# Patient Record
Sex: MALE | Race: WHITE | NOT HISPANIC OR LATINO | Employment: UNEMPLOYED | URBAN - METROPOLITAN AREA
[De-identification: names, ages, dates, MRNs, and addresses within clinical notes are randomized per-mention and may not be internally consistent; named-entity substitution may affect disease eponyms.]

---

## 2014-01-14 LAB — HBA1C MFR BLD: 6.7 % (ref 4.3–6)

## 2014-01-29 LAB
CHOLEST SERPL-MCNC: 205 MG/DL (ref 0–200)
HDLC SERPL-MCNC: 27 MG/DL (ref 40–110)
LDLC SERPL CALC-MCNC: 130 MG/DL (ref 0–129)
NONHDLC SERPL-MCNC: ABNORMAL MG/DL
TRIGL SERPL-MCNC: 242 MG/DL (ref 0–150)

## 2014-07-08 LAB
ANION GAP SERPL CALCULATED.3IONS-SCNC: 13 MMOL/L (ref 6–17)
ANION GAP SERPL CALCULATED.3IONS-SCNC: 15 MMOL/L (ref 6–17)
BASOPHILS NFR BLD AUTO: 0.4 %
BUN SERPL-MCNC: 14 MG/DL (ref 5–24)
BUN SERPL-MCNC: 20 MG/DL (ref 5–24)
CALCIUM SERPL-MCNC: 8.8 MG/DL (ref 8.5–10.4)
CALCIUM SERPL-MCNC: 9.7 MG/DL (ref 8.5–10.4)
CHLORIDE SERPLBLD-SCNC: 101 MMOL/L (ref 94–109)
CHLORIDE SERPLBLD-SCNC: 103 MMOL/L (ref 94–109)
CO2 SERPL-SCNC: 28 MMOL/L (ref 20–32)
CO2 SERPL-SCNC: 28 MMOL/L (ref 20–32)
CREAT SERPL-MCNC: 0.93 MG/DL (ref 0.66–1.25)
CREAT SERPL-MCNC: 1 MG/DL (ref 0.66–1.25)
EOSINOPHIL NFR BLD AUTO: 3 %
ERYTHROCYTE [DISTWIDTH] IN BLOOD BY AUTOMATED COUNT: 15.1 % (ref 10–15)
GFR SERPL CREATININE-BSD FRML MDRD: 80 ML/MIN/1.73M2
GFR SERPL CREATININE-BSD FRML MDRD: 87 ML/MIN/1.73M2
GLUCOSE SERPL-MCNC: 147 MG/DL (ref 60–99)
GLUCOSE SERPL-MCNC: 186 MG/DL (ref 60–99)
HCT VFR BLD AUTO: 52.2 % (ref 40–53)
HEMOGLOBIN: 14.8 G/DL (ref 13.3–17.7)
HEMOGLOBIN: 17.6 G/DL (ref 13.3–17.7)
LYMPHOCYTES NFR BLD AUTO: 20.7 %
MCH RBC QN AUTO: 29.8 PG (ref 26.5–33)
MCHC RBC AUTO-ENTMCNC: 33.6 G/DL (ref 31.5–36.5)
MCV RBC AUTO: 88 FL (ref 78–100)
MONOCYTES NFR BLD AUTO: 6.9 %
NEUTROPHILS NFR BLD AUTO: 68.7 %
PLATELET COUNT - QUEST: 312 10^9/L (ref 150–450)
POTASSIUM SERPL-SCNC: 4.1 MMOL/L (ref 3.4–5.3)
POTASSIUM SERPL-SCNC: 4.5 MMOL/L (ref 3.4–5.3)
RBC # BLD AUTO: 5.9 10^12/L (ref 4.4–5.9)
SODIUM SERPL-SCNC: 142 MMOL/L (ref 133–144)
SODIUM SERPL-SCNC: 146 MMOL/L (ref 133–144)
WBC # BLD AUTO: 15.2 10^9/L (ref 4–11)

## 2017-01-05 ENCOUNTER — HOSPITAL ENCOUNTER (OUTPATIENT)
Dept: NEUROLOGY | Facility: CLINIC | Age: 47
Setting detail: THERAPIES SERIES
Discharge: STILL ADMITTED OR ADMINISTRATIVE | End: 2017-01-05
Attending: NURSE PRACTITIONER

## 2017-01-05 DIAGNOSIS — G89.29 CHRONIC PAIN: ICD-10-CM

## 2017-01-05 DIAGNOSIS — G47.00 INSOMNIA, UNSPECIFIED: ICD-10-CM

## 2017-01-05 DIAGNOSIS — F33.1 MDD (MAJOR DEPRESSIVE DISORDER), RECURRENT EPISODE, MODERATE (H): ICD-10-CM

## 2017-01-13 ENCOUNTER — OFFICE VISIT (OUTPATIENT)
Dept: ORTHOPEDICS | Facility: CLINIC | Age: 47
End: 2017-01-13

## 2017-01-13 VITALS — WEIGHT: 260 LBS | BODY MASS INDEX: 35.21 KG/M2 | HEIGHT: 72 IN

## 2017-01-13 DIAGNOSIS — E11.40 TYPE 2 DIABETES MELLITUS WITH DIABETIC NEUROPATHY, WITHOUT LONG-TERM CURRENT USE OF INSULIN (H): ICD-10-CM

## 2017-01-13 DIAGNOSIS — L60.0 ONYCHOCRYPTOSIS: Primary | ICD-10-CM

## 2017-01-13 ASSESSMENT — ENCOUNTER SYMPTOMS
CONSTIPATION: 1
EXERCISE INTOLERANCE: 1
DYSPNEA ON EXERTION: 1
TINGLING: 1
SHORTNESS OF BREATH: 1
HYPERTENSION: 1
MEMORY LOSS: 1
SNORES LOUDLY: 1
NUMBNESS: 1
LEG SWELLING: 1
LEG PAIN: 1
BACK PAIN: 1
ABDOMINAL PAIN: 1
NECK PAIN: 1

## 2017-01-13 NOTE — PROGRESS NOTES
Date of Service: 1/13/2017    Chief Complaint:   Chief Complaint   Patient presents with     Consult     Bilateral toe pain, Ingrown toe pain, Neuropathy        HPI: Konstantin is a 46 year old male who presents today for further evaluation of painful ingrown nails for many weeks. He presents today with his sister-in-law who is also his PCA. They relate to me that Konstantin is a diabetic. He receives his primary care from an outside PCP. He relates that the nails get ingrown in the corners and he tries to dig them out himself. He also relates the sensation of feeling like thread is around the toes, not in a constricting manner, just loosely wrapped around. He doesn't recall his last A1c number exactly, however he thinks it was in the low 7's. His PMD would like this number decreased. He relates that he has neuropathy and has had large doses of gabapentin that were not sufficient to help with the symptoms. His PMD attempted to get Lyrica for him, however it was declined by insurance.     Review of Systems: A 14-point review was obtained and added to the medical record.   Answers for HPI/ROS submitted by the patient on 1/13/2017   General Symptoms: No  Skin Symptoms: No  HENT Symptoms: No  EYE SYMPTOMS: No  HEART SYMPTOMS: Yes  LUNG SYMPTOMS: Yes  INTESTINAL SYMPTOMS: Yes  URINARY SYMPTOMS: No  REPRODUCTIVE SYMPTOMS: No  SKELETAL SYMPTOMS: Yes  BLOOD SYMPTOMS: No  NERVOUS SYSTEM SYMPTOMS: Yes  MENTAL HEALTH SYMPTOMS: No  Difficulty breating or shortness of breath: Yes  Snoring: Yes  Difficulty breathing on exertion: Yes  Chest pain or pressure: Yes  Pain in legs with walking: Yes  Swelling in feet or ankles: Yes  Fingers or Toes appear blue: Yes  High blood pressure: Yes  Chest pain on exertion: Yes  Pacemaker: Yes  Edema or swelling: Yes  Exercise intolerance: Yes  Abdominal pain: Yes  Constipation: Yes  Hemorrhoids: Yes  Back pain: Yes  Neck pain: Yes  Memory loss: Yes  Tingling: Yes  Numbness: Yes        PMH:   Past  Medical History   Diagnosis Date     Cardiomyopathy      Atrial fibrillation (H)      HTN (hypertension)      Unspecified asthma(493.90)      Heart disease      Inflammatory arthritis      Back pains, lower      Cardiomyopathy (H)      Polysubstance abuse      Sleep apnea      doesn't use cpap     Cardiomyopathy      Type 2 diabetes mellitus without complications (H)      Coronary artery disease      Shortness of breath      Muscular dystrophy (H)      Dual ICD (implantable cardiac defibrillator) in place 4/29/2014     Pacemaker      Hypertrophic nonobstructive cardiomyopathy (H) 9/12/2012       PSxH:   Past Surgical History   Procedure Laterality Date     Neck surgery       X 2     Stomach surgery       Wrist surgery       LEFT     Shoulder surgery       RIGHT     Discectomy lumbar posterior microscopic three+ levels  12/16/2011     Procedure:DISCECTOMY LUMBAR POSTERIOR MICROSCOPIC THREE+ LEVELS; Posterior Decompression L2-S1; Surgeon:CAITIE OSMAN; [facility]     Appendectomy       Fusion cervical posterior one level  8/24/2012     Procedure: FUSION CERVICAL POSTERIOR ONE LEVEL;  Posterior Instrumentated Spinal Fusion Cervical 6-7, Right Iliac Crest Bone [place] ;  Surgeon: Caitie Osman MD;  [facility]     Graft bone from iliac crest  8/24/2012     Procedure: GRAFT BONE FROM ILIAC CREST;;  Surgeon: Caitie Osman MD;  [facility]     Abdomen surgery       Insert stimulator and leads internal dorsal column  8/7/2013     Procedure: INSERT STIMULATOR AND LEADS INTERNAL DORSAL COLUMN;  SPINAL CORD STIMULATOR IMPLANT ;  Surgeon: Ricardo Bruno MD;  [facility]     Insert stimulator dorsal column       Myectomy septal  4/14/2014     Procedure: Median Sternotomy, Septal Myectomy, Intraoperative BRENT performed by Dr. Castano, on pump oxygenator.;  Surgeon: Aguila Cannon MD;  [facility]       4/29/2014     Procedure: ANESTHESIA ICD/PACEMAKER CHANGE ADULT;   "Surgeon: Generic Anesthesia Provider;  [facility]     Laser co2 laryngoscopy, complex  7/22/2014     Procedure: LASER CO2 LARYNGOSCOPY, COMPLEX;  Surgeon: Siri Koch MD;  [facility]     Laser co2 laryngoscopy N/A 2/19/2015     Procedure: LASER CO2 LARYNGOSCOPY;  Surgeon: Siri Koch MD;  [facility]     Inject steroid (location) N/A 2/19/2015     Procedure: INJECT STEROID (LOCATION);  Surgeon: Siri Koch MD;  [facility]       Allergies: Bee venom; Lisinopril; and Penicillins    SH:   Social History     Social History     Marital Status: Single     Spouse Name: N/A     Number of Children: N/A     Years of Education: N/A     Occupational History     Not on file.     Social History Main Topics     Smoking status: Current Every Day Smoker -- 0.25 packs/day for 28 years     Types: Cigarettes     Last Attempt to Quit: 04/07/2014     Smokeless tobacco: Never Used     Alcohol Use: 0.0 oz/week     0 Standard drinks or equivalent per week      Comment: 0-1 X Weekly     Drug Use: No     Sexual Activity: Not on file     Other Topics Concern     Parent/Sibling W/ Cabg, Mi Or Angioplasty Before 65f 55m? Yes     Social History Narrative       FH:   Family History   Problem Relation Age of Onset     HEART DISEASE Father 32     MIs x2; s/p CABG     Hypertension Brother      DIABETES Brother      Glaucoma Maternal Grandmother      DIABETES Maternal Grandfather      Glaucoma Maternal Grandfather        Objective:   6' 0\" 260 lbs 0 oz    PT and DP pulses are 1/4 bilaterally. CRT is > 3 seconds. Diminished pedal hair.   Gross sensation is intact bilaterally. Monofilament testing with a 5.07 (10 G) filament revealed decreased protective sensation bilaterally.    Equinus is noted bilaterally. No pain with active or passive ROM of the ankle, MTJ, 1st ray, or halluces bilaterally,.   No open lesions are noted. Nails are short. The hallux nails are mildly incurvated at the medial " borders with no signs of infection noted to the digits today. Some mild pain with palpation of the area.     Assessment: DM2 with neuropathy  Onychocryptosis BL halluces     Plan:  - Pt seen and evaluated  - The medial hallucial nails were debrided using a slantback procedure. The right nail had scant bleeding and was covered with a bandaid.   - I will try to get a topical neuropathic cream. I think that the insurance will deny it though. If they do, he can try Capsaicin OTC for the neuropathy.   - See again in 6 months or sooner if problems arise.

## 2017-01-13 NOTE — NURSING NOTE
Reason For Visit:   Chief Complaint   Patient presents with     Consult     Bilateral toe pain, Ingrown toe pain, Neuropathy       Primary MD: Jesus Boyle      Ht 1.829 m (6')  Wt 117.935 kg (260 lb)  BMI 35.25 kg/m2    Pain Assessment  Patient Currently in Pain: Yes  0-10 Pain Scale: 7  Primary Pain Location: Foot  Pain Orientation: Right, Left  Other Pain Locations: Neck, Back   Pain Descriptors: Aching, Burning, Tingling, Sharp, Shooting         Current Outpatient Prescriptions   Medication     Lubiprostone (AMITIZA PO)     FAMOTIDINE PO     warfarin (COUMADIN) 5 MG tablet     warfarin (COUMADIN) 1 MG tablet     losartan (COZAAR) 25 MG tablet     metFORMIN (GLUCOPHAGE) 500 MG tablet     traZODone (DESYREL) 50 MG tablet     glipiZIDE (GLUCOTROL) 5 MG tablet     mirtazapine (REMERON) 45 MG tablet     venlafaxine (EFFEXOR-XR) 150 MG 24 hr capsule     topiramate (TOPAMAX) 50 MG tablet     ARIPiprazole (ABILIFY) 2 MG tablet     diphenhydrAMINE (BENADRYL) 25 MG tablet     amitriptyline (ELAVIL) 25 MG tablet     magnesium oxide (MAG-OX) 400 (241.3 MG) MG tablet     QUEtiapine (SEROQUEL) 100 MG tablet     senna (SENOKOT) 8.6 MG tablet     metoprolol (LOPRESSOR) 50 MG tablet     aspirin 81 MG tablet     albuterol (PROAIR HFA, PROVENTIL HFA, VENTOLIN HFA) 108 (90 BASE) MCG/ACT inhaler     No current facility-administered medications for this visit.          Allergies   Allergen Reactions     Bee Venom Swelling     Lisinopril      TABS  Severe cough     Penicillins Hives and Difficulty breathing

## 2017-01-13 NOTE — Clinical Note
1/13/2017       RE: Konstantin Sharp  [address]  [facility] [address]     Dear Colleague,    Thank you for referring your patient, Konstantin Sharp, to the [facility] at [facility]. Please see a copy of my visit note below.    Date of Service: 1/13/2017    Chief Complaint:   Chief Complaint   Patient presents with     Consult     Bilateral toe pain, Ingrown toe pain, Neuropathy        HPI: Konstantin is a 46 year old male who presents today for further evaluation of painful ingrown nails for many weeks. He presents today with his sister-in-law who is also his PCA. They relate to me that Konstantin is a diabetic. He receives his primary care from an outside PCP. He relates that the nails get ingrown in the corners and he tries to dig them out himself. He also relates the sensation of feeling like thread is around the toes, not in a constricting manner, just loosely wrapped around. He doesn't recall his last A1c number exactly, however he thinks it was in the low 7's. His PMD would like this number decreased. He relates that he has neuropathy and has had large doses of gabapentin that were not sufficient to help with the symptoms. His PMD attempted to get Lyrica for him, however it was declined by insurance.     Review of Systems: A 14-point review was obtained and added to the medical record.   Answers for HPI/ROS submitted by the patient on 1/13/2017   General Symptoms: No  Skin Symptoms: No  HENT Symptoms: No  EYE SYMPTOMS: No  HEART SYMPTOMS: Yes  LUNG SYMPTOMS: Yes  INTESTINAL SYMPTOMS: Yes  URINARY SYMPTOMS: No  REPRODUCTIVE SYMPTOMS: No  SKELETAL SYMPTOMS: Yes  BLOOD SYMPTOMS: No  NERVOUS SYSTEM SYMPTOMS: Yes  MENTAL HEALTH SYMPTOMS: No  Difficulty breating or shortness of breath: Yes  Snoring: Yes  Difficulty breathing on exertion: Yes  Chest pain or pressure: Yes  Pain in legs with walking: Yes  Swelling in feet or ankles: Yes  Fingers or Toes appear blue:  Yes  High blood pressure: Yes  Chest pain on exertion: Yes  Pacemaker: Yes  Edema or swelling: Yes  Exercise intolerance: Yes  Abdominal pain: Yes  Constipation: Yes  Hemorrhoids: Yes  Back pain: Yes  Neck pain: Yes  Memory loss: Yes  Tingling: Yes  Numbness: Yes        PMH:   Past Medical History   Diagnosis Date     Cardiomyopathy      Atrial fibrillation (H)      HTN (hypertension)      Unspecified asthma(493.90)      Heart disease      Inflammatory arthritis      Back pains, lower      Cardiomyopathy (H)      Polysubstance abuse      Sleep apnea      doesn't use cpap     Cardiomyopathy      Type 2 diabetes mellitus without complications (H)      Coronary artery disease      Shortness of breath      Muscular dystrophy (H)      Dual ICD (implantable cardiac defibrillator) in place 4/29/2014     Pacemaker      Hypertrophic nonobstructive cardiomyopathy (H) 9/12/2012       PSxH:   Past Surgical History   Procedure Laterality Date     Neck surgery       X 2     Stomach surgery       Wrist surgery       LEFT     Shoulder surgery       RIGHT     Discectomy lumbar posterior microscopic three+ levels  12/16/2011     Procedure:DISCECTOMY LUMBAR POSTERIOR MICROSCOPIC THREE+ LEVELS; Posterior Decompression L2-S1; Surgeon:CAITIE FUNEZ; [facility]     Appendectomy       Fusion cervical posterior one level  8/24/2012     Procedure: FUSION CERVICAL POSTERIOR ONE LEVEL;  Posterior Instrumentated Spinal Fusion Cervical 6-7, Right Iliac Crest Bone [place] ;  Surgeon: Caitie Funez MD;  [facility]     Graft bone from iliac crest  8/24/2012     Procedure: GRAFT BONE FROM ILIAC CREST;;  Surgeon: Caitie Funez MD;  [facility]     Abdomen surgery       Insert stimulator and leads internal dorsal column  8/7/2013     Procedure: INSERT STIMULATOR AND LEADS INTERNAL DORSAL COLUMN;  SPINAL CORD STIMULATOR IMPLANT ;  Surgeon: Ricardo Bruno MD;  [facility]     Insert stimulator dorsal  "column       Myectomy septal  4/14/2014     Procedure: Median Sternotomy, Septal Myectomy, Intraoperative BRENT performed by Dr. Castano, on pump oxygenator.;  Surgeon: Aguila Cannon MD;  [facility]       4/29/2014     Procedure: ANESTHESIA ICD/PACEMAKER CHANGE ADULT;  Surgeon: Generic Anesthesia Provider;  [facility]     Laser co2 laryngoscopy, complex  7/22/2014     Procedure: LASER CO2 LARYNGOSCOPY, COMPLEX;  Surgeon: Siri Koch MD;  [facility]     Laser co2 laryngoscopy N/A 2/19/2015     Procedure: LASER CO2 LARYNGOSCOPY;  Surgeon: Siri Koch MD;  [facility]     Inject steroid (location) N/A 2/19/2015     Procedure: INJECT STEROID (LOCATION);  Surgeon: Siri Koch MD;  [facility]       Allergies: Bee venom; Lisinopril; and Penicillins    SH:   Social History     Social History     Marital Status: Single     Spouse Name: N/A     Number of Children: N/A     Years of Education: N/A     Occupational History     Not on file.     Social History Main Topics     Smoking status: Current Every Day Smoker -- 0.25 packs/day for 28 years     Types: Cigarettes     Last Attempt to Quit: 04/07/2014     Smokeless tobacco: Never Used     Alcohol Use: 0.0 oz/week     0 Standard drinks or equivalent per week      Comment: 0-1 X Weekly     Drug Use: No     Sexual Activity: Not on file     Other Topics Concern     Parent/Sibling W/ Cabg, Mi Or Angioplasty Before 65f 55m? Yes     Social History Narrative       FH:   Family History   Problem Relation Age of Onset     HEART DISEASE Father 32     MIs x2; s/p CABG     Hypertension Brother      DIABETES Brother      Glaucoma Maternal Grandmother      DIABETES Maternal Grandfather      Glaucoma Maternal Grandfather        Objective:   6' 0\" 260 lbs 0 oz    PT and DP pulses are 1/4 bilaterally. CRT is > 3 seconds. Diminished pedal hair.   Gross sensation is intact bilaterally. Monofilament testing with a 5.07 (10 G) " filament revealed decreased protective sensation bilaterally.    Equinus is noted bilaterally. No pain with active or passive ROM of the ankle, MTJ, 1st ray, or halluces bilaterally,.   No open lesions are noted. Nails are short. The hallux nails are mildly incurvated at the medial borders with no signs of infection noted to the digits today. Some mild pain with palpation of the area.     Assessment: DM2 with neuropathy  Onychocryptosis BL halluces     Plan:  - Pt seen and evaluated  - The medial hallucial nails were debrided using a slantback procedure. The right nail had scant bleeding and was covered with a bandaid.   - I will try to get a topical neuropathic cream. I think that the insurance will deny it though. If they do, he can try Capsaicin OTC for the neuropathy.   - See again in 6 months or sooner if problems arise.          Again, thank you for allowing me to participate in the care of your patient.      Sincerely,    Casey Bangura DPM

## 2017-01-26 ENCOUNTER — COMMUNICATION - HEALTHEAST (OUTPATIENT)
Dept: NEUROLOGY | Facility: CLINIC | Age: 47
End: 2017-01-26

## 2017-02-03 ENCOUNTER — ALLIED HEALTH/NURSE VISIT (OUTPATIENT)
Dept: CARDIOLOGY | Facility: CLINIC | Age: 47
End: 2017-02-03
Attending: INTERNAL MEDICINE
Payer: MEDICAID

## 2017-02-03 ENCOUNTER — OFFICE VISIT (OUTPATIENT)
Dept: OTOLARYNGOLOGY | Facility: CLINIC | Age: 47
End: 2017-02-03

## 2017-02-03 VITALS — HEIGHT: 71 IN | WEIGHT: 251 LBS | BODY MASS INDEX: 35.14 KG/M2

## 2017-02-03 DIAGNOSIS — I42.2 HYPERTROPHIC NONOBSTRUCTIVE CARDIOMYOPATHY (H): Primary | ICD-10-CM

## 2017-02-03 DIAGNOSIS — J95.5 POSTPROCEDURAL SUBGLOTTIC STENOSIS: ICD-10-CM

## 2017-02-03 DIAGNOSIS — R13.12 OROPHARYNGEAL DYSPHAGIA: ICD-10-CM

## 2017-02-03 DIAGNOSIS — J38.6 SUBGLOTTIC STENOSIS: Primary | ICD-10-CM

## 2017-02-03 DIAGNOSIS — R49.0 DYSPHONIA: ICD-10-CM

## 2017-02-03 DIAGNOSIS — R06.02 SOB (SHORTNESS OF BREATH): ICD-10-CM

## 2017-02-03 PROCEDURE — 93289 INTERROG DEVICE EVAL HEART: CPT | Mod: 26 | Performed by: INTERNAL MEDICINE

## 2017-02-03 PROCEDURE — 93283 PRGRMG EVAL IMPLANTABLE DFB: CPT | Mod: ZF

## 2017-02-03 ASSESSMENT — PAIN SCALES - GENERAL: PAINLEVEL: NO PAIN (0)

## 2017-02-03 NOTE — NURSING NOTE
Procedure: Upper aerodigestive tract endoscopy, Flexible or rigid laryngoscopy, possible stroboscopy, possible flexible endoscopic evaluation of swallowing   Reason: symptoms requiring examination   PREPROCEDURE:   Yes Patient ID verified with 2 identifiers (name and  or MRN)   Yes: Procedure and site verified with patient/designee (when able)   Yes: Accurate consent documentation in medical record   No: Marking not required. Reason: [ Procedure does not require site marking. ][ Provider is in continuous attendance with the patient from consent through completion of procedure. ][ Marking unable or refused by patient (see scanned diagram).   TIME OUT:   Yes: Time-Out performed immediately prior to starting procedure, including verbal and active participation of all team members addressing:   * Correct patient identity   * Confirmed that the correct side and site are marked   * An accurate procedure consent form   * Agreement on the procedure to be done   * Correct patient position   * Relevant images and results are properly labeled and appropriately displayed   * The need to administer antibiotics or fluids for irrigation purposes during the procedure as applicable   * Safety precations based on patient history or medication use   DURING PROCEDURE: Verification of correct person, site, and procedure occurs any time the responsibility for care of the patient is transferred to another member of the care team.   Roseanna Ding RN  P Otolaryngology/Head & Neck Surgery

## 2017-02-03 NOTE — Clinical Note
2/3/2017      RE: Konstantin Sharp  [address]  [facility] [address]       Dear Dr. Cooper:    Konstantin Sharp recently returned for follow-up at the [facility]. My clinic note from our visit is enclosed below.     I appreciate the ongoing opportunity to participate in this patient's care.    Please feel free to contact me with any questions.      Sincerely yours,  Siri Koch M.D., M.P.H.  , Laryngology  Director, [facility]  Otolaryngology- Head & Neck Surgery  [phone]            =====  HISTORY OF PRESENT ILLNESS:  Konstantin Sharp is a pleasant 46-year-old male with intubation-related subglottic stenosis and multiple medical problems, status post microdirect laryngoscopy with kenalog injection and balloon dilation 7/22/2014 and 2/19/2015 who returns in follow up today. He was last seen in 2015, at which time we discussed a referral to Dr. Covington. He underwent an airway resection at [place] with Dr Erickson in early 2015.  He was hospitalized in Fall of 2015 with pneumonia and ended up receiving a tracheostomy at [place]. He was decannulated December of 2015. He reports that since that time, he has had stable limitation in activity.  He is fine at rest, but gets short of breath if he walks more than about 0.25 mile or goes up stairs. He also reports that about a month after decannulation, he began having difficulty with swallowing pills and food although water is okay. Part of the trouble is difficulty with chewing due to dental problems. This problem has been persistent and stable. No voice or cough concerns.      MEDICATIONS:     Current Outpatient Prescriptions   Medication Sig Dispense Refill     Lubiprostone (AMITIZA PO) Take 24 mcg by mouth 2 times daily (with meals)       FAMOTIDINE PO Take 10 mg by mouth daily       warfarin (COUMADIN) 5 MG tablet 8.5 mg daily 30 tablet      warfarin (COUMADIN) 1 MG tablet Total 8.5 mg daily 30 tablet       losartan (COZAAR) 25 MG tablet Take 1 tablet (25 mg) by mouth daily 30 tablet      metFORMIN (GLUCOPHAGE) 500 MG tablet 850 mg BID 60 tablet      traZODone (DESYREL) 50 MG tablet Take 1 tablet (50 mg) by mouth 3 times daily 90 tablet      glipiZIDE (GLUCOTROL) 5 MG tablet Take 1 tablet (5 mg) by mouth 2 times daily (before meals) 30 tablet      mirtazapine (REMERON) 45 MG tablet Take 1 tablet (45 mg) by mouth daily 90 tablet 1     venlafaxine (EFFEXOR-XR) 150 MG 24 hr capsule Take 1 capsule (150 mg) by mouth daily 30 capsule 1     topiramate (TOPAMAX) 50 MG tablet Take 1 tablet (50 mg) by mouth 2 times daily 60 tablet      ARIPiprazole (ABILIFY) 2 MG tablet Take 1 tablet (2 mg) by mouth 2 times daily 30 tablet 0     diphenhydrAMINE (BENADRYL) 25 MG tablet Take 1-2 tablets (25-50 mg) by mouth every 6 hours as needed for itching or allergies 60 tablet 1     amitriptyline (ELAVIL) 25 MG tablet Take 1 tablet (25 mg) by mouth At Bedtime 90 tablet 1     magnesium oxide (MAG-OX) 400 (241.3 MG) MG tablet Take 1 tablet (400 mg) by mouth daily 60 tablet 3     QUEtiapine (SEROQUEL) 100 MG tablet 150 mg every HS 30 tablet 1     senna (SENOKOT) 8.6 MG tablet Take 1 tablet by mouth daily 120 tablet      metoprolol (LOPRESSOR) 50 MG tablet Take 100 mg by mouth 3 times daily  180 tablet 4     aspirin 81 MG tablet Take 1 tablet (81 mg) by mouth daily 90 tablet 4     albuterol (PROAIR HFA, PROVENTIL HFA, VENTOLIN HFA) 108 (90 BASE) MCG/ACT inhaler Inhale 2 puffs into the lungs every 4 hours as needed         ALLERGIES:    Allergies   Allergen Reactions     Bee Venom Swelling     Lisinopril      TABS  Severe cough     Penicillins Hives and Difficulty breathing       NEW PMH/PSH: None    REVIEW OF SYSTEMS:  The patient completed a comprehensive 11 point review of systems (below), which was reviewed. Positives are as noted below.  Patient Supplied Answers to Review of Systems  [facility] ENT ROS 2/3/2017   Constitutional Unexplained  fatigue, Problems with sleep   Neurology Numbness   Psychology Frequently feeling depressed or sad   Eyes Double vision   Ears, Nose, Throat Ringing/noise in ears, Trouble swallowing   Gastrointestinal/Genitourinary Constipation   Musculoskeletal Back pain, Neck pain   Endocrine Heat or cold intolerance, Frequent urination       PHYSICAL EXAM:  General: The patient was alert and conversant, and in no acute distress. Patient accompanied by his significant other.  Oral cavity/oropharynx: No masses or lesions. Dentition unchanged since prior. Tongue mobility and palate elevation intact and symmetric.  Neck: No palpable cervical lymphadenopathy, no significant tenderness to palpation of the thyrohyoid space. No obvious thyroid abnormality. Well healed anterior neck incision/trach scar.  Resp: Breathing comfortably, no stridor or stertor.  Neuro: Symmetric facial function. Other cranial nerve function as documented above.  Psych: Normal affect, pleasant and cooperative.  Voice/speech: No significant dysphonia.      Intake scores  Last 2 Scores for Patient-Answered VHI Questionnaire  VHI Total Score 2/3/2017   VHI Total Score 0      Last 2 Scores for Patient-Answered EAT Questionnaire  EAT Total Score 2/3/2017   EAT Total Score 17      Last 2 Scores for Patient-Answered CSI Questionnaire  CSI Total Score 2/3/2017   CSI Total Score 0       Procedure:   Flexible fiberoptic laryngoscopy  Indications: This procedure was warranted to evaluate the patient's laryngeal function. Risks, benefits, and alternatives were discussed.  Description: After written informed consent was obtained, a time-out was performed to confirm patient identity, procedure, and procedure site. Topical 3% lidocaine with 0.25% phenylephrine was applied to the nasal cavities. I performed the endoscopy and no complications were apparent.  Performed by: Siri Koch MD MPH  SLP: NA  Findings: Normal nasopharynx. Normal base of tongue, valleculae, and  epiglottis. The right true vocal fold demonstrated normal mobility. The left true vocal fold demonstrated normal mobility. The medial edges of the vocal folds appeared smooth and straight. No focal mucosal lesions were observed on the true vocal folds. Moderate supraglottic hyperfunction.  Mucosa of the false vocal folds, aryepiglottic folds, piriform sinuses, and posterior glottis unremarkable. Airway (including subglottis and upper trachea) was mildly narrow and slightly tortuous, but patent.      IMPRESSION AND PLAN:   Konstantin Sharp returns with a patent airway status post airway reconstruction by Dr. Erickson. He is having difficulty with ongoing dyspnea on exertion and dysphagia.    Recommendations:  1) I recommended a video fluoro swallow study for further evaluation of swallowing. I encouraged immediate adoption of any strategies and/or exercises learned at that evaluation.   2) If VFSS normal, plan speech therapy with [facility] SLP for supraglottic hyperfunction.  3) Continue follow up with primary provider and pulmonary/cardiology for other contributors to shortness of breath, as airway is patent and he did not demonstrate paradoxical vocal fold motion.    He will return as needed. I appreciate the opportunity to participate in the care of this pleasant patient.     Siri Koch MD

## 2017-02-03 NOTE — NURSING NOTE
Chief Complaint   Patient presents with     RECHECK     Return Throat - throat check, difficulty breathing     Pt states no pain today, just difficulty swallowing.     RAYNE Bonner LPN

## 2017-02-03 NOTE — PROGRESS NOTES
Dear Dr. Cooper:    Konstantin Sharp recently returned for follow-up at the [facility]. My clinic note from our visit is enclosed below.     I appreciate the ongoing opportunity to participate in this patient's care.    Please feel free to contact me with any questions.      Sincerely yours,  Siri Koch M.D., M.P.H.  , Laryngology  Director, [facility]  Otolaryngology- Head & Neck Surgery  [phone]            =====  HISTORY OF PRESENT ILLNESS:  Konstantin Sharp is a pleasant 46-year-old male with intubation-related subglottic stenosis and multiple medical problems, status post microdirect laryngoscopy with kenalog injection and balloon dilation 7/22/2014 and 2/19/2015 who returns in follow up today. He was last seen in 2015, at which time we discussed a referral to Dr. Covington. He underwent an airway resection at [facility] with Dr Erickson in early 2015.  He was hospitalized in Fall of 2015 with pneumonia and ended up receiving a tracheostomy at [facility]. He was decannulated December of 2015. He reports that since that time, he has had stable limitation in activity.  He is fine at rest, but gets short of breath if he walks more than about 0.25 mile or goes up stairs. He also reports that about a month after decannulation, he began having difficulty with swallowing pills and food although water is okay. Part of the trouble is difficulty with chewing due to dental problems. This problem has been persistent and stable. No voice or cough concerns.      MEDICATIONS:     Current Outpatient Prescriptions   Medication Sig Dispense Refill     Lubiprostone (AMITIZA PO) Take 24 mcg by mouth 2 times daily (with meals)       FAMOTIDINE PO Take 10 mg by mouth daily       warfarin (COUMADIN) 5 MG tablet 8.5 mg daily 30 tablet      warfarin (COUMADIN) 1 MG tablet Total 8.5 mg daily 30 tablet      losartan (COZAAR) 25 MG tablet Take 1 tablet (25 mg) by mouth daily 30  tablet      metFORMIN (GLUCOPHAGE) 500 MG tablet 850 mg BID 60 tablet      traZODone (DESYREL) 50 MG tablet Take 1 tablet (50 mg) by mouth 3 times daily 90 tablet      glipiZIDE (GLUCOTROL) 5 MG tablet Take 1 tablet (5 mg) by mouth 2 times daily (before meals) 30 tablet      mirtazapine (REMERON) 45 MG tablet Take 1 tablet (45 mg) by mouth daily 90 tablet 1     venlafaxine (EFFEXOR-XR) 150 MG 24 hr capsule Take 1 capsule (150 mg) by mouth daily 30 capsule 1     topiramate (TOPAMAX) 50 MG tablet Take 1 tablet (50 mg) by mouth 2 times daily 60 tablet      ARIPiprazole (ABILIFY) 2 MG tablet Take 1 tablet (2 mg) by mouth 2 times daily 30 tablet 0     diphenhydrAMINE (BENADRYL) 25 MG tablet Take 1-2 tablets (25-50 mg) by mouth every 6 hours as needed for itching or allergies 60 tablet 1     amitriptyline (ELAVIL) 25 MG tablet Take 1 tablet (25 mg) by mouth At Bedtime 90 tablet 1     magnesium oxide (MAG-OX) 400 (241.3 MG) MG tablet Take 1 tablet (400 mg) by mouth daily 60 tablet 3     QUEtiapine (SEROQUEL) 100 MG tablet 150 mg every HS 30 tablet 1     senna (SENOKOT) 8.6 MG tablet Take 1 tablet by mouth daily 120 tablet      metoprolol (LOPRESSOR) 50 MG tablet Take 100 mg by mouth 3 times daily  180 tablet 4     aspirin 81 MG tablet Take 1 tablet (81 mg) by mouth daily 90 tablet 4     albuterol (PROAIR HFA, PROVENTIL HFA, VENTOLIN HFA) 108 (90 BASE) MCG/ACT inhaler Inhale 2 puffs into the lungs every 4 hours as needed         ALLERGIES:    Allergies   Allergen Reactions     Bee Venom Swelling     Lisinopril      TABS  Severe cough     Penicillins Hives and Difficulty breathing       NEW PMH/PSH: None    REVIEW OF SYSTEMS:  The patient completed a comprehensive 11 point review of systems (below), which was reviewed. Positives are as noted below.  Patient Supplied Answers to Review of Systems  [facility] ENT ROS 2/3/2017   Constitutional Unexplained fatigue, Problems with sleep   Neurology Numbness   Psychology Frequently feeling  depressed or sad   Eyes Double vision   Ears, Nose, Throat Ringing/noise in ears, Trouble swallowing   Gastrointestinal/Genitourinary Constipation   Musculoskeletal Back pain, Neck pain   Endocrine Heat or cold intolerance, Frequent urination       PHYSICAL EXAM:  General: The patient was alert and conversant, and in no acute distress. Patient accompanied by his significant other.  Oral cavity/oropharynx: No masses or lesions. Dentition unchanged since prior. Tongue mobility and palate elevation intact and symmetric.  Neck: No palpable cervical lymphadenopathy, no significant tenderness to palpation of the thyrohyoid space. No obvious thyroid abnormality. Well healed anterior neck incision/trach scar.  Resp: Breathing comfortably, no stridor or stertor.  Neuro: Symmetric facial function. Other cranial nerve function as documented above.  Psych: Normal affect, pleasant and cooperative.  Voice/speech: No significant dysphonia.      Intake scores  Last 2 Scores for Patient-Answered VHI Questionnaire  VHI Total Score 2/3/2017   VHI Total Score 0      Last 2 Scores for Patient-Answered EAT Questionnaire  EAT Total Score 2/3/2017   EAT Total Score 17      Last 2 Scores for Patient-Answered CSI Questionnaire  CSI Total Score 2/3/2017   CSI Total Score 0       Procedure:   Flexible fiberoptic laryngoscopy  Indications: This procedure was warranted to evaluate the patient's laryngeal function. Risks, benefits, and alternatives were discussed.  Description: After written informed consent was obtained, a time-out was performed to confirm patient identity, procedure, and procedure site. Topical 3% lidocaine with 0.25% phenylephrine was applied to the nasal cavities. I performed the endoscopy and no complications were apparent.  Performed by: Siri Koch MD MPH  SLP: NA  Findings: Normal nasopharynx. Normal base of tongue, valleculae, and epiglottis. The right true vocal fold demonstrated normal mobility. The left true  vocal fold demonstrated normal mobility. The medial edges of the vocal folds appeared smooth and straight. No focal mucosal lesions were observed on the true vocal folds. Moderate supraglottic hyperfunction.  Mucosa of the false vocal folds, aryepiglottic folds, piriform sinuses, and posterior glottis unremarkable. Airway (including subglottis and upper trachea) was mildly narrow and slightly tortuous, but patent.      IMPRESSION AND PLAN:   Konstantin Sharp returns with a patent airway status post airway reconstruction by Dr. Erickson. He is having difficulty with ongoing dyspnea on exertion and dysphagia.    Recommendations:  1) I recommended a video fluoro swallow study for further evaluation of swallowing. I encouraged immediate adoption of any strategies and/or exercises learned at that evaluation.   2) If VFSS normal, plan speech therapy with [facility] SLP for supraglottic hyperfunction.  3) Continue follow up with primary provider and pulmonary/cardiology for other contributors to shortness of breath, as airway is patent and he did not demonstrate paradoxical vocal fold motion.    He will return as needed. I appreciate the opportunity to participate in the care of this pleasant patient.

## 2017-02-03 NOTE — Clinical Note
2/3/2017       RE: Konstantin Sharp  [address]  [facility] [address]     Dear Colleague,    Thank you for referring your patient, Konstantin Sharp, to the [facility] EAR NOSE AND THROAT at [facility]. Please see a copy of my visit note below.    No notes on file    Again, thank you for allowing me to participate in the care of your patient.      Sincerely,    Siri Koch MD

## 2017-02-03 NOTE — PATIENT INSTRUCTIONS
Plan of Care:  1. Swallow evaluation study referral to be placed  2. Depending on the results follow up with speech therapy or swallow therapy  3. Check with PCP regarding possible heart and lung issues leading to exertion    Clinic Information:  1. To schedule an appointment please call [phone], option 1  2. To talk to a Triage RN please call [phone] select option 3  3. To speak to Dr. Koch's nurse, Roseanna, please call [phone]    Roseanna Ding RN  [facility]   Head & Neck Surgery

## 2017-02-03 NOTE — PATIENT INSTRUCTIONS
It was a pleasure to see you in clinic today. Please do not hesitate to call with any questions or concerns. You are scheduled for a remote ICD transmission on 5/3/17. This will happen automatically in the night. We will call in 1-2 business days to discuss the results with you. We look forward to seeing you in clinic at your next device check in 6 months.    Rupal Zamora RN  Electrophysiology Nurse Clinician  [facility]  During business hours call:  [phone]  After business hours please call: [phone]- select option #4 and ask for job code [phone].

## 2017-02-07 ENCOUNTER — THERAPY VISIT (OUTPATIENT)
Dept: SPEECH THERAPY | Facility: CLINIC | Age: 47
End: 2017-02-07
Attending: OTOLARYNGOLOGY

## 2017-02-07 DIAGNOSIS — R13.12 OROPHARYNGEAL DYSPHAGIA: Primary | ICD-10-CM

## 2017-02-07 NOTE — PROGRESS NOTES
02/07/17 0800   General Information   Type Of Visit Initial   Start Of Care Date 02/07/17   Referring Physician Dr Siri Koch   Orders Evaluate And Treat   Orders Comment Video Swallow Study   Medical Diagnosis Oropharyngeal dysphagia   Onset Of Illness/injury Or Date Of Surgery 02/03/17   Hearing WFL   Pertinent History of Current Problem/OT: Additional Occupational Profile Info Mr Sharp is a 46 year old man who present with reported dysphagia to solids and pills. He has limited upper dentition and reports he does not chew well sometimes. He also has a history of multiple intubations and subglottic stenosis which was treated with tracheal reconstruction. After that time he was again hospitalized and required tracheostomy. He recovered from that illness and was decannulated. He followed up with his ENT on 2/3/17 reporting difficulty swallowing. He reports no coughing or choking when eating just a globus sensation in the area of his former tracheostomy on solids and pills. This will eventially resolve with sips of liquid.    Respiratory Status Room air   Prior Level Of Function Swallowing   Prior Level Of Function Comment Regular solids and thin liquids   General Observations Pt pleasant and cooperative. Pt very quiet with limited verbal responses.    Patient/family Goals Pt states desire to eat with greater ease.   FALL RISK SCREEN   Have you fallen 2 or more times in the last year? No   Have you fallen and had an injury in the past year? No   Is the patient a fall risk? No   Clinical Swallow Evaluation   Oral Musculature generally intact   Dentition other (see comments)  (Limited lower dentition, no upper dentition)   Mucosal Quality good   Mandibular Strength and Mobility intact   Oral Labial Strength and Mobility WFL   Lingual Strength and Mobility WFL   Velar Elevation intact   Buccal Strength and Mobility intact   Laryngeal Function Cough;Throat clear;Swallow;Voicing initiated   VFSS Eval: Radiology    Radiologist Resident   Views Taken left lateral;A/P   Physical Location of Procedure [facility]   VFSS Eval: Thin Liquid Texture Trial   Mode of Presentation, Thin Liquid cup;self-fed   Order of Presentation 1,2,7   Preparatory Phase WFL   Oral Phase, Thin Liquid WFL   Pharyngeal Phase, Thin Liquid WFL   Rosenbek's Penetration Aspiration Scale: Thin Liquid Trial Results 1 - no aspiration, contrast does not enter airway   Diagnostic Statement No aspiration demonstrated. No pharyngeal stasis noted. Single episode of flash penetration noted on the initial swallow of thin liquid. No further episodes noted.    VFSS Eval: Nectar Thick Liquid Texture Trial   Mode of Presentation, Nectar cup;self-fed   Order of Presentation 3   Preparatory Phase WFL   Oral Phase, Nectar WFL   Pharyngeal Phase, [place] WFL   Rosenbek's Penetration Aspiration Scale: Nectar-Thick Liquid Trial Results 1 - no aspiration, contrast does not enter airway   Diagnostic Statement Functional swallow demonstrated on nectar thick liquid trial.    VFSS Eval: Puree Solid Texture Trial   Mode of Presentation, Puree spoon;self-fed   Order of Presentation 4   Preparatory Phase WFL   Oral Phase, Puree WFL   Pharyngeal Phase, Puree WFL   Rosenbek's Penetration Aspiration Scale: Puree Food Trial Results 1 - no aspiration, contrast does not enter airway   Diagnostic Statement Functional swallow noted on pureed consistency   VFSS Eval: Solid Food Texture Trial   Mode of Presentation, Solid self-fed   Order of Presentation 5   Preparatory Phase WFL   Oral Phase, Solid WFL   Pharyngeal Phase, Solid WFL   Rosenbek's Penetration Aspiration Scale: Solid Food Trial Results 1 - no aspiration, contrast does not enter airway   Diagnostic Statement No overt clincal s/sx of aspiration/penetration noted. Pt demonstrated stasis in the valleculae after initial swallow however second swallow cleared this without difficulty.    Swallow Compensations    Swallow Compensations No compensations were used   Educational Assessment   Barriers to Learning Emotional;Cognitive   Preferred Learning Style Listening;Reading;Demonstration;Pictures/video   Esophageal Phase of Swallow   Esophageal sweep performed during today s vidofluoroscopic exam  Yes;Please refer to radiologist's report for details   Swallow Eval: Clinical Impressions   Skilled Criteria for Therapy Intervention No problems identified which require skilled intervention   Dysphagia Outcome Severity Scale (SARA) Level 7 - SARA   Treatment Diagnosis Functional oropharyngeal swallow   Diet texture recommendations Regular diet;Thin liquids   Recommended Feeding/Eating Techniques alternate between small bites and sips of food/liquid;maintain upright posture during/after eating for 30 mins;small sips/bites   Predicted Duration of Therapy Intervention (days/wks) Evaluation only   Anticipated Discharge Disposition home   Risks and Benefits of Treatment have been explained. Yes   Patient, family and/or staff in agreement with Plan of Care Yes   Clinical Impression Comments Mr Sharp presents with functional oropharyngeal swallow as characterized by no events of aspiration noted, single flash penetration into laryngeal vestibule on the initial swallow of thin liquid which spontaneously resolved and stasis noted in valleculae on solid consistency which was cleared with second spontaneous swallow. Pt demonstrated no other pharyngeal stasis. Pt demonstrated thurough mastication of cookie consistency. Pt reports he does not usually chew this much while eating at home. Pt has no upper dentition and limited lower dentition. Adequate ROM and strength of oral mechanism demonstrate. Pill presented with water which passed easily through oropharyngeal mechanism and into esophagus easily. Recommend regular consistency diet with thin liquids. Pt to sit upright for po intake. Encouraged small bites and small sips. Encouraged pt to  increase the moisture of his solids and cut solids more before he takes bites. Also encouraged pt to chew better when eating solid consistencies. Encouraged pt to eat more slowly as this will decrease some of his intermittent globus sensation. Mr Sharp and his sister-in-law/PCA were educated on this information. No further SLP services indicated for swallowing at this time. Thank you kindly for this referral.   Total Session Time   Total Session Time 45   Total Evaluation Time 45

## 2017-02-08 ENCOUNTER — TELEPHONE (OUTPATIENT)
Dept: OTOLARYNGOLOGY | Facility: CLINIC | Age: 47
End: 2017-02-08

## 2017-02-08 NOTE — TELEPHONE ENCOUNTER
Phoenix Monzon,    Mr Sharp's swallow study was normal, so I would like him to work with one of the [facility] SLPs to help with throat muscle coordination and reduce tightness. Could you pls place the referral and let him know to expect the call? Also, pls let him know that he needs to call in advance if he can't make it.     Thanks,  Siri     Left detailed msg with above information on patient's cell number. Stated Mr. Juan Pablo Viveros would be calling to get him scheduled with [facility]. Message sent to Juan Pablo.  Na Dillon, RN Care Coordinator  [facility] Otolaryngology/Head & Neck Surgery  Direct contact: [phone]

## 2017-02-13 ENCOUNTER — PRE VISIT (OUTPATIENT)
Dept: CARDIOLOGY | Facility: CLINIC | Age: 47
End: 2017-02-13

## 2017-02-14 ENCOUNTER — RADIANT APPOINTMENT (OUTPATIENT)
Dept: CARDIOLOGY | Facility: CLINIC | Age: 47
End: 2017-02-14

## 2017-02-14 ENCOUNTER — OFFICE VISIT (OUTPATIENT)
Dept: CARDIOLOGY | Facility: CLINIC | Age: 47
End: 2017-02-14
Attending: INTERNAL MEDICINE
Payer: MEDICAID

## 2017-02-14 VITALS
SYSTOLIC BLOOD PRESSURE: 122 MMHG | HEART RATE: 76 BPM | DIASTOLIC BLOOD PRESSURE: 82 MMHG | HEIGHT: 72 IN | BODY MASS INDEX: 33.94 KG/M2 | WEIGHT: 250.6 LBS | OXYGEN SATURATION: 94 %

## 2017-02-14 DIAGNOSIS — I42.2 HYPERTROPHIC CARDIOMYOPATHY (H): ICD-10-CM

## 2017-02-14 DIAGNOSIS — G47.10 EXCESSIVE SLEEPINESS: Primary | ICD-10-CM

## 2017-02-14 PROCEDURE — 99213 OFFICE O/P EST LOW 20 MIN: CPT

## 2017-02-14 PROCEDURE — 99214 OFFICE O/P EST MOD 30 MIN: CPT | Mod: ZP | Performed by: INTERNAL MEDICINE

## 2017-02-14 PROCEDURE — 99212 OFFICE O/P EST SF 10 MIN: CPT | Mod: 25,ZF

## 2017-02-14 ASSESSMENT — PAIN SCALES - GENERAL: PAINLEVEL: NO PAIN (0)

## 2017-02-14 NOTE — NURSING NOTE
Chief Complaint   Patient presents with     Follow Up For     heart problem--46 year old male with history of a fib, hypertrophic cardiomyopathy, s/p ventricular spetal myectomy and ICD placement, Hypertension, hyperlipidemia, and DM presenting for follow up

## 2017-02-14 NOTE — PATIENT INSTRUCTIONS
You were seen today in the Adult Congenital and Cardiovascular Genetics Clinic at the [facility].    Cardiology Providers you saw during your visit:  Dr Lia Castellano    Diagnosis:  Hypertrophic Cardiomyopathy    Results:  Dr Castellano reviewed the results of your echo with you in clinic    Recommendations:    1.  Continue to eat a heart healthy, low salt diet.  2.  Continue to get 20-30 minutes of aerobic activity, 4-5 days per week.  Examples of aerobic activity include walking, running, swimming, cycling, etc.  3.  Continue to observe good oral hygiene, with regular dental visits.  4.  Will place referral for sleep medicine evaluation      Vitals:    02/14/17 0850   BP: 122/82   BP Location: Right arm   Patient Position: Chair   Cuff Size: Adult Large   Pulse: 76   SpO2: 94%   Weight: 113.7 kg (250 lb 9.6 oz)   Height: 1.829 m (6')       SBE prophylaxis:   Yes____  No__x__    Lifelong Bacterial Endocarditis Prophylaxis:  YES____  NO____    If YES is checked, follow the recommendations outlined below:  1. Take antibiotic(s) prior to interventional procedures or surgeries (dental, respiratory, urologic, gastrointestinal, gynecologic), or instrumental examinations.   2. Observe good oral hygiene daily, as advised by your dentist. Get regular professional dental care.  3. Keep cuts clean.  4. Infections should be treated promptly.      Exercise restrictions:   Yes____  No_x___         If yes, list restrictions:  Must be allowed to rest if fatigued or SOB      Work restrictions:  Yes____  No__x__         If yes, list restrictions:    FASTING CHOLESTEROL was checked in the last 5 years YES_x__  NO___   Continue to eat a heart healthy, low salt diet.         ____ Fasting lipid panel order today         ____ No changes in medications          ____ I recommend the following changes in your cholesterol medications.:          ____ Please follow up for cholesterol screening at your primary care  physician      Follow-up:  Follow up with Dr Castellano in one year with an echo cardiogram. Device Clinic appointment in April.    For after hours urgent needs, call [phone] and ask to speak to the Adult Congenital Physician on call.  Mention Job Code [phone].    For emergencies call [phone].    For any scheduling needs, please call [facility] , at [phone]  Thank you for your visit today!  If you have questions or concerns about today's visit, please call me.    Cisco Hercules RN, BSN  Cardiology Care Coordinator  [facility]  [phone]    [address] [facility]  Mail Code [id number]  [place] [address]

## 2017-02-14 NOTE — LETTER
2/14/2017      RE: Konstantin Sharp  [address]  [facility] [address]       Dear Colleague,    Thank you for the opportunity to participate in the care of your patient, Konstantin Sharp, at the [facility] at [facility]. Please see a copy of my visit note below.    HPI:     It was a pleasure to see Konstantin Sharp and his significant other in [facility].  Konstantin is a 46-year-old gentleman with a past medical history significant for hypertrophic cardiomyopathy.  He also has diabetes, has nicotine dependence, hypertension and hyperlipidemia. I met him for the first time in June of 2015.  He has chronic atypical chest pain that he describes happen when he is lying on the couch, that is unchanged.  It can last for hours at a time, better when he walks around.  As part of this evaluation, he did have a nuclear stress test that was nondiagnostic; CTA in 2013 and 2015 were negative with calcium score of 0. He has a 30-pack-year smoking history and continues to smoke a half pack a day.  He also has diabetes type 2 and is morbidly obese.  Notably, he is also on Coumadin with a history of atrial fibrillation and DVT.  His last echo was today.  This showed no gradient across his thickened wall, which is 11 cm apically.  His ejection fraction is 66%.  He does have an ICD that was interrogated.  He has not had any shocks.     Since I last saw him he has been doing well with no change in symptoms. No hospitalizations and he has lost 12 pounds.  He has cut back on smoking to 1/2 PPD -he is now on Chantix.  I don't know why he is not on a statin - we need to look into that. I would like to know where his cholesterol is.  AIC has been around 7, per his report. No shocks from his ICD      Notably, he has 2 daughters and 2 sons. The 2 daughters are from the same mom and the 2 sons each have a different baby mother. One son has been checked and is  negative. Another daughter has been checked and is negative, but 2 of the other children have not been checked this point. He does have a diagnosis of obstructive sleep apnea and reports he does not use the CPAP mask because he cannot tolerate it, despite multiple tries. He is willing to get rechecked for that at this point.  He still uses a neurostimulation device in his back. He has been on disability since 2008.        PAST MEDICAL HISTORY:  Past Medical History   Diagnosis Date     Atrial fibrillation (H)      Back pains, lower      Cardiomyopathy      Cardiomyopathy      Cardiomyopathy (H)      Coronary artery disease      Dual ICD (implantable cardiac defibrillator) in place 4/29/2014     Heart disease      HTN (hypertension)      Hypertrophic nonobstructive cardiomyopathy (H) 9/12/2012     Inflammatory arthritis      Muscular dystrophy (H)      Pacemaker      Polysubstance abuse      Shortness of breath      Sleep apnea      doesn't use cpap     Type 2 diabetes mellitus without complications (H)      Unspecified asthma(493.90)        CURRENT MEDICATIONS:  Current Outpatient Prescriptions   Medication Sig Dispense Refill     Lubiprostone (AMITIZA PO) Take 24 mcg by mouth 2 times daily (with meals)       FAMOTIDINE PO Take 10 mg by mouth daily       warfarin (COUMADIN) 5 MG tablet 8.5 mg daily 30 tablet      warfarin (COUMADIN) 1 MG tablet Total 8.5 mg daily 30 tablet      losartan (COZAAR) 25 MG tablet Take 1 tablet (25 mg) by mouth daily 30 tablet      metFORMIN (GLUCOPHAGE) 500 MG tablet 850 mg BID 60 tablet      traZODone (DESYREL) 50 MG tablet Take 1 tablet (50 mg) by mouth 3 times daily 90 tablet      glipiZIDE (GLUCOTROL) 5 MG tablet Take 1 tablet (5 mg) by mouth 2 times daily (before meals) 30 tablet      mirtazapine (REMERON) 45 MG tablet Take 1 tablet (45 mg) by mouth daily 90 tablet 1     venlafaxine (EFFEXOR-XR) 150 MG 24 hr capsule Take 1 capsule (150 mg) by mouth daily 30 capsule 1     topiramate  (TOPAMAX) 50 MG tablet Take 1 tablet (50 mg) by mouth 2 times daily 60 tablet      ARIPiprazole (ABILIFY) 2 MG tablet Take 1 tablet (2 mg) by mouth 2 times daily 30 tablet 0     diphenhydrAMINE (BENADRYL) 25 MG tablet Take 1-2 tablets (25-50 mg) by mouth every 6 hours as needed for itching or allergies 60 tablet 1     amitriptyline (ELAVIL) 25 MG tablet Take 1 tablet (25 mg) by mouth At Bedtime 90 tablet 1     magnesium oxide (MAG-OX) 400 (241.3 MG) MG tablet Take 1 tablet (400 mg) by mouth daily 60 tablet 3     QUEtiapine (SEROQUEL) 100 MG tablet 150 mg every HS 30 tablet 1     senna (SENOKOT) 8.6 MG tablet Take 1 tablet by mouth daily 120 tablet      metoprolol (LOPRESSOR) 50 MG tablet Take 100 mg by mouth 3 times daily  180 tablet 4     aspirin 81 MG tablet Take 1 tablet (81 mg) by mouth daily 90 tablet 4     albuterol (PROAIR HFA, PROVENTIL HFA, VENTOLIN HFA) 108 (90 BASE) MCG/ACT inhaler Inhale 2 puffs into the lungs every 4 hours as needed         PAST SURGICAL HISTORY:  Past Surgical History   Procedure Laterality Date     Neck surgery       X 2     Stomach surgery       Wrist surgery       LEFT     Shoulder surgery       RIGHT     Discectomy lumbar posterior microscopic three+ levels  12/16/2011     Procedure:DISCECTOMY LUMBAR POSTERIOR MICROSCOPIC THREE+ LEVELS; Posterior Decompression L2-S1; Surgeon:CAITIE FUNEZ; Location:[facility]     Appendectomy       Fusion cervical posterior one level  8/24/2012     Procedure: FUSION CERVICAL POSTERIOR ONE LEVEL;  Posterior Instrumentated Spinal Fusion Cervical 6-7, Right Iliac Crest Bone [place] ;  Surgeon: Caitie Funez MD;  Location: [facility]     Graft bone from iliac crest  8/24/2012     Procedure: GRAFT BONE FROM ILIAC CREST;;  Surgeon: Caitie Funez MD;  Location: [facility]     Abdomen surgery       Insert stimulator and leads internal dorsal column  8/7/2013     Procedure: INSERT STIMULATOR AND LEADS INTERNAL DORSAL COLUMN;  SPINAL CORD  STIMULATOR IMPLANT ;  Surgeon: Ricardo Bruno MD;  Location: [facility] OR     Insert stimulator dorsal column       Myectomy septal  4/14/2014     Procedure: Median Sternotomy, Septal Myectomy, Intraoperative BRENT performed by Dr. Castano, on pump oxygenator.;  Surgeon: Aguila Cannon MD;  Location: [facility]       4/29/2014     Procedure: ANESTHESIA ICD/PACEMAKER CHANGE ADULT;  Surgeon: Generic Anesthesia Provider;  [facility]     Laser co2 laryngoscopy, complex  7/22/2014     Procedure: LASER CO2 LARYNGOSCOPY, COMPLEX;  Surgeon: Siri Koch MD;  [facility]     Laser co2 laryngoscopy N/A 2/19/2015     Procedure: LASER CO2 LARYNGOSCOPY;  Surgeon: Siri Koch MD;  [facility]     Inject steroid (location) N/A 2/19/2015     Procedure: INJECT STEROID (LOCATION);  Surgeon: Siri Koch MD;  [facility]       ALLERGIES     Allergies   Allergen Reactions     Bee Venom Swelling     Lisinopril      TABS  Severe cough     Penicillins Hives and Difficulty breathing       FAMILY HISTORY:  Family History   Problem Relation Age of Onset     HEART DISEASE Father 32     MIs x2; s/p CABG     Hypertension Brother      DIABETES Brother      Glaucoma Maternal Grandmother      DIABETES Maternal Grandfather      Glaucoma Maternal Grandfather        SOCIAL HISTORY:  Social History     Social History     Marital status: Single     Spouse name: N/A     Number of children: N/A     Years of education: N/A     Social History Main Topics     Smoking status: Current Every Day Smoker     Packs/day: 0.25     Years: 28.00     Types: Cigarettes     Last attempt to quit: 4/7/2014     Smokeless tobacco: Never Used     Alcohol use 0.0 oz/week     0 Standard drinks or equivalent per week      Comment: 0-1 X Weekly     Drug use: No     Sexual activity: Not Asked     Other Topics Concern     Parent/Sibling W/ Cabg, Mi Or Angioplasty Before 65f 55m? Yes     Social History Narrative       ROS:    Constitutional: No fever, chills, or sweats. No weight gain/loss   ENT: No visual disturbance, ear ache, epistaxis, sore throat  Allergies/Immunologic: Negative.   Respiratory: No cough, hemoptysia  Cardiovascular: As per HPI  GI: No nausea, vomiting, hematemesis, melena, or hematochezia  : No urinary frequency, dysuria, or hematuria  Integument: Negative  Psychiatric: Negative  Neuro: Negative  Endocrinology: Negative   Musculoskeletal: Negative    EXAM:  /82 (BP Location: Right arm, Patient Position: Chair, Cuff Size: Adult Large)  Pulse 76  Ht 1.829 m (6')  Wt 113.7 kg (250 lb 9.6 oz)  SpO2 94%  BMI 33.99 kg/m2  In general, the patient is a pleasant male in no apparent distress.    HEENT: NC/AT.  PERRLA.  EOMI.  Sclerae white, not injected.  Nares clear.    Neck:. Carotids +4/4 bilaterally without bruits.  No jugular venous distension.   Heart: RRR. Normal S1, S2 splits physiologically. No murmur, rub, click, or gallop. The PMI is in the 5th ICS in the midclavicular line. There is no heave.    Lungs: CTA.  No ronchi, wheezes, rales.    Abdomen: Soft, nontender, nondistended  Extremities: No clubbing, cyanosis, or edema.  The pulses are +4/4 at the radial, DP, and PT sites bilaterally.    Neurologic: Alert and oriented to person/place/time, normal speech, gait and affect  Skin: No petechiae, purpura or rash.    Labs:  LIPID RESULTS:  Lab Results   Component Value Date    CHOL 140 09/30/2013    HDL 35 (L) 09/30/2013    LDL 76 09/30/2013    TRIG 145 09/30/2013    CHOLHDLRATIO 4.0 09/30/2013       LIVER ENZYME RESULTS:  Lab Results   Component Value Date    AST 23 01/17/2015    ALT 30 01/17/2015       CBC RESULTS:  Lab Results   Component Value Date    WBC 13.2 (H) 02/17/2015    RBC 5.26 02/17/2015    HGB 16.6 02/17/2015    HCT 48.1 02/17/2015    MCV 91 02/17/2015    MCH 31.6 02/17/2015    MCHC 34.5 02/17/2015    RDW 13.4 02/17/2015     02/17/2015       BMP RESULTS:  Lab Results   Component Value  Date     03/17/2015    POTASSIUM 4.0 03/17/2015    CHLORIDE 110 (H) 03/17/2015    CO2 25 03/17/2015    ANIONGAP 7 03/17/2015     (H) 03/17/2015    BUN 14 03/17/2015    CR 0.96 03/17/2015    GFRESTIMATED 84 03/17/2015    GFRESTBLACK >90   GFR Calc   03/17/2015    TANIA 8.8 03/17/2015        A1C RESULTS:  Lab Results   Component Value Date    A1C 7.2 (H) 01/18/2015       INR RESULTS:  Lab Results   Component Value Date    INR 0.92 02/19/2015    INR 1.12 01/18/2015    INR 1.7 (H) 01/17/2015       Procedures:      Assessment and Plan:   #1 Hypertrophic cardiomyopathy S/P myectomy  #2 DM II with neuropathy  #3 Heart block following myectomy S/P ICD and permanent pacemaker  #4 Atrial fibrillation with history of DVT on chronic anticoagulation with coumadin  #5 Nicotine dependence, 30 PYSH, currently smokes  #6 Atypical chest pain with non-diagnostic nuclear stress test 2/2015 and negative CTA; unchanged  #7 Glotic stenosis  #8 Pneumonia hospitalized for 4 months requiring tracheostomy, 9/2015-12/2015, [facility] [place]          It was a pleasure to see Mr. Sharp in follow-up.  Clinically he is doing well with no new symptoms. He continues to have neuropathy and he blames this for not being as active.  He has lost 12 pounds in the past year, however. He has no chest pain of concern; just the chronic one that he notices only at rest.   I discussed that his echo looks good. I am again concerned about medication reconciliation as he reports that he thinks he is on Atorvastatin, which he should be, but it is not listed.  I would like to know what his cholesterol is, and we will work on getting his labs from his clinic.  He says his A1C is around 7.  I am glad that he has cut back on the smoking; he says he has started chantix in the past month and is down to 1/2 PPD.    He is followed by EP for his ICD.  He agrees to sleep study today, which we will arrange.    Will plan to see him back in 1 year with an  juna j Castellano MD [facility]  Adult Congenital and Interventional Cardiology   [facility]  [phone]    CC  Patient Care Team:  Jesus Boyle MD as PCP - General (Family Practice)  None  Darcy Rodriguez, RN as Clinic Care Coordinator (ENT-Otolaryngology)  Ana Sanchez (Nurse Practitioner)  Cisco Hercules RN as Nurse Coordinator (Cardiology)  Casey Bangura DPM as MD (Podiatry)  TAHIRA CASTELLANO    Chief Complaint   Patient presents with     Follow Up For     heart problem--46 year old male with history of a fib, hypertrophic cardiomyopathy, s/p ventricular spetal myectomy and ICD placement, Hypertension, hyperlipidemia, and DM presenting for follow up

## 2017-02-14 NOTE — PROGRESS NOTES
HPI:     It was a pleasure to see Konstantin Sharp and his significant other in [facility].  Konstantin is a 46-year-old gentleman with a past medical history significant for hypertrophic cardiomyopathy.  He also has diabetes, has nicotine dependence, hypertension and hyperlipidemia. I met him for the first time in June of 2015.  He has chronic atypical chest pain that he describes happen when he is lying on the couch, that is unchanged.  It can last for hours at a time, better when he walks around.  As part of this evaluation, he did have a nuclear stress test that was nondiagnostic; CTA in 2013 and 2015 were negative with calcium score of 0. He has a 30-pack-year smoking history and continues to smoke a half pack a day.  He also has diabetes type 2 and is morbidly obese.  Notably, he is also on Coumadin with a history of atrial fibrillation and DVT.  His last echo was today.  This showed no gradient across his thickened wall, which is 11 cm apically.  His ejection fraction is 66%.  He does have an ICD that was interrogated.  He has not had any shocks.     Since I last saw him he has been doing well with no change in symptoms. No hospitalizations and he has lost 12 pounds.  He has cut back on smoking to 1/2 PPD -he is now on Chantix.  I don't know why he is not on a statin - we need to look into that. I would like to know where his cholesterol is.  AIC has been around 7, per his report. No shocks from his ICD      Notably, he has 2 daughters and 2 sons. The 2 daughters are from the same mom and the 2 sons each have a different baby mother. One son has been checked and is negative. Another daughter has been checked and is negative, but 2 of the other children have not been checked this point. He does have a diagnosis of obstructive sleep apnea and reports he does not use the CPAP mask because he cannot tolerate it, despite multiple tries. He is willing to get rechecked for that at  this point.  He still uses a neurostimulation device in his back. He has been on disability since 2008.        PAST MEDICAL HISTORY:  Past Medical History   Diagnosis Date     Atrial fibrillation (H)      Back pains, lower      Cardiomyopathy      Cardiomyopathy      Cardiomyopathy (H)      Coronary artery disease      Dual ICD (implantable cardiac defibrillator) in place 4/29/2014     Heart disease      HTN (hypertension)      Hypertrophic nonobstructive cardiomyopathy (H) 9/12/2012     Inflammatory arthritis      Muscular dystrophy (H)      Pacemaker      Polysubstance abuse      Shortness of breath      Sleep apnea      doesn't use cpap     Type 2 diabetes mellitus without complications (H)      Unspecified asthma(493.90)        CURRENT MEDICATIONS:  Current Outpatient Prescriptions   Medication Sig Dispense Refill     Lubiprostone (AMITIZA PO) Take 24 mcg by mouth 2 times daily (with meals)       FAMOTIDINE PO Take 10 mg by mouth daily       warfarin (COUMADIN) 5 MG tablet 8.5 mg daily 30 tablet      warfarin (COUMADIN) 1 MG tablet Total 8.5 mg daily 30 tablet      losartan (COZAAR) 25 MG tablet Take 1 tablet (25 mg) by mouth daily 30 tablet      metFORMIN (GLUCOPHAGE) 500 MG tablet 850 mg BID 60 tablet      traZODone (DESYREL) 50 MG tablet Take 1 tablet (50 mg) by mouth 3 times daily 90 tablet      glipiZIDE (GLUCOTROL) 5 MG tablet Take 1 tablet (5 mg) by mouth 2 times daily (before meals) 30 tablet      mirtazapine (REMERON) 45 MG tablet Take 1 tablet (45 mg) by mouth daily 90 tablet 1     venlafaxine (EFFEXOR-XR) 150 MG 24 hr capsule Take 1 capsule (150 mg) by mouth daily 30 capsule 1     topiramate (TOPAMAX) 50 MG tablet Take 1 tablet (50 mg) by mouth 2 times daily 60 tablet      ARIPiprazole (ABILIFY) 2 MG tablet Take 1 tablet (2 mg) by mouth 2 times daily 30 tablet 0     diphenhydrAMINE (BENADRYL) 25 MG tablet Take 1-2 tablets (25-50 mg) by mouth every 6 hours as needed for itching or allergies 60 tablet 1      amitriptyline (ELAVIL) 25 MG tablet Take 1 tablet (25 mg) by mouth At Bedtime 90 tablet 1     magnesium oxide (MAG-OX) 400 (241.3 MG) MG tablet Take 1 tablet (400 mg) by mouth daily 60 tablet 3     QUEtiapine (SEROQUEL) 100 MG tablet 150 mg every HS 30 tablet 1     senna (SENOKOT) 8.6 MG tablet Take 1 tablet by mouth daily 120 tablet      metoprolol (LOPRESSOR) 50 MG tablet Take 100 mg by mouth 3 times daily  180 tablet 4     aspirin 81 MG tablet Take 1 tablet (81 mg) by mouth daily 90 tablet 4     albuterol (PROAIR HFA, PROVENTIL HFA, VENTOLIN HFA) 108 (90 BASE) MCG/ACT inhaler Inhale 2 puffs into the lungs every 4 hours as needed         PAST SURGICAL HISTORY:  Past Surgical History   Procedure Laterality Date     Neck surgery       X 2     Stomach surgery       Wrist surgery       LEFT     Shoulder surgery       RIGHT     Discectomy lumbar posterior microscopic three+ levels  12/16/2011     Procedure:DISCECTOMY LUMBAR POSTERIOR MICROSCOPIC THREE+ LEVELS; Posterior Decompression L2-S1; Surgeon:CAITIE OSMAN; Location:[facility]     Appendectomy       Fusion cervical posterior one level  8/24/2012     Procedure: FUSION CERVICAL POSTERIOR ONE LEVEL;  Posterior Instrumentated Spinal Fusion Cervical 6-7, Right Iliac Crest Bone [place] ;  Surgeon: Caitie Osman MD;  [facility]     Graft bone from iliac crest  8/24/2012     Procedure: GRAFT BONE FROM ILIAC CREST;;  Surgeon: Caitie Osman MD;  [facility]     Abdomen surgery       Insert stimulator and leads internal dorsal column  8/7/2013     Procedure: INSERT STIMULATOR AND LEADS INTERNAL DORSAL COLUMN;  SPINAL CORD STIMULATOR IMPLANT ;  Surgeon: Ricardo Bruno MD;  Location:  OR     Insert stimulator dorsal column       Myectomy septal  4/14/2014     Procedure: Median Sternotomy, Septal Myectomy, Intraoperative BRENT performed by Dr. Castano, on pump oxygenator.;  Surgeon: Aguila Cannon MD;  [facility]        4/29/2014     Procedure: ANESTHESIA ICD/PACEMAKER CHANGE ADULT;  Surgeon: Generic Anesthesia Provider;  [facility]     Laser co2 laryngoscopy, complex  7/22/2014     Procedure: LASER CO2 LARYNGOSCOPY, COMPLEX;  Surgeon: Siri Koch MD;  [facility]     Laser co2 laryngoscopy N/A 2/19/2015     Procedure: LASER CO2 LARYNGOSCOPY;  Surgeon: Siri Koch MD;  [facility]     Inject steroid (location) N/A 2/19/2015     Procedure: INJECT STEROID (LOCATION);  Surgeon: Siri Koch MD;  [facility]       ALLERGIES     Allergies   Allergen Reactions     Bee Venom Swelling     Lisinopril      TABS  Severe cough     Penicillins Hives and Difficulty breathing       FAMILY HISTORY:  Family History   Problem Relation Age of Onset     HEART DISEASE Father 32     MIs x2; s/p CABG     Hypertension Brother      DIABETES Brother      Glaucoma Maternal Grandmother      DIABETES Maternal Grandfather      Glaucoma Maternal Grandfather        SOCIAL HISTORY:  Social History     Social History     Marital status: Single     Spouse name: N/A     Number of children: N/A     Years of education: N/A     Social History Main Topics     Smoking status: Current Every Day Smoker     Packs/day: 0.25     Years: 28.00     Types: Cigarettes     Last attempt to quit: 4/7/2014     Smokeless tobacco: Never Used     Alcohol use 0.0 oz/week     0 Standard drinks or equivalent per week      Comment: 0-1 X Weekly     Drug use: No     Sexual activity: Not Asked     Other Topics Concern     Parent/Sibling W/ Cabg, Mi Or Angioplasty Before 65f 55m? Yes     Social History Narrative       ROS:   Constitutional: No fever, chills, or sweats. No weight gain/loss   ENT: No visual disturbance, ear ache, epistaxis, sore throat  Allergies/Immunologic: Negative.   Respiratory: No cough, hemoptysia  Cardiovascular: As per HPI  GI: No nausea, vomiting, hematemesis, melena, or hematochezia  : No urinary frequency,  dysuria, or hematuria  Integument: Negative  Psychiatric: Negative  Neuro: Negative  Endocrinology: Negative   Musculoskeletal: Negative    EXAM:  /82 (BP Location: Right arm, Patient Position: Chair, Cuff Size: Adult Large)  Pulse 76  Ht 1.829 m (6')  Wt 113.7 kg (250 lb 9.6 oz)  SpO2 94%  BMI 33.99 kg/m2  In general, the patient is a pleasant male in no apparent distress.    HEENT: NC/AT.  PERRLA.  EOMI.  Sclerae white, not injected.  Nares clear.    Neck:. Carotids +4/4 bilaterally without bruits.  No jugular venous distension.   Heart: RRR. Normal S1, S2 splits physiologically. No murmur, rub, click, or gallop. The PMI is in the 5th ICS in the midclavicular line. There is no heave.    Lungs: CTA.  No ronchi, wheezes, rales.    Abdomen: Soft, nontender, nondistended  Extremities: No clubbing, cyanosis, or edema.  The pulses are +4/4 at the radial, DP, and PT sites bilaterally.    Neurologic: Alert and oriented to person/place/time, normal speech, gait and affect  Skin: No petechiae, purpura or rash.    Labs:  LIPID RESULTS:  Lab Results   Component Value Date    CHOL 140 09/30/2013    HDL 35 (L) 09/30/2013    LDL 76 09/30/2013    TRIG 145 09/30/2013    CHOLHDLRATIO 4.0 09/30/2013       LIVER ENZYME RESULTS:  Lab Results   Component Value Date    AST 23 01/17/2015    ALT 30 01/17/2015       CBC RESULTS:  Lab Results   Component Value Date    WBC 13.2 (H) 02/17/2015    RBC 5.26 02/17/2015    HGB 16.6 02/17/2015    HCT 48.1 02/17/2015    MCV 91 02/17/2015    MCH 31.6 02/17/2015    MCHC 34.5 02/17/2015    RDW 13.4 02/17/2015     02/17/2015       BMP RESULTS:  Lab Results   Component Value Date     03/17/2015    POTASSIUM 4.0 03/17/2015    CHLORIDE 110 (H) 03/17/2015    CO2 25 03/17/2015    ANIONGAP 7 03/17/2015     (H) 03/17/2015    BUN 14 03/17/2015    CR 0.96 03/17/2015    GFRESTIMATED 84 03/17/2015    GFRESTBLACK >90   GFR Calc   03/17/2015    TANIA 8.8 03/17/2015        A1C  RESULTS:  Lab Results   Component Value Date    A1C 7.2 (H) 01/18/2015       INR RESULTS:  Lab Results   Component Value Date    INR 0.92 02/19/2015    INR 1.12 01/18/2015    INR 1.7 (H) 01/17/2015       Procedures:      Assessment and Plan:   #1 Hypertrophic cardiomyopathy S/P myectomy  #2 DM II with neuropathy  #3 Heart block following myectomy S/P ICD and permanent pacemaker  #4 Atrial fibrillation with history of DVT on chronic anticoagulation with coumadin  #5 Nicotine dependence, 30 PYSH, currently smokes  #6 Atypical chest pain with non-diagnostic nuclear stress test 2/2015 and negative CTA; unchanged  #7 Glotic stenosis  #8 Pneumonia hospitalized for 4 months requiring tracheostomy, 9/2015-12/2015, [facility]          It was a pleasure to see Mr. Sharp in follow-up.  Clinically he is doing well with no new symptoms. He continues to have neuropathy and he blames this for not being as active.  He has lost 12 pounds in the past year, however. He has no chest pain of concern; just the chronic one that he notices only at rest.   I discussed that his echo looks good. I am again concerned about medication reconciliation as he reports that he thinks he is on Atorvastatin, which he should be, but it is not listed.  I would like to know what his cholesterol is, and we will work on getting his labs from his clinic.  He says his A1C is around 7.  I am glad that he has cut back on the smoking; he says he has started chantix in the past month and is down to 1/2 PPD.    He is followed by EP for his ICD.  He agrees to sleep study today, which we will arrange.    Will plan to see him back in 1 year with an echo     S. Lia Castellano MD [facility]  Adult Congenital and Interventional Cardiology   [facility]  [phone]    CC  Patient Care Team:  Jesus Boyle MD as PCP - General (Family Practice)  None  Darcy Rodriguez, RN as Clinic Care Coordinator (ENT-Otolaryngology)  Ana Sanchez (Nurse  Practitioner)  Cisco Hercules, RN as Nurse Coordinator (Cardiology)  Casey Bangura, CEDM as MD (Podiatry)  TAHIRA SAHU

## 2017-02-14 NOTE — NURSING NOTE
Cardiac Testing: Patient given instructions regarding  echocardiogram . Discussed purpose, preparation, procedure and when to expect results reported back to the patient. Patient demonstrated understanding of this information and agreed to call with further questions or concerns.    Med Reconcile: Reviewed and verified all current medications with the patient. The updated medication list was printed and given to the patient.    Return Appointment: Follow up with Dr Castellano in 1 year with an echocardiogram.  Patient given instructions regarding scheduling next clinic visit. Patient demonstrated understanding of this information and agreed to call with further questions or concerns.    Patient stated he understood all health information given and agreed to call with further questions or concerns.

## 2017-03-09 ENCOUNTER — TRANSFERRED RECORDS (OUTPATIENT)
Dept: HEALTH INFORMATION MANAGEMENT | Facility: CLINIC | Age: 47
End: 2017-03-09

## 2017-03-09 ENCOUNTER — MEDICAL CORRESPONDENCE (OUTPATIENT)
Dept: HEALTH INFORMATION MANAGEMENT | Facility: CLINIC | Age: 47
End: 2017-03-09

## 2017-03-14 ENCOUNTER — OFFICE VISIT (OUTPATIENT)
Dept: OPHTHALMOLOGY | Facility: CLINIC | Age: 47
End: 2017-03-14

## 2017-03-14 DIAGNOSIS — E11.3292: Primary | ICD-10-CM

## 2017-03-14 ASSESSMENT — EXTERNAL EXAM - LEFT EYE: OS_EXAM: NORMAL

## 2017-03-14 ASSESSMENT — SLIT LAMP EXAM - LIDS
COMMENTS: NORMAL
COMMENTS: NORMAL

## 2017-03-14 ASSESSMENT — TONOMETRY
IOP_METHOD: TONOPEN
OD_IOP_MMHG: 26
IOP_METHOD: TONOPEN
OD_IOP_MMHG: 21
OS_IOP_MMHG: 27
OD_IOP_MMHG: 20
OS_IOP_MMHG: 23
OS_IOP_MMHG: 21

## 2017-03-14 ASSESSMENT — CONF VISUAL FIELD
METHOD: COUNTING FINGERS
OD_NORMAL: 1
OS_NORMAL: 1

## 2017-03-14 ASSESSMENT — VISUAL ACUITY
OD_CC: 20/30
OS_CC: J1
CORRECTION_TYPE: GLASSES
OD_PH_CC: 20/25
OS_PH_CC: 20/25
OD_CC: J1
METHOD: SNELLEN - LINEAR
OS_CC: 20/40
OD_CC+: -1

## 2017-03-14 ASSESSMENT — REFRACTION_WEARINGRX
OS_CYLINDER: +1.00
OS_SPHERE: -1.25
OD_CYLINDER: +0.50
OS_AXIS: 016
OD_ADD: +2.00
OD_AXIS: 018
OD_SPHERE: -1.00
SPECS_TYPE: BIFOCAL
OS_ADD: +2.00

## 2017-03-14 ASSESSMENT — CUP TO DISC RATIO
OD_RATIO: 0.4
OS_RATIO: 0.3

## 2017-03-14 ASSESSMENT — EXTERNAL EXAM - RIGHT EYE: OD_EXAM: NORMAL

## 2017-03-14 NOTE — NURSING NOTE
"Chief Complaints and History of Present Illnesses   Patient presents with     Diabetic Eye Exam     HPI    Affected eye(s):  Both   Symptoms:     Blurred vision (Comment: BE )   Floaters (Comment: BE x \"long times\", translucent threads/spot)   No flashes   Ghost images (Comment: shadow images, diagonally, worse further away)   No tearing   No Dryness         Do you have eye pain now?:  No      Comments:  DMII last BGL: 117 this morning  Last A1C: 6.7 about 1 month  Lab Results       Component                Value               Date                       A1C                      7.2                 01/18/2015                 A1C                      6.5                 07/23/2014                 A1C                      6.5                 04/26/2014                 A1C                      6.5                 04/25/2014                 A1C                      6.4                 03/13/2014              Went to [facility] last Wednesday, told he had a \"hernia\" in the LE, had MRx done at that time, was told he has OHTN    Sara Stake March 14, 2017 11:54 AM               "

## 2017-03-14 NOTE — PROGRESS NOTES
Assessment/Plan  (E11.1696) Non-proliferative diabetic retinopathy, mild, left eye (H) - Left Eye  (primary encounter diagnosis)  Plan: Educated patient on clinical findings and the importance of continued management with primary care physician. Continue management as directed and return to clinic in 6 months for dilated exam, or sooner, as needed.   Provided patient with [employer] for at home monitoring weekly. If changes occur in vision, return to clinic immediately.   Records requested from previous provider, patient wishes to transfer care.      Complete documentation of historical and exam elements from today's encounter can  be found in the full encounter summary report (not reduplicated in this progress  note). I personally obtained the chief complaint(s) and history of present illness. I  confirmed and edited as necessary the review of systems, past medical/surgical  history, family history, social history, and examination findings as documented by  others; and I examined the patient myself. I personally reviewed the relevant tests,  images, and reports as documented above. I formulated and edited as necessary the  assessment and plan and discussed the findings and management plan with the  patient and family.    Ricardo Pickard, SHEREE, FAAO

## 2017-03-14 NOTE — LETTER
March 14, 2017          Your patient, Konstantin Sharp, was examined in the Ophthalmology Clinic today for a diabetic evaluation.     Examination of the right eye today shows: No diabetic retinopathy.    Examination ofthe left eye today shows: Non-proliferative diabetic retinopathy:  mild.    The condition of the eyes today is: stable.    Treatment recommended: Monitor.    I recommend follow-up examination in: 6 months.    I have encouraged the patient to continue working with you to maintain tight control of blood glucose and blood pressure.   Thank you for allowing us to participate in the care of this patient.     Sincerely,      Ricardo Pickard OD

## 2017-03-14 NOTE — MR AVS SNAPSHOT
After Visit Summary   3/14/2017    Konstantin Sharp    MRN: [id number]           Patient Information     Date Of Birth          1970        Visit Information        Provider Department      3/14/2017 12:00 PM Ricardo Pickard OD [facility]        Today's Diagnoses     Non-proliferative diabetic retinopathy, mild, left eye (H) - Left Eye    -  1       Follow-ups after your visit        Follow-up notes from your care team     Return in about 6 months (around 9/14/2017) for Follow Up.      Your next 10 appointments already scheduled     Mar 20, 2017 12:00 PM CDT   New Sleep Patient with Niru Chairez MD   [facility] [place], Sleep Study ([facility])    [address] [place] [id number] [phone]            Sep 12, 2017 11:00 AM CDT   (Arrive by 10:45 AM)   RETURN GENERAL with SHEREE Tejada [facility] Ophthalmology ([facility] [place])    [address] [address] [phone]              Who to contact     Please call your clinic at [phone] to:    Ask questions about your health    Make or cancel appointments    Discuss your medicines    Learn about your test results    Speak to your doctor   If you have compliments or concerns about an experience at your clinic, or if you wish to file a complaint, please contact [facility] Patient Relations at [phone] or email us at [email or web address]         Additional Information About Your Visit        MyChart Information     [facility] gives you secure access to your electronic health record. If you see a primary care provider, you can also send messages to your care team and make appointments. If you have questions, please call your primary care clinic.  If you do not have a primary care provider, please call [phone] and they will assist you.      [employer] is an  electronic gateway that provides easy, online access to your medical records. With [employer], you can request a clinic appointment, read your test results, renew a prescription or communicate with your care team.     To access your existing account, please contact your [facility] or call [phone] for assistance.        Care EveryWhere ID     This is your Care EveryWhere ID. This could be used by other organizations to access your [place] medical records  [id number]         Blood Pressure from Last 3 Encounters:   02/14/17 122/82   06/21/16 132/90   05/02/16 126/70    Weight from Last 3 Encounters:   02/14/17 113.7 kg (250 lb 9.6 oz)   02/03/17 113.9 kg (251 lb)   01/13/17 117.9 kg (260 lb)              Today, you had the following     No orders found for display       Primary Care Provider Office Phone # Fax #    Jesus Boyle [phone]       [facility] [address] [facility] [address]        Thank you!     Thank you for choosing [facility] OPHTHALMOLOGY  for your care. Our goal is always to provide you with excellent care. Hearing back from our patients is one way we can continue to improve our services. Please take a few minutes to complete the written survey that you may receive in the mail after your visit with us. Thank you!             Your Updated Medication List - Protect others around you: Learn how to safely use, store and throw away your medicines at www.disposemymeds.org.          This list is accurate as of: 3/14/17  1:26 PM.  Always use your most recent med list.                   Brand Name Dispense Instructions for use    albuterol 108 (90 BASE) MCG/ACT Inhaler    PROAIR HFA/PROVENTIL HFA/VENTOLIN HFA     Inhale 2 puffs into the lungs every 4 hours as needed       AMITIZA PO      Take 24 mcg by mouth 2 times daily (with meals)       amitriptyline 25 MG tablet    ELAVIL    90 tablet    Take 1 tablet (25 mg) by mouth At Bedtime        ARIPiprazole 2 MG tablet    ABILIFY    30 tablet    Take 1 tablet (2 mg) by mouth 2 times daily       aspirin 81 MG tablet     90 tablet    Take 1 tablet (81 mg) by mouth daily       * COUMADIN 5 MG tablet   Generic drug:  warfarin     30 tablet    8.5 mg daily       * warfarin 1 MG tablet    COUMADIN    30 tablet    Total 8.5 mg daily       diphenhydrAMINE 25 MG tablet    BENADRYL    60 tablet    Take 1-2 tablets (25-50 mg) by mouth every 6 hours as needed for itching or allergies       FAMOTIDINE PO      Take 10 mg by mouth daily       glipiZIDE 5 MG tablet    GLUCOTROL    30 tablet    Take 1 tablet (5 mg) by mouth 2 times daily (before meals)       losartan 25 MG tablet    COZAAR    30 tablet    Take 1 tablet (25 mg) by mouth daily       magnesium oxide 400 (241.3 MG) MG tablet    MAG-OX    60 tablet    Take 1 tablet (400 mg) by mouth daily       metFORMIN 500 MG tablet    GLUCOPHAGE    60 tablet    850 mg BID       metoprolol 50 MG tablet    LOPRESSOR    180 tablet    Take 100 mg by mouth 3 times daily       mirtazapine 45 MG tablet    REMERON    90 tablet    Take 1 tablet (45 mg) by mouth daily       QUEtiapine 100 MG tablet    SEROquel    30 tablet    150 mg every HS       senna 8.6 MG tablet    SENOKOT    120 tablet    Take 1 tablet by mouth daily       topiramate 50 MG tablet    TOPAMAX    60 tablet    Take 1 tablet (50 mg) by mouth 2 times daily       traZODone 50 MG tablet    DESYREL    90 tablet    Take 1 tablet (50 mg) by mouth 3 times daily       venlafaxine 150 MG 24 hr capsule    EFFEXOR-XR    30 capsule    Take 1 capsule (150 mg) by mouth daily       * Notice:  This list has 2 medication(s) that are the same as other medications prescribed for you. Read the directions carefully, and ask your doctor or other care provider to review them with you.

## 2017-03-20 ENCOUNTER — OFFICE VISIT (OUTPATIENT)
Dept: SLEEP MEDICINE | Facility: CLINIC | Age: 47
End: 2017-03-20
Attending: INTERNAL MEDICINE
Payer: MEDICAID

## 2017-03-20 VITALS
HEART RATE: 76 BPM | WEIGHT: 262 LBS | RESPIRATION RATE: 18 BRPM | DIASTOLIC BLOOD PRESSURE: 72 MMHG | SYSTOLIC BLOOD PRESSURE: 125 MMHG | OXYGEN SATURATION: 97 % | HEIGHT: 72 IN | BODY MASS INDEX: 35.49 KG/M2

## 2017-03-20 DIAGNOSIS — G47.10 EXCESSIVE SLEEPINESS: ICD-10-CM

## 2017-03-20 DIAGNOSIS — G25.81 RESTLESS LEGS SYNDROME (RLS): Primary | ICD-10-CM

## 2017-03-20 DIAGNOSIS — G47.33 OSA (OBSTRUCTIVE SLEEP APNEA): ICD-10-CM

## 2017-03-20 LAB
BASE EXCESS BLDV CALC-SCNC: 1.1 MMOL/L
FERRITIN SERPL-MCNC: 65 NG/ML (ref 26–388)
HCO3 BLDV-SCNC: 27 MMOL/L (ref 21–28)
O2/TOTAL GAS SETTING VFR VENT: NORMAL %
PCO2 BLDV: 49 MM HG (ref 40–50)
PH BLDV: 7.35 PH (ref 7.32–7.43)
PO2 BLDV: 39 MM HG (ref 25–47)

## 2017-03-20 PROCEDURE — 99211 OFF/OP EST MAY X REQ PHY/QHP: CPT | Mod: ZF

## 2017-03-20 PROCEDURE — 82803 BLOOD GASES ANY COMBINATION: CPT | Performed by: INTERNAL MEDICINE

## 2017-03-20 PROCEDURE — 36415 COLL VENOUS BLD VENIPUNCTURE: CPT | Performed by: INTERNAL MEDICINE

## 2017-03-20 PROCEDURE — 82728 ASSAY OF FERRITIN: CPT | Performed by: INTERNAL MEDICINE

## 2017-03-20 NOTE — NURSING NOTE
Chief Complaint   Patient presents with     Consult     Discuss poor sleeping.  Only gets 4 hours of sleep at night       Initial Ht 1.829 m (6')  Wt 118.8 kg (262 lb)  BMI 35.53 kg/m2 Estimated body mass index is 35.53 kg/(m^2) as calculated from the following:    Height as of this encounter: 1.829 m (6').    Weight as of this encounter: 118.8 kg (262 lb).  Medication Reconciliation: complete     Neck circumference: 19 inches.  48.5 cm    JOSH Chen

## 2017-03-20 NOTE — MR AVS SNAPSHOT
"              After Visit Summary   3/20/2017    Konstantin Sharp    MRN: [id number]           Patient Information     Date Of Birth          1970        Visit Information        Provider Department      3/20/2017 12:00 PM Niru Chairez MD [facility], [place], Sleep Study        Today's Diagnoses     Restless legs syndrome (RLS)    -  1    Excessive sleepiness        CLARY (obstructive sleep apnea)          Care Instructions    MY TREATMENT INFORMATION FOR SLEEP APNEA-  Konstantin Sharp    DOCTOR : Niru Chairez    If I haven't had a sleep study yet, what can I expect?  A personal story from [employer]  https://www.Slingr.com/watch?v=AxPLmlRpnCs    Am I having a home sleep study?  Here is a video in case you get home and want to make sure you have done it correctly  https://www.Slingr.com/watch?v=RVL2J5fJeu1&feature=youtu.be    Frequently asked questions:  1. What is Obstructive Sleep Apnea (CLARY)? CLARY is the most common type of sleep apnea. Apnea literally means, \"without breath.\" It is characterized by repetitive pauses in breathing, despite continued effort to breathe, and is usually associated with a reduction in blood oxygen saturation. Apneas can last 10 to over 60 seconds. It is caused by narrowing or collapse of the upper airway as muscles relax during sleep. Severity of sleep apnea is determined by frequency of breathing events and their effect on your sleep and oxygen levels determined during sleep testing.   2. What are the consequences of CLARY? Symptoms include: daytime sleepiness- possibly increasing the risk of falling asleep while driving, unrefreshing/restless sleep, snoring, insomnia, waking frequently to urinate, waking with heartburn or reflux, reduced concentration and memory, and morning headaches. Other health consequences may include development of high blood pressure and other cardiovascular disease in persons who are susceptible. Untreated CLARY  can " contribute to heart disease, stroke and diabetes.   3. What are the treatment options? In most situations, sleep apnea is a lifelong disease that must be managed with daily therapy. Medications are not effective for sleep apnea and surgery is generally not performed until other therapies have been tried. Therapy is usually tailored to the individual patient based on many factors including your wishes as well as severity of sleep apnea and severity of obesity. Continuous Positive Airway (CPAP) is the most reliable treatment. An oral device to hold your jaw forward is usually the next most reliable option. Other options include postioning devices (to keep you off your back), weight loss, and surgery including a tongue pacing device. There is more detail about some of these options below.            1. CPAP-  WHAT DOES IT DO AND HOW CAN I LEARN TO WEAR IT?                               BEFORE I START, CAN I WATCH A MOVIE TO GET A PLAN ON HOW TO USE CPAP?  https://www.iFLYER.com/watch?b=w3R10xh577E      Continuous positive airway pressure, or CPAP, is the most effective treatment for obstructive sleep apnea. It works by blowing room air, through a mask, to hold your throat open. A decision to use CPAP is a major step forward in the pursuit of a healthier life. The successful use of CPAP will help you breathe easier, sleep better and live healthier. You can choose CPAP equipment from any durable medical equipment provider that meets your needs.  Using CPAP can be a positive experience if you keep these mullins points in mind:  1. Commitment  CPAP is not a quick fix for your problem. It involves a long-term commitment to improve your sleep and your health.    2. Communication  Stay in close communication with both your sleep doctor and your CPAP supplier. Ask lots of questions and seek help when you need it.    3. Consistency  Use CPAP all night, every night and for every nap. You will receive the maximum health benefits  "from CPAP when you use it every time that you sleep. This will also make it easier for your body to adjust to the treatment.    4. Correction  The first machine and mask that you try may not be the best ones for you. Work with your sleep doctor and your CPAP supplier to make corrections to your equipment selection. Ask about trying a different type of machine or mask if you have ongoing problems. Make sure that your mask is a good fit and learn to use your equipment properly.    5. Challenge  Tell a family member or close friend to ask you each morning if you used your CPAP the previous night. Have someone to challenge you to give it your best effort.    6. Connection   Your adjustment to CPAP will be easier if you are able to connect with others who use the same treatment. Ask your sleep doctor if there is a support group in your area for people who have sleep apnea, or look for one on the Internet.  7. Comfort   Increase your level of comfort by using a saline spray, decongestant or heated humidifier if CPAP irritates your nose, mouth or throat. Use your unit's \"ramp\" setting to slowly get used to the air pressure level. There may be soft pads you can buy that will fit over your mask straps. Look on www.CPAP.com for accessories that can help make CPAP use more comfortable.  8. Cleaning   Clean your mask, tubing and headgear on a regular basis. Put this time in your schedule so that you don't forget to do it. Check and replace the filters for your CPAP unit and humidifier.    9. Completion   Although you are never finished with CPAP therapy, you should reward yourself by celebrating the completion of your first month of treatment. Expect this first month to be your hardest period of adjustment. It will involve some trial and error as you find the machine, mask and pressure settings that are right for you.    10. Continuation  After your first month of treatment, continue to make a daily commitment to use your CPAP " all night, every night and for every nap.    CPAP-Tips to starting with success:  Begin using your CPAP for short periods of time during the day while you watch TV or read.    Use CPAP every night and for every nap. Using it less often reduces the health benefits and makes it harder for your body to get used to it.    Make small adjustments to your mask, tubing, straps and headgear until you get the right fit. Tightening the mask may actually worsen the leak.  If it leaves significant marks on your face or irritates the bridge of your nose, it may not be the best mask for you.  Speak with the person who supplied the mask and consider trying other masks. Insurances will allow you to try different masks during the first month of starting CPAP.  Insurance also covers a new mask, hose and filter about every 6 months.    Use a saline nasal spray to ease mild nasal congestion. Neti-Pot or saline nasal rinses may also help. Nasal gel sprays can help reduce nasal dryness.  Biotene mouthwash can be helpful to protect your teeth if you experience frequent dry mouth.  Dry mouth may be a sign of air escaping out of your mouth or out of the mask in the case of a full face mask.  Speak with your provider if you expect that is the case.     Take a nasal decongestant to relieve more severe nasal or sinus congestion.  Do not use Afrin (oxymetazoline) nasal spray more than 3 days in a row.  Speak with your sleep doctor if your nasal congestion is chronic.    Use a heated humidifier that fits your CPAP model to enhance your breathing comfort. Adjust the heat setting up if you get a dry nose or throat, down if you get condensation in the hose or mask.  Position the CPAP lower than you so that any condensation in the hose drains back into the machine rather than towards the mask.    Try a system that uses nasal pillows if traditional masks give you problems.    Clean your mask, tubing and headgear once a week. Make sure the equipment  debora luis.    Regularly check and replace the filters for your CPAP unit and humidifier.    Work closely with your sleep provider and your CPAP supplier to make sure that you have the machine, mask and air pressure setting that works best for you. It is better to stop using it and call your provider to solve problems than to lay awake all night frustrated with the device.    BESIDES CPAP, WHAT OTHER THERAPIES ARE THERE?      Positioning Device  Positioning devices are generally used when sleep apnea is mild and only occurs on your back.This example shows a pillow that straps around the waist. It may be appropriate for those whose sleep study shows milder sleep apnea that occurs primarily when lying flat on one's back. Preliminary studies have shown benefit but effectiveness at home may need to be verified by a home sleep test. These devices are generally not covered by medical insurance.                      Oral Appliance  What is oral appliance therapy?  An oral appliance is a small acrylic device that fits over the upper and lower teeth or tongue (similar to an orthodontic retainer or a mouth guard). This device slightly advances the lower jaw or tongue, which moves the base of the tongue forward, opens the airway, improves breathing and can effectively treat snoring and obstructive sleep apnea sleep apnea. The appliance is fabricated and customized by a qualified dentist with experience in treating snoring and sleep apnea. Oral appliances are usually well tolerated and have relatively high compliance by patients1, 2, 3.  When is an oral appliance indicated?  Oral appliance therapy is recommended as a first-line treatment for patients with primary snoring, mild sleep apnea, and for patients with moderate sleep apnea who prefer appliance therapy to use of CPAP4, 5. Severity of sleep apnea is determined by sleep testing and is based on the number of respiratory events per hour of sleep.   How successful is oral  appliance therapy?  The success rate of oral appliance therapy in patients with mild sleep apnea is 75-80% while in patients with moderate sleep apnea it is 50-70%. The chance of success in patients with severe sleep apnea is 40-50%. The research also shows that oral appliances have a beneficial effect on the cardiovascular health of CLARY patients at the same magnitude as CPAP therapy7.  Oral appliances should be a second-line treatment in cases of severe sleep apnea, but if not completely successful then a combination therapy utilizing CPAP plus oral appliance therapy may be effective. Oral appliances tend to be effective in a broad range of patients although studies show that the patients who have the highest success are females, younger patients, those with milder disease, and less severe obesity. 3, 6.   The chances of success are lower in patients who have more severe CLRAY, are older, and those who are morbidly obese.     Example of an oral appliance   Finding a dentist that practices dental sleep medicine  Specific training is available through the American Academy of Dental Sleep Medicine for dentists interested in working in the field of sleep. To find a dentist who is educated in the field of sleep and the use of oral appliances, near you, visit the Web site of the American Academy of Dental Sleep Medicine; also see   http://www.accpstorage.org/newOrganization/patients/oralAppliances.pdf  To search for a dentist certified in these practices:  Http://aadsm.org/FindADentist.aspx?1  1. Zia, et al. Objectively measured vs self-reported compliance during oral appliance therapy for sleep-disordered breathing. Chest 2013; 144(5): 8520-7812.  2. Sherly et al. Objective measurement of compliance during oral appliance therapy for sleep-disordered breathing. Thorax 2013; 68(1): 91-96.  3. Keny et al. Mandibular advancement devices in 620 men and women with CLARY and snoring: tolerability and predictors  of treatment success. Chest 2004; 125: 7854-2735.  4. Shawna et al. Oral appliances for snoring and CLARY: a review. Sleep 2006; 29: 244-262.  5. Adalberto et al. Oral appliance treatment for CLARY: an update. J Clin Sleep Med 2014; 10(2): 215-227.  6. Yisel et al. Predictors of OSAH treatment outcome. J Dent Res 2007; 86: 6970-1406.      Weight Loss:    Weight management is a personal decision.  If you are interested in exploring weight loss strategies, the following discussion covers the impact on weight loss on sleep apnea and the approaches that may be successful.    Weight loss decreases severity of sleep apnea in most people with obesity. For those with mild obesity who have developed snoring with weight gain, even 15-30 pound weight loss can improve and occasionally eliminate sleep apnea.  Structured and life-long dietary and health habits are necessary to lose weight and keep healthier weight levels.     Though there may be significant health benefits from weight loss, long-term weight loss is very difficult to achieve- studies show success with dietary management in less than 10% of people. In addition, substantial weight loss may require years of dietary control and may be difficult if patients have severe obesity. In these cases, surgical management may be considered.  Finally, older individuals who have tolerated obesity without health complications may be less likely to benefit from weight loss strategies.    Your BMI is Body mass index is 35.53 kg/(m^2).  Body mass index (BMI) is one way to tell whether you are at a healthy weight, overweight, or obese. It measures your weight in relation to your height.  A BMI of 18.5 to 24.9 is in the healthy range. A person with a BMI of 25 to 29.9 is considered overweight, and someone with a BMI of 30 or greater is considered obese. More than two-thirds of American adults are considered overweight or obese.  Being overweight or obese increases the risk for  further weight gain. Excess weight may lead to heart disease and diabetes.  Creating and following plans for healthy eating and physical activity may help you improve your health.  Weight control is part of healthy lifestyle and includes exercise, emotional health, and healthy eating habits. Careful eating habits lifelong are the mainstay of weight control. Though there are significant health benefits from weight loss, long-term weight loss with diet alone may be very difficult to achieve- studies show long-term success with dietary management in less than 10% of people. Attaining a healthy weight may be especially difficult to achieve in those with severe obesity. In some cases, medications, devices and surgical management might be considered.  What can you do?  If you are overweight or obese and are interested in methods for weight loss, you should discuss this with your provider.     Consider reducing daily calorie intake by 500 calories.     Keep a food journal.     Avoiding skipping meals, consider cutting portions instead.    Diet combined with exercise helps maintain muscle while optimizing fat loss. Strength training is particularly important for building and maintaining muscle mass. Exercise helps reduce stress, increase energy, and improves fitness. Increasing exercise without diet control, however, may not burn enough calories to loose weight.       Start walking three days a week 10-20 minutes at a time    Work towards walking thirty minutes five days a week     Eventually, increase the speed of your walking for 1-2 minutes at time    In addition, we recommend that you review healthy lifestyles and methods for weight loss available through the National Institutes of Health patient information sites:  http://win.niddk.nih.gov/publications/index.htm    And look into health and wellness programs that may be available through your health insurance provider, employer, local community center, or [place]  club.    Weight management plan: Patient was referred to their PCP to discuss a diet and exercise plan.    Surgery:    Upper Airway Surgery for CLARY  Surgery for CLARY is a second-line treatment option in the management of sleep apnea.  Surgery should be considered for patients who are having a difficult time tolerating CPAP.    Surgery for CLARY is directed at areas that are responsible for narrowing or complete obstruction of the airway during sleep.  There are a wide range of procedures available to enlarge and/or stabilize the airway to prevent blockage of breathing in the three major areas where it can occur: the palate, tongue, and nasal regions.  Successful surgical treatment depends on the accurate identification of the factors responsible for obstructive sleep apnea in each person.  A personalized approach is required because there is no single treatment that works well for everyone.  Because of anatomic variation, consultation with an examination by a sleep surgeon is a critical first step in determining what surgical options are best for each patient.  In some cases, examination during sedation may be recommended in order to guide the selection of procedures.  Patients will be counseled about risks and benefits as well as the typical recovery course after surgery. Surgery is typically not a cure for a person s CLARY.  However, surgery will often significantly improve one s CLARY severity (termed  success rate ).  Even in the absence of a cure, surgery will decrease the cardiovascular risk associated with OSA7; improve overall quality of life8 (sleepiness, functionality, sleep quality, etc).          Palate Procedures:  Patients with CLARY often have narrowing of their airway in the region of their tonsils and uvula.  The goals of palate procedures are to widen the airway in this region as well as to help the tissues resist collapse.  Modern palate procedure techniques focus on tissue conservation and soft tissue  rearrangement, rather than tissue removal.  Often the uvula is preserved in this procedure. Residual sleep apnea is common in patient after pharyngoplasty with an average reduction in sleep apnea events of 33%2.      Tongue Procedures:  While patients are awake, the muscles that surround the throat are active and keep this region open for breathing. These muscles relax during sleep, allowing the tongue and other structures to collapse and block breathing.  There are several different tongue procedures available.  Selection of a tongue base procedure depends on characteristics seen on physical exam.  Generally, procedures are aimed at removing bulky tissues in this area or preventing the back of the tongue from falling back during sleep.  Success rates for tongue surgery range from 50-62%3.    Hypoglossal Nerve Stimulation:  Hypoglossal nerve stimulation has recently received approval from the [place] Food and Drug Administration for the treatment of obstructive sleep apnea.  This is based on research showing that the system was safe and effective in treating sleep apnea6.  Results showed that the median AHI score decreased 68%, from 29.3 to 9.0. This therapy uses an implant system that senses breathing patterns and delivers mild stimulation to airway muscles, which keeps the airway open during sleep.  The system consists of three fully implanted components: a small generator (similar in size to a pacemaker), a breathing sensor, and a stimulation lead.  Using a small handheld remote, a patient turns the therapy on before bed and off upon awakening.    Candidates for this device must be greater than 22 years of age, have moderate to severe CLARY (AHI between 20-65), BMI less than 32, have tried CPAP/oral appliance without success, and have appropriate upper airway anatomy (determined by a sleep endoscopy performed by Dr. Ackerman).    Hypoglossal Nerve Stimulation Pathway:    The sleep surgeon s office will work with  the patient through the insurance prior-authorization process (including communications and appeals).    Nasal Procedures:  Nasal obstruction can interfere with nasal breathing during the day and night.  Studies have shown that relief of nasal obstruction can improve the ability of some patients to tolerate positive airway pressure therapy for obstructive sleep apnea1.  Treatment options include medications such as nasal saline, topical corticosteroid and antihistamine sprays, and oral medications such as antihistamines or decongestants. Non-surgical treatments can include external nasal dilators for selected patients. If these are not successful by themselves, surgery can improve the nasal airway either alone or in combination with these other options.      Combination Procedures:  Combination of surgical procedures and other treatments may be recommended, particularly if patients have more than one area of narrowing or persistent positional disease.  The success rate of combination surgery ranges from 66-80%2,3.      1. Tyrell LE. The Role of the Nose in Snoring and Obstructive Sleep Apnoea: An Update.  Eur Arch Otorhinolaryngol. 2011; 268: 1365-73.  2.  Cecilia SM; Toney JA; Sim JR; Pallanch JF; Qamar MB; Dinesh SG; Del PLEITEZ. Surgical modifications of the upper airway for obstructive sleep apnea in adults: a systematic review and meta-analysis. SLEEP 2010;33(10):8480-7772. Marie CHERY. Hypopharyngeal surgery in obstructive sleep apnea: an evidence-based medicine review.  Arch Otolaryngol Head Neck Surg. 2006 Feb;132(2):206-13.  3. Robert YH1, Maria Esther Y, Alberto RODOLFO. The efficacy of anatomically based multilevel surgery for obstructive sleep apnea. Otolaryngol Head Neck Surg. 2003 Oct;129(4):327-35.  4. Marie CHERY, Goldberg A. Hypopharyngeal Surgery in Obstructive Sleep Apnea: An Evidence-Based Medicine Review. Arch Otolaryngol Head Neck Surg. 2006 Feb;132(2):206-13.  5. Bryon RUIZ et al. Upper-Airway Stimulation  for Obstructive Sleep Apnea.  N Engl J Med. 2014 Jan 9;370(2):139-49.  6. John Y et al. Increased Incidence of Cardiovascular Disease in Middle-aged Men with Obstructive Sleep Apnea. Am J Respir Crit Care Med; 2002 166: 159-165  7. Muna SIGALA et al. Studying Life Effects and Effectiveness of Palatopharyngoplasty (SLEEP) study: Subjective Outcomes of Isolated Uvulopalatopharyngoplasty. Otolaryngol Head Neck Surg. 2011; 144: 623-631.  Please do not drive if drowsy or sleepy;  pull over if drowsy.  Simple lifestyle changes can play an important role in alleviating symptoms of Restless legs syndrome (RLS) . These steps may help reduce the extra activity in your legs:   Try baths and massages. Soaking in a warm bath and massaging your legs can relax your muscles.   Apply warm or cool packs. You may find that the use of heat or cold, or alternating use of the two, lessens the sensations in your limbs.   Try relaxation techniques, such as meditation or yoga. Stress can aggravate RLS. Learn to relax, especially before going to bed at night.   Establish good sleep hygiene. Fatigue tends to worsen symptoms of RLS, so it's important that you practice good sleep hygiene. Ideally, sleep hygiene involves having a cool, quiet and comfortable sleeping environment, going to bed at the same time, rising at the same time, and getting enough sleep to feel well rested. Some people with RLS find that going to bed later and rising later in the day helps in getting enough sleep.   Exercise. Getting moderate, regular exercise may relieve symptoms of RLS, but overdoing it at the gym or working out too late in the day may intensify symptoms.   Avoid caffeine. Sometimes cutting back on caffeine may help restless legs. It's worth trying to avoid caffeine-containing products, including chocolate and caffeinated beverages, such as coffee, tea and soft drinks, for a few weeks to see if this helps.   Cut back on alcohol and tobacco. These  substances also may aggravate or trigger symptoms of RLS. Test to see whether avoiding them helps.     Please follow up with your primary care provider and psychiatrist for optimizing the management of the depression.      [place] [facility]      Treating Insomnia  Good sleeping habits are a key part of treatment. If needed, some medications may help you sleep better at first. Making healthy lifestyle changes and learning to relax can improve your sleep. Treating insomnia takes commitment, but trust that your efforts will pay off. Talk to your doctor before taking any medication.    Healthy Lifestyle  Your lifestyle affects your health and your sleep. Here are some healthy habits:    Keep a regular sleep schedule. Go to bed and get up at the same time each day.    Exercise regularly. It may help you reduce stress. Avoid strenuous exercise for two to four hours before bedtime.    Avoid or limit naps.    Use your bed only for sleep and sex.    Don t spend too much time in bed trying to fall asleep. If you can t fall asleep, get up and do something until you become tired and drowsy.    Avoid or limit caffeine and nicotine. They can keep you awake at night. Also avoid alcohol. It may help you fall asleep at first, but your sleep will not be restful.    Before Bedtime  To sleep better every night, try these tips:    Have a bedtime routine to let your body and mind know when it s time to sleep.    Going to bed should be relaxing so try to do only relaxing things around bedtime. Sleep will come sooner.    If your worries don t let you sleep, write them down in a diary. Then close it, and go to bed.    Make sure the room is not too hot or too cold. If it s not dark enough, an eye mask can help. If it s noisy, try using earplugs.    Learn to Relax  Stress, anxiety, and body tension may keep you awake at night. To unwind before bedtime, try reading a book, meditation, or yoga. Also, try the following:    Deep  breathing. Sit or lie back in a chair. Take a slow, deep breath. Hold it for 5 counts. Then breathe out slowly through your mouth. Keep doing this until you feel relaxed.    Imagery. Think of the last fun trip you took. In your mind, walk through the trip from start to finish. Put as much detail into the memory as you can remember. It will help you relax.    Cognitive Behavioral Treatment (CBT)  CBT is the most effective treatment for long-term insomnia. It tries to address the underlying causes of your sleep problems, including your habits and how you think about sleep.      Individual Therapy   Aden Chandra    Sleep Psychologist, [facility] in [place] and [place], [place]     Online Programs     www.[employer] (pronounced shut eye). There is a fee for this program. Enter the code  [facility]  if you decide to enroll in this program.      www.sleepIO.com (pronounced sleep ee oh). There is a fee for this program. Enter the code  [employer]  if you decide to enroll in this program.     Suggested Resources  Insomnia Treatment Books     Overcoming Insomnia by Damien Mckenna and Jamaica Stovall (2008)    No More Sleepless Nights by Ruddy Bailey and Chinyere Olson (1996)    Say Akbar to Insomnia by Zay Marcial (2009)    The Insomnia Workbook by Siri Houston and Duke Mejias (2009)    The Insomnia Answer by Jackson Masters and Michael Garay (2006)      Stress Management and Relaxation Books    The Relaxation and Stress Reduction Workbook by Helen Mayes, Concepción Shah and Shorty Merritt (2008)    Stress Management Workbook: Techniques and Self-Assessment Procedures by Nena Rothman and Mu Concepcion (1997)    A Mindfulness-Based Stress Reduction Workbook by Remi Portillo and Sachi Vega (2010)    The Complete Stress Management Workbook by Josep Malik, Brody Pizarro and Garret Mclaughlin (1996)    Assert Yourself by Serena Sepulveda and William Sepulveda (1977)    Relaxation  Resources for Computer Download   These websites offer resources to help you relax. This list is for information only. [facility] is not responsible for the quality of services or the actions of any person or organization.  Progressive Muscle Relaxation (PMR):     http://www.AdhereTx/progressive-muscle-relaxation-exercise.html     http://studentsupport.[facility]/counseling/resources/self-help/relaxation-and-stress-management/   Deep Breathing Exercises:    http://www.AdhereTx/breathing-awareness.html     Meditation:     wwwUmBio    www.[employer]guidedMediaCoremeditation-site.com You may have to pay for some of these resources.    Guided Imagery:    http://www.[employer]hereTx/guided-imagery-scripts.html     http://[employer]library/bnphvpijvl-xzjyop-tshdoww/     Counseling / Behavioral Health  [facility]  Visit [email or web address] or call [phone] to find a clinic close to you.      This is not a prescription and these resources are optional. You must pay for any costs when using these resources. Please ask your insurance carrier if you can be reimbursed for these resources. If so, you are responsible for sending the needed details to your insurance carrier. These resources may also be tax deductible as medical expenses. Check with your .     These programs and publications are not affiliated in any way with [facility].                  Follow-ups after your visit        Follow-up notes from your care team     Return in about 1 week (around 3/27/2017).      Your next 10 appointments already scheduled     Mar 23, 2017  8:00 PM CDT   Psg Split W/Tcm with SLEEP STUDY  5   [facility], Sleep Study ([facility])    [address] [place] [id number] [phone]            Apr 03, 2017 11:00 AM CDT   Return Sleep Patient with Niru Chairez MD   [facility],  [place], Sleep Study ([facility])    [address] [place] [id number]   [phone]            Sep 12, 2017 11:00 AM CDT   (Arrive by 10:45 AM)   RETURN GENERAL with Ricardo Pickard OD   [facility] ([facility] [place])    [address] [facility]  [address] [facility] [id number] [phone]              Future tests that were ordered for you today     Open Future Orders        Priority Expected Expires Ordered    Ferritin Routine  5/19/2017 3/20/2017    Comprehensive Sleep Study Routine  9/16/2017 3/20/2017            Who to contact     If you have questions or need follow up information about today's clinic visit or your schedule please contact [facility] directly at [phone].  Normal or non-critical lab and imaging results will be communicated to you by MyChart, letter or phone within 4 business days after the clinic has received the results. If you do not hear from us within 7 days, please contact the clinic through [employer] or phone. If you have a critical or abnormal lab result, we will notify you by phone as soon as possible.  Submit refill requests through [employer] or call your pharmacy and they will forward the refill request to us. Please allow 3 business days for your refill to be completed.          Additional Information About Your Visit        [employer] Information     [employer] gives you secure access to your electronic health record. If you see a primary care provider, you can also send messages to your care team and make appointments. If you have questions, please call your primary care clinic.  If you do not have a primary care provider, please call [phone] and they will assist you.        Care EveryWhere ID     This is your Care EveryWhere ID. This could be used by other organizations to access your [facility] medical records  [id number]        Your Vitals Were     Pulse Respirations  Height Pulse Oximetry BMI (Body Mass Index)       76 18 1.829 m (6') 97% 35.53 kg/m2        Blood Pressure from Last 3 Encounters:   03/20/17 125/72   02/14/17 122/82   06/21/16 132/90    Weight from Last 3 Encounters:   03/20/17 118.8 kg (262 lb)   02/14/17 113.7 kg (250 lb 9.6 oz)   02/03/17 113.9 kg (251 lb)              We Performed the Following     Blood gas venous     SLEEP EVALUATION & MANAGEMENT REFERRAL - ADULT        Primary Care Provider Office Phone # Fax #    Jesus Boyle [phone]       [facility] [address] [facility] [address]        Thank you!     Thank you for choosing [facility], SLEEP STUDY  for your care. Our goal is always to provide you with excellent care. Hearing back from our patients is one way we can continue to improve our services. Please take a few minutes to complete the written survey that you may receive in the mail after your visit with us. Thank you!             Your Updated Medication List - Protect others around you: Learn how to safely use, store and throw away your medicines at www.disposemymeds.org.          This list is accurate as of: 3/20/17  1:01 PM.  Always use your most recent med list.                   Brand Name Dispense Instructions for use    albuterol 108 (90 BASE) MCG/ACT Inhaler    PROAIR HFA/PROVENTIL HFA/VENTOLIN HFA     Inhale 2 puffs into the lungs every 4 hours as needed       AMITIZA PO      Take 24 mcg by mouth 2 times daily (with meals)       amitriptyline 25 MG tablet    ELAVIL    90 tablet    Take 1 tablet (25 mg) by mouth At Bedtime       ARIPiprazole 2 MG tablet    ABILIFY    30 tablet    Take 1 tablet (2 mg) by mouth 2 times daily       * COUMADIN 5 MG tablet   Generic drug:  warfarin     30 tablet    8.5 mg daily       * warfarin 1 MG tablet    COUMADIN    30 tablet    Total 8.5 mg daily       FAMOTIDINE PO      Take 10 mg by mouth daily       glipiZIDE 5 MG tablet    GLUCOTROL    30 tablet    Take 1  tablet (5 mg) by mouth 2 times daily (before meals)       losartan 25 MG tablet    COZAAR    30 tablet    Take 1 tablet (25 mg) by mouth daily       magnesium oxide 400 (241.3 MG) MG tablet    MAG-OX    60 tablet    Take 1 tablet (400 mg) by mouth daily       metFORMIN 500 MG tablet    GLUCOPHAGE    60 tablet    850 mg BID       metoprolol 50 MG tablet    LOPRESSOR    180 tablet    Take 100 mg by mouth 3 times daily       mirtazapine 45 MG tablet    REMERON    90 tablet    Take 1 tablet (45 mg) by mouth daily       QUEtiapine 100 MG tablet    SEROquel    30 tablet    150 mg every HS       senna 8.6 MG tablet    SENOKOT    120 tablet    Take 1 tablet by mouth daily       topiramate 50 MG tablet    TOPAMAX    60 tablet    Take 1 tablet (50 mg) by mouth 2 times daily       traZODone 50 MG tablet    DESYREL    90 tablet    Take 1 tablet (50 mg) by mouth 3 times daily       venlafaxine 150 MG 24 hr capsule    EFFEXOR-XR    30 capsule    Take 1 capsule (150 mg) by mouth daily       * Notice:  This list has 2 medication(s) that are the same as other medications prescribed for you. Read the directions carefully, and ask your doctor or other care provider to review them with you.

## 2017-03-20 NOTE — PATIENT INSTRUCTIONS
"MY TREATMENT INFORMATION FOR SLEEP APNEA-  Konstantin Sharp    DOCTOR : Niru Chairez    If I haven't had a sleep study yet, what can I expect?  A personal story from Mike  [email or web address]=AxPLmlRpnCs    Am I having a home sleep study?  Here is a video in case you get home and want to make sure you have done it correctly  [email or web address]?v=[email or web address]=youtu.be    Frequently asked questions:  1. What is Obstructive Sleep Apnea (CLARY)? CLARY is the most common type of sleep apnea. Apnea literally means, \"without breath.\" It is characterized by repetitive pauses in breathing, despite continued effort to breathe, and is usually associated with a reduction in blood oxygen saturation. Apneas can last 10 to over 60 seconds. It is caused by narrowing or collapse of the upper airway as muscles relax during sleep. Severity of sleep apnea is determined by frequency of breathing events and their effect on your sleep and oxygen levels determined during sleep testing.   2. What are the consequences of CLARY? Symptoms include: daytime sleepiness- possibly increasing the risk of falling asleep while driving, unrefreshing/restless sleep, snoring, insomnia, waking frequently to urinate, waking with heartburn or reflux, reduced concentration and memory, and morning headaches. Other health consequences may include development of high blood pressure and other cardiovascular disease in persons who are susceptible. Untreated CLARY  can contribute to heart disease, stroke and diabetes.   3. What are the treatment options? In most situations, sleep apnea is a lifelong disease that must be managed with daily therapy. Medications are not effective for sleep apnea and surgery is generally not performed until other therapies have been tried. Therapy is usually tailored to the individual patient based on many factors including your wishes as well as severity of sleep apnea and severity of " obesity. Continuous Positive Airway (CPAP) is the most reliable treatment. An oral device to hold your jaw forward is usually the next most reliable option. Other options include postioning devices (to keep you off your back), weight loss, and surgery including a tongue pacing device. There is more detail about some of these options below.            1. CPAP-  WHAT DOES IT DO AND HOW CAN I LEARN TO WEAR IT?                               BEFORE I START, CAN I WATCH A MOVIE TO GET A PLAN ON HOW TO USE CPAP?  https://www.[employer].com/watch?o=f5O72bo110Z      Continuous positive airway pressure, or CPAP, is the most effective treatment for obstructive sleep apnea. It works by blowing room air, through a mask, to hold your throat open. A decision to use CPAP is a major step forward in the pursuit of a healthier life. The successful use of CPAP will help you breathe easier, sleep better and live healthier. You can choose CPAP equipment from any durable medical equipment provider that meets your needs.  Using CPAP can be a positive experience if you keep these mullins points in mind:  1. Commitment  CPAP is not a quick fix for your problem. It involves a long-term commitment to improve your sleep and your health.    2. Communication  Stay in close communication with both your sleep doctor and your CPAP supplier. Ask lots of questions and seek help when you need it.    3. Consistency  Use CPAP all night, every night and for every nap. You will receive the maximum health benefits from CPAP when you use it every time that you sleep. This will also make it easier for your body to adjust to the treatment.    4. Correction  The first machine and mask that you try may not be the best ones for you. Work with your sleep doctor and your CPAP supplier to make corrections to your equipment selection. Ask about trying a different type of machine or mask if you have ongoing problems. Make sure that your mask is a good fit and learn to use  "your equipment properly.    5. Challenge  Tell a family member or close friend to ask you each morning if you used your CPAP the previous night. Have someone to challenge you to give it your best effort.    6. Connection   Your adjustment to CPAP will be easier if you are able to connect with others who use the same treatment. Ask your sleep doctor if there is a support group in your area for people who have sleep apnea, or look for one on the Internet.  7. Comfort   Increase your level of comfort by using a saline spray, decongestant or heated humidifier if CPAP irritates your nose, mouth or throat. Use your unit's \"ramp\" setting to slowly get used to the air pressure level. There may be soft pads you can buy that will fit over your mask straps. Look on www.CPAP.com for accessories that can help make CPAP use more comfortable.  8. Cleaning   Clean your mask, tubing and headgear on a regular basis. Put this time in your schedule so that you don't forget to do it. Check and replace the filters for your CPAP unit and humidifier.    9. Completion   Although you are never finished with CPAP therapy, you should reward yourself by celebrating the completion of your first month of treatment. Expect this first month to be your hardest period of adjustment. It will involve some trial and error as you find the machine, mask and pressure settings that are right for you.    10. Continuation  After your first month of treatment, continue to make a daily commitment to use your CPAP all night, every night and for every nap.    CPAP-Tips to starting with success:  Begin using your CPAP for short periods of time during the day while you watch TV or read.    Use CPAP every night and for every nap. Using it less often reduces the health benefits and makes it harder for your body to get used to it.    Make small adjustments to your mask, tubing, straps and headgear until you get the right fit. Tightening the mask may actually worsen " the leak.  If it leaves significant marks on your face or irritates the bridge of your nose, it may not be the best mask for you.  Speak with the person who supplied the mask and consider trying other masks. Insurances will allow you to try different masks during the first month of starting CPAP.  Insurance also covers a new mask, hose and filter about every 6 months.    Use a saline nasal spray to ease mild nasal congestion. Neti-Pot or saline nasal rinses may also help. Nasal gel sprays can help reduce nasal dryness.  Biotene mouthwash can be helpful to protect your teeth if you experience frequent dry mouth.  Dry mouth may be a sign of air escaping out of your mouth or out of the mask in the case of a full face mask.  Speak with your provider if you expect that is the case.     Take a nasal decongestant to relieve more severe nasal or sinus congestion.  Do not use Afrin (oxymetazoline) nasal spray more than 3 days in a row.  Speak with your sleep doctor if your nasal congestion is chronic.    Use a heated humidifier that fits your CPAP model to enhance your breathing comfort. Adjust the heat setting up if you get a dry nose or throat, down if you get condensation in the hose or mask.  Position the CPAP lower than you so that any condensation in the hose drains back into the machine rather than towards the mask.    Try a system that uses nasal pillows if traditional masks give you problems.    Clean your mask, tubing and headgear once a week. Make sure the equipment dries fully.    Regularly check and replace the filters for your CPAP unit and humidifier.    Work closely with your sleep provider and your CPAP supplier to make sure that you have the machine, mask and air pressure setting that works best for you. It is better to stop using it and call your provider to solve problems than to lay awake all night frustrated with the device.    BESIDES CPAP, WHAT OTHER THERAPIES ARE THERE?      Positioning Device   Positioning devices are generally used when sleep apnea is mild and only occurs on your back.This example shows a pillow that straps around the waist. It may be appropriate for those whose sleep study shows milder sleep apnea that occurs primarily when lying flat on one's back. Preliminary studies have shown benefit but effectiveness at home may need to be verified by a home sleep test. These devices are generally not covered by medical insurance.                      Oral Appliance  What is oral appliance therapy?  An oral appliance is a small acrylic device that fits over the upper and lower teeth or tongue (similar to an orthodontic retainer or a mouth guard). This device slightly advances the lower jaw or tongue, which moves the base of the tongue forward, opens the airway, improves breathing and can effectively treat snoring and obstructive sleep apnea sleep apnea. The appliance is fabricated and customized by a qualified dentist with experience in treating snoring and sleep apnea. Oral appliances are usually well tolerated and have relatively high compliance by patients1, 2, 3.  When is an oral appliance indicated?  Oral appliance therapy is recommended as a first-line treatment for patients with primary snoring, mild sleep apnea, and for patients with moderate sleep apnea who prefer appliance therapy to use of CPAP4, 5. Severity of sleep apnea is determined by sleep testing and is based on the number of respiratory events per hour of sleep.   How successful is oral appliance therapy?  The success rate of oral appliance therapy in patients with mild sleep apnea is 75-80% while in patients with moderate sleep apnea it is 50-70%. The chance of success in patients with severe sleep apnea is 40-50%. The research also shows that oral appliances have a beneficial effect on the cardiovascular health of CLARY patients at the same magnitude as CPAP therapy7.  Oral appliances should be a second-line treatment in cases of  severe sleep apnea, but if not completely successful then a combination therapy utilizing CPAP plus oral appliance therapy may be effective. Oral appliances tend to be effective in a broad range of patients although studies show that the patients who have the highest success are females, younger patients, those with milder disease, and less severe obesity. 3, 6.   The chances of success are lower in patients who have more severe CLARY, are older, and those who are morbidly obese.     Example of an oral appliance   Finding a dentist that practices dental sleep medicine  Specific training is available through the American Academy of Dental Sleep Medicine for dentists interested in working in the field of sleep. To find a dentist who is educated in the field of sleep and the use of oral appliances, near you, visit the Web site of the American Academy of Dental Sleep Medicine; also see   http://www.accpstorage.org/newOrganization/patients/oralAppliances.pdf  To search for a dentist certified in these practices:  Http://aadsm.org/FindADentist.aspx?1  1. Zia et al. Objectively measured vs self-reported compliance during oral appliance therapy for sleep-disordered breathing. Chest 2013; 144(5): 9875-1793.  2. Sherly, et al. Objective measurement of compliance during oral appliance therapy for sleep-disordered breathing. Thorax 2013; 68(1): 91-96.  3. Keny et al. Mandibular advancement devices in 620 men and women with CLARY and snoring: tolerability and predictors of treatment success. Chest 2004; 125: 2435-6604.  4. Matos, et al. Oral appliances for snoring and CLARY: a review. Sleep 2006; 29: 244-262.  5. Adalberto et al. Oral appliance treatment for CLARY: an update. J Clin Sleep Med 2014; 10(2): 215-227.  6. Yisel, et al. Predictors of OSAH treatment outcome. J Dent Res 2007; 86: 6267-7648.      Weight Loss:    Weight management is a personal decision.  If you are interested in exploring weight loss  strategies, the following discussion covers the impact on weight loss on sleep apnea and the approaches that may be successful.    Weight loss decreases severity of sleep apnea in most people with obesity. For those with mild obesity who have developed snoring with weight gain, even 15-30 pound weight loss can improve and occasionally eliminate sleep apnea.  Structured and life-long dietary and health habits are necessary to lose weight and keep healthier weight levels.     Though there may be significant health benefits from weight loss, long-term weight loss is very difficult to achieve- studies show success with dietary management in less than 10% of people. In addition, substantial weight loss may require years of dietary control and may be difficult if patients have severe obesity. In these cases, surgical management may be considered.  Finally, older individuals who have tolerated obesity without health complications may be less likely to benefit from weight loss strategies.    Your BMI is Body mass index is 35.53 kg/(m^2).  Body mass index (BMI) is one way to tell whether you are at a healthy weight, overweight, or obese. It measures your weight in relation to your height.  A BMI of 18.5 to 24.9 is in the healthy range. A person with a BMI of 25 to 29.9 is considered overweight, and someone with a BMI of 30 or greater is considered obese. More than two-thirds of American adults are considered overweight or obese.  Being overweight or obese increases the risk for further weight gain. Excess weight may lead to heart disease and diabetes.  Creating and following plans for healthy eating and physical activity may help you improve your health.  Weight control is part of healthy lifestyle and includes exercise, emotional health, and healthy eating habits. Careful eating habits lifelong are the mainstay of weight control. Though there are significant health benefits from weight loss, long-term weight loss with diet  alone may be very difficult to achieve- studies show long-term success with dietary management in less than 10% of people. Attaining a healthy weight may be especially difficult to achieve in those with severe obesity. In some cases, medications, devices and surgical management might be considered.  What can you do?  If you are overweight or obese and are interested in methods for weight loss, you should discuss this with your provider.     Consider reducing daily calorie intake by 500 calories.     Keep a food journal.     Avoiding skipping meals, consider cutting portions instead.    Diet combined with exercise helps maintain muscle while optimizing fat loss. Strength training is particularly important for building and maintaining muscle mass. Exercise helps reduce stress, increase energy, and improves fitness. Increasing exercise without diet control, however, may not burn enough calories to loose weight.       Start walking three days a week 10-20 minutes at a time    Work towards walking thirty minutes five days a week     Eventually, increase the speed of your walking for 1-2 minutes at time    In addition, we recommend that you review healthy lifestyles and methods for weight loss available through the National Institutes of Health patient information sites:  http://win.niddk.nih.gov/publications/index.htm    And look into health and wellness programs that may be available through your health insurance provider, employer, local community center, or [employer].    Weight management plan: Patient was referred to their PCP to discuss a diet and exercise plan.    Surgery:    Upper Airway Surgery for CLARY  Surgery for CLARY is a second-line treatment option in the management of sleep apnea.  Surgery should be considered for patients who are having a difficult time tolerating CPAP.    Surgery for CLARY is directed at areas that are responsible for narrowing or complete obstruction of the airway during sleep.  There are  a wide range of procedures available to enlarge and/or stabilize the airway to prevent blockage of breathing in the three major areas where it can occur: the palate, tongue, and nasal regions.  Successful surgical treatment depends on the accurate identification of the factors responsible for obstructive sleep apnea in each person.  A personalized approach is required because there is no single treatment that works well for everyone.  Because of anatomic variation, consultation with an examination by a sleep surgeon is a critical first step in determining what surgical options are best for each patient.  In some cases, examination during sedation may be recommended in order to guide the selection of procedures.  Patients will be counseled about risks and benefits as well as the typical recovery course after surgery. Surgery is typically not a cure for a person s CLARY.  However, surgery will often significantly improve one s CLARY severity (termed  success rate ).  Even in the absence of a cure, surgery will decrease the cardiovascular risk associated with OSA7; improve overall quality of life8 (sleepiness, functionality, sleep quality, etc).          Palate Procedures:  Patients with CLARY often have narrowing of their airway in the region of their tonsils and uvula.  The goals of palate procedures are to widen the airway in this region as well as to help the tissues resist collapse.  Modern palate procedure techniques focus on tissue conservation and soft tissue rearrangement, rather than tissue removal.  Often the uvula is preserved in this procedure. Residual sleep apnea is common in patient after pharyngoplasty with an average reduction in sleep apnea events of 33%2.      Tongue Procedures:  While patients are awake, the muscles that surround the throat are active and keep this region open for breathing. These muscles relax during sleep, allowing the tongue and other structures to collapse and block breathing.  There  are several different tongue procedures available.  Selection of a tongue base procedure depends on characteristics seen on physical exam.  Generally, procedures are aimed at removing bulky tissues in this area or preventing the back of the tongue from falling back during sleep.  Success rates for tongue surgery range from 50-62%3.    Hypoglossal Nerve Stimulation:  Hypoglossal nerve stimulation has recently received approval from the [place] Food and Drug Administration for the treatment of obstructive sleep apnea.  This is based on research showing that the system was safe and effective in treating sleep apnea6.  Results showed that the median AHI score decreased 68%, from 29.3 to 9.0. This therapy uses an implant system that senses breathing patterns and delivers mild stimulation to airway muscles, which keeps the airway open during sleep.  The system consists of three fully implanted components: a small generator (similar in size to a pacemaker), a breathing sensor, and a stimulation lead.  Using a small handheld remote, a patient turns the therapy on before bed and off upon awakening.    Candidates for this device must be greater than 22 years of age, have moderate to severe CLARY (AHI between 20-65), BMI less than 32, have tried CPAP/oral appliance without success, and have appropriate upper airway anatomy (determined by a sleep endoscopy performed by Dr. Ackerman).    Hypoglossal Nerve Stimulation Pathway:    The sleep surgeon s office will work with the patient through the insurance prior-authorization process (including communications and appeals).    Nasal Procedures:  Nasal obstruction can interfere with nasal breathing during the day and night.  Studies have shown that relief of nasal obstruction can improve the ability of some patients to tolerate positive airway pressure therapy for obstructive sleep apnea1.  Treatment options include medications such as nasal saline, topical corticosteroid and  antihistamine sprays, and oral medications such as antihistamines or decongestants. Non-surgical treatments can include external nasal dilators for selected patients. If these are not successful by themselves, surgery can improve the nasal airway either alone or in combination with these other options.      Combination Procedures:  Combination of surgical procedures and other treatments may be recommended, particularly if patients have more than one area of narrowing or persistent positional disease.  The success rate of combination surgery ranges from 66-80%2,3.      1. Tyrell LE. The Role of the Nose in Snoring and Obstructive Sleep Apnoea: An Update.  Eur Arch Otorhinolaryngol. 2011; 268: 1365-73.  2.  Cecilia SM; Toney JA; Sim JR; Pallanch JF; Qamar MB; Dinesh SG; Del PLEITEZ. Surgical modifications of the upper airway for obstructive sleep apnea in adults: a systematic review and meta-analysis. SLEEP 2010;33(10):0686-9827. Marie CHERY. Hypopharyngeal surgery in obstructive sleep apnea: an evidence-based medicine review.  Arch Otolaryngol Head Neck Surg. 2006 Feb;132(2):206-13.  3. Robert YH1, Maria Esther Y, Alberto RODOLFO. The efficacy of anatomically based multilevel surgery for obstructive sleep apnea. Otolaryngol Head Neck Surg. 2003 Oct;129(4):327-35.  4. Marie CHERY, Goldberg A. Hypopharyngeal Surgery in Obstructive Sleep Apnea: An Evidence-Based Medicine Review. Arch Otolaryngol Head Neck Surg. 2006 Feb;132(2):206-13.  5. Bryon RUIZ et al. Upper-Airway Stimulation for Obstructive Sleep Apnea.  N Engl J Med. 2014 Jan 9;370(2):139-49.  6. John Y et al. Increased Incidence of Cardiovascular Disease in Middle-aged Men with Obstructive Sleep Apnea. Am J Respir Crit Care Med; 2002 166: 159-165  7. Muna SIGALA et al. Studying Life Effects and Effectiveness of Palatopharyngoplasty (SLEEP) study: Subjective Outcomes of Isolated Uvulopalatopharyngoplasty. Otolaryngol Head Neck Surg. 2011; 144: 623-631.  Please do not drive if  drowsy or sleepy;  pull over if drowsy.  Simple lifestyle changes can play an important role in alleviating symptoms of Restless legs syndrome (RLS) . These steps may help reduce the extra activity in your legs:   Try baths and massages. Soaking in a warm bath and massaging your legs can relax your muscles.   Apply warm or cool packs. You may find that the use of heat or cold, or alternating use of the two, lessens the sensations in your limbs.   Try relaxation techniques, such as meditation or yoga. Stress can aggravate RLS. Learn to relax, especially before going to bed at night.   Establish good sleep hygiene. Fatigue tends to worsen symptoms of RLS, so it's important that you practice good sleep hygiene. Ideally, sleep hygiene involves having a cool, quiet and comfortable sleeping environment, going to bed at the same time, rising at the same time, and getting enough sleep to feel well rested. Some people with RLS find that going to bed later and rising later in the day helps in getting enough sleep.   Exercise. Getting moderate, regular exercise may relieve symptoms of RLS, but overdoing it at the gym or working out too late in the day may intensify symptoms.   Avoid caffeine. Sometimes cutting back on caffeine may help restless legs. It's worth trying to avoid caffeine-containing products, including chocolate and caffeinated beverages, such as coffee, tea and soft drinks, for a few weeks to see if this helps.   Cut back on alcohol and tobacco. These substances also may aggravate or trigger symptoms of RLS. Test to see whether avoiding them helps.     Please follow up with your primary care provider and psychiatrist for optimizing the management of the depression.      [facility]      Treating Insomnia  Good sleeping habits are a key part of treatment. If needed, some medications may help you sleep better at first. Making healthy lifestyle changes and learning to relax can improve your sleep.  Treating insomnia takes commitment, but trust that your efforts will pay off. Talk to your doctor before taking any medication.    Healthy Lifestyle  Your lifestyle affects your health and your sleep. Here are some healthy habits:    Keep a regular sleep schedule. Go to bed and get up at the same time each day.    Exercise regularly. It may help you reduce stress. Avoid strenuous exercise for two to four hours before bedtime.    Avoid or limit naps.    Use your bed only for sleep and sex.    Don t spend too much time in bed trying to fall asleep. If you can t fall asleep, get up and do something until you become tired and drowsy.    Avoid or limit caffeine and nicotine. They can keep you awake at night. Also avoid alcohol. It may help you fall asleep at first, but your sleep will not be restful.    Before Bedtime  To sleep better every night, try these tips:    Have a bedtime routine to let your body and mind know when it s time to sleep.    Going to bed should be relaxing so try to do only relaxing things around bedtime. Sleep will come sooner.    If your worries don t let you sleep, write them down in a diary. Then close it, and go to bed.    Make sure the room is not too hot or too cold. If it s not dark enough, an eye mask can help. If it s noisy, try using earplugs.    Learn to Relax  Stress, anxiety, and body tension may keep you awake at night. To unwind before bedtime, try reading a book, meditation, or yoga. Also, try the following:    Deep breathing. Sit or lie back in a chair. Take a slow, deep breath. Hold it for 5 counts. Then breathe out slowly through your mouth. Keep doing this until you feel relaxed.    Imagery. Think of the last fun trip you took. In your mind, walk through the trip from start to finish. Put as much detail into the memory as you can remember. It will help you relax.    Cognitive Behavioral Treatment (CBT)  CBT is the most effective treatment for long-term insomnia. It tries to  address the underlying causes of your sleep problems, including your habits and how you think about sleep.      Individual Therapy   Aden Chandra    Sleep Psychologist, [facility] in [place] and [place], [place]     Online Programs     www.SHUTi.me (pronounced shut eye). There is a fee for this program. Enter the code  [facility]  if you decide to enroll in this program.      www.sleepIO.com (pronounced sleep ee oh). There is a fee for this program. Enter the code  [facility]  if you decide to enroll in this program.     Suggested Resources  Insomnia Treatment Books     Overcoming Insomnia by Damien Mckenna and Jamaica Stovall (2008)    No More Sleepless Nights by Ruddy Bailey and Chinyere Olson (1996)    Say Akbar to Insomnia by Zay Marcial (2009)    The Insomnia Workbook by Siri Houston and Duke Mejias (2009)    The Insomnia Answer by Jackson Masters and iMchael Garay (2006)      Stress Management and Relaxation Books    The Relaxation and Stress Reduction Workbook by Helen Mayes, Concepción Shah and Shorty Merritt (2008)    Stress Management Workbook: Techniques and Self-Assessment Procedures by Nena Rothman and Mu Concepcion (1997)    A Mindfulness-Based Stress Reduction Workbook by Remi Portillo and Sachi Vega (2010)    The Complete Stress Management Workbook by Josep Malik, Brody Pizarro and Garret Mclaughlin (1996)    Assert Yourself by Serena Sepulveda and William Sepulveda (1977)    Relaxation Resources for Computer Download   These websites offer resources to help you relax. This list is for information only. [facility] is not responsible for the quality of services or the actions of any person or organization.  Progressive Muscle Relaxation (PMR):     http://www.innerSense Platformo.com/progressive-muscle-relaxation-exercise.html     http://studentsupport.[facility]/counseling/resources/self-help/relaxation-and-stress-management/   Deep Breathing  Exercises:    http://www.[employer] [place]/breathing-awareness.html     Meditation:     www.[employer] Systems[employer]hePit My Pet.[employer]    www.theTiny Postguided-meditation-site.com You may have to pay for some of these resources.    Guided Imagery:    http://www.[employer] [place]/guided-imagery-scripts.html     http://UZwan/library/tcuvoovlzn-nhxycs-gxjzpiz/     Counseling / Behavioral Health  [facility]  Visit www.[facility].org or call [phone] to find a clinic close to you.      This is not a prescription and these resources are optional. You must pay for any costs when using these resources. Please ask your insurance carrier if you can be reimbursed for these resources. If so, you are responsible for sending the needed details to your insurance carrier. These resources may also be tax deductible as medical expenses. Check with your .     These programs and publications are not affiliated in any way with [facility].
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
## 2017-03-21 NOTE — PROGRESS NOTES
DICTATED THE SLEEP CLINIC VISIT NOTE FOR TODAY.  Niru Chairez MD   of Medicine,  Division of Pulmonary/Sleep Medicine  [facility].

## 2017-03-23 ENCOUNTER — MEDICAL CORRESPONDENCE (OUTPATIENT)
Dept: HEALTH INFORMATION MANAGEMENT | Facility: CLINIC | Age: 47
End: 2017-03-23

## 2017-03-27 ENCOUNTER — MEDICAL CORRESPONDENCE (OUTPATIENT)
Dept: HEALTH INFORMATION MANAGEMENT | Facility: CLINIC | Age: 47
End: 2017-03-27

## 2017-03-27 ENCOUNTER — THERAPY VISIT (OUTPATIENT)
Dept: SLEEP MEDICINE | Facility: CLINIC | Age: 47
End: 2017-03-27
Attending: INTERNAL MEDICINE
Payer: MEDICAID

## 2017-03-27 ENCOUNTER — TRANSFERRED RECORDS (OUTPATIENT)
Dept: HEALTH INFORMATION MANAGEMENT | Facility: CLINIC | Age: 47
End: 2017-03-27

## 2017-03-27 DIAGNOSIS — G47.33 OSA (OBSTRUCTIVE SLEEP APNEA): ICD-10-CM

## 2017-03-27 LAB — HBA1C MFR BLD: 6.3 % (ref 4.3–6)

## 2017-03-27 PROCEDURE — 95810 POLYSOM 6/> YRS 4/> PARAM: CPT | Mod: ZF

## 2017-03-27 NOTE — MR AVS SNAPSHOT
After Visit Summary   3/27/2017    Konstantin Sharp    MRN: [id number]           Patient Information     Date Of Birth          1970        Visit Information        Provider Department      3/27/2017 8:00 PM [facility]Y [facility]        Today's Diagnoses     CLARY (obstructive sleep apnea)          Care Instructions    [place] SLEEP [facility]    1. Your sleep study will be reviewed by a sleep physician within the next few days.     2. Please follow up in the sleep clinic as scheduled, or, make an appointment with your sleep provider to be seen within two weeks to discuss the results of the sleep study.    3. If you have any questions or problems with your treatment plan, please contact your sleep clinic provider at [phone] to further manage your condition.    4. Please review your attached medication list, and, at your follow-up appointment advise your sleep clinic provider about any changes.    5. Go to [email or web address] for more information about your sleep problems.    LITA Garcia  March 28, 2017                Follow-ups after your visit        Your next 10 appointments already scheduled     Apr 03, 2017 11:00 AM CDT   Return Sleep Patient with Niru Chairez MD   [facility], Sleep Study ([facility])    [address] [place] [address] [phone]            Sep 12, 2017 11:00 AM CDT   (Arrive by 10:45 AM)   RETURN GENERAL with Ricardo Pickard OD   [facility]hthalmology ([facility])    [address] [address] [phone]              Who to contact     If you have questions or need follow up information about today's clinic visit or your schedule please contact [facility] directly at [phone].  Normal or non-critical lab and imaging results will be communicated to  you by MyChart, letter or phone within 4 business days after the clinic has received the results. If you do not hear from us within 7 days, please contact the clinic through Meiyout or phone. If you have a critical or abnormal lab result, we will notify you by phone as soon as possible.  Submit refill requests through Mirifice or call your pharmacy and they will forward the refill request to us. Please allow 3 business days for your refill to be completed.          Additional Information About Your Visit        [employer] Information     Mirifice gives you secure access to your electronic health record. If you see a primary care provider, you can also send messages to your care team and make appointments. If you have questions, please call your primary care clinic.  If you do not have a primary care provider, please call [phone] and they will assist you.        Care EveryWhere ID     This is your Care EveryWhere ID. This could be used by other organizations to access your [place] medical records  [id number]         Blood Pressure from Last 3 Encounters:   03/20/17 125/72   02/14/17 122/82   06/21/16 132/90    Weight from Last 3 Encounters:   03/20/17 118.8 kg (262 lb)   02/14/17 113.7 kg (250 lb 9.6 oz)   02/03/17 113.9 kg (251 lb)              We Performed the Following     Comprehensive Sleep Study        Primary Care Provider Office Phone # Fax #    Jesus Boyle [phone]       [facility] [address] [address]        Thank you!     Thank you for choosing [facility], SLEEP STUDY  for your care. Our goal is always to provide you with excellent care. Hearing back from our patients is one way we can continue to improve our services. Please take a few minutes to complete the written survey that you may receive in the mail after your visit with us. Thank you!             Your Updated Medication List - Protect others around you: Learn how to safely use, store and  throw away your medicines at www.disposemymeds.org.          This list is accurate as of: 3/27/17 11:59 PM.  Always use your most recent med list.                   Brand Name Dispense Instructions for use    albuterol 108 (90 BASE) MCG/ACT Inhaler    PROAIR HFA/PROVENTIL HFA/VENTOLIN HFA     Inhale 2 puffs into the lungs every 4 hours as needed       AMITIZA PO      Take 24 mcg by mouth 2 times daily (with meals)       amitriptyline 25 MG tablet    ELAVIL    90 tablet    Take 1 tablet (25 mg) by mouth At Bedtime       ARIPiprazole 2 MG tablet    ABILIFY    30 tablet    Take 1 tablet (2 mg) by mouth 2 times daily       * COUMADIN 5 MG tablet   Generic drug:  warfarin     30 tablet    8.5 mg daily       * warfarin 1 MG tablet    COUMADIN    30 tablet    Total 8.5 mg daily       FAMOTIDINE PO      Take 10 mg by mouth daily       glipiZIDE 5 MG tablet    GLUCOTROL    30 tablet    Take 1 tablet (5 mg) by mouth 2 times daily (before meals)       losartan 25 MG tablet    COZAAR    30 tablet    Take 1 tablet (25 mg) by mouth daily       magnesium oxide 400 (241.3 MG) MG tablet    MAG-OX    60 tablet    Take 1 tablet (400 mg) by mouth daily       metFORMIN 500 MG tablet    GLUCOPHAGE    60 tablet    850 mg BID       metoprolol 50 MG tablet    LOPRESSOR    180 tablet    Take 100 mg by mouth 3 times daily       mirtazapine 45 MG tablet    REMERON    90 tablet    Take 1 tablet (45 mg) by mouth daily       QUEtiapine 100 MG tablet    SEROquel    30 tablet    150 mg every HS       senna 8.6 MG tablet    SENOKOT    120 tablet    Take 1 tablet by mouth daily       topiramate 50 MG tablet    TOPAMAX    60 tablet    Take 1 tablet (50 mg) by mouth 2 times daily       traZODone 50 MG tablet    DESYREL    90 tablet    Take 1 tablet (50 mg) by mouth 3 times daily       venlafaxine 150 MG 24 hr capsule    EFFEXOR-XR    30 capsule    Take 1 capsule (150 mg) by mouth daily       * Notice:  This list has 2 medication(s) that are the same as  other medications prescribed for you. Read the directions carefully, and ask your doctor or other care provider to review them with you.

## 2017-03-28 NOTE — PATIENT INSTRUCTIONS
[place] SLEEP [facility]    1. Your sleep study will be reviewed by a sleep physician within the next few days.     2. Please follow up in the sleep clinic as scheduled, or, make an appointment with your sleep provider to be seen within two weeks to discuss the results of the sleep study.    3. If you have any questions or problems with your treatment plan, please contact your sleep clinic provider at [phone] to further manage your condition.    4. Please review your attached medication list, and, at your follow-up appointment advise your sleep clinic provider about any changes.    5. Go to [email or web address] for more information about your sleep problems.    Ml Draper, RPSGT  March 28, 2017

## 2017-03-29 ENCOUNTER — PRE VISIT (OUTPATIENT)
Dept: GASTROENTEROLOGY | Facility: CLINIC | Age: 47
End: 2017-03-29

## 2017-03-29 NOTE — TELEPHONE ENCOUNTER
1.  Date/reason for appt:7/26/17, Chronic constipation   2.  Referring provider:DINORA MORALEZ  3.  Call to patient (Yes / No - short description):No, transferred from  (Anna from refering clinic)  4.  Previous care at / records requested from:   [facility]- faxing records (provided fax #)

## 2017-03-31 NOTE — PROCEDURES
SLEEP STUDY INTERPRETATION  POLYSOMNOGRAPHY REPORT      Patient: Konstantin Sharp  Date of Birth: [birth year]  Study Date: 3/27/17  MRN: [id number]  Referring Provider: Momo Castellano MD  Ordering Provider: Mag Chairez MD    Indications for Polysomnography: The patient is a 47 y old edentulous  male who is 6' and weighs 262.0 lbs.  His BMI is 35.9, [place] sleepiness scale 11.0 and neck size is 48cm.  Relevant medical history includes atrial fibrillation, diabetes, DVT and hypertrophic cardiomyopathy [EF> 60%]. His sleep diagnoses include delayed sleep phase, RLS, and  prior history of severe obstructive sleep apnea in 2010 [AHI 30.5] with some CPAP intolerance.  A diagnostic polysomnogram was performed to evaluate for current severity of sleep apnea.     Polysomnogram Data:  A full night polysomnogram recorded the standard physiologic parameters including EEG, EOG, EMG, ECG, nasal and oral airflow.  Respiratory parameters of chest and abdominal movements were recorded with respiratory inductance plethysmography.  Oxygen saturation was recorded by pulse oximetry.      Sleep Architecture: Prolonged sleep latency and prolonged wakefulness.   The total recording time of the polysomnogram was 435.3 minutes.  The total sleep time was 358.5 minutes.  Sleep latency was increased at 46.7 minutes without the use of a sleep aid.  REM latency was 227.5 minutes.  Arousal index was normal at 24.1 arousals per hour.  Sleep efficiency was decreased at 82.4%.  Wake after sleep onset was 29.5 minutes.  The patient spent 4.6% of total sleep time in Stage N1, 69.2% in Stage N2, 14.2% in Stages N3, and 12.0% in REM.  Time in REM supine was 0.5 minutes.    Respiration: Moderate obstructive sleep apnea with hypoxemia worse in stage R and not clearly positional.     Events - The polysomnogram revealed a presence of 13 obstructive, 8 central, and 2 mixed apneas resulting in an apnea index of 3.8 events per hour.  There were 96  hypopneas resulting in a hypopnea index of 16.1 events per hour.  The combined apnea/hypopnea index was 19.9 events per hour.  The REM AHI was 80.9 events per hour.  The supine AHI was 9.4 events per hour.  The RERA index was 0.2 events per hour.   The RDI was 20.1 events per hour.    Snoring - was reported as continuous.    Respiratory rate and pattern - was notable for normal respiratory rate and pattern.    Sustained Sleep Associated Hypoventilation - Transcutaneous carbon dioxide monitoring was performed without evidence of hypoventilation. Current venous CO2 was marginal at 49 mmHg though arterial PCO2 was elevated at 48 mmHg in 1/17.    Sleep Associated Hypoxemia - (Greater than 5 minutes O2 sat below 89%) was present.  Baseline oxygen saturation was 93.9%. Lowest oxygen saturation was 76.6%.  Time spent less than or equal to 88% was 7.6 minutes.  Time spent less than or equal to 89% was 10.4 minutes.  20.1 0.2 19.9     Movement Activity: Mumbling during wakefulness; transient muscle activity in legs during stage R contaminated by respiratory events and without dream enactment.     Periodic Limb Activity - There were 14 PLMs during the entire study. The PLM index was 2.3 movements per hour.  The PLM Arousal Index was 0.8 per hour.    REM EMG Activity - Excessive transient / sustained muscle activity was not clearly documented with transient muscle activity increases contaminated by respiratory events.     Nocturnal Behavior - Occasional mumbling during transitions between wakefulness and N1.     Bruxism - None apparent.    Cardiac Summary: Normal sinus rhythm with wide complex beats.   The average pulse rate was 77.8 bpm.  The minimum pulse rate was 59.9 bpm while the maximum pulse rate was 95.0 bpm. The rhythm is normal sinus. Arrhythmias were not noted.      Assessment:     Probable mild awake hypoventilation without worsening during sleep.     Moderate non-positional obstructive sleep apnea worse in stage R  and hypoxemia.    Prolonged sleep latency with sleep onset at 11:30 pm and periods of prolonged wakefulness.     Clinical history of RLS-current findings do not exclude that diagnosis. .    Probable confusional arousals during sleep-these may be promoted by insufficient sleep or conditions such as sleep apnea and RLS that cause arousals.  .     Recommendations:    Consider APAP 5-15 and clinical followup with sleep therapy management.     Advise regarding the risks of drowsy driving.    Suggest optimizing sleep schedule and avoiding sleep deprivation.    Weight management (if BMI > 30).    Diagnostic Codes:     Obstructive Sleep Apnea G47.33    Repetitive Intrusions Into Sleep F51.8    Sleep Hypoxemia/Hypoventilation G47.36            _____________________________________   Electronically Signed by Jake Baker MD 14:47 3/31/17

## 2017-04-03 ENCOUNTER — OFFICE VISIT (OUTPATIENT)
Dept: SLEEP MEDICINE | Facility: CLINIC | Age: 47
End: 2017-04-03
Attending: INTERNAL MEDICINE
Payer: MEDICAID

## 2017-04-03 VITALS
RESPIRATION RATE: 20 BRPM | WEIGHT: 261 LBS | HEIGHT: 72 IN | HEART RATE: 91 BPM | BODY MASS INDEX: 35.35 KG/M2 | OXYGEN SATURATION: 96 % | SYSTOLIC BLOOD PRESSURE: 126 MMHG | DIASTOLIC BLOOD PRESSURE: 75 MMHG

## 2017-04-03 DIAGNOSIS — G47.33 OSA (OBSTRUCTIVE SLEEP APNEA): ICD-10-CM

## 2017-04-03 DIAGNOSIS — E66.01 MORBID OBESITY, UNSPECIFIED OBESITY TYPE (H): Primary | ICD-10-CM

## 2017-04-03 PROCEDURE — 99211 OFF/OP EST MAY X REQ PHY/QHP: CPT | Mod: ZF

## 2017-04-03 NOTE — MR AVS SNAPSHOT
After Visit Summary   4/3/2017    Konstantin Sharp    MRN: [id number]           Patient Information     Date Of Birth          1970        Visit Information        Provider Department      4/3/2017 11:20 AM Niru [facility]airez MD [facility]        Today's Diagnoses     Morbid obesity, unspecified obesity type (H)    -  1    CLARY (obstructive sleep apnea)           Follow-ups after your visit        Additional Services     BARIATRIC ADULT REFERRAL       Your provider has referred you to: [facility]: Lifestyle Medicine Program for Weight Management [facility] [phone]   [email or web address]    Please be aware that coverage of these services is subject to the terms and limitations of your health insurance plan.  Call member services at your health plan with any benefit or coverage questions.      Please bring the following with you to your appointment:      (1) List of current medications   (2) This referral request   (3) Any documents/labs given to you for this referral                  Follow-up notes from your care team     Return in about 2 weeks (around 4/17/2017).      Your next 10 appointments already scheduled     Apr 04, 2017  8:00 PM CDT   PSG Titration with SLEEP STUDY  6   [facility], Sleep Study ([facility])    [address] [place] [address]   [phone]            Apr 25, 2017 11:30 AM CDT   Return Sleep Patient with Niru Chairez MD   [facility], Sleep Study ([facility])    [address] [place] [address]   [phone]            Jul 26, 2017  2:00 PM CDT   (Arrive by 1:45 PM)   New Patient Visit with SHAHEED Mccormick [facility] ([facility])    [address] [place] [address]  Floor  [facility] [id number] [phone]            Sep 12, 2017 11:00 AM CDT   (Arrive by 10:45 AM)   RETURN GENERAL with Ricardo Pickard OD   [facility]hthalmology ([facility])    [address] [facility]  4th Floor  [facility] [address] [phone]              Future tests that were ordered for you today     Open Future Orders        Priority Expected Expires Ordered    Comprehensive Sleep Study Routine  9/30/2017 4/3/2017            Who to contact     If you have questions or need follow up information about today's clinic visit or your schedule please contact [facility] directly at [phone].  Normal or non-critical lab and imaging results will be communicated to you by [employer], letter or phone within 4 business days after the clinic has received the results. If you do not hear from us within 7 days, please contact the clinic through Neo Technologyt or phone. If you have a critical or abnormal lab result, we will notify you by phone as soon as possible.  Submit refill requests through Oplerno or call your pharmacy and they will forward the refill request to us. Please allow 3 business days for your refill to be completed.          Additional Information About Your Visit        Oplerno Information     Oplerno gives you secure access to your electronic health record. If you see a primary care provider, you can also send messages to your care team and make appointments. If you have questions, please call your primary care clinic.  If you do not have a primary care provider, please call [phone] and they will assist you.        Care EveryWhere ID     This is your Care EveryWhere ID. This could be used by other organizations to access your [place] medical records  [id number]        Your Vitals Were     Pulse Respirations Height Pulse Oximetry BMI (Body Mass Index)       91 20 1.829 m (6') 96% 35.4 kg/m2        Blood Pressure from Last 3 Encounters:   04/03/17  126/75   03/20/17 125/72   02/14/17 122/82    Weight from Last 3 Encounters:   04/03/17 118.4 kg (261 lb)   03/20/17 118.8 kg (262 lb)   02/14/17 113.7 kg (250 lb 9.6 oz)              We Performed the Following     BARIATRIC ADULT REFERRAL          Today's Medication Changes          These changes are accurate as of: 4/3/17 11:50 AM.  If you have any questions, ask your nurse or doctor.               Start taking these medicines.        Dose/Directions    ferrous fumarate 65 mg ([place]. FE)-Vitamin C 125 mg  MG Tabs tablet   Commonly known as:  VITRON C   Started by:  Niru Chairez MD        One tab by mouth once daily in between meals   Quantity:  30 tablet   Refills:  2            Where to get your medicines      Some of these will need a paper prescription and others can be bought over the counter.  Ask your nurse if you have questions.     Bring a paper prescription for each of these medications     ferrous fumarate 65 mg ([place]. FE)-Vitamin C 125 mg  MG Tabs tablet                Primary Care Provider Office Phone # Fax #    Jesus Boyle [phone]       [facility] [address] [address]        Thank you!     Thank you for choosing [facility], SLEEP STUDY  for your care. Our goal is always to provide you with excellent care. Hearing back from our patients is one way we can continue to improve our services. Please take a few minutes to complete the written survey that you may receive in the mail after your visit with us. Thank you!             Your Updated Medication List - Protect others around you: Learn how to safely use, store and throw away your medicines at www.disposemymeds.org.          This list is accurate as of: 4/3/17 11:50 AM.  Always use your most recent med list.                   Brand Name Dispense Instructions for use    albuterol 108 (90 BASE) MCG/ACT Inhaler    PROAIR HFA/PROVENTIL HFA/VENTOLIN HFA      Inhale 2 puffs into the lungs every 4 hours as needed       AMITIZA PO      Take 24 mcg by mouth 2 times daily (with meals)       amitriptyline 25 MG tablet    ELAVIL    90 tablet    Take 1 tablet (25 mg) by mouth At Bedtime       ARIPiprazole 2 MG tablet    ABILIFY    30 tablet    Take 1 tablet (2 mg) by mouth 2 times daily       * COUMADIN 5 MG tablet   Generic drug:  warfarin     30 tablet    8.5 mg daily       * warfarin 1 MG tablet    COUMADIN    30 tablet    Total 8.5 mg daily       FAMOTIDINE PO      Take 10 mg by mouth daily       ferrous fumarate 65 mg ([place]. FE)-Vitamin C 125 mg  MG Tabs tablet    VITRON C    30 tablet    One tab by mouth once daily in between meals       glipiZIDE 5 MG tablet    GLUCOTROL    30 tablet    Take 1 tablet (5 mg) by mouth 2 times daily (before meals)       losartan 25 MG tablet    COZAAR    30 tablet    Take 1 tablet (25 mg) by mouth daily       magnesium oxide 400 (241.3 MG) MG tablet    MAG-OX    60 tablet    Take 1 tablet (400 mg) by mouth daily       metFORMIN 500 MG tablet    GLUCOPHAGE    60 tablet    850 mg BID       metoprolol 50 MG tablet    LOPRESSOR    180 tablet    Take 100 mg by mouth 3 times daily       mirtazapine 45 MG tablet    REMERON    90 tablet    Take 1 tablet (45 mg) by mouth daily       QUEtiapine 100 MG tablet    SEROquel    30 tablet    150 mg every HS       senna 8.6 MG tablet    SENOKOT    120 tablet    Take 1 tablet by mouth daily       topiramate 50 MG tablet    TOPAMAX    60 tablet    Take 1 tablet (50 mg) by mouth 2 times daily       traZODone 50 MG tablet    DESYREL    90 tablet    Take 1 tablet (50 mg) by mouth 3 times daily       venlafaxine 150 MG 24 hr capsule    EFFEXOR-XR    30 capsule    Take 1 capsule (150 mg) by mouth daily       * Notice:  This list has 2 medication(s) that are the same as other medications prescribed for you. Read the directions carefully, and ask your doctor or other care provider to review them with you.

## 2017-04-03 NOTE — NURSING NOTE
Chief Complaint   Patient presents with     RECHECK     Follow up PSG results       Initial Ht 1.829 m (6')  Wt 118.4 kg (261 lb)  BMI 35.4 kg/m2 Estimated body mass index is 35.4 kg/(m^2) as calculated from the following:    Height as of this encounter: 1.829 m (6').    Weight as of this encounter: 118.4 kg (261 lb).  Medication Reconciliation: complete     JOSH Chen

## 2017-04-03 NOTE — Clinical Note
[place] Tom, the pt is coming tonight for PSG, PAP titration study. He wants to be called at [phone] after the sleep study to review the test results. Could you please write the DME orders for PAP device after the study so that he can start the treatment soon. Thanks, Alex Becker, pt has an appt scheduled to review test results with me on 4/25/17. He will not need that appointment on that date but I  can see his 6 weeks after he starts the PAP treatment. Thanks, Alex

## 2017-04-04 ENCOUNTER — THERAPY VISIT (OUTPATIENT)
Dept: SLEEP MEDICINE | Facility: CLINIC | Age: 47
End: 2017-04-04
Attending: INTERNAL MEDICINE
Payer: MEDICAID

## 2017-04-04 DIAGNOSIS — G47.33 OSA (OBSTRUCTIVE SLEEP APNEA): ICD-10-CM

## 2017-04-04 PROCEDURE — 95811 POLYSOM 6/>YRS CPAP 4/> PARM: CPT | Mod: ZF

## 2017-04-04 NOTE — PROGRESS NOTES
Chief complaint: review results of recently obtained  polysomnography.    HPI: Mr. Konstantin Sharp is a 47 yr old male who presents to Sleep clinic, accompanied by his sister-in-law , to review results of recently obtained polysomnography.     On 3/23/17, he was seen at ER in [facility] for Atrial fibrillation.    PSG(3/27/17)  Total recording time: 435.3 minutes ; Total sleep time: 358.5 minutes  Sleep latency: increased at 46.7 minutes without the use of a sleep aid.    REM latency: prolonged at 227.5 minutes  Sleep efficiency: decreased at 82.4%  Arousal index was increased at 24.1 per hour  All stages of sleep were observed.   REM was reduced at  12.0% of the total sleep time  Total AHI: 19.9 per hour; RDI: 20.1 per hour. The obstructive events were pronounced during REM sleep with REM AHI=80.9 per hour.  Continuous  snoring was reported.  BaselineO2 saturation: 93.9%; Lowest O2 saturation: 76.6% ; Sleep associated hypoxemia (Greater than 5 minutes O2 sat below 89%) was present. Time spent less than or equal to 88% was 7.6 minutes.  Time spent less than or equal to   89% was 10.4 minutes.   Transcutaneous carbon dioxide monitoring was performed without evidence of hypoventilation. Current venous CO2 was marginal at 49 mmHg   though arterial PCO2 was elevated at 48 mmHg in 1/17.  PLM Index: 2.3 per hour  ; PLM Arousal Index: 0.8 per hour  Mumbling during wakefulness; transient muscle activity in legs during stage R contaminated by respiratory events and without dream enactment.  Normal sinus rhythm was noted with wide complex beats.  The results were discussed with pt in detail.    CURRENT MEDS:  Current Outpatient Prescriptions   Medication Sig Dispense Refill     ferrous fumarate 65 mg, [place]. FE,-Vitamin C 125 mg (VITRON C)  MG TABS tablet One tab by mouth once daily in between meals 30 tablet 2     Lubiprostone (AMITIZA PO) Take 24 mcg by mouth 2 times daily (with meals)       FAMOTIDINE PO  Take 10 mg by mouth daily       warfarin (COUMADIN) 5 MG tablet 8.5 mg daily 30 tablet      warfarin (COUMADIN) 1 MG tablet Total 8.5 mg daily 30 tablet      losartan (COZAAR) 25 MG tablet Take 1 tablet (25 mg) by mouth daily 30 tablet      metFORMIN (GLUCOPHAGE) 500 MG tablet 850 mg BID 60 tablet      traZODone (DESYREL) 50 MG tablet Take 1 tablet (50 mg) by mouth 3 times daily 90 tablet      glipiZIDE (GLUCOTROL) 5 MG tablet Take 1 tablet (5 mg) by mouth 2 times daily (before meals) 30 tablet      mirtazapine (REMERON) 45 MG tablet Take 1 tablet (45 mg) by mouth daily 90 tablet 1     venlafaxine (EFFEXOR-XR) 150 MG 24 hr capsule Take 1 capsule (150 mg) by mouth daily 30 capsule 1     topiramate (TOPAMAX) 50 MG tablet Take 1 tablet (50 mg) by mouth 2 times daily 60 tablet      ARIPiprazole (ABILIFY) 2 MG tablet Take 1 tablet (2 mg) by mouth 2 times daily 30 tablet 0     amitriptyline (ELAVIL) 25 MG tablet Take 1 tablet (25 mg) by mouth At Bedtime 90 tablet 1     magnesium oxide (MAG-OX) 400 (241.3 MG) MG tablet Take 1 tablet (400 mg) by mouth daily 60 tablet 3     QUEtiapine (SEROQUEL) 100 MG tablet 150 mg every HS 30 tablet 1     senna (SENOKOT) 8.6 MG tablet Take 1 tablet by mouth daily 120 tablet      metoprolol (LOPRESSOR) 50 MG tablet Take 100 mg by mouth 3 times daily  180 tablet 4     albuterol (PROAIR HFA, PROVENTIL HFA, VENTOLIN HFA) 108 (90 BASE) MCG/ACT inhaler Inhale 2 puffs into the lungs every 4 hours as needed       ALLERGIES:  Allergies   Allergen Reactions     Bee Venom Swelling     Lisinopril      TABS  Severe cough     Penicillins Hives and Difficulty breathing     PAST MEDICAL HISTORY:  Past Medical History:   Diagnosis Date     Atrial fibrillation (H)      Back pains, lower      Cardiomyopathy      Cardiomyopathy      Cardiomyopathy (H)      Coronary artery disease      Dual ICD (implantable cardiac defibrillator) in place 4/29/2014     Heart disease      HTN (hypertension)      Hypertrophic  nonobstructive cardiomyopathy (H) 9/12/2012     Inflammatory arthritis      Muscular dystrophy (H)      Pacemaker      Polysubstance abuse      Shortness of breath      Sleep apnea     doesn't use cpap     Type 2 diabetes mellitus without complications (H)      Unspecified asthma(493.90)        PAST SURGICAL  HISTORY:    Past Surgical History:   Procedure Laterality Date     ABDOMEN SURGERY       APPENDECTOMY       DISCECTOMY LUMBAR POSTERIOR MICROSCOPIC THREE+ LEVELS  12/16/2011    Procedure:DISCECTOMY LUMBAR POSTERIOR MICROSCOPIC THREE+ LEVELS; Posterior Decompression L2-S1; Surgeon:CAITIE OSMAN; Location:[facility] OR     FUSION CERVICAL POSTERIOR ONE LEVEL  8/24/2012    Procedure: FUSION CERVICAL POSTERIOR ONE LEVEL;  Posterior Instrumentated Spinal Fusion Cervical 6-7, Right Iliac Crest Bone [place] ;  Surgeon: Caitie Osman MD;  Location: [facility] OR     GRAFT BONE FROM ILIAC CREST  8/24/2012    Procedure: GRAFT BONE FROM ILIAC CREST;;  Surgeon: Caitie Osman MD;  [facility]     INJECT STEROID (LOCATION) N/A 2/19/2015    Procedure: INJECT STEROID (LOCATION);  Surgeon: Siri Koch MD;  [facility]     INSERT STIMULATOR AND LEADS INTERNAL DORSAL COLUMN  8/7/2013    Procedure: INSERT STIMULATOR AND LEADS INTERNAL DORSAL COLUMN;  SPINAL CORD STIMULATOR IMPLANT ;  Surgeon: Ricardo Bruno MD;  Location: [facility] OR     INSERT STIMULATOR DORSAL COLUMN       LASER CO2 LARYNGOSCOPY N/A 2/19/2015    Procedure: LASER CO2 LARYNGOSCOPY;  Surgeon: Siri Koch MD;  [facility]     LASER CO2 LARYNGOSCOPY, COMPLEX  7/22/2014    Procedure: LASER CO2 LARYNGOSCOPY, COMPLEX;  Surgeon: Siri Koch MD;  [facility]     MYECTOMY SEPTAL  4/14/2014    Procedure: Median Sternotomy, Septal Myectomy, Intraoperative BRENT performed by Dr. Castano, on pump oxygenator.;  Surgeon: Aguila Cannon MD;  [facility]     NECK SURGERY      X 2     SHOULDER  SURGERY      RIGHT     STOMACH SURGERY       WRIST SURGERY      LEFT       Exam:  Vital signs: /75  Pulse 91  Resp 20  Ht 1.829 m (6')  Wt 118.4 kg (261 lb)  SpO2 96%  BMI 35.4 kg/m2   General appearance:  Obese male, in no apparent distress    ASSESSMENT/PLAN:  1. Moderate  obstructive sleep apnea,  worse during stage REM sleep and hypoxemia. Probable mild awake hypoventilation without worsening during sleep. We discussed about treatment options for CLARY. Recommend obtaining a repeat sleep study which will include all night titration using  PAP device to establish the optimum pressure/treatment to control the sleep related breathing disorder. Therapeutic trial with auto CPAP was not considered due to the h/o atrial fibrillation including recent episode. (He is not a candidate for oral appliance based on his dentition and  he is not a candidate for hypoglossal nerve stimulator based on BMI). Pt wants to be called at [phone] after the sleep study to review the test results. Patient was counseled on the importance of driving while alert, avoiding driving if drowsy or sleepy and to pull over if drowsy.    We discussed weight management. [facility]eral to [facility] was provided.     2. Atrial fibrillation: We discussed the  association of CLARY and atrial fibrillation and he was recommended to f/u with cardiology.    Twentyfive minutes spent with patient> 50 % spent counseling and co-ordinating plan of care.    Niru Chairez MD   of Medicine,  Division of Pulmonary/Sleep Medicine  [facility].

## 2017-04-04 NOTE — MR AVS SNAPSHOT
After Visit Summary   4/4/2017    Konstantin Sharp    MRN: [id number]           Patient Information     Date Of Birth          1970        Visit Information        Provider Department      4/4/2017 8:00 PM SLEEP STUDY RM 6 [facility], Sleep Study        Today's Diagnoses     CLARY (obstructive sleep apnea)          Care Instructions    [facility]    1. Your sleep study will be reviewed by a sleep physician within the next few days.     2. Please follow up in the sleep clinic as scheduled, or, make an appointment with your sleep provider to be seen within two weeks to discuss the results of the sleep study.    3. If you have any questions or problems with your treatment plan, please contact your sleep clinic provider at [phone] to further manage your condition.    4. Please review your attached medication list, and, at your follow-up appointment advise your sleep clinic provider about any changes.    5. Go to [email or web address] for more information about your sleep problems.    Ayden Moy, [facility]  April 5, 2017              Follow-ups after your visit        Your next 10 appointments already scheduled     Apr 25, 2017 11:30 AM CDT   Return Sleep Patient with Niru Chairez MD   [facility], Sleep Study ([facility])    [address] [place] [id number] [phone]            Jul 26, 2017  2:00 PM CDT   (Arrive by 1:45 PM)   New Patient Visit with SHAHEED Mccormick CNP [facility] Gastroenterology and IBD ([facility])    [address] [place] [id number] [phone]            Sep 12, 2017 11:00 AM CDT   (Arrive by 10:45 AM)   RETURN GENERAL with SHEREE Tejada [facility] Ophthalmology ([facility])    [address] [place] [id number] [phone]              Who  to contact     If you have questions or need follow up information about today's clinic visit or your schedule please contact [facility], SLEEP STUDY directly at [phone].  Normal or non-critical lab and imaging results will be communicated to you by MyChart, letter or phone within 4 business days after the clinic has received the results. If you do not hear from us within 7 days, please contact the clinic through [employer] or phone. If you have a critical or abnormal lab result, we will notify you by phone as soon as possible.  Submit refill requests through LIA or call your pharmacy and they will forward the refill request to us. Please allow 3 business days for your refill to be completed.          Additional Information About Your Visit        LIA Information     LIA gives you secure access to your electronic health record. If you see a primary care provider, you can also send messages to your care team and make appointments. If you have questions, please call your primary care clinic.  If you do not have a primary care provider, please call [phone] and they will assist you.        Care EveryWhere ID     This is your Care EveryWhere ID. This could be used by other organizations to access your [facility] medical records  [id number]         Blood Pressure from Last 3 Encounters:   04/03/17 126/75   03/20/17 125/72   02/14/17 122/82    Weight from Last 3 Encounters:   04/03/17 118.4 kg (261 lb)   03/20/17 118.8 kg (262 lb)   02/14/17 113.7 kg (250 lb 9.6 oz)              We Performed the Following     Comprehensive Sleep Study        Primary Care Provider Office Phone # Fax #    Jesus Boyle [phone]       [facility] [address] [facility] [address]        Thank you!     Thank you for choosing [facility]EP [facility]  for your care. Our goal is always to provide you with excellent care. Hearing back from our patients is one way we can continue to  improve our services. Please take a few minutes to complete the written survey that you may receive in the mail after your visit with us. Thank you!             Your Updated Medication List - Protect others around you: Learn how to safely use, store and throw away your medicines at www.disposemymeds.org.          This list is accurate as of: 4/4/17 11:59 PM.  Always use your most recent med list.                   Brand Name Dispense Instructions for use    albuterol 108 (90 BASE) MCG/ACT Inhaler    PROAIR HFA/PROVENTIL HFA/VENTOLIN HFA     Inhale 2 puffs into the lungs every 4 hours as needed       AMITIZA PO      Take 24 mcg by mouth 2 times daily (with meals)       amitriptyline 25 MG tablet    ELAVIL    90 tablet    Take 1 tablet (25 mg) by mouth At Bedtime       ARIPiprazole 2 MG tablet    ABILIFY    30 tablet    Take 1 tablet (2 mg) by mouth 2 times daily       * COUMADIN 5 MG tablet   Generic drug:  warfarin     30 tablet    8.5 mg daily       * warfarin 1 MG tablet    COUMADIN    30 tablet    Total 8.5 mg daily       FAMOTIDINE PO      Take 10 mg by mouth daily       ferrous fumarate 65 mg ([place]. FE)-Vitamin C 125 mg  MG Tabs tablet    VITRON C    30 tablet    One tab by mouth once daily in between meals       glipiZIDE 5 MG tablet    GLUCOTROL    30 tablet    Take 1 tablet (5 mg) by mouth 2 times daily (before meals)       losartan 25 MG tablet    COZAAR    30 tablet    Take 1 tablet (25 mg) by mouth daily       magnesium oxide 400 (241.3 MG) MG tablet    MAG-OX    60 tablet    Take 1 tablet (400 mg) by mouth daily       metFORMIN 500 MG tablet    GLUCOPHAGE    60 tablet    850 mg BID       metoprolol 50 MG tablet    LOPRESSOR    180 tablet    Take 100 mg by mouth 3 times daily       mirtazapine 45 MG tablet    REMERON    90 tablet    Take 1 tablet (45 mg) by mouth daily       QUEtiapine 100 MG tablet    SEROquel    30 tablet    150 mg every HS       senna 8.6 MG tablet    SENOKOT    120 tablet     Take 1 tablet by mouth daily       topiramate 50 MG tablet    TOPAMAX    60 tablet    Take 1 tablet (50 mg) by mouth 2 times daily       traZODone 50 MG tablet    DESYREL    90 tablet    Take 1 tablet (50 mg) by mouth 3 times daily       venlafaxine 150 MG 24 hr capsule    EFFEXOR-XR    30 capsule    Take 1 capsule (150 mg) by mouth daily       * Notice:  This list has 2 medication(s) that are the same as other medications prescribed for you. Read the directions carefully, and ask your doctor or other care provider to review them with you.

## 2017-04-05 NOTE — PATIENT INSTRUCTIONS
[facility]    1. Your sleep study will be reviewed by a sleep physician within the next few days.     2. Please follow up in the sleep clinic as scheduled, or, make an appointment with your sleep provider to be seen within two weeks to discuss the results of the sleep study.    3. If you have any questions or problems with your treatment plan, please contact your sleep clinic provider at [phone] to further manage your condition.    4. Please review your attached medication list, and, at your follow-up appointment advise your sleep clinic provider about any changes.    5. Go to [email or web address] for more information about your sleep problems.    Ayden Moy, RPSGT  April 5, 2017

## 2017-04-05 NOTE — PROGRESS NOTES
Completed a all night titration PSG per provider order.    Preliminary AHI 19.  A final therapeutic PAP pressure was achieved.    Supine REM was seen on therapeutic pressure.    Patient reports feeling not refreshed in AM.

## 2017-04-06 ENCOUNTER — HOSPITAL ENCOUNTER (OUTPATIENT)
Dept: NEUROLOGY | Facility: CLINIC | Age: 47
Setting detail: THERAPIES SERIES
Discharge: STILL ADMITTED OR ADMINISTRATIVE | End: 2017-04-06
Attending: NURSE PRACTITIONER

## 2017-04-06 DIAGNOSIS — F09 COGNITIVE DISORDER: ICD-10-CM

## 2017-04-06 DIAGNOSIS — F33.1 MDD (MAJOR DEPRESSIVE DISORDER), RECURRENT EPISODE, MODERATE (H): ICD-10-CM

## 2017-04-06 DIAGNOSIS — G47.00 INSOMNIA, UNSPECIFIED: ICD-10-CM

## 2017-04-06 DIAGNOSIS — G89.29 CHRONIC PAIN: ICD-10-CM

## 2017-04-09 NOTE — PROCEDURES
SLEEP STUDY INTERPRETATION  TITRATION STUDY      Patient: Konstantin Sharp  Date of Birth: [birth year]  Study Date: 4/04/17  MRN: [id number]  Referring Provider: Jesus Boyle MD  Ordering Provider: Mag Chairez MD    Indications for Polysomnography: The patient is a 47 y old Male who is 6' and weighs 261.0 lbs.  His BMI is 35.7, Ninety Six sleepiness scale is 7.0 and neck size is 48cm.  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  A diagnostic polysomnogram PAP titration was performed to find optimal treatment for sleep apnea (PSG 3/27/2017 AHI 19.9, REM AHI 80.9 lowest sat 77%).    Polysomnogram Data:  A full night polysomnogram recorded the standard physiologic parameters including EEG, EOG, EMG, ECG, nasal and oral airflow.  Respiratory parameters of chest and abdominal movements were recorded with respiratory inductance plethysmography.  Oxygen saturation was recorded by pulse oximetry.      Treatment PSG:  Sleep Architecture: Consolidated sleep with delay to REM.  The total recording time of the study was 421.7 minutes.  The total sleep time was 388.5 minutes.  Sleep latency was short at 0.8 minutes without the use of a sleep aid.  REM latency was delayed at 161.0 minutes.  Arousal index was normal at 7.9 arousals per hour.  Sleep efficiency was normal at 92.1%.  Wake after sleep onset was 31.5 minutes.   The patient spent 2.1% of total sleep time in Stage N1, 42.5% in Stage N2, 37.8% in Stage N3 and 17.6% in REM.     Respiration: Obstructive events resolved at final CPAP treatment pressure of 10 including REM lateral.    The patient was titrated at pressures ranging from 5 cmH2O up to 10 cmH2O.  The final pressure achieved was 10 cmH2O with a residual AHI of 6.5 events per hour.  Time in REM supine  on final pressure was 1.5 minutes.     This titration was considered adequate (residual AHI with 75% decrease, or above constraints without REM-supine sleep at final pressure).    Snoring - was reported as mild.    Respiratory rate and pattern - was notable for normal respiratory rate and pattern.    Sustained Sleep Associated Hypoventilation - Transcutaneous carbon dioxide monitoring was not used, however significant hypoventilation was not suggested by oximetry.    Sleep Associated Hypoxemia - (Greater than 5 minutes O2 sat below 89%) was not present.  Baseline oxygen saturation was 93.6%. Lowest oxygen saturation was 82.3%.  Time spent less than or equal to 88% was 2.2 minutes.  Time spent less than or equal to 89% was 3.0 minutes.         Movement Activity: Increased EMG activity noted without dream enactment behavior; increased PLMs most of which were not associated with arousals.    Periodic Limb Activity - There were 83 PLMs during the entire study. The PLM index was 12.8 movements per hour.  The PLM Arousal Index was 0.2 per hour.    REM EMG Activity - Excessive transient / sustained muscle activity was present.    Nocturnal Behavior - Abnormal sleep related behaviors were not noted.    Bruxism - None apparent.    Cardiac Summary: Normal rate, paced rhythm  The average pulse rate was 72.5 bpm.  The minimum pulse rate was 59.9 bpm while the maximum pulse rate was 86.1 bpm. The rhythm is normal sinus. Arrhythmias were not noted.    Assessment:     Recently diagnosed moderately severe REM predominant obstructive sleep apnea.    Obstructive events resolved at final CPAP treatment pressure of 10 including REM lateral.    On PAP therapy sleep was consolidated with delay to REM.    Increased EMG activity noted without dream enactment behavior.    Increased PLMs most of which were not associated with arousals.    Normal heart  rate, paced rhythm.    Recommendations:    Treatment of CLARY with Auto-titrating PAP  therapy with a range of 10-15 cmH2O.  Recommend clinical follow up with sleep management team, including review of compliance measures.    Clinical correlation recommended for increased EMG activity in REM sleep (may be associated with SSRIs.)    Advise regarding the risks of drowsy driving.    Suggest optimizing sleep schedule and avoiding sleep deprivation.    Weight management ( BMI > 30).    Pharmacologic therapy should be used for management of restless legs syndrome only if present and clinically indicated and not based on the presence of periodic limb movements alone.    Diagnostic Codes:    Obstructive Sleep Apnea G47.33          _____________________________________   Noris Fernandes MD 4/9/2017         Table of Oximetry Distribution    Range(%) Time in range (min) Time in range (%) Time in or below range (min) Time in or below range (%)   0.0 - 88.0 2.2 0.5% 2.2 0.5%   0.0 - 89.0 3.0 0.7% 3.0 0.7%

## 2017-04-10 ENCOUNTER — HOSPITAL ENCOUNTER (OUTPATIENT)
Dept: NEUROLOGY | Facility: CLINIC | Age: 47
Setting detail: THERAPIES SERIES
Discharge: STILL ADMITTED OR ADMINISTRATIVE | End: 2017-04-10
Attending: NURSE PRACTITIONER

## 2017-04-10 DIAGNOSIS — F34.1 PERSISTENT DEPRESSIVE DISORDER: ICD-10-CM

## 2017-04-10 DIAGNOSIS — F41.1 GENERALIZED ANXIETY DISORDER: ICD-10-CM

## 2017-04-10 DIAGNOSIS — F17.200 TOBACCO USE DISORDER: ICD-10-CM

## 2017-04-11 DIAGNOSIS — G47.33 OBSTRUCTIVE SLEEP APNEA (ADULT) (PEDIATRIC): Primary | ICD-10-CM

## 2017-04-13 ENCOUNTER — DOCUMENTATION ONLY (OUTPATIENT)
Dept: SLEEP MEDICINE | Facility: CLINIC | Age: 47
End: 2017-04-13
Attending: INTERNAL MEDICINE

## 2017-04-13 NOTE — PROGRESS NOTES
Patient was offered choice of vendor and chose [facility].  Patient Konstantin Sharp was set up at [place] on April 13, 2017. Patient received a Resmed AirSense 10 Auto. Pressures were set at 10-15 cm H2O.   Patient s ramp is 5 cm H2O for Auto and FLEX/EPR is EPR, 2.  Patient received a Resmed Mask name: AIRFIT F20  Full Face mask Size Medium, heated tubing and heated humidifier.  Patient is enrolled in the STM Program and does need to meet compliance. Patient has a follow up on 5/23/17 with Dr. Chairez.    Dorota Hercules

## 2017-04-27 ENCOUNTER — TELEPHONE (OUTPATIENT)
Dept: SLEEP MEDICINE | Facility: CLINIC | Age: 47
End: 2017-04-27

## 2017-05-02 ENCOUNTER — HOSPITAL ENCOUNTER (OUTPATIENT)
Dept: NEUROLOGY | Facility: CLINIC | Age: 47
Setting detail: THERAPIES SERIES
Discharge: STILL ADMITTED OR ADMINISTRATIVE | End: 2017-05-02
Attending: NURSE PRACTITIONER

## 2017-05-02 DIAGNOSIS — F34.1 PERSISTENT DEPRESSIVE DISORDER: ICD-10-CM

## 2017-05-02 DIAGNOSIS — F41.1 GENERALIZED ANXIETY DISORDER: ICD-10-CM

## 2017-05-05 ENCOUNTER — COMMUNICATION - HEALTHEAST (OUTPATIENT)
Dept: NEUROLOGY | Facility: CLINIC | Age: 47
End: 2017-05-05

## 2017-05-05 DIAGNOSIS — F33.1 MDD (MAJOR DEPRESSIVE DISORDER), RECURRENT EPISODE, MODERATE (H): ICD-10-CM

## 2017-05-05 DIAGNOSIS — F32.A DEPRESSION: ICD-10-CM

## 2017-05-11 ENCOUNTER — CARE COORDINATION (OUTPATIENT)
Dept: CARDIOLOGY | Facility: CLINIC | Age: 47
End: 2017-05-11

## 2017-05-22 RX ORDER — ALBUTEROL SULFATE 0.83 MG/ML
1 SOLUTION RESPIRATORY (INHALATION) EVERY 6 HOURS PRN
COMMUNITY
End: 2021-12-28

## 2017-05-22 RX ORDER — ALBUTEROL SULFATE 90 UG/1
2 AEROSOL, METERED RESPIRATORY (INHALATION) EVERY 4 HOURS PRN
COMMUNITY
End: 2020-05-15

## 2017-05-22 RX ORDER — AMOXICILLIN 250 MG
1 CAPSULE ORAL DAILY
COMMUNITY
End: 2020-05-15

## 2017-05-22 RX ORDER — WARFARIN SODIUM 7.5 MG/1
7.5 TABLET ORAL DAILY
COMMUNITY
End: 2018-01-23

## 2017-05-23 ENCOUNTER — OFFICE VISIT (OUTPATIENT)
Dept: SLEEP MEDICINE | Facility: CLINIC | Age: 47
End: 2017-05-23
Attending: INTERNAL MEDICINE
Payer: MEDICAID

## 2017-05-23 VITALS
HEIGHT: 72 IN | SYSTOLIC BLOOD PRESSURE: 129 MMHG | HEART RATE: 83 BPM | RESPIRATION RATE: 16 BRPM | BODY MASS INDEX: 35.68 KG/M2 | WEIGHT: 263.4 LBS | OXYGEN SATURATION: 96 % | DIASTOLIC BLOOD PRESSURE: 77 MMHG

## 2017-05-23 DIAGNOSIS — G47.33 OSA (OBSTRUCTIVE SLEEP APNEA): Primary | ICD-10-CM

## 2017-05-23 PROCEDURE — 99211 OFF/OP EST MAY X REQ PHY/QHP: CPT | Mod: ZF

## 2017-05-23 NOTE — NURSING NOTE
Chief Complaint   Patient presents with     CPAP Follow Up       Initial /77  Pulse 83  Resp 16  Ht 1.829 m (6')  Wt 119.5 kg (263 lb 6.4 oz)  SpO2 96%  BMI 35.72 kg/m2 Estimated body mass index is 35.72 kg/(m^2) as calculated from the following:    Height as of this encounter: 1.829 m (6').    Weight as of this encounter: 119.5 kg (263 lb 6.4 oz).  Medication Reconciliation: complete         [facility]

## 2017-05-23 NOTE — PATIENT INSTRUCTIONS

## 2017-05-23 NOTE — PROGRESS NOTES
SLEEP MEDICINE FOLLOWUP VISIT      DATE OF SLEEP CLINIC VISIT:   05/23/2017.      CHIEF COMPLAINT AND PURPOSE OF VISIT:  Followup of CLARY.  CPAP return visit.      HISTORY OF PRESENT ILLNESS:  Mr. Konstantin Sharp is a 47-year-old male who was previously diagnosed with obstructive sleep apnea(3/27/17; AHI 19.9 per hr, pronounce during REM sleep).  He presents to the Sleep Clinic today for followup accompanied by his sister-in-law, Leilani Sharp.      Following the diagnosis of obstructive sleep apnea, he was prescribed auto titrating CPAP device.  He received auto CPAP device through the [facility] in [place] on 04/13/2017 with pressure setting 10 to 15 cm of water.      He tried using the CPAP device for the first few weeks when he got the device because he had to use it.  However, he reported that he discontinued the CPAP since he could not tolerate the pressure settings at all and according to his sister-in-law, he has been burping since he has been using  the device and patient also reports trouble exhaling against the CPAP pressures.      The compliance measures were reviewed via synopsis and based on the synopsis, he does have poor compliance with the CPAP and has not really used the CPAP device past 04/24/2017.  The residual AHI was 3.2 per hour, 95th percentile of the pressure setting was 13.5 cm of water.     Current meds, Past medical history, Past surgical history, Allergies, Social history, Family history: reviewed, per EMR    Physical exam:  /77  Pulse 83  Resp 16  Ht 1.829 m (6')  Wt 119.5 kg (263 lb 6.4 oz)  SpO2 96%  BMI 35.72 kg/m2]  General appearance:  Obese male, in no apparent distress     IMPRESSION/ REPORT/PLAN:  Recently diagnosed moderate obstructive sleep apnea (predominant during REM sleep).  The patient was prescribed auto titrating CPAP device 10-15 cm of water pressure following the sleep study; however, patient reports poor compliance and intolerance to the CPAP  device and has not been using the device almost since .  He still is interested in pursuing treatment for the sleep apnea.  We discussed about the alternate option of BiPAP device and explained how the BiPAP works and how it is different from the CPAP.  He was willing to consider the BiPAP device.  Prescription was provided for bilevel device with IPAP equal to 15 and EPAP equal to 11 cm of water.  He will be scheduled a DME visit for tomorrow when he will return this previous auto CPAP and obtain the BiPAP in return.  Once he obtains the bilevel device, he was recommended to use the device regularly during sleep, and he was asked to get back to us if he is encountering interface problems.  He will be followed through our STM program.  We discussed about the importance of being compliant with the device and the consequences if he does not meet the compliance requirements. He will be scheduled for a followup visit with me 61-90 days after he starts the bilevel device. Weight management was discussed.  He was instructed not to drive if drowsy or sleepy to prevent accidents.      TOTAL TIME SPENT DURING THIS VISIT:  25 minutes, more than 50% spent in counseling and coordinating plan of care.         JEREMIAH KOHLER MD             D: 2017 16:39   T: 2017 17:59   MT: RITESH      Name:     NERIS JACKSON   MRN:      [id number]        Account:      [id number]   :      1970           Visit Date:   2017      Document: [id number]

## 2017-05-24 ENCOUNTER — DOCUMENTATION ONLY (OUTPATIENT)
Dept: SLEEP MEDICINE | Facility: CLINIC | Age: 47
End: 2017-05-24
Attending: INTERNAL MEDICINE

## 2017-05-24 ENCOUNTER — DOCUMENTATION ONLY (OUTPATIENT)
Dept: SLEEP MEDICINE | Facility: CLINIC | Age: 47
End: 2017-05-24

## 2017-05-24 NOTE — PROGRESS NOTES
DICTATED THE SLEEP CLINIC VISIT NOTE FOR TODAY.  Niru Chairez MD   of Medicine,  Division of Pulmonary/Sleep Medicine  [facility].

## 2017-05-30 ENCOUNTER — DOCUMENTATION ONLY (OUTPATIENT)
Dept: SLEEP MEDICINE | Facility: CLINIC | Age: 47
End: 2017-05-30

## 2017-05-30 ENCOUNTER — HOSPITAL ENCOUNTER (OUTPATIENT)
Dept: NEUROLOGY | Facility: CLINIC | Age: 47
Setting detail: THERAPIES SERIES
Discharge: STILL ADMITTED OR ADMINISTRATIVE | End: 2017-05-30
Attending: NURSE PRACTITIONER

## 2017-05-30 DIAGNOSIS — F34.1 PERSISTENT DEPRESSIVE DISORDER: ICD-10-CM

## 2017-05-30 DIAGNOSIS — F17.200 TOBACCO USE DISORDER: ICD-10-CM

## 2017-05-30 DIAGNOSIS — F41.1 GENERALIZED ANXIETY DISORDER: ICD-10-CM

## 2017-05-30 NOTE — PROGRESS NOTES
3 DAY STM VISIT    Patient contacted for 3 day STM visit  Subjective measures:  Pt states things are going well and has no issues or complaints.  He likes this machine much better.  Pt is benefiting from therapy.    Current settings: 15/34bgQ6Q   Assessment: Nightly usage over four hours.  Action plan: Pt to have f/u 14 day STM visit.

## 2017-05-30 NOTE — PROGRESS NOTES
Konstantin Sharp was set up with PAP equipment by [facility] and is enrolled in [facility].  See previous documentation by [facility] for equipment and supply details.

## 2017-06-02 ENCOUNTER — COMMUNICATION - HEALTHEAST (OUTPATIENT)
Dept: NEUROLOGY | Facility: CLINIC | Age: 47
End: 2017-06-02

## 2017-06-02 DIAGNOSIS — F33.1 MDD (MAJOR DEPRESSIVE DISORDER), RECURRENT EPISODE, MODERATE (H): ICD-10-CM

## 2017-06-08 ENCOUNTER — DOCUMENTATION ONLY (OUTPATIENT)
Dept: SLEEP MEDICINE | Facility: CLINIC | Age: 47
End: 2017-06-08

## 2017-06-08 NOTE — PROGRESS NOTES
14 DAY STM VISIT    Subjective measures:  Pt states that he is doing pretty well with the machine.  He isn't sleeping much so that's why his compliance is down.    Assessment: Pt not meeting objective benchmarks for leak and compliance  Patient meeting subjective benchmarks.   Action plan: Pt to have 30 day STM visit.   Device settings:  15/11cm H20  Objective measures: 14 day rolling measure     % compliance greater than four hours rolling average 14 days: 21.4%     95% OF Leak in litres Rolling Average 14 Days: 60.4 lpm last data upload        AHI Rolling Average 14 Day: 1.58  last data upload        Time mask on face 14 day average: 85 min         Objective measure goal  Compliance   Goal >70%  Leak   Goal < 10%  AHI  Goal < 5  Usage  Goal >240

## 2017-06-18 ENCOUNTER — COMMUNICATION - HEALTHEAST (OUTPATIENT)
Dept: NEUROLOGY | Facility: CLINIC | Age: 47
End: 2017-06-18

## 2017-06-18 DIAGNOSIS — F32.A DEPRESSION: ICD-10-CM

## 2017-06-23 ENCOUNTER — COMMUNICATION - HEALTHEAST (OUTPATIENT)
Dept: NEUROLOGY | Facility: CLINIC | Age: 47
End: 2017-06-23

## 2017-06-23 DIAGNOSIS — F33.1 MDD (MAJOR DEPRESSIVE DISORDER), RECURRENT EPISODE, MODERATE (H): ICD-10-CM

## 2017-06-26 ENCOUNTER — DOCUMENTATION ONLY (OUTPATIENT)
Dept: SLEEP MEDICINE | Facility: CLINIC | Age: 47
End: 2017-06-26

## 2017-06-26 NOTE — PROGRESS NOTES
Patient returned call.     Subjective measures:  Patient meeting subjective benchmarks.     Action plan:Pt to have 6 month STM visit    Data shows high mask leak on just a few days that cause the average to be high.  Most nights leak is meeting benchmarks.

## 2017-06-29 ENCOUNTER — HOSPITAL ENCOUNTER (OUTPATIENT)
Dept: NEUROLOGY | Facility: CLINIC | Age: 47
Setting detail: THERAPIES SERIES
Discharge: STILL ADMITTED OR ADMINISTRATIVE | End: 2017-06-29
Attending: NURSE PRACTITIONER

## 2017-06-29 DIAGNOSIS — F41.1 GENERALIZED ANXIETY DISORDER: ICD-10-CM

## 2017-06-29 DIAGNOSIS — F34.1 PERSISTENT DEPRESSIVE DISORDER: ICD-10-CM

## 2017-06-29 DIAGNOSIS — F17.200 TOBACCO USE DISORDER: ICD-10-CM

## 2017-07-06 ENCOUNTER — HOSPITAL ENCOUNTER (OUTPATIENT)
Dept: NEUROLOGY | Facility: CLINIC | Age: 47
Setting detail: THERAPIES SERIES
Discharge: STILL ADMITTED OR ADMINISTRATIVE | End: 2017-07-06
Attending: NURSE PRACTITIONER

## 2017-07-06 ENCOUNTER — TRANSFERRED RECORDS (OUTPATIENT)
Dept: HEALTH INFORMATION MANAGEMENT | Facility: CLINIC | Age: 47
End: 2017-07-06

## 2017-07-06 DIAGNOSIS — F33.1 MDD (MAJOR DEPRESSIVE DISORDER), RECURRENT EPISODE, MODERATE (H): ICD-10-CM

## 2017-07-06 DIAGNOSIS — F29 PSYCHOSIS (H): ICD-10-CM

## 2017-07-06 DIAGNOSIS — G89.29 CHRONIC PAIN: ICD-10-CM

## 2017-07-06 DIAGNOSIS — F09 COGNITIVE DISORDER: ICD-10-CM

## 2017-07-06 DIAGNOSIS — G47.00 INSOMNIA, UNSPECIFIED: ICD-10-CM

## 2017-07-11 ENCOUNTER — ALLIED HEALTH/NURSE VISIT (OUTPATIENT)
Dept: CARDIOLOGY | Facility: CLINIC | Age: 47
End: 2017-07-11
Attending: INTERNAL MEDICINE
Payer: MEDICAID

## 2017-07-11 DIAGNOSIS — I42.2 HYPERTROPHIC NONOBSTRUCTIVE CARDIOMYOPATHY (H): Primary | ICD-10-CM

## 2017-07-11 PROCEDURE — 93296 REM INTERROG EVL PM/IDS: CPT | Mod: ZF

## 2017-07-11 PROCEDURE — 93295 DEV INTERROG REMOTE 1/2/MLT: CPT | Performed by: INTERNAL MEDICINE

## 2017-07-11 NOTE — PROGRESS NOTES
Pt sent in a routine remote ICD check for evaluation per MD order.  His ICD check does not show any episodes of atrial or ventricular arrhythmias, he is RV pacing 100% of the time, his heart rate histograms show good heart rate variation and his ICD battery estimates 5.3 years left.  Pt was called with the results and he reports feeling well and he does not offer any complaints. We will plan for him to send a remote transmission on 9/27/17.    remote ICD

## 2017-07-11 NOTE — MR AVS SNAPSHOT
After Visit Summary   7/11/2017    Konstantin hSarp    MRN: [id number]           Patient Information     Date Of Birth          1970        Visit Information        Provider Department      7/11/2017 6:00 AM UC ICD REMOTE [facility]        Today's Diagnoses     Hypertrophic nonobstructive cardiomyopathy (H)    -  1       Follow-ups after your visit        Your next 10 appointments already scheduled     Jul 26, 2017  2:00 PM CDT   (Arrive by 1:45 PM)   New Patient Visit with SHAHEED Mccormick CNP   [facility] ([facility])    [address] [place] [id number] [phone]            Sep 12, 2017 11:00 AM CDT   (Arrive by 10:45 AM)   RETURN GENERAL with Ricardo Pickard OD   [facility] ([facility])    [address] [place] [id number] [phone]              Who to contact     If you have questions or need follow up information about today's clinic visit or your schedule please contact [facility] directly at [phone].  Normal or non-critical lab and imaging results will be communicated to you by [employer], letter or phone within 4 business days after the clinic has received the results. If you do not hear from us within 7 days, please contact the clinic through [employer] or phone. If you have a critical or abnormal lab result, we will notify you by phone as soon as possible.  Submit refill requests through [employer] or call your pharmacy and they will forward the refill request to us. Please allow 3 business days for your refill to be completed.          Additional Information About Your Visit        MyChart Information     [employer] gives you secure access to your electronic health record. If you see a primary care provider, you can also send messages to your care team and make appointments. If you have questions, please call your primary care  clinic.  If you do not have a primary care provider, please call [phone] and they will assist you.        Care EveryWhere ID     This is your Care EveryWhere ID. This could be used by other organizations to access your [facility] medical records  [id number]         Blood Pressure from Last 3 Encounters:   05/23/17 129/77   04/03/17 126/75   03/20/17 125/72    Weight from Last 3 Encounters:   05/23/17 119.5 kg (263 lb 6.4 oz)   04/03/17 118.4 kg (261 lb)   03/20/17 118.8 kg (262 lb)              We Performed the Following     INTERROGATION DEVICE EVAL REMOTE, PACER/ICD          Today's Medication Changes          These changes are accurate as of: 7/11/17 12:10 PM.  If you have any questions, ask your nurse or doctor.               These medicines have changed or have updated prescriptions.        Dose/Directions    ferrous fumarate 65 mg ([place]. FE)-Vitamin C 125 mg  MG Tabs tablet   Commonly known as:  VITRON C   This may have changed:    - how much to take  - when to take this  - additional instructions        One tab by mouth once daily in between meals   Quantity:  30 tablet   Refills:  2                Primary Care Provider Office Phone # Fax #    Jesus Boyle [phone]       [facility] [address] [place] [address]        Equal Access to Services     CAROLINE BARBER AH: Hadii migdalia breen hadasho Soomaali, waaxda luqadaha, qaybta kaalmada annabel, gustavo kennedy. So [facility] [phone].    ATENCIÓN: Si habla español, tiene a dover disposición servicios gratuitos de asistencia lingüística. Llame al [phone].    We comply with applicable federal civil rights laws and [place] laws. We do not discriminate on the basis of race, color, national origin, age, disability sex, sexual orientation or gender identity.            Thank you!     Thank you for choosing [facility]  for your care. Our goal is always to provide you with excellent  care. Hearing back from our patients is one way we can continue to improve our services. Please take a few minutes to complete the written survey that you may receive in the mail after your visit with us. Thank you!             Your Updated Medication List - Protect others around you: Learn how to safely use, store and throw away your medicines at www.disposemymeds.org.          This list is accurate as of: 7/11/17 12:10 PM.  Always use your most recent med list.                   Brand Name Dispense Instructions for use Diagnosis    * albuterol (2.5 MG/3ML) 0.083% neb solution      Take 1 vial by nebulization every 6 hours as needed for shortness of breath / dyspnea or wheezing        * VENTOLIN  (90 BASE) MCG/ACT Inhaler   Generic drug:  albuterol      Inhale 2 puffs into the lungs every 4 hours as needed for shortness of breath / dyspnea or wheezing        AMITIZA PO      Take 24 mcg by mouth 2 times daily (with meals)        * ARIPIPRAZOLE PO      Take 10 mg by mouth every evening        * ARIPiprazole 2 MG tablet    ABILIFY    30 tablet    Take 2 mg by mouth 2 times daily    Adjustment disorder with depressed mood       CHANTIX PO      Take 1 mg by mouth 2 times daily        EUCERIN INTENSIVE REPAIR EX      Externally apply topically 2 times daily as needed        FAMOTIDINE PO      Take 20 mg by mouth 2 times daily        ferrous fumarate 65 mg ([place]. FE)-Vitamin C 125 mg  MG Tabs tablet    VITRON C    30 tablet    One tab by mouth once daily in between meals        glipiZIDE 5 MG tablet    GLUCOTROL    30 tablet    Take 1 tablet (5 mg) by mouth 2 times daily (before meals)    Diabetes mellitus (H)       LIPITOR PO      Take 40 mg by mouth every evening        losartan 25 MG tablet    COZAAR    30 tablet    Take 1 tablet (25 mg) by mouth daily    Hypertrophic cardiomyopathy (H)       magnesium oxide 400 (241.3 MG) MG tablet    MAG-OX    60 tablet    Take 1 tablet (400 mg) by mouth daily     Hypertrophic cardiomyopathy (H)       metFORMIN 500 MG tablet    GLUCOPHAGE    60 tablet    Take 850 mg by mouth 2 times daily (with meals) 850 mg BID    Diabetes mellitus (H)       metoprolol 50 MG tablet    LOPRESSOR    180 tablet    Take 100 mg by mouth 3 times daily    S/P ventricular septal myectomy, Congestive heart failure, unspecified, Hypertrophic cardiomyopathy (H)       mirtazapine 45 MG tablet    REMERON    90 tablet    Take 45 mg by mouth At Bedtime    Adjustment disorder with depressed mood       * SEROQUEL PO      Take 25 mg by mouth 2 times daily        * QUEtiapine 100 MG tablet    SEROquel    30 tablet    150 mg At Bedtime 150 mg every HS    Insomnia       senna-docusate 8.6-50 MG per tablet    SENOKOT-S;PERICOLACE     Take 1 tablet by mouth daily        TOPAMAX PO      Take 100 mg by mouth every evening        TRAZODONE HCL PO      Take 100 mg by mouth At Bedtime        venlafaxine 150 MG 24 hr capsule    EFFEXOR-XR    30 capsule    Take 150 mg by mouth 2 times daily    Adjustment disorder with depressed mood       * warfarin 7.5 MG tablet    COUMADIN     Take 7.5 mg by mouth daily        * warfarin 1 MG tablet    COUMADIN    30 tablet    Total 8.5 mg daily    Atrial fibrillation (H)       * Notice:  This list has 8 medication(s) that are the same as other medications prescribed for you. Read the directions carefully, and ask your doctor or other care provider to review them with you.

## 2017-07-13 ENCOUNTER — COMMUNICATION - HEALTHEAST (OUTPATIENT)
Dept: NEUROLOGY | Facility: CLINIC | Age: 47
End: 2017-07-13

## 2017-07-13 DIAGNOSIS — F33.1 MDD (MAJOR DEPRESSIVE DISORDER), RECURRENT EPISODE, MODERATE (H): ICD-10-CM

## 2017-07-19 ENCOUNTER — TELEPHONE (OUTPATIENT)
Dept: GASTROENTEROLOGY | Facility: CLINIC | Age: 47
End: 2017-07-19

## 2017-07-23 ASSESSMENT — ENCOUNTER SYMPTOMS
RECTAL PAIN: 1
BOWEL INCONTINENCE: 0
LOSS OF CONSCIOUSNESS: 0
HYPOTENSION: 0
EXERCISE INTOLERANCE: 1
MEMORY LOSS: 1
SEIZURES: 0
BLOATING: 1
SLEEP DISTURBANCES DUE TO BREATHING: 0
LEG SWELLING: 0
PALPITATIONS: 1
LIGHT-HEADEDNESS: 0
JOINT SWELLING: 0
DISTURBANCES IN COORDINATION: 0
DIARRHEA: 0
RECTAL BLEEDING: 1
MUSCLE WEAKNESS: 0
JAUNDICE: 0
HEARTBURN: 0
BLOOD IN STOOL: 0
HYPERTENSION: 1
TREMORS: 0
SYNCOPE: 0
LEG PAIN: 1
NAUSEA: 0
PARALYSIS: 0
ARTHRALGIAS: 1
BACK PAIN: 1
CLAUDICATION: 0
STIFFNESS: 0
ORTHOPNEA: 0
MYALGIAS: 0
DIZZINESS: 0
TACHYCARDIA: 0
ABDOMINAL PAIN: 1
NUMBNESS: 1
MUSCLE CRAMPS: 0
WEAKNESS: 0
HEADACHES: 0
CONSTIPATION: 1
VOMITING: 0
SPEECH CHANGE: 0
NECK PAIN: 1
TINGLING: 1

## 2017-07-26 ENCOUNTER — OFFICE VISIT (OUTPATIENT)
Dept: GASTROENTEROLOGY | Facility: CLINIC | Age: 47
End: 2017-07-26

## 2017-07-26 VITALS
OXYGEN SATURATION: 96 % | DIASTOLIC BLOOD PRESSURE: 82 MMHG | WEIGHT: 268 LBS | HEART RATE: 85 BPM | BODY MASS INDEX: 37.52 KG/M2 | SYSTOLIC BLOOD PRESSURE: 121 MMHG | HEIGHT: 71 IN

## 2017-07-26 DIAGNOSIS — K59.09 OTHER CONSTIPATION: Primary | ICD-10-CM

## 2017-07-26 DIAGNOSIS — D50.9 IRON DEFICIENCY ANEMIA, UNSPECIFIED IRON DEFICIENCY ANEMIA TYPE: ICD-10-CM

## 2017-07-26 ASSESSMENT — PAIN SCALES - GENERAL: PAINLEVEL: MODERATE PAIN (4)

## 2017-07-26 NOTE — PATIENT INSTRUCTIONS
Summary: details below  A blood test to dot I's and cross T's: check for Celiac Disease (may cause constipation and blood anemia)    1. Start lactulose AND  2. Kegel exercises  3. Increase the senna  4. Report back  5. Likely: Pelvic Floor [facility] testing, possible training.      1. Start lactulose  First 30 ml once daily  After several days  30 ml two times daily  After another week, if no better  45 ml two times daily or three times daily     2. At the same time  Learn and do Kegel exercises:    slowly tighten muscles inside the bottom as if to hold urine or gas,    then relax those muscles.  Do this exercise a few times, a few times each day.  AND when you sit on the toilet,   do a Kegel and then relax those muscles to have a BM.  No pushing more than a grunt or cough or two.    With these actions, if no improvement in stool out  3. Increase senna /docusate to two at a time, once daily    LET US KNOW HOW YOU DO WITH THESE ACTIONS    IF NO BENEFIT AND NOT STOOL for 4 or 5 days and getting miserable, mix up 1/2 of the 4 liter golytely powder in 8 cups liquid.    Drink slowly (not for prep.  1 cup each hour x 2 hours.  Then 1 cup every half hour.  How many do you really need.    LET US KNOW HOW YOU DO WITH THESE ACTIONS    Return to GI Clinic 3 months      [facility]   For appointments call Veronica [phone]  Coordinator  [phone] Isabel or call -  GI Nurse Triage  [phone] for Medical Questions, # 3  Fax results to

## 2017-07-26 NOTE — MR AVS SNAPSHOT
After Visit Summary   7/26/2017    Konstantin Sharp    MRN: [id number]           Patient Information     Date Of Birth          1970        Visit Information        Provider Department      7/26/2017 2:00 PM Britney Mcfadden APRN CNP [facility] Gastroenterology and IBD        Today's Diagnoses     Other constipation    -  1    Iron deficiency anemia, unspecified iron deficiency anemia type          Care Instructions    Summary: details below  A blood test to dot I's and cross T's: check for Celiac Disease (may cause constipation and blood anemia)    1. Start lactulose AND  2. Kegel exercises  3. Increase the senna  4. Report back  5. Likely: Pelvic Floor Center testing, possible training.      1. Start lactulose  First 30 ml once daily  After several days  30 ml two times daily  After another week, if no better  45 ml two times daily or three times daily     2. At the same time  Learn and do Kegel exercises:    slowly tighten muscles inside the bottom as if to hold urine or gas,    then relax those muscles.  Do this exercise a few times, a few times each day.  AND when you sit on the toilet,   do a Kegel and then relax those muscles to have a BM.  No pushing more than a grunt or cough or two.    With these actions, if no improvement in stool out  3. Increase senna /docusate to two at a time, once daily    LET US KNOW HOW YOU DO WITH THESE ACTIONS    IF NO BENEFIT AND NOT STOOL for 4 or 5 days and getting miserable, mix up 1/2 of the 4 liter golytely powder in 8 cups liquid.    Drink slowly (not for prep.  1 cup each hour x 2 hours.  Then 1 cup every half hour.  How many do you really need.    LET US KNOW HOW YOU DO WITH THESE ACTIONS            Follow-ups after your visit        Follow-up notes from your care team     Return in about 3 months (around 10/26/2017).      Your next 10 appointments already scheduled     Sep 12, 2017 11:00 AM CDT   (Arrive by 10:45 AM)   RETURN GENERAL with Ricardo Pickard  "OD   [facility] ([facility])    [address]  4th Floor  [facility] [address] [phone]              Future tests that were ordered for you today     Open Future Orders        Priority Expected Expires Ordered    Tissue transglutaminase antibody IgA Routine  7/26/2018 7/26/2017    Tissue transglutaminase antibody IgA Routine  7/26/2018 7/26/2017            Who to contact     Please call your clinic at [phone] to:    Ask questions about your health    Make or cancel appointments    Discuss your medicines    Learn about your test results    Speak to your doctor   If you have compliments or concerns about an experience at your clinic, or if you wish to file a complaint, please contact [facility] Patient Relations at [phone] or email us at [email or web address]         Additional Information About Your Visit        StyleTechhart Information     [employer] gives you secure access to your electronic health record. If you see a primary care provider, you can also send messages to your care team and make appointments. If you have questions, please call your primary care clinic.  If you do not have a primary care provider, please call [phone] and they will assist you.      Rong360 is an electronic gateway that provides easy, online access to your medical records. With [employer], you can request a clinic appointment, read your test results, renew a prescription or communicate with your care team.     To access your existing account, please contact your [facility] or call [phone] for assistance.        Care EveryWhere ID     This is your Care EveryWhere ID. This could be used by other organizations to access your [place] medical records  [id number]        Your Vitals Were     Pulse Height Pulse Oximetry BMI (Body Mass Index)          85 1.803 m (5' 11\") 96% 37.38 kg/m2         Blood Pressure " "from Last 3 Encounters:   07/26/17 121/82   05/23/17 129/77   04/03/17 126/75    Weight from Last 3 Encounters:   07/26/17 121.6 kg (268 lb)   05/23/17 119.5 kg (263 lb 6.4 oz)   04/03/17 118.4 kg (261 lb)                 Today's Medication Changes          These changes are accurate as of: 7/26/17  3:44 PM.  If you have any questions, ask your nurse or doctor.               Start taking these medicines.        Dose/Directions    polyethylene glycol 236 G suspension   Commonly known as:  GoLYTELY/NuLYTELY   Used for:  Other constipation   Started by:  Britney Mcfadden APRN CNP        Dose:  2 L   Take 2,000 mLs (2 L) by mouth once as needed Refer to \"Getting Ready for a Colonoscopy\" instruction handout   Quantity:  4000 mL   Refills:  0            Where to get your medicines      These medications were sent to [facility]/pharmacy #[id number] - [place], [place] - [address]  [address], [place] [address]    Hours:  24-hours Phone:  [phone]     polyethylene glycol 236 G suspension                Primary Care Provider Office Phone # Fax #    Jesus Boyle [phone]       [facility] [address] [facility] [address]        Equal Access to Services     SOFIE BARBER AH: Hadii migdalia breen hadasho Soomaali, waaxda luqadaha, qaybta kaalmada adeegyada, gustavo sanders haydonna perry . So [facility] [phone].    ATENCIÓN: Si habla español, tiene a dover disposición servicios gratuitos de asistencia lingüística. Kemar al [phone].    We comply with applicable federal civil rights laws and [place] laws. We do not discriminate on the basis of race, color, national origin, age, disability sex, sexual orientation or gender identity.            Thank you!     Thank you for choosing [facility] GASTROENTEROLOGY AND IBD  for your care. Our goal is always to provide you with excellent care. Hearing back from our patients is one way we can continue to improve our services. Please take " a few minutes to complete the written survey that you may receive in the mail after your visit with us. Thank you!             Your Updated Medication List - Protect others around you: Learn how to safely use, store and throw away your medicines at www.disposemymeds.org.          This list is accurate as of: 7/26/17  3:44 PM.  Always use your most recent med list.                   Brand Name Dispense Instructions for use Diagnosis    * albuterol (2.5 MG/3ML) 0.083% neb solution      Take 1 vial by nebulization every 6 hours as needed for shortness of breath / dyspnea or wheezing        * VENTOLIN  (90 BASE) MCG/ACT Inhaler   Generic drug:  albuterol      Inhale 2 puffs into the lungs every 4 hours as needed for shortness of breath / dyspnea or wheezing        AMITIZA PO      Take 24 mcg by mouth 2 times daily (with meals)        amitriptyline 25 MG tablet    ELAVIL     Take 25 mg by mouth        ARIPiprazole 2 MG tablet    ABILIFY    30 tablet    Take 2 mg by mouth 2 times daily    Adjustment disorder with depressed mood       CHANTIX PO      Take 1 mg by mouth 2 times daily        EUCERIN INTENSIVE REPAIR EX      Externally apply topically 2 times daily as needed        ferrous fumarate 65 mg ([place]. FE)-Vitamin C 125 mg  MG Tabs tablet    VITRON C    30 tablet    One tab by mouth once daily in between meals        glipiZIDE 5 MG tablet    GLUCOTROL    30 tablet    Take 1 tablet (5 mg) by mouth 2 times daily (before meals)    Diabetes mellitus (H)       LIPITOR PO      Take 40 mg by mouth every evening        losartan 25 MG tablet    COZAAR    30 tablet    Take 1 tablet (25 mg) by mouth daily    Hypertrophic cardiomyopathy (H)       magnesium oxide 400 (241.3 MG) MG tablet    MAG-OX    60 tablet    Take 1 tablet (400 mg) by mouth daily    Hypertrophic cardiomyopathy (H)       metFORMIN 500 MG tablet    GLUCOPHAGE    60 tablet    Take 850 mg by mouth 2 times daily (with meals) 850 mg BID    Diabetes  "mellitus (H)       metoprolol 50 MG tablet    LOPRESSOR    180 tablet    Take 100 mg by mouth 3 times daily    S/P ventricular septal myectomy, Congestive heart failure, unspecified, Hypertrophic cardiomyopathy (H)       polyethylene glycol 236 G suspension    GoLYTELY/NuLYTELY    4000 mL    Take 2,000 mLs (2 L) by mouth once as needed Refer to \"Getting Ready for a Colonoscopy\" instruction handout    Other constipation       * SEROQUEL PO      Take 25 mg by mouth 2 times daily        * QUEtiapine 100 MG tablet    SEROquel    30 tablet    150 mg At Bedtime 150 mg every HS    Insomnia       senna-docusate 8.6-50 MG per tablet    SENOKOT-S;PERICOLACE     Take 1 tablet by mouth daily        TRAZODONE HCL PO      Take 100 mg by mouth At Bedtime        venlafaxine 150 MG 24 hr capsule    EFFEXOR-XR    30 capsule    Take 150 mg by mouth 2 times daily    Adjustment disorder with depressed mood       * warfarin 7.5 MG tablet    COUMADIN     Take 7.5 mg by mouth daily        * warfarin 1 MG tablet    COUMADIN    30 tablet    Total 8.5 mg daily    Atrial fibrillation (H)       * Notice:  This list has 6 medication(s) that are the same as other medications prescribed for you. Read the directions carefully, and ask your doctor or other care provider to review them with you.      "

## 2017-07-26 NOTE — LETTER
2017       RE: Konstantin Sharp  [address] [place] [address]     Dear Colleague,    Thank you for referring your patient, Konstantin Sharp, to the [facility] GASTROENTEROLOGY AND IBD CLINIC at [facility]. Please see a copy of my visit note below.    CHIEF COMPLAINT:  Chronic constipation.      HISTORY OF PRESENT ILLNESS:  Konstantin is a 47-year-old man with a complex medical history including cardiomyopathy with an implanted defibrillator, muscular dystrophy, status post brain injury, double pneumonia and in coma for 3 months with that.  He has had constipation for a couple years, and it is now worse again and causing pain, gas, bloat.  He will have as many as 2 weeks with no results.  He stopped iron a couple weeks ago and is now only minimally improved.  He had a normal bowel movement today after suppository which only sometimes helps.  He is using prune juice quite regularly, takes MiraLax 1-3 doses daily days on end with no benefit and uses Ex-Lax up to 6 doses per day, 1 week straight and sometimes with no benefit.  At times, he has had some response from Senokot.  He has received lactulose from outside location and has not yet started that.  He has received prescriptions for lubiprostone which is not covered thus far.      Konstantin has no black stool.  He denies significant upper GI symptoms.      Konstantin had tremendous abdominal pain , for which he had CT abdomen and pelvis with IV contrast only in September at [facility].  There were air fluid levels with transition, but no sign of disease or mass.  The following day, he had a water soluble colonic enema imaging which showed a large volume of stool burden and no masses.      MEDICAL HISTORY, MEDICATIONS, ADVERSE DRUG REACTIONS:  Reviewed.      FAMILY HISTORY:  Reviewed.  No history of colon cancer.  No autoimmune disease known.  Father  young of heart disease.      SOCIAL HISTORY:  Single, disabled.       REVIEW OF SYSTEMS:  Denies symptoms of acute illness or localized infection.  Denies dysphagia, odynophagia, heartburn, nausea or vomiting.  He has no black or bloody stool.  Has occasional bleeding from hemorrhoids.      OBJECTIVE:   VITAL SIGNS:  Reviewed.  BMI 37.  Pain in the abdomen 4/10.   GENERAL:  Clean, well-nourished man who does not show his distress.  His breathing is calm and grossly normal.  Eyes are clear.  He expresses himself well.  He is here in the company of T[place].   ABDOMEN:  Currently soft and nontender.  Examined only in the chair.      ASSESSMENT:  A 47-year-old meal with severe constipation over greater than 1 year during which time he has received no consistent, successful plan for his constipation.  Based on air fluid levels, there is question also regarding pelvic floor function or dysfunction.  He is not able to describe movement in the pelvic floor with Kegel exercise yet.      PLAN:   1.  Start the lactulose provided.   2.  Maintain good fluid intake.   3.  Perform Kegel exercises daily and use those to identify muscles to relax for defecation.   4.  Increase his senna dose.   5.  Report back to GI how these things are going.   6.  Simply to be sure there is no associated issue, screening for celiac disease which may cause both constipation and the anemia.   7.  Later consideration for Pelvic Floor Center and possible retraining.   8.  If he has no benefit and has not had stool in 4 or 5 days, mix up 1/2 of his 4 liter GoLYTELY powder and 8 cups liquid and drink it slowly, not rapidly as for a preparation.  Details were provided.   9.  Return to GI clinic in 3 months.      We reviewed his medical history, his medications and his constipation history.  We discussed the essentials of management and possible types of agents and the differences between the agents he has had so far.  We discussed the details of the plan and notes were provided.      Total visit 45 minutes with counseling  time 30 minutes.         SHAHEED SHELL CNP             D: 2017 01:48   T: 2017 07:53   MT: SCOTT      Name:     NERIS JACKSON   MRN:      [id number]        Account:      [id number]   :      1970           Service Date: 2017      Document: [id number]       Again, thank you for allowing me to participate in the care of your patient.      Sincerely,    SHAHEED Mccormick CNP

## 2017-07-26 NOTE — NURSING NOTE
"Chief Complaint   Patient presents with     Consult     chronic constipation       Vitals:    07/26/17 1423   BP: 121/82   Pulse: 85   SpO2: 96%   Weight: 121.6 kg (268 lb)   Height: 1.803 m (5' 11\")       Body mass index is 37.38 kg/(m^2).                         "

## 2017-07-27 ENCOUNTER — MYC REFILL (OUTPATIENT)
Dept: SLEEP MEDICINE | Facility: CLINIC | Age: 47
End: 2017-07-27

## 2017-08-02 NOTE — TELEPHONE ENCOUNTER
Pending Prescriptions:                       Disp   Refills    ferrous fumarate 65 mg, [place]. FE,-Vitamin*30 tab*2            Sig: One tab by mouth once daily in between meals    Last Written Prescription Date:  04/03/2017  Last Fill Quantity: 30,   # refills: 2  Last Office Visit with FMMONTSERRAT, CARLOS or [facility] prescribing provider: 05/23/2017  Future Office visit:   No follow up scheduled at this time.    JOSH Chen

## 2017-08-02 NOTE — TELEPHONE ENCOUNTER
Message from MyChart:  Original authorizing provider: Niru Chairez MD    Konstantin Sharp would like a refill of the following medications:  ferrous fumarate 65 mg, [place]. FE,-Vitamin C 125 mg (VITRON C)  MG TABS tablet [Niru Chairez MD]    Preferred pharmacy: [facility]/PHARMACY #[id number] - [place], [place] - [address]    Comment:

## 2017-08-25 ENCOUNTER — OFFICE VISIT (OUTPATIENT)
Dept: ORTHOPEDICS | Facility: CLINIC | Age: 47
End: 2017-08-25

## 2017-08-25 VITALS — BODY MASS INDEX: 37.39 KG/M2 | WEIGHT: 267.1 LBS | HEIGHT: 71 IN

## 2017-08-25 DIAGNOSIS — E11.40 TYPE 2 DIABETES MELLITUS WITH DIABETIC NEUROPATHY, WITHOUT LONG-TERM CURRENT USE OF INSULIN (H): Primary | ICD-10-CM

## 2017-08-25 DIAGNOSIS — L60.0 ONYCHOCRYPTOSIS: ICD-10-CM

## 2017-08-25 RX ORDER — LACTULOSE 10 G/15ML
30 SOLUTION ORAL 2 TIMES DAILY
COMMUNITY
End: 2018-01-23

## 2017-08-25 RX ORDER — BISACODYL 10 MG
10 SUPPOSITORY, RECTAL RECTAL DAILY PRN
COMMUNITY
End: 2020-05-15

## 2017-08-25 RX ORDER — LIDOCAINE 50 MG/G
OINTMENT TOPICAL 3 TIMES DAILY
COMMUNITY
End: 2020-05-15

## 2017-08-25 NOTE — MR AVS SNAPSHOT
After Visit Summary   8/25/2017    Konstantin Sharp    MRN: [id number]           Patient Information     Date Of Birth          1970        Visit Information        Provider Department      8/25/2017 3:40 PM Casey Bangura DPM [facility]        Today's Diagnoses     Type 2 diabetes mellitus with diabetic neuropathy, without long-term current use of insulin (H)    -  1    Onychocryptosis           Follow-ups after your visit        Additional Services     ORTHOTICS REFERRAL       **This referral order prints off in the [facility]  (Orthotics & Prosthetics) Central Scheduling Office**    The [facility] Central Scheduling Staff will contact the patient to schedule appointments.     Central Scheduling Contact Information: [phone] ([place])    Diabetic shoes and custom inserts x 3    Please be aware that coverage of these services is subject to the terms and limitations of your health insurance plan.  Call member services at your health plan with any benefit or coverage questions.      Please bring the following to your appointment:    >>   Any x-rays, CTs or MRIs which have been performed.  Contact the facility where they were done to arrange for  prior to your scheduled appointment.    >>   List of current medications   >>   This referral request   >>   Any documents/labs given to you for this referral                  Your next 10 appointments already scheduled     Sep 12, 2017 11:00 AM CDT   (Arrive by 10:45 AM)   RETURN GENERAL with Ricardo Pickard OD   [facility] ([facility])    [address] [place] [address] [phone]            Oct 26, 2017 11:30 AM CDT   (Arrive by 11:15 AM)   Return Visit with SHAHEED Mccormick [facility] ([facility])    [address] [place] [id number]  "  [phone]              Who to contact     Please call your clinic at [phone] to:    Ask questions about your health    Make or cancel appointments    Discuss your medicines    Learn about your test results    Speak to your doctor   If you have compliments or concerns about an experience at your clinic, or if you wish to file a complaint, please contact [facility] Patient Relations at [phone] or email us at [email or web address]         Additional Information About Your Visit        Woven Systemshart Information     [employer] gives you secure access to your electronic health record. If you see a primary care provider, you can also send messages to your care team and make appointments. If you have questions, please call your primary care clinic.  If you do not have a primary care provider, please call [phone] and they will assist you.      Quest[employer] is an electronic gateway that provides easy, online access to your medical records. With [employer], you can request a clinic appointment, read your test results, renew a prescription or communicate with your care team.     To access your existing account, please contact your [facility] or call [phone] for assistance.        Care EveryWhere ID     This is your Care EveryWhere ID. This could be used by other organizations to access your [place] medical records  [id number]        Your Vitals Were     Height BMI (Body Mass Index)                1.803 m (5' 11\") 37.25 kg/m2           Blood Pressure from Last 3 Encounters:   07/26/17 121/82   05/23/17 129/77   04/03/17 126/75    Weight from Last 3 Encounters:   08/25/17 121.2 kg (267 lb 1.6 oz)   07/26/17 121.6 kg (268 lb)   05/23/17 119.5 kg (263 lb 6.4 oz)              We Performed the Following     ORTHOTICS REFERRAL     TRIM NONDYSTRPHIC NAIL(S)        Primary Care Provider Office Phone # Fax #    Jesus Boyle [phone] "       [facility] [address] [facility] [address]        Equal Access to Services     WILLSOFIE ELISABETH : Hadii migdalia breen patricia Olsen, waallysonda luoskartonyha, qawoodrowta kasuzi rosiedylan, waxgeovanni tommy redahlia velezflower sureshtamera kennedy. So [facility] [phone].    ATENCIÓN: Si habla español, tiene a dover disposición servicios gratuitos de asistencia lingüística. [facility] [phone].    We comply with applicable federal civil rights laws and [place] laws. We do not discriminate on the basis of race, color, national origin, age, disability sex, sexual orientation or gender identity.            Thank you!     Thank you for choosing [facility] ORTHOPAEDIC CLINIC  for your care. Our goal is always to provide you with excellent care. Hearing back from our patients is one way we can continue to improve our services. Please take a few minutes to complete the written survey that you may receive in the mail after your visit with us. Thank you!             Your Updated Medication List - Protect others around you: Learn how to safely use, store and throw away your medicines at www.disposemymeds.org.          This list is accurate as of: 8/25/17  6:00 PM.  Always use your most recent med list.                   Brand Name Dispense Instructions for use Diagnosis    * albuterol (2.5 MG/3ML) 0.083% neb solution      Take 1 vial by nebulization every 6 hours as needed for shortness of breath / dyspnea or wheezing        * VENTOLIN  (90 BASE) MCG/ACT Inhaler   Generic drug:  albuterol      Inhale 2 puffs into the lungs every 4 hours as needed for shortness of breath / dyspnea or wheezing        AMITIZA PO      Take 24 mcg by mouth 2 times daily (with meals)        amitriptyline 25 MG tablet    ELAVIL     Take 25 mg by mouth        * ARIPIPRAZOLE PO      Take 10 mg by mouth daily (with dinner)        * ARIPiprazole 2 MG tablet    ABILIFY    30 tablet    Take 2 mg by mouth 2 times daily    Adjustment disorder with depressed mood        "bisacodyl 10 MG Suppository    DULCOLAX     Place 10 mg rectally 2 times daily        CHANTIX PO      Take 1 mg by mouth 2 times daily        diclofenac sodium 3 % Gel      Apply topically 2 times daily as needed        EUCERIN INTENSIVE REPAIR EX      Externally apply topically 2 times daily as needed        ferrous fumarate 65 mg ([place]. FE)-Vitamin C 125 mg  MG Tabs tablet    VITRON C    30 tablet    One tab by mouth once daily in between meals        glipiZIDE 5 MG tablet    GLUCOTROL    30 tablet    Take 1 tablet (5 mg) by mouth 2 times daily (before meals)    Diabetes mellitus (H)       lactulose 10 GM/15ML solution    CHRONULAC     Take 30 g by mouth 2 times daily        lidocaine 5 % ointment    XYLOCAINE     Apply topically 3 times daily        LIPITOR PO      Take 40 mg by mouth every evening        losartan 25 MG tablet    COZAAR    30 tablet    Take 1 tablet (25 mg) by mouth daily    Hypertrophic cardiomyopathy (H)       LYRICA PO      Take 75 mg by mouth daily        magnesium oxide 400 (241.3 MG) MG tablet    MAG-OX    60 tablet    Take 1 tablet (400 mg) by mouth daily    Hypertrophic cardiomyopathy (H)       metFORMIN 500 MG tablet    GLUCOPHAGE    60 tablet    Take 1,000 mg by mouth 2 times daily (with meals) 850 mg BID    Diabetes mellitus (H)       metoprolol 50 MG tablet    LOPRESSOR    180 tablet    Take 100 mg by mouth 3 times daily    S/P ventricular septal myectomy, Congestive heart failure, unspecified, Hypertrophic cardiomyopathy (H)       polyethylene glycol 236 G suspension    GoLYTELY/NuLYTELY    4000 mL    Take 2,000 mLs (2 L) by mouth once as needed Refer to \"Getting Ready for a Colonoscopy\" instruction handout    Other constipation       * SEROQUEL PO      Take 25 mg by mouth 2 times daily        * QUEtiapine 100 MG tablet    SEROquel    30 tablet    150 mg At Bedtime 150 mg every HS    Insomnia       REVATIO PO      Take 20 mg by mouth daily as needed        senna-docusate 8.6-50 " MG per tablet    SENOKOT-S;PERICOLACE     Take 1 tablet by mouth daily        TRAZODONE HCL PO      Take 100 mg by mouth At Bedtime        venlafaxine 150 MG 24 hr capsule    EFFEXOR-XR    30 capsule    Take 150 mg by mouth 2 times daily    Adjustment disorder with depressed mood       * warfarin 7.5 MG tablet    COUMADIN     Take 7.5 mg by mouth daily        * warfarin 1 MG tablet    COUMADIN    30 tablet    Total 8.5 mg daily    Atrial fibrillation (H)       * Notice:  This list has 8 medication(s) that are the same as other medications prescribed for you. Read the directions carefully, and ask your doctor or other care provider to review them with you.

## 2017-08-25 NOTE — LETTER
"8/25/2017       RE: Konstantin Sharp  [address] [place] [address]     Dear Colleague,    Thank you for referring your patient, Konstantin Sharp, to the [facility] at [facility]. Please see a copy of my visit note below.    Chief Complaint:   Chief Complaint   Patient presents with     Foot Problems     f/u bilateral toe pain     Allergies   Allergen Reactions     Bee Venom Swelling     Lisinopril      TABS  Severe cough     Penicillins Hives and Difficulty breathing     HPI: Konstantin is a 46 year old male who presents today for further evaluation of elongated toenails and diabetic foot check. He presents today with his sister-in-law who is also his PCA.  He has history of DM type 2 which is well controlled with metformin and glipizide. Started Lyrica about 2 months ago for neuropathy by his PCP. This doesn't seem to have helped much, but he isn't sure. The lidocaine ointment helps the most to control neuropath symptoms. Capsaicin cream did not work. Reports that he quit smoking on 8/14/17 with only a few cigarettes since then. Interested in diabetic shoes. Admits numbness and pain of his toes and forefoot. Denies open sores and skin concerns.     Objective:  Ht 1.803 m (5' 11\")  Wt 121.2 kg (267 lb 1.6 oz)  BMI 37.25 kg/m2    PT and DP pulses are 1/4 bilaterally. CRT is > 3 seconds. Diminished pedal hair. Toes are cold to the touch and have bluish discoloration bilaterally.  Gross sensation is slightly decreased bilaterally.  Equinus is noted bilaterally. No pain with active or passive ROM of the ankle, MTJ, 1st ray, or halluces bilaterally. Pain with light touch starting just proximal to first and fifth met heads bilaterally.  No open lesions are noted. Nails are short. The hallux nails are mildly incurvated at the medial borders with no signs of infection noted to the digits today. Skin is normal in texture and turgor.     A1C    6.3%     " 3/27/17    Assessment: DM2 with neuropathy  Onychocryptosis BL halluces   Likely peripheral microvascular disease     Plan:  - Pt seen and evaluated  - Nails trimmed x 10  - Ordered diabetic shoes and inserts x3. Patient to schedule appointment for fitting  - Continue working with your PCP to control neuropathy symptoms.   - Praise given for patient attempting to quit smoking. Likely contributing to microvascular disease in toes cause them to be cold and painful, in addition to the chronic DM and neuropathy.  - See again in 6 months or sooner if problems arise.     Again, thank you for allowing me to participate in the care of your patient.      Sincerely,    Casey Bangura DPM

## 2017-08-25 NOTE — NURSING NOTE
"Reason For Visit:   Chief Complaint   Patient presents with     Foot Problems     f/u bilateral toe pain       Ht 1.803 m (5' 11\")  Wt 121.2 kg (267 lb 1.6 oz)  BMI 37.25 kg/m2    Pain Assessment  Patient Currently in Pain: Yes  0-10 Pain Scale: 7  Primary Pain Location: Foot  Pain Orientation: Right, Left  Pain Descriptors: Burning, Sharp  Alleviating Factors: Other (comment) (no alleviating factors)  Aggravating Factors: Walking, Standing    Janice Brooks LPN  "

## 2017-08-25 NOTE — PROGRESS NOTES
"Chief Complaint:   Chief Complaint   Patient presents with     Foot Problems     f/u bilateral toe pain     Allergies   Allergen Reactions     Bee Venom Swelling     Lisinopril      TABS  Severe cough     Penicillins Hives and Difficulty breathing     HPI: Konstantin is a 46 year old male who presents today for further evaluation of elongated toenails and diabetic foot check. He presents today with his sister-in-law who is also his PCA.  He has history of DM type 2 which is well controlled with metformin and glipizide. Started Lyrica about 2 months ago for neuropathy by his PCP. This doesn't seem to have helped much, but he isn't sure. The lidocaine ointment helps the most to control neuropath symptoms. Capsaicin cream did not work. Reports that he quit smoking on 8/14/17 with only a few cigarettes since then. Interested in diabetic shoes. Admits numbness and pain of his toes and forefoot. Denies open sores and skin concerns.     Objective:  Ht 1.803 m (5' 11\")  Wt 121.2 kg (267 lb 1.6 oz)  BMI 37.25 kg/m2    PT and DP pulses are 1/4 bilaterally. CRT is > 3 seconds. Diminished pedal hair. Toes are cold to the touch and have bluish discoloration bilaterally.  Gross sensation is slightly decreased bilaterally.  Equinus is noted bilaterally. No pain with active or passive ROM of the ankle, MTJ, 1st ray, or halluces bilaterally. Pain with light touch starting just proximal to first and fifth met heads bilaterally.  No open lesions are noted. Nails are short. The hallux nails are mildly incurvated at the medial borders with no signs of infection noted to the digits today. Skin is normal in texture and turgor.     A1C    6.3%     3/27/17    Assessment: DM2 with neuropathy  Onychocryptosis BL halluces   Likely peripheral microvascular disease     Plan:  - Pt seen and evaluated  - Nails trimmed x 10  - Ordered diabetic shoes and inserts x3. Patient to schedule appointment for fitting  - Continue working with your PCP to " control neuropathy symptoms.   - Praise given for patient attempting to quit smoking. Likely contributing to microvascular disease in toes cause them to be cold and painful, in addition to the chronic DM and neuropathy.  - See again in 6 months or sooner if problems arise.

## 2017-08-31 ENCOUNTER — COMMUNICATION - HEALTHEAST (OUTPATIENT)
Dept: NEUROLOGY | Facility: CLINIC | Age: 47
End: 2017-08-31

## 2017-08-31 DIAGNOSIS — F33.1 MDD (MAJOR DEPRESSIVE DISORDER), RECURRENT EPISODE, MODERATE (H): ICD-10-CM

## 2017-09-04 ENCOUNTER — COMMUNICATION - HEALTHEAST (OUTPATIENT)
Dept: NEUROLOGY | Facility: CLINIC | Age: 47
End: 2017-09-04

## 2017-09-04 DIAGNOSIS — F33.1 MDD (MAJOR DEPRESSIVE DISORDER), RECURRENT EPISODE, MODERATE (H): ICD-10-CM

## 2017-09-05 ENCOUNTER — AMBULATORY - HEALTHEAST (OUTPATIENT)
Dept: NEUROLOGY | Facility: CLINIC | Age: 47
End: 2017-09-05

## 2017-09-07 ENCOUNTER — COMMUNICATION - HEALTHEAST (OUTPATIENT)
Dept: NEUROLOGY | Facility: CLINIC | Age: 47
End: 2017-09-07

## 2017-09-07 DIAGNOSIS — F33.1 MDD (MAJOR DEPRESSIVE DISORDER), RECURRENT EPISODE, MODERATE (H): ICD-10-CM

## 2017-09-20 NOTE — PROGRESS NOTES
CHIEF COMPLAINT:  Chronic constipation.      HISTORY OF PRESENT ILLNESS:  Konstantin is a 47-year-old man with a complex medical history including cardiomyopathy with an implanted defibrillator, muscular dystrophy, status post brain injury, double pneumonia and in coma for 3 months with that.  He has had constipation for a couple years, and it is now worse again and causing pain, gas, bloat.  He will have as many as 2 weeks with no results.  He stopped iron a couple weeks ago and is now only minimally improved.  He had a normal bowel movement today after suppository which only sometimes helps.  He is using prune juice quite regularly, takes MiraLax 1-3 doses daily days on end with no benefit and uses Ex-Lax up to 6 doses per day, 1 week straight and sometimes with no benefit.  At times, he has had some response from Senokot.  He has received lactulose from outside location and has not yet started that.  He has received prescriptions for lubiprostone which is not covered thus far.      Konstantin has no black stool.  He denies significant upper GI symptoms.      Konstantin had tremendous abdominal pain , for which he had CT abdomen and pelvis with IV contrast only in September at [facility].  There were air fluid levels with transition, but no sign of disease or mass.  The following day, he had a water soluble colonic enema imaging which showed a large volume of stool burden and no masses.      MEDICAL HISTORY, MEDICATIONS, ADVERSE DRUG REACTIONS:  Reviewed.      FAMILY HISTORY:  Reviewed.  No history of colon cancer.  No autoimmune disease known.  Father  young of heart disease.      SOCIAL HISTORY:  Single, disabled.      REVIEW OF SYSTEMS:  Denies symptoms of acute illness or localized infection.  Denies dysphagia, odynophagia, heartburn, nausea or vomiting.  He has no black or bloody stool.  Has occasional bleeding from hemorrhoids.      OBJECTIVE:   VITAL SIGNS:  Reviewed.  BMI 37.  Pain in the abdomen 4/10.    GENERAL:  Clean, well-nourished man who does not show his distress.  His breathing is calm and grossly normal.  Eyes are clear.  He expresses himself well.  He is here in the company of Leilani.   ABDOMEN:  Currently soft and nontender.  Examined only in the chair.      ASSESSMENT:  A 47-year-old meal with severe constipation over greater than 1 year during which time he has received no consistent, successful plan for his constipation.  Based on air fluid levels, there is question also regarding pelvic floor function or dysfunction.  He is not able to describe movement in the pelvic floor with Kegel exercise yet.      PLAN:   1.  Start the lactulose provided.   2.  Maintain good fluid intake.   3.  Perform Kegel exercises daily and use those to identify muscles to relax for defecation.   4.  Increase his senna dose.   5.  Report back to GI how these things are going.   6.  Simply to be sure there is no associated issue, screening for celiac disease which may cause both constipation and the anemia.   7.  Later consideration for [facility] and possible retraining.   8.  If he has no benefit and has not had stool in 4 or 5 days, mix up 1/2 of his 4 liter GoLYTELY powder and 8 cups liquid and drink it slowly, not rapidly as for a preparation.  Details were provided.   9.  Return to GI clinic in 3 months.      We reviewed his medical history, his medications and his constipation history.  We discussed the essentials of management and possible types of agents and the differences between the agents he has had so far.  We discussed the details of the plan and notes were provided.      Total visit 45 minutes with counseling time 30 minutes.         SHAHEED SHELL CNP             D: 2017 01:48   T: 2017 07:53   MT: SCOTT      Name:     NERIS JACKSON   MRN:      [id number]        Account:      [id number]   :      1970           Service Date: 2017      Document: [id number]

## 2017-10-12 ENCOUNTER — HOSPITAL ENCOUNTER (OUTPATIENT)
Dept: NEUROLOGY | Facility: CLINIC | Age: 47
Setting detail: THERAPIES SERIES
Discharge: STILL ADMITTED OR ADMINISTRATIVE | End: 2017-10-12
Attending: NURSE PRACTITIONER

## 2017-10-12 DIAGNOSIS — F33.1 MDD (MAJOR DEPRESSIVE DISORDER), RECURRENT EPISODE, MODERATE (H): ICD-10-CM

## 2017-10-12 DIAGNOSIS — G47.00 INSOMNIA: ICD-10-CM

## 2017-10-12 DIAGNOSIS — F09 COGNITIVE DISORDER: ICD-10-CM

## 2017-10-15 ENCOUNTER — COMMUNICATION - HEALTHEAST (OUTPATIENT)
Dept: NEUROLOGY | Facility: CLINIC | Age: 47
End: 2017-10-15

## 2017-10-16 ENCOUNTER — ALLIED HEALTH/NURSE VISIT (OUTPATIENT)
Dept: CARDIOLOGY | Facility: CLINIC | Age: 47
End: 2017-10-16
Payer: MEDICAID

## 2017-10-16 DIAGNOSIS — I42.1 HOCM (HYPERTROPHIC OBSTRUCTIVE CARDIOMYOPATHY) (H): Primary | ICD-10-CM

## 2017-10-16 PROCEDURE — 93283 PRGRMG EVAL IMPLANTABLE DFB: CPT | Mod: 26 | Performed by: INTERNAL MEDICINE

## 2017-10-16 PROCEDURE — 93283 PRGRMG EVAL IMPLANTABLE DFB: CPT | Mod: ZF

## 2017-10-16 NOTE — PATIENT INSTRUCTIONS
It was a pleasure to see you in clinic today.  Please do not hesitate to call us if you have any questions or concerns.  Please return to clinic in 6 mos for a ICD check.    Next remote 1/17/18    Nely Bush R.N.  Electrophysiology nurse specialist  [facility]  [address] [place] [id number]     Laurel Zambrano  [phone] business hours    After business hours please call [phone], option #4, and ask for Job code [id number].

## 2017-10-16 NOTE — PROGRESS NOTES
Patient seen in clinic for evaluation and iterative programming of his Medtronic dual lead ICD per MD orders.  Patient has an appointment to see Dr. Castellano today.  Normal ICD function.  2 AT/AF episodes recorded - 7 seconds.  Intrinsic rhythm = NSR with  @ 30 bpm.  AP = 8.2%.   = 100%.  OptiVol fluid index is near baseline.  Estimated battery longevity to DA = 4.8 years.  Patient reports that he is feeling well and denies complaints.  Plan for patient to send a remote transmission in 3 months and return to clinic in 6 months.    Dual lead ICD

## 2017-10-16 NOTE — MR AVS SNAPSHOT
After Visit Summary   10/16/2017    Konstantin Sharp    MRN: [id number]           Patient Information     Date Of Birth          1970        Visit Information        Provider Department      10/16/2017 3:30 PM 1, Uc Cv Device [facility]        Care Instructions    It was a pleasure to see you in clinic today.  Please do not hesitate to call us if you have any questions or concerns.  Please return to clinic in 6 mos for a ICD check.    Next remote 1/17/18    Nely Bush R.N.  Electrophysiology nurse specialist  [facility]  [address] [place] [id number]     Laurel Zambrano  [phone] business hours    After business hours please call [phone], option #4, and ask for Job code [id number].                  Follow-ups after your visit        Follow-up notes from your care team     Discussed this visit Return in about 6 months (around 4/16/2018) for ICD check.      Your next 10 appointments already scheduled     Nov 09, 2017  2:00 PM CST   (Arrive by 1:45 PM)   New Vascular Visit with Irlanda Calero MD   [facility] ([facility])    [address] [place] [id number]   [phone]            Dec 01, 2017  3:00 PM CST   (Arrive by 2:45 PM)   Return Visit with SHAHEED Mccormick CNP   [facility] ([facility])    [address] [place] [id number] [phone]            Apr 16, 2018  3:00 PM CDT   (Arrive by 2:45 PM)   Implanted Defibulator with Uc Cv Device 1   [facility] ([facility])    [address] [place] [id number] [phone]              Who to contact     If you have questions or need follow up information about today's clinic visit or your schedule please contact [facility] directly at [phone].  Normal or non-critical  lab and imaging results will be communicated to you by MyChart, letter or phone within 4 business days after the clinic has received the results. If you do not hear from us within 7 days, please contact the clinic through ShotClipt or phone. If you have a critical or abnormal lab result, we will notify you by phone as soon as possible.  Submit refill requests through Viki or call your pharmacy and they will forward the refill request to us. Please allow 3 business days for your refill to be completed.          Additional Information About Your Visit        [employer] Information     Viki gives you secure access to your electronic health record. If you see a primary care provider, you can also send messages to your care team and make appointments. If you have questions, please call your primary care clinic.  If you do not have a primary care provider, please call [phone] and they will assist you.        Care EveryWhere ID     This is your Care EveryWhere ID. This could be used by other organizations to access your [facility] medical records  [id number]         Blood Pressure from Last 3 Encounters:   07/26/17 121/82   05/23/17 129/77   04/03/17 126/75    Weight from Last 3 Encounters:   08/25/17 121.2 kg (267 lb 1.6 oz)   07/26/17 121.6 kg (268 lb)   05/23/17 119.5 kg (263 lb 6.4 oz)              Today, you had the following     No orders found for display       Primary Care Provider Office Phone # Fax #    Jesus Boyle [phone]       [facility] [address] [place] [address]        Equal Access to Services     CAROLINE BARBER : Hadii migdalia breen hadibano Sodaniela, waaxda luqadaha, qaybta kaalmada annabel, gustavo kennedy. So [facility] [phone].    ATENCIÓN: Si habla español, tiene a dover disposición servicios gratuitos de asistencia lingüística. Llame al [phone].    We comply with applicable federal civil rights laws and [place] laws. We do not  discriminate on the basis of race, color, national origin, age, disability, sex, sexual orientation, or gender identity.            Thank you!     Thank you for choosing [facility]  for your care. Our goal is always to provide you with excellent care. Hearing back from our patients is one way we can continue to improve our services. Please take a few minutes to complete the written survey that you may receive in the mail after your visit with us. Thank you!             Your Updated Medication List - Protect others around you: Learn how to safely use, store and throw away your medicines at www.disposemymeds.org.          This list is accurate as of: 10/16/17  3:48 PM.  Always use your most recent med list.                   Brand Name Dispense Instructions for use Diagnosis    * albuterol (2.5 MG/3ML) 0.083% neb solution      Take 1 vial by nebulization every 6 hours as needed for shortness of breath / dyspnea or wheezing        * VENTOLIN  (90 BASE) MCG/ACT Inhaler   Generic drug:  albuterol      Inhale 2 puffs into the lungs every 4 hours as needed for shortness of breath / dyspnea or wheezing        AMITIZA PO      Take 24 mcg by mouth 2 times daily (with meals)        amitriptyline 25 MG tablet    ELAVIL     Take 25 mg by mouth        * ARIPIPRAZOLE PO      Take 10 mg by mouth daily (with dinner)        * ARIPiprazole 2 MG tablet    ABILIFY    30 tablet    Take 2 mg by mouth 2 times daily    Adjustment disorder with depressed mood       bisacodyl 10 MG Suppository    DULCOLAX     Place 10 mg rectally 2 times daily        CHANTIX PO      Take 1 mg by mouth 2 times daily        diclofenac sodium 3 % Gel      Apply topically 2 times daily as needed        EUCERIN INTENSIVE REPAIR EX      Externally apply topically 2 times daily as needed        ferrous fumarate 65 mg (Viejas. FE)-Vitamin C 125 mg  MG Tabs tablet    VITRON C    30 tablet    One tab by mouth once daily in between meals         "glipiZIDE 5 MG tablet    GLUCOTROL    30 tablet    Take 1 tablet (5 mg) by mouth 2 times daily (before meals)    Diabetes mellitus (H)       lactulose 10 GM/15ML solution    CHRONULAC     Take 30 g by mouth 2 times daily        lidocaine 5 % ointment    XYLOCAINE     Apply topically 3 times daily        LIPITOR PO      Take 40 mg by mouth every evening        losartan 25 MG tablet    COZAAR    30 tablet    Take 1 tablet (25 mg) by mouth daily    Hypertrophic cardiomyopathy (H)       LYRICA PO      Take 75 mg by mouth daily        magnesium oxide 400 (241.3 MG) MG tablet    MAG-OX    60 tablet    Take 1 tablet (400 mg) by mouth daily    Hypertrophic cardiomyopathy (H)       metFORMIN 500 MG tablet    GLUCOPHAGE    60 tablet    Take 1,000 mg by mouth 2 times daily (with meals) 850 mg BID    Diabetes mellitus (H)       metoprolol 50 MG tablet    LOPRESSOR    180 tablet    Take 100 mg by mouth 3 times daily    S/P ventricular septal myectomy, Congestive heart failure, unspecified, Hypertrophic cardiomyopathy (H)       polyethylene glycol 236 G suspension    GoLYTELY/NuLYTELY    4000 mL    Take 2,000 mLs (2 L) by mouth once as needed Refer to \"Getting Ready for a Colonoscopy\" instruction handout    Other constipation       * SEROQUEL PO      Take 25 mg by mouth 2 times daily        * QUEtiapine 100 MG tablet    SEROquel    30 tablet    150 mg At Bedtime 150 mg every HS    Insomnia       REVATIO PO      Take 20 mg by mouth daily as needed        senna-docusate 8.6-50 MG per tablet    SENOKOT-S;PERICOLACE     Take 1 tablet by mouth daily        TRAZODONE HCL PO      Take 100 mg by mouth At Bedtime        venlafaxine 150 MG 24 hr capsule    EFFEXOR-XR    30 capsule    Take 150 mg by mouth 2 times daily    Adjustment disorder with depressed mood       * warfarin 7.5 MG tablet    COUMADIN     Take 7.5 mg by mouth daily        * warfarin 1 MG tablet    COUMADIN    30 tablet    Total 8.5 mg daily    Atrial fibrillation (H)    "    * Notice:  This list has 8 medication(s) that are the same as other medications prescribed for you. Read the directions carefully, and ask your doctor or other care provider to review them with you.

## 2017-10-23 ENCOUNTER — COMMUNICATION - HEALTHEAST (OUTPATIENT)
Dept: NEUROLOGY | Facility: CLINIC | Age: 47
End: 2017-10-23

## 2017-10-23 DIAGNOSIS — G47.00 INSOMNIA: ICD-10-CM

## 2017-11-04 ENCOUNTER — COMMUNICATION - HEALTHEAST (OUTPATIENT)
Dept: NEUROLOGY | Facility: CLINIC | Age: 47
End: 2017-11-04

## 2017-11-04 DIAGNOSIS — F33.1 MDD (MAJOR DEPRESSIVE DISORDER), RECURRENT EPISODE, MODERATE (H): ICD-10-CM

## 2017-11-06 DIAGNOSIS — I73.9 PAD (PERIPHERAL ARTERY DISEASE) (H): Primary | ICD-10-CM

## 2017-11-09 ENCOUNTER — OFFICE VISIT (OUTPATIENT)
Dept: VASCULAR SURGERY | Facility: CLINIC | Age: 47
End: 2017-11-09

## 2017-11-09 VITALS
HEART RATE: 87 BPM | OXYGEN SATURATION: 94 % | DIASTOLIC BLOOD PRESSURE: 83 MMHG | RESPIRATION RATE: 17 BRPM | SYSTOLIC BLOOD PRESSURE: 128 MMHG

## 2017-11-09 DIAGNOSIS — I77.6 VASCULITIS (H): Primary | ICD-10-CM

## 2017-11-09 ASSESSMENT — PAIN SCALES - GENERAL: PAINLEVEL: SEVERE PAIN (6)

## 2017-11-09 NOTE — MR AVS SNAPSHOT
After Visit Summary   11/9/2017    Konstantin hSarp    MRN: [id number]           Patient Information     Date Of Birth          1970        Visit Information        Provider Department      11/9/2017 2:00 PM Irlanda Calero MD [facility]        Care Instructions    Follow up with vascular medicine              Follow-ups after your visit        Your next 10 appointments already scheduled     Dec 01, 2017  3:00 PM CST   (Arrive by 2:45 PM)   Return Visit with SHAHEED Mccormick [facility] ([facility])    [address]  4th Floor  [facility] [id number] [phone]            Apr 16, 2018  3:00 PM CDT   (Arrive by 2:45 PM)   Implanted Defibulator with Uc Cv Device 1   [facility] ([facility])    [address]  3rd Floor  [facility] [id number] [phone]              Who to contact     Please call your clinic at [phone] to:    Ask questions about your health    Make or cancel appointments    Discuss your medicines    Learn about your test results    Speak to your doctor   If you have compliments or concerns about an experience at your clinic, or if you wish to file a complaint, please contact [facility] Patient Relations at [phone] or email us at [email or web address]         Additional Information About Your Visit        MyChart Information     [employer] gives you secure access to your electronic health record. If you see a primary care provider, you can also send messages to your care team and make appointments. If you have questions, please call your primary care clinic.  If you do not have a primary care provider, please call [phone] and they will assist you.      [employer] is an electronic gateway that provides easy, online access to your medical records. With [employer], you can request a clinic appointment, read your  test results, renew a prescription or communicate with your care team.     To access your existing account, please contact your [facility] or call [phone] for assistance.        Care EveryWhere ID     This is your Care EveryWhere ID. This could be used by other organizations to access your [facility] medical records  [id number]        Your Vitals Were     Pulse Respirations Pulse Oximetry             87 17 94%          Blood Pressure from Last 3 Encounters:   11/09/17 128/83   07/26/17 121/82   05/23/17 129/77    Weight from Last 3 Encounters:   08/25/17 121.2 kg (267 lb 1.6 oz)   07/26/17 121.6 kg (268 lb)   05/23/17 119.5 kg (263 lb 6.4 oz)              Today, you had the following     No orders found for display       Primary Care Provider Office Phone # Fax #    Jesus Boyle [phone]       [facility] [address] [facility] [address]        Equal Access to Services     CAROLINE BARBER : Hadii aad ku hadasho Soomaali, waaxda luqadaha, qaybta kaalmada adeegyada, waxay idiin hayaan adeeg kharatamera perry . So [facility] [place] [phone].    ATENCIÓN: Si habla español, tiene a dover disposición servicios gratuitos de asistencia lingüística. Georgieame al [phone].    We comply with applicable federal civil rights laws and [place] laws. We do not discriminate on the basis of race, color, national origin, age, disability, sex, sexual orientation, or gender identity.            Thank you!     Thank you for choosing [facility]  for your care. Our goal is always to provide you with excellent care. Hearing back from our patients is one way we can continue to improve our services. Please take a few minutes to complete the written survey that you may receive in the mail after your visit with us. Thank you!             Your Updated Medication List - Protect others around you: Learn how to safely use, store and throw away your medicines at  www.disposemymeds.org.          This list is accurate as of: 11/9/17  2:17 PM.  Always use your most recent med list.                   Brand Name Dispense Instructions for use Diagnosis    * albuterol (2.5 MG/3ML) 0.083% neb solution      Take 1 vial by nebulization every 6 hours as needed for shortness of breath / dyspnea or wheezing        * VENTOLIN  (90 BASE) MCG/ACT Inhaler   Generic drug:  albuterol      Inhale 2 puffs into the lungs every 4 hours as needed for shortness of breath / dyspnea or wheezing        AMITIZA PO      Take 24 mcg by mouth 2 times daily (with meals)        amitriptyline 25 MG tablet    ELAVIL     Take 25 mg by mouth        ARIPIPRAZOLE PO      Take 10 mg by mouth daily (with dinner)        bisacodyl 10 MG Suppository    DULCOLAX     Place 10 mg rectally 2 times daily        EUCERIN INTENSIVE REPAIR EX      Externally apply topically 2 times daily as needed        ferrous fumarate 65 mg ([place]. FE)-Vitamin C 125 mg  MG Tabs tablet    VITRON C    30 tablet    One tab by mouth once daily in between meals        glipiZIDE 5 MG tablet    GLUCOTROL    30 tablet    Take 1 tablet (5 mg) by mouth 2 times daily (before meals)    Diabetes mellitus (H)       lactulose 10 GM/15ML solution    CHRONULAC     Take 30 g by mouth 2 times daily        lidocaine 5 % ointment    XYLOCAINE     Apply topically 3 times daily        LIPITOR PO      Take 40 mg by mouth every evening        losartan 25 MG tablet    COZAAR    30 tablet    Take 1 tablet (25 mg) by mouth daily    Hypertrophic cardiomyopathy (H)       LYRICA PO      Take 75 mg by mouth daily        magnesium oxide 400 (241.3 MG) MG tablet    MAG-OX    60 tablet    Take 1 tablet (400 mg) by mouth daily    Hypertrophic cardiomyopathy (H)       metFORMIN 500 MG tablet    GLUCOPHAGE    60 tablet    Take 1,000 mg by mouth 2 times daily (with meals) 850 mg BID    Diabetes mellitus (H)       metoprolol 50 MG tablet    LOPRESSOR    180 tablet     "Take 100 mg by mouth 3 times daily    S/P ventricular septal myectomy, Congestive heart failure, unspecified, Hypertrophic cardiomyopathy (H)       polyethylene glycol 236 G suspension    GoLYTELY/NuLYTELY    4000 mL    Take 2,000 mLs (2 L) by mouth once as needed Refer to \"Getting Ready for a Colonoscopy\" instruction handout    Other constipation       * SEROQUEL PO      Take 25 mg by mouth 2 times daily        * QUEtiapine 100 MG tablet    SEROquel    30 tablet    150 mg At Bedtime 150 mg every HS    Insomnia       REVATIO PO      Take 20 mg by mouth daily as needed        senna-docusate 8.6-50 MG per tablet    SENOKOT-S;PERICOLACE     Take 1 tablet by mouth daily        TRAZODONE HCL PO      Take 100 mg by mouth At Bedtime        venlafaxine 150 MG 24 hr capsule    EFFEXOR-XR    30 capsule    Take 150 mg by mouth 2 times daily    Adjustment disorder with depressed mood       * warfarin 7.5 MG tablet    COUMADIN     Take 7.5 mg by mouth daily        * warfarin 1 MG tablet    COUMADIN    30 tablet    Total 8.5 mg daily    Atrial fibrillation (H)       * Notice:  This list has 6 medication(s) that are the same as other medications prescribed for you. Read the directions carefully, and ask your doctor or other care provider to review them with you.      "

## 2017-11-09 NOTE — PROGRESS NOTES
I have seen and assessed this patient with the above provider and agree with her above documentation, impression and plan.    I spent>50% of this 15 min visit in counseling.  In summary this patient does not have macrovascular occlusive disease. He does not have symptoms consistent with claudication. His most likely source for discomfort is neuropathy given that his symptoms are improved with Lyrica and he has long-standing diabetes. Dad said his absence of toe pressures on the left side are concerning. Given his family history of scleroderma, his personal history of arthritis in his knees and wrists, and his occasional history of intermittent rashes, I think workup for a vasculitis is appropriate. Given that he has a cardiomyopathy history I will refer him to Dr. Mcgovern of vascular medicine and cardiology. Follow up with me only p.r.n.  Answers for HPI/ROS submitted by the patient on 11/7/2017   General Symptoms: No  Skin Symptoms: No  HENT Symptoms: No  EYE SYMPTOMS: No  HEART SYMPTOMS: No  LUNG SYMPTOMS: No  INTESTINAL SYMPTOMS: No  URINARY SYMPTOMS: No  REPRODUCTIVE SYMPTOMS: No  SKELETAL SYMPTOMS: No  BLOOD SYMPTOMS: No  NERVOUS SYSTEM SYMPTOMS: No  MENTAL HEALTH SYMPTOMS: No

## 2017-11-09 NOTE — PROGRESS NOTES
VASCULAR SURGERY CLINIC PATIENT CONSULTATION NOTE    HPI:  Konstantin Sharp is a 47 year old male with past medical history remarkable for a-fib, hypertension, cardiomyopathy, DM II, inflammatory arthritis and tobacco abuse with 6 month history of severe bilateral foot pain and coolness.  He states his bilateral foot pain is constant.  He reports elevation and Lyrica help relieve his feet pains. Konstantin states he had previously been on high doses of Neurontin which did not help his symptoms.  He reports his grandmother has history of scleroderma.  He is being seen in clinic regarding possible PAD.      PAST MEDICAL HISTORY:  Past Medical History:   Diagnosis Date     Atrial fibrillation (H)      Back pains, lower      Cardiomyopathy      Cardiomyopathy      Cardiomyopathy (H)      Chest pain      Coronary artery disease      Depressive disorder      Dual ICD (implantable cardiac defibrillator) in place 4/29/2014     Heart disease      HTN (hypertension)      Hypertrophic nonobstructive cardiomyopathy (H) 9/12/2012     Inflammatory arthritis      Muscular dystrophy (H)      Pacemaker      Polysubstance abuse      Shortness of breath      Sleep apnea     doesn't use cpap     Stomach ulcer      Type 2 diabetes mellitus without complications (H)      Uncomplicated asthma      Unspecified asthma(493.90)        PAST SURGICAL HISTORY:  Past Surgical History:   Procedure Laterality Date     APPENDECTOMY  1980    [facility]? near [facility]     C CARDIAC SURG PROCEDURE UNLIST       C HAND/FINGER SURGERY UNLISTED       C SHOULDER SURG PROC UNLISTED       C STOMACH SURGERY PROCEDURE UNLISTED       DISCECTOMY LUMBAR POSTERIOR MICROSCOPIC THREE+ LEVELS  12/16/2011    Procedure:DISCECTOMY LUMBAR POSTERIOR MICROSCOPIC THREE+ LEVELS; Posterior Decompression L2-S1; Surgeon:CAITIE OSMAN; Location:[facility] OR     FUSION CERVICAL POSTERIOR ONE LEVEL  8/24/2012    Procedure: FUSION CERVICAL POSTERIOR ONE LEVEL;  Posterior  "Instrumentated Spinal Fusion Cervical 6-7, Right Iliac Crest Bone [place] ;  Surgeon: Lopez Funez MD;  Location: [facility] OR     GRAFT BONE FROM ILIAC CREST  8/24/2012    Procedure: GRAFT BONE FROM ILIAC CREST;;  Surgeon: Lopez Funez MD;  Location: [facility] OR     HC SACROPLASTY       IMPLANT PACEMAKER       INJECT STEROID (LOCATION) N/A 2/19/2015    Procedure: INJECT STEROID (LOCATION);  Surgeon: Siri Koch MD;  [facility]     INSERT STIMULATOR AND LEADS INTERNAL DORSAL COLUMN  8/7/2013    Procedure: INSERT STIMULATOR AND LEADS INTERNAL DORSAL COLUMN;  SPINAL CORD STIMULATOR IMPLANT ;  Surgeon: Ricardo Bruno MD;  Location: [facility] OR     INSERT STIMULATOR DORSAL COLUMN  08/13/2013    lead replacemend, 12?2016,  battery replacement 4/4/2016     KNEE SURGERY       LASER CO2 LARYNGOSCOPY N/A 2/19/2015    Procedure: LASER CO2 LARYNGOSCOPY;  Surgeon: Siri Koch MD;  [facility]     LASER CO2 LARYNGOSCOPY, COMPLEX  7/22/2014    Procedure: LASER CO2 LARYNGOSCOPY, COMPLEX;  Surgeon: Siri Koch MD;  [facility]     MYECTOMY SEPTAL  4/14/2014    Procedure: Median Sternotomy, Septal Myectomy, Intraoperative BRENT performed by Dr. Castano, on pump oxygenator.;  Surgeon: Aguila Cannon MD;  [facility]     NECK SURGERY       SHOULDER SURGERY  2006    RIGHT     STOMACH SURGERY  1980    partial gastrectomy for bleeding ulcers, \"stress related\". [facility]     WRIST SURGERY Left 1988    fractured. 1988 or so       FAMILY HISTORY:  Family History   Problem Relation Age of Onset     HEART DISEASE Father 32     MIs x2; s/p CABG     Substance Abuse Father      Hypertension Father      Obesity Father      Hyperlipidemia Father      Hypertension Brother      DIABETES Brother      Glaucoma Maternal Grandmother      Hypertension Maternal Grandmother      DIABETES Maternal Grandfather      Glaucoma Maternal Grandfather      Hypertension Maternal " Grandfather      CEREBROVASCULAR DISEASE Maternal Grandfather      Obesity Maternal Grandfather      Hyperlipidemia Maternal Grandfather      Coronary Artery Disease Maternal Grandfather      Substance Abuse Other      CANCER Other      Hypertension Other      Obesity Other      Other Cancer Other      Hyperlipidemia Other      Coronary Artery Disease Other      Obesity Brother      Hyperlipidemia Brother      Macular Degeneration No family hx of        SOCIAL HISTORY:   Social History   Substance Use Topics     Smoking status: Light Tobacco Smoker     Packs/day: 1.00     Years: 28.00     Types: Cigarettes     Last attempt to quit: 4/7/2014     Smokeless tobacco: Never Used     Alcohol use 0.0 oz/week     0 Standard drinks or equivalent per week      Comment: 0-1 X Weekly       TOBACCO USE: current smoker        HOME MEDICATIONS:  Prior to Admission medications    Medication Sig Start Date End Date Taking? Authorizing Provider   lidocaine (XYLOCAINE) 5 % ointment Apply topically 3 times daily   Yes Reported, Patient   lactulose (CHRONULAC) 10 GM/15ML solution Take 30 g by mouth 2 times daily   Yes Reported, Patient   Sildenafil Citrate (REVATIO PO) Take 20 mg by mouth daily as needed   Yes Reported, Patient   Pregabalin (LYRICA PO) Take 75 mg by mouth daily   Yes Reported, Patient   bisacodyl (DULCOLAX) 10 MG Suppository Place 10 mg rectally 2 times daily   Yes Reported, Patient   ARIPIPRAZOLE PO Take 10 mg by mouth daily (with dinner)   Yes Reported, Patient   amitriptyline (ELAVIL) 25 MG tablet Take 25 mg by mouth   Yes Reported, Patient   senna-docusate (SENOKOT-S;PERICOLACE) 8.6-50 MG per tablet Take 1 tablet by mouth daily   Yes Reported, Patient   TRAZODONE HCL PO Take 100 mg by mouth At Bedtime   Yes Reported, Patient   warfarin (COUMADIN) 7.5 MG tablet Take 7.5 mg by mouth daily   Yes Reported, Patient   Atorvastatin Calcium (LIPITOR PO) Take 40 mg by mouth every evening   Yes Reported, Patient   QUEtiapine  "Fumarate (SEROQUEL PO) Take 25 mg by mouth 2 times daily   Yes Reported, Patient   albuterol (2.5 MG/3ML) 0.083% neb solution Take 1 vial by nebulization every 6 hours as needed for shortness of breath / dyspnea or wheezing   Yes Reported, Patient   albuterol (VENTOLIN HFA) 108 (90 BASE) MCG/ACT Inhaler Inhale 2 puffs into the lungs every 4 hours as needed for shortness of breath / dyspnea or wheezing   Yes Reported, Patient   Lubiprostone (AMITIZA PO) Take 24 mcg by mouth 2 times daily (with meals)   Yes Reported, Patient   warfarin (COUMADIN) 1 MG tablet Total 8.5 mg daily 12/12/16  Yes MarchMomo MD   losartan (COZAAR) 25 MG tablet Take 1 tablet (25 mg) by mouth daily 12/12/16  Yes MarchMomo MD   metFORMIN (GLUCOPHAGE) 500 MG tablet Take 1,000 mg by mouth 2 times daily (with meals) 850 mg BID 12/12/16  Yes MarchMomo MD   glipiZIDE (GLUCOTROL) 5 MG tablet Take 1 tablet (5 mg) by mouth 2 times daily (before meals) 12/12/16  Yes MarchMomo MD   venlafaxine (EFFEXOR-XR) 150 MG 24 hr capsule Take 150 mg by mouth 2 times daily  12/12/16  Yes Momo Castellano MD   QUEtiapine (SEROQUEL) 100 MG tablet 150 mg At Bedtime 150 mg every HS 12/12/16  Yes MarchMomo MD   metoprolol (LOPRESSOR) 50 MG tablet Take 100 mg by mouth 3 times daily  2/17/15  Yes Connie Mijares MD   ferrous fumarate 65 mg, [place]. FE,-Vitamin C 125 mg (VITRON C)  MG TABS tablet One tab by mouth once daily in between meals  Patient not taking: Reported on 8/25/2017 8/8/17   Niru Chairez MD   polyethylene glycol (GOLYTELY/NULYTELY) 236 G suspension Take 2,000 mLs (2 L) by mouth once as needed Refer to \"Getting Ready for a Colonoscopy\" instruction handout  Patient not taking: Reported on 8/25/2017 7/26/17   Britney Mcfadden, SHAHEED CNP   Emollient (EUCERIN INTENSIVE REPAIR EX) Externally apply topically 2 times daily as needed    Reported, Patient   magnesium " oxide (MAG-OX) 400 (241.3 MG) MG tablet Take 1 tablet (400 mg) by mouth daily 12/12/16 March, Momo Fitzgerald MD       VITAL SIGNS:      BP: 128/83 Pulse: 87   Resp: 17 SpO2: 94 %        0 lbs 0 oz    PHYSICAL EXAM:  NEURO/PSYCH:  The patient is alert and oriented.  Appropriate.  Moves all extremities.   SKIN: warm and dry.  PULMONARY:  Non-labored breathing   EXTREMITIES: palpable bilateral PT and DP, pulses weaker on right foot, palpable bilateral femoral and popliteal pulses, feet cold bilaterally, moderate hair growth on bilateral legs    JANIS RESULTS:  Exam done 11/9/2017  Right 1.11, first digit 90  Left 1.15, first digit 0    ASSESSMENT:  Patient Active Problem List   Diagnosis     Herniated cervical intervertebral disc     Chondromalacia of patella     Pseudoarthrosis of cervical spine (H)     Hypertrophic nonobstructive cardiomyopathy (H)     Chest pain     Sinus tachycardia     s/p PSF C6-7 for anterior pseudarthrosis     Spinal stenosis, lumbar region, with neurogenic claudication     Lumbar radicular pain     Atrial fibrillation (H)     HOCM (hypertrophic obstructive cardiomyopathy) (H)     Syncope     S/P ventricular septal myectomy     Automatic implantable cardioverter-defibrillator in situ- Glintstronic, dual chamber- DEPENDENT     Subglottic stenosis     Pre-operative cardiovascular examination     Postprocedural subglottic stenosis     Dysphonia     [facility]     Insomnia     Seizures (H)     Type 2 diabetes mellitus with diabetic neuropathy (H)         PLAN:  #1 Bilateral foot pain  - DM neuropathy verus vasculitis  More than likely DM neuropathy since his symptoms are relieved with taking Lyrica.  Will refer patient to vascular medicine to evaluate patient for vasculitis as he has history of cardiomyopathy     - continue Lipitor and coumadin     - smoking cessation strongly encouraged    - no further vascular surgery follow up  needed     Please do not hesitate to contact Dr. Calero's vascular surgery team with any questions.      Nely HUMMEL, CNS  Division of Vascular Surgery  [facility]  Pager [phone]

## 2017-11-09 NOTE — LETTER
11/9/2017       RE: Konstantin Sharp  [address] [place] [address]     Dear Colleague,    Thank you for referring your patient, Konstantin Sharp, to the [facility] VASCULAR [facility]IC at [facility]. Please see a copy of my visit note below.    VASCULAR SURGERY CLINIC PATIENT CONSULTATION NOTE    HPI:  Konstantin Sharp is a 47 year old male with past medical history remarkable for a-fib, hypertension, cardiomyopathy, DM II, inflammatory arthritis and tobacco abuse with 6 month history of severe bilateral foot pain and coolness.  He states his bilateral foot pain is constant.  He reports elevation and Lyrica help relieve his feet pains. Konstantin states he had previously been on high doses of Neurontin which did not help his symptoms.  He reports his grandmother has history of scleroderma.  He is being seen in clinic regarding possible PAD.      PAST MEDICAL HISTORY:  Past Medical History:   Diagnosis Date     Atrial fibrillation (H)      Back pains, lower      Cardiomyopathy      Cardiomyopathy      Cardiomyopathy (H)      Chest pain      Coronary artery disease      Depressive disorder      Dual ICD (implantable cardiac defibrillator) in place 4/29/2014     Heart disease      HTN (hypertension)      Hypertrophic nonobstructive cardiomyopathy (H) 9/12/2012     Inflammatory arthritis      Muscular dystrophy (H)      Pacemaker      Polysubstance abuse      Shortness of breath      Sleep apnea     doesn't use cpap     Stomach ulcer      Type 2 diabetes mellitus without complications (H)      Uncomplicated asthma      Unspecified asthma(493.90)        PAST SURGICAL HISTORY:  Past Surgical History:   Procedure Laterality Date     APPENDECTOMY  1980    [place] near [facility]     C CARDIAC SURG PROCEDURE UNLIST       C HAND/FINGER SURGERY UNLISTED       C SHOULDER SURG PROC UNLISTED       C STOMACH SURGERY PROCEDURE UNLISTED       DISCECTOMY LUMBAR POSTERIOR MICROSCOPIC THREE+ LEVELS   "12/16/2011    Procedure:DISCECTOMY LUMBAR POSTERIOR MICROSCOPIC THREE+ LEVELS; Posterior Decompression L2-S1; Surgeon:CAITIE OSMAN; Location:[facility] OR     FUSION CERVICAL POSTERIOR ONE LEVEL  8/24/2012    Procedure: FUSION CERVICAL POSTERIOR ONE LEVEL;  Posterior Instrumentated Spinal Fusion Cervical 6-7, Right Iliac Crest Bone Redfox ;  Surgeon: Caitie Osman MD;  Location[facility] OR     GRAFT BONE FROM ILIAC CREST  8/24/2012    Procedure: GRAFT BONE FROM ILIAC CREST;;  Surgeon: Caitie Osman MD;  [facility]     HC SACROPLASTY       IMPLANT PACEMAKER       INJECT STEROID (LOCATION) N/A 2/19/2015    Procedure: INJECT STEROID (LOCATION);  Surgeon: Siri Koch MD;  [facility]     INSERT STIMULATOR AND LEADS INTERNAL DORSAL COLUMN  8/7/2013    Procedure: INSERT STIMULATOR AND LEADS INTERNAL DORSAL COLUMN;  SPINAL CORD STIMULATOR IMPLANT ;  Surgeon: Ricardo Bruno MD;  Location: [facility] OR     INSERT STIMULATOR DORSAL COLUMN  08/13/2013    lead replacemend, 12?2016,  battery replacement 4/4/2016     KNEE SURGERY       LASER CO2 LARYNGOSCOPY N/A 2/19/2015    Procedure: LASER CO2 LARYNGOSCOPY;  Surgeon: Siri Koch MD;  [facility]     LASER CO2 LARYNGOSCOPY, COMPLEX  7/22/2014    Procedure: LASER CO2 LARYNGOSCOPY, COMPLEX;  Surgeon: Siri Koch MD;  [facility]     MYECTOMY SEPTAL  4/14/2014    Procedure: Median Sternotomy, Septal Myectomy, Intraoperative BRENT performed by Dr. Castano, on pump oxygenator.;  Surgeon: Aguila Cannon MD;  [facility]     NECK SURGERY       SHOULDER SURGERY  2006    RIGHT     STOMACH SURGERY  1980    partial gastrectomy for bleeding ulcers, \"stress related\". [facility]     WRIST SURGERY Left 1988    fractured. 1988 or so       FAMILY HISTORY:  Family History   Problem Relation Age of Onset     HEART DISEASE Father 32     MIs x2; s/p CABG     Substance Abuse Father      Hypertension Father  "     Obesity Father      Hyperlipidemia Father      Hypertension Brother      DIABETES Brother      Glaucoma Maternal Grandmother      Hypertension Maternal Grandmother      DIABETES Maternal Grandfather      Glaucoma Maternal Grandfather      Hypertension Maternal Grandfather      CEREBROVASCULAR DISEASE Maternal Grandfather      Obesity Maternal Grandfather      Hyperlipidemia Maternal Grandfather      Coronary Artery Disease Maternal Grandfather      Substance Abuse Other      CANCER Other      Hypertension Other      Obesity Other      Other Cancer Other      Hyperlipidemia Other      Coronary Artery Disease Other      Obesity Brother      Hyperlipidemia Brother      Macular Degeneration No family hx of        SOCIAL HISTORY:   Social History   Substance Use Topics     Smoking status: Light Tobacco Smoker     Packs/day: 1.00     Years: 28.00     Types: Cigarettes     Last attempt to quit: 4/7/2014     Smokeless tobacco: Never Used     Alcohol use 0.0 oz/week     0 Standard drinks or equivalent per week      Comment: 0-1 X Weekly       TOBACCO USE: current smoker        HOME MEDICATIONS:  Prior to Admission medications    Medication Sig Start Date End Date Taking? Authorizing Provider   lidocaine (XYLOCAINE) 5 % ointment Apply topically 3 times daily   Yes Reported, Patient   lactulose (CHRONULAC) 10 GM/15ML solution Take 30 g by mouth 2 times daily   Yes Reported, Patient   Sildenafil Citrate (REVATIO PO) Take 20 mg by mouth daily as needed   Yes Reported, Patient   Pregabalin (LYRICA PO) Take 75 mg by mouth daily   Yes Reported, Patient   bisacodyl (DULCOLAX) 10 MG Suppository Place 10 mg rectally 2 times daily   Yes Reported, Patient   ARIPIPRAZOLE PO Take 10 mg by mouth daily (with dinner)   Yes Reported, Patient   amitriptyline (ELAVIL) 25 MG tablet Take 25 mg by mouth   Yes Reported, Patient   senna-docusate (SENOKOT-S;PERICOLACE) 8.6-50 MG per tablet Take 1 tablet by mouth daily   Yes Reported, Patient    TRAZODONE HCL PO Take 100 mg by mouth At Bedtime   Yes Reported, Patient   warfarin (COUMADIN) 7.5 MG tablet Take 7.5 mg by mouth daily   Yes Reported, Patient   Atorvastatin Calcium (LIPITOR PO) Take 40 mg by mouth every evening   Yes Reported, Patient   QUEtiapine Fumarate (SEROQUEL PO) Take 25 mg by mouth 2 times daily   Yes Reported, Patient   albuterol (2.5 MG/3ML) 0.083% neb solution Take 1 vial by nebulization every 6 hours as needed for shortness of breath / dyspnea or wheezing   Yes Reported, Patient   albuterol (VENTOLIN HFA) 108 (90 BASE) MCG/ACT Inhaler Inhale 2 puffs into the lungs every 4 hours as needed for shortness of breath / dyspnea or wheezing   Yes Reported, Patient   Lubiprostone (AMITIZA PO) Take 24 mcg by mouth 2 times daily (with meals)   Yes Reported, Patient   warfarin (COUMADIN) 1 MG tablet Total 8.5 mg daily 12/12/16  Yes Momo Castellano MD   losartan (COZAAR) 25 MG tablet Take 1 tablet (25 mg) by mouth daily 12/12/16  Yes Momo Castellano MD   metFORMIN (GLUCOPHAGE) 500 MG tablet Take 1,000 mg by mouth 2 times daily (with meals) 850 mg BID 12/12/16  Yes Momo Castellano MD   glipiZIDE (GLUCOTROL) 5 MG tablet Take 1 tablet (5 mg) by mouth 2 times daily (before meals) 12/12/16  Yes Momo Castellano MD   venlafaxine (EFFEXOR-XR) 150 MG 24 hr capsule Take 150 mg by mouth 2 times daily  12/12/16  Yes Momo Castellano MD   QUEtiapine (SEROQUEL) 100 MG tablet 150 mg At Bedtime 150 mg every HS 12/12/16  Yes Momo Castellano MD   metoprolol (LOPRESSOR) 50 MG tablet Take 100 mg by mouth 3 times daily  2/17/15  Yes Connie Mijares MD   ferrous fumarate 65 mg, [place]. FE,-Vitamin C 125 mg (VITRON C)  MG TABS tablet One tab by mouth once daily in between meals  Patient not taking: Reported on 8/25/2017 8/8/17   Niru Chairez MD   polyethylene glycol (GOLYTELY/NULYTELY) 236 G suspension Take 2,000 mLs (2 L) by mouth once as needed  "Refer to \"Getting Ready for a Colonoscopy\" instruction handout  Patient not taking: Reported on 8/25/2017 7/26/17   Britney Mcfadden, APRN CNP   Emollient (EUCERIN INTENSIVE REPAIR EX) Externally apply topically 2 times daily as needed    Reported, Patient   magnesium oxide (MAG-OX) 400 (241.3 MG) MG tablet Take 1 tablet (400 mg) by mouth daily 12/12/16 March, Momo Fitzgerald MD       VITAL SIGNS:      BP: 128/83 Pulse: 87   Resp: 17 SpO2: 94 %        0 lbs 0 oz    PHYSICAL EXAM:  NEURO/PSYCH:  The patient is alert and oriented.  Appropriate.  Moves all extremities.   SKIN: warm and dry.  PULMONARY:  Non-labored breathing   EXTREMITIES: palpable bilateral PT and DP, pulses weaker on right foot, palpable bilateral femoral and popliteal pulses, feet cold bilaterally, moderate hair growth on bilateral legs    JANIS RESULTS:  Exam done 11/9/2017  Right 1.11, first digit 90  Left 1.15, first digit 0    ASSESSMENT:  Patient Active Problem List   Diagnosis     Herniated cervical intervertebral disc     Chondromalacia of patella     Pseudoarthrosis of cervical spine (H)     Hypertrophic nonobstructive cardiomyopathy (H)     Chest pain     Sinus tachycardia     s/p PSF C6-7 for anterior pseudarthrosis     Spinal stenosis, lumbar region, with neurogenic claudication     Lumbar radicular pain     Atrial fibrillation (H)     HOCM (hypertrophic obstructive cardiomyopathy) (H)     Syncope     S/P ventricular septal myectomy     Automatic implantable cardioverter-defibrillator in situ- [employer], dual chamber- DEPENDENT     Subglottic stenosis     Pre-operative cardiovascular examination     Postprocedural subglottic stenosis     Dysphonia     [facility]     Insomnia     Seizures (H)     Type 2 diabetes mellitus with diabetic neuropathy (H)         PLAN:  #1 Bilateral foot pain  - DM neuropathy verus vasculitis  More than likely DM neuropathy since his symptoms " are relieved with taking Lyrica.  Will refer patient to vascular medicine to evaluate patient for vasculitis as he has history of cardiomyopathy     - continue Lipitor and coumadin     - smoking cessation strongly encouraged    - no further vascular surgery follow up needed     Please do not hesitate to contact Dr. Calero's vascular surgery team with any questions.      ALBIN Red  Division of Vascular Surgery  [facility]  Pager [phone]            I have seen and assessed this patient with the above provider and agree with her above documentation, impression and plan.    I spent>50% of this 15 min visit in counseling.  In summary this patient does not have macrovascular occlusive disease. He does not have symptoms consistent with claudication. His most likely source for discomfort is neuropathy given that his symptoms are improved with Lyrica and he has long-standing diabetes. Dad said his absence of toe pressures on the left side are concerning. Given his family history of scleroderma, his personal history of arthritis in his knees and wrists, and his occasional history of intermittent rashes, I think workup for a vasculitis is appropriate. Given that he has a cardiomyopathy history I will refer him to Dr. Mcgovern of vascular medicine and cardiology. Follow up with me only p.r.n.      Again, thank you for allowing me to participate in the care of your patient.      Sincerely,    Irlanda Calero MD

## 2017-11-09 NOTE — NURSING NOTE
Chief Complaint   Patient presents with     Consult     Consult claudication        Vitals:    11/09/17 1321   BP: 128/83   BP Location: Right arm   Pulse: 87   Resp: 17   SpO2: 94%       There is no height or weight on file to calculate BMI.               Radhika Hercules LPN

## 2017-11-15 ASSESSMENT — ENCOUNTER SYMPTOMS
NERVOUS/ANXIOUS: 0
SPEECH CHANGE: 0
SEIZURES: 0
DIFFICULTY URINATING: 0
TINGLING: 1
PANIC: 0
LIGHT-HEADEDNESS: 0
HYPOTENSION: 0
SLEEP DISTURBANCES DUE TO BREATHING: 0
MEMORY LOSS: 1
LOSS OF CONSCIOUSNESS: 0
INSOMNIA: 1
FLANK PAIN: 0
HEADACHES: 0
PARALYSIS: 0
NUMBNESS: 1
HEMATURIA: 0
DEPRESSION: 1
DIZZINESS: 1
PALPITATIONS: 0
DECREASED CONCENTRATION: 0
LEG PAIN: 1
SYNCOPE: 0
WEAKNESS: 0
HYPERTENSION: 1
DISTURBANCES IN COORDINATION: 0
ORTHOPNEA: 1
EXERCISE INTOLERANCE: 1
DYSURIA: 0

## 2017-11-18 ENCOUNTER — COMMUNICATION - HEALTHEAST (OUTPATIENT)
Dept: NEUROLOGY | Facility: CLINIC | Age: 47
End: 2017-11-18

## 2017-11-18 DIAGNOSIS — G47.00 INSOMNIA: ICD-10-CM

## 2017-11-24 ENCOUNTER — PRE VISIT (OUTPATIENT)
Dept: CARDIOLOGY | Facility: CLINIC | Age: 47
End: 2017-11-24

## 2017-11-24 NOTE — TELEPHONE ENCOUNTER
JANIS no exercise : 11/9/17  Right leg: Resting JANIS: Normal (No observed evidence of increased cardiovascular risk or peripheral arterial disease). Diminished waveform in the first digit suggestive of small vessel disease.  Toe pressures of 90 mmHg.     Left leg: Resting JANIS: Normal (No observed evidence of increased cardiovascular risk or peripheral arterial disease). No flow identified in the left first digit compatible with severe small vessel disease in the foot.

## 2017-11-28 ENCOUNTER — OFFICE VISIT (OUTPATIENT)
Dept: CARDIOLOGY | Facility: CLINIC | Age: 47
End: 2017-11-28
Attending: INTERNAL MEDICINE
Payer: MEDICAID

## 2017-11-28 VITALS
BODY MASS INDEX: 37.49 KG/M2 | HEIGHT: 71 IN | DIASTOLIC BLOOD PRESSURE: 83 MMHG | OXYGEN SATURATION: 97 % | WEIGHT: 267.8 LBS | HEART RATE: 74 BPM | SYSTOLIC BLOOD PRESSURE: 126 MMHG

## 2017-11-28 DIAGNOSIS — I77.6 VASCULITIS (H): ICD-10-CM

## 2017-11-28 DIAGNOSIS — I70.229 CRITICAL LOWER LIMB ISCHEMIA (H): Primary | ICD-10-CM

## 2017-11-28 PROCEDURE — 99214 OFFICE O/P EST MOD 30 MIN: CPT | Mod: ZF

## 2017-11-28 PROCEDURE — 99205 OFFICE O/P NEW HI 60 MIN: CPT | Mod: ZP | Performed by: INTERNAL MEDICINE

## 2017-11-28 RX ORDER — SILDENAFIL CITRATE 20 MG/1
10 TABLET ORAL 3 TIMES DAILY
Qty: 90 TABLET | Refills: 3 | OUTPATIENT
Start: 2017-11-28 | End: 2017-11-28

## 2017-11-28 RX ORDER — SILDENAFIL CITRATE 20 MG/1
10 TABLET ORAL 3 TIMES DAILY
Qty: 90 TABLET | Refills: 3 | Status: SHIPPED | OUTPATIENT
Start: 2017-11-28 | End: 2020-05-15

## 2017-11-28 ASSESSMENT — PAIN SCALES - GENERAL: PAINLEVEL: NO PAIN (0)

## 2017-11-28 NOTE — LETTER
11/28/2017      RE: Konstantin Sharp  [address] [place] [address]       Dear Colleague,    Thank you for the opportunity to participate in the care of your patient, Konstantin Sharp, at the [facility] at [facility]. Please see a copy of my visit note below.    HPI:     This is a 47-year-old female with past medical history atrial fibrillation coronary artery disease type 2 diabetes on insulin hypertension hypertrophic cardiomyopathy with ICD implantation who presents to vascular clinic for chronic critical limb ischemia.     Patient has a long-standing smoking history of a half pack per day for 20 years.  He also has long-standing diabetes which is poorly controlled which is complicated by significant neuropathy.  He has had left foot pain and severe toe pain at rest worse with exertion that is extremely life limiting.  He has complete cold intolerance of his toe and significant discoloration.  He has significant skin breakdown of the left foot but no gangrene.  Patient has not been on any aspirin therapy.  He was seen by vascular surgery who was concerned for possible small vessel vasculitis and could not offer any interventional therapy given low suspicion for macrovascular disease.  However he has been on sildenafil with no relief.  He has a family history of scleroderma and some osteoarthritis in his knees and with occasional history of rash.  He has been on chronic warfarin for A. Fib.     Has a family history for early coronary disease.    PAST MEDICAL HISTORY  Past Medical History:   Diagnosis Date     Atrial fibrillation (H)      Back pains, lower      Cardiomyopathy      Cardiomyopathy      Cardiomyopathy (H)      Chest pain      Coronary artery disease      Depressive disorder      Dual ICD (implantable cardiac defibrillator) in place 4/29/2014     Heart disease      HTN (hypertension)      Hypertrophic nonobstructive cardiomyopathy (H) 9/12/2012      Inflammatory arthritis      Muscular dystrophy (H)      Pacemaker      Polysubstance abuse      Shortness of breath      Sleep apnea     doesn't use cpap     Stomach ulcer      Type 2 diabetes mellitus without complications (H)      Uncomplicated asthma      Unspecified asthma(493.90)        CURRENT MEDICATIONS  Current Outpatient Prescriptions   Medication Sig Dispense Refill     lidocaine (XYLOCAINE) 5 % ointment Apply topically 3 times daily       lactulose (CHRONULAC) 10 GM/15ML solution Take 30 g by mouth 2 times daily       Sildenafil Citrate (REVATIO PO) Take 20 mg by mouth daily as needed       Pregabalin (LYRICA PO) Take 75 mg by mouth daily       bisacodyl (DULCOLAX) 10 MG Suppository Place 10 mg rectally 2 times daily       ARIPIPRAZOLE PO Take 10 mg by mouth daily (with dinner)       amitriptyline (ELAVIL) 25 MG tablet Take 25 mg by mouth       senna-docusate (SENOKOT-S;PERICOLACE) 8.6-50 MG per tablet Take 1 tablet by mouth daily       TRAZODONE HCL PO Take 100 mg by mouth At Bedtime       Atorvastatin Calcium (LIPITOR PO) Take 40 mg by mouth every evening       QUEtiapine Fumarate (SEROQUEL PO) Take 25 mg by mouth 2 times daily       albuterol (2.5 MG/3ML) 0.083% neb solution Take 1 vial by nebulization every 6 hours as needed for shortness of breath / dyspnea or wheezing       albuterol (VENTOLIN HFA) 108 (90 BASE) MCG/ACT Inhaler Inhale 2 puffs into the lungs every 4 hours as needed for shortness of breath / dyspnea or wheezing       Emollient (EUCERIN INTENSIVE REPAIR EX) Externally apply topically 2 times daily as needed       Lubiprostone (AMITIZA PO) Take 24 mcg by mouth 2 times daily (with meals)       warfarin (COUMADIN) 1 MG tablet Total 8.5 mg daily 30 tablet      losartan (COZAAR) 25 MG tablet Take 1 tablet (25 mg) by mouth daily 30 tablet      metFORMIN (GLUCOPHAGE) 500 MG tablet Take 1,000 mg by mouth 2 times daily (with meals) 850 mg BID 60 tablet      glipiZIDE (GLUCOTROL) 5 MG tablet  "Take 1 tablet (5 mg) by mouth 2 times daily (before meals) 30 tablet      venlafaxine (EFFEXOR-XR) 150 MG 24 hr capsule Take 150 mg by mouth 2 times daily  30 capsule 1     QUEtiapine (SEROQUEL) 100 MG tablet 150 mg At Bedtime 150 mg every HS 30 tablet 1     metoprolol (LOPRESSOR) 50 MG tablet Take 100 mg by mouth 3 times daily  180 tablet 4     ferrous fumarate 65 mg, [place]. FE,-Vitamin C 125 mg (VITRON C)  MG TABS tablet One tab by mouth once daily in between meals (Patient not taking: Reported on 8/25/2017) 30 tablet 2     polyethylene glycol (GOLYTELY/NULYTELY) 236 G suspension Take 2,000 mLs (2 L) by mouth once as needed Refer to \"Getting Ready for a Colonoscopy\" instruction handout (Patient not taking: Reported on 8/25/2017) 4000 mL 0     warfarin (COUMADIN) 7.5 MG tablet Take 7.5 mg by mouth daily       magnesium oxide (MAG-OX) 400 (241.3 MG) MG tablet Take 1 tablet (400 mg) by mouth daily 60 tablet 3       PAST SURGICAL HISTORY:  Past Surgical History:   Procedure Laterality Date     APPENDECTOMY  1980    [facility]? near [facility]     C CARDIAC SURG PROCEDURE UNLIST       C HAND/FINGER SURGERY UNLISTED       C SHOULDER SURG PROC UNLISTED       C STOMACH SURGERY PROCEDURE UNLISTED       DISCECTOMY LUMBAR POSTERIOR MICROSCOPIC THREE+ LEVELS  12/16/2011    Procedure:DISCECTOMY LUMBAR POSTERIOR MICROSCOPIC THREE+ LEVELS; Posterior Decompression L2-S1; Surgeon:CAITIE FUNEZ; Location:[facility] OR     FUSION CERVICAL POSTERIOR ONE LEVEL  8/24/2012    Procedure: FUSION CERVICAL POSTERIOR ONE LEVEL;  Posterior Instrumentated Spinal Fusion Cervical 6-7, Right Iliac Crest Bone [place] ;  Surgeon: Caitie Funez MD;  [facility] OR     GRAFT BONE FROM ILIAC CREST  8/24/2012    Procedure: GRAFT BONE FROM ILIAC CREST;;  Surgeon: Caitie Funez MD;  Location[facility] OR     HC SACROPLASTY       IMPLANT PACEMAKER       INJECT STEROID (LOCATION) N/A 2/19/2015    Procedure: INJECT STEROID " "(LOCATION);  Surgeon: Siri Koch MD;  [facility]     INSERT STIMULATOR AND LEADS INTERNAL DORSAL COLUMN  8/7/2013    Procedure: INSERT STIMULATOR AND LEADS INTERNAL DORSAL COLUMN;  SPINAL CORD STIMULATOR IMPLANT ;  Surgeon: Ricardo Bruno MD;  Location: [facility] OR     INSERT STIMULATOR DORSAL COLUMN  08/13/2013    lead replacemend, 12?2016,  battery replacement 4/4/2016     KNEE SURGERY       LASER CO2 LARYNGOSCOPY N/A 2/19/2015    Procedure: LASER CO2 LARYNGOSCOPY;  Surgeon: Siri Koch MD;  [facility]     LASER CO2 LARYNGOSCOPY, COMPLEX  7/22/2014    Procedure: LASER CO2 LARYNGOSCOPY, COMPLEX;  Surgeon: Siri Koch MD;  [facility]     MYECTOMY SEPTAL  4/14/2014    Procedure: Median Sternotomy, Septal Myectomy, Intraoperative BRENT performed by Dr. Castano, on pump oxygenator.;  Surgeon: Aguila Cannon MD;  [facility]     NECK SURGERY       SHOULDER SURGERY  2006    RIGHT     STOMACH SURGERY  1980    partial gastrectomy for bleeding ulcers, \"stress related\". [facility]     WRIST SURGERY Left 1988    fractured. 1988 or so       ALLERGIES     Allergies   Allergen Reactions     Bee Venom Swelling     Lisinopril      TABS  Severe cough     Penicillins Hives and Difficulty breathing       FAMILY HISTORY  Family History   Problem Relation Age of Onset     HEART DISEASE Father 32     MIs x2; s/p CABG     Substance Abuse Father      Hypertension Father      Obesity Father      Hyperlipidemia Father      Hypertension Brother      DIABETES Brother      Glaucoma Maternal Grandmother      Hypertension Maternal Grandmother      DIABETES Maternal Grandfather      Glaucoma Maternal Grandfather      Hypertension Maternal Grandfather      CEREBROVASCULAR DISEASE Maternal Grandfather      Obesity Maternal Grandfather      Hyperlipidemia Maternal Grandfather      Coronary Artery Disease Maternal Grandfather      Substance Abuse Other      CANCER Other      " "Hypertension Other      Obesity Other      Other Cancer Other      Hyperlipidemia Other      Coronary Artery Disease Other      Obesity Brother      Hyperlipidemia Brother      Macular Degeneration No family hx of        VASCULAR FAMILY HISTORY  1st order relative with atherosclerotic PAD: no  1st order relative with AAA: no    SOCIAL HISTORY  Social History     Social History     Marital status: Single     Spouse name: N/A     Number of children: N/A     Years of education: N/A     Occupational History     Not on file.     Social History Main Topics     Smoking status: Light Tobacco Smoker     Packs/day: 1.00     Years: 28.00     Types: Cigarettes     Last attempt to quit: 4/7/2014     Smokeless tobacco: Never Used     Alcohol use 0.0 oz/week     0 Standard drinks or equivalent per week      Comment: 0-1 X Weekly     Drug use: No     Sexual activity: Not Currently     Partners: Female     Other Topics Concern     Parent/Sibling W/ Cabg, Mi Or Angioplasty Before 65f 55m? Yes     Social History Narrative       ROS:   Constitutional: No fever, chills, or sweats. No weight gain/loss   ENT: No visual disturbance, ear ache, epistaxis, sore throat  Allergies/Immunologic: Negative  Respiratory: No cough, hemoptysia  Cardiovascular: As per HPI  GI: No nausea, vomiting, hematemesis, melena, or hematochezia  : No urinary frequency, dysuria, or hematuria  Integument: Negative  Psychiatric: Negative  Neuro: Negative  Endocrinology: Negative   Musculoskeletal: Negative  Vascular: No walking impairment, claudication, ischemic rest pain or nonhealing wounds    EXAM:  /83 (BP Location: Left arm, Patient Position: Chair, Cuff Size: Adult Large)  Pulse 74  Ht 1.803 m (5' 11\")  Wt 121.5 kg (267 lb 12.8 oz)  SpO2 97%  BMI 37.35 kg/m2  In general, the patient is a pleasant male in no apparent distress.    HEENT: NC/AT.  PERRLA.  EOMI.  Sclerae white, not injected.  Nares clear.  Pharynx without erythema or exudate.  " Dentition intact.    Neck: No adenopathy.  No thyromegaly. Carotids +2/2 bilaterally without bruits.  No jugular venous distension.   Heart: RRR.  Holosystolic murmur right upper sternal border. the PMI is in the 5th ICS in the midclavicular line. There is no heave.    Lungs: CTA.  No ronchi, wheezes, rales.  No dullness to percussion.   Abdomen: Soft, nontender, nondistended. No organomegaly. No AAA.  No bruits.   Extremities: No clubbing, cyanosis, or edema.  No wounds. No varicose veins signs of chronic venous insufficiency.   Vascular: DP and PT pulses are intact.  Normal popliteal and femoral pulses normal radial pulses.  Skin: No skin tightening present    Labs:  LIPID RESULTS:  Lab Results   Component Value Date    CHOL 205 (A) 01/29/2014    HDL 27 (A) 01/29/2014     (A) 01/29/2014    TRIG 242 (A) 01/29/2014    CHOLHDLRATIO 4.0 09/30/2013       LIVER ENZYME RESULTS:  Lab Results   Component Value Date    AST 23 01/17/2015    ALT 30 01/17/2015       CBC RESULTS:  Lab Results   Component Value Date    WBC 13.2 (H) 02/17/2015    RBC 5.26 02/17/2015    HGB 16.6 02/17/2015    HCT 48.1 02/17/2015    MCV 91 02/17/2015    MCH 31.6 02/17/2015    MCHC 34.5 02/17/2015    RDW 13.4 02/17/2015     02/17/2015       BMP RESULTS:  Lab Results   Component Value Date     03/17/2015    POTASSIUM 4.0 03/17/2015    CHLORIDE 110 (H) 03/17/2015    CO2 25 03/17/2015    ANIONGAP 7 03/17/2015     (H) 03/17/2015    BUN 14 03/17/2015    CR 0.96 03/17/2015    GFRESTIMATED 84 03/17/2015    GFRESTBLACK >90   GFR Calc   03/17/2015    TANIA 8.8 03/17/2015        A1C RESULTS:  Lab Results   Component Value Date    A1C 6.3 (A) 03/27/2017       Procedures:    JANIS 11/9/2017    Impression:      Right leg: Resting JANIS: Normal (No observed evidence of increased  cardiovascular risk or peripheral arterial disease). Diminished  waveform in the first digit suggestive of small vessel disease.  Toe  pressures of  90 mmHg.     Left leg: Resting JANIS: Normal (No observed evidence of increased  cardiovascular risk or peripheral arterial disease). No flow  identified in the left first digit compatible with severe small vessel  disease in the foot.    Assessment and Plan:   This is a 47-year-old male with chronic critical limb ischemia of his left first digit with normal ankle-brachial indices bilaterally.  It is unlikely that this is a diffuse small vessel vasculitis as this is an isolated small vessel disease with no involvement of any other blood vessels in the hands or feet.  I also did not  on any systemic findings to suggest scleroderma in this patient or other severe rheumatologic disease like lupus or rheumatoid arthritis that would cause associated vasculitis.  Another consideration is thromboangiitis obliterans given his very significant smoking history, or embolic event in the past. He is also a long-standing diabetic and could have small vessel disease from that alone.  I would like to do a diagnostic angiogram of the foot which can help evaluate for etiology.  Unfortunately there is little intervention that is likely possible if this is in fact a distal small vessel disease of the first digit.  He has been on sildenafil and also warfarin which has not provided any therapeutic relief however he was underdosed on sildenafil and has some room to go up.  I discussed increasing sildenafil with 1 of my heart failure colleagues and it is possible to go up to 10 mg 3 times daily with future escalations as needed.  I did discuss after diagnostic angiogram that for pain relief we could consider a distal toe amputation if there is no improvement with sildenafil as this is extremely life limiting and excruciating for this patient.     Recommendations:   #1: Smoking cessation discussed in detail with patient especially if this is thromboangiitis.   #2: Continue warfarin.  Will increase sildenafil to 10 mg 3 times daily with  possible up titration in the future to 20 mg.  #3:  If no bleeding risk patient would probably benefit from baby aspirin as well.  #4:  Would like to obtain a selective angiography of the left foot.  This can be done under IR.  If classic corkscrew vessels then we establish a diagnosis.  #5: Continue aggressive blood pressure and diabetic control.  Heart failure management per my cardiology colleague.  #6: Based on diagnostic foot angiogram will then obtain vasculitis workup if needed.  Although my current suspicion is low.    Total time spent 60 minutes, of which >50% was spent in face-to-face patient evaluation, reviewing data with patient, and coordination of care.     Kenia Mcgovern MD MSc  Division of Cardiology and Vascular Medicine    [facility]    -smoking cessation encouraged

## 2017-11-28 NOTE — NURSING NOTE
Chief Complaint   Patient presents with     New Patient     48 y/o male for initial evaluation of vasculitis     Vitals were taken and medications were reconciled.  Rich Stoner, JOSH  5:59 PM

## 2017-11-28 NOTE — MR AVS SNAPSHOT
After Visit Summary   11/28/2017    Konstantin Sharp    MRN: [id number]           Patient Information     Date Of Birth          1970        Visit Information        Provider Department      11/28/2017 6:00 PM Kenia Mcgovern MD [facility]        Today's Diagnoses     Critical lower limb ischemia    -  1    Vasculitis (H)          Care Instructions    You were seen today in the Cardiovascular Clinic at the [facility].      Cardiology Providers you saw during your visit:  Dr. Mcgovern    Diagnosis:  Critical limb ischemia    Results:  None today    Recommendations:   Increase sildenafil to 10 mg three times per day     Use GoodRx.com to get coupon.  Let Joel know where you would like it sent.     Lower extremity angiogram with Dr. Calero.      Follow-up:  6 months with Dr. Mcgovern      For emergencies call 911.    For any scheduling needs, please call [phone]. Option 1 then option 3    Thank you for your visit today!     Please call if you have any questions or concerns.  Joel Saba RN              Follow-ups after your visit        Follow-up notes from your care team     See patient instructions section of the AVS Return in about 6 months (around 5/28/2018) for Dr. Mcgovern, Critical limb ischemia.      Your next 10 appointments already scheduled     Dec 01, 2017  3:00 PM CST   (Arrive by 2:45 PM)   Return Visit with SHAHEED Mccormick [facility] ([facility])    [address] [place] [address]  4th [facility] [id number] [phone]            Apr 16, 2018  3:00 PM CDT   (Arrive by 2:45 PM)   Implanted Defibulator with Uc Cv Device 1   [facility])    [address] [place] [id number] [phone]            May 29, 2018  5:00 PM CDT   (Arrive by 4:45 PM)   Return Vascular Visit with Kenia Mcgovern MD   [facility] ([facility]  "[facility])    [address]  3rd Floor  [facility] [id number] [phone]              Who to contact     If you have questions or need follow up information about today's clinic visit or your schedule please contact [facility] directly at [phone].  Normal or non-critical lab and imaging results will be communicated to you by MyChart, letter or phone within 4 business days after the clinic has received the results. If you do not hear from us within 7 days, please contact the clinic through [facility] or phone. If you have a critical or abnormal lab result, we will notify you by phone as soon as possible.  Submit refill requests through [employer] or call your pharmacy and they will forward the refill request to us. Please allow 3 business days for your refill to be completed.          Additional Information About Your Visit        MyChart Information     [employer] gives you secure access to your electronic health record. If you see a primary care provider, you can also send messages to your care team and make appointments. If you have questions, please call your primary care clinic.  If you do not have a primary care provider, please call [phone] and they will assist you.        Care EveryWhere ID     This is your Care EveryWhere ID. This could be used by other organizations to access your [place] medical records  [id number]        Your Vitals Were     Pulse Height Pulse Oximetry BMI (Body Mass Index)          74 1.803 m (5' 11\") 97% 37.35 kg/m2         Blood Pressure from Last 3 Encounters:   11/28/17 126/83   11/09/17 128/83   07/26/17 121/82    Weight from Last 3 Encounters:   11/28/17 121.5 kg (267 lb 12.8 oz)   08/25/17 121.2 kg (267 lb 1.6 oz)   07/26/17 121.6 kg (268 lb)              Today, you had the following     No orders found for display         Today's Medication Changes          These changes are accurate as of: 11/28/17  6:58 PM.  If you have any " questions, ask your nurse or doctor.               Start taking these medicines.        Dose/Directions    sildenafil 20 MG tablet   Commonly known as:  REVATIO   Used for:  Critical lower limb ischemia        Dose:  10 mg   Take 0.5 tablets (10 mg) by mouth 3 times daily   Quantity:  90 tablet   Refills:  3            Where to get your medicines      Call your pharmacy to confirm that your medication is ready for pickup. It may take up to 24 hours for them to receive the prescription. If the prescription is not ready within 3 business days, please contact your clinic or your provider.     We will let you know when these medications are ready. If you don't hear back within 3 business days, please contact us.     sildenafil 20 MG tablet                Primary Care Provider Office Phone # Fax #    Jesus Boyle [phone]       [facility] [address] [employer] [address]        Equal Access to Services     [facility] Center[facility] : Yayo gomez Sodaniela, waaxda luqadaha, qaybta kaalmada annabel, gustavo perry . So [facility] [phone].    ATENCIÓN: Si habla español, tiene a dover disposición servicios gratuitos de asistencia lingüística. Kemar al [phone].    We comply with applicable federal civil rights laws and [place] laws. We do not discriminate on the basis of race, color, national origin, age, disability, sex, sexual orientation, or gender identity.            Thank you!     Thank you for choosing [facility]  for your care. Our goal is always to provide you with excellent care. Hearing back from our patients is one way we can continue to improve our services. Please take a few minutes to complete the written survey that you may receive in the mail after your visit with us. Thank you!             Your Updated Medication List - Protect others around you: Learn how to safely use, store and throw away your medicines at www.disposemymeds.org.           This list is accurate as of: 11/28/17  6:58 PM.  Always use your most recent med list.                   Brand Name Dispense Instructions for use Diagnosis    * albuterol (2.5 MG/3ML) 0.083% neb solution      Take 1 vial by nebulization every 6 hours as needed for shortness of breath / dyspnea or wheezing        * VENTOLIN  (90 BASE) MCG/ACT Inhaler   Generic drug:  albuterol      Inhale 2 puffs into the lungs every 4 hours as needed for shortness of breath / dyspnea or wheezing        AMITIZA PO      Take 24 mcg by mouth 2 times daily (with meals)        amitriptyline 25 MG tablet    ELAVIL     Take 25 mg by mouth        ARIPIPRAZOLE PO      Take 10 mg by mouth daily (with dinner)        bisacodyl 10 MG Suppository    DULCOLAX     Place 10 mg rectally 2 times daily        EUCERIN INTENSIVE REPAIR EX      Externally apply topically 2 times daily as needed        ferrous fumarate 65 mg ([place]. FE)-Vitamin C 125 mg  MG Tabs tablet    VITRON C    30 tablet    One tab by mouth once daily in between meals        glipiZIDE 5 MG tablet    GLUCOTROL    30 tablet    Take 1 tablet (5 mg) by mouth 2 times daily (before meals)    Diabetes mellitus (H)       lactulose 10 GM/15ML solution    CHRONULAC     Take 30 g by mouth 2 times daily        lidocaine 5 % ointment    XYLOCAINE     Apply topically 3 times daily        LIPITOR PO      Take 40 mg by mouth every evening        losartan 25 MG tablet    COZAAR    30 tablet    Take 1 tablet (25 mg) by mouth daily    Hypertrophic cardiomyopathy (H)       LYRICA PO      Take 75 mg by mouth daily        magnesium oxide 400 (241.3 MG) MG tablet    MAG-OX    60 tablet    Take 1 tablet (400 mg) by mouth daily    Hypertrophic cardiomyopathy (H)       metFORMIN 500 MG tablet    GLUCOPHAGE    60 tablet    Take 1,000 mg by mouth 2 times daily (with meals) 850 mg BID    Diabetes mellitus (H)       metoprolol 50 MG tablet    LOPRESSOR    180 tablet    Take 100 mg by mouth 3  "times daily    S/P ventricular septal myectomy, Congestive heart failure, unspecified, Hypertrophic cardiomyopathy (H)       polyethylene glycol 236 G suspension    GoLYTELY/NuLYTELY    4000 mL    Take 2,000 mLs (2 L) by mouth once as needed Refer to \"Getting Ready for a Colonoscopy\" instruction handout    Other constipation       * SEROQUEL PO      Take 25 mg by mouth 2 times daily        * QUEtiapine 100 MG tablet    SEROquel    30 tablet    150 mg At Bedtime 150 mg every HS    Insomnia       senna-docusate 8.6-50 MG per tablet    SENOKOT-S;PERICOLACE     Take 1 tablet by mouth daily        sildenafil 20 MG tablet    REVATIO    90 tablet    Take 0.5 tablets (10 mg) by mouth 3 times daily    Critical lower limb ischemia       TRAZODONE HCL PO      Take 100 mg by mouth At Bedtime        venlafaxine 150 MG 24 hr capsule    EFFEXOR-XR    30 capsule    Take 150 mg by mouth 2 times daily    Adjustment disorder with depressed mood       * warfarin 7.5 MG tablet    COUMADIN     Take 7.5 mg by mouth daily        * warfarin 1 MG tablet    COUMADIN    30 tablet    Total 8.5 mg daily    Atrial fibrillation (H)       * Notice:  This list has 6 medication(s) that are the same as other medications prescribed for you. Read the directions carefully, and ask your doctor or other care provider to review them with you.      "

## 2017-11-29 ENCOUNTER — TELEPHONE (OUTPATIENT)
Dept: CARDIOLOGY | Facility: CLINIC | Age: 47
End: 2017-11-29

## 2017-11-29 NOTE — PATIENT INSTRUCTIONS
You were seen today in the Cardiovascular Clinic at the [facility].      Cardiology Providers you saw during your visit:  Dr. Mcgovern    Diagnosis:  Critical limb ischemia    Results:  None today    Recommendations:   Increase sildenafil to 10 mg three times per day     Use [email or web address] to get coupon.  Let Joel know where you would like it sent.     Lower extremity angiogram with Dr. Calero.      Follow-up:  6 months with Dr. Mcgovern      For emergencies call [phone].    For any scheduling needs, please call [phone]. Option 1 then option 3    Thank you for your visit today!     Please call if you have any questions or concerns.  Joel Saba RN

## 2017-11-29 NOTE — TELEPHONE ENCOUNTER
PA Initiation    Medication: Sidlenafil 20 MG PA PENDING  Insurance Company: [place] Medicaid ([facility] - Phone [phone] Fax [phone]  Pharmacy Filling the Rx: [id number] - [place], [place] - [address] [place] [address] E  Filling Pharmacy Phone: [phone]  Filling Pharmacy Fax: [phone]  Start Date: 11/29/2017    Manually faxed PA form to [place] Medicaid (fax: [phone]) along with clinical notes from 11/28/2017)

## 2017-11-29 NOTE — NURSING NOTE
Med Reconcile: Reviewed and verified all current medications with the patient. The updated medication list was printed and given to the patient.  Referral to Vascular Surgery referral for single leg angiogram.   Return Appointment: 6 months with Dr. Mcgovern. Patient given instructions regarding scheduling next clinic visit. Patient demonstrated understanding of this information and agreed to call with further questions or concerns.  Medication Change: Revatio 10 mg po tid.  Patient was educated regarding prescribed medication change, including discussion of the indication, administration, side effects, and when to report to MD or RN. Patient demonstrated understanding of this information and agreed to call with further questions or concerns.  Patient stated he understood all health information given and agreed to call with further questions or concerns.

## 2017-11-29 NOTE — PROGRESS NOTES
HPI:     This is a 47-year-old female with past medical history atrial fibrillation coronary artery disease type 2 diabetes on insulin hypertension hypertrophic cardiomyopathy with ICD implantation who presents to vascular clinic for chronic critical limb ischemia.     Patient has a long-standing smoking history of a half pack per day for 20 years.  He also has long-standing diabetes which is poorly controlled which is complicated by significant neuropathy.  He has had left foot pain and severe toe pain at rest worse with exertion that is extremely life limiting.  He has complete cold intolerance of his toe and significant discoloration.  He has significant skin breakdown of the left foot but no gangrene.  Patient has not been on any aspirin therapy.  He was seen by vascular surgery who was concerned for possible small vessel vasculitis and could not offer any interventional therapy given low suspicion for macrovascular disease.  However he has been on sildenafil with no relief.  He has a family history of scleroderma and some osteoarthritis in his knees and with occasional history of rash.  He has been on chronic warfarin for A. Fib.     Has a family history for early coronary disease.    PAST MEDICAL HISTORY  Past Medical History:   Diagnosis Date     Atrial fibrillation (H)      Back pains, lower      Cardiomyopathy      Cardiomyopathy      Cardiomyopathy (H)      Chest pain      Coronary artery disease      Depressive disorder      Dual ICD (implantable cardiac defibrillator) in place 4/29/2014     Heart disease      HTN (hypertension)      Hypertrophic nonobstructive cardiomyopathy (H) 9/12/2012     Inflammatory arthritis      Muscular dystrophy (H)      Pacemaker      Polysubstance abuse      Shortness of breath      Sleep apnea     doesn't use cpap     Stomach ulcer      Type 2 diabetes mellitus without complications (H)      Uncomplicated asthma      Unspecified asthma(493.90)        CURRENT  MEDICATIONS  Current Outpatient Prescriptions   Medication Sig Dispense Refill     lidocaine (XYLOCAINE) 5 % ointment Apply topically 3 times daily       lactulose (CHRONULAC) 10 GM/15ML solution Take 30 g by mouth 2 times daily       Sildenafil Citrate (REVATIO PO) Take 20 mg by mouth daily as needed       Pregabalin (LYRICA PO) Take 75 mg by mouth daily       bisacodyl (DULCOLAX) 10 MG Suppository Place 10 mg rectally 2 times daily       ARIPIPRAZOLE PO Take 10 mg by mouth daily (with dinner)       amitriptyline (ELAVIL) 25 MG tablet Take 25 mg by mouth       senna-docusate (SENOKOT-S;PERICOLACE) 8.6-50 MG per tablet Take 1 tablet by mouth daily       TRAZODONE HCL PO Take 100 mg by mouth At Bedtime       Atorvastatin Calcium (LIPITOR PO) Take 40 mg by mouth every evening       QUEtiapine Fumarate (SEROQUEL PO) Take 25 mg by mouth 2 times daily       albuterol (2.5 MG/3ML) 0.083% neb solution Take 1 vial by nebulization every 6 hours as needed for shortness of breath / dyspnea or wheezing       albuterol (VENTOLIN HFA) 108 (90 BASE) MCG/ACT Inhaler Inhale 2 puffs into the lungs every 4 hours as needed for shortness of breath / dyspnea or wheezing       Emollient (EUCERIN INTENSIVE REPAIR EX) Externally apply topically 2 times daily as needed       Lubiprostone (AMITIZA PO) Take 24 mcg by mouth 2 times daily (with meals)       warfarin (COUMADIN) 1 MG tablet Total 8.5 mg daily 30 tablet      losartan (COZAAR) 25 MG tablet Take 1 tablet (25 mg) by mouth daily 30 tablet      metFORMIN (GLUCOPHAGE) 500 MG tablet Take 1,000 mg by mouth 2 times daily (with meals) 850 mg BID 60 tablet      glipiZIDE (GLUCOTROL) 5 MG tablet Take 1 tablet (5 mg) by mouth 2 times daily (before meals) 30 tablet      venlafaxine (EFFEXOR-XR) 150 MG 24 hr capsule Take 150 mg by mouth 2 times daily  30 capsule 1     QUEtiapine (SEROQUEL) 100 MG tablet 150 mg At Bedtime 150 mg every HS 30 tablet 1     metoprolol (LOPRESSOR) 50 MG tablet Take 100  "mg by mouth 3 times daily  180 tablet 4     ferrous fumarate 65 mg, [place]. FE,-Vitamin C 125 mg (VITRON C)  MG TABS tablet One tab by mouth once daily in between meals (Patient not taking: Reported on 8/25/2017) 30 tablet 2     polyethylene glycol (GOLYTELY/NULYTELY) 236 G suspension Take 2,000 mLs (2 L) by mouth once as needed Refer to \"Getting Ready for a Colonoscopy\" instruction handout (Patient not taking: Reported on 8/25/2017) 4000 mL 0     warfarin (COUMADIN) 7.5 MG tablet Take 7.5 mg by mouth daily       magnesium oxide (MAG-OX) 400 (241.3 MG) MG tablet Take 1 tablet (400 mg) by mouth daily 60 tablet 3       PAST SURGICAL HISTORY:  Past Surgical History:   Procedure Laterality Date     APPENDECTOMY  [facility]    [facility] CARDIAC SURG PROCEDURE UNLIST       C HAND/FINGER SURGERY UNLISTED       C SHOULDER SURG PROC UNLISTED       C STOMACH SURGERY PROCEDURE UNLISTED       DISCECTOMY LUMBAR POSTERIOR MICROSCOPIC THREE+ LEVELS  12/16/2011    Procedure:DISCECTOMY LUMBAR POSTERIOR MICROSCOPIC THREE+ LEVELS; Posterior Decompression L2-S1; Surgeon:CAITIE FUNEZ; Location:[facility] OR     FUSION CERVICAL POSTERIOR ONE LEVEL  8/24/2012    Procedure: FUSION CERVICAL POSTERIOR ONE LEVEL;  Posterior Instrumentated Spinal Fusion Cervical 6-7, Right Iliac Crest Bone [place] ;  Surgeon: Caitie Funez MD;  Location: [facility] OR     GRAFT BONE FROM ILIAC CREST  8/24/2012    Procedure: GRAFT BONE FROM ILIAC CREST;;  Surgeon: Caitie Funez MD;  Location[facility] OR     HC SACROPLASTY       IMPLANT PACEMAKER       INJECT STEROID (LOCATION) N/A 2/19/2015    Procedure: INJECT STEROID (LOCATION);  Surgeon: Siri Koch MD;  [facility]     INSERT STIMULATOR AND LEADS INTERNAL DORSAL COLUMN  8/7/2013    Procedure: INSERT STIMULATOR AND LEADS INTERNAL DORSAL COLUMN;  SPINAL CORD STIMULATOR IMPLANT ;  Surgeon: Ricardo Bruno MD;  Location: [facility] OR     INSERT STIMULATOR DORSAL " "COLUMN  08/13/2013    lead replacemend, 12?2016,  battery replacement 4/4/2016     KNEE SURGERY       LASER CO2 LARYNGOSCOPY N/A 2/19/2015    Procedure: LASER CO2 LARYNGOSCOPY;  Surgeon: Siri Koch MD;  [facility]     LASER CO2 LARYNGOSCOPY, COMPLEX  7/22/2014    Procedure: LASER CO2 LARYNGOSCOPY, COMPLEX;  Surgeon: Siri Koch MD;  [facility]     MYECTOMY SEPTAL  4/14/2014    Procedure: Median Sternotomy, Septal Myectomy, Intraoperative BRENT performed by Dr. Castano, on pump oxygenator.;  Surgeon: Aguila Cannon MD;  [facility]     NECK SURGERY       SHOULDER SURGERY  2006    RIGHT     STOMACH SURGERY  1980    partial gastrectomy for bleeding ulcers, \"stress related\". [facility]     WRIST SURGERY Left 1988    fractured. 1988 or so       ALLERGIES     Allergies   Allergen Reactions     Bee Venom Swelling     Lisinopril      TABS  Severe cough     Penicillins Hives and Difficulty breathing       FAMILY HISTORY  Family History   Problem Relation Age of Onset     HEART DISEASE Father 32     MIs x2; s/p CABG     Substance Abuse Father      Hypertension Father      Obesity Father      Hyperlipidemia Father      Hypertension Brother      DIABETES Brother      Glaucoma Maternal Grandmother      Hypertension Maternal Grandmother      DIABETES Maternal Grandfather      Glaucoma Maternal Grandfather      Hypertension Maternal Grandfather      CEREBROVASCULAR DISEASE Maternal Grandfather      Obesity Maternal Grandfather      Hyperlipidemia Maternal Grandfather      Coronary Artery Disease Maternal Grandfather      Substance Abuse Other      CANCER Other      Hypertension Other      Obesity Other      Other Cancer Other      Hyperlipidemia Other      Coronary Artery Disease Other      Obesity Brother      Hyperlipidemia Brother      Macular Degeneration No family hx of        VASCULAR FAMILY HISTORY  1st order relative with atherosclerotic PAD: no  1st order relative with " "AAA: no    SOCIAL HISTORY  Social History     Social History     Marital status: Single     Spouse name: N/A     Number of children: N/A     Years of education: N/A     Occupational History     Not on file.     Social History Main Topics     Smoking status: Light Tobacco Smoker     Packs/day: 1.00     Years: 28.00     Types: Cigarettes     Last attempt to quit: 4/7/2014     Smokeless tobacco: Never Used     Alcohol use 0.0 oz/week     0 Standard drinks or equivalent per week      Comment: 0-1 X Weekly     Drug use: No     Sexual activity: Not Currently     Partners: Female     Other Topics Concern     Parent/Sibling W/ Cabg, Mi Or Angioplasty Before 65f 55m? Yes     Social History Narrative       ROS:   Constitutional: No fever, chills, or sweats. No weight gain/loss   ENT: No visual disturbance, ear ache, epistaxis, sore throat  Allergies/Immunologic: Negative  Respiratory: No cough, hemoptysia  Cardiovascular: As per HPI  GI: No nausea, vomiting, hematemesis, melena, or hematochezia  : No urinary frequency, dysuria, or hematuria  Integument: Negative  Psychiatric: Negative  Neuro: Negative  Endocrinology: Negative   Musculoskeletal: Negative  Vascular: No walking impairment, claudication, ischemic rest pain or nonhealing wounds    EXAM:  /83 (BP Location: Left arm, Patient Position: Chair, Cuff Size: Adult Large)  Pulse 74  Ht 1.803 m (5' 11\")  Wt 121.5 kg (267 lb 12.8 oz)  SpO2 97%  BMI 37.35 kg/m2  In general, the patient is a pleasant male in no apparent distress.    HEENT: NC/AT.  PERRLA.  EOMI.  Sclerae white, not injected.  Nares clear.  Pharynx without erythema or exudate.  Dentition intact.    Neck: No adenopathy.  No thyromegaly. Carotids +2/2 bilaterally without bruits.  No jugular venous distension.   Heart: RRR.  Holosystolic murmur right upper sternal border. the PMI is in the 5th ICS in the midclavicular line. There is no heave.    Lungs: CTA.  No ronchi, wheezes, rales.  No dullness to " percussion.   Abdomen: Soft, nontender, nondistended. No organomegaly. No AAA.  No bruits.   Extremities: No clubbing, cyanosis, or edema.  No wounds. No varicose veins signs of chronic venous insufficiency.   Vascular: DP and PT pulses are intact.  Normal popliteal and femoral pulses normal radial pulses.  Skin: No skin tightening present    Labs:  LIPID RESULTS:  Lab Results   Component Value Date    CHOL 205 (A) 01/29/2014    HDL 27 (A) 01/29/2014     (A) 01/29/2014    TRIG 242 (A) 01/29/2014    CHOLHDLRATIO 4.0 09/30/2013       LIVER ENZYME RESULTS:  Lab Results   Component Value Date    AST 23 01/17/2015    ALT 30 01/17/2015       CBC RESULTS:  Lab Results   Component Value Date    WBC 13.2 (H) 02/17/2015    RBC 5.26 02/17/2015    HGB 16.6 02/17/2015    HCT 48.1 02/17/2015    MCV 91 02/17/2015    MCH 31.6 02/17/2015    MCHC 34.5 02/17/2015    RDW 13.4 02/17/2015     02/17/2015       BMP RESULTS:  Lab Results   Component Value Date     03/17/2015    POTASSIUM 4.0 03/17/2015    CHLORIDE 110 (H) 03/17/2015    CO2 25 03/17/2015    ANIONGAP 7 03/17/2015     (H) 03/17/2015    BUN 14 03/17/2015    CR 0.96 03/17/2015    GFRESTIMATED 84 03/17/2015    GFRESTBLACK >90   GFR Calc   03/17/2015    TANIA 8.8 03/17/2015        A1C RESULTS:  Lab Results   Component Value Date    A1C 6.3 (A) 03/27/2017       Procedures:    JANIS 11/9/2017    Impression:      Right leg: Resting JANIS: Normal (No observed evidence of increased  cardiovascular risk or peripheral arterial disease). Diminished  waveform in the first digit suggestive of small vessel disease.  Toe  pressures of 90 mmHg.     Left leg: Resting JANIS: Normal (No observed evidence of increased  cardiovascular risk or peripheral arterial disease). No flow  identified in the left first digit compatible with severe small vessel  disease in the foot.      Assessment and Plan:     This is a 47-year-old male with chronic critical limb ischemia  of his left first digit with normal ankle-brachial indices bilaterally.  It is unlikely that this is a diffuse small vessel vasculitis as this is an isolated small vessel disease with no involvement of any other blood vessels in the hands or feet.  I also did not  on any systemic findings to suggest scleroderma in this patient or other severe rheumatologic disease like lupus or rheumatoid arthritis that would cause associated vasculitis.  Another consideration is thromboangiitis obliterans given his very significant smoking history, or embolic event in the past. He is also a long-standing diabetic and could have small vessel disease from that alone.  I would like to do a diagnostic angiogram of the foot which can help evaluate for etiology.  Unfortunately there is little intervention that is likely possible if this is in fact a distal small vessel disease of the first digit.  He has been on sildenafil and also warfarin which has not provided any therapeutic relief however he was underdosed on sildenafil and has some room to go up.  I discussed increasing sildenafil with 1 of my heart failure colleagues and it is possible to go up to 10 mg 3 times daily with future escalations as needed.  I did discuss after diagnostic angiogram that for pain relief we could consider a distal toe amputation if there is no improvement with sildenafil as this is extremely life limiting and excruciating for this patient.     Recommendations:   #1: Smoking cessation discussed in detail with patient especially if this is thromboangiitis.   #2: Continue warfarin.  Will increase sildenafil to 10 mg 3 times daily with possible up titration in the future to 20 mg.  #3:  If no bleeding risk patient would probably benefit from baby aspirin as well.  #4:  Would like to obtain a selective angiography of the left foot.  This can be done under IR.  If classic corkscrew vessels then we establish a diagnosis.  #5: Continue aggressive blood  pressure and diabetic control.  Heart failure management per my cardiology colleague.  #6: Based on diagnostic foot angiogram will then obtain vasculitis workup if needed.  Although my current suspicion is low.    Total time spent 60 minutes, of which >50% was spent in face-to-face patient evaluation, reviewing data with patient, and coordination of care.     Kenia Mcgovern MD MSc  Division of Cardiology and Vascular Medicine    [facility]      -smoking cessation encouraged

## 2017-11-29 NOTE — TELEPHONE ENCOUNTER
PRIOR AUTHORIZATION DENIED    Medication: Sidlenafil 20 MG PA DENIED    Denial Date: 11/29/2017    Denial Rational: Diagnosis (I99.8 Critical lower limb ischemia) does not meet criteria.     Appeal Information:

## 2017-12-01 ENCOUNTER — OFFICE VISIT (OUTPATIENT)
Dept: GASTROENTEROLOGY | Facility: CLINIC | Age: 47
End: 2017-12-01

## 2017-12-01 VITALS
DIASTOLIC BLOOD PRESSURE: 92 MMHG | HEART RATE: 98 BPM | SYSTOLIC BLOOD PRESSURE: 142 MMHG | WEIGHT: 267.7 LBS | TEMPERATURE: 97.8 F | HEIGHT: 71 IN | BODY MASS INDEX: 37.48 KG/M2 | OXYGEN SATURATION: 95 %

## 2017-12-01 DIAGNOSIS — K59.00 CONSTIPATION, UNSPECIFIED CONSTIPATION TYPE: Primary | ICD-10-CM

## 2017-12-01 ASSESSMENT — PAIN SCALES - GENERAL: PAINLEVEL: MODERATE PAIN (4)

## 2017-12-01 NOTE — NURSING NOTE
"Chief Complaint   Patient presents with     RECHECK     F/U       Vitals:    12/01/17 1456   BP: (!) 142/92   BP Location: Left arm   Patient Position: Chair   Cuff Size: Adult Regular   Pulse: 98   Temp: 97.8  F (36.6  C)   SpO2: 95%   Weight: 267 lb 11.2 oz   Height: 5' 11\"       Body mass index is 37.34 kg/(m^2).      Silke Wiley                          "

## 2017-12-01 NOTE — LETTER
12/1/2017       RE: Konstantin Sharp  [address] [place] [address]     Dear Colleague,    Thank you for referring your patient, Konstantin Sharp, to the [facility] at [facility]. Please see a copy of my visit note below.    CHIEF COMPLAINT:  Constipation, followup.      HISTORY OF PRESENT ILLNESS:  Konstantin is a 47-year-old man whom I met 07/26/2017.  He has complex medical condition including cardiac hypertrophy, ICD, TBI, double pneumonia with 3 months in coma.  He had severe constipation for a couple years increased again within the last 6 months when he was seen.  He was using prune juice and MiraLax 3 times daily, Ex-Lax up 6 today.  He had received lactulose but had not started it so was advised to do so, advised to use Kegel exercises and relax muscles for defecation, advised to use senna p.r.n. or 1 or 2 daily and to call with the results.  Pelvic floor studies were under consideration.  If he had no stool for 4-5 days, it was suggested he take some action, either his suppositories and if he had good result, that would be enough, but without good result (not typically expected), he was advised to use 1/2 of a GoLYTELY bowel prep over many hours rather than rapidly.      Konstantin is here with his mother (Zurdo).  He has not called in the meantime.  He describes his stool essentially now as before.  He describes that his MiraLax use did not give him softer stool but still left it hard even at regular twice daily dosing.  He did try the lactulose, but it brought stool from once weekly to twice weekly and he still had very hard stool at onset difficult to pass, leading then to soft stool.  Most of all, it caused him to be too gassy for him to work.  In the past, he had magnesium tablets and stopped them or they were stopped by his primary provider due to pill burden.  We find this only in 12/2016 medical record and otherwise as an inpatient  medication, more often liquid.  He does continue on senna 2 each morning and with this has a bowel movement once weekly.  He continues to have pain in the lower abdomen  -- under the belly -- and it is decreased but not ever resolved after a bowel movement.  He is unable to associate any specific increase in pain with the senna nor any improved bowel movement with the Senna.  Finally, he has attempted to note the Kegel exercises and can feel that activity.  He is aware of relaxing his bottom to pass gas and will attempt to use that regularly when passing stool.   He senses when stool was coming down nowadays prior to defecation, which is an improvement.        MEDICAL HISTORY:  Reviewed.  In addition to above, recall that he also has DM type 2, seizure history and a stated spinal stenosis who did have neurogenic claudication in the lumbar region as of 11/2012.  He also has some urinary symptoms, not further discussed.      REVIEW OF SYSTEMS:  Konstantin acknowledges poor memory.  He denies dysphagia, odynophagia, nausea or vomiting.  He denies hematochezia or black stool.  He has difficulty passing firm stool but not the softer stool.  He remains dependent on his mother for some overall management.  She maintains his health file, for instance, and awakens him.      Konstantin denies symptoms of acute illness or localized infection at this time.  He has some pain deep inside at the beltline but does not think it is specifically related to his tight belt but that his pants would fall down if he wears it any looser.  Protuberant abdomen above the beltline is not the location or cause of his pain.      OBJECTIVE:   VITAL SIGNS:  Reviewed.  Note, weight 267 pounds/121 kg for BMI 37.3.  Hypertensive at 142/92.  Pain 4/10.   GENERAL:  Large, somewhat tall man who speaks easily and acknowledges poor memory.   EYES:  Clear.   NEUROLOGIC:  His speech is fluent and logical.  His mood is stable and affect appropriate.   RESPIRATIONS:   Breathing is calm and grossly normal.   ABDOMEN:  Has no noticeable tympany or mass, but the exam is exceedingly compromised.  It is soft without rigidity, guarding or rebound.      RESULTS:  No new laboratory results since the last visit.      Colonoscopy 05/24/2016 at [facility] with no polyps, no specimens found, no complications.  He reports that the prep was good.  We did not find the original direct report but the postoperative conclusion of no specimens and no complications.      ASSESSMENT:  A 47-year-old man with severe constipation not yet adequately managed who has failed management with current Senokot, 2 a day, with Bisacodyl, MiraLax 2 and 3 doses daily and with lactulose.  He does not clearly recollect whether he had good benefit from milk of magnesia.  He had stool onset with use suppository but does not tolerate 2 which bring him better results.  These also exacerbate his hemorrhoids.      Konstantin also can no longer stand to take MiraLax, even in a flavored liquid of some kind or another.  He prefers to have no more powder treatments.  He prefers, of course, also to have no more suppository.  He does sense the Kegel exercise movement and relaxation of the pelvic floor, so pelvic floor studies or training are now deferred.      PLAN:   1.  Trial of linaclotide 290 mcg once daily, 30-60 minutes prior to his first meal.  He is to stop using Senna and any other preparation for the first 3 days of this medication trial and then restart only as needed.   2.  Continue his efforts to drink adequate fluid.   3.  Continue to practice Kegel exercises when not needed and use them when needed to relax the bottom for defecation or passing gas.   4.  Use suppositories to get moving if needed.  Please do not wait 5 days or a week to do so but rather only 3 days at most.   5.  Call or message Gastroenterology within a month as to whether the linaclotide is a good medication for him.   6.  Consider making an appointment  with a Colon and Rectal Surgery surgeon regarding his hemorrhoids.  The in-office nurse practitioner banding cannot be done due to his chronic warfarin use.  Their number was provided.      We discussed the assessment and plan, the very specific action of linaclotide and its lack of medication interactions.  We discussed the possible side effects which may improve within the first week to 10 days and allow continued use.  We discussed the need for him to decide whether any potential diarrhea would be an improvement over constipation, and he says that as long as he has continence, that would be an improvement.     We discussed the assessment and plan, provided written notes and answered questions.  These seem to be to their satisfaction.      Total visit 35 minutes with counseling time 25 minutes.         SHAHEED SHELL CNP             D: 2017 22:50   T: 2017 08:15   MT: SCOTT      Name:     NERIS JACKSON   MRN:      [id number]        Account:      [id number]   :      1970           Service Date: 2017      Document: [id number]        Again, thank you for allowing me to participate in the care of your patient.      Sincerely,    SHAHEED Mccormick CNP

## 2017-12-01 NOTE — PATIENT INSTRUCTIONS
Now, do something to get moving  You say a suppository    And then start linaclotide (Linzess)     Linaclotide (Linzess) against constipation  Take one pill daily, in AM 1/2 hour or one hour before you eat.   Taking this closer to your meal increases risk of diarrhea reaction.    Stop whatever else you are using. If you need to, restart something else.    About half of scb patients use more than the linaclotide (Linzess) alone.    It takes 1 to 2 weeks to figure out if this is good for you.  Good for you may include BM twice a week or more, possibly too soft.  Pain relief comes over a month or longer.      Consider making an appointment with Colon and Rectal Surgery regarding hemorrhoids.  An option to consider is banding of hemorrhoids. This is a procedure done in clinic.   There is no colon preparation for this treatment and no need to take additional time off work. It usually requires two or three brief visits.    To schedule with Colon and Rectal Surgery, call [phone].  To schedule regarding hemorrhoids, ask for a surgeon , because you are on warfarin.    Learn and do Kegel exercises:    slowly tighten muscles inside the bottom as if to hold urine or gas,    then relax those muscles.  Do this exercise a few times, a few times each day.  AND when you sit on the toilet,   do a Kegel and then relax those muscles to have a BM.    Return to GI Clinic Feb 21 or earlier.    [facility]  GI Nurse Triage  [phone] for Medical Questions, # 3

## 2017-12-01 NOTE — MR AVS SNAPSHOT
After Visit Summary   12/1/2017    Konstantin Sharp    MRN: [id number]           Patient Information     Date Of Birth          1970        Visit Information        Provider Department      12/1/2017 3:00 PM Britney Mcfadden APRN [facility]        Today's Diagnoses     Constipation, unspecified constipation type    -  1      Care Instructions    Now, do something to get moving  You say a suppository    And then start linaclotide (Linzess)     Linaclotide (Linzess) against constipation  Take one pill daily, in AM 1/2 hour or one hour before you eat.   Taking this closer to your meal increases risk of diarrhea reaction.    Stop whatever else you are using. If you need to, restart something else.    About half of scb patients use more than the linaclotide (Linzess) alone.    It takes 1 to 2 weeks to figure out if this is good for you.  Good for you may include BM twice a week or more, possibly too soft.  Pain relief comes over a month or longer.      Consider making an appointment with Colon and Rectal Surgery regarding hemorrhoids.  An option to consider is banding of hemorrhoids. This is a procedure done in clinic.   There is no colon preparation for this treatment and no need to take additional time off work. It usually requires two or three brief visits.    To schedule with Colon and Rectal Surgery, call [phone].  To schedule regarding hemorrhoids, ask for a surgeon , because you are on warfarin.    Learn and do Kegel exercises:    slowly tighten muscles inside the bottom as if to hold urine or gas,    then relax those muscles.  Do this exercise a few times, a few times each day.  AND when you sit on the toilet,   do a Kegel and then relax those muscles to have a BM.    Return to GI Clinic Feb 21 or earlier.    [facility]  GI Nurse Triage  [phone] for Medical Questions, # 3              Follow-ups after your  visit        Follow-up notes from your care team     Return in about 3 weeks (around 12/21/2017).      Your next 10 appointments already scheduled     Apr 16, 2018  3:00 PM CDT   (Arrive by 2:45 PM)   Implanted Defibulator with Uc Cv Device 1   [facility] ([facility])    [address]  [address] [facility] [id number] [phone]            May 29, 2018  5:00 PM CDT   (Arrive by 4:45 PM)   Return Vascular Visit with Kenia Mcgovern MD   [facility] ([facility])    [address] [id number] [phone]              Who to contact     Please call your clinic at [phone] to:    Ask questions about your health    Make or cancel appointments    Discuss your medicines    Learn about your test results    Speak to your doctor   If you have compliments or concerns about an experience at your clinic, or if you wish to file a complaint, please contact [facility] Patient Relations at [phone] or email us at [email or web address]         Additional Information About Your Visit        MyChart Information     [employer] gives you secure access to your electronic health record. If you see a primary care provider, you can also send messages to your care team and make appointments. If you have questions, please call your primary care clinic.  If you do not have a primary care provider, please call [phone] and they will assist you.      [employer] is an electronic gateway that provides easy, online access to your medical records. With [employer], you can request a clinic appointment, read your test results, renew a prescription or communicate with your care team.     To access your existing account, please contact your [facility] or call [phone] for assistance.        Care EveryWhere ID     This is your Care EveryWhere ID. This could be used  "by other organizations to access your [place] medical records  [id number]        Your Vitals Were     Pulse Temperature Height Pulse Oximetry BMI (Body Mass Index)       98 97.8  F (36.6  C) 1.803 m (5' 11\") 95% 37.34 kg/m2        Blood Pressure from Last 3 Encounters:   12/01/17 (!) 142/92   11/28/17 126/83   11/09/17 128/83    Weight from Last 3 Encounters:   12/01/17 121.4 kg (267 lb 11.2 oz)   11/28/17 121.5 kg (267 lb 12.8 oz)   08/25/17 121.2 kg (267 lb 1.6 oz)              Today, you had the following     No orders found for display         Today's Medication Changes          These changes are accurate as of: 12/1/17  4:04 PM.  If you have any questions, ask your nurse or doctor.               Start taking these medicines.        Dose/Directions    linaclotide 290 MCG capsule   Commonly known as:  LINZESS   Used for:  Constipation, unspecified constipation type   Started by:  Britney Mcfadden APRN CNP        Dose:  290 mcg   Take 1 capsule (290 mcg) by mouth every morning (before breakfast)   Quantity:  30 capsule   Refills:  1            Where to get your medicines      These medications were sent to [facility]/pharmacy #[id number] - [place], [place] - [address], [place] [address]     Phone:  [phone]     linaclotide 290 MCG capsule                Primary Care Provider Office Phone # Fax #    Damon Cooper [phone]       [facility] [address] [facility] [address]        Equal Access to Services     [facility] Center[facility]ENRI : Hadii migdalia gomez Sodaniela, waaxda luqadaha, qaybta kaalmada gustavo jin . [facility] [phone].    ATENCIÓN: Si habla español, tiene a dover disposición servicios gratuitos de asistencia lingüística. Llame al [phone].    We comply with applicable federal civil rights laws and [place] laws. We do not discriminate on the basis of race, color, national origin, age, disability, sex, sexual " orientation, or gender identity.            Thank you!     Thank you for choosing [facility]  for your care. Our goal is always to provide you with excellent care. Hearing back from our patients is one way we can continue to improve our services. Please take a few minutes to complete the written survey that you may receive in the mail after your visit with us. Thank you!             Your Updated Medication List - Protect others around you: Learn how to safely use, store and throw away your medicines at www.disposemymeds.org.          This list is accurate as of: 12/1/17  4:04 PM.  Always use your most recent med list.                   Brand Name Dispense Instructions for use Diagnosis    * albuterol (2.5 MG/3ML) 0.083% neb solution      Take 1 vial by nebulization every 6 hours as needed for shortness of breath / dyspnea or wheezing        * VENTOLIN  (90 BASE) MCG/ACT Inhaler   Generic drug:  albuterol      Inhale 2 puffs into the lungs every 4 hours as needed for shortness of breath / dyspnea or wheezing        AMITIZA PO      Take 24 mcg by mouth 2 times daily (with meals)        amitriptyline 25 MG tablet    ELAVIL     Take 25 mg by mouth        ARIPIPRAZOLE PO      Take 10 mg by mouth daily (with dinner)        bisacodyl 10 MG Suppository    DULCOLAX     Place 10 mg rectally 2 times daily        EUCERIN INTENSIVE REPAIR EX      Externally apply topically 2 times daily as needed        ferrous fumarate 65 mg ([place]. FE)-Vitamin C 125 mg  MG Tabs tablet    VITRON C    30 tablet    One tab by mouth once daily in between meals        glipiZIDE 5 MG tablet    GLUCOTROL    30 tablet    Take 1 tablet (5 mg) by mouth 2 times daily (before meals)    Diabetes mellitus (H)       lactulose 10 GM/15ML solution    CHRONULAC     Take 30 g by mouth 2 times daily        lidocaine 5 % ointment    XYLOCAINE     Apply topically 3 times daily        linaclotide 290 MCG capsule    LINZESS  "   30 capsule    Take 1 capsule (290 mcg) by mouth every morning (before breakfast)    Constipation, unspecified constipation type       LIPITOR PO      Take 40 mg by mouth every evening        losartan 25 MG tablet    COZAAR    30 tablet    Take 1 tablet (25 mg) by mouth daily    Hypertrophic cardiomyopathy (H)       LYRICA PO      Take 75 mg by mouth daily        magnesium oxide 400 (241.3 MG) MG tablet    MAG-OX    60 tablet    Take 1 tablet (400 mg) by mouth daily    Hypertrophic cardiomyopathy (H)       metFORMIN 500 MG tablet    GLUCOPHAGE    60 tablet    Take 1,000 mg by mouth 2 times daily (with meals) 850 mg BID    Diabetes mellitus (H)       metoprolol 50 MG tablet    LOPRESSOR    180 tablet    Take 100 mg by mouth 3 times daily    S/P ventricular septal myectomy, Congestive heart failure, unspecified, Hypertrophic cardiomyopathy (H)       polyethylene glycol 236 G suspension    GoLYTELY/NuLYTELY    4000 mL    Take 2,000 mLs (2 L) by mouth once as needed Refer to \"Getting Ready for a Colonoscopy\" instruction handout    Other constipation       * SEROQUEL PO      Take 25 mg by mouth 2 times daily        * QUEtiapine 100 MG tablet    SEROquel    30 tablet    150 mg At Bedtime 150 mg every HS    Insomnia       senna-docusate 8.6-50 MG per tablet    SENOKOT-S;PERICOLACE     Take 1 tablet by mouth daily        sildenafil 20 MG tablet    REVATIO    90 tablet    Take 0.5 tablets (10 mg) by mouth 3 times daily    Critical lower limb ischemia       TRAZODONE HCL PO      Take 100 mg by mouth At Bedtime        venlafaxine 150 MG 24 hr capsule    EFFEXOR-XR    30 capsule    Take 150 mg by mouth 2 times daily    Adjustment disorder with depressed mood       * warfarin 7.5 MG tablet    COUMADIN     Take 7.5 mg by mouth daily        * warfarin 1 MG tablet    COUMADIN    30 tablet    Total 8.5 mg daily    Atrial fibrillation (H)       * Notice:  This list has 6 medication(s) that are the same as other medications " prescribed for you. Read the directions carefully, and ask your doctor or other care provider to review them with you.

## 2017-12-04 NOTE — PROGRESS NOTES
CHIEF COMPLAINT:  Constipation, followup.      HISTORY OF PRESENT ILLNESS:  Konstantin is a 47-year-old man whom I met 07/26/2017.  He has complex medical condition including cardiac hypertrophy, ICD, TBI, double pneumonia with 3 months in coma.  He had severe constipation for a couple years increased again within the last 6 months when he was seen.  He was using prune juice and MiraLax 3 times daily, Ex-Lax up 6 today.  He had received lactulose but had not started it so was advised to do so, advised to use Kegel exercises and relax muscles for defecation, advised to use senna p.r.n. or 1 or 2 daily and to call with the results.  Pelvic floor studies were under consideration.  If he had no stool for 4-5 days, it was suggested he take some action, either his suppositories and if he had good result, that would be enough, but without good result (not typically expected), he was advised to use 1/2 of a GoLYTELY bowel prep over many hours rather than rapidly.      Konstantin is here with his mother (Zurdo).  He has not called in the meantime.  He describes his stool essentially now as before.  He describes that his MiraLax use did not give him softer stool but still left it hard even at regular twice daily dosing.  He did try the lactulose, but it brought stool from once weekly to twice weekly and he still had very hard stool at onset difficult to pass, leading then to soft stool.  Most of all, it caused him to be too gassy for him to work.  In the past, he had magnesium tablets and stopped them or they were stopped by his primary provider due to pill burden.  We find this only in 12/2016 medical record and otherwise as an inpatient medication, more often liquid.  He does continue on senna 2 each morning and with this has a bowel movement once weekly.  He continues to have pain in the lower abdomen  -- under the belly -- and it is decreased but not ever resolved after a bowel movement.  He is unable to associate any specific  increase in pain with the senna nor any improved bowel movement with the Senna.  Finally, he has attempted to note the Kegel exercises and can feel that activity.  He is aware of relaxing his bottom to pass gas and will attempt to use that regularly when passing stool.   He senses when stool was coming down nowadays prior to defecation, which is an improvement.        MEDICAL HISTORY:  Reviewed.  In addition to above, recall that he also has DM type 2, seizure history and a stated spinal stenosis who did have neurogenic claudication in the lumbar region as of 11/2012.  He also has some urinary symptoms, not further discussed.      REVIEW OF SYSTEMS:  Konstantin acknowledges poor memory.  He denies dysphagia, odynophagia, nausea or vomiting.  He denies hematochezia or black stool.  He has difficulty passing firm stool but not the softer stool.  He remains dependent on his mother for some overall management.  She maintains his health file, for instance, and awakens him.      Konstantin denies symptoms of acute illness or localized infection at this time.  He has some pain deep inside at the beltline but does not think it is specifically related to his tight belt but that his pants would fall down if he wears it any looser.  Protuberant abdomen above the beltline is not the location or cause of his pain.      OBJECTIVE:   VITAL SIGNS:  Reviewed.  Note, weight 267 pounds/121 kg for BMI 37.3.  Hypertensive at 142/92.  Pain 4/10.   GENERAL:  Large, somewhat tall man who speaks easily and acknowledges poor memory.   EYES:  Clear.   NEUROLOGIC:  His speech is fluent and logical.  His mood is stable and affect appropriate.   RESPIRATIONS:  Breathing is calm and grossly normal.   ABDOMEN:  Has no noticeable tympany or mass, but the exam is exceedingly compromised.  It is soft without rigidity, guarding or rebound.      RESULTS:  No new laboratory results since the last visit.      Colonoscopy 05/24/2016 at [facility] with no polyps, no  specimens found, no complications.  He reports that the prep was good.  We did not find the original direct report but the postoperative conclusion of no specimens and no complications.      ASSESSMENT:  A 47-year-old man with severe constipation not yet adequately managed who has failed management with current Senokot, 2 a day, with Bisacodyl, MiraLax 2 and 3 doses daily and with lactulose.  He does not clearly recollect whether he had good benefit from milk of magnesia.  He had stool onset with use suppository but does not tolerate 2 which bring him better results.  These also exacerbate his hemorrhoids.      Konstantin also can no longer stand to take MiraLax, even in a flavored liquid of some kind or another.  He prefers to have no more powder treatments.  He prefers, of course, also to have no more suppository.  He does sense the Kegel exercise movement and relaxation of the pelvic floor, so pelvic floor studies or training are now deferred.      PLAN:   1.  Trial of linaclotide 290 mcg once daily, 30-60 minutes prior to his first meal.  He is to stop using Senna and any other preparation for the first 3 days of this medication trial and then restart only as needed.   2.  Continue his efforts to drink adequate fluid.   3.  Continue to practice Kegel exercises when not needed and use them when needed to relax the bottom for defecation or passing gas.   4.  Use suppositories to get moving if needed.  Please do not wait 5 days or a week to do so but rather only 3 days at most.   5.  Call or message Gastroenterology within a month as to whether the linaclotide is a good medication for him.   6.  Consider making an appointment with a Colon and Rectal Surgery surgeon regarding his hemorrhoids.  The in-office nurse practitioner banding cannot be done due to his chronic warfarin use.  Their number was provided.      We discussed the assessment and plan, the very specific action of linaclotide and its lack of medication  interactions.  We discussed the possible side effects which may improve within the first week to 10 days and allow continued use.  We discussed the need for him to decide whether any potential diarrhea would be an improvement over constipation, and he says that as long as he has continence, that would be an improvement.     We discussed the assessment and plan, provided written notes and answered questions.  These seem to be to their satisfaction.      Total visit 35 minutes with counseling time 25 minutes.         SHAHEED SHELL CNP             D: 2017 22:50   T: 2017 08:15   MT: SCOTT      Name:     NERIS JACKSON   MRN:      [id number]        Account:      [id number]   :      1970           Service Date: 2017      Document: [id number]

## 2017-12-11 DIAGNOSIS — I70.229 CRITICAL LOWER LIMB ISCHEMIA (H): Primary | ICD-10-CM

## 2017-12-14 ENCOUNTER — CARE COORDINATION (OUTPATIENT)
Dept: GASTROENTEROLOGY | Facility: CLINIC | Age: 47
End: 2017-12-14

## 2017-12-14 NOTE — PROGRESS NOTES
Tejal from [facility] clinic called regarding recent clinic visit with Britney Mcfadden NP.    Per [facility] clinic records patient was taking Amitiza 24mcg BID. Tejal wanted to confirm patient was to discontinue that medication and start Linzess as recommended.     Writer confirmed, patient is to discontinue all other constipation medications ( Amitiza, senna, docusate) and start Linzess.     Tejal verbalized understanding of plan and will update patient care team at [facility]    Message routed to Britney Mcfadden NP.

## 2017-12-20 ENCOUNTER — COMMUNICATION - HEALTHEAST (OUTPATIENT)
Dept: NEUROLOGY | Facility: CLINIC | Age: 47
End: 2017-12-20

## 2017-12-20 DIAGNOSIS — G47.00 INSOMNIA: ICD-10-CM

## 2017-12-27 ENCOUNTER — COMMUNICATION - HEALTHEAST (OUTPATIENT)
Dept: NEUROLOGY | Facility: CLINIC | Age: 47
End: 2017-12-27

## 2017-12-27 DIAGNOSIS — F33.1 MDD (MAJOR DEPRESSIVE DISORDER), RECURRENT EPISODE, MODERATE (H): ICD-10-CM

## 2017-12-28 ENCOUNTER — COMMUNICATION - HEALTHEAST (OUTPATIENT)
Dept: NEUROLOGY | Facility: CLINIC | Age: 47
End: 2017-12-28

## 2017-12-28 DIAGNOSIS — F33.1 MDD (MAJOR DEPRESSIVE DISORDER), RECURRENT EPISODE, MODERATE (H): ICD-10-CM

## 2018-01-02 DIAGNOSIS — I73.1 THROMBOANGIITIS OBLITERANS (BUERGER'S DISEASE) (H): Primary | ICD-10-CM

## 2018-01-11 ENCOUNTER — HOSPITAL ENCOUNTER (OUTPATIENT)
Dept: NEUROLOGY | Facility: CLINIC | Age: 48
Setting detail: THERAPIES SERIES
Discharge: STILL ADMITTED OR ADMINISTRATIVE | End: 2018-01-11
Attending: NURSE PRACTITIONER

## 2018-01-11 DIAGNOSIS — F33.1 MDD (MAJOR DEPRESSIVE DISORDER), RECURRENT EPISODE, MODERATE (H): ICD-10-CM

## 2018-01-11 DIAGNOSIS — F29 PSYCHOSIS (H): ICD-10-CM

## 2018-01-11 DIAGNOSIS — G89.29 CHRONIC PAIN: ICD-10-CM

## 2018-01-11 DIAGNOSIS — G47.00 INSOMNIA: ICD-10-CM

## 2018-01-11 DIAGNOSIS — F09 COGNITIVE DISORDER: ICD-10-CM

## 2018-01-15 ENCOUNTER — OFFICE VISIT (OUTPATIENT)
Dept: RADIOLOGY | Facility: CLINIC | Age: 48
End: 2018-01-15
Payer: MEDICAID

## 2018-01-15 VITALS — HEART RATE: 95 BPM | RESPIRATION RATE: 18 BRPM | SYSTOLIC BLOOD PRESSURE: 105 MMHG | DIASTOLIC BLOOD PRESSURE: 77 MMHG

## 2018-01-15 DIAGNOSIS — I73.9 PAD (PERIPHERAL ARTERY DISEASE) (H): Primary | ICD-10-CM

## 2018-01-15 ASSESSMENT — PAIN SCALES - GENERAL: PAINLEVEL: MODERATE PAIN (5)

## 2018-01-15 NOTE — NURSING NOTE
Chief Complaint   Patient presents with     Consult     Konstantin is here today for a consult of his left foot and toes.        Vitals:    01/15/18 0855   BP: 105/77   Cuff Size: Adult Large   Pulse: 95   Resp: 18       There is no height or weight on file to calculate BMI.

## 2018-01-15 NOTE — LETTER
1/15/2018       RE: Konstantin Sharp  [address] [place] [address]     Dear Colleague,    Thank you for referring your patient, Konstantin Sharp, to the [facility] at [facility]. Please see a copy of my visit note below.        INTERVENTIONAL RADIOLOGY CONSULTATION    Name: Konstantin Sharp  Age: 47 year old   Referring Physician: Dr. Mcgovern   REASON FOR REFERRAL: left great toe pain, critical limb ischemia.      HPI:     48y/o Patient with history of asthma, CLARY, T2DM, HTN, CAD, Cardiomyopathy, and Afib is referred to E angio to evaluate for thromboangiitis obliterans.  Patient has a significant smoking history: 1.5-2.0 ppd since the age of 11, recently reduced to 0.5-0.75 ppd.  He finds complete cessation very difficult due to depression and anxiety related to chronic health issues.      He experiences constant bilateral great toe and lateral foot pain, worst at the left great toe.  This pain never goes away and is at its worst when ambulating. He denies calf tightness/cramping/pain when walking, although he cannot accomplish great distances due to bilateral foot pain.  He has been on Sildenafil for presumed small vessel disease which was recently increased, resulting in no change in symptoms.  No change in pain when legs are elevated, or when lying in bed at night.  No current wounds.     He has a history of lumbar spinal stenosis s/p decompression in 2012. No spinal imaging since then.     PAST MEDICAL HISTORY:   Past Medical History:   Diagnosis Date     Atrial fibrillation (H)      Back pains, lower      Cardiomyopathy      Cardiomyopathy      Cardiomyopathy (H)      Chest pain      Coronary artery disease      Depressive disorder      Dual ICD (implantable cardiac defibrillator) in place 4/29/2014     Heart disease      HTN (hypertension)      Hypertrophic nonobstructive cardiomyopathy (H) 9/12/2012     Inflammatory arthritis      Muscular dystrophy (H)       Pacemaker      Polysubstance abuse      Shortness of breath      Sleep apnea     doesn't use cpap     Stomach ulcer      Type 2 diabetes mellitus without complications (H)      Uncomplicated asthma      Unspecified asthma(493.90)        PAST SURGICAL HISTORY:   Past Surgical History:   Procedure Laterality Date     APPENDECTOMY  1980    [facility]     C CARDIAC SURG PROCEDURE UNLIST       C HAND/FINGER SURGERY UNLISTED       C SHOULDER SURG PROC UNLISTED       C STOMACH SURGERY PROCEDURE UNLISTED       DISCECTOMY LUMBAR POSTERIOR MICROSCOPIC THREE+ LEVELS  12/16/2011    Procedure:DISCECTOMY LUMBAR POSTERIOR MICROSCOPIC THREE+ LEVELS; Posterior Decompression L2-S1; Surgeon:CAITIE FUNEZ; Location:[facility]     FUSION CERVICAL POSTERIOR ONE LEVEL  8/24/2012    Procedure: FUSION CERVICAL POSTERIOR ONE LEVEL;  Posterior Instrumentated Spinal Fusion Cervical 6-7, Right Iliac Crest Bone [place] ;  Surgeon: Caitie Funez MD;  [facility]     GRAFT BONE FROM ILIAC CREST  8/24/2012    Procedure: GRAFT BONE FROM ILIAC CREST;;  Surgeon: Caitie Funez MD;  Location[facility]     HC SACROPLASTY       IMPLANT PACEMAKER       INJECT STEROID (LOCATION) N/A 2/19/2015    Procedure: INJECT STEROID (LOCATION);  Surgeon: Siri Koch MD;  [facility]     INSERT STIMULATOR AND LEADS INTERNAL DORSAL COLUMN  8/7/2013    Procedure: INSERT STIMULATOR AND LEADS INTERNAL DORSAL COLUMN;  SPINAL CORD STIMULATOR IMPLANT ;  Surgeon: Ricardo Bruno MD;  Location: [facility] OR     INSERT STIMULATOR DORSAL COLUMN  08/13/2013    lead replacemend, 12?2016,  battery replacement 4/4/2016     KNEE SURGERY       LASER CO2 LARYNGOSCOPY N/A 2/19/2015    Procedure: LASER CO2 LARYNGOSCOPY;  Surgeon: Siri Koch MD;  [facility]     LASER CO2 LARYNGOSCOPY, COMPLEX  7/22/2014    Procedure: LASER CO2 LARYNGOSCOPY, COMPLEX;  Surgeon: Siri Koch MD;  [facility]      "MYECTOMY SEPTAL  4/14/2014    Procedure: Median Sternotomy, Septal Myectomy, Intraoperative BRENT performed by Dr. Castano, on pump oxygenator.;  Surgeon: Aguila Cannon MD;  [facility]     NECK SURGERY       SHOULDER SURGERY  2006    RIGHT     STOMACH SURGERY  1980    partial gastrectomy for bleeding ulcers, \"stress related\". [facility]     WRIST SURGERY Left 1988    fractured. 1988 or so       FAMILY HISTORY:   Family History   Problem Relation Age of Onset     HEART DISEASE Father 32     MIs x2; s/p CABG     Substance Abuse Father      Hypertension Father      Obesity Father      Hyperlipidemia Father      Hypertension Brother      DIABETES Brother      Glaucoma Maternal Grandmother      Hypertension Maternal Grandmother      DIABETES Maternal Grandfather      Glaucoma Maternal Grandfather      Hypertension Maternal Grandfather      CEREBROVASCULAR DISEASE Maternal Grandfather      Obesity Maternal Grandfather      Hyperlipidemia Maternal Grandfather      Coronary Artery Disease Maternal Grandfather      Substance Abuse Other      CANCER Other      Hypertension Other      Obesity Other      Other Cancer Other      all over     Hyperlipidemia Other      Coronary Artery Disease Other      Obesity Brother      Hyperlipidemia Brother      Lymphoma Maternal Uncle      Macular Degeneration No family hx of        SOCIAL HISTORY:   Social History   Substance Use Topics     Smoking status: Light Tobacco Smoker     Packs/day: 1.00     Years: 28.00     Types: Cigarettes     Last attempt to quit: 4/7/2014     Smokeless tobacco: Never Used     Alcohol use 0.0 oz/week     0 Standard drinks or equivalent per week      Comment: 0-1 X Weekly       PROBLEM LIST:   Patient Active Problem List    Diagnosis Date Noted     Chronic pain due to trauma 09/03/2016     Priority: Medium     Overview:   Broken back/neck 1996/2007 respectively. Did have procedure for spine stimulator       Esophageal reflux 09/03/2016     " Priority: Medium     Essential hypertension 09/03/2016     Priority: Medium     History of traumatic brain injury 09/03/2016     Priority: Medium     Large bowel obstruction 09/03/2016     Priority: Medium     S/P laminectomy 09/03/2016     Priority: Medium     Overview:   replacement of failed spinal cord stimulator & placement of epidural paddle electrode - 9/2/2016       Tobacco dependence 09/03/2016     Priority: Medium     Cognitive disorder 06/16/2016     Priority: Medium     Type 2 diabetes mellitus with diabetic neuropathy (H) 05/02/2016     Priority: Medium     Seizures (H) 02/01/2016     Priority: Medium     Respiratory failure (H) 10/19/2015     Priority: Medium     Insomnia 03/13/2015     Priority: Medium     [facility] 03/11/2015     Priority: Medium     For urgent after hours needs, please call [phone] and ask to speak with the Adult [facility] Physician on call - mention job code [phone].           Dysphonia 01/05/2015     Priority: Medium     Postprocedural subglottic stenosis 07/22/2014     Priority: Medium     Pre-operative cardiovascular examination 07/16/2014     Priority: Medium     Subglottic stenosis 07/07/2014     Priority: Medium     Automatic implantable cardioverter-defibrillator in situ- [employer], dual chamber- DEPENDENT 04/30/2014     Priority: Medium     Implanted 4/29/14 by Dr. Lacy  Problem list name updated by automated process. Provider to review       S/P ventricular septal myectomy 04/14/2014     Priority: Medium     Syncope 12/28/2013     Priority: Medium     HOCM (hypertrophic obstructive cardiomyopathy) (H) 09/30/2013     Priority: Medium     Atrial fibrillation (H) 05/15/2013     Priority: Medium     Asthma 01/24/2013     Priority: Medium     Depressive disorder 01/24/2013     Priority: Medium     Old myocardial infarction 01/24/2013     Priority: Medium     Osteoarthrosis 01/24/2013      "Priority: Medium     Spinal stenosis, lumbar region, with neurogenic claudication 11/15/2012     Priority: Medium     Lumbar radicular pain 11/15/2012     Priority: Medium     s/p PSF C6-7 for anterior pseudarthrosis 10/11/2012     Priority: Medium     Hypertrophic nonobstructive cardiomyopathy (H) 09/12/2012     Priority: Medium     Chest pain 09/12/2012     Priority: Medium     Sinus tachycardia 09/12/2012     Priority: Medium     Pseudoarthrosis of cervical spine (H) 08/24/2012     Priority: Medium     Chondromalacia of patella 10/25/2011     Priority: Medium     Anticoagulant long-term use 02/18/2011     Priority: Medium     Apnea, sleep 02/18/2011     Priority: Medium     Cervical vertebral fusion 12/08/2010     Priority: Medium     Hypertensive disorder 06/29/2010     Priority: Medium     Herniated cervical intervertebral disc 06/26/2008     Priority: Medium       MEDICATIONS:   Prescription Medications as of 1/17/2018             linaclotide (LINZESS) 290 MCG capsule Take 1 capsule (290 mcg) by mouth every morning (before breakfast)    sildenafil (REVATIO) 20 MG tablet Take 0.5 tablets (10 mg) by mouth 3 times daily    lidocaine (XYLOCAINE) 5 % ointment Apply topically 3 times daily    lactulose (CHRONULAC) 10 GM/15ML solution Take 30 g by mouth 2 times daily    Pregabalin (LYRICA PO) Take 75 mg by mouth daily    bisacodyl (DULCOLAX) 10 MG Suppository Place 10 mg rectally 2 times daily    ARIPIPRAZOLE PO Take 10 mg by mouth daily (with dinner)    ferrous fumarate 65 mg, [place]. FE,-Vitamin C 125 mg (VITRON C)  MG TABS tablet One tab by mouth once daily in between meals    amitriptyline (ELAVIL) 25 MG tablet Take 25 mg by mouth    polyethylene glycol (GOLYTELY/NULYTELY) 236 G suspension Take 2,000 mLs (2 L) by mouth once as needed Refer to \"Getting Ready for a Colonoscopy\" instruction handout    senna-docusate (SENOKOT-S;PERICOLACE) 8.6-50 MG per tablet Take 1 tablet by mouth daily    TRAZODONE HCL PO Take " 100 mg by mouth At Bedtime    warfarin (COUMADIN) 7.5 MG tablet Take 7.5 mg by mouth daily    Atorvastatin Calcium (LIPITOR PO) Take 40 mg by mouth every evening    QUEtiapine Fumarate (SEROQUEL PO) Take 25 mg by mouth 2 times daily    albuterol (2.5 MG/3ML) 0.083% neb solution Take 1 vial by nebulization every 6 hours as needed for shortness of breath / dyspnea or wheezing    albuterol (VENTOLIN HFA) 108 (90 BASE) MCG/ACT Inhaler Inhale 2 puffs into the lungs every 4 hours as needed for shortness of breath / dyspnea or wheezing    Emollient (EUCERIN INTENSIVE REPAIR EX) Externally apply topically 2 times daily as needed    Lubiprostone (AMITIZA PO) Take 24 mcg by mouth 2 times daily (with meals)    warfarin (COUMADIN) 1 MG tablet Total 8.5 mg daily    losartan (COZAAR) 25 MG tablet Take 1 tablet (25 mg) by mouth daily    metFORMIN (GLUCOPHAGE) 500 MG tablet Take 1,000 mg by mouth 2 times daily (with meals) 850 mg BID    glipiZIDE (GLUCOTROL) 5 MG tablet Take 1 tablet (5 mg) by mouth 2 times daily (before meals)    venlafaxine (EFFEXOR-XR) 150 MG 24 hr capsule Take 150 mg by mouth 2 times daily     magnesium oxide (MAG-OX) 400 (241.3 MG) MG tablet Take 1 tablet (400 mg) by mouth daily    QUEtiapine (SEROQUEL) 100 MG tablet 150 mg At Bedtime 150 mg every HS    metoprolol (LOPRESSOR) 50 MG tablet Take 100 mg by mouth 3 times daily           ALLERGIES:   Bee venom; Lisinopril; and Penicillins    ROS:  See HPI for relevant positives/negatives.       Physical Examination:   VITALS:   /77 (Cuff Size: Adult Large)  Pulse 95  Resp 18  Constitutional: alert and no acute distress  Gastrointestinal: Obese abdomen.  Soft and non-tender.   : Deferred  Musculoskeletal: extremities normal- no gross deformities noted and normal muscle tone  Skin: no rashes, lesions, wounds.   Neurologic: Proprioception intact. Normal symmetric strength.   Psychiatric: affect flat and slightly limited  insight.  Hematologic/Lymphatic/Immunologic: No LE edema.   Vascular: No bruits are noted. LE's warm, pink and with digital cap refill wnl.  Left digit refill is slightly delayed with respect to the right. Femoral and pedal pulses 2/2.     Labs:    BMP RESULTS:  Lab Results   Component Value Date     03/17/2015    POTASSIUM 4.0 03/17/2015    CHLORIDE 110 (H) 03/17/2015    CO2 25 03/17/2015    ANIONGAP 7 03/17/2015     (H) 03/17/2015    BUN 14 03/17/2015    CR 0.96 03/17/2015    GFRESTIMATED 84 03/17/2015    GFRESTBLACK >90   GFR Calc   03/17/2015    TANIA 8.8 03/17/2015        CBC RESULTS:  Lab Results   Component Value Date    WBC 13.2 (H) 02/17/2015    RBC 5.26 02/17/2015    HGB 16.6 02/17/2015    HCT 48.1 02/17/2015    MCV 91 02/17/2015    MCH 31.6 02/17/2015    MCHC 34.5 02/17/2015    RDW 13.4 02/17/2015     02/17/2015       INR/PTT:  Lab Results   Component Value Date    INR 0.92 02/19/2015    PTT 26 02/19/2015       Diagnostic studies:   Lumbar spine MR 5/17/2012:   Postoperative changes with improvement in the spinal canal  narrowing, most evident at L4-L5.    Cardiac Echo 2/14/2017:   Left ventricular function is normal.The EF is 60-65%.  S/p myomectomy. There is no LVOT gradient or FADIA. The mid anteroseptal  thickness is 11 mm  Global right ventricular function is normal.  No significant valvular abnormalities were noted.  Pulmonary artery systolic pressure is normal.  The inferior vena cava was normal in size with preserved respiratory  variability.  No pericardial effusion is present.    JANIS 11/9/2017:   Right leg: Resting JANIS: Normal (No observed evidence of increased  cardiovascular risk or peripheral arterial disease). Diminished  waveform in the first digit suggestive of small vessel disease.  Toe  pressures of 90 mmHg.     Left leg: Resting JANIS: Normal (No observed evidence of increased  cardiovascular risk or peripheral arterial disease). Flat PPG waveform in the  left first digit compatible with severe small vessel  disease in the foot.      Assessment 48y/o M with complicated medical history with bilateral toe foot pain which is most severe at the left first toe.  By non-invasive imaging and physical exam, he has no macrovascular occlusive disease but clear microvascular disease.  Even so, I think the pain he presents with is more likely related to chronic neuropathy. This could be related to diabetes and or more central disease as he does have a history of prior lumbar decompression for spinal stenosis.  Differential for his microvascular disease includes more likely phenomenon such as Thromboangiitis obliterans and/or DM related damage as well as less likely small vessel vasculitis.  No clear systemic signs/symptoms of rheumatic conditions.     Plan:   -Diagnostic LLE angio to evaluate for evidence of Thromboangiitis obliterans.  He will need to hold Coumadin, switch to Lovenox.   -This will be proceed by PACs clinic for pre-sedation evaluation given the patients significant cardiac history, as well as CLARY and obstructive airway disease. I did discuss with the patient and Dr. Mcgovern (referring provider) that even if Buergers disease is discovered and treated with smoking cessation, it is likely that this does not affect the patients foot pain which is more likely neuropathic.   -Encouraged continued progression toward complete abstinence from cigarettes. He declines formal program/assistance.  -Consider L-spine MR in this patient with history of lumbar decompression. He has not had lumbar imaging since 2012.     I spent a total of 45 minutes with this patient, > 50% of which was spent counseling.     CC  Patient Care Team:  Damon Cooper as PCP - General (Family Practice)  None  Darcy Rodriguez RN as Clinic Care Coordinator (ENT-Otolaryngology)  Ana Sanchez (Nurse Practitioner)  Cisco Hercules RN as Nurse Coordinator (Cardiology)  Casey Bangura DPM as  MD (Podiatry)  Ricardo Pickard OD as MD (Optometry)  Irlanda Calero MD as MD (Vascular Surgery)  Damon Cooper as Referring Physician (Family Practice)  ANAY HARPER        Again, thank you for allowing me to participate in the care of your patient.      Sincerely,    Elvis Macdonald MD

## 2018-01-17 ENCOUNTER — TELEPHONE (OUTPATIENT)
Dept: INTERVENTIONAL RADIOLOGY/VASCULAR | Facility: CLINIC | Age: 48
End: 2018-01-17

## 2018-01-17 ENCOUNTER — ALLIED HEALTH/NURSE VISIT (OUTPATIENT)
Dept: CARDIOLOGY | Facility: CLINIC | Age: 48
End: 2018-01-17
Attending: INTERNAL MEDICINE
Payer: MEDICAID

## 2018-01-17 DIAGNOSIS — I73.9 PAD (PERIPHERAL ARTERY DISEASE) (H): Primary | ICD-10-CM

## 2018-01-17 DIAGNOSIS — I42.1 HOCM (HYPERTROPHIC OBSTRUCTIVE CARDIOMYOPATHY) (H): Primary | ICD-10-CM

## 2018-01-17 DIAGNOSIS — I73.1 THROMBOANGIITIS OBLITERANS (H): ICD-10-CM

## 2018-01-17 PROCEDURE — 93296 REM INTERROG EVL PM/IDS: CPT | Mod: ZF

## 2018-01-17 PROCEDURE — 93295 DEV INTERROG REMOTE 1/2/MLT: CPT | Performed by: INTERNAL MEDICINE

## 2018-01-17 NOTE — PROGRESS NOTES
INTERVENTIONAL RADIOLOGY CONSULTATION    Name: Konstantin Sharp  Age: 47 year old   Referring Physician: Dr. Mcgovern   REASON FOR REFERRAL: left great toe pain, critical limb ischemia.      HPI:     48y/o Patient with history of asthma, CLARY, T2DM, HTN, CAD, Cardiomyopathy, and Afib is referred to E angio to evaluate for thromboangiitis obliterans.  Patient has a significant smoking history: 1.5-2.0 ppd since the age of 11, recently reduced to 0.5-0.75 ppd.  He finds complete cessation very difficult due to depression and anxiety related to chronic health issues.      He experiences constant bilateral great toe and lateral foot pain, worst at the left great toe.  This pain never goes away and is at its worst when ambulating. He denies calf tightness/cramping/pain when walking, although he cannot accomplish great distances due to bilateral foot pain.  He has been on Sildenafil for presumed small vessel disease which was recently increased, resulting in no change in symptoms.  No change in pain when legs are elevated, or when lying in bed at night.  No current wounds.     He has a history of lumbar spinal stenosis s/p decompression in 2012. No spinal imaging since then.     PAST MEDICAL HISTORY:   Past Medical History:   Diagnosis Date     Atrial fibrillation (H)      Back pains, lower      Cardiomyopathy      Cardiomyopathy      Cardiomyopathy (H)      Chest pain      Coronary artery disease      Depressive disorder      Dual ICD (implantable cardiac defibrillator) in place 4/29/2014     Heart disease      HTN (hypertension)      Hypertrophic nonobstructive cardiomyopathy (H) 9/12/2012     Inflammatory arthritis      Muscular dystrophy (H)      Pacemaker      Polysubstance abuse      Shortness of breath      Sleep apnea     doesn't use cpap     Stomach ulcer      Type 2 diabetes mellitus without complications (H)      Uncomplicated asthma      Unspecified asthma(493.90)        PAST SURGICAL HISTORY:   Past Surgical  History:   Procedure Laterality Date     APPENDECTOMY  1980    [facility]     C CARDIAC SURG PROCEDURE UNLIST       C HAND/FINGER SURGERY UNLISTED       C SHOULDER SURG PROC UNLISTED       C STOMACH SURGERY PROCEDURE UNLISTED       DISCECTOMY LUMBAR POSTERIOR MICROSCOPIC THREE+ LEVELS  12/16/2011    Procedure:DISCECTOMY LUMBAR POSTERIOR MICROSCOPIC THREE+ LEVELS; Posterior Decompression L2-S1; Surgeon:CAITIE OSMAN; Location:[facility]     FUSION CERVICAL POSTERIOR ONE LEVEL  8/24/2012    Procedure: FUSION CERVICAL POSTERIOR ONE LEVEL;  Posterior Instrumentated Spinal Fusion Cervical 6-7, Right Iliac Crest Bone Wallace ;  Surgeon: Caitie Osman MD;  Location: [facility]     GRAFT BONE FROM ILIAC CREST  8/24/2012    Procedure: GRAFT BONE FROM ILIAC CREST;;  Surgeon: Caitie Osman MD;  Location: [facility]     HC SACROPLASTY       IMPLANT PACEMAKER       INJECT STEROID (LOCATION) N/A 2/19/2015    Procedure: INJECT STEROID (LOCATION);  Surgeon: Siri Koch MD;  Location: [facility]     INSERT STIMULATOR AND LEADS INTERNAL DORSAL COLUMN  8/7/2013    Procedure: INSERT STIMULATOR AND LEADS INTERNAL DORSAL COLUMN;  SPINAL CORD STIMULATOR IMPLANT ;  Surgeon: Ricardo Bruno MD;  Location: [facility] OR     INSERT STIMULATOR DORSAL COLUMN  08/13/2013    lead replacemend, 12?2016,  battery replacement 4/4/2016     KNEE SURGERY       LASER CO2 LARYNGOSCOPY N/A 2/19/2015    Procedure: LASER CO2 LARYNGOSCOPY;  Surgeon: Siri Koch MD;  [facility]     LASER CO2 LARYNGOSCOPY, COMPLEX  7/22/2014    Procedure: LASER CO2 LARYNGOSCOPY, COMPLEX;  Surgeon: Siri Koch MD;  [facility]     MYECTOMY SEPTAL  4/14/2014    Procedure: Median Sternotomy, Septal Myectomy, Intraoperative BRENT performed by Dr. Castano, on pump oxygenator.;  Surgeon: Aguila Cannon MD;  [facility]     NECK SURGERY       SHOULDER SURGERY  2006    RIGHT     STOMACH SURGERY  1980     "partial gastrectomy for bleeding ulcers, \"stress related\". [facility]     WRIST SURGERY Left 1988    fractured. 1988 or so       FAMILY HISTORY:   Family History   Problem Relation Age of Onset     HEART DISEASE Father 32     MIs x2; s/p CABG     Substance Abuse Father      Hypertension Father      Obesity Father      Hyperlipidemia Father      Hypertension Brother      DIABETES Brother      Glaucoma Maternal Grandmother      Hypertension Maternal Grandmother      DIABETES Maternal Grandfather      Glaucoma Maternal Grandfather      Hypertension Maternal Grandfather      CEREBROVASCULAR DISEASE Maternal Grandfather      Obesity Maternal Grandfather      Hyperlipidemia Maternal Grandfather      Coronary Artery Disease Maternal Grandfather      Substance Abuse Other      CANCER Other      Hypertension Other      Obesity Other      Other Cancer Other      all over     Hyperlipidemia Other      Coronary Artery Disease Other      Obesity Brother      Hyperlipidemia Brother      Lymphoma Maternal Uncle      Macular Degeneration No family hx of        SOCIAL HISTORY:   Social History   Substance Use Topics     Smoking status: Light Tobacco Smoker     Packs/day: 1.00     Years: 28.00     Types: Cigarettes     Last attempt to quit: 4/7/2014     Smokeless tobacco: Never Used     Alcohol use 0.0 oz/week     0 Standard drinks or equivalent per week      Comment: 0-1 X Weekly       PROBLEM LIST:   Patient Active Problem List    Diagnosis Date Noted     Chronic pain due to trauma 09/03/2016     Priority: Medium     Overview:   Broken back/neck 1996/2007 respectively. Did have procedure for spine stimulator       Esophageal reflux 09/03/2016     Priority: Medium     Essential hypertension 09/03/2016     Priority: Medium     History of traumatic brain injury 09/03/2016     Priority: Medium     Large bowel obstruction 09/03/2016     Priority: Medium     S/P laminectomy 09/03/2016     Priority: Medium     Overview:   replacement " of failed spinal cord stimulator & placement of epidural paddle electrode - 9/2/2016       Tobacco dependence 09/03/2016     Priority: Medium     Cognitive disorder 06/16/2016     Priority: Medium     Type 2 diabetes mellitus with diabetic neuropathy (H) 05/02/2016     Priority: Medium     Seizures (H) 02/01/2016     Priority: Medium     Respiratory failure (H) 10/19/2015     Priority: Medium     Insomnia 03/13/2015     Priority: Medium     [facility] 03/11/2015     Priority: Medium     For urgent after hours needs, please call [phone] and ask to speak with the [facility] Physician on call - mention job code [phone].           Dysphonia 01/05/2015     Priority: Medium     Postprocedural subglottic stenosis 07/22/2014     Priority: Medium     Pre-operative cardiovascular examination 07/16/2014     Priority: Medium     Subglottic stenosis 07/07/2014     Priority: Medium     Automatic implantable cardioverter-defibrillator in situ- [employer], dual chamber- DEPENDENT 04/30/2014     Priority: Medium     Implanted 4/29/14 by Dr. Lacy  Problem list name updated by automated process. Provider to review       S/P ventricular septal myectomy 04/14/2014     Priority: Medium     Syncope 12/28/2013     Priority: Medium     HOCM (hypertrophic obstructive cardiomyopathy) (H) 09/30/2013     Priority: Medium     Atrial fibrillation (H) 05/15/2013     Priority: Medium     Asthma 01/24/2013     Priority: Medium     Depressive disorder 01/24/2013     Priority: Medium     Old myocardial infarction 01/24/2013     Priority: Medium     Osteoarthrosis 01/24/2013     Priority: Medium     Spinal stenosis, lumbar region, with neurogenic claudication 11/15/2012     Priority: Medium     Lumbar radicular pain 11/15/2012     Priority: Medium     s/p PSF C6-7 for anterior pseudarthrosis 10/11/2012     Priority: Medium     Hypertrophic nonobstructive  "cardiomyopathy (H) 09/12/2012     Priority: Medium     Chest pain 09/12/2012     Priority: Medium     Sinus tachycardia 09/12/2012     Priority: Medium     Pseudoarthrosis of cervical spine (H) 08/24/2012     Priority: Medium     Chondromalacia of patella 10/25/2011     Priority: Medium     Anticoagulant long-term use 02/18/2011     Priority: Medium     Apnea, sleep 02/18/2011     Priority: Medium     Cervical vertebral fusion 12/08/2010     Priority: Medium     Hypertensive disorder 06/29/2010     Priority: Medium     Herniated cervical intervertebral disc 06/26/2008     Priority: Medium       MEDICATIONS:   Prescription Medications as of 1/17/2018             linaclotide (LINZESS) 290 MCG capsule Take 1 capsule (290 mcg) by mouth every morning (before breakfast)    sildenafil (REVATIO) 20 MG tablet Take 0.5 tablets (10 mg) by mouth 3 times daily    lidocaine (XYLOCAINE) 5 % ointment Apply topically 3 times daily    lactulose (CHRONULAC) 10 GM/15ML solution Take 30 g by mouth 2 times daily    Pregabalin (LYRICA PO) Take 75 mg by mouth daily    bisacodyl (DULCOLAX) 10 MG Suppository Place 10 mg rectally 2 times daily    ARIPIPRAZOLE PO Take 10 mg by mouth daily (with dinner)    ferrous fumarate 65 mg, [place]. FE,-Vitamin C 125 mg (VITRON C)  MG TABS tablet One tab by mouth once daily in between meals    amitriptyline (ELAVIL) 25 MG tablet Take 25 mg by mouth    polyethylene glycol (GOLYTELY/NULYTELY) 236 G suspension Take 2,000 mLs (2 L) by mouth once as needed Refer to \"Getting Ready for a Colonoscopy\" instruction handout    senna-docusate (SENOKOT-S;PERICOLACE) 8.6-50 MG per tablet Take 1 tablet by mouth daily    TRAZODONE HCL PO Take 100 mg by mouth At Bedtime    warfarin (COUMADIN) 7.5 MG tablet Take 7.5 mg by mouth daily    Atorvastatin Calcium (LIPITOR PO) Take 40 mg by mouth every evening    QUEtiapine Fumarate (SEROQUEL PO) Take 25 mg by mouth 2 times daily    albuterol (2.5 MG/3ML) 0.083% neb solution " Take 1 vial by nebulization every 6 hours as needed for shortness of breath / dyspnea or wheezing    albuterol (VENTOLIN HFA) 108 (90 BASE) MCG/ACT Inhaler Inhale 2 puffs into the lungs every 4 hours as needed for shortness of breath / dyspnea or wheezing    Emollient (EUCERIN INTENSIVE REPAIR EX) Externally apply topically 2 times daily as needed    Lubiprostone (AMITIZA PO) Take 24 mcg by mouth 2 times daily (with meals)    warfarin (COUMADIN) 1 MG tablet Total 8.5 mg daily    losartan (COZAAR) 25 MG tablet Take 1 tablet (25 mg) by mouth daily    metFORMIN (GLUCOPHAGE) 500 MG tablet Take 1,000 mg by mouth 2 times daily (with meals) 850 mg BID    glipiZIDE (GLUCOTROL) 5 MG tablet Take 1 tablet (5 mg) by mouth 2 times daily (before meals)    venlafaxine (EFFEXOR-XR) 150 MG 24 hr capsule Take 150 mg by mouth 2 times daily     magnesium oxide (MAG-OX) 400 (241.3 MG) MG tablet Take 1 tablet (400 mg) by mouth daily    QUEtiapine (SEROQUEL) 100 MG tablet 150 mg At Bedtime 150 mg every HS    metoprolol (LOPRESSOR) 50 MG tablet Take 100 mg by mouth 3 times daily           ALLERGIES:   Bee venom; Lisinopril; and Penicillins    ROS:  See HPI for relevant positives/negatives.       Physical Examination:   VITALS:   /77 (Cuff Size: Adult Large)  Pulse 95  Resp 18  Constitutional: alert and no acute distress  Gastrointestinal: Obese abdomen.  Soft and non-tender.   : Deferred  Musculoskeletal: extremities normal- no gross deformities noted and normal muscle tone  Skin: no rashes, lesions, wounds.   Neurologic: Proprioception intact. Normal symmetric strength.   Psychiatric: affect flat and slightly limited insight.  Hematologic/Lymphatic/Immunologic: No LE edema.   Vascular: No bruits are noted. LE's warm, pink and with digital cap refill wnl.  Left digit refill is slightly delayed with respect to the right. Femoral and pedal pulses 2/2.     Labs:    BMP RESULTS:  Lab Results   Component Value Date      03/17/2015    POTASSIUM 4.0 03/17/2015    CHLORIDE 110 (H) 03/17/2015    CO2 25 03/17/2015    ANIONGAP 7 03/17/2015     (H) 03/17/2015    BUN 14 03/17/2015    CR 0.96 03/17/2015    GFRESTIMATED 84 03/17/2015    GFRESTBLACK >90   GFR Calc   03/17/2015    TANIA 8.8 03/17/2015        CBC RESULTS:  Lab Results   Component Value Date    WBC 13.2 (H) 02/17/2015    RBC 5.26 02/17/2015    HGB 16.6 02/17/2015    HCT 48.1 02/17/2015    MCV 91 02/17/2015    MCH 31.6 02/17/2015    MCHC 34.5 02/17/2015    RDW 13.4 02/17/2015     02/17/2015       INR/PTT:  Lab Results   Component Value Date    INR 0.92 02/19/2015    PTT 26 02/19/2015       Diagnostic studies:   Lumbar spine MR 5/17/2012:   Postoperative changes with improvement in the spinal canal  narrowing, most evident at L4-L5.    Cardiac Echo 2/14/2017:   Left ventricular function is normal.The EF is 60-65%.  S/p myomectomy. There is no LVOT gradient or FADIA. The mid anteroseptal  thickness is 11 mm  Global right ventricular function is normal.  No significant valvular abnormalities were noted.  Pulmonary artery systolic pressure is normal.  The inferior vena cava was normal in size with preserved respiratory  variability.  No pericardial effusion is present.    JANIS 11/9/2017:   Right leg: Resting JANIS: Normal (No observed evidence of increased  cardiovascular risk or peripheral arterial disease). Diminished  waveform in the first digit suggestive of small vessel disease.  Toe  pressures of 90 mmHg.     Left leg: Resting JANIS: Normal (No observed evidence of increased  cardiovascular risk or peripheral arterial disease). Flat PPG waveform in the left first digit compatible with severe small vessel  disease in the foot.      Assessment 48y/o M with complicated medical history with bilateral toe foot pain which is most severe at the left first toe.  By non-invasive imaging and physical exam, he has no macrovascular occlusive disease but clear  microvascular disease.  Even so, I think the pain he presents with is more likely related to chronic neuropathy. This could be related to diabetes and or more central disease as he does have a history of prior lumbar decompression for spinal stenosis.  Differential for his microvascular disease includes more likely phenomenon such as Thromboangiitis obliterans and/or DM related damage as well as less likely small vessel vasculitis.  No clear systemic signs/symptoms of rheumatic conditions.     Plan:   -Diagnostic LLE angio to evaluate for evidence of Thromboangiitis obliterans.  He will need to hold Coumadin, switch to Lovenox.   -This will be proceed by PACs clinic for pre-sedation evaluation given the patients significant cardiac history, as well as CLARY and obstructive airway disease. I did discuss with the patient and Dr. Harper (referring provider) that even if Buergers disease is discovered and treated with smoking cessation, it is likely that this does not affect the patients foot pain which is more likely neuropathic.   -Encouraged continued progression toward complete abstinence from cigarettes. He declines formal program/assistance.  -Consider L-spine MR in this patient with history of lumbar decompression. He has not had lumbar imaging since 2012.     I spent a total of 45 minutes with this patient, > 50% of which was spent counseling.     CC  Patient Care Team:  Damon Cooper as PCP - General (Family Practice)  None  Darcy Rodriguez, RN as Clinic Care Coordinator (ENT-Otolaryngology)  Ana Sanchez (Nurse Practitioner)  Cisco Hercules, RN as Nurse Coordinator (Cardiology)  Casey Bangura DPM as MD (Podiatry)  Ricardo Pickard OD as MD (Optometry)  Irlanda Calero MD as MD (Vascular Surgery)  Damon Cooper as Referring Physician (Family Practice)  ANAY HARPER

## 2018-01-17 NOTE — MR AVS SNAPSHOT
After Visit Summary   1/17/2018    Konstantin Sharp    MRN: [id number]           Patient Information     Date Of Birth          1970        Visit Information        Provider Department      1/17/2018 6:00 AM [facility]        Today's Diagnoses     HOCM (hypertrophic obstructive cardiomyopathy) (H)    -  1       Follow-ups after your visit        Your next 10 appointments already scheduled     Jan 31, 2018  9:30 AM CST   Procedure 6.5 hour with U2A ROOM 4   Unit [address]A [facility] ([facility])    [address] [facility] [address]               Jan 31, 2018 11:00 AM CST   (Arrive by 10:45 AM)   IR LOWER EXTREMITY ANGIOGRAM LEFT with UUIR4   [facility] [place], Interventional Radiology ([facility])    [address] [address] [phone]           1. Your doctor will need to do a history and physical within 30 days before this procedure. 2. Your doctor will determine whether you need a 12 lead EKG, as well as which medications should not be taken the morning of the exam. 3. Laboratory tests are to be obtained by your doctor prior to the exam (creatinine, Hgb/Hct, platelet count, and INR) 4. If you have allergies to x-ray contrast or iodine, contact your doctor or a Radiology nurse prior to the exam day for instructions. 5. Someone will need to drive you to and from the hospital. 6. Bring a list of all drugs you are taking; include supplements and over-the-counter medications. 7. Wear comfortable clothes and leave your valuables at home. 8. If you are or may be pregnant, contact your doctor or a Radiology nurse prior to the day of the exam. 9.  If you have diabetes, check with your doctor or a Radiology nurse to see if your insulin needs to be adjusted for the exam. 10. If you are taking Coumadin (to thin you blood) please contact your doctor or a Radiology nurse  at least 5 days before the exam for special instructions. 11. You should not have received barium (x-ray contrast) within 48 hours of this exam. 12. The day before your exam you may eat your regular diet and are encouraged to drink at least 2 quarts of clear liquids. Drink no alcoholic beverages for 24 hours prior to the exam. 13. If you have a colostomy you will need to irrigate it with tap water at 8PM the evening before and again at 6AM the morning of the exam. 14. Do not smoke for 24 hours prior to the procedure. 15. Do not eat any solid food or milk products for 6 hours prior to the exam. You may drink clear liquids until 2 hours prior to the exam. Clear liquids include the following: water, Jell-O, clear broth, apple juice or any non-carbonated drink that you can see through (no pop!) 16. The morning of the exam you may brush your teeth and take medications as directed with a sip of water. 17. Tell the Radiology nurse if you have any allergies. 18. You will be asked to empty your bladder before the exam begins. 19. Following the exam you will need to remain on complete bedrest for 4-6 hours. The nurse will monitor your vital signs, puncture site, and the pulses and temperature of the arm or leg that was punctured. 20. When discharged, you cannot drive until morning, and an adult must be with you until then. You should stay in the [facility] area overnight.            Apr 16, 2018  3:00 PM CDT   (Arrive by 2:45 PM)   Implanted Defibulator with Uc Cv Device 1   [facility] ([facility])    [address] [place] [address]  [address] [place] [address] [phone]            May 29, 2018  5:00 PM CDT   (Arrive by 4:45 PM)   Return Vascular Visit with Kenia Mcgovern MD   [facility])    [address] [place] [address]  [address] [place] [address] [phone]              Who to contact     If you have questions or need follow up  "information about today's clinic visit or your schedule please contact [facility] directly at [phone].  Normal or non-critical lab and imaging results will be communicated to you by MyChart, letter or phone within 4 business days after the clinic has received the results. If you do not hear from us within 7 days, please contact the clinic through MyChart or phone. If you have a critical or abnormal lab result, we will notify you by phone as soon as possible.  Submit refill requests through [employer] or call your pharmacy and they will forward the refill request to us. Please allow 3 business days for your refill to be completed.          Additional Information About Your Visit        MyChart Information     [employer] lets you send messages to your doctor, view your test results, renew your prescriptions, schedule appointments and more. To sign up, go to [email or web address] . Click on \"Log in\" on the left side of the screen, which will take you to the Welcome page. Then click on \"Sign up Now\" on the right side of the page.     You will be asked to enter the access code listed below, as well as some personal information. Please follow the directions to create your username and password.     Your access code is: [email or web address]  Expires: 4/3/2018  6:30 AM     Your access code will  in 90 days. If you need help or a new code, please call your [facility] clinic or [phone].        Care EveryWhere ID     This is your Care EveryWhere ID. This could be used by other organizations to access your [facility] medical records  [id number]         Blood Pressure from Last 3 Encounters:   01/15/18 105/77   17 (!) 142/92   17 126/83    Weight from Last 3 Encounters:   17 121.4 kg (267 lb 11.2 oz)   17 121.5 kg (267 lb 12.8 oz)   17 121.2 kg (267 lb 1.6 oz)              We Performed the Following     INTERROGATION DEVICE EVAL REMOTE, PACER/ICD        Primary Care Provider Office Phone " # Fax #    Damon Cooper [phone]       [facility] [address] [facility] [address]        Equal Access to Services     CAROLINE BARBER : Hadii migdalia ku hadibano Soomaali, waaxda luqadaha, qaybta kaalmada adedylan, gustavo hernandez laGeovannidonna kennedy. So [facility] [phone].    ATENCIÓN: Si habla español, tiene a dover disposición servicios gratuitos de asistencia lingüística. Llame al [phone].    We comply with applicable federal civil rights laws and [place] laws. We do not discriminate on the basis of race, color, national origin, age, disability, sex, sexual orientation, or gender identity.            Thank you!     Thank you for choosing [facility]  for your care. Our goal is always to provide you with excellent care. Hearing back from our patients is one way we can continue to improve our services. Please take a few minutes to complete the written survey that you may receive in the mail after your visit with us. Thank you!             Your Updated Medication List - Protect others around you: Learn how to safely use, store and throw away your medicines at www.disposemymeds.org.          This list is accurate as of: 1/17/18  4:24 PM.  Always use your most recent med list.                   Brand Name Dispense Instructions for use Diagnosis    * albuterol (2.5 MG/3ML) 0.083% neb solution      Take 1 vial by nebulization every 6 hours as needed for shortness of breath / dyspnea or wheezing        * VENTOLIN  (90 BASE) MCG/ACT Inhaler   Generic drug:  albuterol      Inhale 2 puffs into the lungs every 4 hours as needed for shortness of breath / dyspnea or wheezing        AMITIZA PO      Take 24 mcg by mouth 2 times daily (with meals)        amitriptyline 25 MG tablet    ELAVIL     Take 25 mg by mouth        ARIPIPRAZOLE PO      Take 10 mg by mouth daily (with dinner)        bisacodyl 10 MG Suppository    DULCOLAX     Place 10 mg rectally 2 times daily        EUCERIN  "INTENSIVE REPAIR EX      Externally apply topically 2 times daily as needed        ferrous fumarate 65 mg (Koi. FE)-Vitamin C 125 mg  MG Tabs tablet    VITRON C    30 tablet    One tab by mouth once daily in between meals        glipiZIDE 5 MG tablet    GLUCOTROL    30 tablet    Take 1 tablet (5 mg) by mouth 2 times daily (before meals)    Diabetes mellitus (H)       lactulose 10 GM/15ML solution    CHRONULAC     Take 30 g by mouth 2 times daily        lidocaine 5 % ointment    XYLOCAINE     Apply topically 3 times daily        linaclotide 290 MCG capsule    LINZESS    30 capsule    Take 1 capsule (290 mcg) by mouth every morning (before breakfast)    Constipation, unspecified constipation type       LIPITOR PO      Take 40 mg by mouth every evening        losartan 25 MG tablet    COZAAR    30 tablet    Take 1 tablet (25 mg) by mouth daily    Hypertrophic cardiomyopathy (H)       LYRICA PO      Take 75 mg by mouth daily        magnesium oxide 400 (241.3 MG) MG tablet    MAG-OX    60 tablet    Take 1 tablet (400 mg) by mouth daily    Hypertrophic cardiomyopathy (H)       metFORMIN 500 MG tablet    GLUCOPHAGE    60 tablet    Take 1,000 mg by mouth 2 times daily (with meals) 850 mg BID    Diabetes mellitus (H)       metoprolol tartrate 50 MG tablet    LOPRESSOR    180 tablet    Take 100 mg by mouth 3 times daily    S/P ventricular septal myectomy, Congestive heart failure, unspecified, Hypertrophic cardiomyopathy (H)       polyethylene glycol 236 G suspension    GoLYTELY/NuLYTELY    4000 mL    Take 2,000 mLs (2 L) by mouth once as needed Refer to \"Getting Ready for a Colonoscopy\" instruction handout    Other constipation       * SEROQUEL PO      Take 25 mg by mouth 2 times daily        * QUEtiapine 100 MG tablet    SEROquel    30 tablet    150 mg At Bedtime 150 mg every HS    Insomnia       senna-docusate 8.6-50 MG per tablet    SENOKOT-S;PERICOLACE     Take 1 tablet by mouth daily        sildenafil 20 MG tablet "    REVATIO    90 tablet    Take 0.5 tablets (10 mg) by mouth 3 times daily    Critical lower limb ischemia       TRAZODONE HCL PO      Take 100 mg by mouth At Bedtime        venlafaxine 150 MG 24 hr capsule    EFFEXOR-XR    30 capsule    Take 150 mg by mouth 2 times daily    Adjustment disorder with depressed mood       * warfarin 7.5 MG tablet    COUMADIN     Take 7.5 mg by mouth daily        * warfarin 1 MG tablet    COUMADIN    30 tablet    Total 8.5 mg daily    Atrial fibrillation (H)       * Notice:  This list has 6 medication(s) that are the same as other medications prescribed for you. Read the directions carefully, and ask your doctor or other care provider to review them with you.

## 2018-01-17 NOTE — PROGRESS NOTES
Pt sent in a routine remote ICD check for evaluation of his Medtronic dual chamber ICD per MD order.  His ICD check does not show any episodes of atrial or ventricular arrhythmias, he is RV pacing 98.8% of the time, his heart rate histograms show good heart rate variation and his optivol is at baseline.  His ICD battery estimates 4.1 years left and the ICD is functioning normally.  Pt was called with the results and he reports feeling well and he does not offer any complaints.  We will plan for another remote on 4/16/18.    remote ICD

## 2018-01-18 NOTE — TELEPHONE ENCOUNTER
Called pt and informed pt that Dr Macdonald did consult with Dr. Mcgovern. We will move forward with the lower extremity angiogram.     He is willing to take first availability for the procedure.     Informed him that I will call him back with appt details.     *Called pt today informing him that I do have appt details. We will need to have him see PAC clinic as well as hold his coumadin 5 days prior to along with bridging. I've asked him to return my call for further details.   Left direct line for him.       Abby Gonzalez RN, BSN  Interventional Radiology Nurse Coordinator   Phone: [phone]

## 2018-01-22 ENCOUNTER — TELEPHONE (OUTPATIENT)
Dept: INTERVENTIONAL RADIOLOGY/VASCULAR | Facility: CLINIC | Age: 48
End: 2018-01-22

## 2018-01-22 ENCOUNTER — TEAM CONFERENCE (OUTPATIENT)
Dept: INTERVENTIONAL RADIOLOGY/VASCULAR | Facility: CLINIC | Age: 48
End: 2018-01-22

## 2018-01-22 ENCOUNTER — COMMUNICATION - HEALTHEAST (OUTPATIENT)
Dept: NEUROLOGY | Facility: CLINIC | Age: 48
End: 2018-01-22

## 2018-01-22 DIAGNOSIS — F33.1 MDD (MAJOR DEPRESSIVE DISORDER), RECURRENT EPISODE, MODERATE (H): ICD-10-CM

## 2018-01-22 NOTE — TELEPHONE ENCOUNTER
2nd attempt to reach pt to inform him of his upcoming PAC appt as well as angiogram procedure.     Left direct line for him to return my call.     I did inform him that we have his PAC appt scheduled for tomorrow Tues 1/23 at 830am, and I did inform him to prepare as this can take 2.5-3 hours in preparation for his Lower extremity angiogram procedure which is scheduled for Weds 1/31.     I left my direct line for him to return my call.     Abby Gonzalez RN, BSN  Interventional Radiology Nurse Coordinator   Phone: [phone]

## 2018-01-22 NOTE — TELEPHONE ENCOUNTER
[facility] Triage RN did return my call informing us that they did get my msg regarding the need for coumadin bridging therapy.      -  Abby Gonzalez RN, BSN  Interventional Radiology Nurse Coordinator   Phone: [phone]

## 2018-01-22 NOTE — TELEPHONE ENCOUNTER
Pt did return my phone call.     I once again reiterated to him his appts. I also informed him that I have sent out a letter as an appt reminder for his procedure.     I asked in regards to who manages his coumadin, in which he informs me that it's his PCP at [facility]. I will call over there and inform her of our pending procedure as he will need to be bridged off and on with coumadin.     *[facility] clinic called and a VM was left on the Triage RN line indicating that IR Protocol is to hold coumadin 5 days prior to and/or his INR needs to be equal to or less than 1.8 for this procedure.     Abby Gonzalez RN, BSN  Interventional Radiology Nurse Coordinator   Phone: [phone]

## 2018-01-23 ENCOUNTER — OFFICE VISIT (OUTPATIENT)
Dept: SURGERY | Facility: CLINIC | Age: 48
End: 2018-01-23
Payer: MEDICAID

## 2018-01-23 ENCOUNTER — ALLIED HEALTH/NURSE VISIT (OUTPATIENT)
Dept: SURGERY | Facility: CLINIC | Age: 48
End: 2018-01-23
Payer: MEDICAID

## 2018-01-23 ENCOUNTER — APPOINTMENT (OUTPATIENT)
Dept: SURGERY | Facility: CLINIC | Age: 48
End: 2018-01-23
Payer: MEDICAID

## 2018-01-23 VITALS
WEIGHT: 268 LBS | HEART RATE: 86 BPM | DIASTOLIC BLOOD PRESSURE: 75 MMHG | BODY MASS INDEX: 37.52 KG/M2 | OXYGEN SATURATION: 95 % | HEIGHT: 71 IN | SYSTOLIC BLOOD PRESSURE: 117 MMHG | RESPIRATION RATE: 20 BRPM | TEMPERATURE: 98.2 F

## 2018-01-23 DIAGNOSIS — I73.9 PAD (PERIPHERAL ARTERY DISEASE) (H): ICD-10-CM

## 2018-01-23 DIAGNOSIS — Z01.818 PREOP EXAMINATION: Primary | ICD-10-CM

## 2018-01-23 LAB
CREAT SERPL-MCNC: 0.93 MG/DL (ref 0.66–1.25)
GFR SERPL CREATININE-BSD FRML MDRD: 86 ML/MIN/1.7M2
HBA1C MFR BLD: 8.1 % (ref 4.3–6)
POTASSIUM SERPL-SCNC: 4.1 MMOL/L (ref 3.4–5.3)

## 2018-01-23 RX ORDER — QUETIAPINE 150 MG/1
150 TABLET, FILM COATED, EXTENDED RELEASE ORAL AT BEDTIME
COMMUNITY
End: 2021-12-28

## 2018-01-23 NOTE — H&P
Pre-Operative H & P     CC:  Preoperative exam to assess for increased cardiopulmonary risk while undergoing surgery and anesthesia.    Date of Encounter: January 23, 2018   Primary Care Physician:  Damon Cooper  Konstantin Sharp is a 47 year old male who presents for pre-operative H & P in preparation for a Diagnostic LLE angiogram to evaluate for evidence of Thromboangiitis obliterans on 1/31/18.    His past medical history is significant for hypertrophic cardiomyopathy with ICD implantation, atrial fibrillation,  type 2 diabetes uncontrolled with severe neuropathy, hypertension,.  He also has intubation-related subglottic stenosis and a difficult intubation was noted in the [facility] anesthesia records from 2015.      Konstantin Sharp   has a past medical history of Atrial fibrillation (H); Chest pain; Chronic pain; Cognitive disorder; Depressive disorder; Dual ICD (implantable cardiac defibrillator) in place (04/29/2014); GERD (gastroesophageal reflux disease); H/O traumatic brain injury (2015); HTN (hypertension); Hypertrophic nonobstructive cardiomyopathy (H) (09/12/2012); Inflammatory arthritis; Intermittent asthma; PAD (peripheral artery disease) (H); Polysubstance abuse; Seizures (H); Sleep apnea; and Type 2 diabetes mellitus without complications (H).      History is obtained from the patient and medical record including Care Everywhere.        Past Surgical History  Past Surgical History:   Procedure Laterality Date     APPENDECTOMY  1980    [facility]? near [facility]     C CARDIAC SURG PROCEDURE UNLIST       C HAND/FINGER SURGERY UNLISTED       C SHOULDER SURG PROC UNLISTED       C STOMACH SURGERY PROCEDURE UNLISTED       DISCECTOMY LUMBAR POSTERIOR MICROSCOPIC THREE+ LEVELS  12/16/2011    Procedure:DISCECTOMY LUMBAR POSTERIOR MICROSCOPIC THREE+ LEVELS; Posterior Decompression L2-S1; Surgeon:CAITIE OSMAN; Location:[facility] OR     FUSION CERVICAL POSTERIOR ONE LEVEL  8/24/2012    Procedure: FUSION  "CERVICAL POSTERIOR ONE LEVEL;  Posterior Instrumentated Spinal Fusion Cervical 6-7, Right Iliac [place] ;  Surgeon: Lopez Funez MD;  [facility] OR     GRAFT BONE FROM ILIAC CREST  8/24/2012    Procedure: GRAFT BONE FROM ILIAC CREST;;  Surgeon: Lopez Funez MD;  Location: [facility] OR     HC SACROPLASTY       IMPLANT PACEMAKER       INJECT STEROID (LOCATION) N/A 2/19/2015    Procedure: INJECT STEROID (LOCATION);  Surgeon: Siri Koch MD;  [facility]     INSERT STIMULATOR AND LEADS INTERNAL DORSAL COLUMN  8/7/2013    Procedure: INSERT STIMULATOR AND LEADS INTERNAL DORSAL COLUMN;  SPINAL CORD STIMULATOR IMPLANT ;  Surgeon: Ricardo Bruno MD;  Location: [facility] OR     INSERT STIMULATOR DORSAL COLUMN  08/13/2013    lead replacemend, 12?2016,  battery replacement 4/4/2016     KNEE SURGERY       LASER CO2 LARYNGOSCOPY N/A 2/19/2015    Procedure: LASER CO2 LARYNGOSCOPY;  Surgeon: Siri Koch MD;  [facility]     LASER CO2 LARYNGOSCOPY, COMPLEX  7/22/2014    Procedure: LASER CO2 LARYNGOSCOPY, COMPLEX;  Surgeon: Siri Koch MD;  [facility]     MYECTOMY SEPTAL  4/14/2014    Procedure: Median Sternotomy, Septal Myectomy, Intraoperative BRENT performed by Dr. Castano, on pump oxygenator.;  Surgeon: Aguila Cannon MD;  [facility]     NECK SURGERY       SHOULDER SURGERY  2006    RIGHT     STOMACH SURGERY  1980    partial gastrectomy for bleeding ulcers, \"stress related\". [facility]     WRIST SURGERY Left 1988    fractured. 1988 or so       Hx of Blood transfusions/reactions: No reaction     Personal or FH with difficulty with Anesthesia:  According to [facility] anesthesia note 2015, patient was a difficult intubation; patient has supra glottis stenosis, history of tracheostomy 2014 following pneumonia according to patient    Prior to Admission Medications  Current Outpatient Prescriptions   Medication Sig Dispense Refill     WARFARIN " SODIUM PO Take 8.5 mg by mouth every morning       QUEtiapine (SEROQUEL XR) 150 MG TB24 24 hr tablet Take 150 mg by mouth At Bedtime       linaclotide (LINZESS) 290 MCG capsule Take 1 capsule (290 mcg) by mouth every morning (before breakfast) 30 capsule 1     sildenafil (REVATIO) 20 MG tablet Take 0.5 tablets (10 mg) by mouth 3 times daily 90 tablet 3     lidocaine (XYLOCAINE) 5 % ointment Apply topically 3 times daily For feet       Pregabalin (LYRICA PO) Take 75 mg by mouth 2 times daily        bisacodyl (DULCOLAX) 10 MG Suppository Place 10 mg rectally daily as needed        amitriptyline (ELAVIL) 25 MG tablet Take 25 mg by mouth At Bedtime        senna-docusate (SENOKOT-S;PERICOLACE) 8.6-50 MG per tablet Take 1 tablet by mouth daily       TRAZODONE HCL PO Take 100 mg by mouth At Bedtime       Atorvastatin Calcium (LIPITOR PO) Take 40 mg by mouth daily        QUEtiapine Fumarate (SEROQUEL PO) Take 25 mg by mouth 2 times daily       albuterol (2.5 MG/3ML) 0.083% neb solution Take 1 vial by nebulization every 6 hours as needed for shortness of breath / dyspnea or wheezing       albuterol (VENTOLIN HFA) 108 (90 BASE) MCG/ACT Inhaler Inhale 2 puffs into the lungs every 4 hours as needed for shortness of breath / dyspnea or wheezing       Emollient (EUCERIN INTENSIVE REPAIR EX) Externally apply topically 2 times daily as needed       Lubiprostone (AMITIZA PO) Take 24 mcg by mouth 2 times daily (with meals)       losartan (COZAAR) 25 MG tablet Take 1 tablet (25 mg) by mouth daily 30 tablet      metFORMIN (GLUCOPHAGE) 500 MG tablet Take 1,000 mg by mouth 2 times daily (with meals)  60 tablet      glipiZIDE (GLUCOTROL) 5 MG tablet Take 1 tablet (5 mg) by mouth 2 times daily (before meals) 30 tablet      venlafaxine (EFFEXOR-XR) 150 MG 24 hr capsule Take 150 mg by mouth 2 times daily  30 capsule 1     metoprolol (LOPRESSOR) 50 MG tablet Take 100 mg by mouth 3 times daily  180 tablet 4         Allergies  Bee venom;  Lisinopril; and Penicillins     Social History  Social History     Social History     Marital status: Single     Spouse name: N/A     Number of children: N/A     Years of education: N/A     Occupational History     Not on file.     Social History Main Topics     Smoking status: Light Tobacco Smoker     Packs/day: 1.00     Years: 28.00     Types: Cigarettes     Last attempt to quit: 4/7/2014     Smokeless tobacco: Never Used     Alcohol use 0.0 oz/week     0 Standard drinks or equivalent per week      Comment: rare     Drug use: No      Comment: last using meth 1/2017     Sexual activity: Not Currently     Partners: Female     Other Topics Concern     Parent/Sibling W/ Cabg, Mi Or Angioplasty Before 65f 55m? Yes     Social History Narrative          Family History  Family History   Problem Relation Age of Onset     HEART DISEASE Father 32     MIs x2; s/p CABG     Substance Abuse Father      Hypertension Father      Obesity Father      Hyperlipidemia Father      Hypertension Brother      DIABETES Brother      Glaucoma Maternal Grandmother      Hypertension Maternal Grandmother      DIABETES Maternal Grandfather      Glaucoma Maternal Grandfather      Hypertension Maternal Grandfather      CEREBROVASCULAR DISEASE Maternal Grandfather      Obesity Maternal Grandfather      Hyperlipidemia Maternal Grandfather      Coronary Artery Disease Maternal Grandfather      Substance Abuse Other      CANCER Other      Hypertension Other      Obesity Other      Other Cancer Other      all over     Hyperlipidemia Other      Coronary Artery Disease Other      Obesity Brother      Hyperlipidemia Brother      Lymphoma Maternal Uncle      Macular Degeneration No family hx of         ROS   The complete review of systems is negative other than noted in the HPI or here.   Constitutional: Denies  fevers/chills.    EENT:  Difficulty swallowing solids  Cardiovascular: Intermittent CP; Denies  RAY or orthopnea, palpitations or  "syncope.  Respiratory: Denies shortness of breath or significant cough.    GI: Denies nausea/vomiting     : Denies dysuria or urinary frequency  Musculoskeletal: Chronic back pain and arthritis in knees; Denies joint swelling.    Skin: Denies rashes or wounds.    Hematologic: DVT following ventricular septal myectomy 2014; Denies anemia   Neurologic: Numbness/tingling and burning in feet, numbness in hands.History of seizure in 2014 according to patient; Denies history of stroke, TIA, migraines, dizziness,   Psychiatric: Depression     Cardiology Tests: (personally reviewed):   Review Results Below in A/P    Labs: (personally reviewed):  Lab Results   Component Value Date    WBC 13.2 (H) 02/17/2015    HGB 16.6 02/17/2015    HCT 48.1 02/17/2015     02/17/2015    INR 0.92 02/19/2015    PTT 26 02/19/2015     03/17/2015    POTASSIUM 4.1 01/23/2018    TANIA 8.8 03/17/2015     (H) 03/17/2015    CR 0.93 01/23/2018    BUN 14 03/17/2015    CO2 25 03/17/2015    ALT 30 01/17/2015    AST 23 01/17/2015    BILITOTAL 0.4 01/17/2015    ALKPHOS 81 01/17/2015     Lab Results   Component Value Date    A1C 8.1 01/23/2018    A1C 6.3 03/27/2017    A1C 7.2 01/18/2015    A1C 6.5 07/23/2014    A1C 6.5 04/26/2014           Physical Exam:  No LMP for male patient.   Vital signs:  /75  Pulse 86  Temp 98.2  F (36.8  C) (Oral)  Resp 20  Ht 1.803 m (5' 11\")  Wt 121.6 kg (268 lb)  SpO2 95%  BMI 37.38 kg/m2    Constitutional: Awake, alert, cooperative, no apparent distress, and appears stated age.  Eyes: Pupils equal, round and reactive to light, sclera clear, conjunctiva normal.  HENT: Normocephalic, oral pharynx with moist mucus membranes. No goiter appreciated.   Respiratory: Clear to auscultation bilaterally, no crackles or wheezing.  Cardiovascular: Regular rate and rhythm,  and no overt murmur noted.  No carotid bruits auscultated.  No edema. Palpable pulses to radial  DP and PT arteries.   GI: Normal bowel " sounds, soft, non-distended, non-tender, no masses palpated  Skin: Warm and dry.  No visible rashes   Musculoskeletal:Limited next extension. There is no redness, warmth, or swelling of the exposed joints. Gross motor strength is normal.    Neurologic: Awake, alert, oriented to name, place and time.  Gait is normal.   Neuropsychiatric: Calm, cooperative. Normal affect.     Assessment/Plan  Konstantin Sharp is a 47 year old male who presents for pre-operative H & P in preparation for a Diagnostic LLE angiogram to evaluate for evidence of Thromboangiitis obliterans on 18.   PAC referral for risk assessment and optimization for anesthesia with comorbid conditions of:    Pre-operative considerations:  1.  Cardiac:  Functional status METS =3 (reduced due neuropathy in feet)  Risk of Major Adverse Cardiac event:0.4%  -Hypertrophic nonobstructive cardiomyopathy s/p ventricular septal myectomy, ICD implanted (last interrogation 18),  History of AFIB, intermittent CP      *ECHO 2017:  Left ventricular function is normal.The EF is 60-65%.  S/p myomectomy. There is no LVOT gradient or FADIA. The mid anteroseptal  thickness is 11 mm Global right ventricular function is normal.  No significant valvular abnormalities were noted. Pulmonary artery systolic pressure is normal. The inferior vena cava was normal in size with preserved respiratory variability. No pericardial effusion is present.    *Lexiscan Stress Test 2015:  1. Indeterminate myocardial SPECT study due to poor image quality.  Clinical correlation is recommended. Consider alternative imaging modality (CT coronary angiography or invasive coronary angiography) if clinically appropriate. 2. Normal left ventricular ejection fraction as described above. 3. No prior study available for comparison.   *CT coronary angiogram 2015:  CORONARY CALCIUM SCORE: The total Agatston calcium score is 0, Left main: 0, left anterior descendin,  circumflex: 0, right coronary  artery: 0.  CORONARY ANGIOGRAPHY : Trivial coronary artery disease is most likely present. However the studies were hampered by significant motion artifact and by the patient's body habitus and also by artifact from  the pacemaker leads.  -HTN controlled with losartan(hold DOS), metoprolol (take DOS)  2.  Pulm:       -CLARY, does not wear CPAP  -Current every day smoker    ppd, 50 pack years   -Intermittent Asthma,  uses an albuterol inhaler/neb a few times per year  3.  Endo:  -DM T2  on metformin and glipizide not well controlled, A1C  8.1 (1/23/18)  4.  Heme:  -History of DVT after myectomy in 2014  5.  GI:  Risk of PONV score =1 .  If > 2, anti-emetic intervention recommended.  8.  Meds:  -Antiplts/Anticoags: warfarin, plan to hold starting (1/26/17), If bridging, this will be coordinated between surgeon and PCP  9.  Anesthesia considerations:  -intubation-related subglottic stenosis and a difficult intubation was noted in the [facility] anesthesia records from 2015    Patient is optimized and is an acceptable candidate for the proposed procedure.  No further diagnostic evaluation is needed.      AVS given to patient regarding medication instructions,  surgery time/arrival time and NPO status.  Farideh VILLALBA-C   Preoperative Assessment Center  [facility]  Clinic and Surgery Center  Phone: [phone]  Fax: [phone]

## 2018-01-23 NOTE — PHARMACY - PREOPERATIVE ASSESSMENT CENTER
Anticoagulation Note - Preoperative Assessment Center (PAC) Pharmacist     Patient seen and interviewed during time of PAC Clinic appointment January 23, 2018.  The purpose of this note is to document the perioperative anticoagulation plan outlined by the providers caring for Konstantin Sharp.     Current Regimen  Anticoagulation Regimen as of January 23, 2018: warfarin 8.5 mg PO daily. Patient takes in the morning.   Indication: atrial fibrillation  Prescriber:  Dr. Cooper  Expected Duration of therapy: indefinite  INR Goal: 2-3  Recent Change in oral intake/nutrition: No    Perioperative plan  Konstantin Sharp is scheduled for IR procedure on LLE on 1/31/18 and the perioperative anticoagulation plan outlined by IR staff is to hold warfarin x5 days. Any bridging per PCPs office who was notified of this yesterday.      Resumption of anticoagulation will be based on IR physician assessment of bleeding risks and complications.  This plan may require re-assessment and modification by his primary team in the perioperative setting depending on patients clinical situation.        Jani Perez RPH  January 23, 2018  8:56 AM

## 2018-01-23 NOTE — MR AVS SNAPSHOT
After Visit Summary   1/23/2018    Konstantin Sharp    MRN: [id number]           Patient Information     Date Of Birth          1970        Visit Information        Provider Department      [facility]2018 [facility]        Today's Diagnoses     Preop examination    -  1       Follow-ups after your visit        Your next 10 appointments already scheduled     Jan 31, 2018  9:30 AM CST   Procedure 6.5 hour with U2A ROOM 4   [address] [facility] ([facility])    [address] [address]               Jan 31, 2018 11:00 AM CST   (Arrive by 10:45 AM)   IR LOWER EXTREMITY ANGIOGRAM LEFT with UUIR4   [facility] [place], Interventional Radiology ([facility])    [address] [address] [phone]           1. Your doctor will need to do a history and physical within 30 days before this procedure. 2. Your doctor will determine whether you need a 12 lead EKG, as well as which medications should not be taken the morning of the exam. 3. Laboratory tests are to be obtained by your doctor prior to the exam (creatinine, Hgb/Hct, platelet count, and INR) 4. If you have allergies to x-ray contrast or iodine, contact your doctor or a Radiology nurse prior to the exam day for instructions. 5. Someone will need to drive you to and from the hospital. 6. Bring a list of all drugs you are taking; include supplements and over-the-counter medications. 7. Wear comfortable clothes and leave your valuables at home. 8. If you are or may be pregnant, contact your doctor or a Radiology nurse prior to the day of the exam. 9.  If you have diabetes, check with your doctor or a Radiology nurse to see if your insulin needs to be adjusted for the exam. 10. If you are taking Coumadin (to thin you blood) please contact your doctor or a Radiology nurse at least  5 days before the exam for special instructions. 11. You should not have received barium (x-ray contrast) within 48 hours of this exam. 12. The day before your exam you may eat your regular diet and are encouraged to drink at least 2 quarts of clear liquids. Drink no alcoholic beverages for 24 hours prior to the exam. 13. If you have a colostomy you will need to irrigate it with tap water at 8PM the evening before and again at 6AM the morning of the exam. 14. Do not smoke for 24 hours prior to the procedure. 15. Do not eat any solid food or milk products for 6 hours prior to the exam. You may drink clear liquids until 2 hours prior to the exam. Clear liquids include the following: water, Jell-O, clear broth, apple juice or any non-carbonated drink that you can see through (no pop!) 16. The morning of the exam you may brush your teeth and take medications as directed with a sip of water. 17. Tell the Radiology nurse if you have any allergies. 18. You will be asked to empty your bladder before the exam begins. 19. Following the exam you will need to remain on complete bedrest for 4-6 hours. The nurse will monitor your vital signs, puncture site, and the pulses and temperature of the arm or leg that was punctured. 20. When discharged, you cannot drive until morning, and an adult must be with you until then. You should stay in the [facility] area overnight.            Apr 16, 2018  3:00 PM CDT   (Arrive by 2:45 PM)   Implanted Defibulator with Uc Cv Device 1   [facility] ([facility])    [address] [place] [address]  [address] [place] [address] [phone]            May 29, 2018  5:00 PM CDT   (Arrive by 4:45 PM)   Return Vascular Visit with Kenia Mcgovern MD   [facility])    [address] [place] [address]  [address] [place] [address] [phone]              Who to contact     Please call your clinic at [phone] to:    Ask  questions about your health    Make or cancel appointments    Discuss your medicines    Learn about your test results    Speak to your doctor   If you have compliments or concerns about an experience at your clinic, or if you wish to file a complaint, please contact [facility] Patient Relations at [phone] or email us at [email or web address]         Additional Information About Your Visit        MyChart Information     [employer] gives you secure access to your electronic health record. If you see a primary care provider, you can also send messages to your care team and make appointments. If you have questions, please call your primary care clinic.  If you do not have a primary care provider, please call [phone] and they will assist you.      [employer] is an electronic gateway that provides easy, online access to your medical records. With [employer], you can request a clinic appointment, read your test results, renew a prescription or communicate with your care team.     To access your existing account, please contact your [facility] or call [phone] for assistance.        Care EveryWhere ID     This is your Care EveryWhere ID. This could be used by other organizations to access your [facility] medical records  [id number]         Blood Pressure from Last 3 Encounters:   01/23/18 117/75   01/15/18 105/77   12/01/17 (!) 142/92    Weight from Last 3 Encounters:   01/23/18 121.6 kg (268 lb)   12/01/17 121.4 kg (267 lb 11.2 oz)   11/28/17 121.5 kg (267 lb 12.8 oz)              Today, you had the following     No orders found for display       Primary Care Provider Office Phone # Fax #    Damon Cooper [phone]       [facility] [address] [facility] [address]        Equal Access to Services     CAROLINE BARBER : kennedi Thapa qaybta kaalmada adeegyada, waxay idiin hayaan adeeg kharash  laingrid kennedy. So [facility] [phone].    ATENCIÓN: Si habla felicitas, tiene a dover disposición servicios gratuitos de asistencia lingüística. Kemar al [phone].    We comply with applicable federal civil rights laws and [place] laws. We do not discriminate on the basis of race, color, national origin, age, disability, sex, sexual orientation, or gender identity.            Thank you!     Thank you for choosing [facility] PREOPERATIVE ASSESSMENT CENTER  for your care. Our goal is always to provide you with excellent care. Hearing back from our patients is one way we can continue to improve our services. Please take a few minutes to complete the written survey that you may receive in the mail after your visit with us. Thank you!             Your Updated Medication List - Protect others around you: Learn how to safely use, store and throw away your medicines at www.disposemymeds.org.          This list is accurate as of: 1/23/18 10:08 AM.  Always use your most recent med list.                   Brand Name Dispense Instructions for use Diagnosis    * albuterol (2.5 MG/3ML) 0.083% neb solution      Take 1 vial by nebulization every 6 hours as needed for shortness of breath / dyspnea or wheezing        * VENTOLIN  (90 BASE) MCG/ACT Inhaler   Generic drug:  albuterol      Inhale 2 puffs into the lungs every 4 hours as needed for shortness of breath / dyspnea or wheezing        AMITIZA PO      Take 24 mcg by mouth 2 times daily (with meals)        amitriptyline 25 MG tablet    ELAVIL     Take 25 mg by mouth At Bedtime        bisacodyl 10 MG Suppository    DULCOLAX     Place 10 mg rectally daily as needed        EUCERIN INTENSIVE REPAIR EX      Externally apply topically 2 times daily as needed        glipiZIDE 5 MG tablet    GLUCOTROL    30 tablet    Take 1 tablet (5 mg) by mouth 2 times daily (before meals)    Diabetes mellitus (H)       lidocaine 5 % ointment    XYLOCAINE     Apply topically 3 times daily For feet         linaclotide 290 MCG capsule    LINZESS    30 capsule    Take 1 capsule (290 mcg) by mouth every morning (before breakfast)    Constipation, unspecified constipation type       LIPITOR PO      Take 40 mg by mouth daily        losartan 25 MG tablet    COZAAR    30 tablet    Take 1 tablet (25 mg) by mouth daily    Hypertrophic cardiomyopathy (H)       LYRICA PO      Take 75 mg by mouth 2 times daily        metFORMIN 500 MG tablet    GLUCOPHAGE    60 tablet    Take 1,000 mg by mouth 2 times daily (with meals)    Diabetes mellitus (H)       metoprolol tartrate 50 MG tablet    LOPRESSOR    180 tablet    Take 100 mg by mouth 3 times daily    S/P ventricular septal myectomy, Congestive heart failure, unspecified, Hypertrophic cardiomyopathy (H)       senna-docusate 8.6-50 MG per tablet    SENOKOT-S;PERICOLACE     Take 1 tablet by mouth daily        * SEROQUEL PO      Take 25 mg by mouth 2 times daily        * QUEtiapine 150 MG Tb24 24 hr tablet    SEROquel XR     Take 150 mg by mouth At Bedtime        sildenafil 20 MG tablet    REVATIO    90 tablet    Take 0.5 tablets (10 mg) by mouth 3 times daily    Critical lower limb ischemia       TRAZODONE HCL PO      Take 100 mg by mouth At Bedtime        venlafaxine 150 MG 24 hr capsule    EFFEXOR-XR    30 capsule    Take 150 mg by mouth 2 times daily    Adjustment disorder with depressed mood       WARFARIN SODIUM PO      Take 8.5 mg by mouth every morning        * Notice:  This list has 4 medication(s) that are the same as other medications prescribed for you. Read the directions carefully, and ask your doctor or other care provider to review them with you.

## 2018-01-23 NOTE — PROGRESS NOTES
[facility] medication history for January 23, 2018 is complete.    See Epic admission navigator for allergy information, pharmacy and prior to admission medications.    Operating room staff will still need to confirm medications and last dose information on day of surgery.     Medication history interview sources:  patient, patient's PCA, home [facility]    Changes made to PTA medication list (reason)  Added: none  Deleted: abilify - stopped by psychiatrist  bong LE - Per MD stopped  lactulose - no longer taking  MagOx - MD stopped this  golytely - completed  Changed: sig on lyrica    Additional medication history information (including reliability of information, actions taken by pharmacist): poor  - amitriptyline - patient and PCA report taking 25 mg PO qHS but not filled since March 2017.  Please check with patient/PCA on day of procedure in pre op to confirm amitriptyline dosing.    *addendum 1: 1/23/18 14:23 - attempted to call patient to clarify amitriptyline on PTA med list. Patient did not answer, voice mail box unidentified.     -- No recent (within 30 days) course of antibiotics  -- No recent (within 30 days) course of steroids  -- No recent (within 30 days) chronic daily medications stopped   -- Patient declines being on any other over-the-counter medications    Prior to Admission medications    Medication Sig Last Dose Taking? Auth Provider   WARFARIN SODIUM PO Take 8.5 mg by mouth every morning Taking Yes Unknown, Entered By History   QUEtiapine (SEROQUEL XR) 150 MG TB24 24 hr tablet Take 150 mg by mouth At Bedtime Taking Yes Unknown, Entered By History   linaclotide (LINZESS) 290 MCG capsule Take 1 capsule (290 mcg) by mouth every morning (before breakfast) Taking Yes Britney Mcfadden APRN CNP   sildenafil (REVATIO) 20 MG tablet Take 0.5 tablets (10 mg) by mouth 3 times daily Taking Yes Kenia Mcgovern MD   lidocaine (XYLOCAINE) 5 % ointment Apply topically 3 times daily For  feet Taking Yes Reported, Patient   Pregabalin (LYRICA PO) Take 75 mg by mouth 2 times daily  Taking Yes Reported, Patient   bisacodyl (DULCOLAX) 10 MG Suppository Place 10 mg rectally daily as needed  Taking Yes Reported, Patient   senna-docusate (SENOKOT-S;PERICOLACE) 8.6-50 MG per tablet Take 1 tablet by mouth daily Taking Yes Reported, Patient   TRAZODONE HCL PO Take 100 mg by mouth At Bedtime Taking Yes Reported, Patient   Atorvastatin Calcium (LIPITOR PO) Take 40 mg by mouth daily  Taking Yes Reported, Patient   QUEtiapine Fumarate (SEROQUEL PO) Take 25 mg by mouth 2 times daily Taking Yes Reported, Patient   albuterol (2.5 MG/3ML) 0.083% neb solution Take 1 vial by nebulization every 6 hours as needed for shortness of breath / dyspnea or wheezing Taking Yes Reported, Patient   albuterol (VENTOLIN HFA) 108 (90 BASE) MCG/ACT Inhaler Inhale 2 puffs into the lungs every 4 hours as needed for shortness of breath / dyspnea or wheezing Taking Yes Reported, Patient   Emollient (EUCERIN INTENSIVE REPAIR EX) Externally apply topically 2 times daily as needed Taking Yes Reported, Patient   Lubiprostone (AMITIZA PO) Take 24 mcg by mouth 2 times daily (with meals) Taking Yes Reported, Patient   losartan (COZAAR) 25 MG tablet Take 1 tablet (25 mg) by mouth daily Taking Yes Momo Castellano MD   metFORMIN (GLUCOPHAGE) 500 MG tablet Take 1,000 mg by mouth 2 times daily (with meals)  Taking Yes Momo Castellano MD   glipiZIDE (GLUCOTROL) 5 MG tablet Take 1 tablet (5 mg) by mouth 2 times daily (before meals) Taking Yes Momo Castellano MD   venlafaxine (EFFEXOR-XR) 150 MG 24 hr capsule Take 150 mg by mouth 2 times daily  Taking Yes Momo Castellano MD   metoprolol (LOPRESSOR) 50 MG tablet Take 100 mg by mouth 3 times daily  Taking Yes Connie Mijares MD   amitriptyline (ELAVIL) 25 MG tablet Take 25 mg by mouth At Bedtime  Unknown  Reported, Patient         Medication history completed by: Jani ALICEA  [facility]

## 2018-01-24 ENCOUNTER — TEAM CONFERENCE (OUTPATIENT)
Dept: INTERVENTIONAL RADIOLOGY/VASCULAR | Facility: CLINIC | Age: 48
End: 2018-01-24

## 2018-01-24 NOTE — TELEPHONE ENCOUNTER
Received a VM from Khushi at [facility] regarding that they did discuss with pt's PCP Dr. Howard regarding stopping coumadin.    She states that they will inform that pt stop his coumadin 5 days prior to his procedure and then to restart it after. They would then want to see him 3-5 days after restarting the coumadin for his INR check.     She states that they did leave a VM for the pt as well regarding this request.     Abby Gonzalez RN, BSN  Interventional Radiology Nurse Coordinator   Phone: [phone]

## 2018-01-29 ENCOUNTER — TELEPHONE (OUTPATIENT)
Dept: INTERVENTIONAL RADIOLOGY/VASCULAR | Facility: CLINIC | Age: 48
End: 2018-01-29

## 2018-01-31 ENCOUNTER — APPOINTMENT (OUTPATIENT)
Dept: MEDSURG UNIT | Facility: CLINIC | Age: 48
End: 2018-01-31
Attending: RADIOLOGY
Payer: MEDICAID

## 2018-01-31 ENCOUNTER — HOSPITAL ENCOUNTER (OUTPATIENT)
Facility: CLINIC | Age: 48
Discharge: HOME | End: 2018-01-31
Attending: RADIOLOGY | Admitting: RADIOLOGY
Payer: MEDICAID

## 2018-01-31 ENCOUNTER — APPOINTMENT (OUTPATIENT)
Dept: INTERVENTIONAL RADIOLOGY/VASCULAR | Facility: CLINIC | Age: 48
End: 2018-01-31
Attending: RADIOLOGY
Payer: MEDICAID

## 2018-01-31 VITALS
DIASTOLIC BLOOD PRESSURE: 77 MMHG | SYSTOLIC BLOOD PRESSURE: 114 MMHG | TEMPERATURE: 97.4 F | WEIGHT: 264 LBS | OXYGEN SATURATION: 97 % | RESPIRATION RATE: 16 BRPM | HEIGHT: 71 IN | BODY MASS INDEX: 36.96 KG/M2 | HEART RATE: 96 BPM

## 2018-01-31 DIAGNOSIS — I73.9 PAD (PERIPHERAL ARTERY DISEASE) (H): ICD-10-CM

## 2018-01-31 LAB
ERYTHROCYTE [DISTWIDTH] IN BLOOD BY AUTOMATED COUNT: 13.6 % (ref 10–15)
GLUCOSE BLDC GLUCOMTR-MCNC: 225 MG/DL (ref 70–99)
HCT VFR BLD AUTO: 45 % (ref 40–53)
HGB BLD-MCNC: 15.4 G/DL (ref 13.3–17.7)
INR PPP: 0.98 (ref 0.86–1.14)
MCH RBC QN AUTO: 32 PG (ref 26.5–33)
MCHC RBC AUTO-ENTMCNC: 34.2 G/DL (ref 31.5–36.5)
MCV RBC AUTO: 94 FL (ref 78–100)
PLATELET # BLD AUTO: 214 10E9/L (ref 150–450)
RBC # BLD AUTO: 4.81 10E12/L (ref 4.4–5.9)
WBC # BLD AUTO: 9.3 10E9/L (ref 4–11)

## 2018-01-31 PROCEDURE — 27210908 ZZH NEEDLE CR4

## 2018-01-31 PROCEDURE — 40000168 ZZH STATISTIC PP CARE STAGE 3

## 2018-01-31 PROCEDURE — 25000128 H RX IP 250 OP 636: Performed by: PHYSICIAN ASSISTANT

## 2018-01-31 PROCEDURE — 25000128 H RX IP 250 OP 636: Performed by: RADIOLOGY

## 2018-01-31 PROCEDURE — 99153 MOD SED SAME PHYS/QHP EA: CPT

## 2018-01-31 PROCEDURE — 85610 PROTHROMBIN TIME: CPT | Performed by: RADIOLOGY

## 2018-01-31 PROCEDURE — 82962 GLUCOSE BLOOD TEST: CPT

## 2018-01-31 PROCEDURE — 27210804 ZZH SHEATH CR3

## 2018-01-31 PROCEDURE — 27210905 ZZH KIT CR7

## 2018-01-31 PROCEDURE — C1769 GUIDE WIRE: HCPCS

## 2018-01-31 PROCEDURE — 36247 INS CATH ABD/L-EXT ART 3RD: CPT

## 2018-01-31 PROCEDURE — 36246 INS CATH ABD/L-EXT ART 2ND: CPT | Mod: XS

## 2018-01-31 PROCEDURE — 27210780 ZZH KIT CR3

## 2018-01-31 PROCEDURE — 27210732 ZZH ACCESSORY CR1

## 2018-01-31 PROCEDURE — 75716 ARTERY X-RAYS ARMS/LEGS: CPT

## 2018-01-31 PROCEDURE — 85027 COMPLETE CBC AUTOMATED: CPT | Performed by: RADIOLOGY

## 2018-01-31 PROCEDURE — 99152 MOD SED SAME PHYS/QHP 5/>YRS: CPT

## 2018-01-31 PROCEDURE — C1887 CATHETER, GUIDING: HCPCS

## 2018-01-31 PROCEDURE — 25000125 ZZHC RX 250: Performed by: RADIOLOGY

## 2018-01-31 RX ORDER — LIDOCAINE 40 MG/G
CREAM TOPICAL
Status: DISCONTINUED | OUTPATIENT
Start: 2018-01-31 | End: 2018-01-31 | Stop reason: HOSPADM

## 2018-01-31 RX ORDER — IODIXANOL 320 MG/ML
150 INJECTION, SOLUTION INTRAVASCULAR ONCE
Status: COMPLETED | OUTPATIENT
Start: 2018-01-31 | End: 2018-01-31

## 2018-01-31 RX ORDER — ACETAMINOPHEN 500 MG
500-1000 TABLET ORAL EVERY 6 HOURS PRN
Status: DISCONTINUED | OUTPATIENT
Start: 2018-01-31 | End: 2018-01-31 | Stop reason: HOSPADM

## 2018-01-31 RX ORDER — NALOXONE HYDROCHLORIDE 0.4 MG/ML
.1-.4 INJECTION, SOLUTION INTRAMUSCULAR; INTRAVENOUS; SUBCUTANEOUS
Status: DISCONTINUED | OUTPATIENT
Start: 2018-01-31 | End: 2018-01-31 | Stop reason: HOSPADM

## 2018-01-31 RX ORDER — FENTANYL CITRATE 50 UG/ML
25-50 INJECTION, SOLUTION INTRAMUSCULAR; INTRAVENOUS EVERY 5 MIN PRN
Status: DISCONTINUED | OUTPATIENT
Start: 2018-01-31 | End: 2018-01-31 | Stop reason: HOSPADM

## 2018-01-31 RX ORDER — SODIUM CHLORIDE 9 MG/ML
INJECTION, SOLUTION INTRAVENOUS CONTINUOUS
Status: DISCONTINUED | OUTPATIENT
Start: 2018-01-31 | End: 2018-01-31 | Stop reason: HOSPADM

## 2018-01-31 RX ORDER — FLUMAZENIL 0.1 MG/ML
0.2 INJECTION, SOLUTION INTRAVENOUS
Status: DISCONTINUED | OUTPATIENT
Start: 2018-01-31 | End: 2018-01-31 | Stop reason: HOSPADM

## 2018-01-31 RX ADMIN — LIDOCAINE HYDROCHLORIDE 30 ML: 10 INJECTION, SOLUTION EPIDURAL; INFILTRATION; INTRACAUDAL; PERINEURAL at 11:49

## 2018-01-31 RX ADMIN — FENTANYL CITRATE 25 MCG: 50 INJECTION, SOLUTION INTRAMUSCULAR; INTRAVENOUS at 12:28

## 2018-01-31 RX ADMIN — MIDAZOLAM 2 MG: 1 INJECTION INTRAMUSCULAR; INTRAVENOUS at 11:47

## 2018-01-31 RX ADMIN — MIDAZOLAM 0.5 MG: 1 INJECTION INTRAMUSCULAR; INTRAVENOUS at 12:27

## 2018-01-31 RX ADMIN — FENTANYL CITRATE 100 MCG: 50 INJECTION, SOLUTION INTRAMUSCULAR; INTRAVENOUS at 11:48

## 2018-01-31 RX ADMIN — SODIUM CHLORIDE: 9 INJECTION, SOLUTION INTRAVENOUS at 10:01

## 2018-01-31 RX ADMIN — HEPARIN SODIUM 5000 UNITS: 1000 INJECTION, SOLUTION INTRAVENOUS; SUBCUTANEOUS at 11:49

## 2018-01-31 RX ADMIN — IODIXANOL 75 ML: 320 INJECTION, SOLUTION INTRAVASCULAR at 12:50

## 2018-01-31 RX ADMIN — MIDAZOLAM 0.5 MG: 1 INJECTION INTRAMUSCULAR; INTRAVENOUS at 12:09

## 2018-01-31 RX ADMIN — FENTANYL CITRATE 25 MCG: 50 INJECTION, SOLUTION INTRAMUSCULAR; INTRAVENOUS at 12:09

## 2018-01-31 NOTE — IP AVS SNAPSHOT
[address] [address]                                       After Visit Summary   1/31/2018    Konstantin Sharp    MRN: [id number]           After Visit Summary Signature Page     I have received my discharge instructions, and my questions have been answered. I have discussed any challenges I see with this plan with the nurse or doctor.    ..........................................................................................................................................  Patient/Patient Representative Signature      ..........................................................................................................................................  Patient Representative Print Name and Relationship to Patient    ..................................................               ................................................  Date                                            Time    ..........................................................................................................................................  Reviewed by Signature/Title    ...................................................              ..............................................  Date                                                            Time

## 2018-01-31 NOTE — IP AVS SNAPSHOT
MRN:[id number]                      After Visit Summary   1/31/2018    Konstantin Sharp    MRN: [id number]           Visit Information        Department      1/31/2018  8:36 AM [address] [facility]          Review of your medicines      CONTINUE these medicines which have NOT CHANGED        Dose / Directions    * albuterol (2.5 MG/3ML) 0.083% neb solution        Dose:  1 vial   Take 1 vial by nebulization every 6 hours as needed for shortness of breath / dyspnea or wheezing   Refills:  0       * VENTOLIN  (90 BASE) MCG/ACT Inhaler   Generic drug:  albuterol        Dose:  2 puff   Inhale 2 puffs into the lungs every 4 hours as needed for shortness of breath / dyspnea or wheezing   Refills:  0       AMITIZA PO        Dose:  24 mcg   Take 24 mcg by mouth 2 times daily (with meals)   Refills:  0       amitriptyline 25 MG tablet   Commonly known as:  ELAVIL        Dose:  25 mg   Take 25 mg by mouth At Bedtime   Refills:  0       bisacodyl 10 MG Suppository   Commonly known as:  DULCOLAX        Dose:  10 mg   Place 10 mg rectally daily as needed   Refills:  0       EUCERIN INTENSIVE REPAIR EX        Externally apply topically 2 times daily as needed   Refills:  0       glipiZIDE 5 MG tablet   Commonly known as:  GLUCOTROL   Used for:  Diabetes mellitus (H)        Dose:  5 mg   Take 1 tablet (5 mg) by mouth 2 times daily (before meals)   Quantity:  30 tablet   Refills:  0       lidocaine 5 % ointment   Commonly known as:  XYLOCAINE        Apply topically 3 times daily For feet   Refills:  0       linaclotide 290 MCG capsule   Commonly known as:  LINZESS   Used for:  Constipation, unspecified constipation type        Dose:  290 mcg   Take 1 capsule (290 mcg) by mouth every morning (before breakfast)   Quantity:  30 capsule   Refills:  1       LIPITOR PO        Dose:  40 mg   Take 40 mg by mouth daily   Refills:  0       losartan 25 MG tablet   Commonly known as:  COZAAR   Used for:  Hypertrophic  cardiomyopathy (H)        Dose:  25 mg   Take 1 tablet (25 mg) by mouth daily   Quantity:  30 tablet   Refills:  0       LYRICA PO        Dose:  75 mg   Take 75 mg by mouth 2 times daily   Refills:  0       metFORMIN 500 MG tablet   Commonly known as:  GLUCOPHAGE   Used for:  Diabetes mellitus (H)        Dose:  1000 mg   Take 1,000 mg by mouth 2 times daily (with meals)   Quantity:  60 tablet   Refills:  0       metoprolol tartrate 50 MG tablet   Commonly known as:  LOPRESSOR   Used for:  S/P ventricular septal myectomy, Congestive heart failure, unspecified, Hypertrophic cardiomyopathy (H)        Dose:  100 mg   Take 100 mg by mouth 3 times daily   Quantity:  180 tablet   Refills:  4       senna-docusate 8.6-50 MG per tablet   Commonly known as:  SENOKOT-S;PERICOLACE        Dose:  1 tablet   Take 1 tablet by mouth daily   Refills:  0       * SEROQUEL PO   Indication:  breakfast and lunch        Dose:  25 mg   Take 25 mg by mouth 2 times daily   Refills:  0       * QUEtiapine 150 MG Tb24 24 hr tablet   Commonly known as:  SEROquel XR        Dose:  150 mg   Take 150 mg by mouth At Bedtime   Refills:  0       sildenafil 20 MG tablet   Commonly known as:  REVATIO   Used for:  Critical lower limb ischemia        Dose:  10 mg   Take 0.5 tablets (10 mg) by mouth 3 times daily   Quantity:  90 tablet   Refills:  3       TRAZODONE HCL PO        Dose:  100 mg   Take 100 mg by mouth At Bedtime   Refills:  0       venlafaxine 150 MG 24 hr capsule   Commonly known as:  EFFEXOR-XR   Used for:  Adjustment disorder with depressed mood        Dose:  150 mg   Take 150 mg by mouth 2 times daily   Quantity:  30 capsule   Refills:  1       WARFARIN SODIUM PO        Dose:  8.5 mg   Take 8.5 mg by mouth every morning   Refills:  0       * Notice:  This list has 4 medication(s) that are the same as other medications prescribed for you. Read the directions carefully, and ask your doctor or other care provider to review them with you.              Protect others around you: Learn how to safely use, store and throw away your medicines at www.disposemymeds.org.         Follow-ups after your visit        Your next 10 appointments already scheduled     Apr 16, 2018  3:00 PM CDT   (Arrive by 2:45 PM)   Implanted Defibulator with Uc Cv Device 1   [facility] ([facility])    [address] [place] [address]  [address] [place] [id number]   [phone]            May 29, 2018  5:00 PM CDT   (Arrive by 4:45 PM)   Return Vascular Visit with Kenia Mcgovern MD   [facility] ([facility])    [address] [place] [address]  [address] [place] [id number]   [phone]               Care Instructions        Further instructions from your care team       [facility]         Interventional Radiology  Discharge Instructions Post Angiogram IR    AFTER YOU GO HOME  ? DO NOT drive or operate machinery at home or at work for at least 24 hours  ? If you were given sedation; we recommend that an adult stay with you for the first 24 hours  ? DO relax and take it easy for 24 hours  ? DO drink plenty of fluids  ? DO resume your regular diet, unless otherwise instructed by your Primary Physician  ? DO NOT SMOKE FOR AT LEAST 24 HOURS, if you have been given any medications that were to help you relax or sedate you during your procedure  ? DO NOT drink alcoholic beverages the day of your procedure  ? DO NOT do any strenuous exercise or lifting for at least 2 days following your procedure  ? DO NOT take a bath or shower for at least 12 hours following your procedure  ? DO NOT scrub the procedure site vigorously for 5 days  ? DO NOT make any important or legal decisions for 24 hours following your procedure if you were given sedation    CALL THE PHYSICIAN IF:  ? You start bleeding from the procedure site.  If you do start to bleed from that site, lie down flat and hold pressure on the site.  Your  physician will tell you if you need to return to the hospital.  It is common to have a small bruise or lump at the site  ? You have numbness, coolness or change in color of the arm or leg of the puncture site  ? You experience changes in your vision, hearing, balance, coordination, speech, thinking or memory  ? You experience weakness in one or more extremity  ? You experience pain or redness at the puncture site  ? You develop nausea or vomiting  ? You develop hives or a rash or unexplained itching  ? You develop a temperature of 101 degrees F or greater    ADDITIONAL INSTRUCTIONS  ? Support the puncture site for coughing, sneezing, or moving your bowels for the first 48 hours  ? No tub bath, hot tubs, or swimming for 5 days  ? No lotion or powder to the puncture site for 3 days  ? Instruction booklet given: ***    [facility] INTERVENTIONAL RADIOLOGY DEPARTMENT  Procedure Physician:         Dr. Macdonald/Dr. Vora  Date of procedure: January 31, 2018  Telephone Numbers: [phone] Monday-Friday 8:00 am to 4:30 pm  [phone] After 4:30 pm Monday-Friday, Weekends & Holidays.   Ask for the Neuro-Radiologist on call.  Someone is on call 24 hrs/day  [facility] toll free number: [phone] Monday-Friday 8:00 am to 4:30 pm  [facility] Emergency Dept: [phone]         Additional Information About Your Visit        MyChart Information     [facility] gives you secure access to your electronic health record. If you see a primary care provider, you can also send messages to your care team and make appointments. If you have questions, please call your primary care clinic.  If you do not have a primary care provider, please call [phone] and they will assist you.        Care EveryWhere ID     This is your Care EveryWhere ID. This could be used by other organizations to access your [facility] medical records  [id number]        Your Vitals Were     Blood Pressure Pulse Temperature Respirations Height Weight    126/81 (BP Location:  "Right arm) 96 97.4  F (36.3  C) (Oral) 16 1.803 m (5' 11\") 119.7 kg (264 lb)    Pulse Oximetry BMI (Body Mass Index)                96% 36.82 kg/m2           Primary Care Provider Office Phone # Fax #    Damon Cooper [phone]      Equal Access to Services     [facility]: Hadii aad ku hadasho Soomaali, waaxda luqadaha, qaybta kaalmada adeegyada, waxay idiin hayaan adeeg khcipriano la'aan . So [facility] [phone].    ATENCIÓN: Si habla espdebora, tiene a dover disposición servicios gratuitos de asistencia lingüística. [facility] [phone].    We comply with applicable federal civil rights laws and [place] laws. We do not discriminate on the basis of race, color, national origin, age, disability, sex, sexual orientation, or gender identity.            Thank you!     Thank you for choosing [facility] for your care. Our goal is always to provide you with excellent care. Hearing back from our patients is one way we can continue to improve our services. Please take a few minutes to complete the written survey that you may receive in the mail after you visit with us. Thank you!             Medication List: This is a list of all your medications and when to take them. Check marks below indicate your daily home schedule. Keep this list as a reference.      Medications           Morning Afternoon Evening Bedtime As Needed    * albuterol (2.5 MG/3ML) 0.083% neb solution   Take 1 vial by nebulization every 6 hours as needed for shortness of breath / dyspnea or wheezing                                * VENTOLIN  (90 BASE) MCG/ACT Inhaler   Inhale 2 puffs into the lungs every 4 hours as needed for shortness of breath / dyspnea or wheezing   Generic drug:  albuterol                                AMITIZA PO   Take 24 mcg by mouth 2 times daily (with meals)                                amitriptyline 25 MG tablet   Commonly known as:  ELAVIL   Take 25 mg by mouth At Bedtime                                " bisacodyl 10 MG Suppository   Commonly known as:  DULCOLAX   Place 10 mg rectally daily as needed                                EUCERIN INTENSIVE REPAIR EX   Externally apply topically 2 times daily as needed                                glipiZIDE 5 MG tablet   Commonly known as:  GLUCOTROL   Take 1 tablet (5 mg) by mouth 2 times daily (before meals)                                lidocaine 5 % ointment   Commonly known as:  XYLOCAINE   Apply topically 3 times daily For feet                                linaclotide 290 MCG capsule   Commonly known as:  LINZESS   Take 1 capsule (290 mcg) by mouth every morning (before breakfast)                                LIPITOR PO   Take 40 mg by mouth daily                                losartan 25 MG tablet   Commonly known as:  COZAAR   Take 1 tablet (25 mg) by mouth daily                                LYRICA PO   Take 75 mg by mouth 2 times daily                                metFORMIN 500 MG tablet   Commonly known as:  GLUCOPHAGE   Take 1,000 mg by mouth 2 times daily (with meals)                                metoprolol tartrate 50 MG tablet   Commonly known as:  LOPRESSOR   Take 100 mg by mouth 3 times daily                                senna-docusate 8.6-50 MG per tablet   Commonly known as:  SENOKOT-S;PERICOLACE   Take 1 tablet by mouth daily                                * SEROQUEL PO   Take 25 mg by mouth 2 times daily                                * QUEtiapine 150 MG Tb24 24 hr tablet   Commonly known as:  SEROquel XR   Take 150 mg by mouth At Bedtime                                sildenafil 20 MG tablet   Commonly known as:  REVATIO   Take 0.5 tablets (10 mg) by mouth 3 times daily                                TRAZODONE HCL PO   Take 100 mg by mouth At Bedtime                                venlafaxine 150 MG 24 hr capsule   Commonly known as:  EFFEXOR-XR   Take 150 mg by mouth 2 times daily                                WARFARIN SODIUM PO   Take  8.5 mg by mouth every morning                                * Notice:  This list has 4 medication(s) that are the same as other medications prescribed for you. Read the directions carefully, and ask your doctor or other care provider to review them with you.

## 2018-01-31 NOTE — BRIEF OP NOTE
Interventional Radiology Brief Post Procedure Note    Procedure: Bilateral lower extremity angiogram    Proceduralist: Elvis Macdonald MD    Assistant: Richardson Vora MD    Time Out: Prior to the start of the procedure and with procedural staff participation, I verbally confirmed the patient s identity using two indicators, relevant allergies, that the procedure was appropriate and matched the consent or emergent situation, and that the correct equipment/implants were available. Immediately prior to starting the procedure I conducted the Time Out with the procedural staff and re-confirmed the patient s name, procedure, and site/side. (The Joint Commission universal protocol was followed.)  Yes        Sedation: IR Nurse Monitored Care   Post Procedure Summary:  Prior to the start of the procedure and with procedural staff participation, I verbally confirmed the patient s identity using two indicators, relevant allergies, that the procedure was appropriate and matched the consent or emergent situation, and that the correct equipment/implants were available. Immediately prior to starting the procedure I conducted the Time Out with the procedural staff and re-confirmed the patient s name, procedure, and site/side. (The Joint Commission universal protocol was followed.)  Yes       Sedatives: Fentanyl and Midazolam (Versed)    Vital signs, airway and pulse oximetry were monitored and remained stable throughout the procedure and sedation was maintained until the procedure was complete.  The patient was monitored by staff until sedation discharge criteria were met.    Patient tolerance: Patient tolerated the procedure well with no immediate complications.    Time of sedation in minutes: 80 minutes from beginning to end of physician one to one monitoring.          Findings: Diagnostic bilateral lower extremity angiogram. No focal stenosis.    Estimated Blood Loss: Minimal    Fluoroscopy Time:  minute(s)    SPECIMENS:  None    Complications: 1. None     Condition: Stable    Plan: Patient to 2A for postprocedure cares. Bedrest for 3 hours with right leg straight.    Comments: See dictated procedure note for full details.    Richardson Vora MD

## 2018-01-31 NOTE — PROGRESS NOTES
Pt arrived back to unit 2A at 1315.  Pt alert and oriented.  VSS.  Denies pain.  Right groin flat and dry.  Pulse 1+.  Pt eating and family at bedside.

## 2018-01-31 NOTE — PROGRESS NOTES
Patient Name: Konstantin Sharp  Medical Record Number: [id number]  Today's Date: 1/31/2018    Procedure: Bilateral lower extremity angiogram  Proceduralist: Dr. Torres Vora    Sedation start time: 1140  Sedation end time: 1300  Sedation medications administered: Fentanyl 150 mcg Versed 3 mg  Total sedation time: 80 min    Procedure start time: 1145  Puncture time: 1150  Procedure end time: 1300    R femoral artery closure device used was an angio-seal 6 F. No hematoma noted. Bedrest for 3 hours (1600).     Report given to: 2A RN      Other Notes: Pt arrived to IR room 4 from . Pt denies any questions or concerns regarding procedure. Pt positioned supine and monitored per protocol. Pt tolerated procedure without any noted complications. Pt transferred back to .    Cristopher RN [phone]

## 2018-01-31 NOTE — PROGRESS NOTES
Interventional Radiology Pre-Procedure Sedation Assessment   Time of Assessment: 10:58 AM    Expected Level: Moderate Sedation    Indication: Sedation is required for the following type of Procedure: Arterial    Sedation and procedural consent: Risks, benefits and alternatives were discussed with Patient    PO Intake: Appropriately NPO for procedure    ASA Class: Class 3 - SEVERE SYSTEMIC DISEASE, DEFINITE FUNCTIONAL LIMITATIONS.    Mallampati: Grade 3:  Soft palate visible, posterior pharyngeal wall not visible    Lungs: Lungs Clear with good breath sounds bilaterally    Heart: Normal heart sounds and rate    History and physical reviewed and no updates needed. I have reviewed the lab findings, diagnostic data, medications, and the plan for sedation. I have determined this patient to be an appropriate candidate for the planned sedation and procedure and have reassessed the patient IMMEDIATELY PRIOR to sedation and procedure.    Richardson Vora MD

## 2018-01-31 NOTE — PROGRESS NOTES
Patient tolerated recovery stage well. VSS,R groin site clean/dry/intact, no hematoma, and denies pain. Patient tolerated PO food and fluids. Teaching was done and discharge instructions were given. Patient ambulated, voided, and PIV was removed. Patient discharged from the hospital via wheel chair to home with caregiver.

## 2018-01-31 NOTE — PROGRESS NOTES
"Prepped for lower extremity angiogram.  Has chronic constant pain both feet &  toes bilaterally described as \"pins & needles\".  Has family with him.  Labs pending.  H & P current.  Consent signed.    "

## 2018-02-06 ENCOUNTER — COMMUNICATION - HEALTHEAST (OUTPATIENT)
Dept: NEUROLOGY | Facility: CLINIC | Age: 48
End: 2018-02-06

## 2018-02-07 ASSESSMENT — ENCOUNTER SYMPTOMS
ARTHRALGIAS: 1
RECTAL PAIN: 1
NAUSEA: 0
CONSTIPATION: 1
LEG PAIN: 1
STIFFNESS: 1
DIFFICULTY URINATING: 1
ABDOMINAL PAIN: 1
HEARTBURN: 0
BACK PAIN: 1
HEMATURIA: 0
HYPOTENSION: 0
MYALGIAS: 0
PALPITATIONS: 0
LIGHT-HEADEDNESS: 0
DYSURIA: 0
SYNCOPE: 0
FLANK PAIN: 0
MUSCLE WEAKNESS: 0
DIARRHEA: 0
NECK PAIN: 1
JOINT SWELLING: 0
EXERCISE INTOLERANCE: 1
BLOOD IN STOOL: 1
SLEEP DISTURBANCES DUE TO BREATHING: 0
BLOATING: 1
ORTHOPNEA: 0
HYPERTENSION: 0
MUSCLE CRAMPS: 0
VOMITING: 0
BOWEL INCONTINENCE: 0
JAUNDICE: 0

## 2018-02-19 ENCOUNTER — MEDICAL CORRESPONDENCE (OUTPATIENT)
Dept: HEALTH INFORMATION MANAGEMENT | Facility: CLINIC | Age: 48
End: 2018-02-19

## 2018-02-21 ENCOUNTER — OFFICE VISIT (OUTPATIENT)
Dept: GASTROENTEROLOGY | Facility: CLINIC | Age: 48
End: 2018-02-21
Payer: MEDICAID

## 2018-02-21 VITALS
DIASTOLIC BLOOD PRESSURE: 74 MMHG | HEIGHT: 71 IN | OXYGEN SATURATION: 100 % | WEIGHT: 266.3 LBS | HEART RATE: 79 BPM | BODY MASS INDEX: 37.28 KG/M2 | SYSTOLIC BLOOD PRESSURE: 120 MMHG

## 2018-02-21 DIAGNOSIS — K59.03 DRUG-INDUCED CONSTIPATION: Primary | ICD-10-CM

## 2018-02-21 RX ORDER — MAGNESIUM OXIDE 400 MG/1
TABLET ORAL EVERY OTHER DAY
Qty: 180 TABLET | Refills: 3 | Status: SHIPPED | OUTPATIENT
Start: 2018-02-21 | End: 2018-10-18

## 2018-02-21 ASSESSMENT — PAIN SCALES - GENERAL: PAINLEVEL: NO PAIN (0)

## 2018-02-21 NOTE — PATIENT INSTRUCTIONS
Gas and bloat are from either  Constipation   Or dairy - lactose  Or carbonation.    Dairy free for 2 weeks.- 4 weeks is best: does dairy protein increase constipation for you?    Then add it back in to your diet as you wish.  Do you get more constipation again? Or more gas?    Transit marker study helps us understand how well the colon works to move stool through.   a capsule with small markers in it from pharmacy here in clinic.   This is NOT available at your home pharmacy.  Plan ahead:  For two days, take nothing against constipation (not even prunes) days -2 and -1.  On day zero, swallow the capsule with the markers.  Next day is day 1.   On day 5, have an abdominal x-ray at any [facility] department.    Plan for day 5 to be a weekday for the x-ray.  You will take in the morning.  That means, Tuesday morning would be your x ray of the abdomen.      Return to GI Clinic at next opening.     [facility]     Until February 22, 2018 longterm  For appointments, call .    To speak with a GI nurse press option 3.

## 2018-02-21 NOTE — NURSING NOTE
"Chief Complaint   Patient presents with     RECHECK     f/u        Vitals:    02/21/18 1505   BP: 120/74   Pulse: 79   SpO2: 100%   Weight: 266 lb 4.8 oz   Height: 5' 11\"       Body mass index is 37.14 kg/(m^2).      Adolfo MACHADO               "

## 2018-02-21 NOTE — MR AVS SNAPSHOT
After Visit Summary   2/21/2018    Konstantin Sharp    MRN: [id number]           Patient Information     Date Of Birth          1970        Visit Information        Provider Department      2/21/2018 2:30 PM Britney Mcfadden APRN [facility]        Today's Diagnoses     Drug-induced constipation    -  1      Care Instructions    Gas and bloat are from either  Constipation   Or dairy - lactose  Or carbonation.    Dairy free for 2 weeks.- 4 weeks is best: does dairy protein increase constipation for you?    Then add it back in to your diet as you wish.  Do you get more constipation again? Or more gas?    Transit marker study helps us understand how well the colon works to move stool through.   a capsule with small markers in it from pharmacy here in clinic.   This is NOT available at your home pharmacy.  Plan ahead:  For two days, take nothing against constipation (not even prunes) days -2 and -1.  On day zero, swallow the capsule with the markers.  Next day is day 1.   On day 5, have an abdominal x-ray at any [facility] department.    Plan for day 5 to be a weekday for the x-ray.  You will take in the morning.  That means, Tuesday morning would be your x ray of the abdomen.      Return to GI Clinic at next opening.     [facility]     Until February 22, 2018 intermediate  For appointments, call .    To speak with a GI nurse press option 3.           Follow-ups after your visit        Follow-up notes from your care team     Return in about 3 months (around 5/21/2018).      Your next 10 appointments already scheduled     Mar 02, 2018  1:30 PM CST   (Arrive by 1:15 PM)   Implanted Defibulator with Uc Cv Device 1   [facility] ([facility])    [address] [place] [address]  [address] [place] [id number] [phone]            Mar 02, 2018  2:00 PM CST   Ech Complete with  [id number]    [facility] ([facility])    [address]  3rd Floor  [facility] [id number] [phone]           1. Please bring or wear a comfortable two-piece outfit. 2. You may eat, drink and take your normal medicines. 3. For any questions that cannot be answered, please contact the ordering physician            Mar 02, 2018  3:00 PM CST   (Arrive by 2:45 PM)   Return Genetic with Momo Castellano MD   [facility] ([facility])    [address] [place] [address]  [address]  [facility] [address] [phone]            May 29, 2018  5:00 PM CDT   (Arrive by 4:45 PM)   Return Vascular Visit with Kenia Mcgovern MD   [facility] ([facility])    [address] [place] [address]  [address] [place] [address] [phone]              Future tests that were ordered for you today     Open Future Orders        Priority Expected Expires Ordered    XR Abdomen 1 View Routine 2/27/2018 4/7/2018 2/21/2018            Who to contact     Please call your clinic at [phone] to:    Ask questions about your health    Make or cancel appointments    Discuss your medicines    Learn about your test results    Speak to your doctor            Additional Information About Your Visit        [facility] Information     [facility] gives you secure access to your electronic health record. If you see a primary care provider, you can also send messages to your care team and make appointments. If you have questions, please call your primary care clinic.  If you do not have a primary care provider, please call [phone] and they will assist you.      [facility] is an electronic gateway that provides easy, online access to your medical records. With [facility], you can request a clinic appointment, read your test results, renew a prescription or communicate with your care team.     To access your existing account, please contact your [facility]  "[facility] [facility] or call [phone] for assistance.        Care EveryWhere ID     This is your Care EveryWhere ID. This could be used by other organizations to access your [facility] medical records  [id number]        Your Vitals Were     Pulse Height Pulse Oximetry BMI (Body Mass Index)          79 1.803 m (5' 11\") 100% 37.14 kg/m2         Blood Pressure from Last 3 Encounters:   02/21/18 120/74   01/31/18 114/77   01/23/18 117/75    Weight from Last 3 Encounters:   02/21/18 120.8 kg (266 lb 4.8 oz)   01/31/18 119.7 kg (264 lb)   01/23/18 121.6 kg (268 lb)                 Today's Medication Changes          These changes are accurate as of 2/21/18  3:38 PM.  If you have any questions, ask your nurse or doctor.               Start taking these medicines.        Dose/Directions    barium sulfate Caps capsule   Commonly known as:  SITZMARKS   Used for:  Drug-induced constipation   Started by:  Britney Mcfadden APRN CNP        Dose:  1 capsule   Take 1 capsule by mouth once for 1 dose   Quantity:  1 capsule   Refills:  0       magnesium oxide 400 MG tablet   Commonly known as:  MAG-OX   Used for:  Drug-induced constipation   Started by:  Britney Mcfadden APRN CNP        Dose:  5530-4908 mg   Take 4-6 tablets (1,600-2,400 mg) by mouth every other day   Quantity:  180 tablet   Refills:  3         Stop taking these medicines if you haven't already. Please contact your care team if you have questions.     AMITIZA PO   Stopped by:  Britney Mcfadden APRN CNP           linaclotide 290 MCG capsule   Commonly known as:  LINZESS   Stopped by:  Britney Mcfadden APRN CNP                Where to get your medicines      These medications were sent to [facility]/pharmacy #[id number] - [place], [place] - [address]  [address] [place] [address]     Phone:  [phone]     magnesium oxide 400 MG tablet         These medications were sent to [place], [place] - [address]  [address], [facility] [address]    Hours:  TRANSPLANT PHONE NUMBER [phone] Phone:  [phone]     barium sulfate Caps capsule                Primary Care Provider Office Phone # Fax #    Damon Cooper [phone]       [facility] [address] [address]        Equal Access to Services     CAROLINE BARBER : Hadii aad ku hadasho Soomaali, waaxda luqadaha, qaybta kaalmada adeegyada, waxay idiin hayaan adeeg kharash la'aan ah. So [facility] [phone].    ATENCIÓN: Si habla feliciats, tiene a dover disposición servicios gratuitos de asistencia lingüística. Llame al [phone].    We comply with applicable federal civil rights laws and [place] laws. We do not discriminate on the basis of race, color, national origin, age, disability, sex, sexual orientation, or gender identity.            Thank you!     Thank you for choosing [facility]  for your care. Our goal is always to provide you with excellent care. Hearing back from our patients is one way we can continue to improve our services. Please take a few minutes to complete the written survey that you may receive in the mail after your visit with us. Thank you!             Your Updated Medication List - Protect others around you: Learn how to safely use, store and throw away your medicines at www.disposemymeds.org.          This list is accurate as of 2/21/18  3:38 PM.  Always use your most recent med list.                   Brand Name Dispense Instructions for use Diagnosis    * albuterol (2.5 MG/3ML) 0.083% neb solution      Take 1 vial by nebulization every 6 hours as needed for shortness of breath / dyspnea or wheezing        * VENTOLIN  (90 BASE) MCG/ACT Inhaler   Generic drug:  albuterol      Inhale 2 puffs into the lungs every 4 hours as needed for shortness of breath / dyspnea or wheezing        amitriptyline 25 MG tablet    ELAVIL     Take 25 mg by mouth At  Bedtime        barium sulfate Caps capsule    SITZMARKS    1 capsule    Take 1 capsule by mouth once for 1 dose    Drug-induced constipation       bisacodyl 10 MG Suppository    DULCOLAX     Place 10 mg rectally daily as needed        EUCERIN INTENSIVE REPAIR EX      Externally apply topically 2 times daily as needed        glipiZIDE 5 MG tablet    GLUCOTROL    30 tablet    Take 1 tablet (5 mg) by mouth 2 times daily (before meals)    Diabetes mellitus (H)       lidocaine 5 % ointment    XYLOCAINE     Apply topically 3 times daily For feet        LIPITOR PO      Take 40 mg by mouth daily        losartan 25 MG tablet    COZAAR    30 tablet    Take 1 tablet (25 mg) by mouth daily    Hypertrophic cardiomyopathy (H)       LYRICA PO      Take 75 mg by mouth 2 times daily        magnesium oxide 400 MG tablet    MAG-OX    180 tablet    Take 4-6 tablets (1,600-2,400 mg) by mouth every other day    Drug-induced constipation       metFORMIN 500 MG tablet    GLUCOPHAGE    60 tablet    Take 1,000 mg by mouth 2 times daily (with meals)    Diabetes mellitus (H)       metoprolol tartrate 50 MG tablet    LOPRESSOR    180 tablet    Take 100 mg by mouth 3 times daily    S/P ventricular septal myectomy, Congestive heart failure, unspecified, Hypertrophic cardiomyopathy (H)       senna-docusate 8.6-50 MG per tablet    SENOKOT-S;PERICOLACE     Take 1 tablet by mouth daily        * SEROQUEL PO      Take 25 mg by mouth 2 times daily        * QUEtiapine 150 MG Tb24 24 hr tablet    SEROquel XR     Take 150 mg by mouth At Bedtime        sildenafil 20 MG tablet    REVATIO    90 tablet    Take 0.5 tablets (10 mg) by mouth 3 times daily    Critical lower limb ischemia       TRAZODONE HCL PO      Take 100 mg by mouth At Bedtime        venlafaxine 150 MG 24 hr capsule    EFFEXOR-XR    30 capsule    Take 150 mg by mouth 2 times daily    Adjustment disorder with depressed mood       WARFARIN SODIUM PO      Take 8.5 mg by mouth every morning         * Notice:  This list has 4 medication(s) that are the same as other medications prescribed for you. Read the directions carefully, and ask your doctor or other care provider to review them with you.

## 2018-02-21 NOTE — LETTER
2/21/2018       RE: Konstantin Sharp  [address] [place] [address]     Dear Colleague,    Thank you for referring your patient, Konstantin Sharp, to the [facility] at [facility]. Please see a copy of my visit note below.    Service Date: 02/21/2018      CHIEF COMPLAINT:  Severe constipation, followup.      HISTORY OF PRESENT ILLNESS:  Konstantin is a 47-year-old man seen in July and December regarding the above complaints.  He has cardiac hypertrophy, ICD, history of TBI, history of double pneumonia with 3 months in coma, severe constipation.  He has DM type 2.  He has failed twice daily MiraLax, lactulose, magnesium product.  We have discussed Kegel exercises, and he reports ability to do that and relax his bottom.  On Senna, 2 each morning, he  will have a bowel movement once weekly.  His new report at the last visit was that he can now sense stool coming down into the rectum, a great improvement.  At the last visit, he was given linaclotide 290 mcg, advised to maintain good fluid, practice Kegel exercises and relax muscles as needed, used a suppository as needed for initiating bowel habit.  Note that he had [place] [facility] [place]  Colon and Rectal Surgery consultation for hemorrhoids was suggested and ordered.      Konstantin has used the Linzess since the last visit and now has bowel movement 1-2 times a week.  He was instructed that his primary care clinic to restart the Senna.  He does not consider that he has any decent benefit from the linaclotide and also no side effect.  He continues to be aware of sensation of stool entering the rectum.  He notes that unless he pushes to initiate a bowel habit, he will not have one.  It always begins hard or firm and then becomes soft and sometimes loose.  He is sure that he does not tighten his pelvic floor when he strains or pushes.  He declines the hemorrhoid treatment  consultation.      Konstantin believes he drinks adequate fluid.  He does drink 1 regular Coke a day, 16 ounce.  He does not have awareness that he has any constipation related to dietary factors.      PLAN:  Konstantin does describe both increase in gas and bloat.  He is advised to have dairy-free trial at least 2 weeks, 4 weeks being best.  Question also is whether protein from dairy increases his constipation.  Then add dairy back into his diet and does he have increase in constipation again or more gas.  He often has only a very small amount of milk on cereal and an occasional cheese on sandwich, etc.  Sometimes he has considerably more dairy in a day.        He is advised to have a transit marker study and will do that shortly.        He is advised to return to the GI Clinic with any luminal provider at the next opening, nonurgent.      He believes he had a magnesium before, but on the tablet, it is noted and only being 1 or 2 tablets at a time.  He is now sent a trial magnesium oxide 4-6 tablets daily or every other day.      We reviewed the need for dietary fiber, good fluid and exercise as possible.  He has tremendous neuropathy and does not believe it is possible for him to do much exercise.  He is literally in bed most of the time due to back pain and back injury, even rather than sitting.      Konstantin remains on potentially constipating medications including his quetiapine, amitriptyline, trazodone.     We discussed the assessment and plan.  We discussed the possibility of pelvic floor studies.  We defer them at this time because he is quite sure he is relaxing the pelvic floor when he strains to stool.  He is happy to complete the transit marker study.  His PCA was present during the entire visit.        Though Konstantin has bisacodyl suppository on order, he prefers not to use it due to discomfort.      Total visit 20 minutes with counseling time 15 minutes.     D: 02/22/2018   T: 02/22/2018   MT: SCOTT      Name:      NERIS JACKSON   MRN:      [id number]        Account:      [id number]   :      1970           Service Date: 2018      Document: [id number]        Again, thank you for allowing me to participate in the care of your patient.      Sincerely,    SHAHEED Mccormick CNP

## 2018-02-22 NOTE — PROGRESS NOTES
Service Date: 02/21/2018      CHIEF COMPLAINT:  Severe constipation, followup.      HISTORY OF PRESENT ILLNESS:  Konstantin is a 47-year-old man seen in July and December regarding the above complaints.  He has cardiac hypertrophy, ICD, history of TBI, history of double pneumonia with 3 months in coma, severe constipation.  He has DM type 2.  He has failed twice daily MiraLax, lactulose, magnesium product.  We have discussed Kegel exercises, and he reports ability to do that and relax his bottom.  On Senna, 2 each morning, he  will have a bowel movement once weekly.  His new report at the last visit was that he can now sense stool coming down into the rectum, a great improvement.  At the last visit, he was given linaclotide 290 mcg, advised to maintain good fluid, practice Kegel exercises and relax muscles as needed, used a suppository as needed for initiating bowel habit.  Note that he had previously had a colonoscopy at [facility] 05/2016.  Colon and Rectal Surgery consultation for hemorrhoids was suggested and ordered.      Konstantin has used the Linzess since the last visit and now has bowel movement 1-2 times a week.  He was instructed that his primary care clinic to restart the Senna.  He does not consider that he has any decent benefit from the linaclotide and also no side effect.  He continues to be aware of sensation of stool entering the rectum.  He notes that unless he pushes to initiate a bowel habit, he will not have one.  It always begins hard or firm and then becomes soft and sometimes loose.  He is sure that he does not tighten his pelvic floor when he strains or pushes.  He declines the hemorrhoid treatment consultation.      Konstantin believes he drinks adequate fluid.  He does drink 1 regular Coke a day, 16 ounce.  He does not have awareness that he has any constipation related to dietary factors.      PLAN:  Konstantin does describe both increase in gas and bloat.  He is advised to have dairy-free trial at least  2 weeks, 4 weeks being best.  Question also is whether protein from dairy increases his constipation.  Then add dairy back into his diet and does he have increase in constipation again or more gas.  He often has only a very small amount of milk on cereal and an occasional cheese on sandwich, etc.  Sometimes he has considerably more dairy in a day.        He is advised to have a transit marker study and will do that shortly.        He is advised to return to the GI Clinic with any luminal provider at the next opening, nonurgent.      He believes he had a magnesium before, but on the tablet, it is noted and only being 1 or 2 tablets at a time.  He is now sent a trial magnesium oxide 4-6 tablets daily or every other day.      We reviewed the need for dietary fiber, good fluid and exercise as possible.  He has tremendous neuropathy and does not believe it is possible for him to do much exercise.  He is literally in bed most of the time due to back pain and back injury, even rather than sitting.      Konstantin remains on potentially constipating medications including his quetiapine, amitriptyline, trazodone.     We discussed the assessment and plan.  We discussed the possibility of pelvic floor studies.  We defer them at this time because he is quite sure he is relaxing the pelvic floor when he strains to stool.  He is happy to complete the transit marker study.  His PCA was present during the entire visit.        Though Konstantin has bisacodyl suppository on order, he prefers not to use it due to discomfort.      Total visit 20 minutes with counseling time 15 minutes.         SHAHEED SHELL CNP             D: 2018   T: 2018   MT: SCOTT      Name:     KONSTANTIN JACKSON   MRN:      [id number]        Account:      [id number]   :      1970           Service Date: 2018      Document: [id number]

## 2018-02-25 ENCOUNTER — COMMUNICATION - HEALTHEAST (OUTPATIENT)
Dept: NEUROLOGY | Facility: CLINIC | Age: 48
End: 2018-02-25

## 2018-02-25 DIAGNOSIS — F33.1 MDD (MAJOR DEPRESSIVE DISORDER), RECURRENT EPISODE, MODERATE (H): ICD-10-CM

## 2018-02-27 ENCOUNTER — RADIANT APPOINTMENT (OUTPATIENT)
Dept: GENERAL RADIOLOGY | Facility: CLINIC | Age: 48
End: 2018-02-27
Attending: REGISTERED NURSE
Payer: MEDICAID

## 2018-02-27 ENCOUNTER — PRE VISIT (OUTPATIENT)
Dept: CARDIOLOGY | Facility: CLINIC | Age: 48
End: 2018-02-27

## 2018-02-27 DIAGNOSIS — K59.03 DRUG-INDUCED CONSTIPATION: ICD-10-CM

## 2018-03-02 ENCOUNTER — OFFICE VISIT (OUTPATIENT)
Dept: CARDIOLOGY | Facility: CLINIC | Age: 48
End: 2018-03-02
Attending: INTERNAL MEDICINE
Payer: MEDICAID

## 2018-03-02 ENCOUNTER — RADIANT APPOINTMENT (OUTPATIENT)
Dept: CARDIOLOGY | Facility: CLINIC | Age: 48
End: 2018-03-02
Payer: MEDICAID

## 2018-03-02 VITALS
WEIGHT: 268.2 LBS | HEART RATE: 73 BPM | BODY MASS INDEX: 37.55 KG/M2 | HEIGHT: 71 IN | SYSTOLIC BLOOD PRESSURE: 122 MMHG | DIASTOLIC BLOOD PRESSURE: 83 MMHG | OXYGEN SATURATION: 96 %

## 2018-03-02 DIAGNOSIS — I42.2 HYPERTROPHIC NONOBSTRUCTIVE CARDIOMYOPATHY (H): Primary | ICD-10-CM

## 2018-03-02 DIAGNOSIS — I42.2 HYPERTROPHIC CARDIOMYOPATHY (H): ICD-10-CM

## 2018-03-02 DIAGNOSIS — I42.2 HYPERTROPHIC CARDIOMYOPATHY (H): Primary | ICD-10-CM

## 2018-03-02 PROCEDURE — G0463 HOSPITAL OUTPT CLINIC VISIT: HCPCS | Mod: 25,ZF

## 2018-03-02 PROCEDURE — 93289 INTERROG DEVICE EVAL HEART: CPT | Mod: 26 | Performed by: INTERNAL MEDICINE

## 2018-03-02 PROCEDURE — 99214 OFFICE O/P EST MOD 30 MIN: CPT | Mod: 25 | Performed by: INTERNAL MEDICINE

## 2018-03-02 PROCEDURE — 93290 INTERROG DEV EVAL ICPMS IP: CPT | Mod: 26 | Performed by: INTERNAL MEDICINE

## 2018-03-02 PROCEDURE — 93283 PRGRMG EVAL IMPLANTABLE DFB: CPT | Mod: ZF

## 2018-03-02 RX ADMIN — Medication 6 ML: at 14:30

## 2018-03-02 ASSESSMENT — PAIN SCALES - GENERAL: PAINLEVEL: NO PAIN (0)

## 2018-03-02 NOTE — PROGRESS NOTES
Service Date: 03/02/2018      HISTORY OF PRESENT ILLNESS:  Mr. Sharp is a 47-year-old gentleman who is known to me with a past medical history significant for hypertrophic cardiomyopathy.  He also has diabetes, nicotine dependence, hypertension, hyperlipidemia.  I met him for the first time in 06/2015.  He had chronic atypical chest pain which he reports is actually improved.  We did do a nuclear stress test which was nondiagnostic and a CTA in 2013 and 2015 which both had a negative coronary calcium score, but the quality was poor in terms of obstructive disease.  Also, his diabetes has not been under ideal control.  He recently had his A1c checked in January and it was 8.1.  He has peripheral neuropathy from this as well.      Mr. Sharp underwent an echo today.  I reviewed those images.  It is unchanged.  He continues to have mostly mid ventricular basal hypertrophy with not a lot of significant obstruction.  He has a normal ejection fraction with no significant valve disease.  He does have an ICD that was implanted in 2016.  He has not had any shocks from his device.  He does continue to smoke half pack a day.  He has 4 children, 2 have been screened who are 21 and 23, girls, and 2 have not who are 26 and 27.  He is on cholesterol medicine.  We do not know of a recent cholesterol, but he does follow with his primary physician and reports it is under good control.  He smokes half a pack a day, does know he needs to quit but has not been able to.  He is now working for [employer] 4 times a week and likes his job pretty well.  He is limited in what he can do for exercise because of his peripheral neuropathy and attributes that and Seroquel addition in the past year for his weight gain of 18 pounds.  He does have sleep apnea and does not use CPAP mask.  He is on disability.      IMPRESSION, REPORT, PLAN:   1.  Hypertrophic cardiomyopathy status post myectomy with minimal obstruction, stable.   2.  Diabetes type 2 with  peripheral neuropathy with an A1c of 8.1 in 2018.     3.  Heart block followed by ectopy status post ICD and permanent pacemaker.   4.  Atrial fibrillation with history of DVT, on chronic anticoagulation with Coumadin.   5.  Nicotine dependence, currently smokes half pack a day with a 30-pack-year smoking history.   6.  History of atypical chest pain with a negative coronary calcium score on last CT 2015.   7.  Glottic stenosis.   8.  Pneumonia.   9.  Chronic back pain.      DISCUSSION:  It was a pleasure to see Mr. Sharp in followup.  Clinically, he has been doing reasonably well from a cardiac standpoint.  He has no concerning symptoms, and in fact his chronic chest pain has improved, but his weight is up 18 pounds and I discussed diet and exercise, and of course, associated with that his A1c is also worse.  He will work on these along with his primary physician.  We will plan to see him back in a year with an echo and a nuclear stress test.  I encouraged him to stop smoking as well.  All questions were answered.  It was a pleasure to see him.  Please do not hesitate to contact me with any questions or concerns.         TAHIRA SAHU MD             D: 2018   T: 2018   MT: JUDAH      Name:     NERIS SHARP   MRN:      [id number]        Account:      [id number]   :      1970           Service Date: 2018      Document: [id number]

## 2018-03-02 NOTE — MR AVS SNAPSHOT
After Visit Summary   3/2/2018    Konstantin Sharp    MRN: [id number]           Patient Information     Date Of Birth          1970        Visit Information        Provider Department      3/2/2018 1:30 PM 1,  Cv Device [facility]        Today's Diagnoses     Hypertrophic nonobstructive cardiomyopathy (H)    -  1      Care Instructions    It was a pleasure to see you in clinic today. Please do not hesitate to call with any questions or concerns.  We look forward to seeing you in clinic at your next device check in 3 months.    Elvia Raza, RN  Electrophysiology Nurse Clinician  [facility]  During business hours call:  [phone]  After business hours please call: [phone]- select option #4 and ask for job code [phone].            Follow-ups after your visit        Additional Services     Follow-Up with Device Clinic-6 months                 Your next 10 appointments already scheduled     Mar 02, 2018  2:00 PM CST   Ech Complete with [id number]   [facility] ([facility])    [address] [place] [address] [phone]           1. Please bring or wear a comfortable two-piece outfit. 2. You may eat, drink and take your normal medicines. 3. For any questions that cannot be answered, please contact the ordering physician            Mar 02, 2018  3:00 PM CST   (Arrive by 2:45 PM)   Return Genetic with Momo Castellano MD   [facility] ([facility])    [address] [place] [id number] [phone]            Mar 16, 2018  1:30 PM CDT   (Arrive by 1:15 PM)   Diabetic Education with Na Alcala RD   [facility] ([facility])    [address] [place] [id number] [phone]           Please bring to your appointment: 1) 2 - 3 day food journal. Write down everything you eat and  drink for 2 - 3 days prior to your appointment. 2) Your insurance card. 3) A blood glucose meter. If you do not have one, contact your pharmacy or clinic prior to your appointment. Your pharmacist can assist you with choosing a meter suitable to your insurance needs. 4)  A list of your medications, including prescription, over the counter and herbal. Include dosage and frequency where appropriate.            May 29, 2018  4:30 PM CDT   (Arrive by 4:15 PM)   Implanted Defibulator with Uc Cv Device 1   [facility] ([facility])    [address] [place] [address]  [address] [place] [address] [phone]            May 29, 2018  5:00 PM CDT   (Arrive by 4:45 PM)   Return Vascular Visit with Kenia Mcgovern MD   [facility] ([facility])    [address] [place] [address]  [address] [place] [address] [phone]              Future tests that were ordered for you today     Open Future Orders        Priority Expected Expires Ordered    Follow-Up with Device Clinic-6 months Routine 8/29/2018 11/27/2018 3/2/2018            Who to contact     If you have questions or need follow up information about today's clinic visit or your schedule please contact [facility] directly at [phone].  Normal or non-critical lab and imaging results will be communicated to you by [employer], letter or phone within 4 business days after the clinic has received the results. If you do not hear from us within 7 days, please contact the clinic through [employer] or phone. If you have a critical or abnormal lab result, we will notify you by phone as soon as possible.  Submit refill requests through [employer] or call your pharmacy and they will forward the refill request to us. Please allow 3 business days for your refill to be completed.          Additional Information About Your Visit        [employer] Information     [employer] gives you secure access to your electronic health  record. If you see a primary care provider, you can also send messages to your care team and make appointments. If you have questions, please call your primary care clinic.  If you do not have a primary care provider, please call [phone] and they will assist you.        Care EveryWhere ID     This is your Care EveryWhere ID. This could be used by other organizations to access your [place] medical records  [id number]         Blood Pressure from Last 3 Encounters:   02/21/18 120/74   01/31/18 114/77   01/23/18 117/75    Weight from Last 3 Encounters:   02/21/18 120.8 kg (266 lb 4.8 oz)   01/31/18 119.7 kg (264 lb)   01/23/18 121.6 kg (268 lb)              We Performed the Following     (37365) ICD DEVICE PROGRAMMING EVAL, DUAL LEAD ICD        Primary Care Provider Office Phone # Fax #    Damon Cooper [phone]       [facility] CL[facility]IC [address] [address]        Equal Access to Services     CAROLINE BARBER : Hadii aad ku hadasho Soomaali, waaxda luqadaha, qaybta kaalmada adeegyada, waxay idiin hayaan betzaida perry . So [facility] [phone].    ATENCIÓN: Si habla español, tiene a dover disposición servicios gratuitos de asistencia lingüística. [facility] [phone].    We comply with applicable federal civil rights laws and [place] laws. We do not discriminate on the basis of race, color, national origin, age, disability, sex, sexual orientation, or gender identity.            Thank you!     Thank you for choosing [facility]  for your care. Our goal is always to provide you with excellent care. Hearing back from our patients is one way we can continue to improve our services. Please take a few minutes to complete the written survey that you may receive in the mail after your visit with us. Thank you!             Your Updated Medication List - Protect others around you: Learn how to safely use, store and throw away your medicines at www.disposemymeds.org.          This  list is accurate as of 3/2/18  1:49 PM.  Always use your most recent med list.                   Brand Name Dispense Instructions for use Diagnosis    * albuterol (2.5 MG/3ML) 0.083% neb solution      Take 1 vial by nebulization every 6 hours as needed for shortness of breath / dyspnea or wheezing        * VENTOLIN  (90 BASE) MCG/ACT Inhaler   Generic drug:  albuterol      Inhale 2 puffs into the lungs every 4 hours as needed for shortness of breath / dyspnea or wheezing        amitriptyline 25 MG tablet    ELAVIL     Take 25 mg by mouth At Bedtime        bisacodyl 10 MG Suppository    DULCOLAX     Place 10 mg rectally daily as needed        EUCERIN INTENSIVE REPAIR EX      Externally apply topically 2 times daily as needed        glipiZIDE 5 MG tablet    GLUCOTROL    30 tablet    Take 1 tablet (5 mg) by mouth 2 times daily (before meals)    Diabetes mellitus (H)       lidocaine 5 % ointment    XYLOCAINE     Apply topically 3 times daily For feet        LIPITOR PO      Take 40 mg by mouth daily        losartan 25 MG tablet    COZAAR    30 tablet    Take 1 tablet (25 mg) by mouth daily    Hypertrophic cardiomyopathy (H)       LYRICA PO      Take 75 mg by mouth 2 times daily        magnesium oxide 400 MG tablet    MAG-OX    180 tablet    Take 4-6 tablets (1,600-2,400 mg) by mouth every other day    Drug-induced constipation       metFORMIN 500 MG tablet    GLUCOPHAGE    60 tablet    Take 1,000 mg by mouth 2 times daily (with meals)    Diabetes mellitus (H)       metoprolol tartrate 50 MG tablet    LOPRESSOR    180 tablet    Take 100 mg by mouth 3 times daily    S/P ventricular septal myectomy, Congestive heart failure, unspecified, Hypertrophic cardiomyopathy (H)       senna-docusate 8.6-50 MG per tablet    SENOKOT-S;PERICOLACE     Take 1 tablet by mouth daily        * SEROQUEL PO      Take 25 mg by mouth 2 times daily        * QUEtiapine 150 MG Tb24 24 hr tablet    SEROquel XR     Take 150 mg by mouth At  Bedtime        sildenafil 20 MG tablet    REVATIO    90 tablet    Take 0.5 tablets (10 mg) by mouth 3 times daily    Critical lower limb ischemia       TRAZODONE HCL PO      Take 100 mg by mouth At Bedtime        venlafaxine 150 MG 24 hr capsule    EFFEXOR-XR    30 capsule    Take 150 mg by mouth 2 times daily    Adjustment disorder with depressed mood       WARFARIN SODIUM PO      Take 8.5 mg by mouth every morning        * Notice:  This list has 4 medication(s) that are the same as other medications prescribed for you. Read the directions carefully, and ask your doctor or other care provider to review them with you.

## 2018-03-02 NOTE — MR AVS SNAPSHOT
"              After Visit Summary   3/2/2018    Konstantin Sharp    MRN: [id number]           Patient Information     Date Of Birth          1970        Visit Information        Provider Department      3/2/2018 3:00 PM Momo Castellano MD [facility]        Today's Diagnoses     Hypertrophic cardiomyopathy (H)    -  1      Care Instructions    You were seen today in the Adult Congenital and Cardiovascular Genetics Clinic at the [facility].    Cardiology Providers you saw during your visit:  Dr. Lia Castellano     Diagnosis:  HCM    Results:  The results of your echo were discussed with you today    Recommendations:    1.  Continue to eat a heart healthy, low salt diet.  2.  Continue to get 20-30 minutes of aerobic activity, 4-5 days per week.  Examples of aerobic activity include walking, running, swimming, cycling, etc.  3.  Continue to observe good oral hygiene, with regular dental visits.  4.  No changes today.      Vitals:    03/02/18 1426   BP: 122/83   BP Location: Left arm   Patient Position: Chair   Cuff Size: Adult Large   Pulse: 73   SpO2: 96%   Weight: 121.7 kg (268 lb 3.2 oz)   Height: 1.803 m (5' 11\")     Exercise restrictions:   Yes__X__  No____         If yes, list restrictions:  Must be allowed to rest if fatigued or SOB      Work restrictions:  Yes____  No_X___         If yes, list restrictions:      Follow-up:  Follow up with Dr. Castellano in 1 year with an echo and Lexiscan prior.     For after hours urgent needs, call [phone] and ask to speak to the Adult Congenital Physician on call.  Mention Job Code [phone].    For emergencies call 911.    For any scheduling needs and to contact your nurse in the Adult Congenital and Cardiovascular Genetics Clinic, please call [facility] , at [phone].    Thank you for your visit today!  If you have questions or concerns about today's visit, please call me.    Anna Chino, RN, BSN  Cardiology Care " Coordinator  [facility]  [email or web address]  [phone]    [facility]  [facility]  Mail Code [id number]  [address] [place] [address]  [id number]           Follow-ups after your visit        Additional Services     Follow-Up with Cardiologist       Echo and Lexiscan prior                  Your next 10 appointments already scheduled     Mar 16, 2018  1:30 PM CDT   (Arrive by 1:15 PM)   Diabetic Education with Na Alcala RD   [facility] ([facility])    [address] [place] [address]  [address] [facility] [address] [phone]           Please bring to your appointment: 1) 2 - 3 day food journal. Write down everything you eat and drink for 2 - 3 days prior to your appointment. 2) Your insurance card. 3) A blood glucose meter. If you do not have one, contact your pharmacy or clinic prior to your appointment. Your pharmacist can assist you with choosing a meter suitable to your insurance needs. 4)  A list of your medications, including prescription, over the counter and herbal. Include dosage and frequency where appropriate.            May 29, 2018  4:30 PM CDT   (Arrive by 4:15 PM)   Implanted Defibulator with Uc Cv Device 1   [facility] ([facility])    [address] [place] [id number] [phone]            May 29, 2018  5:00 PM CDT   (Arrive by 4:45 PM)   Return Vascular Visit with Kenia Mcgovern MD   [facility] ([facility])    [address] [place] [address]  [address] [place] [id number] [phone]              Future tests that were ordered for you today     Open Future Orders        Priority Expected Expires Ordered    Follow-Up with Cardiologist Routine 3/1/2019 3/2/2019 3/2/2018    Echocardiogram Complete Routine 3/1/2019 3/2/2019 3/2/2018    Stress NM  "Lexiscan Routine 3/1/2019 3/2/2019 3/2/2018    Follow-Up with Device Clinic-6 months Routine 8/29/2018 11/27/2018 3/2/2018            Who to contact     If you have questions or need follow up information about today's clinic visit or your schedule please contact [facility] directly at [phone].  Normal or non-critical lab and imaging results will be communicated to you by [employer], letter or phone within 4 business days after the clinic has received the results. If you do not hear from us within 7 days, please contact the clinic through [employer] or phone. If you have a critical or abnormal lab result, we will notify you by phone as soon as possible.  Submit refill requests through [employer] or call your pharmacy and they will forward the refill request to us. Please allow 3 business days for your refill to be completed.          Additional Information About Your Visit        [employer] Information     [employer] gives you secure access to your electronic health record. If you see a primary care provider, you can also send messages to your care team and make appointments. If you have questions, please call your primary care clinic.  If you do not have a primary care provider, please call [phone] and they will assist you.        Care EveryWhere ID     This is your Care EveryWhere ID. This could be used by other organizations to access your [place] medical records  [id number]        Your Vitals Were     Pulse Height Pulse Oximetry BMI (Body Mass Index)          73 1.803 m (5' 11\") 96% 37.41 kg/m2         Blood Pressure from Last 3 Encounters:   03/02/18 122/83   02/21/18 120/74   01/31/18 114/77    Weight from Last 3 Encounters:   03/02/18 121.7 kg (268 lb 3.2 oz)   02/21/18 120.8 kg (266 lb 4.8 oz)   01/31/18 119.7 kg (264 lb)               Primary Care Provider Office Phone # Fax #    Damon Cooper [phone]       [facility] CLINIC [address] [facility] [address]        Equal Access " to Services     [facility] Hospital[facility] : Hadii migdalia Olsen, waallysonda luqadaha, qaybta kamariashanita jin, gustavo kennedy. So [facility] [phone].    ATENCIÓN: Si valla felicitas, tiene a dover disposición servicios gratuitos de asistencia lingüística. Llame al [phone].    We comply with applicable federal civil rights laws and [place] laws. We do not discriminate on the basis of race, color, national origin, age, disability, sex, sexual orientation, or gender identity.            Thank you!     Thank you for choosing [facility]  for your care. Our goal is always to provide you with excellent care. Hearing back from our patients is one way we can continue to improve our services. Please take a few minutes to complete the written survey that you may receive in the mail after your visit with us. Thank you!             Your Updated Medication List - Protect others around you: Learn how to safely use, store and throw away your medicines at www.disposemymeds.org.          This list is accurate as of 3/2/18  3:18 PM.  Always use your most recent med list.                   Brand Name Dispense Instructions for use Diagnosis    * albuterol (2.5 MG/3ML) 0.083% neb solution      Take 1 vial by nebulization every 6 hours as needed for shortness of breath / dyspnea or wheezing        * VENTOLIN  (90 BASE) MCG/ACT Inhaler   Generic drug:  albuterol      Inhale 2 puffs into the lungs every 4 hours as needed for shortness of breath / dyspnea or wheezing        amitriptyline 25 MG tablet    ELAVIL     Take 25 mg by mouth At Bedtime        bisacodyl 10 MG Suppository    DULCOLAX     Place 10 mg rectally daily as needed        EUCERIN INTENSIVE REPAIR EX      Externally apply topically 2 times daily as needed        glipiZIDE 5 MG tablet    GLUCOTROL    30 tablet    Take 1 tablet (5 mg) by mouth 2 times daily (before meals)    Diabetes mellitus (H)       lidocaine 5 % ointment    XYLOCAINE      Apply topically 3 times daily For feet        LIPITOR PO      Take 40 mg by mouth daily        losartan 25 MG tablet    COZAAR    30 tablet    Take 1 tablet (25 mg) by mouth daily    Hypertrophic cardiomyopathy (H)       LYRICA PO      Take 75 mg by mouth 2 times daily        magnesium oxide 400 MG tablet    MAG-OX    180 tablet    Take 4-6 tablets (1,600-2,400 mg) by mouth every other day    Drug-induced constipation       metFORMIN 500 MG tablet    GLUCOPHAGE    60 tablet    Take 1,000 mg by mouth 2 times daily (with meals)    Diabetes mellitus (H)       metoprolol tartrate 50 MG tablet    LOPRESSOR    180 tablet    Take 100 mg by mouth 3 times daily    S/P ventricular septal myectomy, Congestive heart failure, unspecified, Hypertrophic cardiomyopathy (H)       senna-docusate 8.6-50 MG per tablet    SENOKOT-S;PERICOLACE     Take 1 tablet by mouth daily        * SEROQUEL PO      Take 25 mg by mouth 2 times daily        * QUEtiapine 150 MG Tb24 24 hr tablet    SEROquel XR     Take 150 mg by mouth At Bedtime        sildenafil 20 MG tablet    REVATIO    90 tablet    Take 0.5 tablets (10 mg) by mouth 3 times daily    Critical lower limb ischemia       TRAZODONE HCL PO      Take 100 mg by mouth At Bedtime        venlafaxine 150 MG 24 hr capsule    EFFEXOR-XR    30 capsule    Take 150 mg by mouth 2 times daily    Adjustment disorder with depressed mood       WARFARIN SODIUM PO      Take 8.5 mg by mouth every morning        * Notice:  This list has 4 medication(s) that are the same as other medications prescribed for you. Read the directions carefully, and ask your doctor or other care provider to review them with you.

## 2018-03-02 NOTE — PROGRESS NOTES
"HPI:     Please see dictated note    PAST MEDICAL HISTORY:  Past Medical History:   Diagnosis Date     Atrial fibrillation (H)      Chest pain     intermittent     Chronic pain      Cognitive disorder     memory loss     Depressive disorder      Dual ICD (implantable cardiac defibrillator) in place 04/29/2014    and pacemaker     GERD (gastroesophageal reflux disease)      H/O traumatic brain injury 2015     HTN (hypertension)      Hypertrophic nonobstructive cardiomyopathy (H) 09/12/2012    s/p ventricular myectomy     Inflammatory arthritis      Intermittent asthma      PAD (peripheral artery disease) (H)      Polysubstance abuse      Seizures (H)     last episode 2014 when \"blood sugar dropped\" according to patient     Sleep apnea     doesn't use cpap     Type 2 diabetes mellitus without complications (H)        CURRENT MEDICATIONS:  Current Outpatient Prescriptions   Medication Sig Dispense Refill     magnesium oxide (MAG-OX) 400 MG tablet Take 4-6 tablets (1,600-2,400 mg) by mouth every other day 180 tablet 3     WARFARIN SODIUM PO Take 8.5 mg by mouth every morning       QUEtiapine (SEROQUEL XR) 150 MG TB24 24 hr tablet Take 150 mg by mouth At Bedtime       sildenafil (REVATIO) 20 MG tablet Take 0.5 tablets (10 mg) by mouth 3 times daily 90 tablet 3     lidocaine (XYLOCAINE) 5 % ointment Apply topically 3 times daily For feet       Pregabalin (LYRICA PO) Take 75 mg by mouth 2 times daily        bisacodyl (DULCOLAX) 10 MG Suppository Place 10 mg rectally daily as needed        amitriptyline (ELAVIL) 25 MG tablet Take 25 mg by mouth At Bedtime        senna-docusate (SENOKOT-S;PERICOLACE) 8.6-50 MG per tablet Take 1 tablet by mouth daily       Atorvastatin Calcium (LIPITOR PO) Take 40 mg by mouth daily        QUEtiapine Fumarate (SEROQUEL PO) Take 25 mg by mouth 2 times daily       albuterol (2.5 MG/3ML) 0.083% neb solution Take 1 vial by nebulization every 6 hours as needed for shortness of breath / dyspnea or " wheezing       Emollient (EUCERIN INTENSIVE REPAIR EX) Externally apply topically 2 times daily as needed       losartan (COZAAR) 25 MG tablet Take 1 tablet (25 mg) by mouth daily 30 tablet      metFORMIN (GLUCOPHAGE) 500 MG tablet Take 1,000 mg by mouth 2 times daily (with meals)  60 tablet      glipiZIDE (GLUCOTROL) 5 MG tablet Take 1 tablet (5 mg) by mouth 2 times daily (before meals) 30 tablet      venlafaxine (EFFEXOR-XR) 150 MG 24 hr capsule Take 150 mg by mouth 2 times daily  30 capsule 1     metoprolol (LOPRESSOR) 50 MG tablet Take 100 mg by mouth 3 times daily  180 tablet 4     TRAZODONE HCL PO Take 100 mg by mouth At Bedtime       albuterol (VENTOLIN HFA) 108 (90 BASE) MCG/ACT Inhaler Inhale 2 puffs into the lungs every 4 hours as needed for shortness of breath / dyspnea or wheezing         PAST SURGICAL HISTORY:  Past Surgical History:   Procedure Laterality Date     APPENDECTOMY  1980    [facility] CARDIAC SURG PROCEDURE UNLIST       C HAND/FINGER SURGERY UNLISTED       C SHOULDER SURG PROC UNLISTED       C STOMACH SURGERY PROCEDURE UNLISTED       DISCECTOMY LUMBAR POSTERIOR MICROSCOPIC THREE+ LEVELS  12/16/2011    Procedure:DISCECTOMY LUMBAR POSTERIOR MICROSCOPIC THREE+ LEVELS; Posterior Decompression L2-S1; Surgeon:CAITIE FUNEZ; [facility]     FUSION CERVICAL POSTERIOR ONE LEVEL  8/24/2012    Procedure: FUSION CERVICAL POSTERIOR ONE LEVEL;  Posterior Instrumentated Spinal Fusion Cervical 6-7, Right Iliac Crest Bone [place] ;  Surgeon: Caitie Funez MD;  [facility]     GRAFT BONE FROM ILIAC CREST  8/24/2012    Procedure: GRAFT BONE FROM ILIAC CREST;;  Surgeon: Caitie Funez MD;  [facility]     HC SACROPLASTY       IMPLANT PACEMAKER       INJECT STEROID (LOCATION) N/A 2/19/2015    Procedure: INJECT STEROID (LOCATION);  Surgeon: Siri Koch MD;  [facility]     INSERT STIMULATOR AND LEADS INTERNAL DORSAL COLUMN   "8/7/2013    Procedure: INSERT STIMULATOR AND LEADS INTERNAL DORSAL COLUMN;  SPINAL CORD STIMULATOR IMPLANT ;  Surgeon: Ricardo Bruno MD;  [facility]     INSERT STIMULATOR DORSAL COLUMN  08/13/2013    lead replacemend, 12?2016,  battery replacement 4/4/2016     KNEE SURGERY       LASER CO2 LARYNGOSCOPY N/A 2/19/2015    Procedure: LASER CO2 LARYNGOSCOPY;  Surgeon: Siri Koch MD;  [facility]     LASER CO2 LARYNGOSCOPY, COMPLEX  7/22/2014    Procedure: LASER CO2 LARYNGOSCOPY, COMPLEX;  Surgeon: Siri Koch MD;  [facility]     MYECTOMY SEPTAL  4/14/2014    Procedure: Median Sternotomy, Septal Myectomy, Intraoperative BRENT performed by Dr. Castano, on pump oxygenator.;  Surgeon: Aguila Cannon MD;  [facility]     NECK SURGERY       SHOULDER SURGERY  2006    RIGHT     STOMACH SURGERY  1980    partial gastrectomy for bleeding ulcers, \"stress related\". [facility]     WRIST SURGERY Left 1988    fractured. 1988 or so       ALLERGIES     Allergies   Allergen Reactions     Bee Venom Swelling     Lisinopril      TABS  Severe cough     Penicillins Hives and Difficulty breathing       FAMILY HISTORY:  Family History   Problem Relation Age of Onset     HEART DISEASE Father 32     MIs x2; s/p CABG     Substance Abuse Father      Hypertension Father      Obesity Father      Hyperlipidemia Father      Hypertension Brother      DIABETES Brother      Glaucoma Maternal Grandmother      Hypertension Maternal Grandmother      DIABETES Maternal Grandfather      Glaucoma Maternal Grandfather      Hypertension Maternal Grandfather      CEREBROVASCULAR DISEASE Maternal Grandfather      Obesity Maternal Grandfather      Hyperlipidemia Maternal Grandfather      Coronary Artery Disease Maternal Grandfather      Substance Abuse Other      CANCER Other      Hypertension Other      Obesity Other      Other Cancer Other      all over     Hyperlipidemia Other      Coronary Artery Disease " "Other      Obesity Brother      Hyperlipidemia Brother      Lymphoma Maternal Uncle      Macular Degeneration No family hx of        SOCIAL HISTORY:  Social History     Social History     Marital status: Single     Spouse name: N/A     Number of children: N/A     Years of education: N/A     Social History Main Topics     Smoking status: Light Tobacco Smoker     Packs/day: 1.00     Years: 28.00     Types: Cigarettes     Last attempt to quit: 4/7/2014     Smokeless tobacco: Never Used     Alcohol use 0.0 oz/week     0 Standard drinks or equivalent per week      Comment: rare     Drug use: No      Comment: last using meth 1/2017     Sexual activity: Not Currently     Partners: Female     Other Topics Concern     Parent/Sibling W/ Cabg, Mi Or Angioplasty Before 65f 55m? Yes     Social History Narrative       ROS:   Constitutional: No fever, chills, or sweats. No weight gain/loss   ENT: No visual disturbance, ear ache, epistaxis, sore throat  Allergies/Immunologic: Negative.   Respiratory: No cough, hemoptysia  Cardiovascular: As per HPI  GI: No nausea, vomiting, hematemesis, melena, or hematochezia  : No urinary frequency, dysuria, or hematuria  Integument: Negative  Psychiatric: Negative  Neuro: Negative  Endocrinology: Negative   Musculoskeletal: Negative    EXAM:  /83 (BP Location: Left arm, Patient Position: Chair, Cuff Size: Adult Large)  Pulse 73  Ht 1.803 m (5' 11\")  Wt 121.7 kg (268 lb 3.2 oz)  SpO2 96%  BMI 37.41 kg/m2  In general, the patient is a pleasant male in no apparent distress.    HEENT: NC/AT.  PERRLA.  EOMI.  Sclerae white, not injected.  Nares clear.  Pharynx without erythema or exudate.  Dentition intact.    Neck: No adenopathy.  No thyromegaly. Carotids +4/4 bilaterally without bruits.  No jugular venous distension.   Heart: RRR. Normal S1, S2 splits physiologically. No murmur, rub, click, or gallop. The PMI is in the 5th ICS in the midclavicular line. There is no heave.    Lungs: " CTA.  No ronchi, wheezes, rales.  No dullness to percussion.   Abdomen: Soft, nontender, nondistended. No organomegaly.  No bruits.   Extremities: No clubbing, cyanosis, or edema.  The pulses are +4/4 at the radial, brachial, femoral, popliteal, DP, and PT sites bilaterally.  No bruits are noted.  Neurologic: Alert and oriented to person/place/time, normal speech, gait and affect  Skin: No petechiae, purpura or rash.    Labs:  LIPID RESULTS:  Lab Results   Component Value Date    CHOL 205 (A) 01/29/2014    HDL 27 (A) 01/29/2014     (A) 01/29/2014    TRIG 242 (A) 01/29/2014    CHOLHDLRATIO 4.0 09/30/2013       LIVER ENZYME RESULTS:  Lab Results   Component Value Date    AST 23 01/17/2015    ALT 30 01/17/2015       CBC RESULTS:  Lab Results   Component Value Date    WBC 9.3 01/31/2018    RBC 4.81 01/31/2018    HGB 15.4 01/31/2018    HCT 45.0 01/31/2018    MCV 94 01/31/2018    MCH 32.0 01/31/2018    MCHC 34.2 01/31/2018    RDW 13.6 01/31/2018     01/31/2018       BMP RESULTS:  Lab Results   Component Value Date     03/17/2015    POTASSIUM 4.1 01/23/2018    CHLORIDE 110 (H) 03/17/2015    CO2 25 03/17/2015    ANIONGAP 7 03/17/2015     (H) 03/17/2015    BUN 14 03/17/2015    CR 0.93 01/23/2018    GFRESTIMATED 86 01/23/2018    GFRESTBLACK >90 01/23/2018    TANIA 8.8 03/17/2015        A1C RESULTS:  Lab Results   Component Value Date    A1C 8.1 (H) 01/23/2018       INR RESULTS:  Lab Results   Component Value Date    INR 0.98 01/31/2018    INR 0.92 02/19/2015       DONALD Castellano MD [facility]  Adult Congenital and Interventional Cardiology   [facility]      CC  Patient Care Team:  Damon Cooper as PCP - General (Family Practice)  None  Darcy Rodriguez, RN as Clinic Care Coordinator (ENT-Otolaryngology)  Ana Sanchez (Nurse Practitioner)  Cisco Hercules, RN as Nurse Coordinator (Cardiology)  Casey Bangura, CEDM as MD (Podiatry)  Ricardo Pickard OD as MD (Optometry)  Abdias,  MD Irlanda as MD (Vascular Surgery)  Damon Cooper as Referring Physician (Family Practice)  Elvis Macdonald MD as Resident (Radiology)  MARCH, TAHIRA [place]

## 2018-03-02 NOTE — LETTER
"3/2/2018      RE: Konstantin Sharp  [address] [place] [address]       Dear Colleague,    Thank you for the opportunity to participate in the care of your patient, Konstantin Sharp, at the [facility] HEART [facility]. Please see a copy of my visit note below.    HPI:       PAST MEDICAL HISTORY:  Past Medical History:   Diagnosis Date     Atrial fibrillation (H)      Chest pain     intermittent     Chronic pain      Cognitive disorder     memory loss     Depressive disorder      Dual ICD (implantable cardiac defibrillator) in place 04/29/2014    and pacemaker     GERD (gastroesophageal reflux disease)      H/O traumatic brain injury 2015     HTN (hypertension)      Hypertrophic nonobstructive cardiomyopathy (H) 09/12/2012    s/p ventricular myectomy     Inflammatory arthritis      Intermittent asthma      PAD (peripheral artery disease) (H)      Polysubstance abuse      Seizures (H)     last episode 2014 when \"blood sugar dropped\" according to patient     Sleep apnea     doesn't use cpap     Type 2 diabetes mellitus without complications (H)        CURRENT MEDICATIONS:  Current Outpatient Prescriptions   Medication Sig Dispense Refill     magnesium oxide (MAG-OX) 400 MG tablet Take 4-6 tablets (1,600-2,400 mg) by mouth every other day 180 tablet 3     WARFARIN SODIUM PO Take 8.5 mg by mouth every morning       QUEtiapine (SEROQUEL XR) 150 MG TB24 24 hr tablet Take 150 mg by mouth At Bedtime       sildenafil (REVATIO) 20 MG tablet Take 0.5 tablets (10 mg) by mouth 3 times daily 90 tablet 3     lidocaine (XYLOCAINE) 5 % ointment Apply topically 3 times daily For feet       Pregabalin (LYRICA PO) Take 75 mg by mouth 2 times daily        bisacodyl (DULCOLAX) 10 MG Suppository Place 10 mg rectally daily as needed        amitriptyline (ELAVIL) 25 MG tablet Take 25 mg by mouth At Bedtime        senna-docusate (SENOKOT-S;PERICOLACE) 8.6-50 MG per tablet Take 1 tablet by mouth " daily       Atorvastatin Calcium (LIPITOR PO) Take 40 mg by mouth daily        QUEtiapine Fumarate (SEROQUEL PO) Take 25 mg by mouth 2 times daily       albuterol (2.5 MG/3ML) 0.083% neb solution Take 1 vial by nebulization every 6 hours as needed for shortness of breath / dyspnea or wheezing       Emollient (EUCERIN INTENSIVE REPAIR EX) Externally apply topically 2 times daily as needed       losartan (COZAAR) 25 MG tablet Take 1 tablet (25 mg) by mouth daily 30 tablet      metFORMIN (GLUCOPHAGE) 500 MG tablet Take 1,000 mg by mouth 2 times daily (with meals)  60 tablet      glipiZIDE (GLUCOTROL) 5 MG tablet Take 1 tablet (5 mg) by mouth 2 times daily (before meals) 30 tablet      venlafaxine (EFFEXOR-XR) 150 MG 24 hr capsule Take 150 mg by mouth 2 times daily  30 capsule 1     metoprolol (LOPRESSOR) 50 MG tablet Take 100 mg by mouth 3 times daily  180 tablet 4     TRAZODONE HCL PO Take 100 mg by mouth At Bedtime       albuterol (VENTOLIN HFA) 108 (90 BASE) MCG/ACT Inhaler Inhale 2 puffs into the lungs every 4 hours as needed for shortness of breath / dyspnea or wheezing         PAST SURGICAL HISTORY:  Past Surgical History:   Procedure Laterality Date     APPENDECTOMY  1980    [facility]     C CARDIAC SURG PROCEDURE UNLIST       C HAND/FINGER SURGERY UNLISTED       C SHOULDER SURG PROC UNLISTED       C STOMACH SURGERY PROCEDURE UNLISTED       DISCECTOMY LUMBAR POSTERIOR MICROSCOPIC THREE+ LEVELS  12/16/2011    Procedure:DISCECTOMY LUMBAR POSTERIOR MICROSCOPIC THREE+ LEVELS; Posterior Decompression L2-S1; Surgeon:CAITIE FUNEZ; [facility]     FUSION CERVICAL POSTERIOR ONE LEVEL  8/24/2012    Procedure: FUSION CERVICAL POSTERIOR ONE LEVEL;  Posterior Instrumentated Spinal Fusion Cervical 6-7, Right Iliac Crest Bone [place] ;  Surgeon: Caitie Funez MD;  [facility]     GRAFT BONE FROM ILIAC CREST  8/24/2012    Procedure: GRAFT BONE FROM ILIAC CREST;;  Surgeon: Armin  "[place] MD;  Location: [facility] OR     HC SACROPLASTY       IMPLANT PACEMAKER       INJECT STEROID (LOCATION) N/A 2/19/2015    Procedure: INJECT STEROID (LOCATION);  Surgeon: Siri Koch MD;  [facility]     INSERT STIMULATOR AND LEADS INTERNAL DORSAL COLUMN  8/7/2013    Procedure: INSERT STIMULATOR AND LEADS INTERNAL DORSAL COLUMN;  SPINAL CORD STIMULATOR IMPLANT ;  Surgeon: Ricardo Bruno MD;  [facility] OR     INSERT STIMULATOR DORSAL COLUMN  08/13/2013    lead replacemend, 12?2016,  battery replacement 4/4/2016     KNEE SURGERY       LASER CO2 LARYNGOSCOPY N/A 2/19/2015    Procedure: LASER CO2 LARYNGOSCOPY;  Surgeon: Siri Koch MD;  [facility]     LASER CO2 LARYNGOSCOPY, COMPLEX  7/22/2014    Procedure: LASER CO2 LARYNGOSCOPY, COMPLEX;  Surgeon: Siri Koch MD;  [facility]     MYECTOMY SEPTAL  4/14/2014    Procedure: Median Sternotomy, Septal Myectomy, Intraoperative BRENT performed by Dr. Castano, on pump oxygenator.;  Surgeon: Aguila Cannon MD;  [facility]     NECK SURGERY       SHOULDER SURGERY  2006    RIGHT     STOMACH SURGERY  1980    partial gastrectomy for bleeding ulcers, \"stress related\". [facility]     WRIST SURGERY Left 1988    fractured. 1988 or so       ALLERGIES     Allergies   Allergen Reactions     Bee Venom Swelling     Lisinopril      TABS  Severe cough     Penicillins Hives and Difficulty breathing       FAMILY HISTORY:  Family History   Problem Relation Age of Onset     HEART DISEASE Father 32     MIs x2; s/p CABG     Substance Abuse Father      Hypertension Father      Obesity Father      Hyperlipidemia Father      Hypertension Brother      DIABETES Brother      Glaucoma Maternal Grandmother      Hypertension Maternal Grandmother      DIABETES Maternal Grandfather      Glaucoma Maternal Grandfather      Hypertension Maternal Grandfather      CEREBROVASCULAR DISEASE Maternal Grandfather      Obesity Maternal " "Grandfather      Hyperlipidemia Maternal Grandfather      Coronary Artery Disease Maternal Grandfather      Substance Abuse Other      CANCER Other      Hypertension Other      Obesity Other      Other Cancer Other      all over     Hyperlipidemia Other      Coronary Artery Disease Other      Obesity Brother      Hyperlipidemia Brother      Lymphoma Maternal Uncle      Macular Degeneration No family hx of        SOCIAL HISTORY:  Social History     Social History     Marital status: Single     Spouse name: N/A     Number of children: N/A     Years of education: N/A     Social History Main Topics     Smoking status: Light Tobacco Smoker     Packs/day: 1.00     Years: 28.00     Types: Cigarettes     Last attempt to quit: 4/7/2014     Smokeless tobacco: Never Used     Alcohol use 0.0 oz/week     0 Standard drinks or equivalent per week      Comment: rare     Drug use: No      Comment: last using meth 1/2017     Sexual activity: Not Currently     Partners: Female     Other Topics Concern     Parent/Sibling W/ Cabg, Mi Or Angioplasty Before 65f 55m? Yes     Social History Narrative       ROS:   Constitutional: No fever, chills, or sweats. No weight gain/loss   ENT: No visual disturbance, ear ache, epistaxis, sore throat  Allergies/Immunologic: Negative.   Respiratory: No cough, hemoptysia  Cardiovascular: As per HPI  GI: No nausea, vomiting, hematemesis, melena, or hematochezia  : No urinary frequency, dysuria, or hematuria  Integument: Negative  Psychiatric: Negative  Neuro: Negative  Endocrinology: Negative   Musculoskeletal: Negative    EXAM:  /83 (BP Location: Left arm, Patient Position: Chair, Cuff Size: Adult Large)  Pulse 73  Ht 1.803 m (5' 11\")  Wt 121.7 kg (268 lb 3.2 oz)  SpO2 96%  BMI 37.41 kg/m2  In general, the patient is a pleasant male in no apparent distress.    HEENT: NC/AT.  PERRLA.  EOMI.  Sclerae white, not injected.  Nares clear.  Pharynx without erythema or exudate.  Dentition intact.  "   Neck: No adenopathy.  No thyromegaly. Carotids +4/4 bilaterally without bruits.  No jugular venous distension.   Heart: RRR. Normal S1, S2 splits physiologically. No murmur, rub, click, or gallop. The PMI is in the 5th ICS in the midclavicular line. There is no heave.    Lungs: CTA.  No ronchi, wheezes, rales.  No dullness to percussion.   Abdomen: Soft, nontender, nondistended. No organomegaly.  No bruits.   Extremities: No clubbing, cyanosis, or edema.  The pulses are +4/4 at the radial, brachial, femoral, popliteal, DP, and PT sites bilaterally.  No bruits are noted.  Neurologic: Alert and oriented to person/place/time, normal speech, gait and affect  Skin: No petechiae, purpura or rash.    Labs:  LIPID RESULTS:  Lab Results   Component Value Date    CHOL 205 (A) 01/29/2014    HDL 27 (A) 01/29/2014     (A) 01/29/2014    TRIG 242 (A) 01/29/2014    CHOLHDLRATIO 4.0 09/30/2013       LIVER ENZYME RESULTS:  Lab Results   Component Value Date    AST 23 01/17/2015    ALT 30 01/17/2015       CBC RESULTS:  Lab Results   Component Value Date    WBC 9.3 01/31/2018    RBC 4.81 01/31/2018    HGB 15.4 01/31/2018    HCT 45.0 01/31/2018    MCV 94 01/31/2018    MCH 32.0 01/31/2018    MCHC 34.2 01/31/2018    RDW 13.6 01/31/2018     01/31/2018       BMP RESULTS:  Lab Results   Component Value Date     03/17/2015    POTASSIUM 4.1 01/23/2018    CHLORIDE 110 (H) 03/17/2015    CO2 25 03/17/2015    ANIONGAP 7 03/17/2015     (H) 03/17/2015    BUN 14 03/17/2015    CR 0.93 01/23/2018    GFRESTIMATED 86 01/23/2018    GFRESTBLACK >90 01/23/2018    TANIA 8.8 03/17/2015        A1C RESULTS:  Lab Results   Component Value Date    A1C 8.1 (H) 01/23/2018       INR RESULTS:  Lab Results   Component Value Date    INR 0.98 01/31/2018    INR 0.92 02/19/2015       Service Date: 03/02/2018      HISTORY OF PRESENT ILLNESS:  Mr. Sharp is a 47-year-old gentleman who is known to me with a past medical history significant for  hypertrophic cardiomyopathy.  He also has diabetes, nicotine dependence, hypertension, hyperlipidemia.  I met him for the first time in 06/2015.  He had chronic atypical chest pain which he reports is actually improved.  We did do a nuclear stress test which was nondiagnostic and a CTA in 2013 and 2015 which both had a negative coronary calcium score, but the quality was poor in terms of obstructive disease.  Also, his diabetes has not been under ideal control.  He recently had his A1c checked in January and it was 8.1.  He has peripheral neuropathy from this as well.      Mr. Sharp underwent an echo today.  I reviewed those images.  It is unchanged.  He continues to have mostly mid ventricular basal hypertrophy with not a lot of significant obstruction.  He has a normal ejection fraction with no significant valve disease.  He does have an ICD that was implanted in 2016.  He has not had any shocks from his device.  He does continue to smoke half pack a day.  He has 4 children, 2 have been screened who are 21 and 23, girls, and 2 have not who are 26 and 27.  He is on cholesterol medicine.  We do not know of a recent cholesterol, but he does follow with his primary physician and reports it is under good control.  He smokes half a pack a day, does know he needs to quit but has not been able to.  He is now working for [employer] 4 times a week and likes his job pretty well.  He is limited in what he can do for exercise because of his peripheral neuropathy and attributes that and Seroquel addition in the past year for his weight gain of 18 pounds.  He does have sleep apnea and does not use CPAP mask.  He is on disability.      IMPRESSION, REPORT, PLAN:   1.  Hypertrophic cardiomyopathy status post myectomy with minimal obstruction, stable.   2.  Diabetes type 2 with peripheral neuropathy with an A1c of 8.1 in 01/2018.     3.  Heart block followed by ectopy status post ICD and permanent pacemaker.   4.  Atrial fibrillation with  history of DVT, on chronic anticoagulation with Coumadin.   5.  Nicotine dependence, currently smokes half pack a day with a 30-pack-year smoking history.   6.  History of atypical chest pain with a negative coronary calcium score on last CT 07/2015.   7.  Glottic stenosis.   8.  Pneumonia.   9.  Chronic back pain.      DISCUSSION:  It was a pleasure to see Mr. Sharp in followup.  Clinically, he has been doing reasonably well from a cardiac standpoint.  He has no concerning symptoms, and in fact his chronic chest pain has improved, but his weight is up 18 pounds and I discussed diet and exercise, and of course, associated with that his A1c is also worse.  He will work on these along with his primary physician.  We will plan to see him back in a year with an echo and a nuclear stress test.  I encouraged him to stop smoking as well.  All questions were answered.  It was a pleasure to see him.  Please do not hesitate to contact me with any questions or concerns.         Sincerely,     Momo Castellano MD      CC  Patient Care Team:  Damon Cooper as PCP - General (Family Practice)  Darcy Rodriguez, RN as Clinic Care Coordinator (ENT-Otolaryngology)  Ana Sanchez (Nurse Practitioner)  Cisco Hercules, RN as Nurse Coordinator (Cardiology)  Casey Bangura DPM as MD (Podiatry)  Ricardo Pickard OD as MD (Optometry)  Irlanda Calero MD as MD (Vascular Surgery)  Damon Cooper as Referring Physician (Family Practice)  Elvis Macdonald MD as Resident (Radiology)  MOMO CASTELLANO

## 2018-03-02 NOTE — PATIENT INSTRUCTIONS
It was a pleasure to see you in clinic today. Please do not hesitate to call with any questions or concerns.  We look forward to seeing you in clinic at your next device check in 3 months.    Elvia Raza, ROSSY  Electrophysiology Nurse Clinician  [facility]  During business hours call:  [phone]  After business hours please call: [phone]- select option #4 and ask for job code [phone].

## 2018-03-02 NOTE — NURSING NOTE
Chief Complaint   Patient presents with     Follow Up For     heart problem--47  year old male with history of a fib, hypertrophic cardiomyopathy, s/p ventricular spetal myectomy and ICD placement, Hypertension, hyperlipidemia, limb ischemia and DM presenting for follow up     Vitals were taken and medications were reconciled.   Carola Rios    2:31 PM

## 2018-03-02 NOTE — NURSING NOTE
Chief Complaint   Patient presents with     Follow Up For     heart problem--47  year old male with history of a fib, hypertrophic cardiomyopathy, s/p ventricular spetal myectomy and ICD placement, Hypertension, hyperlipidemia, limb ischemia and DM presenting for follow up        Cardiac Testing: Patient given instructions regarding  echocardiogram  and nuclear pharmacologic thallium . Discussed purpose, preparation, procedure and when to expect results reported back to the patient. Patient demonstrated understanding of this information and agreed to call with further questions or concerns.  Med Reconcile: Reviewed and verified all current medications with the patient. The updated medication list was printed and given to the patient.  Return Appointment: Patient given instructions regarding scheduling next clinic visit. Patient demonstrated understanding of this information and agreed to call with further questions or concerns.  Patient stated he understood all health information given and agreed to call with further questions or concerns.     Anna Chino RN, BSN  Cardiology Care Coordinator  [facility]  [email or web address]  [phone]

## 2018-03-02 NOTE — PROGRESS NOTES
Preliminary Device Interrogation Results.  Final physician signed paceart report to be scanned and attached.    Patient seen in clinic for evaluation and iterative programming of his Medtronic dual lead ICD per MD orders.  Patient has an appointment to see Dr. Castellano today.  Normal ICD function.  No episodes recorded  Intrinsic rhythm = AS/ @30 bpm.  AP = 8.2%.   = 99.7%. OptiVol fluid index is near baseline.  Estimated battery longevity to DA = 4.2 years   No short v-v intervals recorded.  RV lead impedance trends appear stable.  Patient reports that he is feeling ok, complaining of neuropathy of feet. Plan for patient to  return to clinic in 3 months.    dual lead ICD

## 2018-03-12 ASSESSMENT — ENCOUNTER SYMPTOMS
MYALGIAS: 0
EYE REDNESS: 0
PALPITATIONS: 0
MUSCLE WEAKNESS: 1
ABDOMINAL PAIN: 1
BLOOD IN STOOL: 0
ORTHOPNEA: 0
EYE IRRITATION: 0
HYPERTENSION: 0
NECK PAIN: 1
EXERCISE INTOLERANCE: 1
JAUNDICE: 0
SYNCOPE: 0
LIGHT-HEADEDNESS: 0
DEPRESSION: 1
VOMITING: 0
BLOATING: 1
CONSTIPATION: 1
EYE PAIN: 0
NAUSEA: 0
BOWEL INCONTINENCE: 0
STIFFNESS: 0
PANIC: 0
DECREASED CONCENTRATION: 0
DIARRHEA: 0
SLEEP DISTURBANCES DUE TO BREATHING: 0
ARTHRALGIAS: 0
DOUBLE VISION: 0
INSOMNIA: 1
JOINT SWELLING: 0
EYE WATERING: 0
BACK PAIN: 1
HEARTBURN: 0
HYPOTENSION: 0
RECTAL PAIN: 0
NERVOUS/ANXIOUS: 0
LEG PAIN: 1

## 2018-03-20 ENCOUNTER — OFFICE VISIT (OUTPATIENT)
Dept: CARDIOLOGY | Facility: CLINIC | Age: 48
End: 2018-03-20
Attending: INTERNAL MEDICINE
Payer: MEDICAID

## 2018-03-20 VITALS
WEIGHT: 265.7 LBS | HEART RATE: 78 BPM | HEIGHT: 71 IN | DIASTOLIC BLOOD PRESSURE: 78 MMHG | BODY MASS INDEX: 37.2 KG/M2 | OXYGEN SATURATION: 95 % | SYSTOLIC BLOOD PRESSURE: 123 MMHG

## 2018-03-20 DIAGNOSIS — E08.42 DIABETIC POLYNEUROPATHY ASSOCIATED WITH DIABETES MELLITUS DUE TO UNDERLYING CONDITION (H): Primary | ICD-10-CM

## 2018-03-20 PROCEDURE — 99215 OFFICE O/P EST HI 40 MIN: CPT | Mod: ZP | Performed by: INTERNAL MEDICINE

## 2018-03-20 PROCEDURE — G0463 HOSPITAL OUTPT CLINIC VISIT: HCPCS | Mod: ZF

## 2018-03-20 ASSESSMENT — PAIN SCALES - GENERAL: PAINLEVEL: NO PAIN (0)

## 2018-03-20 NOTE — MR AVS SNAPSHOT
After Visit Summary   3/20/2018    Konstantin Sharp    MRN: [id number]           Patient Information     Date Of Birth          1970        Visit Information        Provider Department      3/20/2018 2:00 PM Kenia Mcgovern MD [facility] CARDIOVASCULAR      Today's Diagnoses     Diabetic polyneuropathy associated with diabetes mellitus due to underlying condition (H)    -  1      Care Instructions    You were seen today in the Cardiovascular Clinic at the [facility].      Cardiology Providers you saw during your visit:  Dr. Mcgovern    Diagnosis:  Critical limp ischemia    Results:  None     Follow-up:  With Dr. Mcgovern on 5/29 as scheduled.     For emergencies call 911.    For any scheduling needs, please call [phone]. Option 1 then option 3    Thank you for your visit today!     Please call if you have any questions or concerns.  Joel Saba RN            Follow-ups after your visit        Your next 10 appointments already scheduled     Apr 11, 2018  9:30 AM CDT   (Arrive by 9:15 AM)   New Patient Visit with SHAHEED Hodges [facility] ([facility])    [address] [place] [id number]   [phone]            May 29, 2018  4:30 PM CDT   (Arrive by 4:15 PM)   Implanted Defibulator with Uc Cv Device 1   [facility] ([facility])    [address] [place] [address]  [address] [place] [id number] [phone]            May 29, 2018  5:00 PM CDT   (Arrive by 4:45 PM)   Return Vascular Visit with Kenia Mcgovern MD   [facility])    [address] [place] [address]  [address] [place] [id number] [phone]            Jun 18, 2018 10:00 AM CDT   (Arrive by 9:45 AM)   New Patient Visit with Aden Bhatia MD   [facility] ([facility])    [address] [place] [address]  "Se  4th Floor  [facility] [address] [phone]              Who to contact     If you have questions or need follow up information about today's clinic visit or your schedule please contact [facility] directly at [phone].  Normal or non-critical lab and imaging results will be communicated to you by MyChart, letter or phone within 4 business days after the clinic has received the results. If you do not hear from us within 7 days, please contact the clinic through [employer] or phone. If you have a critical or abnormal lab result, we will notify you by phone as soon as possible.  Submit refill requests through [employer] or call your pharmacy and they will forward the refill request to us. Please allow 3 business days for your refill to be completed.          Additional Information About Your Visit        [employer] Information     [employer] gives you secure access to your electronic health record. If you see a primary care provider, you can also send messages to your care team and make appointments. If you have questions, please call your primary care clinic.  If you do not have a primary care provider, please call [phone] and they will assist you.        Care EveryWhere ID     This is your Care EveryWhere ID. This could be used by other organizations to access your [facility] medical records  [id number]        Your Vitals Were     Pulse Height Pulse Oximetry BMI (Body Mass Index)          78 1.803 m (5' 11\") 95% 37.06 kg/m2         Blood Pressure from Last 3 Encounters:   03/20/18 123/78   03/02/18 122/83   02/21/18 120/74    Weight from Last 3 Encounters:   03/20/18 120.5 kg (265 lb 11.2 oz)   03/02/18 121.7 kg (268 lb 3.2 oz)   02/21/18 120.8 kg (266 lb 4.8 oz)              Today, you had the following     No orders found for display       Primary Care Provider Office Phone # Fax #    Damon Dislai [phone]       [facility] [address] [facility] [address]        Equal " Access to Services     [facility]: Hadii migdalia breen patricia Olsen, waaxda luqadaha, qaybta kasuzi rosiegeovannashanita, waxgeovanni tommy centenocherritamera kennedy. So [facility] [phone].    ATENCIÓN: Si habla espdebora, tiene a dover disposición servicios gratuitos de asistencia lingüística. Llame al [phone].    We comply with applicable federal civil rights laws and [place] laws. We do not discriminate on the basis of race, color, national origin, age, disability, sex, sexual orientation, or gender identity.            Thank you!     Thank you for choosing [facility]  for your care. Our goal is always to provide you with excellent care. Hearing back from our patients is one way we can continue to improve our services. Please take a few minutes to complete the written survey that you may receive in the mail after your visit with us. Thank you!             Your Updated Medication List - Protect others around you: Learn how to safely use, store and throw away your medicines at www.disposemymeds.org.          This list is accurate as of 3/20/18 11:59 PM.  Always use your most recent med list.                   Brand Name Dispense Instructions for use Diagnosis    * albuterol (2.5 MG/3ML) 0.083% neb solution      Take 1 vial by nebulization every 6 hours as needed for shortness of breath / dyspnea or wheezing        * VENTOLIN  (90 BASE) MCG/ACT Inhaler   Generic drug:  albuterol      Inhale 2 puffs into the lungs every 4 hours as needed for shortness of breath / dyspnea or wheezing        amitriptyline 25 MG tablet    ELAVIL     Take 25 mg by mouth At Bedtime        bisacodyl 10 MG Suppository    DULCOLAX     Place 10 mg rectally daily as needed        EUCERIN INTENSIVE REPAIR EX      Externally apply topically 2 times daily as needed        glipiZIDE 5 MG tablet    GLUCOTROL    30 tablet    Take 1 tablet (5 mg) by mouth 2 times daily (before meals)    Diabetes mellitus (H)       lidocaine 5 % ointment     XYLOCAINE     Apply topically 3 times daily For feet        LIPITOR PO      Take 40 mg by mouth daily        losartan 25 MG tablet    COZAAR    30 tablet    Take 1 tablet (25 mg) by mouth daily    Hypertrophic cardiomyopathy (H)       LYRICA PO      Take 75 mg by mouth 2 times daily        magnesium oxide 400 MG tablet    MAG-OX    180 tablet    Take 4-6 tablets (1,600-2,400 mg) by mouth every other day    Drug-induced constipation       metFORMIN 500 MG tablet    GLUCOPHAGE    60 tablet    Take 1,000 mg by mouth 2 times daily (with meals)    Diabetes mellitus (H)       metoprolol tartrate 50 MG tablet    LOPRESSOR    180 tablet    Take 100 mg by mouth 3 times daily    S/P ventricular septal myectomy, Congestive heart failure, unspecified, Hypertrophic cardiomyopathy (H)       senna-docusate 8.6-50 MG per tablet    SENOKOT-S;PERICOLACE     Take 1 tablet by mouth daily        * SEROQUEL PO      Take 25 mg by mouth 2 times daily        * QUEtiapine 150 MG Tb24 24 hr tablet    SEROquel XR     Take 150 mg by mouth At Bedtime        sildenafil 20 MG tablet    REVATIO    90 tablet    Take 0.5 tablets (10 mg) by mouth 3 times daily    Critical lower limb ischemia       TRAZODONE HCL PO      Take 100 mg by mouth At Bedtime        venlafaxine 150 MG 24 hr capsule    EFFEXOR-XR    30 capsule    Take 150 mg by mouth 2 times daily    Adjustment disorder with depressed mood       WARFARIN SODIUM PO      Take 8.5 mg by mouth every morning        * Notice:  This list has 4 medication(s) that are the same as other medications prescribed for you. Read the directions carefully, and ask your doctor or other care provider to review them with you.

## 2018-03-20 NOTE — LETTER
3/20/2018      RE: Konstantin Sharp  [address] [place] [address]       Dear Colleague,    Thank you for the opportunity to participate in the care of your patient, Konstantin Sharp, at the [facility]. Please see a copy of my visit note below.           Vascular Cardiology Consultation Follow Up Visit        HPI:   This is a 47-year-old male with past medical history atrial fibrillation coronary artery disease type 2 diabetes on insulin hypertension hypertrophic cardiomyopathy with ICD implantation who is here to follow up lower extremity pain.    Patient has a long-standing smoking history of a half pack per day for 20 years.  He also has long-standing diabetes which is poorly controlled which is complicated by significant and very severe uncontrolled neuropathy.  He had bilateral foot pain, L > R, with pain most pronounced in heels and toes.  He has complete cold intolerance of his toe and significant discoloration.  Initially referred to me for concern of small vessel vasculitis given low suspicion for macrovascular disease. However he has been on sildenafil with no relief. He has also been uptitrated on lyrica and gabapentin with minimal relief. Diabetic control has not been optimized and he is still smoking.  He has been on chronic warfarin for A. Fib. Given the severity of symptoms and unclear nature of vascular etiology, I referred him last visit to my colleague Dr. Macdonald for angiography of his digits. He had adequate perfusion to the feet with no significant vasospasm or thromboangitis noted on angiogram. There was a small area of the distal left toe that had area of damaged vessels consistent with prior episode of frostbite. Given the asymmetric nature of the findings, diffuse digital vasopasm was ruled out as well as thromboangitis obliterans. Patient is here to discuss next steps. He says the foot/leg burning is severely life limiting. When  "asked if ever had any nerve type studies patient denies. He was seen by neurologist in the past but this was for seizure disorder.       PAST MEDICAL HISTORY  Past Medical History:   Diagnosis Date     Atrial fibrillation (H)      Chest pain     intermittent     Chronic pain      Cognitive disorder     memory loss     Depressive disorder      Dual ICD (implantable cardiac defibrillator) in place 04/29/2014    and pacemaker     GERD (gastroesophageal reflux disease)      H/O traumatic brain injury 2015     HTN (hypertension)      Hypertrophic nonobstructive cardiomyopathy (H) 09/12/2012    s/p ventricular myectomy     Inflammatory arthritis      Intermittent asthma      PAD (peripheral artery disease) (H)      Polysubstance abuse      Seizures (H)     last episode 2014 when \"blood sugar dropped\" according to patient     Sleep apnea     doesn't use cpap     Type 2 diabetes mellitus without complications (H)        CURRENT MEDICATIONS  Current Outpatient Prescriptions   Medication Sig Dispense Refill     magnesium oxide (MAG-OX) 400 MG tablet Take 4-6 tablets (1,600-2,400 mg) by mouth every other day 180 tablet 3     WARFARIN SODIUM PO Take 8.5 mg by mouth every morning       QUEtiapine (SEROQUEL XR) 150 MG TB24 24 hr tablet Take 150 mg by mouth At Bedtime       sildenafil (REVATIO) 20 MG tablet Take 0.5 tablets (10 mg) by mouth 3 times daily 90 tablet 3     lidocaine (XYLOCAINE) 5 % ointment Apply topically 3 times daily For feet       Pregabalin (LYRICA PO) Take 75 mg by mouth 2 times daily        bisacodyl (DULCOLAX) 10 MG Suppository Place 10 mg rectally daily as needed        amitriptyline (ELAVIL) 25 MG tablet Take 25 mg by mouth At Bedtime        senna-docusate (SENOKOT-S;PERICOLACE) 8.6-50 MG per tablet Take 1 tablet by mouth daily       TRAZODONE HCL PO Take 100 mg by mouth At Bedtime       Atorvastatin Calcium (LIPITOR PO) Take 40 mg by mouth daily        QUEtiapine Fumarate (SEROQUEL PO) Take 25 mg by mouth " 2 times daily       albuterol (2.5 MG/3ML) 0.083% neb solution Take 1 vial by nebulization every 6 hours as needed for shortness of breath / dyspnea or wheezing       albuterol (VENTOLIN HFA) 108 (90 BASE) MCG/ACT Inhaler Inhale 2 puffs into the lungs every 4 hours as needed for shortness of breath / dyspnea or wheezing       Emollient (EUCERIN INTENSIVE REPAIR EX) Externally apply topically 2 times daily as needed       losartan (COZAAR) 25 MG tablet Take 1 tablet (25 mg) by mouth daily 30 tablet      metFORMIN (GLUCOPHAGE) 500 MG tablet Take 1,000 mg by mouth 2 times daily (with meals)  60 tablet      glipiZIDE (GLUCOTROL) 5 MG tablet Take 1 tablet (5 mg) by mouth 2 times daily (before meals) 30 tablet      venlafaxine (EFFEXOR-XR) 150 MG 24 hr capsule Take 150 mg by mouth 2 times daily  30 capsule 1     metoprolol (LOPRESSOR) 50 MG tablet Take 100 mg by mouth 3 times daily  180 tablet 4       PAST SURGICAL HISTORY:  Past Surgical History:   Procedure Laterality Date     APPENDECTOMY  1980    [facility]? [facility] CARDIAC SURG PROCEDURE UNLIST       C HAND/FINGER SURGERY UNLISTED       C SHOULDER SURG PROC UNLISTED       C STOMACH SURGERY PROCEDURE UNLISTED       DISCECTOMY LUMBAR POSTERIOR MICROSCOPIC THREE+ LEVELS  12/16/2011    Procedure:DISCECTOMY LUMBAR POSTERIOR MICROSCOPIC THREE+ LEVELS; Posterior Decompression L2-S1; Surgeon:CAITIE FUNEZ; Location:[facility] OR     FUSION CERVICAL POSTERIOR ONE LEVEL  8/24/2012    Procedure: FUSION CERVICAL POSTERIOR ONE LEVEL;  Posterior Instrumentated Spinal Fusion Cervical 6-7, Right Iliac Crest Bone [place] ;  Surgeon: Caitie Funez MD;  Location: [facility] OR     GRAFT BONE FROM ILIAC CREST  8/24/2012    Procedure: GRAFT BONE FROM ILIAC CREST;;  Surgeon: Caitie Funez MD;  Location: [facility] OR     HC SACROPLASTY       IMPLANT PACEMAKER       INJECT STEROID (LOCATION) N/A 2/19/2015    Procedure: INJECT STEROID (LOCATION);  Surgeon: Zee  "Siri Taveras MD;  Location: [facility] OR     INSERT STIMULATOR AND LEADS INTERNAL DORSAL COLUMN  8/7/2013    Procedure: INSERT STIMULATOR AND LEADS INTERNAL DORSAL COLUMN;  SPINAL CORD STIMULATOR IMPLANT ;  Surgeon: Ricardo Bruno MD;  Location: [facility] OR     INSERT STIMULATOR DORSAL COLUMN  08/13/2013    lead replacemend, 12?2016,  battery replacement 4/4/2016     KNEE SURGERY       LASER CO2 LARYNGOSCOPY N/A 2/19/2015    Procedure: LASER CO2 LARYNGOSCOPY;  Surgeon: Siri Koch MD;  [facility]     LASER CO2 LARYNGOSCOPY, COMPLEX  7/22/2014    Procedure: LASER CO2 LARYNGOSCOPY, COMPLEX;  Surgeon: Siri Koch MD;  [facility]     MYECTOMY SEPTAL  4/14/2014    Procedure: Median Sternotomy, Septal Myectomy, Intraoperative BRENT performed by Dr. Castano, on pump oxygenator.;  Surgeon: Aguila Cannon MD;  Location: [facility] OR     NECK SURGERY       SHOULDER SURGERY  2006    RIGHT     STOMACH SURGERY  1980    partial gastrectomy for bleeding ulcers, \"stress related\". [facility]     WRIST SURGERY Left 1988    fractured. 1988 or so       ALLERGIES     Allergies   Allergen Reactions     Bee Venom Swelling     Lisinopril      TABS  Severe cough     Penicillins Hives and Difficulty breathing       FAMILY HISTORY  Family History   Problem Relation Age of Onset     HEART DISEASE Father 32     MIs x2; s/p CABG     Substance Abuse Father      Hypertension Father      Obesity Father      Hyperlipidemia Father      Hypertension Brother      DIABETES Brother      Glaucoma Maternal Grandmother      Hypertension Maternal Grandmother      DIABETES Maternal Grandfather      Glaucoma Maternal Grandfather      Hypertension Maternal Grandfather      CEREBROVASCULAR DISEASE Maternal Grandfather      Obesity Maternal Grandfather      Hyperlipidemia Maternal Grandfather      Coronary Artery Disease Maternal Grandfather      Substance Abuse Other      CANCER Other      Hypertension Other      Obesity Other  " "    Other Cancer Other      all over     Hyperlipidemia Other      Coronary Artery Disease Other      Obesity Brother      Hyperlipidemia Brother      Lymphoma Maternal Uncle      Macular Degeneration No family hx of        VASCULAR FAMILY HISTORY  1st order relative with atherosclerotic PAD: no  1st order relative with AAA: no    SOCIAL HISTORY  Social History     Social History     Marital status: Single     Spouse name: N/A     Number of children: N/A     Years of education: N/A     Occupational History     Not on file.     Social History Main Topics     Smoking status: Light Tobacco Smoker     Packs/day: 1.00     Years: 28.00     Types: Cigarettes     Last attempt to quit: 4/7/2014     Smokeless tobacco: Never Used     Alcohol use 0.0 oz/week     0 Standard drinks or equivalent per week      Comment: rare     Drug use: No      Comment: last using meth 1/2017     Sexual activity: Not Currently     Partners: Female     Other Topics Concern     Parent/Sibling W/ Cabg, Mi Or Angioplasty Before 65f 55m? Yes     Social History Narrative       ROS:   Constitutional: No fever, chills, or sweats. No weight gain/loss   ENT: No visual disturbance, ear ache, epistaxis, sore throat  Allergies/Immunologic: Negative  Respiratory: No cough, hemoptysia  Cardiovascular: As per HPI  GI: No nausea, vomiting, hematemesis, melena, or hematochezia  : No urinary frequency, dysuria, or hematuria  Integument: Negative  Psychiatric: Negative  Neuro: Negative  Endocrinology: Negative   Musculoskeletal: Negative  Vascular: No walking impairment, claudication, ischemic rest pain or nonhealing wounds    EXAM:  /78  Pulse 78  Ht 1.803 m (5' 11\")  Wt 120.5 kg (265 lb 11.2 oz)  SpO2 95%  BMI 37.06 kg/m2  In general, the patient is a pleasant male in no apparent distress.    HEENT: NC/AT.  PERRLA.  EOMI.  Sclerae white, not injected.  Nares clear.  Pharynx without erythema or exudate.  Dentition intact.    Neck: No adenopathy.  No " thyromegaly. Carotids +2/2 bilaterally without bruits.  No jugular venous distension.   Heart: RRR.  Holosystolic murmur right upper sternal border. the PMI is in the 5th ICS in the midclavicular line. There is no heave.    Lungs: CTA.  No ronchi, wheezes, rales.  No dullness to percussion.   Abdomen: Soft, nontender, nondistended. No organomegaly. No AAA.  No bruits.   Extremities: No clubbing, cyanosis, or edema.  No wounds. No varicose veins signs of chronic venous insufficiency.   Vascular: DP and PT pulses are intact.  Normal popliteal and femoral pulses normal radial pulses. Normal distal toe coloration. No ulcears.  Skin: No skin tightening present    Labs:  LIPID RESULTS:  Lab Results   Component Value Date    CHOL 205 (A) 01/29/2014    HDL 27 (A) 01/29/2014     (A) 01/29/2014    TRIG 242 (A) 01/29/2014    CHOLHDLRATIO 4.0 09/30/2013       LIVER ENZYME RESULTS:  Lab Results   Component Value Date    AST 23 01/17/2015    ALT 30 01/17/2015       CBC RESULTS:  Lab Results   Component Value Date    WBC 9.3 01/31/2018    RBC 4.81 01/31/2018    HGB 15.4 01/31/2018    HCT 45.0 01/31/2018    MCV 94 01/31/2018    MCH 32.0 01/31/2018    MCHC 34.2 01/31/2018    RDW 13.6 01/31/2018     01/31/2018       BMP RESULTS:  Lab Results   Component Value Date     03/17/2015    POTASSIUM 4.1 01/23/2018    CHLORIDE 110 (H) 03/17/2015    CO2 25 03/17/2015    ANIONGAP 7 03/17/2015     (H) 03/17/2015    BUN 14 03/17/2015    CR 0.93 01/23/2018    GFRESTIMATED 86 01/23/2018    GFRESTBLACK >90 01/23/2018    TANIA 8.8 03/17/2015        A1C RESULTS:  Lab Results   Component Value Date    A1C 8.1 (H) 01/23/2018       Procedures:    JANIS 11/9/2017    Impression:      Right leg: Resting JANIS: Normal (No observed evidence of increased  cardiovascular risk or peripheral arterial disease). Diminished  waveform in the first digit suggestive of small vessel disease.  Toe  pressures of 90 mmHg.     Left leg: Resting JANIS:  Normal (No observed evidence of increased  cardiovascular risk or peripheral arterial disease). No flow  identified in the left first digit compatible with severe small vessel  disease in the foot.    Impression:     1. Aortoiliac angiography demonstrates no evidence of common,  external, or internal iliac artery stenosis.  2. Diagnostic left lower extremity angiogram demonstrates no  hemodynamically significant stenosis of the left lower extremity  through the plantar arch.   3. Diagnostic right lower extremity angiography demonstrates brisk  flow with no hemodynamically significant stenosis.  4. Asymmetric flow throughout the lower extremities with sluggish flow  seen throughout the left lower extremity and brisk flow in the right.  5. Grossly normal appearance of the right digital vessels and diffuse  irregularity and pruning of the left digital vessels. The asymmetric  appearance argues against an underlying systemic disease such as  vasculitis or purely hyperglycemic related microvascular disease.  Further discussion with the patient confirms a remote history of  frostbite which could have this angiographic appearance. This suggests  clinical and angiographic findings could be related to chronic changes  of frostbite compounded by hyperglycemia and tobacco abuse.       Assessment and Plan:     This is a 47-year-old male with diabetes, smoking history, seizures, and remote h/o frostbite to left big toe with ongoing severe bilateral leg pain. We did exhaustive vascular work up and although there were some asymmetric findings of the left big toe alone (see above), findings were inconsistent with diabetic microvascular disease, thromboangitis obliterans, and/or small vessel vasculitis or vasospasm. Again I discussed toe amputation as a palliative approach ONLY if we thought this was the isolated cause of his pain, however I do not think it is. His pain remains bilateral, at the soles/heels of his feet, and up the  distal legs. Most consistent with neuropathy. I have reached out to his prior neurologist for advice on a referral for one of the more severe neuropathy cases I have seen. In the meantime we have again stressed importance of smoking cessation and diabetic control.     Recommendations:   #1: Smoking cessation discussed  #2: Continue warfarin. No need for sildenafil.  #3:  If no bleeding risk patient would probably benefit from baby aspirin as well.  #4: Continue aggressive blood pressure and diabetic control.  Heart failure management per my cardiology colleague.  #5: Referral to neuropathy specialist. He also c/o urinary incontinence which is concerning.   #6: Can follow up with me as needed in the future    Total time spent 45 minutes, of which >50% was spent in face-to-face patient evaluation, reviewing data with patient, and coordination of care.     Kenia Mcgovern MD MSc  Division of Cardiology and Vascular Medicine    [facility]

## 2018-03-20 NOTE — PROGRESS NOTES
Vascular Cardiology Consultation Follow Up Visit        HPI:     This is a 47-year-old male with past medical history atrial fibrillation coronary artery disease type 2 diabetes on insulin hypertension hypertrophic cardiomyopathy with ICD implantation who is here to follow up lower extremity pain.    Patient has a long-standing smoking history of a half pack per day for 20 years.  He also has long-standing diabetes which is poorly controlled which is complicated by significant and very severe uncontrolled neuropathy.  He had bilateral foot pain, L > R, with pain most pronounced in heels and toes.  He has complete cold intolerance of his toe and significant discoloration.  Initially referred to me for concern of small vessel vasculitis given low suspicion for macrovascular disease. However he has been on sildenafil with no relief. He has also been uptitrated on lyrica and gabapentin with minimal relief. Diabetic control has not been optimized and he is still smoking.  He has been on chronic warfarin for A. Fib. Given the severity of symptoms and unclear nature of vascular etiology, I referred him last visit to my colleague Dr. Macdonald for angiography of his digits. He had adequate perfusion to the feet with no significant vasospasm or thromboangitis noted on angiogram. There was a small area of the distal left toe that had area of damaged vessels consistent with prior episode of frostbite. Given the asymmetric nature of the findings, diffuse digital vasopasm was ruled out as well as thromboangitis obliterans. Patient is here to discuss next steps. He says the foot/leg burning is severely life limiting. When asked if ever had any nerve type studies patient denies. He was seen by neurologist in the past but this was for seizure disorder.       PAST MEDICAL HISTORY  Past Medical History:   Diagnosis Date     Atrial fibrillation (H)      Chest pain     intermittent     Chronic pain      Cognitive disorder      "memory loss     Depressive disorder      Dual ICD (implantable cardiac defibrillator) in place 04/29/2014    and pacemaker     GERD (gastroesophageal reflux disease)      H/O traumatic brain injury 2015     HTN (hypertension)      Hypertrophic nonobstructive cardiomyopathy (H) 09/12/2012    s/p ventricular myectomy     Inflammatory arthritis      Intermittent asthma      PAD (peripheral artery disease) (H)      Polysubstance abuse      Seizures (H)     last episode 2014 when \"blood sugar dropped\" according to patient     Sleep apnea     doesn't use cpap     Type 2 diabetes mellitus without complications (H)        CURRENT MEDICATIONS  Current Outpatient Prescriptions   Medication Sig Dispense Refill     magnesium oxide (MAG-OX) 400 MG tablet Take 4-6 tablets (1,600-2,400 mg) by mouth every other day 180 tablet 3     WARFARIN SODIUM PO Take 8.5 mg by mouth every morning       QUEtiapine (SEROQUEL XR) 150 MG TB24 24 hr tablet Take 150 mg by mouth At Bedtime       sildenafil (REVATIO) 20 MG tablet Take 0.5 tablets (10 mg) by mouth 3 times daily 90 tablet 3     lidocaine (XYLOCAINE) 5 % ointment Apply topically 3 times daily For feet       Pregabalin (LYRICA PO) Take 75 mg by mouth 2 times daily        bisacodyl (DULCOLAX) 10 MG Suppository Place 10 mg rectally daily as needed        amitriptyline (ELAVIL) 25 MG tablet Take 25 mg by mouth At Bedtime        senna-docusate (SENOKOT-S;PERICOLACE) 8.6-50 MG per tablet Take 1 tablet by mouth daily       TRAZODONE HCL PO Take 100 mg by mouth At Bedtime       Atorvastatin Calcium (LIPITOR PO) Take 40 mg by mouth daily        QUEtiapine Fumarate (SEROQUEL PO) Take 25 mg by mouth 2 times daily       albuterol (2.5 MG/3ML) 0.083% neb solution Take 1 vial by nebulization every 6 hours as needed for shortness of breath / dyspnea or wheezing       albuterol (VENTOLIN HFA) 108 (90 BASE) MCG/ACT Inhaler Inhale 2 puffs into the lungs every 4 hours as needed for shortness of breath / " dyspnea or wheezing       Emollient (EUCERIN INTENSIVE REPAIR EX) Externally apply topically 2 times daily as needed       losartan (COZAAR) 25 MG tablet Take 1 tablet (25 mg) by mouth daily 30 tablet      metFORMIN (GLUCOPHAGE) 500 MG tablet Take 1,000 mg by mouth 2 times daily (with meals)  60 tablet      glipiZIDE (GLUCOTROL) 5 MG tablet Take 1 tablet (5 mg) by mouth 2 times daily (before meals) 30 tablet      venlafaxine (EFFEXOR-XR) 150 MG 24 hr capsule Take 150 mg by mouth 2 times daily  30 capsule 1     metoprolol (LOPRESSOR) 50 MG tablet Take 100 mg by mouth 3 times daily  180 tablet 4       PAST SURGICAL HISTORY:  Past Surgical History:   Procedure Laterality Date     APPENDECTOMY  1980    [facility]? near [facility]     C CARDIAC SURG PROCEDURE UNLIST       C HAND/FINGER SURGERY UNLISTED       C SHOULDER SURG PROC UNLISTED       C STOMACH SURGERY PROCEDURE UNLISTED       DISCECTOMY LUMBAR POSTERIOR MICROSCOPIC THREE+ LEVELS  12/16/2011    Procedure:DISCECTOMY LUMBAR POSTERIOR MICROSCOPIC THREE+ LEVELS; Posterior Decompression L2-S1; Surgeon:CAITIE FUNEZ; Location:[facility] OR     FUSION CERVICAL POSTERIOR ONE LEVEL  8/24/2012    Procedure: FUSION CERVICAL POSTERIOR ONE LEVEL;  Posterior Instrumentated Spinal Fusion Cervical 6-7, Right Iliac Crest Bone [place] ;  Surgeon: Caitie Funez MD;  Location: [facility]     GRAFT BONE FROM ILIAC CREST  8/24/2012    Procedure: GRAFT BONE FROM ILIAC CREST;;  Surgeon: Caitie Funez MD;  Location: [facility]     HC SACROPLASTY       IMPLANT PACEMAKER       INJECT STEROID (LOCATION) N/A 2/19/2015    Procedure: INJECT STEROID (LOCATION);  Surgeon: Siri Koch MD;  Location: [facility]     INSERT STIMULATOR AND LEADS INTERNAL DORSAL COLUMN  8/7/2013    Procedure: INSERT STIMULATOR AND LEADS INTERNAL DORSAL COLUMN;  SPINAL CORD STIMULATOR IMPLANT ;  Surgeon: Ricardo Bruno MD;  Location: [facility] OR     INSERT STIMULATOR DORSAL COLUMN  08/13/2013     "lead replacemend, 12?2016,  battery replacement 4/4/2016     KNEE SURGERY       LASER CO2 LARYNGOSCOPY N/A 2/19/2015    Procedure: LASER CO2 LARYNGOSCOPY;  Surgeon: Siri Koch MD;  [facility]     LASER CO2 LARYNGOSCOPY, COMPLEX  7/22/2014    Procedure: LASER CO2 LARYNGOSCOPY, COMPLEX;  Surgeon: Siri Koch MD;  [facility]     MYECTOMY SEPTAL  4/14/2014    Procedure: Median Sternotomy, Septal Myectomy, Intraoperative BRENT performed by Dr. Castano, on pump oxygenator.;  Surgeon: Aguila Cannon MD;  Location: [facility]     NECK SURGERY       SHOULDER SURGERY  2006    RIGHT     STOMACH SURGERY  1980    partial gastrectomy for bleeding ulcers, \"stress related\". [facility]     WRIST SURGERY Left 1988    fractured. 1988 or so       ALLERGIES     Allergies   Allergen Reactions     Bee Venom Swelling     Lisinopril      TABS  Severe cough     Penicillins Hives and Difficulty breathing       FAMILY HISTORY  Family History   Problem Relation Age of Onset     HEART DISEASE Father 32     MIs x2; s/p CABG     Substance Abuse Father      Hypertension Father      Obesity Father      Hyperlipidemia Father      Hypertension Brother      DIABETES Brother      Glaucoma Maternal Grandmother      Hypertension Maternal Grandmother      DIABETES Maternal Grandfather      Glaucoma Maternal Grandfather      Hypertension Maternal Grandfather      CEREBROVASCULAR DISEASE Maternal Grandfather      Obesity Maternal Grandfather      Hyperlipidemia Maternal Grandfather      Coronary Artery Disease Maternal Grandfather      Substance Abuse Other      CANCER Other      Hypertension Other      Obesity Other      Other Cancer Other      all over     Hyperlipidemia Other      Coronary Artery Disease Other      Obesity Brother      Hyperlipidemia Brother      Lymphoma Maternal Uncle      Macular Degeneration No family hx of        VASCULAR FAMILY HISTORY  1st order relative with atherosclerotic PAD: no  1st " "order relative with AAA: no    SOCIAL HISTORY  Social History     Social History     Marital status: Single     Spouse name: N/A     Number of children: N/A     Years of education: N/A     Occupational History     Not on file.     Social History Main Topics     Smoking status: Light Tobacco Smoker     Packs/day: 1.00     Years: 28.00     Types: Cigarettes     Last attempt to quit: 4/7/2014     Smokeless tobacco: Never Used     Alcohol use 0.0 oz/week     0 Standard drinks or equivalent per week      Comment: rare     Drug use: No      Comment: last using meth 1/2017     Sexual activity: Not Currently     Partners: Female     Other Topics Concern     Parent/Sibling W/ Cabg, Mi Or Angioplasty Before 65f 55m? Yes     Social History Narrative       ROS:   Constitutional: No fever, chills, or sweats. No weight gain/loss   ENT: No visual disturbance, ear ache, epistaxis, sore throat  Allergies/Immunologic: Negative  Respiratory: No cough, hemoptysia  Cardiovascular: As per HPI  GI: No nausea, vomiting, hematemesis, melena, or hematochezia  : No urinary frequency, dysuria, or hematuria  Integument: Negative  Psychiatric: Negative  Neuro: Negative  Endocrinology: Negative   Musculoskeletal: Negative  Vascular: No walking impairment, claudication, ischemic rest pain or nonhealing wounds    EXAM:  /78  Pulse 78  Ht 1.803 m (5' 11\")  Wt 120.5 kg (265 lb 11.2 oz)  SpO2 95%  BMI 37.06 kg/m2  In general, the patient is a pleasant male in no apparent distress.    HEENT: NC/AT.  PERRLA.  EOMI.  Sclerae white, not injected.  Nares clear.  Pharynx without erythema or exudate.  Dentition intact.    Neck: No adenopathy.  No thyromegaly. Carotids +2/2 bilaterally without bruits.  No jugular venous distension.   Heart: RRR.  Holosystolic murmur right upper sternal border. the PMI is in the 5th ICS in the midclavicular line. There is no heave.    Lungs: CTA.  No ronchi, wheezes, rales.  No dullness to percussion.   Abdomen: " Soft, nontender, nondistended. No organomegaly. No AAA.  No bruits.   Extremities: No clubbing, cyanosis, or edema.  No wounds. No varicose veins signs of chronic venous insufficiency.   Vascular: DP and PT pulses are intact.  Normal popliteal and femoral pulses normal radial pulses. Normal distal toe coloration. No ulcears.  Skin: No skin tightening present    Labs:  LIPID RESULTS:  Lab Results   Component Value Date    CHOL 205 (A) 01/29/2014    HDL 27 (A) 01/29/2014     (A) 01/29/2014    TRIG 242 (A) 01/29/2014    CHOLHDLRATIO 4.0 09/30/2013       LIVER ENZYME RESULTS:  Lab Results   Component Value Date    AST 23 01/17/2015    ALT 30 01/17/2015       CBC RESULTS:  Lab Results   Component Value Date    WBC 9.3 01/31/2018    RBC 4.81 01/31/2018    HGB 15.4 01/31/2018    HCT 45.0 01/31/2018    MCV 94 01/31/2018    MCH 32.0 01/31/2018    MCHC 34.2 01/31/2018    RDW 13.6 01/31/2018     01/31/2018       BMP RESULTS:  Lab Results   Component Value Date     03/17/2015    POTASSIUM 4.1 01/23/2018    CHLORIDE 110 (H) 03/17/2015    CO2 25 03/17/2015    ANIONGAP 7 03/17/2015     (H) 03/17/2015    BUN 14 03/17/2015    CR 0.93 01/23/2018    GFRESTIMATED 86 01/23/2018    GFRESTBLACK >90 01/23/2018    TANIA 8.8 03/17/2015        A1C RESULTS:  Lab Results   Component Value Date    A1C 8.1 (H) 01/23/2018       Procedures:    JANIS 11/9/2017    Impression:      Right leg: Resting JANIS: Normal (No observed evidence of increased  cardiovascular risk or peripheral arterial disease). Diminished  waveform in the first digit suggestive of small vessel disease.  Toe  pressures of 90 mmHg.     Left leg: Resting JANIS: Normal (No observed evidence of increased  cardiovascular risk or peripheral arterial disease). No flow  identified in the left first digit compatible with severe small vessel  disease in the foot.    Impression:     1. Aortoiliac angiography demonstrates no evidence of common,  external, or internal  iliac artery stenosis.  2. Diagnostic left lower extremity angiogram demonstrates no  hemodynamically significant stenosis of the left lower extremity  through the plantar arch.   3. Diagnostic right lower extremity angiography demonstrates brisk  flow with no hemodynamically significant stenosis.  4. Asymmetric flow throughout the lower extremities with sluggish flow  seen throughout the left lower extremity and brisk flow in the right.  5. Grossly normal appearance of the right digital vessels and diffuse  irregularity and pruning of the left digital vessels. The asymmetric  appearance argues against an underlying systemic disease such as  vasculitis or purely hyperglycemic related microvascular disease.  Further discussion with the patient confirms a remote history of  frostbite which could have this angiographic appearance. This suggests  clinical and angiographic findings could be related to chronic changes  of frostbite compounded by hyperglycemia and tobacco abuse.       Assessment and Plan:     This is a 47-year-old male with diabetes, smoking history, seizures, and remote h/o frostbite to left big toe with ongoing severe bilateral leg pain. We did exhaustive vascular work up and although there were some asymmetric findings of the left big toe alone (see above), findings were inconsistent with diabetic microvascular disease, thromboangitis obliterans, and/or small vessel vasculitis or vasospasm. Again I discussed toe amputation as a palliative approach ONLY if we thought this was the isolated cause of his pain, however I do not think it is. His pain remains bilateral, at the soles/heels of his feet, and up the distal legs. Most consistent with neuropathy. I have reached out to his prior neurologist for advice on a referral for one of the more severe neuropathy cases I have seen. In the meantime we have again stressed importance of smoking cessation and diabetic control.     Recommendations:   #1: Smoking  cessation discussed  #2: Continue warfarin. No need for sildenafil.  #3:  If no bleeding risk patient would probably benefit from baby aspirin as well.  #4: Continue aggressive blood pressure and diabetic control.  Heart failure management per my cardiology colleague.  #5: Referral to neuropathy specialist. He also c/o urinary incontinence which is concerning.   #6: Can follow up with me as needed in the future    Total time spent 45 minutes, of which >50% was spent in face-to-face patient evaluation, reviewing data with patient, and coordination of care.     Kenia Mcgovern MD MSc  Division of Cardiology and Vascular Medicine    [facility]

## 2018-03-20 NOTE — PATIENT INSTRUCTIONS
You were seen today in the Cardiovascular Clinic at the [facility].      Cardiology Providers you saw during your visit:  Dr. Mcgovern    Diagnosis:  Critical limp ischemia    Results:  None     Follow-up:  With Dr. Mcgovern on 5/29 as scheduled.     For emergencies call [phone].    For any scheduling needs, please call [phone]. Option 1 then option 3    Thank you for your visit today!     Please call if you have any questions or concerns.  Joel Saba RN

## 2018-03-20 NOTE — NURSING NOTE
Chief Complaint   Patient presents with     Follow Up For      49 y/o male for f/u of possible vasculitis diagnosis in presence of lower limb ischemia     Vitals were taken and medications were reconciled.     Cindy Cordova RMA  1:48 PM

## 2018-03-28 ENCOUNTER — MEDICAL CORRESPONDENCE (OUTPATIENT)
Dept: HEALTH INFORMATION MANAGEMENT | Facility: CLINIC | Age: 48
End: 2018-03-28

## 2018-03-28 DIAGNOSIS — G62.9 PERIPHERAL NEUROPATHY: Primary | ICD-10-CM

## 2018-04-10 ASSESSMENT — ENCOUNTER SYMPTOMS
MUSCLE WEAKNESS: 0
BLOOD IN STOOL: 0
NAUSEA: 0
MYALGIAS: 0
JAUNDICE: 0
BLOATING: 0
STIFFNESS: 0
MUSCLE CRAMPS: 0
NERVOUS/ANXIOUS: 0
HEMATURIA: 0
HEARTBURN: 0
CONSTIPATION: 1
ARTHRALGIAS: 1
INSOMNIA: 1
JOINT SWELLING: 0
DECREASED CONCENTRATION: 0
ABDOMINAL PAIN: 0
BOWEL INCONTINENCE: 0
FLANK PAIN: 0
BACK PAIN: 1
DIFFICULTY URINATING: 0
DYSURIA: 0
DEPRESSION: 1
DIARRHEA: 0
NECK PAIN: 1
RECTAL PAIN: 0
PANIC: 0
VOMITING: 0

## 2018-04-11 ENCOUNTER — OFFICE VISIT (OUTPATIENT)
Dept: NEUROLOGY | Facility: CLINIC | Age: 48
End: 2018-04-11
Payer: MEDICAID

## 2018-04-11 VITALS
HEART RATE: 104 BPM | WEIGHT: 252.4 LBS | HEIGHT: 71 IN | BODY MASS INDEX: 35.34 KG/M2 | SYSTOLIC BLOOD PRESSURE: 134 MMHG | DIASTOLIC BLOOD PRESSURE: 89 MMHG

## 2018-04-11 DIAGNOSIS — Z79.4 TYPE 2 DIABETES MELLITUS WITH DIABETIC NEUROPATHY, WITH LONG-TERM CURRENT USE OF INSULIN (H): ICD-10-CM

## 2018-04-11 DIAGNOSIS — R20.9 DISTURBANCE OF SKIN SENSATION: ICD-10-CM

## 2018-04-11 DIAGNOSIS — E11.40 TYPE 2 DIABETES MELLITUS WITH DIABETIC NEUROPATHY, WITH LONG-TERM CURRENT USE OF INSULIN (H): ICD-10-CM

## 2018-04-11 DIAGNOSIS — M79.672 PAIN IN BOTH FEET: Primary | ICD-10-CM

## 2018-04-11 DIAGNOSIS — M79.671 PAIN IN BOTH FEET: Primary | ICD-10-CM

## 2018-04-11 ASSESSMENT — PAIN SCALES - GENERAL: PAINLEVEL: SEVERE PAIN (6)
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
## 2018-04-11 NOTE — MR AVS SNAPSHOT
After Visit Summary   4/11/2018    Konstantin Sharp    MRN: [id number]           Patient Information     Date Of Birth          1970        Visit Information        Provider Department      4/11/2018 9:30 AM Marika Hercules APRN CNP [facility]        Today's Diagnoses     Pain in both feet    -  1    Disturbance of skin sensation        Type 2 diabetes mellitus with diabetic neuropathy, with long-term current use of insulin (H)          Care Instructions    Plan:  EMG for symptoms localization  Follow up after the EMG      History of seizures and did not follow up with Dr Smyth recommendations. Recommended to follow up with Dr Dorantes.       Poorly controlled DM. Follow up with your primary care provider. Consider endocrinology referral.     Pain management per PCP and consider Pain Clinic referral                Follow-ups after your visit        Your next 10 appointments already scheduled     Apr 17, 2018  2:30 PM CDT   (Arrive by 2:15 PM)   EMG with Temo Bobby MD   [facility] ([facility])    [address] [place] [id number] [phone]           Do not use lotions or creams on the area to be tested. If you are on blood thinners (Warfarin, Coumadin, Lovenox, etc), please contact your primary care physician to check if it is safe to stop them 3 days prior to testing. If you have anxiety, please check with your referring physician to obtain anti-anxiety medication for the procedure.            Apr 24, 2018 10:30 AM CDT   (Arrive by 10:15 AM)   Return Visit with SHAHEED Hodges CNP   [facility] ([facility])    [address] [place] [id number] [phone]            May 29, 2018  4:30 PM CDT   (Arrive by 4:15 PM)   Implanted Defibulator with Uc Cv Device 1   [facility] ([facility])    [facility]  [facility]  [address] [place] [id number] [phone]            May 29, 2018  5:00 PM CDT   (Arrive by 4:45 PM)   Return Vascular Visit with Kenia Mcgovern MD   [facility] ([facility])    [address] [place] [address]  [address] [place] [id number] [phone]            Jul 30, 2018 10:00 AM CDT   (Arrive by 9:45 AM)   New Patient Visit with Aden Bhatia MD   [facility] ([facility])    [address] [place] [address]  4th Floor  [facility] [id number] [phone]              Future tests that were ordered for you today     Open Future Orders        Priority Expected Expires Ordered    EMG Routine  7/11/2018 4/11/2018            Who to contact     Please call your clinic at [phone] to:    Ask questions about your health    Make or cancel appointments    Discuss your medicines    Learn about your test results    Speak to your doctor            Additional Information About Your Visit        [employer] Information     [employer] gives you secure access to your electronic health record. If you see a primary care provider, you can also send messages to your care team and make appointments. If you have questions, please call your primary care clinic.  If you do not have a primary care provider, please call [phone] and they will assist you.      [employer] is an electronic gateway that provides easy, online access to your medical records. With [employer], you can request a clinic appointment, read your test results, renew a prescription or communicate with your care team.     To access your existing account, please contact your [facility] or call [phone] for assistance.        Care EveryWhere ID     This is your Care EveryWhere ID. This could be used by other organizations to access your [facility] medical records  [id number]        Your Vitals Were     Pulse Height BMI (Body  "Mass Index)             104 1.803 m (5' 11\") 35.2 kg/m2          Blood Pressure from Last 3 Encounters:   04/11/18 134/89   03/20/18 123/78   03/02/18 122/83    Weight from Last 3 Encounters:   04/11/18 114.5 kg (252 lb 6.4 oz)   03/20/18 120.5 kg (265 lb 11.2 oz)   03/02/18 121.7 kg (268 lb 3.2 oz)               Primary Care Provider Office Phone # Fax #    Damon Cooper [phone]       [facility] [address] [facility] [address]        Equal Access to Services     CAROLINE BARBER : Yayo Olsen, kennedi parsons, natalia jin, gustavo kennedy. So [facility] [phone].    ATENCIÓN: Si habla español, tiene a dover disposición servicios gratuitos de asistencia lingüística. Llame al [phone].    We comply with applicable federal civil rights laws and [place] laws. We do not discriminate on the basis of race, color, national origin, age, disability, sex, sexual orientation, or gender identity.            Thank you!     Thank you for choosing [facility]  for your care. Our goal is always to provide you with excellent care. Hearing back from our patients is one way we can continue to improve our services. Please take a few minutes to complete the written survey that you may receive in the mail after your visit with us. Thank you!             Your Updated Medication List - Protect others around you: Learn how to safely use, store and throw away your medicines at www.disposemymeds.org.          This list is accurate as of 4/11/18 10:59 AM.  Always use your most recent med list.                   Brand Name Dispense Instructions for use Diagnosis    * albuterol (2.5 MG/3ML) 0.083% neb solution      Take 1 vial by nebulization every 6 hours as needed for shortness of breath / dyspnea or wheezing        * VENTOLIN  (90 Base) MCG/ACT Inhaler   Generic drug:  albuterol      Inhale 2 puffs into the lungs every 4 hours as needed for " shortness of breath / dyspnea or wheezing        amitriptyline 25 MG tablet    ELAVIL     Take 25 mg by mouth At Bedtime        bisacodyl 10 MG Suppository    DULCOLAX     Place 10 mg rectally daily as needed        EUCERIN INTENSIVE REPAIR EX      Externally apply topically 2 times daily as needed        glipiZIDE 5 MG tablet    GLUCOTROL    30 tablet    Take 1 tablet (5 mg) by mouth 2 times daily (before meals)    Diabetes mellitus (H)       lidocaine 5 % ointment    XYLOCAINE     Apply topically 3 times daily For feet        LIPITOR PO      Take 40 mg by mouth daily        losartan 25 MG tablet    COZAAR    30 tablet    Take 1 tablet (25 mg) by mouth daily    Hypertrophic cardiomyopathy (H)       LYRICA PO      Take 75 mg by mouth 2 times daily        magnesium oxide 400 MG tablet    MAG-OX    180 tablet    Take 4-6 tablets (1,600-2,400 mg) by mouth every other day    Drug-induced constipation       metFORMIN 500 MG tablet    GLUCOPHAGE    60 tablet    Take 1,000 mg by mouth 2 times daily (with meals)    Diabetes mellitus (H)       metoprolol tartrate 50 MG tablet    LOPRESSOR    180 tablet    Take 100 mg by mouth 3 times daily    S/P ventricular septal myectomy, Congestive heart failure, unspecified, Hypertrophic cardiomyopathy (H)       senna-docusate 8.6-50 MG per tablet    SENOKOT-S;PERICOLACE     Take 1 tablet by mouth daily        * SEROQUEL PO      Take 25 mg by mouth 2 times daily        * QUEtiapine 150 MG Tb24 24 hr tablet    SEROquel XR     Take 150 mg by mouth At Bedtime        sildenafil 20 MG tablet    REVATIO    90 tablet    Take 0.5 tablets (10 mg) by mouth 3 times daily    Critical lower limb ischemia       TRAZODONE HCL PO      Take 100 mg by mouth At Bedtime        venlafaxine 150 MG 24 hr capsule    EFFEXOR-XR    30 capsule    Take 150 mg by mouth 2 times daily    Adjustment disorder with depressed mood       WARFARIN SODIUM PO      Take 8.5 mg by mouth every morning        * Notice:  This  list has 4 medication(s) that are the same as other medications prescribed for you. Read the directions carefully, and ask your doctor or other care provider to review them with you.
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
## 2018-04-11 NOTE — PATIENT INSTRUCTIONS
Plan:  EMG for symptoms localization  Follow up after the EMG      History of seizures and did not follow up with Dr Smyth recommendations. Recommended to follow up with Dr Dorantes.       Poorly controlled DM. Follow up with your primary care provider. Consider endocrinology referral.     Pain management per PCP and consider Pain Clinic referral

## 2018-04-12 ENCOUNTER — HOSPITAL ENCOUNTER (OUTPATIENT)
Dept: NEUROLOGY | Facility: CLINIC | Age: 48
Setting detail: THERAPIES SERIES
Discharge: STILL ADMITTED OR ADMINISTRATIVE | End: 2018-04-12
Attending: NURSE PRACTITIONER

## 2018-04-12 DIAGNOSIS — G89.29 CHRONIC PAIN: ICD-10-CM

## 2018-04-12 DIAGNOSIS — G47.33 OSA (OBSTRUCTIVE SLEEP APNEA): Primary | ICD-10-CM

## 2018-04-12 DIAGNOSIS — G47.00 INSOMNIA: ICD-10-CM

## 2018-04-12 DIAGNOSIS — F33.1 MDD (MAJOR DEPRESSIVE DISORDER), RECURRENT EPISODE, MODERATE (H): ICD-10-CM

## 2018-04-12 ASSESSMENT — PATIENT HEALTH QUESTIONNAIRE - PHQ9: SUM OF ALL RESPONSES TO PHQ QUESTIONS 1-9: 9

## 2018-04-16 ASSESSMENT — ENCOUNTER SYMPTOMS
HEMATURIA: 0
HYPERTENSION: 1
NERVOUS/ANXIOUS: 0
PANIC: 0
SYNCOPE: 0
DYSURIA: 0
LEG PAIN: 1
DEPRESSION: 1
STIFFNESS: 0
MUSCLE CRAMPS: 0
MYALGIAS: 0
NECK PAIN: 1
EXERCISE INTOLERANCE: 1
JOINT SWELLING: 0
DECREASED CONCENTRATION: 0
BACK PAIN: 1
DIFFICULTY URINATING: 0
INSOMNIA: 1
ORTHOPNEA: 0
PALPITATIONS: 0
LIGHT-HEADEDNESS: 0
HYPOTENSION: 0
SLEEP DISTURBANCES DUE TO BREATHING: 0
FLANK PAIN: 0
MUSCLE WEAKNESS: 0
ARTHRALGIAS: 0

## 2018-04-17 ENCOUNTER — MEDICAL CORRESPONDENCE (OUTPATIENT)
Dept: HEALTH INFORMATION MANAGEMENT | Facility: CLINIC | Age: 48
End: 2018-04-17

## 2018-04-17 ENCOUNTER — OFFICE VISIT (OUTPATIENT)
Dept: NEUROLOGY | Facility: CLINIC | Age: 48
End: 2018-04-17
Payer: MEDICAID

## 2018-04-17 DIAGNOSIS — E11.40 TYPE 2 DIABETES MELLITUS WITH DIABETIC NEUROPATHY, WITH LONG-TERM CURRENT USE OF INSULIN (H): ICD-10-CM

## 2018-04-17 DIAGNOSIS — G62.89 AXONAL SENSORIMOTOR NEUROPATHY: Primary | ICD-10-CM

## 2018-04-17 DIAGNOSIS — R20.9 DISTURBANCE OF SKIN SENSATION: ICD-10-CM

## 2018-04-17 DIAGNOSIS — Z79.4 TYPE 2 DIABETES MELLITUS WITH DIABETIC NEUROPATHY, WITH LONG-TERM CURRENT USE OF INSULIN (H): ICD-10-CM

## 2018-04-17 DIAGNOSIS — M79.671 PAIN IN BOTH FEET: ICD-10-CM

## 2018-04-17 DIAGNOSIS — M79.672 PAIN IN BOTH FEET: ICD-10-CM

## 2018-04-17 NOTE — PROGRESS NOTES
[facility]  Electrodiagnostic Laboratory    Nerve Conduction & EMG Report          Patient:       Konstantin Sharp  Patient ID:    [id number]  Gender:        Male  YOB: 1970  Age:           48 Years 1 Months      Referring Provider: Marika Hercules NP    History & Examination:    48 year old man with numbness at both feet. Query polyneuropathy.    Techniques: Motor and sensory conduction studies were done with surface recording electrodes. Temperature was monitored and recorded throughout the study. Upper extremities were maintained at a temperature of 32 degrees Centigrade or higher.  Lower extremities were maintained at a temperature of 31degrees Centigrade or higher.      Results:    Right radial antidromic sensory NCS was normal. Bilateral sural antidromic sensory NCSs showed mildly attenuated SNAP amplitudes and normal conduction velocities. Right median, and bilateral tibial motor NCSs were normal. Right tibial F-wave latencies were at the upper limits of normal. Needle EMG examination was deferred (reason: not necessary to answer referral question).    Interpretation:    Abnormal study. There is electrodiagnostic evidence of a mild, length-dependent, axonal, sensory polyneuropathy.     EMG Physician:    Temo Bobby MD       Sensory NCS      Nerve / Sites Rec. Site Onset Peak NP Amp Ref. PP Amp Dist Jony Ref. Temp     ms ms  V  V  V cm m/s m/s  C   R RADIAL - Snuff      Forearm Snuff 1.93 2.71 16.7 15.0 19.1 10 51.9 48.0 31.1   R SURAL - Lat Mall       Calf Ankle 3.54 4.58 5.5 8.0 14.3 14 39.5 38.0 31.9      Calf Ankle 3.59 4.38 7.2 8.0 10.2 14 39.0  32   L SURAL - Lat Mall       Calf Ankle 3.44 4.64 5.8 8.0 3.8 14 40.7 38.0 31.5       Motor NCS      Nerve / Sites Rec. Site Lat Ref. Amp Ref. Rel Amp Dist Jony Ref. Dur. Area Temp.     ms ms mV mV % cm m/s m/s ms %  C   R MEDIAN - APB      Wrist APB 4.17 4.40 11.1 5.0 100 8   6.09 100 31.1      Elbow APB 8.75  10.5  94.8 22  48.0 48.0 6.41 95.1 31.1   R DEEP PERONEAL - EDB      Ankle EDB 5.05 6.00 4.0 2.5 100 8   6.82 100 31.9      FibHead EDB 14.79  3.3  82.5 37 38.0 38.0 8.59 77.2 31.9      Pop Fos EDB 16.77  3.1  78.9 9 45.5 38.0 8.28 75.3 31.1   R TIBIAL - AH      Ankle AH 3.91 6.00 4.4 4.0 100 8   7.66 100 31.7      Pop Fos AH 14.84  3.2  73.6 42 38.4 38.0 9.17 96.6 31.7   L TIBIAL - AH      Ankle AH 3.44 6.00 4.8 4.0 100 8   7.76 100 31.5       F  Wave      Nerve Min F Lat Max F Lat Mean FLat Temp.    ms ms ms  C   R TIBIAL 56.41 59.84 57.55 31.7

## 2018-04-17 NOTE — MR AVS SNAPSHOT
After Visit Summary   4/17/2018    Konstantin Sharp    MRN: [id number]           Patient Information     Date Of Birth          1970        Visit Information        Provider Department      4/17/2018 2:30 PM Temo Bobby MD [facility] EMG        Today's Diagnoses     Axonal sensorimotor neuropathy    -  1    Pain in both feet        Disturbance of skin sensation        Type 2 diabetes mellitus with diabetic neuropathy, with long-term current use of insulin (H)           Follow-ups after your visit        Your next 10 appointments already scheduled     Apr 24, 2018 10:30 AM CDT   (Arrive by 10:15 AM)   Return Visit with SHAHEED [facility] ([facility])    [address]  3rd Floor  [facility] [place] [facility] [id number] [phone]            May 29, 2018  4:30 PM CDT   (Arrive by 4:15 PM)   Implanted Defibulator with Uc Cv Device 1   [facility] ([facility])    [address]  [address]  [facility] [id number] [phone]            May 29, 2018  5:00 PM CDT   (Arrive by 4:45 PM)   Return Vascular Visit with Kenia Mcgovern MD   [facility] ([facility])    [address]  [address]  [facility] [id number] [phone]            Jul 30, 2018 10:00 AM CDT   (Arrive by 9:45 AM)   New Patient Visit with Aden Bhatia MD   [facility] ([facility])    [address]  4th Floor  [facility] [id number] [phone]              Who to contact     Please call your clinic at [phone] to:    Ask questions about your health    Make or cancel appointments    Discuss your medicines    Learn about your test results    Speak to your doctor            Additional Information About Your Visit        MyChart Information     [employer] gives you secure access to your electronic health  record. If you see a primary care provider, you can also send messages to your care team and make appointments. If you have questions, please call your primary care clinic.  If you do not have a primary care provider, please call [phone] and they will assist you.      [employer] is an electronic gateway that provides easy, online access to your medical records. With [employer], you can request a clinic appointment, read your test results, renew a prescription or communicate with your care team.     To access your existing account, please contact your [facility] or call [phone] for assistance.        Care EveryWhere ID     This is your Care EveryWhere ID. This could be used by other organizations to access your [place] medical records  [id number]         Blood Pressure from Last 3 Encounters:   04/11/18 134/89   03/20/18 123/78   03/02/18 122/83    Weight from Last 3 Encounters:   04/11/18 114.5 kg (252 lb 6.4 oz)   03/20/18 120.5 kg (265 lb 11.2 oz)   03/02/18 121.7 kg (268 lb 3.2 oz)              We Performed the Following     EMG     HC NCS MOTOR W OR W/O F-WAVE, 7 OR 8        Primary Care Provider Office Phone # Fax #    Damon Cooper [phone]       [facility] [address] [address]        Equal Access to Services     CAROLINE BARBER : Hadii aad ku hadasho Sotanyaali, waaxda luqadaha, qaybta kaalmada annabel, gustavo kennedy. So [facility] [phone].    ATENCIÓN: Si habla español, tiene a dover disposición servicios gratuitos de asistencia lingüística. Kemar al [phone].    We comply with applicable federal civil rights laws and [place] laws. We do not discriminate on the basis of race, color, national origin, age, disability, sex, sexual orientation, or gender identity.            Thank you!     Thank you for choosing [facility]  for your care. Our goal is always to provide you with excellent care. Hearing back  from our patients is one way we can continue to improve our services. Please take a few minutes to complete the written survey that you may receive in the mail after your visit with us. Thank you!             Your Updated Medication List - Protect others around you: Learn how to safely use, store and throw away your medicines at www.disposemymeds.org.          This list is accurate as of 4/17/18  2:55 PM.  Always use your most recent med list.                   Brand Name Dispense Instructions for use Diagnosis    * albuterol (2.5 MG/3ML) 0.083% neb solution      Take 1 vial by nebulization every 6 hours as needed for shortness of breath / dyspnea or wheezing        * VENTOLIN  (90 Base) MCG/ACT Inhaler   Generic drug:  albuterol      Inhale 2 puffs into the lungs every 4 hours as needed for shortness of breath / dyspnea or wheezing        amitriptyline 25 MG tablet    ELAVIL     Take 25 mg by mouth At Bedtime        bisacodyl 10 MG Suppository    DULCOLAX     Place 10 mg rectally daily as needed        EUCERIN INTENSIVE REPAIR EX      Externally apply topically 2 times daily as needed        glipiZIDE 5 MG tablet    GLUCOTROL    30 tablet    Take 1 tablet (5 mg) by mouth 2 times daily (before meals)    Diabetes mellitus (H)       lidocaine 5 % ointment    XYLOCAINE     Apply topically 3 times daily For feet        LIPITOR PO      Take 40 mg by mouth daily        losartan 25 MG tablet    COZAAR    30 tablet    Take 1 tablet (25 mg) by mouth daily    Hypertrophic cardiomyopathy (H)       LYRICA PO      Take 75 mg by mouth 2 times daily        magnesium oxide 400 MG tablet    MAG-OX    180 tablet    Take 4-6 tablets (1,600-2,400 mg) by mouth every other day    Drug-induced constipation       metFORMIN 500 MG tablet    GLUCOPHAGE    60 tablet    Take 1,000 mg by mouth 2 times daily (with meals)    Diabetes mellitus (H)       metoprolol tartrate 50 MG tablet    LOPRESSOR    180 tablet    Take 100 mg by mouth 3  times daily    S/P ventricular septal myectomy, Congestive heart failure, unspecified, Hypertrophic cardiomyopathy (H)       senna-docusate 8.6-50 MG per tablet    SENOKOT-S;PERICOLACE     Take 1 tablet by mouth daily        * SEROQUEL PO      Take 25 mg by mouth 2 times daily        * QUEtiapine 150 MG Tb24 24 hr tablet    SEROquel XR     Take 150 mg by mouth At Bedtime        sildenafil 20 MG tablet    REVATIO    90 tablet    Take 0.5 tablets (10 mg) by mouth 3 times daily    Critical lower limb ischemia       TRAZODONE HCL PO      Take 100 mg by mouth At Bedtime        venlafaxine 150 MG 24 hr capsule    EFFEXOR-XR    30 capsule    Take 150 mg by mouth 2 times daily    Adjustment disorder with depressed mood       WARFARIN SODIUM PO      Take 8.5 mg by mouth every morning        * Notice:  This list has 4 medication(s) that are the same as other medications prescribed for you. Read the directions carefully, and ask your doctor or other care provider to review them with you.

## 2018-04-17 NOTE — LETTER
4/17/2018       RE: Konstantin Sharp  [address] [place] [address]     Dear Colleague,    Thank you for referring your patient, Konstantin Sharp, to the [facility] EMG at [facility]. Please see a copy of my visit note below.        [facility]  Electrodiagnostic Laboratory    Nerve Conduction & EMG Report          Patient:       Konstantin Sharp  Patient ID:    [id number]  Gender:        Male  YOB: 1970  Age:           48 Years 1 Months      Referring Provider: Marika Hercules NP    History & Examination:    48 year old man with numbness at both feet. Query polyneuropathy.    Techniques: Motor and sensory conduction studies were done with surface recording electrodes. Temperature was monitored and recorded throughout the study. Upper extremities were maintained at a temperature of 32 degrees Centigrade or higher.  Lower extremities were maintained at a temperature of 31degrees Centigrade or higher.      Results:    Right radial antidromic sensory NCS was normal. Bilateral sural antidromic sensory NCSs showed mildly attenuated SNAP amplitudes and normal conduction velocities. Right median, and bilateral tibial motor NCSs were normal. Right tibial F-wave latencies were at the upper limits of normal. Needle EMG examination was deferred (reason: not necessary to answer referral question).    Interpretation:    Abnormal study. There is electrodiagnostic evidence of a mild, length-dependent, axonal, sensory polyneuropathy.     EMG Physician:    Temo Bobby MD       Sensory NCS      Nerve / Sites Rec. Site Onset Peak NP Amp Ref. PP Amp Dist Jony Ref. Temp     ms ms  V  V  V cm m/s m/s  C   R RADIAL - Snuff      Forearm Snuff 1.93 2.71 16.7 15.0 19.1 10 51.9 48.0 31.1   R SURAL - Lat Mall       Calf Ankle 3.54 4.58 5.5 8.0 14.3 14 39.5 38.0 31.9      Calf Ankle 3.59 4.38 7.2 8.0 10.2 14 39.0  32   L SURAL - Lat Mall       Calf Ankle 3.44 4.64 5.8 8.0  3.8 14 40.7 38.0 31.5       Motor NCS      Nerve / Sites Rec. Site Lat Ref. Amp Ref. Rel Amp Dist Jony Ref. Dur. Area Temp.     ms ms mV mV % cm m/s m/s ms %  C   R MEDIAN - APB      Wrist APB 4.17 4.40 11.1 5.0 100 8   6.09 100 31.1      Elbow APB 8.75  10.5  94.8 22 48.0 48.0 6.41 95.1 31.1   R DEEP PERONEAL - EDB      Ankle EDB 5.05 6.00 4.0 2.5 100 8   6.82 100 31.9      FibHead EDB 14.79  3.3  82.5 37 38.0 38.0 8.59 77.2 31.9      Pop Fos EDB 16.77  3.1  78.9 9 45.5 38.0 8.28 75.3 31.1   R TIBIAL - AH      Ankle AH 3.91 6.00 4.4 4.0 100 8   7.66 100 31.7      Pop Fos AH 14.84  3.2  73.6 42 38.4 38.0 9.17 96.6 31.7   L TIBIAL - AH      Ankle AH 3.44 6.00 4.8 4.0 100 8   7.76 100 31.5       F  Wave      Nerve Min F Lat Max F Lat Mean FLat Temp.    ms ms ms  C   R TIBIAL 56.41 59.84 57.55 31.7                         Again, thank you for allowing me to participate in the care of your patient.      Sincerely,    Temo Bobby MD

## 2018-04-24 ENCOUNTER — OFFICE VISIT (OUTPATIENT)
Dept: NEUROLOGY | Facility: CLINIC | Age: 48
End: 2018-04-24
Payer: MEDICAID

## 2018-04-24 VITALS
SYSTOLIC BLOOD PRESSURE: 127 MMHG | WEIGHT: 254.3 LBS | OXYGEN SATURATION: 97 % | TEMPERATURE: 98.2 F | DIASTOLIC BLOOD PRESSURE: 83 MMHG | HEART RATE: 89 BPM | HEIGHT: 71 IN | BODY MASS INDEX: 35.6 KG/M2 | RESPIRATION RATE: 24 BRPM

## 2018-04-24 DIAGNOSIS — G62.89 AXONAL SENSORIMOTOR NEUROPATHY: Primary | ICD-10-CM

## 2018-04-24 DIAGNOSIS — Z79.4 TYPE 2 DIABETES MELLITUS WITH DIABETIC NEUROPATHY, WITH LONG-TERM CURRENT USE OF INSULIN (H): ICD-10-CM

## 2018-04-24 DIAGNOSIS — E11.40 TYPE 2 DIABETES MELLITUS WITH DIABETIC NEUROPATHY, WITH LONG-TERM CURRENT USE OF INSULIN (H): ICD-10-CM

## 2018-04-24 ASSESSMENT — PAIN SCALES - GENERAL: PAINLEVEL: SEVERE PAIN (6)

## 2018-04-24 NOTE — PROGRESS NOTES
"Re: Konstantin Sharp      MRN# [id number]  YOB: 1970  Date of Visit:5/4/2018    OUTPATIENT NEUROLOGY VISIT NOTE    Reason for Visit:  EMG follow up    Interval History:  Konstantin Sharp is a 48-year-old male presents to the clinic today for EMG results follow up  History of DM diagnosed in 2013 and on insulin and not well controlled, afib, cardiomyopathy and has ICD, long standing smoking history, gastroplasty in 1980 due to GI ulcers  Accompanied by his \"in law PCA\" and granddaughter.   EMG Results reviewed with the patient :     Right radial antidromic sensory NCS was normal. Bilateral sural antidromic sensory NCSs showed mildly attenuated SNAP amplitudes and normal conduction velocities. Right median, and bilateral tibial motor NCSs were normal. Right tibial F-wave latencies were at the upper limits of normal. Needle EMG examination was deferred (reason: not necessary to answer referral question).     Interpretation:     Abnormal study. There is electrodiagnostic evidence of a mild, length-dependent, axonal, sensory polyneuropathy.      EMG Physician:     Temo Bobby MD       Patient's PCA states that the patient has never been to a pain specialist for neuropathy. Patient reports that he has been taking lyrica, amitriptyline, effexor, lidocaine patches and gabapentin in the past. Patient's PCA reports that she drinks but not patient.   Low vitamin B6 in the past and will recheck it.   Patient reports that his medication was changed -dose increased for \"Effexor 275 mg \" per patient's PCA and seroquel at 150 mg daily in divided doses.     Plan discussed with the patient and his PCA  Recommended to see a Pain Specialist for neuropathic pain Management  Recommended blood sugar control   Neuropathy lab work up-B6, B1, TSH, B12, serum immunofixation  Do not put pressure on your feet, caution with a hot water or any warmers because of risk of burns, do not wear tight shoes.   Follow up for neuropathy " in 6-12 months or sooner if needed      Patient declines to follow up with Epilepsy Clinic and does not want to proceed with Dr Gallegos's recommendations of video EEG. Patient's PCA denies seizures.       Neurodiagnostic Testing  Lab reviewed  Results for NERIS JACKSON (MRN [id number]) as of 4/24/2018 11:13   Ref. Range 1/23/2018 10:01   Hemoglobin A1C Latest Ref Range: 4.3 - 6.0 % 8.1 (H)     Results for NERIS JACKSON (MRN [id number]) as of 4/24/2018 11:13   Ref. Range 3/13/2013 16:34   RAMYA Screen by EIA Latest Ref Range: <1.0  <1.0...     Results for NERIS JACKSON (MRN [id number]) as of 4/24/2018 10:47   Ref. Range 1/31/2018 09:45   WBC Latest Ref Range: 4.0 - 11.0 10e9/L 9.3   Hemoglobin Latest Ref Range: 13.3 - 17.7 g/dL 15.4   Hematocrit Latest Ref Range: 40.0 - 53.0 % 45.0   Platelet Count Latest Ref Range: 150 - 450 10e9/L 214   RBC Count Latest Ref Range: 4.4 - 5.9 10e12/L 4.81   MCV Latest Ref Range: 78 - 100 fl 94   MCH Latest Ref Range: 26.5 - 33.0 pg 32.0   MCHC Latest Ref Range: 31.5 - 36.5 g/dL 34.2   RDW Latest Ref Range: 10.0 - 15.0 % 13.6     Results for NERIS JACKSON (MRN [id number]) as of 4/24/2018 10:47   Ref. Range 1/17/2015 22:33   TSH Latest Ref Range: 0.40 - 4.00 mU/L 0.52     Results for NERIS JACKSON (MRN [id number]) as of 4/24/2018 10:47   Ref. Range 1/17/2015 20:27   Vitamin B6 Unknown 17.4 (L)     Results for NERIS JACKSON (MRN [id number]) as of 4/24/2018 10:47   Ref. Range 1/17/2015 16:23   Vitamin B12 Latest Ref Range: >210 pg/mL 899     Past Medical History reviewed   Social History   Substance Use Topics     Smoking status: Light Tobacco Smoker     Packs/day: 0.50     Years: 35.00     Types: Cigarettes     Start date: 3/8/1982     Last attempt to quit: 4/7/2014     Smokeless tobacco: Never Used     Alcohol use 0.0 oz/week     0 Standard drinks or equivalent per week      Comment: rare    reviewed and verified with the patient    Current Outpatient Prescriptions   Medication Sig  Dispense Refill     albuterol (2.5 MG/3ML) 0.083% neb solution Take 1 vial by nebulization every 6 hours as needed for shortness of breath / dyspnea or wheezing       albuterol (VENTOLIN HFA) 108 (90 BASE) MCG/ACT Inhaler Inhale 2 puffs into the lungs every 4 hours as needed for shortness of breath / dyspnea or wheezing       amitriptyline (ELAVIL) 25 MG tablet Take 25 mg by mouth At Bedtime        Atorvastatin Calcium (LIPITOR PO) Take 40 mg by mouth daily        bisacodyl (DULCOLAX) 10 MG Suppository Place 10 mg rectally daily as needed        Emollient (EUCERIN INTENSIVE REPAIR EX) Externally apply topically 2 times daily as needed       glipiZIDE (GLUCOTROL) 5 MG tablet Take 1 tablet (5 mg) by mouth 2 times daily (before meals) 30 tablet      lidocaine (XYLOCAINE) 5 % ointment Apply topically 3 times daily For feet       losartan (COZAAR) 25 MG tablet Take 1 tablet (25 mg) by mouth daily 30 tablet      magnesium oxide (MAG-OX) 400 MG tablet Take 4-6 tablets (1,600-2,400 mg) by mouth every other day 180 tablet 3     metFORMIN (GLUCOPHAGE) 500 MG tablet Take 1,000 mg by mouth 2 times daily (with meals)  60 tablet      metoprolol (LOPRESSOR) 50 MG tablet Take 100 mg by mouth 3 times daily  180 tablet 4     Pregabalin (LYRICA PO) Take 75 mg by mouth 2 times daily        QUEtiapine (SEROQUEL XR) 150 MG TB24 24 hr tablet Take 150 mg by mouth At Bedtime       QUEtiapine Fumarate (SEROQUEL PO) Take 25 mg by mouth 2 times daily       senna-docusate (SENOKOT-S;PERICOLACE) 8.6-50 MG per tablet Take 1 tablet by mouth daily       sildenafil (REVATIO) 20 MG tablet Take 0.5 tablets (10 mg) by mouth 3 times daily 90 tablet 3     TRAZODONE HCL PO Take 100 mg by mouth At Bedtime       venlafaxine (EFFEXOR-XR) 150 MG 24 hr capsule Take 150 mg by mouth 2 times daily  30 capsule 1     WARFARIN SODIUM PO Take 8.5 mg by mouth every morning     reviewed and verified with the patient    Review of Systems:   A 10-point ROS including  "constitutional, eyes, respiratory, cardiovascular, gastroenterology, genitourinary, integumentary, musculoskeletal, neurology and psychiatric were all negative except as mentioned in the interval history.      General Exam:   /83  Pulse 89  Temp 98.2  F (36.8  C) (Oral)  Resp 24  Ht 1.803 m (5' 11\")  Wt 115.3 kg (254 lb 4.8 oz)  SpO2 97%  BMI 35.47 kg/m2   O2 sats varies 94-97 on the room air  GEN: NAD;  responses appropriately but laying down and snoozing at times, responds when asked questions and follows commands, Oriented x4 (April 23, 2018 at the Neurology Clinic). Speech is fluent without dysarthria. EOM intact. Face is symetric, FNF intact and no pronator drift.  Normal casual gait.  Assessment and Plan:  See Interval History for our discussion and plan    I discussed all my recommendation with Konstantin Sharp. The patient verbalizes understanding and comfortable with the plan. The patient has our clinic phone number to call with any questions or concerns. All of the patient's questions were answered from the best of my current knowledge. Follow up in    weeks or sooner if needed.    Time spent with pt answering questions, discussing findings, counseling and coordinating care was more than 50% the appointment time, 25   minutes.         SHAHEED Whaley, CNP  [facility]    "

## 2018-04-24 NOTE — NURSING NOTE
Chief Complaint   Patient presents with     RECHECK     [facility]- EMG RESULTS F/U     Jorge Luis Nelson, JOANN

## 2018-04-24 NOTE — PATIENT INSTRUCTIONS
Plan   Recommended to see a Pain Specialist for neuropathic pain Management  Recommended blood sugar control   Neuropathy lab work up-B6, B1, TSH, B12, serum immunofixation  Follow up for neuropathy in 6-12 months or sooner if needed

## 2018-04-24 NOTE — MR AVS SNAPSHOT
After Visit Summary   4/24/2018    Konstantin Sharp    MRN: [id number]           Patient Information     Date Of Birth          1970        Visit Information        Provider Department      4/24/2018 10:30 AM Marika Hercules APRN [facility]        Today's Diagnoses     Axonal sensorimotor neuropathy    -  1    Type 2 diabetes mellitus with diabetic neuropathy, with long-term current use of insulin (H)          Care Instructions    Plan   Recommended to see a Pain Specialist for neuropathic pain Management  Recommended blood sugar control   Neuropathy lab work up-B6, B1, TSH, B12, serum immunofixation  Follow up for neuropathy in 6-12 months or sooner if needed              Follow-ups after your visit        Your next 10 appointments already scheduled     May 08, 2018 11:20 AM CDT   (Arrive by 11:05 AM)   RETURN GENERAL with Ricardo Pickard OD   [facility] ([facility])    [address] [place] [address]  [address] [facility] [id number] [phone]            May 29, 2018  4:30 PM CDT   (Arrive by 4:15 PM)   Implanted Defibulator with Uc Cv Device 1   [facility] ([facility])    [address] [place] [address]  [address] [place] [address]   [phone]            May 29, 2018  5:00 PM CDT   (Arrive by 4:45 PM)   Return Vascular Visit with Kenia Mcgovern MD   [facility] ([facility])    [address] [place] [address]  [address] [place] [id number] [phone]            Jul 30, 2018 10:00 AM CDT   (Arrive by 9:45 AM)   New Patient Visit with Aden Bhatia MD   [facility] ([facility])    [address] [place] [address]  [address] [facility] [address]   [phone]              Future tests that were ordered for you today     Open Future Orders        Priority Expected Expires Ordered    Protein Immunofixation Serum  "Routine  4/25/2019 4/24/2018            Who to contact     Please call your clinic at [phone] to:    Ask questions about your health    Make or cancel appointments    Discuss your medicines    Learn about your test results    Speak to your doctor            Additional Information About Your Visit        [employer] Information     [employer] gives you secure access to your electronic health record. If you see a primary care provider, you can also send messages to your care team and make appointments. If you have questions, please call your primary care clinic.  If you do not have a primary care provider, please call [phone] and they will assist you.      [employer] is an electronic gateway that provides easy, online access to your medical records. With [employer], you can request a clinic appointment, read your test results, renew a prescription or communicate with your care team.     To access your existing account, please contact your [facility] or call [phone] for assistance.        Care EveryWhere ID     This is your Care EveryWhere ID. This could be used by other organizations to access your [facility] medical records  [id number]        Your Vitals Were     Pulse Temperature Respirations Height Pulse Oximetry BMI (Body Mass Index)    89 98.2  F (36.8  C) (Oral) 24 1.803 m (5' 11\") 97% 35.47 kg/m2       Blood Pressure from Last 3 Encounters:   04/24/18 127/83   04/11/18 134/89   03/20/18 123/78    Weight from Last 3 Encounters:   04/24/18 115.3 kg (254 lb 4.8 oz)   04/11/18 114.5 kg (252 lb 6.4 oz)   03/20/18 120.5 kg (265 lb 11.2 oz)              We Performed the Following     Methylmalonic acid     Vitamin B1 whole blood     Vitamin B12     Vitamin B6        Primary Care Provider Office Phone # Fax #    Damon Allison [phone]       [facility] [address] [facility] [address]        Equal Access to Services     CAROLINE BARBER AH: Yayo gomez " Sodaniela, waallysonda luqadaha, qaybta kaaljaime jin, gustavo richarddahlia marcia. So [facility] [phone].    ATENCIÓN: Si gallo polk, tiene a dover disposición servicios gratuitos de asistencia lingüística. Kemar al [phone].    We comply with applicable federal civil rights laws and [place] laws. We do not discriminate on the basis of race, color, national origin, age, disability, sex, sexual orientation, or gender identity.            Thank you!     Thank you for choosing [facility]UR[facility]  for your care. Our goal is always to provide you with excellent care. Hearing back from our patients is one way we can continue to improve our services. Please take a few minutes to complete the written survey that you may receive in the mail after your visit with us. Thank you!             Your Updated Medication List - Protect others around you: Learn how to safely use, store and throw away your medicines at www.disposemymeds.org.          This list is accurate as of 4/24/18 11:33 AM.  Always use your most recent med list.                   Brand Name Dispense Instructions for use Diagnosis    * albuterol (2.5 MG/3ML) 0.083% neb solution      Take 1 vial by nebulization every 6 hours as needed for shortness of breath / dyspnea or wheezing        * VENTOLIN  (90 Base) MCG/ACT Inhaler   Generic drug:  albuterol      Inhale 2 puffs into the lungs every 4 hours as needed for shortness of breath / dyspnea or wheezing        amitriptyline 25 MG tablet    ELAVIL     Take 25 mg by mouth At Bedtime        bisacodyl 10 MG Suppository    DULCOLAX     Place 10 mg rectally daily as needed        EUCERIN INTENSIVE REPAIR EX      Externally apply topically 2 times daily as needed        glipiZIDE 5 MG tablet    GLUCOTROL    30 tablet    Take 1 tablet (5 mg) by mouth 2 times daily (before meals)    Diabetes mellitus (H)       lidocaine 5 % ointment    XYLOCAINE     Apply topically 3 times daily For feet         LIPITOR PO      Take 40 mg by mouth daily        losartan 25 MG tablet    COZAAR    30 tablet    Take 1 tablet (25 mg) by mouth daily    Hypertrophic cardiomyopathy (H)       LYRICA PO      Take 75 mg by mouth 2 times daily        magnesium oxide 400 MG tablet    MAG-OX    180 tablet    Take 4-6 tablets (1,600-2,400 mg) by mouth every other day    Drug-induced constipation       metFORMIN 500 MG tablet    GLUCOPHAGE    60 tablet    Take 1,000 mg by mouth 2 times daily (with meals)    Diabetes mellitus (H)       metoprolol tartrate 50 MG tablet    LOPRESSOR    180 tablet    Take 100 mg by mouth 3 times daily    S/P ventricular septal myectomy, Congestive heart failure, unspecified, Hypertrophic cardiomyopathy (H)       senna-docusate 8.6-50 MG per tablet    SENOKOT-S;PERICOLACE     Take 1 tablet by mouth daily        * SEROQUEL PO      Take 25 mg by mouth 2 times daily        * QUEtiapine 150 MG Tb24 24 hr tablet    SEROquel XR     Take 150 mg by mouth At Bedtime        sildenafil 20 MG tablet    REVATIO    90 tablet    Take 0.5 tablets (10 mg) by mouth 3 times daily    Critical lower limb ischemia       TRAZODONE HCL PO      Take 100 mg by mouth At Bedtime        venlafaxine 150 MG 24 hr capsule    EFFEXOR-XR    30 capsule    Take 150 mg by mouth 2 times daily    Adjustment disorder with depressed mood       WARFARIN SODIUM PO      Take 8.5 mg by mouth every morning        * Notice:  This list has 4 medication(s) that are the same as other medications prescribed for you. Read the directions carefully, and ask your doctor or other care provider to review them with you.

## 2018-05-02 NOTE — PROGRESS NOTES
Reviewed with patient during his follow up visit. See clinic visit note for details and recommendations

## 2018-05-15 ENCOUNTER — EXTERNAL ORDER RESULTS (OUTPATIENT)
Dept: CARE COORDINATION | Facility: CLINIC | Age: 48
End: 2018-05-15

## 2018-05-15 ENCOUNTER — OFFICE VISIT (OUTPATIENT)
Dept: OPHTHALMOLOGY | Facility: CLINIC | Age: 48
End: 2018-05-15
Payer: MEDICAID

## 2018-05-15 DIAGNOSIS — H52.13 MYOPIA OF BOTH EYES: ICD-10-CM

## 2018-05-15 DIAGNOSIS — E11.9 TYPE 2 DIABETES MELLITUS WITHOUT RETINOPATHY (H): Primary | ICD-10-CM

## 2018-05-15 ASSESSMENT — TONOMETRY
OD_IOP_MMHG: 20
OS_IOP_MMHG: 15
IOP_METHOD: ICARE

## 2018-05-15 ASSESSMENT — VISUAL ACUITY
METHOD: SNELLEN - LINEAR
OD_CC: 20/30
CORRECTION_TYPE: GLASSES
OD_CC+: +
OS_CC: 20/40

## 2018-05-15 ASSESSMENT — REFRACTION_MANIFEST
OD_AXIS: 061
OD_SPHERE: -1.75
OS_SPHERE: -2.00
OS_CYLINDER: +0.75
OS_ADD: +2.00
OD_ADD: +2.00
OD_CYLINDER: +0.25
OS_AXIS: 030

## 2018-05-15 ASSESSMENT — REFRACTION
OS_AXIS: 045
OS_SPHERE: -1.75
OS_CYLINDER: +0.75
OS_SPHERE: -1.50
OS_CYLINDER: +0.50
OS_AXIS: 170
OD_SPHERE: -1.75
OD_AXIS: 015
OD_CYLINDER: +1.00

## 2018-05-15 ASSESSMENT — EXTERNAL EXAM - RIGHT EYE: OD_EXAM: NORMAL

## 2018-05-15 ASSESSMENT — CONF VISUAL FIELD
OD_NORMAL: 1
OS_NORMAL: 1
METHOD: COUNTING FINGERS

## 2018-05-15 ASSESSMENT — CUP TO DISC RATIO
OS_RATIO: 0.3
OD_RATIO: 0.4

## 2018-05-15 ASSESSMENT — EXTERNAL EXAM - LEFT EYE: OS_EXAM: NORMAL

## 2018-05-15 ASSESSMENT — SLIT LAMP EXAM - LIDS
COMMENTS: NORMAL
COMMENTS: NORMAL

## 2018-05-15 NOTE — MR AVS SNAPSHOT
After Visit Summary   5/15/2018    Konstantin Sharp    MRN: [id number]           Patient Information     Date Of Birth          1970        Visit Information        Provider Department      5/15/2018 10:20 AM Ricardo Pickard, SHEREE [facility]        Today's Diagnoses     Type 2 diabetes mellitus without retinopathy (H)    -  1    Myopia of both eyes           Follow-ups after your visit        Follow-up notes from your care team     Return in about 1 year (around 5/15/2019) for Comprehensive Eye Exam.      Your next 10 appointments already scheduled     May 29, 2018  4:30 PM CDT   (Arrive by 4:15 PM)   Implanted Defibulator with Uc Cv Device 1   [facility])    [address]  [address]  [facility] [address] [phone]            May 29, 2018  5:00 PM CDT   (Arrive by 4:45 PM)   Return Vascular Visit with Kenia Mcgovern MD   [facility] ([facility])    [address]  [address]  [place] [facility] [address] [phone]            Jul 30, 2018 10:00 AM CDT   (Arrive by 9:45 AM)   New Patient Visit with Aden Bhatia MD   [facility] ([facility])    [address]  [address]  [place] [address] [phone]              Who to contact     Please call your clinic at [phone] to:    Ask questions about your health    Make or cancel appointments    Discuss your medicines    Learn about your test results    Speak to your doctor            Additional Information About Your Visit        MyChart Information     [employer] gives you secure access to your electronic health record. If you see a primary care provider, you can also send messages to your care team and make appointments. If you have questions, please call your primary care clinic.  If you do not have a primary care provider, please call [phone] and they will  assist you.      [employer] is an electronic gateway that provides easy, online access to your medical records. With [employer], you can request a clinic appointment, read your test results, renew a prescription or communicate with your care team.     To access your existing account, please contact your [facility] or call [phone] for assistance.        Care EveryWhere ID     This is your Care EveryWhere ID. This could be used by other organizations to access your [facility] medical records  [id number]         Blood Pressure from Last 3 Encounters:   04/24/18 127/83   04/11/18 134/89   03/20/18 123/78    Weight from Last 3 Encounters:   04/24/18 115.3 kg (254 lb 4.8 oz)   04/11/18 114.5 kg (252 lb 6.4 oz)   03/20/18 120.5 kg (265 lb 11.2 oz)              We Performed the Following     REFRACTION [[address]]        Primary Care Provider Office Phone # Fax #    Damon Cooper [phone]       [facility] [address] [address]        Equal Access to Services     SOFIE BARBER : Hadii aad ku hadasho Soomaali, waaxda luqadaha, qaybta kaalmada adeegyada, gustavo perry . So [facility] [phone].    ATENCIÓN: Si habla español, tiene a dover disposición servicios gratuitos de asistencia lingüística. [facility] [phone].    We comply with applicable federal civil rights laws and [place] laws. We do not discriminate on the basis of race, color, national origin, age, disability, sex, sexual orientation, or gender identity.            Thank you!     Thank you for choosing [facility] OPHTHALMOLOGY  for your care. Our goal is always to provide you with excellent care. Hearing back from our patients is one way we can continue to improve our services. Please take a few minutes to complete the written survey that you may receive in the mail after your visit with us. Thank you!             Your Updated Medication List - Protect others around you: Learn  how to safely use, store and throw away your medicines at www.disposemymeds.org.          This list is accurate as of 5/15/18 11:52 AM.  Always use your most recent med list.                   Brand Name Dispense Instructions for use Diagnosis    * albuterol (2.5 MG/3ML) 0.083% neb solution      Take 1 vial by nebulization every 6 hours as needed for shortness of breath / dyspnea or wheezing        * VENTOLIN  (90 Base) MCG/ACT Inhaler   Generic drug:  albuterol      Inhale 2 puffs into the lungs every 4 hours as needed for shortness of breath / dyspnea or wheezing        amitriptyline 25 MG tablet    ELAVIL     Take 25 mg by mouth At Bedtime        bisacodyl 10 MG Suppository    DULCOLAX     Place 10 mg rectally daily as needed        EUCERIN INTENSIVE REPAIR EX      Externally apply topically 2 times daily as needed        glipiZIDE 5 MG tablet    GLUCOTROL    30 tablet    Take 1 tablet (5 mg) by mouth 2 times daily (before meals)    Diabetes mellitus (H)       lidocaine 5 % ointment    XYLOCAINE     Apply topically 3 times daily For feet        LIPITOR PO      Take 40 mg by mouth daily        losartan 25 MG tablet    COZAAR    30 tablet    Take 1 tablet (25 mg) by mouth daily    Hypertrophic cardiomyopathy (H)       LYRICA PO      Take 75 mg by mouth 2 times daily        magnesium oxide 400 MG tablet    MAG-OX    180 tablet    Take 4-6 tablets (1,600-2,400 mg) by mouth every other day    Drug-induced constipation       metFORMIN 500 MG tablet    GLUCOPHAGE    60 tablet    Take 1,000 mg by mouth 2 times daily (with meals)    Diabetes mellitus (H)       metoprolol tartrate 50 MG tablet    LOPRESSOR    180 tablet    Take 100 mg by mouth 3 times daily    S/P ventricular septal myectomy, Congestive heart failure, unspecified, Hypertrophic cardiomyopathy (H)       senna-docusate 8.6-50 MG per tablet    SENOKOT-S;PERICOLACE     Take 1 tablet by mouth daily        * SEROQUEL PO      Take 25 mg by mouth 2 times  daily        * QUEtiapine 150 MG Tb24 24 hr tablet    SEROquel XR     Take 150 mg by mouth At Bedtime        sildenafil 20 MG tablet    REVATIO    90 tablet    Take 0.5 tablets (10 mg) by mouth 3 times daily    Critical lower limb ischemia       TRAZODONE HCL PO      Take 100 mg by mouth At Bedtime        venlafaxine 150 MG 24 hr capsule    EFFEXOR-XR    30 capsule    Take 150 mg by mouth 2 times daily    Adjustment disorder with depressed mood       WARFARIN SODIUM PO      Take 8.5 mg by mouth every morning        * Notice:  This list has 4 medication(s) that are the same as other medications prescribed for you. Read the directions carefully, and ask your doctor or other care provider to review them with you.

## 2018-05-15 NOTE — PROGRESS NOTES
Assessment/Plan  (E11.9) Type 2 diabetes mellitus without retinopathy (H)  (primary encounter diagnosis)  Comment: No retinopathy OU  Plan:  Educated patient on clinical findings and the importance of continued management with primary care physician. Continue management as directed and return to clinic in 1 year for dilated exam, or sooner, as needed.    (H52.13) Myopia of both eyes  Comment: Myopia OU, causing monocular diplopia OU  Plan: REFRACTION [[id number]]         Dispensed spectacle prescription for full time wear. Educated patient on possibility of adaptation period, if symptoms do not improve return to clinic for further testing.    Return to clinic in 1 year for comprehensive eye exam.    Complete documentation of historical and exam elements from today's encounter can  be found in the full encounter summary report (not reduplicated in this progress  note). I personally obtained the chief complaint(s) and history of present illness. I  confirmed and edited as necessary the review of systems, past medical/surgical  history, family history, social history, and examination findings as documented by  others; and I examined the patient myself. I personally reviewed the relevant tests,  images, and reports as documented above. I formulated and edited as necessary the  assessment and plan and discussed the findings and management plan with the  patient and family.    Ricardo Pickard, OD, FAAO

## 2018-05-15 NOTE — LETTER
May 15, 2018          Your patient, Konstantin Sharp, was examined in the Ophthalmology Clinic today for a diabetic evaluation.     Examination of the right eye today shows: No diabetic retinopathy.    Examination of the left eye today shows: No diabetic retinopathy.    The condition of the eyes today is: stable.    I recommend follow-up examination in: 1 year.    I have encouraged the patient to continue working with you to maintain tight control of blood glucose and blood pressure.   Thank you for allowing us to participate in the care of this patient.     Sincerely,    Ricardo Pickard OD, FAAO

## 2018-05-15 NOTE — NURSING NOTE
Chief Complaints and History of Present Illnesses   Patient presents with     Diabetic Eye Exam     HPI    Affected eye(s):  Both   Symptoms:     Blurred vision   Double vision      Duration:  1 year   Frequency:  Constant       Do you have eye pain now?:  No      Comments:  Seeing double all the time. Glasses don't stay on. No doubling with glasses on. Patient denies eye pain or irritation. Does not use any eye drops.     this AM (fluctuated a lot)  Lab Results       Component                Value               Date                       A1C                      8.1                 01/23/2018                 A1C                      6.3                 03/27/2017                 A1C                      7.2                 01/18/2015                 A1C                      6.5                 07/23/2014                 A1C                      6.5                 04/26/2014            Julissa Rice COT 10:11 AM May 15, 2018

## 2018-05-17 ENCOUNTER — COMMUNICATION - HEALTHEAST (OUTPATIENT)
Dept: NEUROLOGY | Facility: CLINIC | Age: 48
End: 2018-05-17

## 2018-05-17 DIAGNOSIS — F33.1 MDD (MAJOR DEPRESSIVE DISORDER), RECURRENT EPISODE, MODERATE (H): ICD-10-CM

## 2018-05-30 ENCOUNTER — HOSPITAL ENCOUNTER (EMERGENCY)
Facility: CLINIC | Age: 48
Discharge: HOME | End: 2018-05-30
Attending: EMERGENCY MEDICINE | Admitting: EMERGENCY MEDICINE
Payer: MEDICAID

## 2018-05-30 VITALS
DIASTOLIC BLOOD PRESSURE: 78 MMHG | HEART RATE: 82 BPM | WEIGHT: 265 LBS | HEIGHT: 71 IN | TEMPERATURE: 98.4 F | SYSTOLIC BLOOD PRESSURE: 115 MMHG | OXYGEN SATURATION: 98 % | RESPIRATION RATE: 20 BRPM | BODY MASS INDEX: 37.1 KG/M2

## 2018-05-30 DIAGNOSIS — G62.9 PERIPHERAL POLYNEUROPATHY: ICD-10-CM

## 2018-05-30 PROCEDURE — 99283 EMERGENCY DEPT VISIT LOW MDM: CPT

## 2018-05-30 PROCEDURE — 99284 EMERGENCY DEPT VISIT MOD MDM: CPT | Mod: Z6 | Performed by: EMERGENCY MEDICINE

## 2018-05-30 PROCEDURE — 25000132 ZZH RX MED GY IP 250 OP 250 PS 637: Performed by: EMERGENCY MEDICINE

## 2018-05-30 RX ORDER — TRAMADOL HYDROCHLORIDE 50 MG/1
100 TABLET ORAL ONCE
Status: COMPLETED | OUTPATIENT
Start: 2018-05-30 | End: 2018-05-30

## 2018-05-30 RX ORDER — IBUPROFEN 800 MG/1
800 TABLET, FILM COATED ORAL ONCE
Status: COMPLETED | OUTPATIENT
Start: 2018-05-30 | End: 2018-05-30

## 2018-05-30 RX ORDER — ACETAMINOPHEN 500 MG
1000 TABLET ORAL ONCE
Status: COMPLETED | OUTPATIENT
Start: 2018-05-30 | End: 2018-05-30

## 2018-05-30 RX ADMIN — ACETAMINOPHEN 1000 MG: 500 TABLET, FILM COATED ORAL at 12:46

## 2018-05-30 RX ADMIN — IBUPROFEN 800 MG: 800 TABLET, FILM COATED ORAL at 12:46

## 2018-05-30 RX ADMIN — TRAMADOL HYDROCHLORIDE 100 MG: 50 TABLET, COATED ORAL at 12:46

## 2018-05-30 NOTE — ED AVS SNAPSHOT
[facility] [place], Emergency Department    [address] [place] [address]    Phone:  [phone]                                       Konstantin Sharp   MRN: [id number]    Department:  [facility], Emergency Department   Date of Visit:  5/30/2018           After Visit Summary Signature Page     I have received my discharge instructions, and my questions have been answered. I have discussed any challenges I see with this plan with the nurse or doctor.    ..........................................................................................................................................  Patient/Patient Representative Signature      ..........................................................................................................................................  Patient Representative Print Name and Relationship to Patient    ..................................................               ................................................  Date                                            Time    ..........................................................................................................................................  Reviewed by Signature/Title    ...................................................              ..............................................  Date                                                            Time

## 2018-05-30 NOTE — ED PROVIDER NOTES
History     Chief Complaint   Patient presents with     Foot Pain     HPI  Konstantin Sharp is a 48 year old male with a history of atrial fibrillation, hypertrophic nonobstructive cardiomyopathy s/p ventricular myectomy, MI s/p Dual ICD and pacemaker (04/29/14), TBI, HTN, asthma, PAD, seizures, DM2, GERD, and depression who presents with 2 weeks foot pain, most notably on the plantar aspect.    Per chart review, patient was seen and evaluated in the Neurology Clinic on 04/24/18 for follow up after EMG testing. EMG results did show evidence of polyneuropathy, so it was recommended patient follow up with a pain specialist for neuropathic pain management, and work towards better blood sugar control. Patient has been previously prescribed Lyrica, amitriptyline, Effexor, gabapentin, and lidocaine patches.  These were initially effective, but the patient now describes them to be no longer useful.    Patient had a primary care appointment today to address the pain scheduled for 4 PM which they initially canceled and came to the ED instead.    Otherwise he has been stable.  Denies fevers or chills chest pain shortness of breath.  His only concern today as his lower extremity foot/plantar pain.  He is accompanied by his family member/PCA.    I have reviewed the Medications, Allergies, Past Medical and Surgical History, and Social History in the [employer] system.  Past Medical History:   Diagnosis Date     Atrial fibrillation (H)      Chest pain     intermittent     Chronic pain      Cognitive disorder     memory loss     Depressive disorder      Dual ICD (implantable cardiac defibrillator) in place 04/29/2014    and pacemaker     GERD (gastroesophageal reflux disease)      H/O traumatic brain injury 2015     Heart attack 1996     HTN (hypertension)      Hypertrophic nonobstructive cardiomyopathy (H) 09/12/2012    s/p ventricular myectomy     Inflammatory arthritis      Intermittent asthma      PAD (peripheral artery disease) (H)   "    Polysubstance abuse      Seizures (H)     last episode 2014 when \"blood sugar dropped\" according to patient     Sleep apnea     doesn't use cpap     Type 2 diabetes mellitus without complications (H)        Past Surgical History:   Procedure Laterality Date     APPENDECTOMY  1980    [facility]     C CARDIAC SURG PROCEDURE UNLIST       C HAND/FINGER SURGERY UNLISTED       C SHOULDER SURG PROC UNLISTED       C STOMACH SURGERY PROCEDURE UNLISTED       COLONOSCOPY  2017     DISCECTOMY LUMBAR POSTERIOR MICROSCOPIC THREE+ LEVELS  12/16/2011    Procedure:DISCECTOMY LUMBAR POSTERIOR MICROSCOPIC THREE+ LEVELS; Posterior Decompression L2-S1; Surgeon:CAITIE FUNEZ; Location:[facility] OR     FUSION CERVICAL POSTERIOR ONE LEVEL  8/24/2012    Procedure: FUSION CERVICAL POSTERIOR ONE LEVEL;  Posterior Instrumentated Spinal Fusion Cervical 6-7, Right Iliac Crest Bone [place] ;  Surgeon: Caitie Funez MD;  [facility] OR     GRAFT BONE FROM ILIAC CREST  8/24/2012    Procedure: GRAFT BONE FROM ILIAC CREST;;  Surgeon: Caitie Funez MD;  Location[facility] OR     HC SACROPLASTY       HEAD & NECK SURGERY  2009     IMPLANT PACEMAKER       INJECT STEROID (LOCATION) N/A 2/19/2015    Procedure: INJECT STEROID (LOCATION);  Surgeon: Siri Koch MD;  [facility]     INSERT STIMULATOR AND LEADS INTERNAL DORSAL COLUMN  8/7/2013    Procedure: INSERT STIMULATOR AND LEADS INTERNAL DORSAL COLUMN;  SPINAL CORD STIMULATOR IMPLANT ;  Surgeon: Ricardo Bruno MD;  Location: [facility] OR     INSERT STIMULATOR DORSAL COLUMN  08/13/2013    lead replacemend, 12?2016,  battery replacement 4/4/2016     KNEE SURGERY       LASER CO2 LARYNGOSCOPY N/A 2/19/2015    Procedure: LASER CO2 LARYNGOSCOPY;  Surgeon: Siri Koch MD;  [facility]     LASER CO2 LARYNGOSCOPY, COMPLEX  7/22/2014    Procedure: LASER CO2 LARYNGOSCOPY, COMPLEX;  Surgeon: Siri Koch MD;  [facility]     " "MYECTOMY SEPTAL  4/14/2014    Procedure: Median Sternotomy, Septal Myectomy, Intraoperative BRENT performed by Dr. Castano, on pump oxygenator.;  Surgeon: Aguila Cannon MD;  [facility]     NECK SURGERY       SHOULDER SURGERY  2006    RIGHT     STOMACH SURGERY  1980    partial gastrectomy for bleeding ulcers, \"stress related\". [facility]     WRIST SURGERY Left 1988    fractured. 1988 or so       Family History   Problem Relation Age of Onset     HEART DISEASE Father 32     MIs x2; s/p CABG     Substance Abuse Father      Hypertension Father      Obesity Father      Hyperlipidemia Father      Hypertension Brother      DIABETES Brother      Glaucoma Maternal Grandmother      Hypertension Maternal Grandmother      DIABETES Maternal Grandfather      Glaucoma Maternal Grandfather      Hypertension Maternal Grandfather      CEREBROVASCULAR DISEASE Maternal Grandfather      Obesity Maternal Grandfather      Hyperlipidemia Maternal Grandfather      Coronary Artery Disease Maternal Grandfather      HEART DISEASE Maternal Grandfather      Substance Abuse Other      CANCER Other      Hypertension Other      Obesity Other      Other Cancer Other      all over     Hyperlipidemia Other      Coronary Artery Disease Other      Obesity Brother      Hyperlipidemia Brother      Lymphoma Maternal Uncle      DIABETES Brother      Depression Daughter      Depression Son      Macular Degeneration No family hx of        Social History   Substance Use Topics     Smoking status: Heavy Tobacco Smoker     Packs/day: 1.00     Years: 35.00     Types: Cigarettes     Start date: 3/8/1982     Last attempt to quit: 4/7/2014     Smokeless tobacco: Never Used     Alcohol use 0.0 oz/week     0 Standard drinks or equivalent per week      Comment: rare       Review of Systems   14 point review of symptoms was performed and is negative except as noted above.     Physical Exam   BP: 131/89  Pulse: 82  Temp: 98.4  F (36.9  C)  Resp: " "20  Height: 180.3 cm (5' 11\")  Weight: 120.2 kg (265 lb)  SpO2: 96 %      Physical Exam  GEN: Well appearing, non toxic, cooperative and conversant.   HEENT: The head is normocephalic and atraumatic. Pupils are equal round and reactive to light. Extraocular motions are intact. There is no facial swelling.   CV: Regular rate and rhythm.  PULM: Unlabored breathing, clear lungs.    EXT: Full range of motion.  No edema.  No area of redness or swelling.  1+ DP pulses bilaterally.  No thigh tenderness, or palpable cords  NEURO: Cranial nerves II through XII are intact and symmetric. Bilateral upper and lower extremities grossly show full range of motion without any focal deficits.  EHL, FHL, TA 5 out of 5 sensory intact light touch.  1+ DP pulses bilaterally.    PSYCH: Calm and cooperative, interactive.     ED Course     ED Course     Procedures             Labs Ordered and Resulted from Time of ED Arrival Up to the Time of Departure from the ED - No data to display         Assessments & Plan (with Medical Decision Making)   48-year-old male with multiple medical problems as noted above resenting to the ED with worsening foot pain.  Patient is very well-appearing he has no clinical evidence to suggest an acute emergent process such as fracture, cellulitis, DVT, vascular occlusion of arterial or venous origin, or other concerning findings.  This is most likely progression of his peripheral neuropathy.  He has a primary care appointment today, I asked the patient and PCA to call back immediately and make sure that the appointment would not be canceled which they were able to do.  They will follow-up with the primary doctor and further with neurology and pain management in order to discuss appropriate pain strategies.  Patient agreeable with this plan.  Given Tylenol, ibuprofen, and tramadol in the ED ×1 without prescriptions.    - Patient agrees to our plan and is ready and eager for discharge. Care plan, follow up plan, " and reasons to return immediately to the ED were dicussed in detail and summarized as noted in the discharge instructions.      I have reviewed the nursing notes.    I have reviewed the findings, diagnosis, plan and need for follow up with the patient.    Discharge Medication List as of 5/30/2018 12:43 PM          Final diagnoses:   Peripheral polyneuropathy       5/30/2018   [facility], [place], EMERGENCY DEPARTMENT     Amol Posada MD  05/30/18 9817

## 2018-05-30 NOTE — ED TRIAGE NOTES
Pt presents with c/o feet pain d/t diabetic neuropathy. He states his usual meds are not helping with his pain. He has an appt with his PCP later today but came to the ED instead because he couldn't wait until 4pm, the pain was too bad.

## 2018-05-30 NOTE — DISCHARGE INSTRUCTIONS
Please keep your appointment with your primary doctor later today.    There is no evidence of a new process such as infection, clot, trauma to suggest that your pain is from a new problem.    Return to the emergency department if you develop any weakness or numbness that is new, inability to walk or stand, difficulty moving your arms or legs, or any other concerns of worsening.  Follow all recommendations and medication adjustments as indicated following your primary care visit today.

## 2018-05-30 NOTE — ED AVS SNAPSHOT
[facility], Emergency Department    [address] [address]    Phone:  [phone]                                       Konstantin Sharp   MRN: [id number]    Department:  [facility], Emergency Department   Date of Visit:  5/30/2018           Patient Information     Date Of Birth          1970        Your diagnoses for this visit were:     Peripheral polyneuropathy        You were seen by Amol Posada MD.        Discharge Instructions       Please keep your appointment with your primary doctor later today.    There is no evidence of a new process such as infection, clot, trauma to suggest that your pain is from a new problem.    Return to the emergency department if you develop any weakness or numbness that is new, inability to walk or stand, difficulty moving your arms or legs, or any other concerns of worsening.  Follow all recommendations and medication adjustments as indicated following your primary care visit today.      Your next 10 appointments already scheduled     Jul 30, 2018 10:00 AM CDT   (Arrive by 9:45 AM)   New Patient Visit with Aden Bhatia MD   [facility] ([facility])    [address] [place] [id number] [phone]              24 Hour Appointment Hotline       To make an appointment at any [facility], call [phone] ([phone]). If you don't have a family doctor or clinic, we will help you find one. [facility] clinics are conveniently located to serve the needs of you and your family.             Review of your medicines      Our records show that you are taking the medicines listed below. If these are incorrect, please call your family doctor or clinic.        Dose / Directions Last dose taken    * albuterol (2.5 MG/3ML) 0.083% neb solution   Dose:  1 vial        Take 1 vial by nebulization every 6 hours as needed for shortness of breath / dyspnea or wheezing    Refills:  0        * VENTOLIN  (90 Base) MCG/ACT Inhaler   Dose:  2 puff   Generic drug:  albuterol        Inhale 2 puffs into the lungs every 4 hours as needed for shortness of breath / dyspnea or wheezing   Refills:  0        amitriptyline 25 MG tablet   Commonly known as:  ELAVIL   Dose:  25 mg        Take 25 mg by mouth At Bedtime   Refills:  0        bisacodyl 10 MG Suppository   Commonly known as:  DULCOLAX   Dose:  10 mg        Place 10 mg rectally daily as needed   Refills:  0        EUCERIN INTENSIVE REPAIR EX        Externally apply topically 2 times daily as needed   Refills:  0        glipiZIDE 5 MG tablet   Commonly known as:  GLUCOTROL   Dose:  5 mg   Quantity:  30 tablet        Take 1 tablet (5 mg) by mouth 2 times daily (before meals)   Refills:  0        lidocaine 5 % ointment   Commonly known as:  XYLOCAINE        Apply topically 3 times daily For feet   Refills:  0        LIPITOR PO   Dose:  40 mg        Take 40 mg by mouth daily   Refills:  0        losartan 25 MG tablet   Commonly known as:  COZAAR   Dose:  25 mg   Quantity:  30 tablet        Take 1 tablet (25 mg) by mouth daily   Refills:  0        LYRICA PO   Dose:  75 mg        Take 75 mg by mouth 2 times daily   Refills:  0        magnesium oxide 400 MG tablet   Commonly known as:  MAG-OX   Dose:  5551-6830 mg   Quantity:  180 tablet        Take 4-6 tablets (1,600-2,400 mg) by mouth every other day   Refills:  3        metFORMIN 500 MG tablet   Commonly known as:  GLUCOPHAGE   Dose:  1000 mg   Quantity:  60 tablet        Take 1,000 mg by mouth 2 times daily (with meals)   Refills:  0        metoprolol tartrate 50 MG tablet   Commonly known as:  LOPRESSOR   Dose:  100 mg   Quantity:  180 tablet        Take 100 mg by mouth 3 times daily   Refills:  4        senna-docusate 8.6-50 MG per tablet   Commonly known as:  SENOKOT-S;PERICOLACE   Dose:  1 tablet        Take 1 tablet by mouth daily   Refills:  0        * SEROQUEL PO   Dose:  25  mg   Indication:  breakfast and lunch        Take 25 mg by mouth 2 times daily   Refills:  0        * QUEtiapine 150 MG Tb24 24 hr tablet   Commonly known as:  SEROquel XR   Dose:  150 mg        Take 150 mg by mouth At Bedtime   Refills:  0        sildenafil 20 MG tablet   Commonly known as:  REVATIO   Dose:  10 mg   Quantity:  90 tablet        Take 0.5 tablets (10 mg) by mouth 3 times daily   Refills:  3        TRAZODONE HCL PO   Dose:  100 mg        Take 100 mg by mouth At Bedtime   Refills:  0        venlafaxine 150 MG 24 hr capsule   Commonly known as:  EFFEXOR-XR   Dose:  150 mg   Quantity:  30 capsule        Take 150 mg by mouth 2 times daily   Refills:  1        WARFARIN SODIUM PO   Dose:  8.5 mg        Take 8.5 mg by mouth every morning   Refills:  0        * Notice:  This list has 4 medication(s) that are the same as other medications prescribed for you. Read the directions carefully, and ask your doctor or other care provider to review them with you.            Orders Needing Specimen Collection     None      Pending Results     No orders found from 5/28/2018 to 5/31/2018.            Pending Culture Results     No orders found from 5/28/2018 to 5/31/2018.            Pending Results Instructions     If you had any lab results that were not finalized at the time of your Discharge, you can call the ED Lab Result RN at [phone]. You will be contacted by this team for any positive Lab results or changes in treatment. The nurses are available 7 days a week from 10A to 6:30P.  You can leave a message 24 hours per day and they will return your call.        Thank you for choosing [facility]       Thank you for choosing [facility] for your care. Our goal is always to provide you with excellent care. Hearing back from our patients is one way we can continue to improve our services. Please take a few minutes to complete the written survey that you may receive in the mail after you visit with us. Thank you!         [employer] Information     [employer] gives you secure access to your electronic health record. If you see a primary care provider, you can also send messages to your care team and make appointments. If you have questions, please call your primary care clinic.  If you do not have a primary care provider, please call [phone] and they will assist you.        Care EveryWhere ID     This is your Care EveryWhere ID. This could be used by other organizations to access your [place] medical records  [id number]        Equal Access to Services     CAROLINE BARBER : Hadii migdalia duttono Sodaniela, waaxda luqadaha, qaybta kaalmada annabel, gustavo kennedy. So [facility] [phone].    ATENCIÓN: Si habla español, tiene a dover disposición servicios gratuitos de asistencia lingüística. [facility] [phone].    We comply with applicable federal civil rights laws and [place] laws. We do not discriminate on the basis of race, color, national origin, age, disability, sex, sexual orientation, or gender identity.            After Visit Summary       This is your record. Keep this with you and show to your community pharmacist(s) and doctor(s) at your next visit.

## 2018-06-12 ENCOUNTER — ALLIED HEALTH/NURSE VISIT (OUTPATIENT)
Dept: CARDIOLOGY | Facility: CLINIC | Age: 48
End: 2018-06-12
Attending: INTERNAL MEDICINE
Payer: MEDICAID

## 2018-06-12 DIAGNOSIS — I42.2 HYPERTROPHIC NONOBSTRUCTIVE CARDIOMYOPATHY (H): Primary | ICD-10-CM

## 2018-06-12 PROCEDURE — 93295 DEV INTERROG REMOTE 1/2/MLT: CPT | Performed by: INTERNAL MEDICINE

## 2018-06-12 PROCEDURE — 93296 REM INTERROG EVL PM/IDS: CPT | Mod: ZF

## 2018-06-12 NOTE — MR AVS SNAPSHOT
After Visit Summary   6/12/2018    Konstantin Sharp    MRN: [id number]           Patient Information     Date Of Birth          1970        Visit Information        Provider Department      6/12/2018 6:00 AM UC ICD REMOTE [facility]        Today's Diagnoses     Hypertrophic nonobstructive cardiomyopathy (H)    -  1       Follow-ups after your visit        Your next 10 appointments already scheduled     Jul 30, 2018 10:00 AM CDT   (Arrive by 9:45 AM)   New Patient Visit with Richardson Novoa MD   [facility] ([facility])    [address]  [address]th [address]  [place] [id number] [phone]            Aug 09, 2018  2:30 PM CDT   (Arrive by 2:15 PM)   Implanted Defibulator with Uc Cv Device 1   [facility] ([facility])    [address]  [address]  [place] [id number] [phone]              Who to contact     If you have questions or need follow up information about today's clinic visit or your schedule please contact [facility] directly at [phone].  Normal or non-critical lab and imaging results will be communicated to you by [employer], letter or phone within 4 business days after the clinic has received the results. If you do not hear from us within 7 days, please contact the clinic through [employer] or phone. If you have a critical or abnormal lab result, we will notify you by phone as soon as possible.  Submit refill requests through [employer] or call your pharmacy and they will forward the refill request to us. Please allow 3 business days for your refill to be completed.          Additional Information About Your Visit        [employer] Information     [employer] gives you secure access to your electronic health record. If you see a primary care provider, you can also send messages to your care team and make appointments. If you have questions, please call your primary care  clinic.  If you do not have a primary care provider, please call [phone] and they will assist you.        Care EveryWhere ID     This is your Care EveryWhere ID. This could be used by other organizations to access your [place] medical records  [id number]         Blood Pressure from Last 3 Encounters:   05/30/18 115/78   04/24/18 127/83   04/11/18 134/89    Weight from Last 3 Encounters:   05/30/18 120.2 kg (265 lb)   04/24/18 115.3 kg (254 lb 4.8 oz)   04/11/18 114.5 kg (252 lb 6.4 oz)              We Performed the Following     (88350)INTERROGATION DEVICE EVAL REMOTE, PACER/ICD        Primary Care Provider Office Phone # Fax #    Damon Cooper [phone]       [facility] [address] [address]        Equal Access to Services     CAROLINE BARBER : Hadii aad ku hadasho Soomaali, waaxda luqadaha, qaybta kaalmada adeegyada, waxay idiin hayaan betzaida perry . So [facility] [phone].    ATENCIÓN: Si habla español, tiene a dover disposición servicios gratuitos de asistencia lingüística. Kemar al [phone].    We comply with applicable federal civil rights laws and [place] laws. We do not discriminate on the basis of race, color, national origin, age, disability, sex, sexual orientation, or gender identity.            Thank you!     Thank you for choosing [facility]  for your care. Our goal is always to provide you with excellent care. Hearing back from our patients is one way we can continue to improve our services. Please take a few minutes to complete the written survey that you may receive in the mail after your visit with us. Thank you!             Your Updated Medication List - Protect others around you: Learn how to safely use, store and throw away your medicines at www.disposemymeds.org.          This list is accurate as of 6/12/18 11:59 PM.  Always use your most recent med list.                   Brand Name Dispense Instructions for use Diagnosis    *  albuterol (2.5 MG/3ML) 0.083% neb solution      Take 1 vial by nebulization every 6 hours as needed for shortness of breath / dyspnea or wheezing        * VENTOLIN  (90 Base) MCG/ACT Inhaler   Generic drug:  albuterol      Inhale 2 puffs into the lungs every 4 hours as needed for shortness of breath / dyspnea or wheezing        amitriptyline 25 MG tablet    ELAVIL     Take 25 mg by mouth At Bedtime        bisacodyl 10 MG Suppository    DULCOLAX     Place 10 mg rectally daily as needed        EUCERIN INTENSIVE REPAIR EX      Externally apply topically 2 times daily as needed        glipiZIDE 5 MG tablet    GLUCOTROL    30 tablet    Take 1 tablet (5 mg) by mouth 2 times daily (before meals)    Diabetes mellitus (H)       lidocaine 5 % ointment    XYLOCAINE     Apply topically 3 times daily For feet        LIPITOR PO      Take 40 mg by mouth daily        losartan 25 MG tablet    COZAAR    30 tablet    Take 1 tablet (25 mg) by mouth daily    Hypertrophic cardiomyopathy (H)       LYRICA PO      Take 75 mg by mouth 2 times daily        magnesium oxide 400 MG tablet    MAG-OX    180 tablet    Take 4-6 tablets (1,600-2,400 mg) by mouth every other day    Drug-induced constipation       metFORMIN 500 MG tablet    GLUCOPHAGE    60 tablet    Take 1,000 mg by mouth 2 times daily (with meals)    Diabetes mellitus (H)       metoprolol tartrate 50 MG tablet    LOPRESSOR    180 tablet    Take 100 mg by mouth 3 times daily    S/P ventricular septal myectomy, Congestive heart failure, unspecified, Hypertrophic cardiomyopathy (H)       senna-docusate 8.6-50 MG per tablet    SENOKOT-S;PERICOLACE     Take 1 tablet by mouth daily        * SEROQUEL PO      Take 25 mg by mouth 2 times daily        * QUEtiapine 150 MG Tb24 24 hr tablet    SEROquel XR     Take 150 mg by mouth At Bedtime        sildenafil 20 MG tablet    REVATIO    90 tablet    Take 0.5 tablets (10 mg) by mouth 3 times daily    Critical lower limb ischemia        TRAZODONE HCL PO      Take 100 mg by mouth At Bedtime        venlafaxine 150 MG 24 hr capsule    EFFEXOR-XR    30 capsule    Take 150 mg by mouth 2 times daily    Adjustment disorder with depressed mood       WARFARIN SODIUM PO      Take 8.5 mg by mouth every morning        * Notice:  This list has 4 medication(s) that are the same as other medications prescribed for you. Read the directions carefully, and ask your doctor or other care provider to review them with you.

## 2018-06-27 ENCOUNTER — COMMUNICATION - HEALTHEAST (OUTPATIENT)
Dept: NEUROLOGY | Facility: CLINIC | Age: 48
End: 2018-06-27

## 2018-06-27 DIAGNOSIS — F33.1 MDD (MAJOR DEPRESSIVE DISORDER), RECURRENT EPISODE, MODERATE (H): ICD-10-CM

## 2018-06-29 NOTE — PROGRESS NOTES
Preliminary Device Interrogation Results.  Final physician signed paceart report to be scanned and attached.    Remote ICD transmission received and reviewed.  Device transmission sent per MD orders.  Patient has a [employer]tronic dual lead ICD.  Normal ICD function.  1 AT/AF episode recorded.  Time in AT/AF =1 second.  Presenting EGM = AS/ @ 84 bpm.  AP = 8%.   = 100%.  OptiVol fluid index is slightly elevated above baseline.  Estimated battery longevity to DA = 3.8 years.  No short v-v intervals recorded.  RV lead impedance trends appear stable.  Patient notified of interrogation results, via voicemail.  Plan for patient to send a remote check in 3 months as scheduled.    Remote ICD transmission

## 2018-07-27 ENCOUNTER — COMMUNICATION - HEALTHEAST (OUTPATIENT)
Dept: NEUROLOGY | Facility: CLINIC | Age: 48
End: 2018-07-27

## 2018-07-27 DIAGNOSIS — F33.1 MDD (MAJOR DEPRESSIVE DISORDER), RECURRENT EPISODE, MODERATE (H): ICD-10-CM

## 2018-07-30 ENCOUNTER — OFFICE VISIT (OUTPATIENT)
Dept: GASTROENTEROLOGY | Facility: CLINIC | Age: 48
End: 2018-07-30
Payer: MEDICAID

## 2018-07-30 VITALS
BODY MASS INDEX: 35.56 KG/M2 | OXYGEN SATURATION: 95 % | HEIGHT: 71 IN | WEIGHT: 254 LBS | TEMPERATURE: 98.7 F | DIASTOLIC BLOOD PRESSURE: 75 MMHG | SYSTOLIC BLOOD PRESSURE: 121 MMHG | HEART RATE: 85 BPM

## 2018-07-30 DIAGNOSIS — K59.00 CONSTIPATION, UNSPECIFIED CONSTIPATION TYPE: Primary | ICD-10-CM

## 2018-07-30 ASSESSMENT — ENCOUNTER SYMPTOMS
NAUSEA: 0
HYPOTENSION: 0
TREMORS: 0
WEAKNESS: 1
MYALGIAS: 0
PANIC: 0
RECTAL PAIN: 0
INSOMNIA: 1
TINGLING: 1
MUSCLE CRAMPS: 0
VOMITING: 0
DISTURBANCES IN COORDINATION: 0
SYNCOPE: 0
PALPITATIONS: 0
SLEEP DISTURBANCES DUE TO BREATHING: 0
LEG PAIN: 1
HYPERTENSION: 1
STIFFNESS: 0
PARALYSIS: 0
EXERCISE INTOLERANCE: 1
LIGHT-HEADEDNESS: 0
NECK PAIN: 1
DIARRHEA: 0
HEARTBURN: 0
ORTHOPNEA: 0
MEMORY LOSS: 1
LOSS OF CONSCIOUSNESS: 0
JAUNDICE: 0
MUSCLE WEAKNESS: 1
ARTHRALGIAS: 0
HEADACHES: 0
DECREASED CONCENTRATION: 0
DEPRESSION: 1
BLOATING: 1
SEIZURES: 0
NERVOUS/ANXIOUS: 0
BOWEL INCONTINENCE: 0
ABDOMINAL PAIN: 1
CONSTIPATION: 1
BLOOD IN STOOL: 0
BACK PAIN: 1
JOINT SWELLING: 0
SPEECH CHANGE: 0
DIZZINESS: 0
NUMBNESS: 1

## 2018-07-30 ASSESSMENT — PAIN SCALES - GENERAL: PAINLEVEL: NO PAIN (0)

## 2018-07-30 NOTE — NURSING NOTE
"No chief complaint on file.      Vitals:    07/30/18 0941   BP: 121/75   Pulse: 85   Temp: 98.7  F (37.1  C)   TempSrc: Oral   SpO2: 95%   Weight: 254 lb   Height: 5' 11\"       Body mass index is 35.43 kg/(m^2).      Mickey Arrieta, EMT                      "

## 2018-07-30 NOTE — PATIENT INSTRUCTIONS
It was a pleasure taking care of you today.  I've included a brief summary of our discussion and care plan from today's visit below.  Please review this information with your primary care provider.  ______________________________________________________________________    My recommendations are summarized as follows:    -- increase magnesium to up to 1000 mg a day  -- glycerin suppository every other day  -- bisacodyl suppository as needed  -- continue Senna   -- continue drinking water and Gatorade   -- we will refer you to the pelvic floor clinic to evaluate muscles needed for bowel movements, they will call you   -- continue to try and exercise as much as possible   -- continue to monitor your diabetes as best as possible, follow-up at [facility] as they recommend     Return to GI Clinic in 3 months with Leidy Richardson PA-C  to review your progress.    ______________________________________________________________________    Who do I call with any questions after my visit?  Please be in touch if there are any further questions that arise following today's visit.  There are multiple ways to contact your gastroenterology care team.        During business hours, you may reach a Gastroenterology nurse at [phone], option 3.       To schedule or reschedule an appointment, please call [phone].       You can always send a secure message through Errplane.  Errplane messages are answered by your nurse or doctor typically within 24 hours.  Please allow extra time on weekends and holidays.        For urgent/emergent questions after business hours, you may reach the on-call GI Fellow by contacting the [facility] at [phone].     How will I get the results of any tests ordered?    You will receive all of your results.  If you have signed up for Errplane, any tests ordered at your visit will be available to you after your physician reviews them.  Typically this takes 1-2 weeks.  If there are  urgent results that require a change in your care plan, your physician or nurse will call you to discuss the next steps.      What is EMUZEhart?  Fortressware is a secure way for you to access all of your healthcare records from the [facility].  It is a web based computer program, so you can sign on to it from any location.  It also allows you to send secure messages to your care team.  I recommend signing up for Fortressware access if you have not already done so and are comfortable with using a computer.      How to I schedule a follow-up visit?  If you did not schedule a follow-up visit today, please call [phone] to schedule a follow-up office visit.        Sincerely,    Leidy Richardson PA-C  Division of Gastroenterology, Hepatology & Nutrition  [facility]

## 2018-07-30 NOTE — MR AVS SNAPSHOT
After Visit Summary   7/30/2018    Konstantin Sharp    MRN: [id number]           Patient Information     Date Of Birth          1970        Visit Information        Provider Department      7/30/2018 10:00 AM Richardson Novoa MD [facility]        Today's Diagnoses     Constipation, unspecified constipation type    -  1      Care Instructions    It was a pleasure taking care of you today.  I've included a brief summary of our discussion and care plan from today's visit below.  Please review this information with your primary care provider.  ______________________________________________________________________    My recommendations are summarized as follows:    -- increase magnesium to up to 1000 mg a day  -- glycerin suppository every other day  -- bisacodyl suppository as needed  -- continue Senna   -- continue drinking water and Gatorade   -- we will refer you to the pelvic floor clinic to evaluate muscles needed for bowel movements, they will call you   -- continue to try and exercise as much as possible   -- continue to monitor your diabetes as best as possible, follow-up at [facility] as they recommend     Return to GI Clinic in 3 months with Leidy Richardson PA-C  to review your progress.    ______________________________________________________________________    Who do I call with any questions after my visit?  Please be in touch if there are any further questions that arise following today's visit.  There are multiple ways to contact your gastroenterology care team.        During business hours, you may reach a Gastroenterology nurse at [phone], option 3.       To schedule or reschedule an appointment, please call [phone].       You can always send a secure message through ScoreStream.  ScoreStream messages are answered by your nurse or doctor typically within 24 hours.  Please allow extra time on weekends and holidays.        For urgent/emergent  questions after business hours, you may reach the on-call GI Fellow by contacting the [facility]  at [phone].     How will I get the results of any tests ordered?    You will receive all of your results.  If you have signed up for Saygushart, any tests ordered at your visit will be available to you after your physician reviews them.  Typically this takes 1-2 weeks.  If there are urgent results that require a change in your care plan, your physician or nurse will call you to discuss the next steps.      What is Swrvet?  Routeware is a secure way for you to access all of your healthcare records from the [facility].  It is a web based computer program, so you can sign on to it from any location.  It also allows you to send secure messages to your care team.  I recommend signing up for Routeware access if you have not already done so and are comfortable with using a computer.      How to I schedule a follow-up visit?  If you did not schedule a follow-up visit today, please call [phone] to schedule a follow-up office visit.        Sincerely,    Leidy Richardson PA-C  Division of Gastroenterology, Hepatology & Nutrition  [facility]              Follow-ups after your visit        Your next 10 appointments already scheduled     Aug 09, 2018  2:30 PM CDT   (Arrive by 2:15 PM)   Implanted Defibulator with Uc Cv Device [address] [place] [address] ([facility])    [address] [place] [address]  [address] [place] [address] [phone]              Who to contact     Please call your clinic at [phone] to:    Ask questions about your health    Make or cancel appointments    Discuss your medicines    Learn about your test results    Speak to your doctor            Additional Information About Your Visit        MyChart Information     Routeware gives you secure access to your electronic health record. If you see a primary care provider, you can also send  "messages to your care team and make appointments. If you have questions, please call your primary care clinic.  If you do not have a primary care provider, please call [phone] and they will assist you.      Loom is an electronic gateway that provides easy, online access to your medical records. With Loom, you can request a clinic appointment, read your test results, renew a prescription or communicate with your care team.     To access your existing account, please contact your [facility] or call [phone] for assistance.        Care EveryWhere ID     This is your Care EveryWhere ID. This could be used by other organizations to access your [facility] medical records  [id number]        Your Vitals Were     Pulse Temperature Height Pulse Oximetry BMI (Body Mass Index)       85 98.7  F (37.1  C) (Oral) 1.803 m (5' 11\") 95% 35.43 kg/m2        Blood Pressure from Last 3 Encounters:   07/30/18 121/75   05/30/18 115/78   04/24/18 127/83    Weight from Last 3 Encounters:   07/30/18 115.2 kg (254 lb)   05/30/18 120.2 kg (265 lb)   04/24/18 115.3 kg (254 lb 4.8 oz)              Today, you had the following     No orders found for display         Today's Medication Changes          These changes are accurate as of 7/30/18 11:01 AM.  If you have any questions, ask your nurse or doctor.               Start taking these medicines.        Dose/Directions    glycerin (adult) 2 g Supp Suppository   Commonly known as:  [facility] GLYCERIN ADULT   Used for:  Constipation, unspecified constipation type   Started by:  Richardson Novoa MD        Dose:  1 suppository   Place 1 suppository rectally daily as needed for constipation   Quantity:  30 suppository   Refills:  1            Where to get your medicines      These medications were sent to [facility]/pharmacy #[id number] - [place], [place] - [address]   [address], [place] [address]     Phone:  [phone]     glycerin (adult) 2 g " Supp Suppository                Primary Care Provider Office Phone # Fax #    Damon Cooper [phone]       [facility] [address] [address]        Equal Access to Services     CAROLINE BARBER : Hadii migdalia ku hadibano Soomaali, waaxda luqadaha, qaybta kaalmada adeegyada, gustavo hernandez laGeovannidonna kennedy. So [facility] [phone].    ATENCIÓN: Si habla español, tiene a dover disposición servicios gratuitos de asistencia lingüística. [facility] [phone].    We comply with applicable federal civil rights laws and [place] laws. We do not discriminate on the basis of race, color, national origin, age, disability, sex, sexual orientation, or gender identity.            Thank you!     Thank you for choosing [facility]  for your care. Our goal is always to provide you with excellent care. Hearing back from our patients is one way we can continue to improve our services. Please take a few minutes to complete the written survey that you may receive in the mail after your visit with us. Thank you!             Your Updated Medication List - Protect others around you: Learn how to safely use, store and throw away your medicines at www.disposemymeds.org.          This list is accurate as of 7/30/18 11:01 AM.  Always use your most recent med list.                   Brand Name Dispense Instructions for use Diagnosis    * albuterol (2.5 MG/3ML) 0.083% neb solution      Take 1 vial by nebulization every 6 hours as needed for shortness of breath / dyspnea or wheezing        * VENTOLIN  (90 Base) MCG/ACT Inhaler   Generic drug:  albuterol      Inhale 2 puffs into the lungs every 4 hours as needed for shortness of breath / dyspnea or wheezing        amitriptyline 25 MG tablet    ELAVIL     Take 25 mg by mouth At Bedtime        bisacodyl 10 MG Suppository    DULCOLAX     Place 10 mg rectally daily as needed        EUCERIN INTENSIVE REPAIR EX      Externally apply  topically 2 times daily as needed        glipiZIDE 5 MG tablet    GLUCOTROL    30 tablet    Take 1 tablet (5 mg) by mouth 2 times daily (before meals)    Diabetes mellitus (H)       glycerin (adult) 2 g Supp Suppository    [facility]S GLYCERIN ADULT    30 suppository    Place 1 suppository rectally daily as needed for constipation    Constipation, unspecified constipation type       lidocaine 5 % ointment    XYLOCAINE     Apply topically 3 times daily For feet        LIPITOR PO      Take 40 mg by mouth daily        losartan 25 MG tablet    COZAAR    30 tablet    Take 1 tablet (25 mg) by mouth daily    Hypertrophic cardiomyopathy (H)       LYRICA PO      Take 75 mg by mouth 2 times daily        magnesium oxide 400 MG tablet    MAG-OX    180 tablet    Take 4-6 tablets (1,600-2,400 mg) by mouth every other day    Drug-induced constipation       metFORMIN 500 MG tablet    GLUCOPHAGE    60 tablet    Take 1,000 mg by mouth 2 times daily (with meals)    Diabetes mellitus (H)       metoprolol tartrate 50 MG tablet    LOPRESSOR    180 tablet    Take 100 mg by mouth 3 times daily    S/P ventricular septal myectomy, Congestive heart failure, unspecified, Hypertrophic cardiomyopathy (H)       senna-docusate 8.6-50 MG per tablet    SENOKOT-S;PERICOLACE     Take 1 tablet by mouth daily        * SEROQUEL PO      Take 25 mg by mouth 2 times daily        * QUEtiapine 150 MG Tb24 24 hr tablet    SEROquel XR     Take 150 mg by mouth At Bedtime        sildenafil 20 MG tablet    REVATIO    90 tablet    Take 0.5 tablets (10 mg) by mouth 3 times daily    Critical lower limb ischemia       TRAZODONE HCL PO      Take 100 mg by mouth At Bedtime        venlafaxine 150 MG 24 hr capsule    EFFEXOR-XR    30 capsule    Take 150 mg by mouth 2 times daily    Adjustment disorder with depressed mood       WARFARIN SODIUM PO      Take 8.5 mg by mouth every morning        * Notice:  This list has 4 medication(s) that are the same as other medications  prescribed for you. Read the directions carefully, and ask your doctor or other care provider to review them with you.

## 2018-07-30 NOTE — LETTER
2018       RE: Konstantin Sharp  [address] [place] [address]     Dear Colleague,    Thank you for referring your patient, Konstantin Sharp, to the [facility] at [facility]. Please see a copy of my visit note below.    GI CLINIC VISIT    CC/REFERRING MD:  Referred Self  REASON FOR CONSULTATION: chronic constipation    ASSESSMENT/PLAN:  Konstantin Sharp is a 48 year old male with a past medical history of DMII, TBI, a fib, stomach surgery for ulcers, CM with ICD placement and psychiatric conditions presenting to clinic today for follow-up for chronic constipation.     1.  Constipation:  Patient has long-standing history of constipation which has been refractory to multiple medical interventions.  Patient feels as though he is able to relax his muscles for a bowel movement, however given history he will be referred to pelvic floor center to rule out outlet obstruction as a source of constipation.  Patient believes that he is only taking 400 mg of magnesium oxide, may increase the dose up to half 1000 mg.  He can continue to use bisacodyl suppositories, and senna as needed.  He may also use glycerin suppositories every day or every other day to stimulate a bowel movement.  Polypharmacy may be contributing to constipation and he can discuss necessity of medications with primary care physician.  He was encouraged to continue to eat well, stay hydrated, and to exercise as much as possible.    2.  Rectal bleeding:  Patient's most recent colonoscopy was normal and did not show suspicious mass or lesion causing bleeding.  Likely hemorrhoidal or anal fissure due to history of severe constipation and large bowel movements that occur once a week.  Will continue to monitor symptoms and watch for improvement with adequate regulation of bowel movements.   Colorectal cancer screenin years     Recommendations:  -- increase magnesium to up to 1000 mg a  day  -- glycerin suppository every other day  -- bisacodyl suppository as needed  -- continue Senna   -- continue drinking water and Gatorade   -- we will refer you to the pelvic floor clinic to evaluate muscles needed for bowel movements, they will call you   -- continue to try and exercise as much as possible   -- continue to monitor your diabetes as best as possible, follow-up at [facility] as they recommend     RTC 3 months    Patient's history and plan was discussed in brief with Adolfo Nj MD.     Thank you for this consultation.  It was a pleasure to participate in the care of this patient; please contact us with any further questions.       Leidy Richardson PA-C  Division of Hepatology, Gastroenterology & Nutrition  [facility]    ATTENDING ATTESTATION:     DATE SEEN: 7/30/2018    Patient was discussed, seen, and examined by me, Adolfo Nj. The plan of care and pertinent data/imaging were reviewed with Leidy Richardson PA-C. Agree with the above assessment and plan.    Please contact me with any further questions.    Adolfo Nj MD    [facility]  Division of Gastroenterology, Hepatology and Nutrition              HPI  Konstantin hSarp is a 48 year old male with a past medical history of DMII, TBI, a fib, stomach surgery for ulcers, CM with ICD placement and psychiatric conditions presenting to clinic today for follow-up for chronic constipation.  The patient states that he has had issues of constipation for over 20 years.  He was previously followed by Britney Mcfadden NP who last saw the patient in February of this year.  In the past he reportedly has tried MiraLAX, lactulose, and Amitiza, and Linzess for his constipation which did not help significantly however patient is unable to give specific details for why these medications did not work and what symptoms that caused.  He has also take Metamucil in the past which has made him feel sick.  He was also  recommended to do dairy free trial which the patient tried for about a week and a half without any change in symptoms.  He has also tried to increase his consumption of vegetables but states that this is difficult due to his vitamin K restriction in the setting of anticoagulation therapy.  He has tried to increase his liquids especially with Gatorade and water which she states has been making him feel better.  He was also started on magnesium oxide and states that he has been taking this 400 mg a day.     He was initially having a couple bowel movements a week which is an improvement for him, but most recently he has been having a bowel movement once every 7-8 days.  He has lower abdominal pain and bloating in between bowel movements and will have rectal pain and significant straining associated with bowel movements.  He states that he has very large bowel movements and he does not feel like he is completely emptying.  The beginning of the bowel movement will start off as a dry [place] scale to bowel movement and it will become progressively looser.  He notes some bright red blood with bowel movements due to the sensation that he is tearing and this happens with about 50% of bowel movements.  He will sometimes have blood in the toilet bowl, but denies melena, nighttime symptoms, incontinence.  He has some associated urgency with bowel movements.The patient is currently taking the following medications for his bowel movements:  Sennacot- once a day  Bisacodyl suppository- a couple times a month when he feels like he needs to go but cant   Ex-lax- takes a couple of times a week   Magnesium: 400 mg daily    ROS:    No fevers or chills  No weight loss +intentional   No blurry vision, double vision or change in vision  No sore throat  No lymphadenopathy  No headache, paraesthesias, or weakness in a limb  + neuropathy   No shortness of breath or wheezing  +RAY   No chest pain or pressure  +RAY   No arthralgias or  myalgias  No rashes or skin changes  No odynophagia or dysphagia  No BRBPR, hematochezia, melena  No dysuria, frequency or urgency  + urgencey  No hot/cold intolerance or polyria  No anxiety or depression  + depression     PROBLEM LIST  Patient Active Problem List    Diagnosis Date Noted     Chronic pain due to trauma 09/03/2016     Priority: Medium     Overview:   Broken back/neck 1996/2007 respectively. Did have procedure for spine stimulator       Esophageal reflux 09/03/2016     Priority: Medium     Essential hypertension 09/03/2016     Priority: Medium     History of traumatic brain injury 09/03/2016     Priority: Medium     Large bowel obstruction 09/03/2016     Priority: Medium     S/P laminectomy 09/03/2016     Priority: Medium     Overview:   replacement of failed spinal cord stimulator & placement of epidural paddle electrode - 9/2/2016       Tobacco dependence 09/03/2016     Priority: Medium     Cognitive disorder 06/16/2016     Priority: Medium     Type 2 diabetes mellitus with diabetic neuropathy (H) 05/02/2016     Priority: Medium     Seizures (H) 02/01/2016     Priority: Medium     Respiratory failure (H) 10/19/2015     Priority: Medium     Insomnia 03/13/2015     Priority: Medium     [facility] 03/11/2015     Priority: Medium     For urgent after hours needs, please call [phone] and ask to speak with the Adult [facility] Physician on call - mention job code [phone].           Dysphonia 01/05/2015     Priority: Medium     Postprocedural subglottic stenosis 07/22/2014     Priority: Medium     Pre-operative cardiovascular examination 07/16/2014     Priority: Medium     Automatic implantable cardioverter-defibrillator in situ- [employer], dual chamber- DEPENDENT 04/30/2014     Priority: Medium     Implanted 4/29/14 by Dr. Lacy  Problem list name updated by automated process. Provider to review       S/P ventricular  "septal myectomy 04/14/2014     Priority: Medium     Syncope 12/28/2013     Priority: Medium     Atrial fibrillation (H) 05/15/2013     Priority: Medium     Asthma 01/24/2013     Priority: Medium     Depressive disorder 01/24/2013     Priority: Medium     Old myocardial infarction 01/24/2013     Priority: Medium     Osteoarthrosis 01/24/2013     Priority: Medium     Spinal stenosis, lumbar region, with neurogenic claudication 11/15/2012     Priority: Medium     Lumbar radicular pain 11/15/2012     Priority: Medium     s/p PSF C6-7 for anterior pseudarthrosis 10/11/2012     Priority: Medium     Hypertrophic nonobstructive cardiomyopathy (H) 09/12/2012     Priority: Medium     Chest pain 09/12/2012     Priority: Medium     Sinus tachycardia 09/12/2012     Priority: Medium     Pseudoarthrosis of cervical spine (H) 08/24/2012     Priority: Medium     Chondromalacia of patella 10/25/2011     Priority: Medium     Anticoagulant long-term use 02/18/2011     Priority: Medium     Apnea, sleep 02/18/2011     Priority: Medium     Cervical vertebral fusion 12/08/2010     Priority: Medium     Herniated cervical intervertebral disc 06/26/2008     Priority: Medium       PERTINENT PAST MEDICAL HISTORY:    Past Medical History:   Diagnosis Date     Atrial fibrillation (H)      Chest pain     intermittent     Chronic pain      Cognitive disorder     memory loss     Depressive disorder      Dual ICD (implantable cardiac defibrillator) in place 04/29/2014    and pacemaker     GERD (gastroesophageal reflux disease)      H/O traumatic brain injury 2015     Heart attack 1996     HTN (hypertension)      Hypertrophic nonobstructive cardiomyopathy (H) 09/12/2012    s/p ventricular myectomy     Inflammatory arthritis      Intermittent asthma      PAD (peripheral artery disease) (H)      Polysubstance abuse      Seizures (H)     last episode 2014 when \"blood sugar dropped\" according to patient     Sleep apnea     doesn't use cpap     Type 2 " diabetes mellitus without complications (H)        PREVIOUS SURGERIES:  Past Surgical History:   Procedure Laterality Date     APPENDECTOMY  1980    [facility]     C CARDIAC SURG PROCEDURE UNLIST       C HAND/FINGER SURGERY UNLISTED       C SHOULDER SURG PROC UNLISTED       C STOMACH SURGERY PROCEDURE UNLISTED       COLONOSCOPY  2017     DISCECTOMY LUMBAR POSTERIOR MICROSCOPIC THREE+ LEVELS  12/16/2011    Procedure:DISCECTOMY LUMBAR POSTERIOR MICROSCOPIC THREE+ LEVELS; Posterior Decompression L2-S1; Surgeon:CAITIE OSMAN; Location:[facility]     FUSION CERVICAL POSTERIOR ONE LEVEL  8/24/2012    Procedure: FUSION CERVICAL POSTERIOR ONE LEVEL;  Posterior Instrumentated Spinal Fusion Cervical 6-7, Right Iliac Crest Bone Carey ;  Surgeon: Caitie Osman MD;  [facility]: [facility]     GRAFT BONE FROM ILIAC CREST  8/24/2012    Procedure: GRAFT BONE FROM ILIAC CREST;;  Surgeon: Caitie Osman MD;  Location: [facility]     HC SACROPLASTY       HEAD & NECK SURGERY  2009     IMPLANT PACEMAKER       INJECT STEROID (LOCATION) N/A 2/19/2015    Procedure: INJECT STEROID (LOCATION);  Surgeon: Siri Koch MD;  [facility]: [facility]     INSERT STIMULATOR AND LEADS INTERNAL DORSAL COLUMN  8/7/2013    Procedure: INSERT STIMULATOR AND LEADS INTERNAL DORSAL COLUMN;  SPINAL CORD STIMULATOR IMPLANT ;  Surgeon: Ricardo Bruno MD;  Location: [facility] OR     INSERT STIMULATOR DORSAL COLUMN  08/13/2013    lead replacemend, 12?2016,  battery replacement 4/4/2016     KNEE SURGERY       LASER CO2 LARYNGOSCOPY N/A 2/19/2015    Procedure: LASER CO2 LARYNGOSCOPY;  Surgeon: Siri Koch MD;  [facility]     LASER CO2 LARYNGOSCOPY, COMPLEX  7/22/2014    Procedure: LASER CO2 LARYNGOSCOPY, COMPLEX;  Surgeon: Siri Koch MD;  [facility]     MYECTOMY SEPTAL  4/14/2014    Procedure: Median Sternotomy, Septal Myectomy, Intraoperative BRENT performed by Dr. Castano, on pump oxygenator.;   "Surgeon: Aguila Cannon MD;  Location[facility]     NECK SURGERY       SHOULDER SURGERY  2006    RIGHT     STOMACH SURGERY  1980    partial gastrectomy for bleeding ulcers, \"stress related\". [facility]     WRIST SURGERY Left 1988    fractured. 1988 or so       PREVIOUS ENDOSCOPY:  5/15/18  - The entire examined colon is normal on direct and  retroflexion views.    ALLERGIES:     Allergies   Allergen Reactions     Bee Venom Swelling     Lisinopril      TABS  Severe cough     Penicillins Hives and Difficulty breathing       PERTINENT MEDICATIONS:    Current Outpatient Prescriptions:      amitriptyline (ELAVIL) 25 MG tablet, Take 25 mg by mouth At Bedtime , Disp: , Rfl:      Atorvastatin Calcium (LIPITOR PO), Take 40 mg by mouth daily , Disp: , Rfl:      lidocaine (XYLOCAINE) 5 % ointment, Apply topically 3 times daily For feet, Disp: , Rfl:      losartan (COZAAR) 25 MG tablet, Take 1 tablet (25 mg) by mouth daily, Disp: 30 tablet, Rfl:      magnesium oxide (MAG-OX) 400 MG tablet, Take 4-6 tablets (1,600-2,400 mg) by mouth every other day, Disp: 180 tablet, Rfl: 3     metFORMIN (GLUCOPHAGE) 500 MG tablet, Take 1,000 mg by mouth 2 times daily (with meals) , Disp: 60 tablet, Rfl:      metoprolol (LOPRESSOR) 50 MG tablet, Take 100 mg by mouth 3 times daily , Disp: 180 tablet, Rfl: 4     Pregabalin (LYRICA PO), Take 75 mg by mouth 2 times daily , Disp: , Rfl:      QUEtiapine (SEROQUEL XR) 150 MG TB24 24 hr tablet, Take 150 mg by mouth At Bedtime, Disp: , Rfl:      QUEtiapine Fumarate (SEROQUEL PO), Take 25 mg by mouth 2 times daily, Disp: , Rfl:      senna-docusate (SENOKOT-S;PERICOLACE) 8.6-50 MG per tablet, Take 1 tablet by mouth daily, Disp: , Rfl:      TRAZODONE HCL PO, Take 100 mg by mouth At Bedtime, Disp: , Rfl:      venlafaxine (EFFEXOR-XR) 150 MG 24 hr capsule, Take 150 mg by mouth 2 times daily , Disp: 30 capsule, Rfl: 1     WARFARIN SODIUM PO, Take 8.5 mg by mouth every morning, Disp: , Rfl:      " albuterol (2.5 MG/3ML) 0.083% neb solution, Take 1 vial by nebulization every 6 hours as needed for shortness of breath / dyspnea or wheezing, Disp: , Rfl:      albuterol (VENTOLIN HFA) 108 (90 BASE) MCG/ACT Inhaler, Inhale 2 puffs into the lungs every 4 hours as needed for shortness of breath / dyspnea or wheezing, Disp: , Rfl:      bisacodyl (DULCOLAX) 10 MG Suppository, Place 10 mg rectally daily as needed , Disp: , Rfl:      Emollient (EUCERIN INTENSIVE REPAIR EX), Externally apply topically 2 times daily as needed, Disp: , Rfl:      glipiZIDE (GLUCOTROL) 5 MG tablet, Take 1 tablet (5 mg) by mouth 2 times daily (before meals), Disp: 30 tablet, Rfl:      sildenafil (REVATIO) 20 MG tablet, Take 0.5 tablets (10 mg) by mouth 3 times daily (Patient not taking: Reported on 7/30/2018), Disp: 90 tablet, Rfl: 3    SOCIAL HISTORY:  Rare alcohol  No drug use   Social History     Social History     Marital status: Single     Spouse name: N/A     Number of children: N/A     Years of education: N/A     Occupational History     Not on file.     Social History Main Topics     Smoking status: Light Tobacco Smoker     Packs/day: 0.12     Years: 35.00     Types: Cigarettes     Start date: 3/8/1982     Last attempt to quit: 4/7/2014     Smokeless tobacco: Never Used     Alcohol use 0.0 oz/week     0 Standard drinks or equivalent per week      Comment: rare     Drug use: No      Comment: last using meth 1/2017     Sexual activity: Not Currently     Partners: Female     Birth control/ protection: Male Surgical     Other Topics Concern     Parent/Sibling W/ Cabg, Mi Or Angioplasty Before 65f 55m? Yes     Social History Narrative       FAMILY HISTORY:  FH of CRC: none  FH of IBD: Grandmother possibly had colitis   Family History   Problem Relation Age of Onset     HEART DISEASE Father 32     MIs x2; s/p CABG     Substance Abuse Father      Hypertension Father      Obesity Father      Hyperlipidemia Father      Hypertension Brother       "Diabetes Brother      Glaucoma Maternal Grandmother      Hypertension Maternal Grandmother      Diabetes Maternal Grandfather      Glaucoma Maternal Grandfather      Hypertension Maternal Grandfather      Cerebrovascular Disease Maternal Grandfather      Obesity Maternal Grandfather      Hyperlipidemia Maternal Grandfather      Coronary Artery Disease Maternal Grandfather      HEART DISEASE Maternal Grandfather      Substance Abuse Other      Cancer Other      Hypertension Other      Obesity Other      Other Cancer Other      all over     Hyperlipidemia Other      Coronary Artery Disease Other      Obesity Brother      Hyperlipidemia Brother      Lymphoma Maternal Uncle      Diabetes Brother      Depression Daughter      Depression Son      Macular Degeneration No family hx of        Past/family/social history reviewed and no changes    PHYSICAL EXAMINATION:  Constitutional: aaox3, cooperative, pleasant, not dyspneic/diaphoretic, no acute distress  Vitals reviewed: /75  Pulse 85  Temp 98.7  F (37.1  C) (Oral)  Ht 1.803 m (5' 11\")  Wt 115.2 kg (254 lb)  SpO2 95%  BMI 35.43 kg/m2  Wt:   Wt Readings from Last 2 Encounters:   07/30/18 115.2 kg (254 lb)   05/30/18 120.2 kg (265 lb)      Eyes: Sclera anicteric/injected  Ears/nose/mouth/throat: Normal oropharynx without ulcers or exudate, mucus membranes moist, hearing intact  Neck: supple, thyroid normal size  CV: No edema  Respiratory: Unlabored breathing  Lymph: No axillary, submandibular, supraclavicular or inguinal lymphadenopathy  Abd:  Nondistended, +bs, no hepatosplenomegaly, nontender, no peritoneal signs  Skin: warm, perfused, no jaundice  Psych: Normal affect  MSK: Normal gait      PERTINENT STUDIES:    Orders Only on 01/23/2018   Component Date Value Ref Range Status     Potassium 01/23/2018 4.1  3.4 - 5.3 mmol/L Final     Hemoglobin A1C 01/23/2018 8.1* 4.3 - 6.0 % Final     Creatinine 01/23/2018 0.93  0.66 - 1.25 mg/dL Final     GFR Estimate " 01/23/2018 86  >60 mL/min/1.7m2 Final     GFR Estimate If Black 01/23/2018 >90  >60 mL/min/1.7m2 Final         Again, thank you for allowing me to participate in the care of your patient.      Sincerely,    Leidy Richardson PA-C

## 2018-07-30 NOTE — PROGRESS NOTES
GI CLINIC VISIT    CC/REFERRING MD:  Referred Self  REASON FOR CONSULTATION: chronic constipation    ASSESSMENT/PLAN:  Konstantin Sharp is a 48 year old male with a past medical history of DMII, TBI, a fib, stomach surgery for ulcers, CM with ICD placement and psychiatric conditions presenting to clinic today for follow-up for chronic constipation.     1.  Constipation:  Patient has long-standing history of constipation which has been refractory to multiple medical interventions.  Patient feels as though he is able to relax his muscles for a bowel movement, however given history he will be referred to pelvic floor center to rule out outlet obstruction as a source of constipation.  Patient believes that he is only taking 400 mg of magnesium oxide, may increase the dose up to half 1000 mg.  He can continue to use bisacodyl suppositories, and senna as needed.  He may also use glycerin suppositories every day or every other day to stimulate a bowel movement.  Polypharmacy may be contributing to constipation and he can discuss necessity of medications with primary care physician.  He was encouraged to continue to eat well, stay hydrated, and to exercise as much as possible.    2.  Rectal bleeding:  Patient's most recent colonoscopy was normal and did not show suspicious mass or lesion causing bleeding.  Likely hemorrhoidal or anal fissure due to history of severe constipation and large bowel movements that occur once a week.  Will continue to monitor symptoms and watch for improvement with adequate regulation of bowel movements.   Colorectal cancer screenin years     Recommendations:  -- increase magnesium to up to 1000 mg a day  -- glycerin suppository every other day  -- bisacodyl suppository as needed  -- continue Senna   -- continue drinking water and Gatorade   -- we will refer you to the pelvic floor clinic to evaluate muscles needed for bowel movements, they will call you   -- continue to try and exercise as much  as possible   -- continue to monitor your diabetes as best as possible, follow-up at [facility] as they recommend     RTC 3 months    Patient's history and plan was discussed in brief with Adolfo Nj MD.     Thank you for this consultation.  It was a pleasure to participate in the care of this patient; please contact us with any further questions.       Leidy Richardson PA-C  Division of Hepatology, Gastroenterology & Nutrition  [facility]    ATTENDING ATTESTATION:     DATE SEEN: 7/30/2018    Patient was discussed, seen, and examined by me, Adolfo Nj. The plan of care and pertinent data/imaging were reviewed with Leidy Richardson PA-C. Agree with the above assessment and plan.    Please contact me with any further questions.    Adolfo Nj MD    [facility]  Division of Gastroenterology, Hepatology and Nutrition              HPI  Konstantin Sharp is a 48 year old male with a past medical history of DMII, TBI, a fib, stomach surgery for ulcers, CM with ICD placement and psychiatric conditions presenting to clinic today for follow-up for chronic constipation.  The patient states that he has had issues of constipation for over 20 years.  He was previously followed by Britney Mcfadden NP who last saw the patient in February of this year.  In the past he reportedly has tried MiraLAX, lactulose, and Amitiza, and Linzess for his constipation which did not help significantly however patient is unable to give specific details for why these medications did not work and what symptoms that caused.  He has also take Metamucil in the past which has made him feel sick.  He was also recommended to do dairy free trial which the patient tried for about a week and a half without any change in symptoms.  He has also tried to increase his consumption of vegetables but states that this is difficult due to his vitamin K restriction in the setting of anticoagulation therapy.  He has  tried to increase his liquids especially with Gatorade and water which she states has been making him feel better.  He was also started on magnesium oxide and states that he has been taking this 400 mg a day.     He was initially having a couple bowel movements a week which is an improvement for him, but most recently he has been having a bowel movement once every 7-8 days.  He has lower abdominal pain and bloating in between bowel movements and will have rectal pain and significant straining associated with bowel movements.  He states that he has very large bowel movements and he does not feel like he is completely emptying.  The beginning of the bowel movement will start off as a dry Crocheron scale to bowel movement and it will become progressively looser.  He notes some bright red blood with bowel movements due to the sensation that he is tearing and this happens with about 50% of bowel movements.  He will sometimes have blood in the toilet bowl, but denies melena, nighttime symptoms, incontinence.  He has some associated urgency with bowel movements.The patient is currently taking the following medications for his bowel movements:  Sennacot- once a day  Bisacodyl suppository- a couple times a month when he feels like he needs to go but cant   Ex-lax- takes a couple of times a week   Magnesium: 400 mg daily    ROS:    No fevers or chills  No weight loss +intentional   No blurry vision, double vision or change in vision  No sore throat  No lymphadenopathy  No headache, paraesthesias, or weakness in a limb  + neuropathy   No shortness of breath or wheezing  +RAY   No chest pain or pressure  +RAY   No arthralgias or myalgias  No rashes or skin changes  No odynophagia or dysphagia  No BRBPR, hematochezia, melena  No dysuria, frequency or urgency  + urgencey  No hot/cold intolerance or polyria  No anxiety or depression  + depression     PROBLEM LIST  Patient Active Problem List    Diagnosis Date Noted     Chronic pain  due to trauma 09/03/2016     Priority: Medium     Overview:   Broken back/neck 1996/2007 respectively. Did have procedure for spine stimulator       Esophageal reflux 09/03/2016     Priority: Medium     Essential hypertension 09/03/2016     Priority: Medium     History of traumatic brain injury 09/03/2016     Priority: Medium     Large bowel obstruction 09/03/2016     Priority: Medium     S/P laminectomy 09/03/2016     Priority: Medium     Overview:   replacement of failed spinal cord stimulator & placement of epidural paddle electrode - 9/2/2016       Tobacco dependence 09/03/2016     Priority: Medium     Cognitive disorder 06/16/2016     Priority: Medium     Type 2 diabetes mellitus with diabetic neuropathy (H) 05/02/2016     Priority: Medium     Seizures (H) 02/01/2016     Priority: Medium     Respiratory failure (H) 10/19/2015     Priority: Medium     Insomnia 03/13/2015     Priority: Medium     [facility] 03/11/2015     Priority: Medium     For urgent after hours needs, please call [phone] and ask to speak with the Adult [facility] Physician on call - mention job code [phone].           Dysphonia 01/05/2015     Priority: Medium     Postprocedural subglottic stenosis 07/22/2014     Priority: Medium     Pre-operative cardiovascular examination 07/16/2014     Priority: Medium     Automatic implantable cardioverter-defibrillator in situ- Medtronic, dual chamber- DEPENDENT 04/30/2014     Priority: Medium     Implanted 4/29/14 by Dr. Lacy  Problem list name updated by automated process. Provider to review       S/P ventricular septal myectomy 04/14/2014     Priority: Medium     Syncope 12/28/2013     Priority: Medium     Atrial fibrillation (H) 05/15/2013     Priority: Medium     Asthma 01/24/2013     Priority: Medium     Depressive disorder 01/24/2013     Priority: Medium     Old myocardial infarction 01/24/2013     Priority:  "Medium     Osteoarthrosis 01/24/2013     Priority: Medium     Spinal stenosis, lumbar region, with neurogenic claudication 11/15/2012     Priority: Medium     Lumbar radicular pain 11/15/2012     Priority: Medium     s/p PSF C6-7 for anterior pseudarthrosis 10/11/2012     Priority: Medium     Hypertrophic nonobstructive cardiomyopathy (H) 09/12/2012     Priority: Medium     Chest pain 09/12/2012     Priority: Medium     Sinus tachycardia 09/12/2012     Priority: Medium     Pseudoarthrosis of cervical spine (H) 08/24/2012     Priority: Medium     Chondromalacia of patella 10/25/2011     Priority: Medium     Anticoagulant long-term use 02/18/2011     Priority: Medium     Apnea, sleep 02/18/2011     Priority: Medium     Cervical vertebral fusion 12/08/2010     Priority: Medium     Herniated cervical intervertebral disc 06/26/2008     Priority: Medium       PERTINENT PAST MEDICAL HISTORY:    Past Medical History:   Diagnosis Date     Atrial fibrillation (H)      Chest pain     intermittent     Chronic pain      Cognitive disorder     memory loss     Depressive disorder      Dual ICD (implantable cardiac defibrillator) in place 04/29/2014    and pacemaker     GERD (gastroesophageal reflux disease)      H/O traumatic brain injury 2015     Heart attack 1996     HTN (hypertension)      Hypertrophic nonobstructive cardiomyopathy (H) 09/12/2012    s/p ventricular myectomy     Inflammatory arthritis      Intermittent asthma      PAD (peripheral artery disease) (H)      Polysubstance abuse      Seizures (H)     last episode 2014 when \"blood sugar dropped\" according to patient     Sleep apnea     doesn't use cpap     Type 2 diabetes mellitus without complications (H)        PREVIOUS SURGERIES:  Past Surgical History:   Procedure Laterality Date     [facility]     C CARDIAC SURG PROCEDURE UNLIST       C HAND/FINGER SURGERY UNLISTED       C SHOULDER SURG PROC UNLISTED       C STOMACH SURGERY " "PROCEDURE UNLISTED       COLONOSCOPY  2017     DISCECTOMY LUMBAR POSTERIOR MICROSCOPIC THREE+ LEVELS  12/16/2011    Procedure:DISCECTOMY LUMBAR POSTERIOR MICROSCOPIC THREE+ LEVELS; Posterior Decompression L2-S1; Surgeon:CAITIE OSMAN; Location:[facility] OR     FUSION CERVICAL POSTERIOR ONE LEVEL  8/24/2012    Procedure: FUSION CERVICAL POSTERIOR ONE LEVEL;  Posterior Instrumentated Spinal Fusion Cervical 6-7, Right Iliac Crest Bone [place] ;  Surgeon: Caitie Osman MD;  [facility]     GRAFT BONE FROM ILIAC CREST  8/24/2012    Procedure: GRAFT BONE FROM ILIAC CREST;;  Surgeon: Caitie Osman MD;  Location: [facility] OR     HC SACROPLASTY       HEAD & NECK SURGERY  2009     IMPLANT PACEMAKER       INJECT STEROID (LOCATION) N/A 2/19/2015    Procedure: INJECT STEROID (LOCATION);  Surgeon: Siri Koch MD;  [facility]     INSERT STIMULATOR AND LEADS INTERNAL DORSAL COLUMN  8/7/2013    Procedure: INSERT STIMULATOR AND LEADS INTERNAL DORSAL COLUMN;  SPINAL CORD STIMULATOR IMPLANT ;  Surgeon: Ricardo Bruno MD;  Location: [facility] OR     INSERT STIMULATOR DORSAL COLUMN  08/13/2013    lead replacemend, 12?2016,  battery replacement 4/4/2016     KNEE SURGERY       LASER CO2 LARYNGOSCOPY N/A 2/19/2015    Procedure: LASER CO2 LARYNGOSCOPY;  Surgeon: Siri oKch MD;  [facility]     LASER CO2 LARYNGOSCOPY, COMPLEX  7/22/2014    Procedure: LASER CO2 LARYNGOSCOPY, COMPLEX;  Surgeon: Siri Koch MD;  [facility]     MYECTOMY SEPTAL  4/14/2014    Procedure: Median Sternotomy, Septal Myectomy, Intraoperative BRENT performed by Dr. Castano, on pump oxygenator.;  Surgeon: Aguila Cannon MD;  [facility]     NECK SURGERY       SHOULDER SURGERY  2006    RIGHT     STOMACH SURGERY  1980    partial gastrectomy for bleeding ulcers, \"stress related\". [facility]     WRIST SURGERY Left 1988    fractured. 1988 or so       PREVIOUS ENDOSCOPY:  5/15/18  - The " entire examined colon is normal on direct and  retroflexion views.    ALLERGIES:     Allergies   Allergen Reactions     Bee Venom Swelling     Lisinopril      TABS  Severe cough     Penicillins Hives and Difficulty breathing       PERTINENT MEDICATIONS:    Current Outpatient Prescriptions:      amitriptyline (ELAVIL) 25 MG tablet, Take 25 mg by mouth At Bedtime , Disp: , Rfl:      Atorvastatin Calcium (LIPITOR PO), Take 40 mg by mouth daily , Disp: , Rfl:      lidocaine (XYLOCAINE) 5 % ointment, Apply topically 3 times daily For feet, Disp: , Rfl:      losartan (COZAAR) 25 MG tablet, Take 1 tablet (25 mg) by mouth daily, Disp: 30 tablet, Rfl:      magnesium oxide (MAG-OX) 400 MG tablet, Take 4-6 tablets (1,600-2,400 mg) by mouth every other day, Disp: 180 tablet, Rfl: 3     metFORMIN (GLUCOPHAGE) 500 MG tablet, Take 1,000 mg by mouth 2 times daily (with meals) , Disp: 60 tablet, Rfl:      metoprolol (LOPRESSOR) 50 MG tablet, Take 100 mg by mouth 3 times daily , Disp: 180 tablet, Rfl: 4     Pregabalin (LYRICA PO), Take 75 mg by mouth 2 times daily , Disp: , Rfl:      QUEtiapine (SEROQUEL XR) 150 MG TB24 24 hr tablet, Take 150 mg by mouth At Bedtime, Disp: , Rfl:      QUEtiapine Fumarate (SEROQUEL PO), Take 25 mg by mouth 2 times daily, Disp: , Rfl:      senna-docusate (SENOKOT-S;PERICOLACE) 8.6-50 MG per tablet, Take 1 tablet by mouth daily, Disp: , Rfl:      TRAZODONE HCL PO, Take 100 mg by mouth At Bedtime, Disp: , Rfl:      venlafaxine (EFFEXOR-XR) 150 MG 24 hr capsule, Take 150 mg by mouth 2 times daily , Disp: 30 capsule, Rfl: 1     WARFARIN SODIUM PO, Take 8.5 mg by mouth every morning, Disp: , Rfl:      albuterol (2.5 MG/3ML) 0.083% neb solution, Take 1 vial by nebulization every 6 hours as needed for shortness of breath / dyspnea or wheezing, Disp: , Rfl:      albuterol (VENTOLIN HFA) 108 (90 BASE) MCG/ACT Inhaler, Inhale 2 puffs into the lungs every 4 hours as needed for shortness of breath / dyspnea or  wheezing, Disp: , Rfl:      bisacodyl (DULCOLAX) 10 MG Suppository, Place 10 mg rectally daily as needed , Disp: , Rfl:      Emollient (EUCERIN INTENSIVE REPAIR EX), Externally apply topically 2 times daily as needed, Disp: , Rfl:      glipiZIDE (GLUCOTROL) 5 MG tablet, Take 1 tablet (5 mg) by mouth 2 times daily (before meals), Disp: 30 tablet, Rfl:      sildenafil (REVATIO) 20 MG tablet, Take 0.5 tablets (10 mg) by mouth 3 times daily (Patient not taking: Reported on 7/30/2018), Disp: 90 tablet, Rfl: 3    SOCIAL HISTORY:  Rare alcohol  No drug use   Social History     Social History     Marital status: Single     Spouse name: N/A     Number of children: N/A     Years of education: N/A     Occupational History     Not on file.     Social History Main Topics     Smoking status: Light Tobacco Smoker     Packs/day: 0.12     Years: 35.00     Types: Cigarettes     Start date: 3/8/1982     Last attempt to quit: 4/7/2014     Smokeless tobacco: Never Used     Alcohol use 0.0 oz/week     0 Standard drinks or equivalent per week      Comment: rare     Drug use: No      Comment: last using meth 1/2017     Sexual activity: Not Currently     Partners: Female     Birth control/ protection: Male Surgical     Other Topics Concern     Parent/Sibling W/ Cabg, Mi Or Angioplasty Before 65f 55m? Yes     Social History Narrative       FAMILY HISTORY:  FH of CRC: none  FH of IBD: Grandmother possibly had colitis   Family History   Problem Relation Age of Onset     HEART DISEASE Father 32     MIs x2; s/p CABG     Substance Abuse Father      Hypertension Father      Obesity Father      Hyperlipidemia Father      Hypertension Brother      Diabetes Brother      Glaucoma Maternal Grandmother      Hypertension Maternal Grandmother      Diabetes Maternal Grandfather      Glaucoma Maternal Grandfather      Hypertension Maternal Grandfather      Cerebrovascular Disease Maternal Grandfather      Obesity Maternal Grandfather      Hyperlipidemia  "Maternal Grandfather      Coronary Artery Disease Maternal Grandfather      HEART DISEASE Maternal Grandfather      Substance Abuse Other      Cancer Other      Hypertension Other      Obesity Other      Other Cancer Other      all over     Hyperlipidemia Other      Coronary Artery Disease Other      Obesity Brother      Hyperlipidemia Brother      Lymphoma Maternal Uncle      Diabetes Brother      Depression Daughter      Depression Son      Macular Degeneration No family hx of        Past/family/social history reviewed and no changes    PHYSICAL EXAMINATION:  Constitutional: aaox3, cooperative, pleasant, not dyspneic/diaphoretic, no acute distress  Vitals reviewed: /75  Pulse 85  Temp 98.7  F (37.1  C) (Oral)  Ht 1.803 m (5' 11\")  Wt 115.2 kg (254 lb)  SpO2 95%  BMI 35.43 kg/m2  Wt:   Wt Readings from Last 2 Encounters:   07/30/18 115.2 kg (254 lb)   05/30/18 120.2 kg (265 lb)      Eyes: Sclera anicteric/injected  Ears/nose/mouth/throat: Normal oropharynx without ulcers or exudate, mucus membranes moist, hearing intact  Neck: supple, thyroid normal size  CV: No edema  Respiratory: Unlabored breathing  Lymph: No axillary, submandibular, supraclavicular or inguinal lymphadenopathy  Abd:  Nondistended, +bs, no hepatosplenomegaly, nontender, no peritoneal signs  Skin: warm, perfused, no jaundice  Psych: Normal affect  MSK: Normal gait      PERTINENT STUDIES:    Orders Only on 01/23/2018   Component Date Value Ref Range Status     Potassium 01/23/2018 4.1  3.4 - 5.3 mmol/L Final     Hemoglobin A1C 01/23/2018 8.1* 4.3 - 6.0 % Final     Creatinine 01/23/2018 0.93  0.66 - 1.25 mg/dL Final     GFR Estimate 01/23/2018 86  >60 mL/min/1.7m2 Final     GFR Estimate If Black 01/23/2018 >90  >60 mL/min/1.7m2 Final         Answers for HPI/ROS submitted by the patient on 7/30/2018   General Symptoms: No  Skin Symptoms: No  HENT Symptoms: No  EYE SYMPTOMS: No  HEART SYMPTOMS: Yes  LUNG SYMPTOMS: No  INTESTINAL SYMPTOMS: " Yes  URINARY SYMPTOMS: No  REPRODUCTIVE SYMPTOMS: Yes  SKELETAL SYMPTOMS: Yes  BLOOD SYMPTOMS: No  NERVOUS SYSTEM SYMPTOMS: Yes  MENTAL HEALTH SYMPTOMS: Yes  Chest pain or pressure: No  Fast or irregular heartbeat: No  Pain in legs with walking: Yes  Trouble breathing while lying down: No  Fingers or toes appear blue: No  High blood pressure: Yes  Low blood pressure: No  Fainting: No  Murmurs: Yes  Pacemaker: Yes  Varicose veins: No  Edema or swelling: No  Wake up at night with shortness of breath: No  Light-headedness: No  Exercise intolerance: Yes  Heart burn or indigestion: No  Nausea: No  Vomiting: No  Abdominal pain: Yes  Bloating: Yes  Constipation: Yes  Diarrhea: No  Blood in stool: No  Black stools: No  Rectal or Anal pain: No  Fecal incontinence: No  Yellowing of skin or eyes: No  Vomit with blood: No  Change in stools: No  Back pain: Yes  Muscle aches: No  Neck pain: Yes  Swollen joints: No  Joint pain: No  Bone pain: Yes  Muscle cramps: No  Muscle weakness: Yes  Joint stiffness: No  Bone fracture: No  Trouble with coordination: No  Dizziness or trouble with balance: No  Fainting or black-out spells: No  Memory loss: Yes  Headache: No  Seizures: No  Speech problems: No  Tingling: Yes  Tremor: No  Weakness: Yes  Difficulty walking: Yes  Paralysis: No  Numbness: Yes  Scrotal pain or swelling: No  Erectile dysfunction: Yes  Penile discharge: No  Genital ulcers: No  Reduced libido: Yes  Nervous or Anxious: No  Depression: Yes  Trouble sleeping: Yes  Trouble thinking or concentrating: No  Mood changes: Yes  Panic attacks: No

## 2018-07-31 ENCOUNTER — MEDICAL CORRESPONDENCE (OUTPATIENT)
Dept: HEALTH INFORMATION MANAGEMENT | Facility: CLINIC | Age: 48
End: 2018-07-31

## 2018-08-09 ENCOUNTER — HOSPITAL ENCOUNTER (OUTPATIENT)
Dept: NEUROLOGY | Facility: CLINIC | Age: 48
Setting detail: THERAPIES SERIES
Discharge: STILL ADMITTED OR ADMINISTRATIVE | End: 2018-08-09
Attending: PSYCHIATRY & NEUROLOGY

## 2018-08-09 DIAGNOSIS — F33.1 MDD (MAJOR DEPRESSIVE DISORDER), RECURRENT EPISODE, MODERATE (H): ICD-10-CM

## 2018-08-15 ENCOUNTER — COMMUNICATION - HEALTHEAST (OUTPATIENT)
Dept: NEUROLOGY | Facility: CLINIC | Age: 48
End: 2018-08-15

## 2018-08-15 DIAGNOSIS — F33.1 MDD (MAJOR DEPRESSIVE DISORDER), RECURRENT EPISODE, MODERATE (H): ICD-10-CM

## 2018-08-15 DIAGNOSIS — G47.00 INSOMNIA: ICD-10-CM

## 2018-08-31 ENCOUNTER — ALLIED HEALTH/NURSE VISIT (OUTPATIENT)
Dept: CARDIOLOGY | Facility: CLINIC | Age: 48
End: 2018-08-31
Attending: INTERNAL MEDICINE
Payer: MEDICAID

## 2018-08-31 DIAGNOSIS — I42.2 HYPERTROPHIC NONOBSTRUCTIVE CARDIOMYOPATHY (H): Primary | ICD-10-CM

## 2018-08-31 PROCEDURE — 93283 PRGRMG EVAL IMPLANTABLE DFB: CPT | Mod: ZF

## 2018-08-31 NOTE — PATIENT INSTRUCTIONS
It was a pleasure to see you in clinic today. Please do not hesitate to call with any questions or concerns. We look forward to seeing you in clinic at your next device check in 3 months.    Rupal Zamora RN  Electrophysiology Nurse Clinician  [facility]  During business hours call:  [phone]  After business hours please call: [phone]- select option #4 and ask for job code [phone].

## 2018-08-31 NOTE — PROGRESS NOTES
Preliminary Device Interrogation Results.  Final physician signed paceart report to be scanned and attached.    Pt seen in the [facility] for evaluation and iterative programming of a Medtronic, dual lead ICD, per MD orders. Today his intrinsic rhythm is Sinus 98 with CHB- ventricular rate is <30 bpm. Normal ICD function. No arrhythmias recorded. OptiVol thoracic impedance is near baseline. AP= 10% and = 100%. No short v-v intervals recorded. Lead trends appear stable. Battery estimates 3.2 years to DA. Plan for pt to RTC in 3 months with [facility] clinic appointment.  Dual lead ICD

## 2018-08-31 NOTE — MR AVS SNAPSHOT
After Visit Summary   8/31/2018    Konstantin Sharp    MRN: [id number]           Patient Information     Date Of Birth          1970        Visit Information        Provider Department      8/31/2018 9:00 AM 1,  Cv Device [facility]        Today's Diagnoses     Hypertrophic nonobstructive cardiomyopathy (H)    -  1      Care Instructions    It was a pleasure to see you in clinic today. Please do not hesitate to call with any questions or concerns. We look forward to seeing you in clinic at your next device check in 3 months.    Rupal Zamora, RN  Electrophysiology Nurse Clinician  [facility]  During business hours call:  [phone]  After business hours please call: [phone]- select option #4 and ask for job code [phone].            Follow-ups after your visit        Additional Services     Follow-Up with Device Clinic - 3 months                 Future tests that were ordered for you today     Open Future Orders        Priority Expected Expires Ordered    Follow-Up with Device Clinic - 3 months Routine 11/29/2018 2/27/2019 8/31/2018            Who to contact     If you have questions or need follow up information about today's clinic visit or your schedule please contact [facility] directly at [phone].  Normal or non-critical lab and imaging results will be communicated to you by MyChart, letter or phone within 4 business days after the clinic has received the results. If you do not hear from us within 7 days, please contact the clinic through MyChart or phone. If you have a critical or abnormal lab result, we will notify you by phone as soon as possible.  Submit refill requests through vSocial or call your pharmacy and they will forward the refill request to us. Please allow 3 business days for your refill to be completed.          Additional Information About Your Visit        MyChart Information     vSocial gives you secure access to your  electronic health record. If you see a primary care provider, you can also send messages to your care team and make appointments. If you have questions, please call your primary care clinic.  If you do not have a primary care provider, please call [phone] and they will assist you.        Care EveryWhere ID     This is your Care EveryWhere ID. This could be used by other organizations to access your [place] medical records  [id number]         Blood Pressure from Last 3 Encounters:   07/30/18 121/75   05/30/18 115/78   04/24/18 127/83    Weight from Last 3 Encounters:   07/30/18 115.2 kg (254 lb)   05/30/18 120.2 kg (265 lb)   04/24/18 115.3 kg (254 lb 4.8 oz)              We Performed the Following     (00690) ICD DEVICE PROGRAMMING EVAL, DUAL LEAD ICD        Primary Care Provider Office Phone # Fax #    Damon Cooper [phone]       [facility] [address] [facility] [address]        Equal Access to Services     CAROLINE BARBER : Hadii aad ku hadasho Soomaali, waaxda luqadaha, qaybta kaalmada adeegyada, waxay idiin hayaan betzaida perry . So [facility] [phone].    ATENCIÓN: Si habla español, tiene a dover disposición servicios gratuitos de asistencia lingüística. [facility] [phone].    We comply with applicable federal civil rights laws and [place] laws. We do not discriminate on the basis of race, color, national origin, age, disability, sex, sexual orientation, or gender identity.            Thank you!     Thank you for choosing [facility]  for your care. Our goal is always to provide you with excellent care. Hearing back from our patients is one way we can continue to improve our services. Please take a few minutes to complete the written survey that you may receive in the mail after your visit with us. Thank you!             Your Updated Medication List - Protect others around you: Learn how to safely use, store and throw away your medicines at  www.disposemymeds.org.          This list is accurate as of 8/31/18 11:59 PM.  Always use your most recent med list.                   Brand Name Dispense Instructions for use Diagnosis    * albuterol (2.5 MG/3ML) 0.083% neb solution      Take 1 vial by nebulization every 6 hours as needed for shortness of breath / dyspnea or wheezing        * VENTOLIN  (90 Base) MCG/ACT inhaler   Generic drug:  albuterol      Inhale 2 puffs into the lungs every 4 hours as needed for shortness of breath / dyspnea or wheezing        amitriptyline 25 MG tablet    ELAVIL     Take 25 mg by mouth At Bedtime        bisacodyl 10 MG Suppository    DULCOLAX     Place 10 mg rectally daily as needed        EUCERIN INTENSIVE REPAIR EX      Externally apply topically 2 times daily as needed        glipiZIDE 5 MG tablet    GLUCOTROL    30 tablet    Take 1 tablet (5 mg) by mouth 2 times daily (before meals)    Diabetes mellitus (H)       glycerin (adult) 2 g Supp Suppository    [facility] GLYCERIN ADULT    30 suppository    Place 1 suppository rectally daily as needed for constipation    Constipation, unspecified constipation type       lidocaine 5 % ointment    XYLOCAINE     Apply topically 3 times daily For feet        LIPITOR PO      Take 40 mg by mouth daily        losartan 25 MG tablet    COZAAR    30 tablet    Take 1 tablet (25 mg) by mouth daily    Hypertrophic cardiomyopathy (H)       LYRICA PO      Take 75 mg by mouth 2 times daily        magnesium oxide 400 MG tablet    MAG-OX    180 tablet    Take 4-6 tablets (1,600-2,400 mg) by mouth every other day    Drug-induced constipation       metFORMIN 500 MG tablet    GLUCOPHAGE    60 tablet    Take 1,000 mg by mouth 2 times daily (with meals)    Diabetes mellitus (H)       metoprolol tartrate 50 MG tablet    LOPRESSOR    180 tablet    Take 100 mg by mouth 3 times daily    S/P ventricular septal myectomy, Congestive heart failure, unspecified, Hypertrophic cardiomyopathy (H)        senna-docusate 8.6-50 MG per tablet    SENOKOT-S;PERICOLACE     Take 1 tablet by mouth daily        * SEROQUEL PO      Take 25 mg by mouth 2 times daily        * QUEtiapine 150 MG Tb24 24 hr tablet    SEROquel XR     Take 150 mg by mouth At Bedtime        sildenafil 20 MG tablet    REVATIO    90 tablet    Take 0.5 tablets (10 mg) by mouth 3 times daily    Critical lower limb ischemia       TRAZODONE HCL PO      Take 100 mg by mouth At Bedtime        venlafaxine 150 MG 24 hr capsule    EFFEXOR-XR    30 capsule    Take 150 mg by mouth 2 times daily    Adjustment disorder with depressed mood       WARFARIN SODIUM PO      Take 8.5 mg by mouth every morning        * Notice:  This list has 4 medication(s) that are the same as other medications prescribed for you. Read the directions carefully, and ask your doctor or other care provider to review them with you.

## 2018-09-24 ENCOUNTER — TELEPHONE (OUTPATIENT)
Dept: GASTROENTEROLOGY | Facility: CLINIC | Age: 48
End: 2018-09-24

## 2018-09-24 DIAGNOSIS — K59.00 CONSTIPATION, UNSPECIFIED CONSTIPATION TYPE: ICD-10-CM

## 2018-10-18 DIAGNOSIS — K59.03 DRUG-INDUCED CONSTIPATION: ICD-10-CM

## 2018-10-19 ENCOUNTER — CARE COORDINATION (OUTPATIENT)
Dept: CARDIOLOGY | Facility: CLINIC | Age: 48
End: 2018-10-19

## 2018-10-19 DIAGNOSIS — I42.2 HYPERTROPHIC CARDIOMYOPATHY (H): Primary | ICD-10-CM

## 2018-10-19 NOTE — TELEPHONE ENCOUNTER
magnesium oxide (MAG-OX) 400 MG tablet  Last Written Prescription Date:  2/21/18  Last Fill Quantity: 180,   # refills: 3  Last Office Visit : 7/30/18  Future Office visit:  None    Routing  Because:  Not on protocol

## 2018-10-23 ENCOUNTER — OFFICE VISIT (OUTPATIENT)
Dept: CARDIOLOGY | Facility: CLINIC | Age: 48
End: 2018-10-23
Attending: INTERNAL MEDICINE
Payer: MEDICAID

## 2018-10-23 ENCOUNTER — RADIANT APPOINTMENT (OUTPATIENT)
Dept: CARDIOLOGY | Facility: CLINIC | Age: 48
End: 2018-10-23
Payer: MEDICAID

## 2018-10-23 VITALS
HEIGHT: 71 IN | WEIGHT: 253.6 LBS | OXYGEN SATURATION: 96 % | SYSTOLIC BLOOD PRESSURE: 122 MMHG | HEART RATE: 90 BPM | DIASTOLIC BLOOD PRESSURE: 82 MMHG | BODY MASS INDEX: 35.5 KG/M2

## 2018-10-23 DIAGNOSIS — I42.2 HYPERTROPHIC CARDIOMYOPATHY (H): ICD-10-CM

## 2018-10-23 DIAGNOSIS — I42.2 HYPERTROPHIC NONOBSTRUCTIVE CARDIOMYOPATHY (H): ICD-10-CM

## 2018-10-23 PROCEDURE — 99213 OFFICE O/P EST LOW 20 MIN: CPT | Mod: ZP | Performed by: INTERNAL MEDICINE

## 2018-10-23 PROCEDURE — G0463 HOSPITAL OUTPT CLINIC VISIT: HCPCS

## 2018-10-23 RX ORDER — INSULIN GLARGINE 100 [IU]/ML
INJECTION, SOLUTION SUBCUTANEOUS
Refills: 3 | COMMUNITY
Start: 2018-10-18 | End: 2021-12-28

## 2018-10-23 RX ADMIN — Medication 5 ML: at 10:15

## 2018-10-23 ASSESSMENT — PAIN SCALES - GENERAL: PAINLEVEL: NO PAIN (0)

## 2018-10-23 NOTE — LETTER
10/23/2018      RE: Konstantin Sharp  [address] [place] [address]       Dear Colleague,    Thank you for the opportunity to participate in the care of your patient, Konstantin Sharp, at the [facility]. Please see a copy of my visit note below.    HPI:     Mr. Sharp is a 48-year-old gentleman who is known to me with a past medical history significant for hypertrophic cardiomyopathy.  He also has diabetes, nicotine dependence (using patch and has cut down to now 7 cigarettes a day), hypertension, hyperlipidemia.  I met him for the first time in 06/2015.  He had chronic atypical chest pain which has resolved essentially.  We did do a nuclear stress test which was nondiagnostic and a CTA in 2013 and 2015 which both had a negative coronary calcium score, but the quality was poor in terms of obstructive disease.  He has diabetes has not been under ideal control with an A1C running around 8. He has peripheral neuropathy from this as well.       Mr. Sharp underwent an echo today.  I reviewed those images.  It is unchanged.  He continues to have mostly mid ventricular hypertrophy with not a lot of significant obstruction.  He has a normal ejection fraction with no significant valve disease.  He does have an ICD that was implanted in 2016.  He has not had any shocks from his device.      He has 4 children, 2 have been screened who are 21 and 23, girls, and 2 have not who are 26 and 27.     He is now working for [employer] 4 times a week and likes his job pretty well.  He does have sleep apnea and does not use CPAP mask.  He is on disability. With history of afib he is on anticoagulation.      PAST MEDICAL HISTORY:  Past Medical History:   Diagnosis Date     Atrial fibrillation (H)      Chest pain     intermittent     Chronic pain      Cognitive disorder     memory loss     Depressive disorder      Dual ICD (implantable cardiac defibrillator) in place 04/29/2014     "and pacemaker     GERD (gastroesophageal reflux disease)      H/O traumatic brain injury 2015     Heart attack (H) 1996     HTN (hypertension)      Hypertrophic nonobstructive cardiomyopathy (H) 09/12/2012    s/p ventricular myectomy     Inflammatory arthritis      Intermittent asthma      PAD (peripheral artery disease) (H)      Polysubstance abuse (H)      Seizures (H)     last episode 2014 when \"blood sugar dropped\" according to patient     Sleep apnea     doesn't use cpap     Type 2 diabetes mellitus without complications (H)        CURRENT MEDICATIONS:  Current Outpatient Prescriptions   Medication Sig Dispense Refill     albuterol (2.5 MG/3ML) 0.083% neb solution Take 1 vial by nebulization every 6 hours as needed for shortness of breath / dyspnea or wheezing       albuterol (VENTOLIN HFA) 108 (90 BASE) MCG/ACT Inhaler Inhale 2 puffs into the lungs every 4 hours as needed for shortness of breath / dyspnea or wheezing       Atorvastatin Calcium (LIPITOR PO) Take 40 mg by mouth daily        Emollient (EUCERIN INTENSIVE REPAIR EX) Externally apply topically 2 times daily as needed       losartan (COZAAR) 25 MG tablet Take 1 tablet (25 mg) by mouth daily 30 tablet      magnesium oxide (MAG-OX) 400 MG tablet Take 4-6 tablets (1,600-2,400 mg) by mouth every other day 180 tablet 3     metFORMIN (GLUCOPHAGE) 500 MG tablet Take 1,000 mg by mouth 2 times daily (with meals)  60 tablet      metoprolol (LOPRESSOR) 50 MG tablet Take 100 mg by mouth 3 times daily  180 tablet 4     Pregabalin (LYRICA PO) Take 225 mg by mouth 2 times daily        QUEtiapine (SEROQUEL XR) 150 MG TB24 24 hr tablet Take 150 mg by mouth At Bedtime       QUEtiapine Fumarate (SEROQUEL PO) Take 25 mg by mouth 2 times daily       sildenafil (REVATIO) 20 MG tablet Take 0.5 tablets (10 mg) by mouth 3 times daily 90 tablet 3     TRAZODONE HCL PO Take 100 mg by mouth At Bedtime       venlafaxine (EFFEXOR-XR) 150 MG 24 hr capsule Take 150 mg by mouth 2 " times daily  30 capsule 1     WARFARIN SODIUM PO Take 8.5 mg by mouth every morning       amitriptyline (ELAVIL) 25 MG tablet Take 25 mg by mouth At Bedtime        bisacodyl (DULCOLAX) 10 MG Suppository Place 10 mg rectally daily as needed        glipiZIDE (GLUCOTROL) 5 MG tablet Take 1 tablet (5 mg) by mouth 2 times daily (before meals) 30 tablet      glycerin, adult, ([employer] GLYCERIN ADULT) 2 g SUPP Suppository Place 1 suppository rectally daily as needed for constipation (Patient not taking: Reported on 10/23/2018) 30 suppository 3     LANTUS SOLOSTAR 100 UNIT/ML soln INJECT 14 UNITS BY SUBCUTANEOUS ROUTE 1 TIME PER DAY AT BEDTIME  3     lidocaine (XYLOCAINE) 5 % ointment Apply topically 3 times daily For feet       senna-docusate (SENOKOT-S;PERICOLACE) 8.6-50 MG per tablet Take 1 tablet by mouth daily         PAST SURGICAL HISTORY:  Past Surgical History:   Procedure Laterality Date     APPENDECTOMY  1980    [facility] CARDIAC SURG PROCEDURE UNLIST       C HAND/FINGER SURGERY UNLISTED       C SHOULDER SURG PROC UNLISTED       C STOMACH SURGERY PROCEDURE UNLISTED       COLONOSCOPY  2017     DISCECTOMY LUMBAR POSTERIOR MICROSCOPIC THREE+ LEVELS  12/16/2011    Procedure:DISCECTOMY LUMBAR POSTERIOR MICROSCOPIC THREE+ LEVELS; Posterior Decompression L2-S1; Surgeon:CAITIE FUNEZ; Location:[facility] OR     FUSION CERVICAL POSTERIOR ONE LEVEL  8/24/2012    Procedure: FUSION CERVICAL POSTERIOR ONE LEVEL;  Posterior Instrumentated Spinal Fusion Cervical 6-7, Right Iliac Crest Bone [place] ;  Surgeon: Caitie Funez MD;  [facility] OR     GRAFT BONE FROM ILIAC CREST  8/24/2012    Procedure: GRAFT BONE FROM ILIAC CREST;;  Surgeon: Caitie Funez MD;  Location[facility] OR     HC SACROPLASTY       HEAD & NECK SURGERY  2009     IMPLANT PACEMAKER       INJECT STEROID (LOCATION) N/A 2/19/2015    Procedure: INJECT STEROID (LOCATION);  Surgeon: Siri Koch MD;  [facility]  "    INSERT STIMULATOR AND LEADS INTERNAL DORSAL COLUMN  8/7/2013    Procedure: INSERT STIMULATOR AND LEADS INTERNAL DORSAL COLUMN;  SPINAL CORD STIMULATOR IMPLANT ;  Surgeon: Ricardo Bruno MD;  Location: [facility] OR     INSERT STIMULATOR DORSAL COLUMN  08/13/2013    lead replacemend, 12?2016,  battery replacement 4/4/2016     KNEE SURGERY       LASER CO2 LARYNGOSCOPY N/A 2/19/2015    Procedure: LASER CO2 LARYNGOSCOPY;  Surgeon: Siri Koch MD;  [facility]     LASER CO2 LARYNGOSCOPY, COMPLEX  7/22/2014    Procedure: LASER CO2 LARYNGOSCOPY, COMPLEX;  Surgeon: Siri Koch MD;  [facility]     MYECTOMY SEPTAL  4/14/2014    Procedure: Median Sternotomy, Septal Myectomy, Intraoperative BRENT performed by Dr. Castano, on pump oxygenator.;  Surgeon: Aguila Cannon MD;  [facility]     NECK SURGERY       SHOULDER SURGERY  2006    RIGHT     STOMACH SURGERY  1980    partial gastrectomy for bleeding ulcers, \"stress related\". [facility]     WRIST SURGERY Left 1988    fractured. 1988 or so       ALLERGIES     Allergies   Allergen Reactions     Bee Venom Swelling     Lisinopril      TABS  Severe cough     Penicillins Hives and Difficulty breathing       FAMILY HISTORY:  Family History   Problem Relation Age of Onset     HEART DISEASE Father 32     MIs x2; s/p CABG     Substance Abuse Father      Hypertension Father      Obesity Father      Hyperlipidemia Father      Hypertension Brother      Diabetes Brother      Glaucoma Maternal Grandmother      Hypertension Maternal Grandmother      Diabetes Maternal Grandfather      Glaucoma Maternal Grandfather      Hypertension Maternal Grandfather      Cerebrovascular Disease Maternal Grandfather      Obesity Maternal Grandfather      Hyperlipidemia Maternal Grandfather      Coronary Artery Disease Maternal Grandfather      HEART DISEASE Maternal Grandfather      Substance Abuse Other      Cancer Other      Hypertension Other      Obesity " "Other      Other Cancer Other      all over     Hyperlipidemia Other      Coronary Artery Disease Other      Obesity Brother      Hyperlipidemia Brother      Lymphoma Maternal Uncle      Diabetes Brother      Depression Daughter      Depression Son      Macular Degeneration No family hx of        SOCIAL HISTORY:  Social History     Social History     Marital status: Single     Spouse name: N/A     Number of children: N/A     Years of education: N/A     Social History Main Topics     Smoking status: Light Tobacco Smoker     Packs/day: 0.12     Years: 35.00     Types: Cigarettes     Start date: 3/8/1982     Last attempt to quit: 4/7/2014     Smokeless tobacco: Never Used     Alcohol use 0.0 oz/week     0 Standard drinks or equivalent per week      Comment: rare     Drug use: No      Comment: last using meth 1/2017     Sexual activity: Not Currently     Partners: Female     Birth control/ protection: Male Surgical     Other Topics Concern     Parent/Sibling W/ Cabg, Mi Or Angioplasty Before 65f 55m? Yes     Social History Narrative       ROS:   Constitutional: No fever, chills, or sweats. No weight gain/loss   ENT: No visual disturbance, ear ache, epistaxis, sore throat  Allergies/Immunologic: Negative.   Respiratory: No cough, hemoptysia  Cardiovascular: As per HPI  GI: No nausea, vomiting, hematemesis, melena, or hematochezia  : No urinary frequency, dysuria, or hematuria  Integument: Negative  Psychiatric: Negative  Neuro: Negative  Endocrinology: Negative   Musculoskeletal: Negative    EXAM:  /82 (BP Location: Left arm, Patient Position: Chair, Cuff Size: Adult Large)  Pulse 90  Ht 1.803 m (5' 11\")  Wt 115 kg (253 lb 9.6 oz)  SpO2 96%  BMI 35.37 kg/m2  In general, the patient is a pleasant male in no apparent distress.    HEENT: NC/AT.  PERRLA.  EOMI.  Sclerae white, not injected.  Nares clear.  Pharynx without erythema or exudate.  Dentition intact.    Neck: No adenopathy.  No thyromegaly. Carotids " +4/4 bilaterally without bruits.  No jugular venous distension.   Heart: RRR. Normal S1, S2 splits physiologically. No murmur, rub, click, or gallop. The PMI is in the 5th ICS in the midclavicular line. There is no heave.    Lungs: CTA.  No ronchi, wheezes, rales.  No dullness to percussion.   Abdomen: Soft, nontender, nondistended. No organomegaly.  No bruits.   Extremities: No clubbing, cyanosis, or edema.  The pulses are +4/4 at the radial, brachial, femoral, popliteal, DP, and PT sites bilaterally.  No bruits are noted.  Neurologic: Alert and oriented to person/place/time, normal speech, gait and affect  Skin: No petechiae, purpura or rash.    Labs:  LIPID RESULTS:  Lab Results   Component Value Date    CHOL 205 (A) 01/29/2014    HDL 27 (A) 01/29/2014     (A) 01/29/2014    TRIG 242 (A) 01/29/2014    CHOLHDLRATIO 4.0 09/30/2013       LIVER ENZYME RESULTS:  Lab Results   Component Value Date    AST 23 01/17/2015    ALT 30 01/17/2015       CBC RESULTS:  Lab Results   Component Value Date    WBC 9.3 01/31/2018    RBC 4.81 01/31/2018    HGB 15.4 01/31/2018    HCT 45.0 01/31/2018    MCV 94 01/31/2018    MCH 32.0 01/31/2018    MCHC 34.2 01/31/2018    RDW 13.6 01/31/2018     01/31/2018       BMP RESULTS:  Lab Results   Component Value Date     03/17/2015    POTASSIUM 4.1 01/23/2018    CHLORIDE 110 (H) 03/17/2015    CO2 25 03/17/2015    ANIONGAP 7 03/17/2015     (H) 03/17/2015    BUN 14 03/17/2015    CR 0.93 01/23/2018    GFRESTIMATED 86 01/23/2018    GFRESTBLACK >90 01/23/2018    TANIA 8.8 03/17/2015        A1C RESULTS:  Lab Results   Component Value Date    A1C 8.1 (H) 01/23/2018       INR RESULTS:  Lab Results   Component Value Date    INR 0.98 01/31/2018    INR 0.92 02/19/2015       IMPRESSION, REPORT, PLAN:   1.  Hypertrophic cardiomyopathy status post myectomy with minimal obstruction, stable.   2.  Diabetes type 2 with peripheral neuropathy with an A1c of around 8.     3.  Heart block  followed by ectopy status post ICD and permanent pacemaker.   4.  Atrial fibrillation with history of DVT, on chronic anticoagulation with Coumadin.   5.  Nicotine dependence, currently smokes half pack a day with a 30-pack-year smoking history.   6.  History of atypical chest pain with a negative coronary calcium score on last CT 07/2015.   7.  Glottic stenosis.   8.  Pneumonia.   9.  Chronic back pain.     It was a pleasure to see Mo in follow up.  He is clinically doing well and has worked on diet and exercise and lost 10 pounds since I last saw him.  He will continue to work on this.  We discussed his echo is unchanged.  He will continue to work on diet and exercise.  We will plan to see him back in 9 months with an echo.  He will let us know if he has any issues in the interim.      DONALD Castellano MD       CC  Patient Care Team:  Damon Cooper as PCP - General (Family Practice)  None  Darcy Rodriguez, RN as Clinic Care Coordinator (ENT-Otolaryngology)  Ana Sanchez (Nurse Practitioner)  Cisco Hercules, ROSSY as Nurse Coordinator (Cardiology)  Casey Bangura DPM as MD (Podiatry)  Ricardo Pickard OD as MD (Optometry)  Irlanda Calero MD as MD (Vascular Surgery)  Damon Cooper as Referring Physician (Family Practice)  Elvis Macdonald MD as Resident (Radiology)  Carolynn Villatoro, RN as Nurse Coordinator (Neurology)

## 2018-10-23 NOTE — PROGRESS NOTES
Preliminary Device Interrogation Results.  Final physician signed paceart report to be scanned and attached.    Patient seen in clinic for evaluation and iterative programming of Medsandi Chance DR dual lead ICD per MD orders.  Patient has an appointment to see Dr. Castellano today.  Normal ICD function. 1 AT/AF episode recorded lasting 2 seconds with no egm storage Intrinsic rhythm = CHB atrial rate 88 bpm with no R-waves with  @ 30 bpm.  AP = 10.9%.   = 100%. OptiVol fluid index is slightly above baseline. Patient noted that sometimes he would get swelling in his fingers but not today.  Estimated battery longevity to DA = 3 years. No short v-v intervals recorded.  Lead trends appear stable.  Patient reports that he is feeling ok and denies complaints.  Plan for patient to send a remote transmission in 3 months and return to clinic in 6 months.    dual lead ICD

## 2018-10-23 NOTE — PATIENT INSTRUCTIONS
It was a pleasure to see you in clinic today.  Please do not hesitate to call with any questions or concerns.  There is a scheduled remote transmission 1/29/2019. We look forward to seeing you in clinic at your next device check in 6 months.    Lianna Apple, RN  Electrophysiology Nurse Clinician  [facility]    During Business Hours Please Call:  [phone]  After Hours Please Call:  [phone] - select option #4 and ask for job code [phone]

## 2018-10-23 NOTE — NURSING NOTE
Cardiac Testing: Patient given instructions regarding  echocardiogram . Discussed purpose, preparation, procedure and when to expect results reported back to the patient. Patient demonstrated understanding of this information and agreed to call with further questions or concerns.    Labs: Patient was instructed to return for the next laboratory testing in 9 mo . Patient demonstrated understanding of this information and agreed to call with further questions or concerns.     Return Appointment: Follow up with Dr Castellano in 9 mo with echo and labs.  Patient given instructions regarding scheduling next clinic visit. Patient demonstrated understanding of this information and agreed to call with further questions or concerns.    Patient stated he understood all health information given and agreed to call with further questions or concerns.    Cisco Hercules, RN  RN Care Coordinator  [facility]  [phone]

## 2018-10-23 NOTE — MR AVS SNAPSHOT
MRN:[id number]                      After Visit Summary   10/23/2018    Konstantin Sharp    MRN: [id number]           Visit Information        Provider Department      10/23/2018 9:00 AM 1, Claudia Cv Device [facility]        Your next 10 appointments already scheduled     Oct 23, 2018  9:30 AM CDT   Ech Complete with RADHACR1   [facility])    [address]  3rd Floor  [facility] [id number] [phone]           1.  Please bring or wear a comfortable two-piece outfit. 2.  You may eat, drink and take your normal medicines. 3.  For any questions that cannot be answered, please contact the ordering physician 4.  Please do not wear perfumes or scented lotions on the day of your exam.            Oct 23, 2018 10:30 AM CDT   (Arrive by 10:15 AM)   RETURN CONGENITAL HEART with Momo Castellano MD    [facility] [place]    [address]  [address] [place] [id number] [phone]            Oct 24, 2018 10:00 AM CDT   NM INJECTION with UUNMINJ1   [facility], Nuclear Medicine ([facility])    [address] [id number] [phone]            Oct 24, 2018 10:45 AM CDT   NM SCAN3 with UUNM1   [facility], Nuclear Medicine ([facility])    [address] [id number] [phone]            Oct 24, 2018 11:15 AM CDT   Ekg Stress Nm Lexiscan with UUEKGNMS   [facility] ([facility])    [address] [place] [id number]               Oct 24, 2018 12:15 PM CDT   NM MPI WITH LEXISCAN with UUNM1   [facility], Nuclear Medicine ([facility])    [address] [address] [phone]           How do I prepare for my exam? (Food and drink  instructions) Day 1 & Day 2: Stop all caffeine 12 hours before the test. This includes coffee, tea, soda pop, chocolate and certain medicines (such as Anacin, Excedrin and NoDoz). Also avoid decaf coffee and tea, as these contain small amounts of caffeine. Stop eating 4 hours before the test. You may drink water.  How do I prepare for my exam? (Other instructions) You may need to stop some medicines before the test. Follow your doctor s orders. Day 1 & Day 2: *If you take a beta blocker: Do not take your beta-blocker on the day before your test, unless specifically told to by your doctor. And do not take it on the day of your test. Bring it with you to take after the test.  *If you take Aggrenox or dipyridamole (Persantine, Permole), stop taking it 48 hours before your test. *If you take Viagra, Cialis or Levitra, stop taking it 48 hours before your test. *If you take theophylline or aminophylline, stop taking it 12 hours before your test.  For patients with diabetes: *If you take insulin, call your diabetes care team. Ask if you should take a 1/2 dose the morning of your test. *If you take diabetes medicine by mouth, don t take it on the morning of your test. Bring it with you to take after the test. (If you have questions, call your diabetes care team.)  *Do not take nitrates on the day of your test. Do not wear your Nitro-Patch. *No alcohol, smoking or other tobacco for 12 hours before the test.  What should I wear: Please wear a loose two-piece outfit. If you will have an exercise test, bring rubber-soled walking shoes.  How long does the exam take: *This test can take 1-2 days.* ONE day exam: Allow 3-4 hours for test. IF TWO day exam: Allow 30-60 minutes PER day for test.  What should I bring: Please bring a list of your medicines (including vitamins, minerals and over-the-counter drugs). Leave your valuables at home.  Do I need a :  No  is needed.  What do I need to tell my doctor? When you arrive,  please tell us if you: * Have diabetes * Are breastfeeding * May be pregnant * Have a pacemaker of ICD (implantable defibrillator).  What should I do after the exam: No restrictions, You may resume normal activities.  What is this test: Your doctor has ordered a nuclear stress test to check how well blood is flowing through your heart. You will either exercise or take a medicine that mimics exercise; we will watch your heart.  Who should I call with questions: If you have any questions, please call the Imaging Department where you will have your exam. Directions, parking instructions, and other information is available on our website, [email or web address].              Care Instructions    It was a pleasure to see you in clinic today.  Please do not hesitate to call with any questions or concerns.  There is a scheduled remote transmission 2019. We look forward to seeing you in clinic at your next device check in 6 months.    Lianna Apple RN  Electrophysiology Nurse Clinician  [facility]    During Business Hours Please Call:  [phone]  After Hours Please Call:  [phone] - select option #4 and ask for job code [phone]         [employer] Information     [employer] is an electronic gateway that provides easy, online access to your medical records. With [employer]String, you can request a clinic appointment, read your test results, renew a prescription or communicate with your care team.     To sign up for i[employer] visit the website at [email or web address]   You will be asked to enter the access code listed below, as well as some personal information. Please follow the directions to create your username and password.     Your access code is: [email or web address]  Expires: 2019  6:30 AM     Your access code will  in 90 days. If you need help or a new code, please contact your [facility] or call [phone] for assistance.        Care EveryWhere ID     This is  your Care EveryWhere ID. This could be used by other organizations to access your [place] medical records  [id number]        Equal Access to Services     CAROLINE BABRER : Yayo Olsen, kennedi parsons, natalia jin, gustavo kennedy. [facility] [phone].    ATENCIÓN: Si habla español, tiene a dover disposición servicios gratuitos de asistencia lingüística. Llame al [phone].    We comply with applicable federal civil rights laws and [place] laws. We do not discriminate on the basis of race, color, national origin, age, disability, sex, sexual orientation, or gender identity.

## 2018-10-23 NOTE — PROGRESS NOTES
HPI:     Mr. Sharp is a 48-year-old gentleman who is known to me with a past medical history significant for hypertrophic cardiomyopathy.  He also has diabetes, nicotine dependence (using patch and has cut down to now 7 cigarettes a day), hypertension, hyperlipidemia.  I met him for the first time in 06/2015.  He had chronic atypical chest pain which has resolved essentially.  We did do a nuclear stress test which was nondiagnostic and a CTA in 2013 and 2015 which both had a negative coronary calcium score, but the quality was poor in terms of obstructive disease.  He has diabetes has not been under ideal control with an A1C running around 8. He has peripheral neuropathy from this as well.       Mr. Sharp underwent an echo today.  I reviewed those images.  It is unchanged.  He continues to have mostly mid ventricular hypertrophy with not a lot of significant obstruction.  He has a normal ejection fraction with no significant valve disease.  He does have an ICD that was implanted in 2016.  He has not had any shocks from his device.      He has 4 children, 2 have been screened who are 21 and 23, girls, and 2 have not who are 26 and 27.     He is now working for [employer] 4 times a week and likes his job pretty well.  He does have sleep apnea and does not use CPAP mask.  He is on disability. With history of afib he is on anticoagulation.      PAST MEDICAL HISTORY:  Past Medical History:   Diagnosis Date     Atrial fibrillation (H)      Chest pain     intermittent     Chronic pain      Cognitive disorder     memory loss     Depressive disorder      Dual ICD (implantable cardiac defibrillator) in place 04/29/2014    and pacemaker     GERD (gastroesophageal reflux disease)      H/O traumatic brain injury 2015     Heart attack (H) 1996     HTN (hypertension)      Hypertrophic nonobstructive cardiomyopathy (H) 09/12/2012    s/p ventricular myectomy     Inflammatory arthritis      Intermittent asthma      PAD (peripheral artery  "disease) (H)      Polysubstance abuse (H)      Seizures (H)     last episode 2014 when \"blood sugar dropped\" according to patient     Sleep apnea     doesn't use cpap     Type 2 diabetes mellitus without complications (H)        CURRENT MEDICATIONS:  Current Outpatient Prescriptions   Medication Sig Dispense Refill     albuterol (2.5 MG/3ML) 0.083% neb solution Take 1 vial by nebulization every 6 hours as needed for shortness of breath / dyspnea or wheezing       albuterol (VENTOLIN HFA) 108 (90 BASE) MCG/ACT Inhaler Inhale 2 puffs into the lungs every 4 hours as needed for shortness of breath / dyspnea or wheezing       Atorvastatin Calcium (LIPITOR PO) Take 40 mg by mouth daily        Emollient (EUCERIN INTENSIVE REPAIR EX) Externally apply topically 2 times daily as needed       losartan (COZAAR) 25 MG tablet Take 1 tablet (25 mg) by mouth daily 30 tablet      magnesium oxide (MAG-OX) 400 MG tablet Take 4-6 tablets (1,600-2,400 mg) by mouth every other day 180 tablet 3     metFORMIN (GLUCOPHAGE) 500 MG tablet Take 1,000 mg by mouth 2 times daily (with meals)  60 tablet      metoprolol (LOPRESSOR) 50 MG tablet Take 100 mg by mouth 3 times daily  180 tablet 4     Pregabalin (LYRICA PO) Take 225 mg by mouth 2 times daily        QUEtiapine (SEROQUEL XR) 150 MG TB24 24 hr tablet Take 150 mg by mouth At Bedtime       QUEtiapine Fumarate (SEROQUEL PO) Take 25 mg by mouth 2 times daily       sildenafil (REVATIO) 20 MG tablet Take 0.5 tablets (10 mg) by mouth 3 times daily 90 tablet 3     TRAZODONE HCL PO Take 100 mg by mouth At Bedtime       venlafaxine (EFFEXOR-XR) 150 MG 24 hr capsule Take 150 mg by mouth 2 times daily  30 capsule 1     WARFARIN SODIUM PO Take 8.5 mg by mouth every morning       amitriptyline (ELAVIL) 25 MG tablet Take 25 mg by mouth At Bedtime        bisacodyl (DULCOLAX) 10 MG Suppository Place 10 mg rectally daily as needed        glipiZIDE (GLUCOTROL) 5 MG tablet Take 1 tablet (5 mg) by mouth 2 " times daily (before meals) 30 tablet      glycerin, adult, ([facility] GLYCERIN ADULT) 2 g SUPP Suppository Place 1 suppository rectally daily as needed for constipation (Patient not taking: Reported on 10/23/2018) 30 suppository 3     LANTUS SOLOSTAR 100 UNIT/ML soln INJECT 14 UNITS BY SUBCUTANEOUS ROUTE 1 TIME PER DAY AT BEDTIME  3     lidocaine (XYLOCAINE) 5 % ointment Apply topically 3 times daily For feet       senna-docusate (SENOKOT-S;PERICOLACE) 8.6-50 MG per tablet Take 1 tablet by mouth daily         PAST SURGICAL HISTORY:  Past Surgical History:   Procedure Laterality Date     APPENDECTOMY  1980    [facility]     C CARDIAC SURG PROCEDURE UNLIST       C HAND/FINGER SURGERY UNLISTED       C SHOULDER SURG PROC UNLISTED       C STOMACH SURGERY PROCEDURE UNLISTED       COLONOSCOPY  2017     DISCECTOMY LUMBAR POSTERIOR MICROSCOPIC THREE+ LEVELS  12/16/2011    Procedure:DISCECTOMY LUMBAR POSTERIOR MICROSCOPIC THREE+ LEVELS; Posterior Decompression L2-S1; Surgeon:CAITIE FUNEZ; Location:[facility]     FUSION CERVICAL POSTERIOR ONE LEVEL  8/24/2012    Procedure: FUSION CERVICAL POSTERIOR ONE LEVEL;  Posterior Instrumentated Spinal Fusion Cervical 6-7, Right Iliac Crest Bone [place] ;  Surgeon: Caitie Funez MD;  [facility]     GRAFT BONE FROM ILIAC CREST  8/24/2012    Procedure: GRAFT BONE FROM ILIAC CREST;;  Surgeon: Caitie Funez MD;  Location: [facility] OR     HC SACROPLASTY       HEAD & NECK SURGERY  2009     IMPLANT PACEMAKER       INJECT STEROID (LOCATION) N/A 2/19/2015    Procedure: INJECT STEROID (LOCATION);  Surgeon: Siri Koch MD;  [facility]     INSERT STIMULATOR AND LEADS INTERNAL DORSAL COLUMN  8/7/2013    Procedure: INSERT STIMULATOR AND LEADS INTERNAL DORSAL COLUMN;  SPINAL CORD STIMULATOR IMPLANT ;  Surgeon: Ricardo Bruno MD;  Location: [facility] OR     INSERT STIMULATOR DORSAL COLUMN  08/13/2013    lead replacemend, 12?2016,  battery replacement  "4/4/2016     KNEE SURGERY       LASER CO2 LARYNGOSCOPY N/A 2/19/2015    Procedure: LASER CO2 LARYNGOSCOPY;  Surgeon: Siri Koch MD;  [facility]     LASER CO2 LARYNGOSCOPY, COMPLEX  7/22/2014    Procedure: LASER CO2 LARYNGOSCOPY, COMPLEX;  Surgeon: Siri Koch MD;  [facility]     MYECTOMY SEPTAL  4/14/2014    Procedure: Median Sternotomy, Septal Myectomy, Intraoperative BRENT performed by Dr. Castano, on pump oxygenator.;  Surgeon: Aguila Cannon MD;  [facility]     NECK SURGERY       SHOULDER SURGERY  2006    RIGHT     STOMACH SURGERY  1980    partial gastrectomy for bleeding ulcers, \"stress related\". [facility]     WRIST SURGERY Left 1988    fractured. 1988 or so       ALLERGIES     Allergies   Allergen Reactions     Bee Venom Swelling     Lisinopril      TABS  Severe cough     Penicillins Hives and Difficulty breathing       FAMILY HISTORY:  Family History   Problem Relation Age of Onset     HEART DISEASE Father 32     MIs x2; s/p CABG     Substance Abuse Father      Hypertension Father      Obesity Father      Hyperlipidemia Father      Hypertension Brother      Diabetes Brother      Glaucoma Maternal Grandmother      Hypertension Maternal Grandmother      Diabetes Maternal Grandfather      Glaucoma Maternal Grandfather      Hypertension Maternal Grandfather      Cerebrovascular Disease Maternal Grandfather      Obesity Maternal Grandfather      Hyperlipidemia Maternal Grandfather      Coronary Artery Disease Maternal Grandfather      HEART DISEASE Maternal Grandfather      Substance Abuse Other      Cancer Other      Hypertension Other      Obesity Other      Other Cancer Other      all over     Hyperlipidemia Other      Coronary Artery Disease Other      Obesity Brother      Hyperlipidemia Brother      Lymphoma Maternal Uncle      Diabetes Brother      Depression Daughter      Depression Son      Macular Degeneration No family hx of        SOCIAL " "HISTORY:  Social History     Social History     Marital status: Single     Spouse name: N/A     Number of children: N/A     Years of education: N/A     Social History Main Topics     Smoking status: Light Tobacco Smoker     Packs/day: 0.12     Years: 35.00     Types: Cigarettes     Start date: 3/8/1982     Last attempt to quit: 4/7/2014     Smokeless tobacco: Never Used     Alcohol use 0.0 oz/week     0 Standard drinks or equivalent per week      Comment: rare     Drug use: No      Comment: last using meth 1/2017     Sexual activity: Not Currently     Partners: Female     Birth control/ protection: Male Surgical     Other Topics Concern     Parent/Sibling W/ Cabg, Mi Or Angioplasty Before 65f 55m? Yes     Social History Narrative       ROS:   Constitutional: No fever, chills, or sweats. No weight gain/loss   ENT: No visual disturbance, ear ache, epistaxis, sore throat  Allergies/Immunologic: Negative.   Respiratory: No cough, hemoptysia  Cardiovascular: As per HPI  GI: No nausea, vomiting, hematemesis, melena, or hematochezia  : No urinary frequency, dysuria, or hematuria  Integument: Negative  Psychiatric: Negative  Neuro: Negative  Endocrinology: Negative   Musculoskeletal: Negative    EXAM:  /82 (BP Location: Left arm, Patient Position: Chair, Cuff Size: Adult Large)  Pulse 90  Ht 1.803 m (5' 11\")  Wt 115 kg (253 lb 9.6 oz)  SpO2 96%  BMI 35.37 kg/m2  In general, the patient is a pleasant male in no apparent distress.    HEENT: NC/AT.  PERRLA.  EOMI.  Sclerae white, not injected.  Nares clear.  Pharynx without erythema or exudate.  Dentition intact.    Neck: No adenopathy.  No thyromegaly. Carotids +4/4 bilaterally without bruits.  No jugular venous distension.   Heart: RRR. Normal S1, S2 splits physiologically. No murmur, rub, click, or gallop. The PMI is in the 5th ICS in the midclavicular line. There is no heave.    Lungs: CTA.  No ronchi, wheezes, rales.  No dullness to percussion.   Abdomen: " Soft, nontender, nondistended. No organomegaly.  No bruits.   Extremities: No clubbing, cyanosis, or edema.  The pulses are +4/4 at the radial, brachial, femoral, popliteal, DP, and PT sites bilaterally.  No bruits are noted.  Neurologic: Alert and oriented to person/place/time, normal speech, gait and affect  Skin: No petechiae, purpura or rash.    Labs:  LIPID RESULTS:  Lab Results   Component Value Date    CHOL 205 (A) 01/29/2014    HDL 27 (A) 01/29/2014     (A) 01/29/2014    TRIG 242 (A) 01/29/2014    CHOLHDLRATIO 4.0 09/30/2013       LIVER ENZYME RESULTS:  Lab Results   Component Value Date    AST 23 01/17/2015    ALT 30 01/17/2015       CBC RESULTS:  Lab Results   Component Value Date    WBC 9.3 01/31/2018    RBC 4.81 01/31/2018    HGB 15.4 01/31/2018    HCT 45.0 01/31/2018    MCV 94 01/31/2018    MCH 32.0 01/31/2018    MCHC 34.2 01/31/2018    RDW 13.6 01/31/2018     01/31/2018       BMP RESULTS:  Lab Results   Component Value Date     03/17/2015    POTASSIUM 4.1 01/23/2018    CHLORIDE 110 (H) 03/17/2015    CO2 25 03/17/2015    ANIONGAP 7 03/17/2015     (H) 03/17/2015    BUN 14 03/17/2015    CR 0.93 01/23/2018    GFRESTIMATED 86 01/23/2018    GFRESTBLACK >90 01/23/2018    TANIA 8.8 03/17/2015        A1C RESULTS:  Lab Results   Component Value Date    A1C 8.1 (H) 01/23/2018       INR RESULTS:  Lab Results   Component Value Date    INR 0.98 01/31/2018    INR 0.92 02/19/2015       IMPRESSION, REPORT, PLAN:   1.  Hypertrophic cardiomyopathy status post myectomy with minimal obstruction, stable.   2.  Diabetes type 2 with peripheral neuropathy with an A1c of around 8.     3.  Heart block followed by ectopy status post ICD and permanent pacemaker.   4.  Atrial fibrillation with history of DVT, on chronic anticoagulation with Coumadin.   5.  Nicotine dependence, currently smokes half pack a day with a 30-pack-year smoking history.   6.  History of atypical chest pain with a negative coronary  calcium score on last CT 07/2015.   7.  Glottic stenosis.   8.  Pneumonia.   9.  Chronic back pain.     It was a pleasure to see Mo in follow up.  He is clinically doing well and has worked on diet and exercise and lost 10 pounds since I last saw him.  He will continue to work on this.  We discussed his echo is unchanged.  He will continue to work on diet and exercise.  We will plan to see him back in 9 months with an echo.  He will let us know if he has any issues in the interim.      DONALD Castellano MD         Patient Care Team:  Damon Cooper as PCP - General (Family Practice)  None  Darcy Rodriguez, RN as Clinic Care Coordinator (ENT-Otolaryngology)  Ana Sanchez (Nurse Practitioner)  Cisco Hercules, RN as Nurse Coordinator (Cardiology)  Casey Bangura DPM as MD (Podiatry)  Ricardo Pickard OD as MD (Optometry)  Irlanda Calero MD as MD (Vascular Surgery)  Damon Cooper as Referring Physician (Family Practice)  Elvis Macdonald MD as Resident (Radiology)  Carolynn Villatoro, RN as Nurse Coordinator (Neurology)  Momo Castellano MD as MD (Cardiology)  MOMO CASTELLANO

## 2018-10-23 NOTE — MR AVS SNAPSHOT
After Visit Summary   10/23/2018    Konstantin Sharp    MRN: [id number]           Patient Information     Date Of Birth          1970        Visit Information        Provider Department      10/23/2018 9:00 AM 1, Claudia Cv Device [facility]        Today's Diagnoses     Hypertrophic nonobstructive cardiomyopathy (H)          Care Instructions    It was a pleasure to see you in clinic today.  Please do not hesitate to call with any questions or concerns.  There is a scheduled remote transmission 1/29/2019. We look forward to seeing you in clinic at your next device check in 6 months.    Lianna Apple, RN  Electrophysiology Nurse Clinician  [facility]    During Business Hours Please Call:  [phone]  After Hours Please Call:  [phone] - select option #4 and ask for job code [phone]          Follow-ups after your visit        Additional Services     Follow-Up with Device Clinic-6 months                 Your next 10 appointments already scheduled     Oct 24, 2018 10:00 AM CDT   NM INJECTION with UUNMINJ1   [facility], Nuclear Medicine ([facility])    [address] [id number] [phone]            Oct 24, 2018 10:45 AM CDT   NM SCAN3 with UUNM1   [facility], Nuclear Medicine ([facility])    [address] [id number] [phone]            Oct 24, 2018 11:15 AM CDT   Ekg Stress Nm Lexiscan with UUEKGNMS   [facility] ELECTROCARDIOLOGY ([facility])    [address] [place] [phone]               Oct 24, 2018 12:15 PM CDT   NM MPI WITH LEXISCAN with UUNM1   [facility], Nuclear Medicine ([facility])    [address] [id number] [phone]           How do I prepare for my exam? (Food and drink instructions) Day 1 &  Day 2: Stop all caffeine 12 hours before the test. This includes coffee, tea, soda pop, chocolate and certain medicines (such as Anacin, Excedrin and NoDoz). Also avoid decaf coffee and tea, as these contain small amounts of caffeine. Stop eating 4 hours before the test. You may drink water.  How do I prepare for my exam? (Other instructions) You may need to stop some medicines before the test. Follow your doctor s orders. Day 1 & Day 2: *If you take a beta blocker: Do not take your beta-blocker on the day before your test, unless specifically told to by your doctor. And do not take it on the day of your test. Bring it with you to take after the test.  *If you take Aggrenox or dipyridamole (Persantine, Permole), stop taking it 48 hours before your test. *If you take Viagra, Cialis or Levitra, stop taking it 48 hours before your test. *If you take theophylline or aminophylline, stop taking it 12 hours before your test.  For patients with diabetes: *If you take insulin, call your diabetes care team. Ask if you should take a 1/2 dose the morning of your test. *If you take diabetes medicine by mouth, don t take it on the morning of your test. Bring it with you to take after the test. (If you have questions, call your diabetes care team.)  *Do not take nitrates on the day of your test. Do not wear your Nitro-Patch. *No alcohol, smoking or other tobacco for 12 hours before the test.  What should I wear: Please wear a loose two-piece outfit. If you will have an exercise test, bring rubber-soled walking shoes.  How long does the exam take: *This test can take 1-2 days.* ONE day exam: Allow 3-4 hours for test. IF TWO day exam: Allow 30-60 minutes PER day for test.  What should I bring: Please bring a list of your medicines (including vitamins, minerals and over-the-counter drugs). Leave your valuables at home.  Do I need a :  No  is needed.  What do I need to tell my doctor? When you arrive, please tell us if  you: * Have diabetes * Are breastfeeding * May be pregnant * Have a pacemaker of ICD (implantable defibrillator).  What should I do after the exam: No restrictions, You may resume normal activities.  What is this test: Your doctor has ordered a nuclear stress test to check how well blood is flowing through your heart. You will either exercise or take a medicine that mimics exercise; we will watch your heart.  Who should I call with questions: If you have any questions, please call the Imaging Department where you will have your exam. Directions, parking instructions, and other information is available on our website, [email or web address].            2019 11:00 AM CDT   (Arrive by 10:45 AM)   Implanted Defibulator with Uc Cv Device [address] [place] [address] ([facility] [place])    [address] [place] [address] Floor  [id number]                 Future tests that were ordered for you today     Open Future Orders        Priority Expected Expires Ordered    Follow-Up with Device Clinic-6 months Routine 2019 2019 10/23/2018            Who to contact     Please call your clinic at No information on file. to:    Ask questions about your health    Make or cancel appointments    Discuss your medicines    Learn about your test results    Speak to your doctor            Additional Information About Your Visit        MyChart Information     [employer] is an electronic gateway that provides easy, online access to your medical records. With Yodo1, you can request a clinic appointment, read your test results, renew a prescription or communicate with your care team.     To sign up for PTS Physicianst visit the website at [email or web address]   You will be asked to enter the access code listed below, as well as some personal information. Please follow the directions to create your username and password.     Your access code is: [email or web address]  Expires: 2019  6:30 AM     Your access code will  in 90  days. If you need help or a new code, please contact your [facility] or call [phone] for assistance.        Care EveryWhere ID     This is your Care EveryWhere ID. This could be used by other organizations to access your [facility] medical records  [id number]         Blood Pressure from Last 3 Encounters:   10/23/18 122/82   07/30/18 121/75   05/30/18 115/78    Weight from Last 3 Encounters:   10/23/18 115 kg (253 lb 9.6 oz)   07/30/18 115.2 kg (254 lb)   05/30/18 120.2 kg (265 lb)              We Performed the Following     (11162) ICD DEVICE PROGRAMMING EVAL, DUAL LEAD ICD     Follow-Up with Device Clinic - 3 months        Primary Care Provider Office Phone # Fax #    Damon Cooper [phone]       [facility] [address] [address]        Equal Access to Services     CAROLINE BARBER : Hadii aad ku hadasho Soomaali, waaxda luqadaha, qaybta kaalmada adeegyada, waxay dahianain hayjeffn betzaida perry . So [facility] [phone].    ATENCIÓN: Si habla español, tiene a dover disposición servicios gratuitos de asistencia lingüística. Llame al [phone].    We comply with applicable federal civil rights laws and [place] laws. We do not discriminate on the basis of race, color, national origin, age, disability, sex, sexual orientation, or gender identity.            Thank you!     Thank you for choosing [facility]  for your care. Our goal is always to provide you with excellent care. Hearing back from our patients is one way we can continue to improve our services. Please take a few minutes to complete the written survey that you may receive in the mail after your visit with us. Thank you!             Your Updated Medication List - Protect others around you: Learn how to safely use, store and throw away your medicines at www.disposemymeds.org.          This list is accurate as of 10/23/18 11:25 AM.  Always use your most recent med list.                    Brand Name Dispense Instructions for use Diagnosis    * albuterol (2.5 MG/3ML) 0.083% neb solution      Take 1 vial by nebulization every 6 hours as needed for shortness of breath / dyspnea or wheezing        * VENTOLIN  (90 Base) MCG/ACT inhaler   Generic drug:  albuterol      Inhale 2 puffs into the lungs every 4 hours as needed for shortness of breath / dyspnea or wheezing        amitriptyline 25 MG tablet    ELAVIL     Take 25 mg by mouth At Bedtime        bisacodyl 10 MG Suppository    DULCOLAX     Place 10 mg rectally daily as needed        EUCERIN INTENSIVE REPAIR EX      Externally apply topically 2 times daily as needed        glipiZIDE 5 MG tablet    GLUCOTROL    30 tablet    Take 1 tablet (5 mg) by mouth 2 times daily (before meals)    Diabetes mellitus (H)       glycerin (adult) 2 g Supp Suppository    [employer] GLYCERIN ADULT    30 suppository    Place 1 suppository rectally daily as needed for constipation    Constipation, unspecified constipation type       LANTUS SOLOSTAR 100 UNIT/ML injection   Generic drug:  insulin glargine      INJECT 14 UNITS BY SUBCUTANEOUS ROUTE 1 TIME PER DAY AT BEDTIME        lidocaine 5 % ointment    XYLOCAINE     Apply topically 3 times daily For feet        LIPITOR PO      Take 40 mg by mouth daily        losartan 25 MG tablet    COZAAR    30 tablet    Take 1 tablet (25 mg) by mouth daily    Hypertrophic cardiomyopathy (H)       LYRICA PO      Take 225 mg by mouth 2 times daily        magnesium oxide 400 MG tablet    MAG-OX    180 tablet    Take 4-6 tablets (1,600-2,400 mg) by mouth every other day    Drug-induced constipation       metFORMIN 500 MG tablet    GLUCOPHAGE    60 tablet    Take 1,000 mg by mouth 2 times daily (with meals)    Diabetes mellitus (H)       metoprolol tartrate 50 MG tablet    LOPRESSOR    180 tablet    Take 100 mg by mouth 3 times daily    S/P ventricular septal myectomy, Congestive heart failure, unspecified, Hypertrophic cardiomyopathy  (H)       senna-docusate 8.6-50 MG per tablet    SENOKOT-S;PERICOLACE     Take 1 tablet by mouth daily        * SEROQUEL PO      Take 25 mg by mouth 2 times daily        * QUEtiapine 150 MG Tb24 24 hr tablet    SEROquel XR     Take 150 mg by mouth At Bedtime        sildenafil 20 MG tablet    REVATIO    90 tablet    Take 0.5 tablets (10 mg) by mouth 3 times daily    Critical lower limb ischemia       TRAZODONE HCL PO      Take 100 mg by mouth At Bedtime        venlafaxine 150 MG 24 hr capsule    EFFEXOR-XR    30 capsule    Take 150 mg by mouth 2 times daily    Adjustment disorder with depressed mood       WARFARIN SODIUM PO      Take 8.5 mg by mouth every morning        * Notice:  This list has 4 medication(s) that are the same as other medications prescribed for you. Read the directions carefully, and ask your doctor or other care provider to review them with you.

## 2018-10-23 NOTE — PATIENT INSTRUCTIONS
"You were seen today in the Adult Congenital and Cardiovascular Genetics Clinic at the [facility].    Cardiology Providers you saw during your visit:  Dr Lia Castellano    Diagnosis:  Hypertrophic Cardiomyopathy    Results:  Dr Castellano reviewed the results of your echo with you in clinic today    Recommendations:    1.  Continue to eat a heart healthy, low salt diet.  2.  Continue to get 20-30 minutes of aerobic activity, 4-5 days per week.  Examples of aerobic activity include walking, running, swimming, cycling, etc.  3.  Continue to observe good oral hygiene, with regular dental visits.  4.  It was a pleasure to see you in clinic today.  Please do not hesitate to call with any questions or concerns.  There is a scheduled remote transmission 1/29/2019. We look forward to seeing you in clinic at your next device check in 6 months.  5.  Please have your Lexiscan tomorrow.  For this, nothing to eat or drink 3 hours prior.  No caffeine, alcohol, or tobacco 12 hours prior.  No need to hold your Metoprolol for this test.        Vitals:    10/23/18 1036 10/23/18 1040   BP: 158/80 122/82   BP Location: Left arm Left arm   Patient Position: Chair Chair   Cuff Size: Adult Large Adult Large   Pulse: 90    SpO2: 96%    Weight: 115 kg (253 lb 9.6 oz)    Height: 1.803 m (5' 11\")        SBE prophylaxis:   Yes____  No__x__    Lifelong Bacterial Endocarditis Prophylaxis:  YES____  NO____    If YES is checked, follow the recommendations outlined below:   1. Take antibiotic(s) prior to recommended dental procedures and procedures on the respiratory tract or with infected skin, muscle or bones. SBE prophylaxis is not needed for routine GI and  procedures (ie. Colonoscopy or vaginal delivery)   2. Observe good oral hygiene daily, as advised by your dentist. Get regular professional dental care.   3. Keep cuts clean.   4. Infections should be treated promptly.   5. Symptoms of Infective Endocarditis could include: fever " lasting more than 4-5 days or a recurrent fever that initially resolves but returns within 1-2 days)     Exercise restrictions:   Yes____  No__x__         If yes, list restrictions:  Must be allowed to rest if fatigued or SOB      Work restrictions:  Yes____  No__x__         If yes, list restrictions:    FASTING CHOLESTEROL was checked in the last 5 years YES_x__  NO___  2014  Continue to eat a heart healthy, low salt diet.         ____ Fasting lipid panel order today         ____ No changes in medications          ____ I recommend the following changes in your cholesterol medications.:          ____ Please follow up for cholesterol screening at your primary care physician      Follow-up:  Follow up with Dr Castellano in 9 months with an echocardiogram and labs.      For after hours urgent needs, call [phone] and ask to speak to the Adult Congenital Physician on call.  Mention Job Code [phone].    For emergencies call [phone].    For any scheduling needs, please call [facility] Procedure , at [phone]  Thank you for your visit today!  If you have questions or concerns about today's visit, please call me.    Cisco Hercules, RN, BSN  Cardiology Care Coordinator  [facility]  [phone]    [address]  Mail Code [id number]  [place] [address]

## 2018-10-23 NOTE — MR AVS SNAPSHOT
"              After Visit Summary   10/23/2018    Konstantin Sharp    MRN: [id number]           Patient Information     Date Of Birth          1970        Visit Information        Provider Department      10/23/2018 10:30 AM Momo Castellano MD [facility]        Today's Diagnoses     Hypertrophic cardiomyopathy (H)          Care Instructions    You were seen today in the Adult Congenital and Cardiovascular Genetics Clinic at the [facility].    Cardiology Providers you saw during your visit:  Dr Lia Castellano    Diagnosis:  Hypertrophic Cardiomyopathy    Results:  Dr Castellano reviewed the results of your echo with you in clinic today    Recommendations:    1.  Continue to eat a heart healthy, low salt diet.  2.  Continue to get 20-30 minutes of aerobic activity, 4-5 days per week.  Examples of aerobic activity include walking, running, swimming, cycling, etc.  3.  Continue to observe good oral hygiene, with regular dental visits.  4.  It was a pleasure to see you in clinic today.  Please do not hesitate to call with any questions or concerns.  There is a scheduled remote transmission 1/29/2019. We look forward to seeing you in clinic at your next device check in 6 months.  5.  Please have your Lexiscan tomorrow.  For this, nothing to eat or drink 3 hours prior.  No caffeine, alcohol, or tobacco 12 hours prior.  No need to hold your Metoprolol for this test.        Vitals:    10/23/18 1036 10/23/18 1040   BP: 158/80 122/82   BP Location: Left arm Left arm   Patient Position: Chair Chair   Cuff Size: Adult Large Adult Large   Pulse: 90    SpO2: 96%    Weight: 115 kg (253 lb 9.6 oz)    Height: 1.803 m (5' 11\")        SBE prophylaxis:   Yes____  No__x__    Lifelong Bacterial Endocarditis Prophylaxis:  YES____  NO____    If YES is checked, follow the recommendations outlined below:   1. Take antibiotic(s) prior to recommended dental procedures and procedures on the respiratory tract or with " infected skin, muscle or bones. SBE prophylaxis is not needed for routine GI and  procedures (ie. Colonoscopy or vaginal delivery)   2. Observe good oral hygiene daily, as advised by your dentist. Get regular professional dental care.   3. Keep cuts clean.   4. Infections should be treated promptly.   5. Symptoms of Infective Endocarditis could include: fever lasting more than 4-5 days or a recurrent fever that initially resolves but returns within 1-2 days)     Exercise restrictions:   Yes____  No__x__         If yes, list restrictions:  Must be allowed to rest if fatigued or SOB      Work restrictions:  Yes____  No__x__         If yes, list restrictions:    FASTING CHOLESTEROL was checked in the last 5 years YES_x__  NO___  2014  Continue to eat a heart healthy, low salt diet.         ____ Fasting lipid panel order today         ____ No changes in medications          ____ I recommend the following changes in your cholesterol medications.:          ____ Please follow up for cholesterol screening at your primary care physician      Follow-up:  Follow up with Dr Castelalno in 9 months with an echocardiogram and labs.      For after hours urgent needs, call [phone] and ask to speak to the Adult Congenital Physician on call.  Mention Job Code 0401.    For emergencies call 111.    For any scheduling needs, please call [facility] , at [phone]  Thank you for your visit today!  If you have questions or concerns about today's visit, please call me.    Cisco Hercules, RN, BSN  Cardiology Care Coordinator  [facility]  [phone]    [address] [place] [address]  Mail Code [id number]  [place] [address]            Follow-ups after your visit        Additional Services     Adult Congenital and CV Genetics Clinic       With echo and fasting labs                  Your next 10 appointments already scheduled     Oct 24, 2018 10:00 AM CDT   NM INJECTION with UUNMINJ1   [facility],  [place], Nuclear Medicine ([facility])    [address] [address]   [phone]            Oct 24, 2018 10:45 AM CDT   NM SCAN3 with UUNM1   [facility] Nuclear Medicine ([facility])    [address] [address]   [phone]            Oct 24, 2018 11:15 AM CDT   Ekg Stress Nm Lexiscan with UUEKGNMS   [facility] ([facility])    [address] [place] [id number]               Oct 24, 2018 12:15 PM CDT   NM MPI WITH LEXISCAN with UUNM1   [facility] Nuclear Medicine ([facility])    [address] [address]   [phone]           How do I prepare for my exam? (Food and drink instructions) Day 1 & Day 2: Stop all caffeine 12 hours before the test. This includes coffee, tea, soda pop, chocolate and certain medicines (such as Anacin, Excedrin and NoDoz). Also avoid decaf coffee and tea, as these contain small amounts of caffeine. Stop eating 4 hours before the test. You may drink water.  How do I prepare for my exam? (Other instructions) You may need to stop some medicines before the test. Follow your doctor s orders. Day 1 & Day 2: *If you take a beta blocker: Do not take your beta-blocker on the day before your test, unless specifically told to by your doctor. And do not take it on the day of your test. Bring it with you to take after the test.  *If you take Aggrenox or dipyridamole (Persantine, Permole), stop taking it 48 hours before your test. *If you take Viagra, Cialis or Levitra, stop taking it 48 hours before your test. *If you take theophylline or aminophylline, stop taking it 12 hours before your test.  For patients with diabetes: *If you take insulin, call your diabetes care team. Ask if you should take a 1/2 dose the morning of your test.  *If you take diabetes medicine by mouth, don t take it on the morning of your test. Bring it with you to take after the test. (If you have questions, call your diabetes care team.)  *Do not take nitrates on the day of your test. Do not wear your Nitro-Patch. *No alcohol, smoking or other tobacco for 12 hours before the test.  What should I wear: Please wear a loose two-piece outfit. If you will have an exercise test, bring rubber-soled walking shoes.  How long does the exam take: *This test can take 1-2 days.* ONE day exam: Allow 3-4 hours for test. IF TWO day exam: Allow  minutes PER day for test.  What should I bring: Please bring a list of your medicines (including vitamins, minerals and over-the-counter drugs). Leave your valuables at home.  Do I need a :  No  is needed.  What do I need to tell my doctor? When you arrive, please tell us if you: * Have diabetes * Are breastfeeding * May be pregnant * Have a pacemaker of ICD (implantable defibrillator).  What should I do after the exam: No restrictions, You may resume normal activities.  What is this test: Your doctor has ordered a nuclear stress test to check how well blood is flowing through your heart. You will either exercise or take a medicine that mimics exercise; we will watch your heart.  Who should I call with questions: If you have any questions, please call the Imaging Department where you will have your exam. Directions, parking instructions, and other information is available on our website, [email or web address].            Apr 23, 2019 11:00 AM CDT   (Arrive by 10:45 AM)   Implanted Defibulator with Uc Cv Device [address] [place] [address] ([facility])    [address] [place] [address]  3rd Floor  [id number]                 Future tests that were ordered for you today     Open Future Orders        Priority Expected Expires Ordered    Adult Congenital and CV Genetics Clinic Routine 7/23/2019 10/23/2019 10/23/2018    Echo  "Complete Routine 2019 10/24/2019 10/23/2018    Follow-Up with Device Clinic-6 months Routine 2019 2019 10/23/2018            Who to contact     If you have questions or need follow up information about today's clinic visit or your schedule please contact [facility] directly at [phone].  Normal or non-critical lab and imaging results will be communicated to you by MyChart, letter or phone within 4 business days after the clinic has received the results. If you do not hear from us within 7 days, please contact the clinic through [employer] or phone. If you have a critical or abnormal lab result, we will notify you by phone as soon as possible.  Submit refill requests through [employer] or call your pharmacy and they will forward the refill request to us. Please allow 3 business days for your refill to be completed.          Additional Information About Your Visit        [employer] Information     [employer] lets you send messages to your doctor, view your test results, renew your prescriptions, schedule appointments and more. To sign up, go to [email or web address] . Click on \"Log in\" on the left side of the screen, which will take you to the Welcome page. Then click on \"Sign up Now\" on the right side of the page.     You will be asked to enter the access code listed below, as well as some personal information. Please follow the directions to create your username and password.     Your access code is: [email or web address]  Expires: 2019  6:30 AM     Your access code will  in 90 days. If you need help or a new code, please call your [place] clinic or [phone].        Care EveryWhere ID     This is your Care EveryWhere ID. This could be used by other organizations to access your [place] medical records  [id number]        Your Vitals Were     Pulse Height Pulse Oximetry BMI (Body Mass Index)          90 1.803 m (5' 11\") 96% 35.37 kg/m2         Blood Pressure from Last 3 Encounters:   10/23/18 " 122/82   07/30/18 121/75   05/30/18 115/78    Weight from Last 3 Encounters:   10/23/18 115 kg (253 lb 9.6 oz)   07/30/18 115.2 kg (254 lb)   05/30/18 120.2 kg (265 lb)              We Performed the Following     Follow-Up with Cardiologist        Primary Care Provider Office Phone # Fax #    Damon Cooper [phone]       [facility] [address] [address]        Equal Access to Services     CAROLINE BARBER : Hadii aad ku hadasho Soomaali, waaxda luqadaha, qaybta kaalmada adeegyada, waxay idiin hayaan adeflower perry . [facility] [phone].    ATENCIÓN: Si habla español, tiene a dover disposición servicios gratuitos de asistencia lingüística. [facility] [phone].    We comply with applicable federal civil rights laws and [place] laws. We do not discriminate on the basis of race, color, national origin, age, disability, sex, sexual orientation, or gender identity.            Thank you!     Thank you for choosing [facility]  for your care. Our goal is always to provide you with excellent care. Hearing back from our patients is one way we can continue to improve our services. Please take a few minutes to complete the written survey that you may receive in the mail after your visit with us. Thank you!             Your Updated Medication List - Protect others around you: Learn how to safely use, store and throw away your medicines at www.disposemymeds.org.          This list is accurate as of 10/23/18  6:43 PM.  Always use your most recent med list.                   Brand Name Dispense Instructions for use Diagnosis    * albuterol (2.5 MG/3ML) 0.083% neb solution      Take 1 vial by nebulization every 6 hours as needed for shortness of breath / dyspnea or wheezing        * VENTOLIN  (90 Base) MCG/ACT inhaler   Generic drug:  albuterol      Inhale 2 puffs into the lungs every 4 hours as needed for shortness of breath / dyspnea or wheezing        amitriptyline 25 MG  tablet    ELAVIL     Take 25 mg by mouth At Bedtime        bisacodyl 10 MG Suppository    DULCOLAX     Place 10 mg rectally daily as needed        EUCERIN INTENSIVE REPAIR EX      Externally apply topically 2 times daily as needed        glipiZIDE 5 MG tablet    GLUCOTROL    30 tablet    Take 1 tablet (5 mg) by mouth 2 times daily (before meals)    Diabetes mellitus (H)       glycerin (adult) 2 g Supp Suppository    [employer] GLYCERIN ADULT    30 suppository    Place 1 suppository rectally daily as needed for constipation    Constipation, unspecified constipation type       LANTUS SOLOSTAR 100 UNIT/ML injection   Generic drug:  insulin glargine      INJECT 14 UNITS BY SUBCUTANEOUS ROUTE 1 TIME PER DAY AT BEDTIME        lidocaine 5 % ointment    XYLOCAINE     Apply topically 3 times daily For feet        LIPITOR PO      Take 40 mg by mouth daily        losartan 25 MG tablet    COZAAR    30 tablet    Take 1 tablet (25 mg) by mouth daily    Hypertrophic cardiomyopathy (H)       LYRICA PO      Take 225 mg by mouth 2 times daily        magnesium oxide 400 MG tablet    MAG-OX    180 tablet    Take 4-6 tablets (1,600-2,400 mg) by mouth every other day    Drug-induced constipation       metFORMIN 500 MG tablet    GLUCOPHAGE    60 tablet    Take 1,000 mg by mouth 2 times daily (with meals)    Diabetes mellitus (H)       metoprolol tartrate 50 MG tablet    LOPRESSOR    180 tablet    Take 100 mg by mouth 3 times daily    S/P ventricular septal myectomy, Congestive heart failure, unspecified, Hypertrophic cardiomyopathy (H)       senna-docusate 8.6-50 MG per tablet    SENOKOT-S;PERICOLACE     Take 1 tablet by mouth daily        * SEROQUEL PO      Take 25 mg by mouth 2 times daily        * QUEtiapine 150 MG Tb24 24 hr tablet    SEROquel XR     Take 150 mg by mouth At Bedtime        sildenafil 20 MG tablet    REVATIO    90 tablet    Take 0.5 tablets (10 mg) by mouth 3 times daily    Critical lower limb ischemia       TRAZODONE HCL PO       Take 100 mg by mouth At Bedtime        venlafaxine 150 MG 24 hr capsule    EFFEXOR-XR    30 capsule    Take 150 mg by mouth 2 times daily    Adjustment disorder with depressed mood       WARFARIN SODIUM PO      Take 8.5 mg by mouth every morning        * Notice:  This list has 4 medication(s) that are the same as other medications prescribed for you. Read the directions carefully, and ask your doctor or other care provider to review them with you.

## 2018-10-24 ENCOUNTER — COMMUNICATION - HEALTHEAST (OUTPATIENT)
Dept: NEUROLOGY | Facility: CLINIC | Age: 48
End: 2018-10-24

## 2018-10-24 DIAGNOSIS — F33.1 MDD (MAJOR DEPRESSIVE DISORDER), RECURRENT EPISODE, MODERATE (H): ICD-10-CM

## 2018-10-24 DIAGNOSIS — K59.03 DRUG-INDUCED CONSTIPATION: ICD-10-CM

## 2018-10-24 RX ORDER — MAGNESIUM OXIDE 400 MG/1
TABLET ORAL EVERY OTHER DAY
Qty: 180 TABLET | Refills: 3 | Status: SHIPPED | OUTPATIENT
Start: 2018-10-24 | End: 2018-10-24

## 2018-10-24 RX ORDER — MAGNESIUM OXIDE 400 MG/1
1000 TABLET ORAL EVERY OTHER DAY
Qty: 180 TABLET | Refills: 3 | Status: SHIPPED | OUTPATIENT
Start: 2018-10-24 | End: 2018-11-09

## 2018-11-09 DIAGNOSIS — K59.03 DRUG-INDUCED CONSTIPATION: ICD-10-CM

## 2018-11-09 RX ORDER — MAGNESIUM OXIDE 400 MG/1
1000 TABLET ORAL DAILY
Qty: 180 TABLET | Refills: 3 | Status: SHIPPED | OUTPATIENT
Start: 2018-11-09 | End: 2022-01-01

## 2018-11-09 NOTE — PROGRESS NOTES
Patient called regarding Magnesium oxide prescription. Changed frequency from every other day to every day.

## 2018-11-15 ENCOUNTER — HOSPITAL ENCOUNTER (OUTPATIENT)
Dept: NEUROLOGY | Facility: CLINIC | Age: 48
Setting detail: THERAPIES SERIES
Discharge: STILL ADMITTED OR ADMINISTRATIVE | End: 2018-11-15
Attending: PSYCHIATRY & NEUROLOGY

## 2018-11-15 DIAGNOSIS — F33.1 MDD (MAJOR DEPRESSIVE DISORDER), RECURRENT EPISODE, MODERATE (H): ICD-10-CM

## 2018-12-11 ENCOUNTER — COMMUNICATION - HEALTHEAST (OUTPATIENT)
Dept: NEUROLOGY | Facility: CLINIC | Age: 48
End: 2018-12-11

## 2018-12-11 DIAGNOSIS — G47.00 INSOMNIA: ICD-10-CM

## 2018-12-11 DIAGNOSIS — F33.1 MDD (MAJOR DEPRESSIVE DISORDER), RECURRENT EPISODE, MODERATE (H): ICD-10-CM

## 2019-01-29 ENCOUNTER — ANCILLARY PROCEDURE (OUTPATIENT)
Dept: CARDIOLOGY | Facility: CLINIC | Age: 49
End: 2019-01-29
Attending: INTERNAL MEDICINE
Payer: MEDICAID

## 2019-01-29 DIAGNOSIS — I42.9 CARDIOMYOPATHY (H): ICD-10-CM

## 2019-01-29 PROCEDURE — 93295 DEV INTERROG REMOTE 1/2/MLT: CPT | Performed by: INTERNAL MEDICINE

## 2019-01-29 PROCEDURE — 93296 REM INTERROG EVL PM/IDS: CPT | Mod: ZF

## 2019-02-13 LAB
MDC_IDC_LEAD_IMPLANT_DT: NORMAL
MDC_IDC_LEAD_IMPLANT_DT: NORMAL
MDC_IDC_LEAD_LOCATION: NORMAL
MDC_IDC_LEAD_LOCATION: NORMAL
MDC_IDC_LEAD_MFG: NORMAL
MDC_IDC_LEAD_MFG: NORMAL
MDC_IDC_LEAD_MODEL: NORMAL
MDC_IDC_LEAD_MODEL: NORMAL
MDC_IDC_LEAD_POLARITY_TYPE: NORMAL
MDC_IDC_LEAD_POLARITY_TYPE: NORMAL
MDC_IDC_LEAD_SERIAL: NORMAL
MDC_IDC_LEAD_SERIAL: NORMAL
MDC_IDC_MSMT_BATTERY_DTM: NORMAL
MDC_IDC_MSMT_BATTERY_REMAINING_LONGEVITY: 31 MO
MDC_IDC_MSMT_BATTERY_RRT_TRIGGER: 2.73
MDC_IDC_MSMT_BATTERY_STATUS: NORMAL
MDC_IDC_MSMT_BATTERY_VOLTAGE: 2.96 V
MDC_IDC_MSMT_CAP_CHARGE_DTM: NORMAL
MDC_IDC_MSMT_CAP_CHARGE_ENERGY: 18 J
MDC_IDC_MSMT_CAP_CHARGE_TIME: 3.97
MDC_IDC_MSMT_CAP_CHARGE_TYPE: NORMAL
MDC_IDC_MSMT_LEADCHNL_RA_IMPEDANCE_VALUE: 399 OHM
MDC_IDC_MSMT_LEADCHNL_RA_PACING_THRESHOLD_AMPLITUDE: 1.12 V
MDC_IDC_MSMT_LEADCHNL_RA_PACING_THRESHOLD_PULSEWIDTH: 0.4 MS
MDC_IDC_MSMT_LEADCHNL_RA_SENSING_INTR_AMPL: 0.75 MV
MDC_IDC_MSMT_LEADCHNL_RA_SENSING_INTR_AMPL: 0.75 MV
MDC_IDC_MSMT_LEADCHNL_RV_IMPEDANCE_VALUE: 323 OHM
MDC_IDC_MSMT_LEADCHNL_RV_IMPEDANCE_VALUE: 456 OHM
MDC_IDC_MSMT_LEADCHNL_RV_PACING_THRESHOLD_AMPLITUDE: 0.88 V
MDC_IDC_MSMT_LEADCHNL_RV_PACING_THRESHOLD_PULSEWIDTH: 0.4 MS
MDC_IDC_MSMT_LEADCHNL_RV_SENSING_INTR_AMPL: 10.25 MV
MDC_IDC_MSMT_LEADCHNL_RV_SENSING_INTR_AMPL: 10.25 MV
MDC_IDC_PG_IMPLANT_DTM: NORMAL
MDC_IDC_PG_MFG: NORMAL
MDC_IDC_PG_MODEL: NORMAL
MDC_IDC_PG_SERIAL: NORMAL
MDC_IDC_PG_TYPE: NORMAL
MDC_IDC_SESS_CLINIC_NAME: NORMAL
MDC_IDC_SESS_DTM: NORMAL
MDC_IDC_SESS_TYPE: NORMAL
MDC_IDC_SET_BRADY_AT_MODE_SWITCH_RATE: 171 {BEATS}/MIN
MDC_IDC_SET_BRADY_HYSTRATE: NORMAL
MDC_IDC_SET_BRADY_LOWRATE: 60 {BEATS}/MIN
MDC_IDC_SET_BRADY_MAX_SENSOR_RATE: 120 {BEATS}/MIN
MDC_IDC_SET_BRADY_MAX_TRACKING_RATE: 140 {BEATS}/MIN
MDC_IDC_SET_BRADY_MODE: NORMAL
MDC_IDC_SET_BRADY_PAV_DELAY_LOW: 180 MS
MDC_IDC_SET_BRADY_SAV_DELAY_LOW: 150 MS
MDC_IDC_SET_LEADCHNL_RA_PACING_AMPLITUDE: 2.5 V
MDC_IDC_SET_LEADCHNL_RA_PACING_ANODE_ELECTRODE_1: NORMAL
MDC_IDC_SET_LEADCHNL_RA_PACING_ANODE_LOCATION_1: NORMAL
MDC_IDC_SET_LEADCHNL_RA_PACING_CAPTURE_MODE: NORMAL
MDC_IDC_SET_LEADCHNL_RA_PACING_CATHODE_ELECTRODE_1: NORMAL
MDC_IDC_SET_LEADCHNL_RA_PACING_CATHODE_LOCATION_1: NORMAL
MDC_IDC_SET_LEADCHNL_RA_PACING_POLARITY: NORMAL
MDC_IDC_SET_LEADCHNL_RA_PACING_PULSEWIDTH: 0.4 MS
MDC_IDC_SET_LEADCHNL_RA_SENSING_ANODE_ELECTRODE_1: NORMAL
MDC_IDC_SET_LEADCHNL_RA_SENSING_ANODE_LOCATION_1: NORMAL
MDC_IDC_SET_LEADCHNL_RA_SENSING_CATHODE_ELECTRODE_1: NORMAL
MDC_IDC_SET_LEADCHNL_RA_SENSING_CATHODE_LOCATION_1: NORMAL
MDC_IDC_SET_LEADCHNL_RA_SENSING_POLARITY: NORMAL
MDC_IDC_SET_LEADCHNL_RA_SENSING_SENSITIVITY: 0.3 MV
MDC_IDC_SET_LEADCHNL_RV_PACING_AMPLITUDE: 2 V
MDC_IDC_SET_LEADCHNL_RV_PACING_ANODE_ELECTRODE_1: NORMAL
MDC_IDC_SET_LEADCHNL_RV_PACING_ANODE_LOCATION_1: NORMAL
MDC_IDC_SET_LEADCHNL_RV_PACING_CAPTURE_MODE: NORMAL
MDC_IDC_SET_LEADCHNL_RV_PACING_CATHODE_ELECTRODE_1: NORMAL
MDC_IDC_SET_LEADCHNL_RV_PACING_CATHODE_LOCATION_1: NORMAL
MDC_IDC_SET_LEADCHNL_RV_PACING_POLARITY: NORMAL
MDC_IDC_SET_LEADCHNL_RV_PACING_PULSEWIDTH: 0.4 MS
MDC_IDC_SET_LEADCHNL_RV_SENSING_ANODE_ELECTRODE_1: NORMAL
MDC_IDC_SET_LEADCHNL_RV_SENSING_ANODE_LOCATION_1: NORMAL
MDC_IDC_SET_LEADCHNL_RV_SENSING_CATHODE_ELECTRODE_1: NORMAL
MDC_IDC_SET_LEADCHNL_RV_SENSING_CATHODE_LOCATION_1: NORMAL
MDC_IDC_SET_LEADCHNL_RV_SENSING_POLARITY: NORMAL
MDC_IDC_SET_LEADCHNL_RV_SENSING_SENSITIVITY: 0.3 MV
MDC_IDC_SET_ZONE_DETECTION_BEATS_DENOMINATOR: 40 {BEATS}
MDC_IDC_SET_ZONE_DETECTION_BEATS_NUMERATOR: 30 {BEATS}
MDC_IDC_SET_ZONE_DETECTION_INTERVAL: 270 MS
MDC_IDC_SET_ZONE_DETECTION_INTERVAL: 350 MS
MDC_IDC_SET_ZONE_DETECTION_INTERVAL: 360 MS
MDC_IDC_SET_ZONE_DETECTION_INTERVAL: 400 MS
MDC_IDC_SET_ZONE_DETECTION_INTERVAL: NORMAL
MDC_IDC_SET_ZONE_TYPE: NORMAL
MDC_IDC_STAT_AT_BURDEN_PERCENT: 0 %
MDC_IDC_STAT_AT_DTM_END: NORMAL
MDC_IDC_STAT_AT_DTM_START: NORMAL
MDC_IDC_STAT_BRADY_AP_VP_PERCENT: 12.2 %
MDC_IDC_STAT_BRADY_AP_VS_PERCENT: 0 %
MDC_IDC_STAT_BRADY_AS_VP_PERCENT: 87.79 %
MDC_IDC_STAT_BRADY_AS_VS_PERCENT: 0 %
MDC_IDC_STAT_BRADY_DTM_END: NORMAL
MDC_IDC_STAT_BRADY_DTM_START: NORMAL
MDC_IDC_STAT_BRADY_RA_PERCENT_PACED: 12.2 %
MDC_IDC_STAT_BRADY_RV_PERCENT_PACED: 100 %
MDC_IDC_STAT_EPISODE_RECENT_COUNT: 0
MDC_IDC_STAT_EPISODE_RECENT_COUNT_DTM_END: NORMAL
MDC_IDC_STAT_EPISODE_RECENT_COUNT_DTM_START: NORMAL
MDC_IDC_STAT_EPISODE_TOTAL_COUNT: 0
MDC_IDC_STAT_EPISODE_TOTAL_COUNT: 2
MDC_IDC_STAT_EPISODE_TOTAL_COUNT: 2
MDC_IDC_STAT_EPISODE_TOTAL_COUNT: 25
MDC_IDC_STAT_EPISODE_TOTAL_COUNT_DTM_END: NORMAL
MDC_IDC_STAT_EPISODE_TOTAL_COUNT_DTM_START: NORMAL
MDC_IDC_STAT_EPISODE_TYPE: NORMAL
MDC_IDC_STAT_TACHYTHERAPY_ATP_DELIVERED_RECENT: 0
MDC_IDC_STAT_TACHYTHERAPY_ATP_DELIVERED_TOTAL: 0
MDC_IDC_STAT_TACHYTHERAPY_RECENT_DTM_END: NORMAL
MDC_IDC_STAT_TACHYTHERAPY_RECENT_DTM_START: NORMAL
MDC_IDC_STAT_TACHYTHERAPY_SHOCKS_ABORTED_RECENT: 0
MDC_IDC_STAT_TACHYTHERAPY_SHOCKS_ABORTED_TOTAL: 0
MDC_IDC_STAT_TACHYTHERAPY_SHOCKS_DELIVERED_RECENT: 0
MDC_IDC_STAT_TACHYTHERAPY_SHOCKS_DELIVERED_TOTAL: 2
MDC_IDC_STAT_TACHYTHERAPY_TOTAL_DTM_END: NORMAL
MDC_IDC_STAT_TACHYTHERAPY_TOTAL_DTM_START: NORMAL

## 2019-02-26 ENCOUNTER — HOSPITAL ENCOUNTER (OUTPATIENT)
Dept: NEUROLOGY | Facility: CLINIC | Age: 49
Setting detail: THERAPIES SERIES
Discharge: STILL ADMITTED OR ADMINISTRATIVE | End: 2019-02-26
Attending: PSYCHIATRY & NEUROLOGY

## 2019-02-26 DIAGNOSIS — F33.1 MDD (MAJOR DEPRESSIVE DISORDER), RECURRENT EPISODE, MODERATE (H): ICD-10-CM

## 2019-03-01 ENCOUNTER — COMMUNICATION - HEALTHEAST (OUTPATIENT)
Dept: NEUROLOGY | Facility: CLINIC | Age: 49
End: 2019-03-01

## 2019-03-01 DIAGNOSIS — F33.1 MDD (MAJOR DEPRESSIVE DISORDER), RECURRENT EPISODE, MODERATE (H): ICD-10-CM

## 2019-03-04 ENCOUNTER — HOSPITAL ENCOUNTER (OUTPATIENT)
Dept: GENERAL RADIOLOGY | Facility: CLINIC | Age: 49
Discharge: HOME | End: 2019-03-04
Attending: FAMILY MEDICINE | Admitting: FAMILY MEDICINE
Payer: MEDICAID
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
## 2019-03-04 DIAGNOSIS — R07.81 RIB PAIN ON RIGHT SIDE: ICD-10-CM

## 2019-03-04 PROCEDURE — 71101 X-RAY EXAM UNILAT RIBS/CHEST: CPT | Mod: RT

## 2019-03-20 ENCOUNTER — COMMUNICATION - HEALTHEAST (OUTPATIENT)
Dept: NEUROLOGY | Facility: CLINIC | Age: 49
End: 2019-03-20

## 2019-03-20 DIAGNOSIS — F33.1 MDD (MAJOR DEPRESSIVE DISORDER), RECURRENT EPISODE, MODERATE (H): ICD-10-CM

## 2019-04-23 ENCOUNTER — ANCILLARY PROCEDURE (OUTPATIENT)
Dept: CARDIOLOGY | Facility: CLINIC | Age: 49
End: 2019-04-23
Payer: MEDICAID

## 2019-04-23 DIAGNOSIS — I42.9 CARDIOMYOPATHY (H): ICD-10-CM

## 2019-04-23 NOTE — PATIENT INSTRUCTIONS
It was a pleasure to see you in clinic today. Please do not hesitate to call with any questions or concerns. We look forward to seeing you in clinic at your next device check in 3 months    Siri Barker, RN  Electrophysiology Nurse Clinician  [facility]  During business hours call:  [phone]  After business hours please call: [phone]- select option #4 and ask for job code [phone].

## 2019-05-05 ENCOUNTER — COMMUNICATION - HEALTHEAST (OUTPATIENT)
Dept: NEUROLOGY | Facility: CLINIC | Age: 49
End: 2019-05-05

## 2019-05-05 DIAGNOSIS — G47.00 INSOMNIA: ICD-10-CM

## 2019-05-28 ENCOUNTER — HOSPITAL ENCOUNTER (OUTPATIENT)
Dept: NEUROLOGY | Facility: CLINIC | Age: 49
Setting detail: THERAPIES SERIES
Discharge: STILL ADMITTED OR ADMINISTRATIVE | End: 2019-05-28
Attending: PSYCHIATRY & NEUROLOGY

## 2019-05-28 DIAGNOSIS — F51.01 PRIMARY INSOMNIA: ICD-10-CM

## 2019-05-28 DIAGNOSIS — F33.1 MDD (MAJOR DEPRESSIVE DISORDER), RECURRENT EPISODE, MODERATE (H): ICD-10-CM

## 2019-05-28 DIAGNOSIS — G47.00 INSOMNIA: ICD-10-CM

## 2019-07-09 ENCOUNTER — CARE COORDINATION (OUTPATIENT)
Dept: CARDIOLOGY | Facility: CLINIC | Age: 49
End: 2019-07-09

## 2019-07-09 DIAGNOSIS — Z98.890 S/P VENTRICULAR SEPTAL MYECTOMY: ICD-10-CM

## 2019-07-09 DIAGNOSIS — I42.2 HYPERTROPHIC CARDIOMYOPATHY (H): ICD-10-CM

## 2019-07-09 RX ORDER — LOSARTAN POTASSIUM 25 MG/1
25 TABLET ORAL DAILY
Qty: 30 TABLET | Refills: 1 | Status: SHIPPED | OUTPATIENT
Start: 2019-07-09 | End: 2023-01-01

## 2019-07-09 RX ORDER — METOPROLOL TARTRATE 50 MG
100 TABLET ORAL 3 TIMES DAILY
Qty: 180 TABLET | Refills: 1 | Status: SHIPPED | OUTPATIENT
Start: 2019-07-09 | End: 2021-12-28

## 2019-07-09 NOTE — PROGRESS NOTES
Called Konstantin and left voicemail to discuss refill request for Metformin and Effexor. Called patient's pharmacy.  Last time Effexor was filled was November 2018.    Cisco Hercules, RN  RN Care Coordinator  [facility]  [phone]

## 2019-07-20 ASSESSMENT — ENCOUNTER SYMPTOMS
VOMITING: 0
LOSS OF CONSCIOUSNESS: 0
ARTHRALGIAS: 0
NECK PAIN: 1
ABDOMINAL PAIN: 1
BACK PAIN: 1
JAUNDICE: 0
PARALYSIS: 0
BOWEL INCONTINENCE: 0
MEMORY LOSS: 1
NERVOUS/ANXIOUS: 0
TINGLING: 1
BLOOD IN STOOL: 1
JOINT SWELLING: 0
CONSTIPATION: 1
HEADACHES: 0
DIARRHEA: 0
DISTURBANCES IN COORDINATION: 0
MUSCLE WEAKNESS: 0
RECTAL PAIN: 0
TREMORS: 1
DECREASED CONCENTRATION: 0
MUSCLE CRAMPS: 0
STIFFNESS: 0
PANIC: 0
DEPRESSION: 1
DIZZINESS: 0
INSOMNIA: 1
NAUSEA: 0
HEARTBURN: 0
BLOATING: 1
SEIZURES: 0
NUMBNESS: 1
SPEECH CHANGE: 0
MYALGIAS: 0
WEAKNESS: 0

## 2019-07-26 ENCOUNTER — HOSPITAL ENCOUNTER (OUTPATIENT)
Dept: NUCLEAR MEDICINE | Facility: CLINIC | Age: 49
Setting detail: NUCLEAR MEDICINE
End: 2019-07-26
Attending: INTERNAL MEDICINE
Payer: MEDICAID

## 2019-07-26 ENCOUNTER — OFFICE VISIT (OUTPATIENT)
Dept: CARDIOLOGY | Facility: CLINIC | Age: 49
End: 2019-07-26
Attending: INTERNAL MEDICINE
Payer: MEDICAID

## 2019-07-26 ENCOUNTER — HOSPITAL ENCOUNTER (OUTPATIENT)
Dept: CARDIOLOGY | Facility: CLINIC | Age: 49
Discharge: HOME | End: 2019-07-26
Attending: INTERNAL MEDICINE | Admitting: INTERNAL MEDICINE
Payer: MEDICAID

## 2019-07-26 ENCOUNTER — HOSPITAL ENCOUNTER (OUTPATIENT)
Dept: NUCLEAR MEDICINE | Facility: CLINIC | Age: 49
Setting detail: NUCLEAR MEDICINE
Discharge: HOME | End: 2019-07-26
Attending: INTERNAL MEDICINE | Admitting: INTERNAL MEDICINE
Payer: MEDICAID

## 2019-07-26 VITALS
BODY MASS INDEX: 36.82 KG/M2 | DIASTOLIC BLOOD PRESSURE: 85 MMHG | HEIGHT: 71 IN | HEART RATE: 98 BPM | WEIGHT: 263 LBS | SYSTOLIC BLOOD PRESSURE: 128 MMHG | OXYGEN SATURATION: 93 %

## 2019-07-26 DIAGNOSIS — I42.2 HYPERTROPHIC CARDIOMYOPATHY (H): ICD-10-CM

## 2019-07-26 DIAGNOSIS — I42.9 CARDIOMYOPATHY (H): ICD-10-CM

## 2019-07-26 PROCEDURE — 34300033 ZZH RX 343: Performed by: INTERNAL MEDICINE

## 2019-07-26 PROCEDURE — 93017 CV STRESS TEST TRACING ONLY: CPT

## 2019-07-26 PROCEDURE — 99214 OFFICE O/P EST MOD 30 MIN: CPT | Mod: ZP | Performed by: INTERNAL MEDICINE

## 2019-07-26 PROCEDURE — 25000128 H RX IP 250 OP 636: Performed by: INTERNAL MEDICINE

## 2019-07-26 PROCEDURE — 40000141 ZZH STATISTIC PERIPHERAL IV START W/O US GUIDANCE

## 2019-07-26 PROCEDURE — 78452 HT MUSCLE IMAGE SPECT MULT: CPT

## 2019-07-26 PROCEDURE — 93010 ELECTROCARDIOGRAM REPORT: CPT | Performed by: INTERNAL MEDICINE

## 2019-07-26 PROCEDURE — A9502 TC99M TETROFOSMIN: HCPCS | Performed by: INTERNAL MEDICINE

## 2019-07-26 PROCEDURE — G0463 HOSPITAL OUTPT CLINIC VISIT: HCPCS | Mod: ZF

## 2019-07-26 RX ORDER — AMINOPHYLLINE 25 MG/ML
50-100 INJECTION, SOLUTION INTRAVENOUS
Status: DISCONTINUED | OUTPATIENT
Start: 2019-07-26 | End: 2019-07-27 | Stop reason: HOSPADM

## 2019-07-26 RX ORDER — ALBUTEROL SULFATE 90 UG/1
2 AEROSOL, METERED RESPIRATORY (INHALATION) EVERY 5 MIN PRN
Status: DISCONTINUED | OUTPATIENT
Start: 2019-07-26 | End: 2019-07-27 | Stop reason: HOSPADM

## 2019-07-26 RX ORDER — REGADENOSON 0.08 MG/ML
0.4 INJECTION, SOLUTION INTRAVENOUS ONCE
Status: COMPLETED | OUTPATIENT
Start: 2019-07-26 | End: 2019-07-26

## 2019-07-26 RX ORDER — ACYCLOVIR 200 MG/1
0-1 CAPSULE ORAL
Status: DISCONTINUED | OUTPATIENT
Start: 2019-07-26 | End: 2019-07-27 | Stop reason: HOSPADM

## 2019-07-26 RX ADMIN — TETROFOSMIN 9.1 MCI.: 1.38 INJECTION, POWDER, LYOPHILIZED, FOR SOLUTION INTRAVENOUS at 11:01

## 2019-07-26 RX ADMIN — Medication 6 ML: at 14:00

## 2019-07-26 RX ADMIN — TETROFOSMIN 36.2 MCI.: 1.38 INJECTION, POWDER, LYOPHILIZED, FOR SOLUTION INTRAVENOUS at 12:02

## 2019-07-26 RX ADMIN — REGADENOSON 0.4 MG: 0.08 INJECTION, SOLUTION INTRAVENOUS at 12:01

## 2019-07-26 ASSESSMENT — MIFFLIN-ST. JEOR: SCORE: 2070.09

## 2019-07-26 ASSESSMENT — PAIN SCALES - GENERAL: PAINLEVEL: NO PAIN (0)

## 2019-07-26 NOTE — PATIENT INSTRUCTIONS
It was a pleasure to see you in clinic today. Please do not hesitate to call with any questions or concerns. You are scheduled for a remote ICD transmission on 10/28/19. This will happen automatically in the night. We will call in 1-2 business days to discuss the results with you.     Siri Barker, RN  Electrophysiology Nurse Clinician  [facility]  During business hours call:  [phone]  After business hours please call: [phone]- select option #4 and ask for job code [phone].

## 2019-07-26 NOTE — PATIENT INSTRUCTIONS
"You were seen today in the Adult Congenital and Cardiovascular Genetics Clinic at the [facility].    Cardiology Providers you saw during your visit:  Dr Lia Castellano    Diagnosis:  Hypertrophic cardiomyopathy    Results:  Dr Castellano reviewed the results of your echo with you in clinic today    Recommendations:    1.  Continue to eat a heart healthy, low salt diet.  2.  Continue to get 20-30 minutes of aerobic activity, 4-5 days per week.  Examples of aerobic activity include walking, running, swimming, cycling, etc.  3.  Continue to observe good oral hygiene, with regular dental visits.  4.  It was a pleasure to see you in clinic today. Please do not hesitate to call with any questions or concerns. You are scheduled for a remote ICD transmission on 10/28/19. This will happen automatically in the night. We will call in 1-2 business days to discuss the results with you.      Siri Barker RN  Electrophysiology Nurse Clinician  [facility]  During business hours call:  [phone]  After business hours please call: [phone]- select option #4 and ask for job code [phone].      Vitals:    07/26/19 1411   BP: 128/85   BP Location: Left arm   Patient Position: Chair   Cuff Size: Adult Regular   Pulse: 98   SpO2: 93%   Weight: 119.3 kg (263 lb)   Height: 1.803 m (5' 11\")       SBE prophylaxis:   Yes____  No__x__    Lifelong Bacterial Endocarditis Prophylaxis:  YES____  NO____    If YES is checked, follow the recommendations outlined below:   1. Take antibiotic(s) prior to recommended dental procedures and procedures on the respiratory tract or with infected skin, muscle or bones. SBE prophylaxis is not needed for routine GI and  procedures (ie. Colonoscopy or vaginal delivery)   2. Observe good oral hygiene daily, as advised by your dentist. Get regular professional dental care.   3. Keep cuts clean.   4. Infections should be treated promptly.   5. Symptoms of Infective " Endocarditis could include: fever lasting more than 4-5 days or a recurrent fever that initially resolves but returns within 1-2 days)     Exercise restrictions:   Yes__x__  No____         If yes, list restrictions:  Must be allowed to rest if fatigued or SOB      Work restrictions:  Yes____  No__x__         If yes, list restrictions:    FASTING CHOLESTEROL was checked in the last 5 years YES_x__  NO___  2014  Continue to eat a heart healthy, low salt diet.         ____ Fasting lipid panel order today         ____ No changes in medications          ____ I recommend the following changes in your cholesterol medications.:          ____ Please follow up for cholesterol screening at your primary care physician      Follow-up:  Follow up with Dr Castellano in one year with an echocardiogram in [place].       For after hours urgent needs, call [phone] and ask to speak to the Adult Congenital Physician on call.  Mention Job Code 0401.    For emergencies call [phone].    For any scheduling needs, please call [facility] , at [phone]  Thank you for your visit today!  If you have questions or concerns about today's visit, please call me.    Cisco Hercules, RN, BSN  Cardiology Care Coordinator  [facility]  [phone]    [address]  Mail Code [id number]  [place] [address]

## 2019-07-26 NOTE — LETTER
7/26/2019      RE: Konstantin Sharp  [address] [place] [address]       Dear Colleague,    Thank you for the opportunity to participate in the care of your patient, Konstantin Sharp, at the [facility] HEART [facility] at [facility]. Please see a copy of my visit note below.    CARDIOLOGY CONSULTATION:    Mr. Sharp is a [age]-year-old gentleman who is known to me with a past medical history significant for hypertrophic cardiomyopathy and myectomy.  He also has diabetes, nicotine dependence ( cutting down to <5 cigarettes a day per report, not ready to quit), hypertension, hyperlipidemia.  I met him for the first time in 06/2015.  He had chronic atypical chest pain which has resolved essentially.  We did a CTA in 2013 and 2015 which both had a negative coronary calcium score, but the quality was poor in terms of obstructive disease.   Nuclear stress test associated with this visit was negative for ischemia. He has diabetes has not been under ideal control with an A1C running around 8. He has peripheral neuropathy from this as well.       Mr. Sharp underwent an echo today.  I reviewed those images.  It is unchanged.  He continues to have mostly mid ventricular hypertrophy with not a lot of significant obstruction.  He has a normal ejection fraction with no significant valve disease.  He does have an ICD that was implanted in 2016; it was checked today and functioning well. <1% afib burden on anticoagulation .  He has not had any shocks from his device.       He has 4 children, 2 have been screened who are 21 and 23, girls, and 2 have not who are 26 and 27.      He does have sleep apnea and does not use CPAP mask.  He is on disability.  He is moving to [place] with his brother and sister in law (who is his PCA) in the next month and he plans to follow with me in [facility] clinic. Weight is up 10 pounds which he attributes to large portions of food and he plans to work on  "that.    PAST MEDICAL HISTORY:  Past Medical History:   Diagnosis Date     Atrial fibrillation (H)      Chest pain     intermittent     Chronic pain      Cognitive disorder     memory loss     Depressive disorder      Dual ICD (implantable cardiac defibrillator) in place 04/29/2014    and pacemaker     GERD (gastroesophageal reflux disease)      H/O traumatic brain injury 2015     Heart attack (H) 1996     HTN (hypertension)      Hypertrophic nonobstructive cardiomyopathy (H) 09/12/2012    s/p ventricular myectomy     Inflammatory arthritis      Intermittent asthma      PAD (peripheral artery disease) (H)      Polysubstance abuse (H)      Seizures (H)     last episode 2014 when \"blood sugar dropped\" according to patient     Sleep apnea     doesn't use cpap     Type 2 diabetes mellitus without complications (H)        CURRENT MEDICATIONS:  Current Outpatient Medications   Medication Sig Dispense Refill     albuterol (VENTOLIN HFA) 108 (90 BASE) MCG/ACT Inhaler Inhale 2 puffs into the lungs every 4 hours as needed for shortness of breath / dyspnea or wheezing       Atorvastatin Calcium (LIPITOR PO) Take 40 mg by mouth daily        bisacodyl (DULCOLAX) 10 MG Suppository Place 10 mg rectally daily as needed        LANTUS SOLOSTAR 100 UNIT/ML soln INJECT 14 UNITS BY SUBCUTANEOUS ROUTE 1 TIME PER DAY AT BEDTIME  3     losartan (COZAAR) 25 MG tablet Take 1 tablet (25 mg) by mouth daily 30 tablet 1     magnesium oxide (MAG-OX) 400 MG tablet Take 2.5 tablets (1,000 mg) by mouth daily 180 tablet 3     metFORMIN (GLUCOPHAGE) 500 MG tablet Take 1,000 mg by mouth 2 times daily (with meals)  60 tablet      metoprolol tartrate (LOPRESSOR) 50 MG tablet Take 2 tablets (100 mg) by mouth 3 times daily 180 tablet 1     QUEtiapine (SEROQUEL XR) 150 MG TB24 24 hr tablet Take 150 mg by mouth At Bedtime       QUEtiapine Fumarate (SEROQUEL PO) Take 25 mg by mouth 2 times daily       sildenafil (REVATIO) 20 MG tablet Take 0.5 tablets (10 mg) " by mouth 3 times daily 90 tablet 3     TRAZODONE HCL PO Take 100 mg by mouth At Bedtime       venlafaxine (EFFEXOR-XR) 150 MG 24 hr capsule Take 150 mg by mouth 2 times daily  30 capsule 1     WARFARIN SODIUM PO Take 8.5 mg by mouth every morning       albuterol (2.5 MG/3ML) 0.083% neb solution Take 1 vial by nebulization every 6 hours as needed for shortness of breath / dyspnea or wheezing       amitriptyline (ELAVIL) 25 MG tablet Take 25 mg by mouth At Bedtime        Emollient (EUCERIN INTENSIVE REPAIR EX) Externally apply topically 2 times daily as needed       glipiZIDE (GLUCOTROL) 5 MG tablet Take 1 tablet (5 mg) by mouth 2 times daily (before meals) 30 tablet      glycerin, adult, ([employer] GLYCERIN ADULT) 2 g SUPP Suppository Place 1 suppository rectally daily as needed for constipation (Patient not taking: Reported on 7/26/2019) 30 suppository 3     lidocaine (XYLOCAINE) 5 % ointment Apply topically 3 times daily For feet       Pregabalin (LYRICA PO) Take 225 mg by mouth 2 times daily        senna-docusate (SENOKOT-S;PERICOLACE) 8.6-50 MG per tablet Take 1 tablet by mouth daily         PAST SURGICAL HISTORY:  Past Surgical History:   Procedure Laterality Date     APPENDECTOMY  1980    [facility]     C CARDIAC SURG PROCEDURE UNLIST       C HAND/FINGER SURGERY UNLISTED       C SHOULDER SURG PROC UNLISTED       C STOMACH SURGERY PROCEDURE UNLISTED       COLONOSCOPY  2017     DISCECTOMY LUMBAR POSTERIOR MICROSCOPIC THREE+ LEVELS  12/16/2011    Procedure:DISCECTOMY LUMBAR POSTERIOR MICROSCOPIC THREE+ LEVELS; Posterior Decompression L2-S1; Surgeon:CAITIE FUNEZ; Location:[facility] OR     FUSION CERVICAL POSTERIOR ONE LEVEL  8/24/2012    Procedure: FUSION CERVICAL POSTERIOR ONE LEVEL;  Posterior Instrumentated Spinal Fusion Cervical 6-7, Right Iliac Crest Bone Steward ;  Surgeon: Caitie Funez MD;  Location: [facility] OR     GRAFT BONE FROM ILIAC CREST  8/24/2012    Procedure: GRAFT BONE FROM  "ILIAC CREST;;  Surgeon: Lopez Funez MD;  Location: [facility] OR     HC SACROPLASTY       HEAD & NECK SURGERY  2009     IMPLANT PACEMAKER       INJECT STEROID (LOCATION) N/A 2/19/2015    Procedure: INJECT STEROID (LOCATION);  Surgeon: Siri Koch MD;  [facility]: [facility]     INSERT STIMULATOR AND LEADS INTERNAL DORSAL COLUMN  8/7/2013    Procedure: INSERT STIMULATOR AND LEADS INTERNAL DORSAL COLUMN;  SPINAL CORD STIMULATOR IMPLANT ;  Surgeon: Ricardo Bruno MD;  Location: [facility] OR     INSERT STIMULATOR DORSAL COLUMN  08/13/2013    lead replacemend, 12?2016,  battery replacement 4/4/2016     KNEE SURGERY       LASER CO2 LARYNGOSCOPY N/A 2/19/2015    Procedure: LASER CO2 LARYNGOSCOPY;  Surgeon: Siri Koch MD;  [facility]     LASER CO2 LARYNGOSCOPY, COMPLEX  7/22/2014    Procedure: LASER CO2 LARYNGOSCOPY, COMPLEX;  Surgeon: Siri Koch MD;  [facility]     MYECTOMY SEPTAL  4/14/2014    Procedure: Median Sternotomy, Septal Myectomy, Intraoperative BRENT performed by Dr. Castano, on pump oxygenator.;  Surgeon: Aguila Cannon MD;  [facility]     NECK SURGERY       SHOULDER SURGERY  2006    RIGHT     STOMACH SURGERY  1980    partial gastrectomy for bleeding ulcers, \"stress related\". mpls childrens     WRIST SURGERY Left 1988    fractured. 1988 or so       ALLERGIES  Bee venom; Lisinopril; and Penicillins    FAMILY HX:  Family History   Problem Relation Age of Onset     Heart Disease Father 32        MIs x2; s/p CABG     Substance Abuse Father      Hypertension Father      Obesity Father      Hyperlipidemia Father      Hypertension Brother      Diabetes Brother      Glaucoma Maternal Grandmother      Hypertension Maternal Grandmother      Diabetes Maternal Grandfather      Glaucoma Maternal Grandfather      Hypertension Maternal Grandfather      Cerebrovascular Disease Maternal Grandfather      Obesity Maternal Grandfather      Hyperlipidemia Maternal " Grandfather      Coronary Artery Disease Maternal Grandfather      Heart Disease Maternal Grandfather      Substance Abuse Other      Cancer Other      Hypertension Other      Obesity Other      Other Cancer Other         all over     Hyperlipidemia Other      Coronary Artery Disease Other      Obesity Brother      Hyperlipidemia Brother      Lymphoma Maternal Uncle      Diabetes Brother      Depression Daughter      Depression Son      Macular Degeneration No family hx of        SOCIAL HX:  Social History     Socioeconomic History     Marital status: Single     Spouse name: None     Number of children: None     Years of education: None     Highest education level: None   Occupational History     None   Social Needs     Financial resource strain: None     Food insecurity:     Worry: None     Inability: None     Transportation needs:     Medical: None     Non-medical: None   Tobacco Use     Smoking status: Light Tobacco Smoker     Packs/day: 0.12     Years: 35.00     Pack years: 4.20     Types: Cigarettes     Start date: 3/8/1982     Last attempt to quit: 2014     Years since quittin.3     Smokeless tobacco: Never Used   Substance and Sexual Activity     Alcohol use: Yes     Alcohol/week: 0.0 oz     Comment: rare     Drug use: No     Comment: last using meth 2017     Sexual activity: Not Currently     Partners: Female     Birth control/protection: Male Surgical   Lifestyle     Physical activity:     Days per week: None     Minutes per session: None     Stress: None   Relationships     Social connections:     Talks on phone: None     Gets together: None     Attends Anabaptist service: None     Active member of club or organization: None     Attends meetings of clubs or organizations: None     Relationship status: None     Intimate partner violence:     Fear of current or ex partner: None     Emotionally abused: None     Physically abused: None     Forced sexual activity: None   Other Topics Concern      "Parent/sibling w/ CABG, MI or angioplasty before 65F 55M? Yes   Social History Narrative     None       ROS:  Constitutional: No fever, chills, or sweats. No weight gain/loss.   ENT: No visual disturbance, ear ache, epistaxis, sore throat.   Allergies/Immunologic: Negative.   Respiratory: No cough, hemoptysis.   Cardiovascular: As per HPI.   GI: No nausea, vomiting, hematemesis, melena, or hematochezia.   : No urinary frequency, dysuria, or hematuria.   Integument: Negative.   Psychiatric: Negative.   Neuro: Negative.   Endocrinology: Negative.   Musculoskeletal: No myalgia.    VITAL SIGNS:  /85 (BP Location: Left arm, Patient Position: Chair, Cuff Size: Adult Regular)   Pulse 98   Ht 1.803 m (5' 11\")   Wt 119.3 kg (263 lb)   SpO2 93%   BMI 36.68 kg/m     Body mass index is 36.68 kg/m .  Wt Readings from Last 2 Encounters:   07/26/19 119.3 kg (263 lb)   10/23/18 115 kg (253 lb 9.6 oz)       PHYSICAL EXAM  Konstantin Sharp IS A 49 year old male.in no acute distress.  HEENT: Unremarkable.  Neck: JVP normal.  Carotids +4/4 bilaterally without bruits.  Lungs: CTA.  Cor: RRR. Normal S1 and S2.  No murmur, rub, or gallop.  PMI in Lf 5th ICS.  Abd: Soft, nontender, nondistended.  NABS.  No pulsatile mass.  Extremities: No C/C/E.  Pulses +4/4 symmetric in upper and lower extremities.  Neuro: Grossly intact.    LABS    Lab Results   Component Value Date    WBC 9.3 01/31/2018     Lab Results   Component Value Date    RBC 4.81 01/31/2018     Lab Results   Component Value Date    HGB 15.4 01/31/2018     Lab Results   Component Value Date    HCT 45.0 01/31/2018     No components found for: MCT  Lab Results   Component Value Date    MCV 94 01/31/2018     Lab Results   Component Value Date    MCH 32.0 01/31/2018     Lab Results   Component Value Date    MCHC 34.2 01/31/2018     Lab Results   Component Value Date    RDW 13.6 01/31/2018     Lab Results   Component Value Date     01/31/2018      Recent Labs   Lab " Test 01/23/18  1001 03/17/15  1224 02/17/15  0830   NA  --  142 134   POTASSIUM 4.1 4.0 3.7   CHLORIDE  --  110* 105   CO2  --  25 22   ANIONGAP  --  7 7   GLC  --  113* 308*   BUN  --  14 16   CR 0.93 0.96 0.90   TANIA  --  8.8 8.5     Recent Labs   Lab Test 01/29/14 09/30/13  0633 05/15/13  0732   CHOL 205* 140 207*   HDL 27* 35* 33*   * 76 124   TRIG 242* 145 254*   CHOLHDLRATIO  --  4.0 6.4*        IMPRESSION, REPORT, PLAN:   1.  Hypertrophic cardiomyopathy status post myectomy with minimal obstruction, stable.   2.  Diabetes type 2 with peripheral neuropathy with an A1c of around 8.     3.  Heart block followed by ectopy status post ICD and permanent pacemaker.   4.  Atrial fibrillation with history of DVT, on chronic anticoagulation with Coumadin.   5.  Nicotine dependence, currently smokes half pack a day with a 30-pack-year smoking history.   6.  History of atypical chest pain with a negative coronary calcium score on last CT 07/2015.   7.  Glottic stenosis.   8.  Pneumonia.   9.  Chronic back pain.      It was a pleasure to see Mo in follow up.  He is clinically doing well.  He will work on diet and exercise.  We discussed his echo is unchanged.  We will plan to see him back in 12 months with an echo in [facility].  He will let us know if he has any issues in the interim.    DONALD Castellano MD

## 2019-07-26 NOTE — PROGRESS NOTES
CARDIOLOGY CONSULTATION:    Mr. Sharp is a 49-year-old gentleman who is known to me with a past medical history significant for hypertrophic cardiomyopathy and myectomy.  He also has diabetes, nicotine dependence (cutting down to <5 cigarettes a day per report, not ready to quit), hypertension, hyperlipidemia.  I met him for the first time in 06/2015.  He had chronic atypical chest pain which has resolved essentially.  We did a CTA in 2013 and 2015 which both had a negative coronary calcium score, but the quality was poor in terms of obstructive disease.  Nuclear stress test associated with this visit was negative for ischemia. He has diabetes has not been under ideal control with an A1C running around 8. He has peripheral neuropathy from this as well.       Mr. Sharp underwent an echo today.  I reviewed those images.  It is unchanged.  He continues to have mostly mid ventricular hypertrophy with not a lot of significant obstruction.  He has a normal ejection fraction with no significant valve disease.  He does have an ICD that was implanted in 2016; it was checked today and functioning well. <1% afib burden on anticoagulation .  He has not had any shocks from his device.       He has 4 children, 2 have been screened who are 21 and 23, girls, and 2 have not who are 26 and 27.      He does have sleep apnea and does not use CPAP mask.  He is on disability. He is moving to [place] with his brother and sister in law (who is his PCA) in the next month and he plans to follow with me in [facility] clinic. Weight is up 10 pounds which he attributes to large portions of food and he plans to work on that.    PAST MEDICAL HISTORY:  Past Medical History:   Diagnosis Date     Atrial fibrillation (H)      Chest pain     intermittent     Chronic pain      Cognitive disorder     memory loss     Depressive disorder      Dual ICD (implantable cardiac defibrillator) in place 04/29/2014    and pacemaker     GERD (gastroesophageal  "reflux disease)      H/O traumatic brain injury 2015     Heart attack (H) 1996     HTN (hypertension)      Hypertrophic nonobstructive cardiomyopathy (H) 09/12/2012    s/p ventricular myectomy     Inflammatory arthritis      Intermittent asthma      PAD (peripheral artery disease) (H)      Polysubstance abuse (H)      Seizures (H)     last episode 2014 when \"blood sugar dropped\" according to patient     Sleep apnea     doesn't use cpap     Type 2 diabetes mellitus without complications (H)        CURRENT MEDICATIONS:  Current Outpatient Medications   Medication Sig Dispense Refill     albuterol (VENTOLIN HFA) 108 (90 BASE) MCG/ACT Inhaler Inhale 2 puffs into the lungs every 4 hours as needed for shortness of breath / dyspnea or wheezing       Atorvastatin Calcium (LIPITOR PO) Take 40 mg by mouth daily        bisacodyl (DULCOLAX) 10 MG Suppository Place 10 mg rectally daily as needed        LANTUS SOLOSTAR 100 UNIT/ML soln INJECT 14 UNITS BY SUBCUTANEOUS ROUTE 1 TIME PER DAY AT BEDTIME  3     losartan (COZAAR) 25 MG tablet Take 1 tablet (25 mg) by mouth daily 30 tablet 1     magnesium oxide (MAG-OX) 400 MG tablet Take 2.5 tablets (1,000 mg) by mouth daily 180 tablet 3     metFORMIN (GLUCOPHAGE) 500 MG tablet Take 1,000 mg by mouth 2 times daily (with meals)  60 tablet      metoprolol tartrate (LOPRESSOR) 50 MG tablet Take 2 tablets (100 mg) by mouth 3 times daily 180 tablet 1     QUEtiapine (SEROQUEL XR) 150 MG TB24 24 hr tablet Take 150 mg by mouth At Bedtime       QUEtiapine Fumarate (SEROQUEL PO) Take 25 mg by mouth 2 times daily       sildenafil (REVATIO) 20 MG tablet Take 0.5 tablets (10 mg) by mouth 3 times daily 90 tablet 3     TRAZODONE HCL PO Take 100 mg by mouth At Bedtime       venlafaxine (EFFEXOR-XR) 150 MG 24 hr capsule Take 150 mg by mouth 2 times daily  30 capsule 1     WARFARIN SODIUM PO Take 8.5 mg by mouth every morning       albuterol (2.5 MG/3ML) 0.083% neb solution Take 1 vial by nebulization " every 6 hours as needed for shortness of breath / dyspnea or wheezing       amitriptyline (ELAVIL) 25 MG tablet Take 25 mg by mouth At Bedtime        Emollient (EUCERIN INTENSIVE REPAIR EX) Externally apply topically 2 times daily as needed       glipiZIDE (GLUCOTROL) 5 MG tablet Take 1 tablet (5 mg) by mouth 2 times daily (before meals) 30 tablet      glycerin, adult, ([facility] GLYCERIN ADULT) 2 g SUPP Suppository Place 1 suppository rectally daily as needed for constipation (Patient not taking: Reported on 7/26/2019) 30 suppository 3     lidocaine (XYLOCAINE) 5 % ointment Apply topically 3 times daily For feet       Pregabalin (LYRICA PO) Take 225 mg by mouth 2 times daily        senna-docusate (SENOKOT-S;PERICOLACE) 8.6-50 MG per tablet Take 1 tablet by mouth daily         PAST SURGICAL HISTORY:  Past Surgical History:   Procedure Laterality Date     APPENDECTOMY  1980    [facility]? [facility] CARDIAC SURG PROCEDURE UNLIST       C HAND/FINGER SURGERY UNLISTED       C SHOULDER SURG PROC UNLISTED       C STOMACH SURGERY PROCEDURE UNLISTED       COLONOSCOPY  2017     DISCECTOMY LUMBAR POSTERIOR MICROSCOPIC THREE+ LEVELS  12/16/2011    Procedure:DISCECTOMY LUMBAR POSTERIOR MICROSCOPIC THREE+ LEVELS; Posterior Decompression L2-S1; Surgeon:CAITIE FUNEZ; Location:[facility]     FUSION CERVICAL POSTERIOR ONE LEVEL  8/24/2012    Procedure: FUSION CERVICAL POSTERIOR ONE LEVEL;  Posterior Instrumentated Spinal Fusion Cervical 6-7, Right Iliac Crest Bone [place] ;  Surgeon: Caitie Funez MD;  Location: [facility] OR     GRAFT BONE FROM ILIAC CREST  8/24/2012    Procedure: GRAFT BONE FROM ILIAC CREST;;  Surgeon: Caitie Funez MD;  Location: [facility] OR     HC SACROPLASTY       HEAD & NECK SURGERY  2009     IMPLANT PACEMAKER       INJECT STEROID (LOCATION) N/A 2/19/2015    Procedure: INJECT STEROID (LOCATION);  Surgeon: Siri Koch MD;  [facility]: [facility]     INSERT STIMULATOR AND LEADS  "INTERNAL DORSAL COLUMN  8/7/2013    Procedure: INSERT STIMULATOR AND LEADS INTERNAL DORSAL COLUMN;  SPINAL CORD STIMULATOR IMPLANT ;  Surgeon: Ricardo Bruno MD;  Location: [facility] OR     INSERT STIMULATOR DORSAL COLUMN  08/13/2013    lead replacemend, 12?2016,  battery replacement 4/4/2016     KNEE SURGERY       LASER CO2 LARYNGOSCOPY N/A 2/19/2015    Procedure: LASER CO2 LARYNGOSCOPY;  Surgeon: Siri Koch MD;  [facility]     LASER CO2 LARYNGOSCOPY, COMPLEX  7/22/2014    Procedure: LASER CO2 LARYNGOSCOPY, COMPLEX;  Surgeon: Siri Koch MD;  [facility]     MYECTOMY SEPTAL  4/14/2014    Procedure: Median Sternotomy, Septal Myectomy, Intraoperative BRENT performed by Dr. Castano, on pump oxygenator.;  Surgeon: Aguila Cannon MD;  [facility]     NECK SURGERY       SHOULDER SURGERY  2006    RIGHT     STOMACH SURGERY  1980    partial gastrectomy for bleeding ulcers, \"stress related\". [facility] childrens     WRIST SURGERY Left 1988    fractured. 1988 or so       ALLERGIES  Bee venom; Lisinopril; and Penicillins    FAMILY HX:  Family History   Problem Relation Age of Onset     Heart Disease Father 32        MIs x2; s/p CABG     Substance Abuse Father      Hypertension Father      Obesity Father      Hyperlipidemia Father      Hypertension Brother      Diabetes Brother      Glaucoma Maternal Grandmother      Hypertension Maternal Grandmother      Diabetes Maternal Grandfather      Glaucoma Maternal Grandfather      Hypertension Maternal Grandfather      Cerebrovascular Disease Maternal Grandfather      Obesity Maternal Grandfather      Hyperlipidemia Maternal Grandfather      Coronary Artery Disease Maternal Grandfather      Heart Disease Maternal Grandfather      Substance Abuse Other      Cancer Other      Hypertension Other      Obesity Other      Other Cancer Other         all over     Hyperlipidemia Other      Coronary Artery Disease Other      Obesity Brother      " Hyperlipidemia Brother      Lymphoma Maternal Uncle      Diabetes Brother      Depression Daughter      Depression Son      Macular Degeneration No family hx of        SOCIAL HX:  Social History     Socioeconomic History     Marital status: Single     Spouse name: None     Number of children: None     Years of education: None     Highest education level: None   Occupational History     None   Social Needs     Financial resource strain: None     Food insecurity:     Worry: None     Inability: None     Transportation needs:     Medical: None     Non-medical: None   Tobacco Use     Smoking status: Light Tobacco Smoker     Packs/day: 0.12     Years: 35.00     Pack years: 4.20     Types: Cigarettes     Start date: 3/8/1982     Last attempt to quit: 2014     Years since quittin.3     Smokeless tobacco: Never Used   Substance and Sexual Activity     Alcohol use: Yes     Alcohol/week: 0.0 oz     Comment: rare     Drug use: No     Comment: last using meth 2017     Sexual activity: Not Currently     Partners: Female     Birth control/protection: Male Surgical   Lifestyle     Physical activity:     Days per week: None     Minutes per session: None     Stress: None   Relationships     Social connections:     Talks on phone: None     Gets together: None     Attends Yazdanism service: None     Active member of club or organization: None     Attends meetings of clubs or organizations: None     Relationship status: None     Intimate partner violence:     Fear of current or ex partner: None     Emotionally abused: None     Physically abused: None     Forced sexual activity: None   Other Topics Concern     Parent/sibling w/ CABG, MI or angioplasty before 65F 55M? Yes   Social History Narrative     None       ROS:  Constitutional: No fever, chills, or sweats. No weight gain/loss.   ENT: No visual disturbance, ear ache, epistaxis, sore throat.   Allergies/Immunologic: Negative.   Respiratory: No cough, hemoptysis.  "  Cardiovascular: As per HPI.   GI: No nausea, vomiting, hematemesis, melena, or hematochezia.   : No urinary frequency, dysuria, or hematuria.   Integument: Negative.   Psychiatric: Negative.   Neuro: Negative.   Endocrinology: Negative.   Musculoskeletal: No myalgia.    VITAL SIGNS:  /85 (BP Location: Left arm, Patient Position: Chair, Cuff Size: Adult Regular)   Pulse 98   Ht 1.803 m (5' 11\")   Wt 119.3 kg (263 lb)   SpO2 93%   BMI 36.68 kg/m    Body mass index is 36.68 kg/m .  Wt Readings from Last 2 Encounters:   07/26/19 119.3 kg (263 lb)   10/23/18 115 kg (253 lb 9.6 oz)       PHYSICAL EXAM  Konstantin Sharp IS A 49 year old male.in no acute distress.  HEENT: Unremarkable.  Neck: JVP normal.  Carotids +4/4 bilaterally without bruits.  Lungs: CTA.  Cor: RRR. Normal S1 and S2.  No murmur, rub, or gallop.  PMI in Lf 5th ICS.  Abd: Soft, nontender, nondistended.  NABS.  No pulsatile mass.  Extremities: No C/C/E.  Pulses +4/4 symmetric in upper and lower extremities.  Neuro: Grossly intact.    LABS    Lab Results   Component Value Date    WBC 9.3 01/31/2018     Lab Results   Component Value Date    RBC 4.81 01/31/2018     Lab Results   Component Value Date    HGB 15.4 01/31/2018     Lab Results   Component Value Date    HCT 45.0 01/31/2018     No components found for: MCT  Lab Results   Component Value Date    MCV 94 01/31/2018     Lab Results   Component Value Date    MCH 32.0 01/31/2018     Lab Results   Component Value Date    MCHC 34.2 01/31/2018     Lab Results   Component Value Date    RDW 13.6 01/31/2018     Lab Results   Component Value Date     01/31/2018      Recent Labs   Lab Test 01/23/18  1001 03/17/15  1224 02/17/15  0830   NA  --  142 134   POTASSIUM 4.1 4.0 3.7   CHLORIDE  --  110* 105   CO2  --  25 22   ANIONGAP  --  7 7   GLC  --  113* 308*   BUN  --  14 16   CR 0.93 0.96 0.90   TANIA  --  8.8 8.5     Recent Labs   Lab Test 01/29/14 09/30/13  0633 05/15/13  0732   CHOL 205* 140 " 207*   HDL 27* 35* 33*   * 76 124   TRIG 242* 145 254*   CHOLHDLRATIO  --  4.0 6.4*        IMPRESSION, REPORT, PLAN:   1.  Hypertrophic cardiomyopathy status post myectomy with minimal obstruction, stable.   2.  Diabetes type 2 with peripheral neuropathy with an A1c of around 8.     3.  Heart block followed by ectopy status post ICD and permanent pacemaker.   4.  Atrial fibrillation with history of DVT, on chronic anticoagulation with Coumadin.   5.  Nicotine dependence, currently smokes half pack a day with a 30-pack-year smoking history.   6.  History of atypical chest pain with a negative coronary calcium score on last CT 07/2015.   7.  Glottic stenosis.   8.  Pneumonia.   9.  Chronic back pain.      It was a pleasure to see Mo in follow up.  He is clinically doing well.  He will work on diet and exercise.  We discussed his echo is unchanged.  We will plan to see him back in 12 months with an echo in [facility].  He will let us know if he has any issues in the interim.    DONALD Castellano MD

## 2019-07-27 LAB
MDC_IDC_EPISODE_DTM: NORMAL
MDC_IDC_EPISODE_DURATION: 150 S
MDC_IDC_EPISODE_DURATION: 195 S
MDC_IDC_EPISODE_DURATION: 210 S
MDC_IDC_EPISODE_DURATION: 433 S
MDC_IDC_EPISODE_ID: 27
MDC_IDC_EPISODE_ID: 28
MDC_IDC_EPISODE_ID: 29
MDC_IDC_EPISODE_ID: 30
MDC_IDC_EPISODE_TYPE: NORMAL
MDC_IDC_LEAD_IMPLANT_DT: NORMAL
MDC_IDC_LEAD_IMPLANT_DT: NORMAL
MDC_IDC_LEAD_LOCATION: NORMAL
MDC_IDC_LEAD_LOCATION: NORMAL
MDC_IDC_LEAD_MFG: NORMAL
MDC_IDC_LEAD_MFG: NORMAL
MDC_IDC_LEAD_MODEL: NORMAL
MDC_IDC_LEAD_MODEL: NORMAL
MDC_IDC_LEAD_POLARITY_TYPE: NORMAL
MDC_IDC_LEAD_POLARITY_TYPE: NORMAL
MDC_IDC_LEAD_SERIAL: NORMAL
MDC_IDC_LEAD_SERIAL: NORMAL
MDC_IDC_MSMT_BATTERY_DTM: NORMAL
MDC_IDC_MSMT_BATTERY_REMAINING_LONGEVITY: 28 MO
MDC_IDC_MSMT_BATTERY_RRT_TRIGGER: 2.73
MDC_IDC_MSMT_BATTERY_STATUS: NORMAL
MDC_IDC_MSMT_BATTERY_VOLTAGE: 2.94 V
MDC_IDC_MSMT_CAP_CHARGE_DTM: NORMAL
MDC_IDC_MSMT_CAP_CHARGE_ENERGY: 18 J
MDC_IDC_MSMT_CAP_CHARGE_TIME: 4
MDC_IDC_MSMT_CAP_CHARGE_TYPE: NORMAL
MDC_IDC_MSMT_LEADCHNL_RA_IMPEDANCE_VALUE: 399 OHM
MDC_IDC_MSMT_LEADCHNL_RA_PACING_THRESHOLD_AMPLITUDE: 1.5 V
MDC_IDC_MSMT_LEADCHNL_RA_PACING_THRESHOLD_AMPLITUDE: 1.75 V
MDC_IDC_MSMT_LEADCHNL_RA_PACING_THRESHOLD_PULSEWIDTH: 0.4 MS
MDC_IDC_MSMT_LEADCHNL_RA_PACING_THRESHOLD_PULSEWIDTH: 0.4 MS
MDC_IDC_MSMT_LEADCHNL_RA_SENSING_INTR_AMPL: 0.38 MV
MDC_IDC_MSMT_LEADCHNL_RA_SENSING_INTR_AMPL: 3 MV
MDC_IDC_MSMT_LEADCHNL_RV_IMPEDANCE_VALUE: 323 OHM
MDC_IDC_MSMT_LEADCHNL_RV_IMPEDANCE_VALUE: 418 OHM
MDC_IDC_MSMT_LEADCHNL_RV_PACING_THRESHOLD_AMPLITUDE: 1 V
MDC_IDC_MSMT_LEADCHNL_RV_PACING_THRESHOLD_AMPLITUDE: 1.25 V
MDC_IDC_MSMT_LEADCHNL_RV_PACING_THRESHOLD_PULSEWIDTH: 0.4 MS
MDC_IDC_MSMT_LEADCHNL_RV_PACING_THRESHOLD_PULSEWIDTH: 0.4 MS
MDC_IDC_MSMT_LEADCHNL_RV_SENSING_INTR_AMPL: 10.25 MV
MDC_IDC_MSMT_LEADCHNL_RV_SENSING_INTR_AMPL: 10.25 MV
MDC_IDC_PG_IMPLANT_DTM: NORMAL
MDC_IDC_PG_MFG: NORMAL
MDC_IDC_PG_MODEL: NORMAL
MDC_IDC_PG_SERIAL: NORMAL
MDC_IDC_PG_TYPE: NORMAL
MDC_IDC_SESS_CLINIC_NAME: NORMAL
MDC_IDC_SESS_DTM: NORMAL
MDC_IDC_SESS_TYPE: NORMAL
MDC_IDC_SET_BRADY_AT_MODE_SWITCH_RATE: 171 {BEATS}/MIN
MDC_IDC_SET_BRADY_HYSTRATE: NORMAL
MDC_IDC_SET_BRADY_LOWRATE: 60 {BEATS}/MIN
MDC_IDC_SET_BRADY_MAX_SENSOR_RATE: 120 {BEATS}/MIN
MDC_IDC_SET_BRADY_MAX_TRACKING_RATE: 140 {BEATS}/MIN
MDC_IDC_SET_BRADY_MODE: NORMAL
MDC_IDC_SET_BRADY_PAV_DELAY_LOW: 180 MS
MDC_IDC_SET_BRADY_SAV_DELAY_LOW: 150 MS
MDC_IDC_SET_LEADCHNL_RA_PACING_AMPLITUDE: 3 V
MDC_IDC_SET_LEADCHNL_RA_PACING_ANODE_ELECTRODE_1: NORMAL
MDC_IDC_SET_LEADCHNL_RA_PACING_ANODE_LOCATION_1: NORMAL
MDC_IDC_SET_LEADCHNL_RA_PACING_CAPTURE_MODE: NORMAL
MDC_IDC_SET_LEADCHNL_RA_PACING_CATHODE_ELECTRODE_1: NORMAL
MDC_IDC_SET_LEADCHNL_RA_PACING_CATHODE_LOCATION_1: NORMAL
MDC_IDC_SET_LEADCHNL_RA_PACING_POLARITY: NORMAL
MDC_IDC_SET_LEADCHNL_RA_PACING_PULSEWIDTH: 0.4 MS
MDC_IDC_SET_LEADCHNL_RA_SENSING_ANODE_ELECTRODE_1: NORMAL
MDC_IDC_SET_LEADCHNL_RA_SENSING_ANODE_LOCATION_1: NORMAL
MDC_IDC_SET_LEADCHNL_RA_SENSING_CATHODE_ELECTRODE_1: NORMAL
MDC_IDC_SET_LEADCHNL_RA_SENSING_CATHODE_LOCATION_1: NORMAL
MDC_IDC_SET_LEADCHNL_RA_SENSING_POLARITY: NORMAL
MDC_IDC_SET_LEADCHNL_RA_SENSING_SENSITIVITY: 0.3 MV
MDC_IDC_SET_LEADCHNL_RV_PACING_AMPLITUDE: 2.5 V
MDC_IDC_SET_LEADCHNL_RV_PACING_ANODE_ELECTRODE_1: NORMAL
MDC_IDC_SET_LEADCHNL_RV_PACING_ANODE_LOCATION_1: NORMAL
MDC_IDC_SET_LEADCHNL_RV_PACING_CAPTURE_MODE: NORMAL
MDC_IDC_SET_LEADCHNL_RV_PACING_CATHODE_ELECTRODE_1: NORMAL
MDC_IDC_SET_LEADCHNL_RV_PACING_CATHODE_LOCATION_1: NORMAL
MDC_IDC_SET_LEADCHNL_RV_PACING_POLARITY: NORMAL
MDC_IDC_SET_LEADCHNL_RV_PACING_PULSEWIDTH: 0.4 MS
MDC_IDC_SET_LEADCHNL_RV_SENSING_ANODE_ELECTRODE_1: NORMAL
MDC_IDC_SET_LEADCHNL_RV_SENSING_ANODE_LOCATION_1: NORMAL
MDC_IDC_SET_LEADCHNL_RV_SENSING_CATHODE_ELECTRODE_1: NORMAL
MDC_IDC_SET_LEADCHNL_RV_SENSING_CATHODE_LOCATION_1: NORMAL
MDC_IDC_SET_LEADCHNL_RV_SENSING_POLARITY: NORMAL
MDC_IDC_SET_LEADCHNL_RV_SENSING_SENSITIVITY: 0.3 MV
MDC_IDC_SET_ZONE_DETECTION_BEATS_DENOMINATOR: 40 {BEATS}
MDC_IDC_SET_ZONE_DETECTION_BEATS_NUMERATOR: 30 {BEATS}
MDC_IDC_SET_ZONE_DETECTION_INTERVAL: 270 MS
MDC_IDC_SET_ZONE_DETECTION_INTERVAL: 350 MS
MDC_IDC_SET_ZONE_DETECTION_INTERVAL: 360 MS
MDC_IDC_SET_ZONE_DETECTION_INTERVAL: 400 MS
MDC_IDC_SET_ZONE_DETECTION_INTERVAL: NORMAL
MDC_IDC_SET_ZONE_TYPE: NORMAL
MDC_IDC_STAT_AT_BURDEN_PERCENT: 0 %
MDC_IDC_STAT_AT_DTM_END: NORMAL
MDC_IDC_STAT_AT_DTM_START: NORMAL
MDC_IDC_STAT_BRADY_AP_VP_PERCENT: 5.39 %
MDC_IDC_STAT_BRADY_AP_VS_PERCENT: 0 %
MDC_IDC_STAT_BRADY_AS_VP_PERCENT: 94.55 %
MDC_IDC_STAT_BRADY_AS_VS_PERCENT: 0.06 %
MDC_IDC_STAT_BRADY_DTM_END: NORMAL
MDC_IDC_STAT_BRADY_DTM_START: NORMAL
MDC_IDC_STAT_BRADY_RA_PERCENT_PACED: 5.31 %
MDC_IDC_STAT_BRADY_RV_PERCENT_PACED: 99.94 %
MDC_IDC_STAT_EPISODE_RECENT_COUNT: 0
MDC_IDC_STAT_EPISODE_RECENT_COUNT: 4
MDC_IDC_STAT_EPISODE_RECENT_COUNT_DTM_END: NORMAL
MDC_IDC_STAT_EPISODE_RECENT_COUNT_DTM_START: NORMAL
MDC_IDC_STAT_EPISODE_TOTAL_COUNT: 0
MDC_IDC_STAT_EPISODE_TOTAL_COUNT: 2
MDC_IDC_STAT_EPISODE_TOTAL_COUNT: 2
MDC_IDC_STAT_EPISODE_TOTAL_COUNT: 29
MDC_IDC_STAT_EPISODE_TOTAL_COUNT_DTM_END: NORMAL
MDC_IDC_STAT_EPISODE_TOTAL_COUNT_DTM_START: NORMAL
MDC_IDC_STAT_EPISODE_TYPE: NORMAL
MDC_IDC_STAT_TACHYTHERAPY_ATP_DELIVERED_RECENT: 0
MDC_IDC_STAT_TACHYTHERAPY_ATP_DELIVERED_TOTAL: 0
MDC_IDC_STAT_TACHYTHERAPY_RECENT_DTM_END: NORMAL
MDC_IDC_STAT_TACHYTHERAPY_RECENT_DTM_START: NORMAL
MDC_IDC_STAT_TACHYTHERAPY_SHOCKS_ABORTED_RECENT: 0
MDC_IDC_STAT_TACHYTHERAPY_SHOCKS_ABORTED_TOTAL: 0
MDC_IDC_STAT_TACHYTHERAPY_SHOCKS_DELIVERED_RECENT: 0
MDC_IDC_STAT_TACHYTHERAPY_SHOCKS_DELIVERED_TOTAL: 2
MDC_IDC_STAT_TACHYTHERAPY_TOTAL_DTM_END: NORMAL
MDC_IDC_STAT_TACHYTHERAPY_TOTAL_DTM_START: NORMAL

## 2019-08-19 ENCOUNTER — COMMUNICATION - HEALTHEAST (OUTPATIENT)
Dept: NEUROLOGY | Facility: CLINIC | Age: 49
End: 2019-08-19

## 2019-08-19 DIAGNOSIS — F33.1 MDD (MAJOR DEPRESSIVE DISORDER), RECURRENT EPISODE, MODERATE (H): ICD-10-CM

## 2019-09-04 ENCOUNTER — TELEPHONE (OUTPATIENT)
Dept: CARDIOLOGY | Facility: CLINIC | Age: 49
End: 2019-09-04

## 2019-09-04 NOTE — TELEPHONE ENCOUNTER
Mo calling, looking to transfer his care and get his records sent to [facility] with Dr. Castellano.    Updated address and phone number.

## 2019-09-05 ENCOUNTER — COMMUNICATION - HEALTHEAST (OUTPATIENT)
Dept: NEUROLOGY | Facility: CLINIC | Age: 49
End: 2019-09-05

## 2019-09-05 DIAGNOSIS — F33.1 MDD (MAJOR DEPRESSIVE DISORDER), RECURRENT EPISODE, MODERATE (H): ICD-10-CM

## 2019-09-06 NOTE — TELEPHONE ENCOUNTER
Called Mo to update him and let him know that we are working on getting a chart set up for him in [facility] and then we will get records faxed over there and then they will reach out to him to schedule.    Pt confirms understanding.      Elizabeth James, JOANN

## 2019-09-29 ENCOUNTER — HEALTH MAINTENANCE LETTER (OUTPATIENT)
Age: 49
End: 2019-09-29

## 2019-10-25 ENCOUNTER — ANCILLARY PROCEDURE (OUTPATIENT)
Dept: CARDIOLOGY | Facility: CLINIC | Age: 49
End: 2019-10-25
Attending: INTERNAL MEDICINE
Payer: MEDICAID

## 2019-10-25 DIAGNOSIS — I42.9 CARDIOMYOPATHY (H): ICD-10-CM

## 2019-10-25 PROCEDURE — 93295 DEV INTERROG REMOTE 1/2/MLT: CPT | Performed by: INTERNAL MEDICINE

## 2019-10-25 PROCEDURE — 93296 REM INTERROG EVL PM/IDS: CPT | Mod: ZF

## 2019-10-27 LAB
MDC_IDC_LEAD_IMPLANT_DT: NORMAL
MDC_IDC_LEAD_IMPLANT_DT: NORMAL
MDC_IDC_LEAD_LOCATION: NORMAL
MDC_IDC_LEAD_LOCATION: NORMAL
MDC_IDC_LEAD_MFG: NORMAL
MDC_IDC_LEAD_MFG: NORMAL
MDC_IDC_LEAD_MODEL: NORMAL
MDC_IDC_LEAD_MODEL: NORMAL
MDC_IDC_LEAD_POLARITY_TYPE: NORMAL
MDC_IDC_LEAD_POLARITY_TYPE: NORMAL
MDC_IDC_LEAD_SERIAL: NORMAL
MDC_IDC_LEAD_SERIAL: NORMAL
MDC_IDC_MSMT_BATTERY_DTM: NORMAL
MDC_IDC_MSMT_BATTERY_REMAINING_LONGEVITY: 24 MO
MDC_IDC_MSMT_BATTERY_RRT_TRIGGER: 2.73
MDC_IDC_MSMT_BATTERY_STATUS: NORMAL
MDC_IDC_MSMT_BATTERY_VOLTAGE: 2.93 V
MDC_IDC_MSMT_CAP_CHARGE_DTM: NORMAL
MDC_IDC_MSMT_CAP_CHARGE_ENERGY: 18 J
MDC_IDC_MSMT_CAP_CHARGE_TIME: 4.08
MDC_IDC_MSMT_CAP_CHARGE_TYPE: NORMAL
MDC_IDC_MSMT_LEADCHNL_RA_IMPEDANCE_VALUE: 456 OHM
MDC_IDC_MSMT_LEADCHNL_RA_PACING_THRESHOLD_AMPLITUDE: 1.38 V
MDC_IDC_MSMT_LEADCHNL_RA_PACING_THRESHOLD_PULSEWIDTH: 0.4 MS
MDC_IDC_MSMT_LEADCHNL_RA_SENSING_INTR_AMPL: 3.38 MV
MDC_IDC_MSMT_LEADCHNL_RV_IMPEDANCE_VALUE: 323 OHM
MDC_IDC_MSMT_LEADCHNL_RV_IMPEDANCE_VALUE: 418 OHM
MDC_IDC_MSMT_LEADCHNL_RV_PACING_THRESHOLD_AMPLITUDE: 1 V
MDC_IDC_MSMT_LEADCHNL_RV_PACING_THRESHOLD_PULSEWIDTH: 0.4 MS
MDC_IDC_MSMT_LEADCHNL_RV_SENSING_INTR_AMPL: 2.75 MV
MDC_IDC_PG_IMPLANT_DTM: NORMAL
MDC_IDC_PG_MFG: NORMAL
MDC_IDC_PG_MODEL: NORMAL
MDC_IDC_PG_SERIAL: NORMAL
MDC_IDC_PG_TYPE: NORMAL
MDC_IDC_SESS_CLINIC_NAME: NORMAL
MDC_IDC_SESS_DTM: NORMAL
MDC_IDC_SESS_TYPE: NORMAL
MDC_IDC_SET_BRADY_AT_MODE_SWITCH_RATE: 171 {BEATS}/MIN
MDC_IDC_SET_BRADY_HYSTRATE: NORMAL
MDC_IDC_SET_BRADY_LOWRATE: 60 {BEATS}/MIN
MDC_IDC_SET_BRADY_MAX_SENSOR_RATE: 120 {BEATS}/MIN
MDC_IDC_SET_BRADY_MAX_TRACKING_RATE: 140 {BEATS}/MIN
MDC_IDC_SET_BRADY_MODE: NORMAL
MDC_IDC_SET_BRADY_PAV_DELAY_LOW: 180 MS
MDC_IDC_SET_BRADY_SAV_DELAY_LOW: 150 MS
MDC_IDC_SET_LEADCHNL_RA_PACING_AMPLITUDE: 2.75 V
MDC_IDC_SET_LEADCHNL_RA_PACING_ANODE_ELECTRODE_1: NORMAL
MDC_IDC_SET_LEADCHNL_RA_PACING_ANODE_LOCATION_1: NORMAL
MDC_IDC_SET_LEADCHNL_RA_PACING_CAPTURE_MODE: NORMAL
MDC_IDC_SET_LEADCHNL_RA_PACING_CATHODE_ELECTRODE_1: NORMAL
MDC_IDC_SET_LEADCHNL_RA_PACING_CATHODE_LOCATION_1: NORMAL
MDC_IDC_SET_LEADCHNL_RA_PACING_POLARITY: NORMAL
MDC_IDC_SET_LEADCHNL_RA_PACING_PULSEWIDTH: 0.4 MS
MDC_IDC_SET_LEADCHNL_RA_SENSING_ANODE_ELECTRODE_1: NORMAL
MDC_IDC_SET_LEADCHNL_RA_SENSING_ANODE_LOCATION_1: NORMAL
MDC_IDC_SET_LEADCHNL_RA_SENSING_CATHODE_ELECTRODE_1: NORMAL
MDC_IDC_SET_LEADCHNL_RA_SENSING_CATHODE_LOCATION_1: NORMAL
MDC_IDC_SET_LEADCHNL_RA_SENSING_POLARITY: NORMAL
MDC_IDC_SET_LEADCHNL_RA_SENSING_SENSITIVITY: 0.3 MV
MDC_IDC_SET_LEADCHNL_RV_PACING_AMPLITUDE: 2 V
MDC_IDC_SET_LEADCHNL_RV_PACING_ANODE_ELECTRODE_1: NORMAL
MDC_IDC_SET_LEADCHNL_RV_PACING_ANODE_LOCATION_1: NORMAL
MDC_IDC_SET_LEADCHNL_RV_PACING_CAPTURE_MODE: NORMAL
MDC_IDC_SET_LEADCHNL_RV_PACING_CATHODE_ELECTRODE_1: NORMAL
MDC_IDC_SET_LEADCHNL_RV_PACING_CATHODE_LOCATION_1: NORMAL
MDC_IDC_SET_LEADCHNL_RV_PACING_POLARITY: NORMAL
MDC_IDC_SET_LEADCHNL_RV_PACING_PULSEWIDTH: 0.4 MS
MDC_IDC_SET_LEADCHNL_RV_SENSING_ANODE_ELECTRODE_1: NORMAL
MDC_IDC_SET_LEADCHNL_RV_SENSING_ANODE_LOCATION_1: NORMAL
MDC_IDC_SET_LEADCHNL_RV_SENSING_CATHODE_ELECTRODE_1: NORMAL
MDC_IDC_SET_LEADCHNL_RV_SENSING_CATHODE_LOCATION_1: NORMAL
MDC_IDC_SET_LEADCHNL_RV_SENSING_POLARITY: NORMAL
MDC_IDC_SET_LEADCHNL_RV_SENSING_SENSITIVITY: 0.3 MV
MDC_IDC_SET_ZONE_DETECTION_BEATS_DENOMINATOR: 40 {BEATS}
MDC_IDC_SET_ZONE_DETECTION_BEATS_NUMERATOR: 30 {BEATS}
MDC_IDC_SET_ZONE_DETECTION_INTERVAL: 270 MS
MDC_IDC_SET_ZONE_DETECTION_INTERVAL: 350 MS
MDC_IDC_SET_ZONE_DETECTION_INTERVAL: 360 MS
MDC_IDC_SET_ZONE_DETECTION_INTERVAL: 400 MS
MDC_IDC_SET_ZONE_DETECTION_INTERVAL: NORMAL
MDC_IDC_SET_ZONE_TYPE: NORMAL
MDC_IDC_STAT_AT_BURDEN_PERCENT: 0 %
MDC_IDC_STAT_AT_DTM_END: NORMAL
MDC_IDC_STAT_AT_DTM_START: NORMAL
MDC_IDC_STAT_BRADY_AP_VP_PERCENT: 3.1 %
MDC_IDC_STAT_BRADY_AP_VS_PERCENT: 0 %
MDC_IDC_STAT_BRADY_AS_VP_PERCENT: 96.89 %
MDC_IDC_STAT_BRADY_AS_VS_PERCENT: 0.01 %
MDC_IDC_STAT_BRADY_DTM_END: NORMAL
MDC_IDC_STAT_BRADY_DTM_START: NORMAL
MDC_IDC_STAT_BRADY_RA_PERCENT_PACED: 3.06 %
MDC_IDC_STAT_BRADY_RV_PERCENT_PACED: 99.99 %
MDC_IDC_STAT_EPISODE_RECENT_COUNT: 0
MDC_IDC_STAT_EPISODE_RECENT_COUNT_DTM_END: NORMAL
MDC_IDC_STAT_EPISODE_RECENT_COUNT_DTM_START: NORMAL
MDC_IDC_STAT_EPISODE_TOTAL_COUNT: 0
MDC_IDC_STAT_EPISODE_TOTAL_COUNT: 2
MDC_IDC_STAT_EPISODE_TOTAL_COUNT: 2
MDC_IDC_STAT_EPISODE_TOTAL_COUNT: 29
MDC_IDC_STAT_EPISODE_TOTAL_COUNT_DTM_END: NORMAL
MDC_IDC_STAT_EPISODE_TOTAL_COUNT_DTM_START: NORMAL
MDC_IDC_STAT_EPISODE_TYPE: NORMAL
MDC_IDC_STAT_TACHYTHERAPY_ATP_DELIVERED_RECENT: 0
MDC_IDC_STAT_TACHYTHERAPY_ATP_DELIVERED_TOTAL: 0
MDC_IDC_STAT_TACHYTHERAPY_RECENT_DTM_END: NORMAL
MDC_IDC_STAT_TACHYTHERAPY_RECENT_DTM_START: NORMAL
MDC_IDC_STAT_TACHYTHERAPY_SHOCKS_ABORTED_RECENT: 0
MDC_IDC_STAT_TACHYTHERAPY_SHOCKS_ABORTED_TOTAL: 0
MDC_IDC_STAT_TACHYTHERAPY_SHOCKS_DELIVERED_RECENT: 0
MDC_IDC_STAT_TACHYTHERAPY_SHOCKS_DELIVERED_TOTAL: 2
MDC_IDC_STAT_TACHYTHERAPY_TOTAL_DTM_END: NORMAL
MDC_IDC_STAT_TACHYTHERAPY_TOTAL_DTM_START: NORMAL

## 2019-11-11 ENCOUNTER — MEDICAL CORRESPONDENCE (OUTPATIENT)
Dept: CARDIOLOGY | Facility: CLINIC | Age: 49
End: 2019-11-11
Payer: MEDICAID

## 2019-11-11 PROCEDURE — 99214 OFFICE O/P EST MOD 30 MIN: CPT | Mod: ZP | Performed by: INTERNAL MEDICINE

## 2019-11-17 ENCOUNTER — COMMUNICATION - HEALTHEAST (OUTPATIENT)
Dept: NEUROLOGY | Facility: CLINIC | Age: 49
End: 2019-11-17

## 2019-11-17 DIAGNOSIS — F51.01 PRIMARY INSOMNIA: ICD-10-CM

## 2019-11-30 ENCOUNTER — COMMUNICATION - HEALTHEAST (OUTPATIENT)
Dept: NEUROLOGY | Facility: CLINIC | Age: 49
End: 2019-11-30

## 2019-11-30 DIAGNOSIS — F33.1 MDD (MAJOR DEPRESSIVE DISORDER), RECURRENT EPISODE, MODERATE (H): ICD-10-CM

## 2019-12-21 ENCOUNTER — COMMUNICATION - HEALTHEAST (OUTPATIENT)
Dept: NEUROLOGY | Facility: CLINIC | Age: 49
End: 2019-12-21

## 2019-12-21 DIAGNOSIS — F51.01 PRIMARY INSOMNIA: ICD-10-CM

## 2020-05-07 ENCOUNTER — DOCUMENTATION ONLY (OUTPATIENT)
Dept: CARE COORDINATION | Facility: CLINIC | Age: 50
End: 2020-05-07

## 2020-05-15 ENCOUNTER — VIRTUAL VISIT (OUTPATIENT)
Dept: CARDIOLOGY | Facility: CLINIC | Age: 50
End: 2020-05-15
Attending: INTERNAL MEDICINE
Payer: MEDICAID

## 2020-05-15 DIAGNOSIS — I42.2 HYPERTROPHIC CARDIOMYOPATHY (H): Primary | ICD-10-CM

## 2020-05-15 PROCEDURE — 99214 OFFICE O/P EST MOD 30 MIN: CPT | Mod: 95 | Performed by: INTERNAL MEDICINE

## 2020-05-15 ASSESSMENT — PATIENT HEALTH QUESTIONNAIRE - PHQ9: SUM OF ALL RESPONSES TO PHQ QUESTIONS 1-9: 10

## 2020-05-15 ASSESSMENT — PAIN SCALES - GENERAL: PAINLEVEL: NO PAIN (0)

## 2020-05-15 NOTE — LETTER
"5/15/2020      RE: Konstantin Sharp  [address]  [facility] [address]       Dear Colleague,    Thank you for the opportunity to participate in the care of your patient, Konstantin Sharp, at the [facility]. Please see a copy of my visit note below.    Konstantin Sharp is a 50 year old male who is being evaluated via a billable video visit.      The patient has been notified of following:     \"This video visit will be conducted via a call between you and your physician/provider. We have found that certain health care needs can be provided without the need for an in-person physical exam.  This service lets us provide the care you need with a video conversation.  If a prescription is necessary we can send it directly to your pharmacy.  If lab work is needed we can place an order for that and you can then stop by our lab to have the test done at a later time.    Video visits are billed at different rates depending on your insurance coverage.  Please reach out to your insurance provider with any questions.    If during the course of the call the physician/provider feels a video visit is not appropriate, you will not be charged for this service.\"    Patient has given verbal consent for Video visit? Yes    How would you like to obtain your AVS? Mail a copy    Patient would like the video invitation sent by: Text to cell phone: [phone]    Will anyone else be joining your video visit? No          CARDIOLOGY CONSULTATION:    Mr. Sharp is a 50-year-old gentleman who is known to me with a past medical history significant for hypertrophic cardiomyopathy and myectomy.  He also has diabetes, nicotine dependence at a pack a day now, hypertension, hyperlipidemia.  I met him for the first time in 06/2015.  He had chronic atypical chest pain which has resolved essentially --denies it now.  We did a CTA in 2013 and 2015 and 2019 had a negative coronary calcium score and no " "obstructive disease.  Nuclear stress test associated with this visit was negative for ischemia. He has diabetes has not been under ideal control with an A1C running around 10; moving to [place] from [place] so has to find a new clinic. He has peripheral neuropathy from this as well.       Mr. Sharp underwent an echo summer 2019.  I reviewed those images.  It is unchanged. On hold this year due to Covid.  He continues to have mostly mid ventricular hypertrophy with not a lot of significant obstruction.  He has a normal ejection fraction with no significant valve disease.  He does have an ICD that was implanted in 2016.       He has 4 children, 2 have been screened who are 22 and 24, girls, and 2 have not who are 27 and 28.      He does have sleep apnea and does not use CPAP mask.      PAST MEDICAL HISTORY:  Past Medical History:   Diagnosis Date     Atrial fibrillation (H)      Chest pain     intermittent     Chronic pain      Cognitive disorder     memory loss     Depressive disorder      Dual ICD (implantable cardiac defibrillator) in place 04/29/2014    and pacemaker     GERD (gastroesophageal reflux disease)      H/O traumatic brain injury 2015     Heart attack (H) 1996     HTN (hypertension)      Hypertrophic nonobstructive cardiomyopathy (H) 09/12/2012    s/p ventricular myectomy     Inflammatory arthritis      Intermittent asthma      PAD (peripheral artery disease) (H)      Polysubstance abuse (H)      Seizures (H)     last episode 2014 when \"blood sugar dropped\" according to patient     Sleep apnea     doesn't use cpap     Type 2 diabetes mellitus without complications (H)        CURRENT MEDICATIONS:  Current Outpatient Medications   Medication Sig Dispense Refill     albuterol (2.5 MG/3ML) 0.083% neb solution Take 1 vial by nebulization every 6 hours as needed for shortness of breath / dyspnea or wheezing       amitriptyline (ELAVIL) 25 MG tablet Take 25 mg by mouth At Bedtime        Atorvastatin Calcium " (LIPITOR PO) Take 40 mg by mouth daily        LANTUS SOLOSTAR 100 UNIT/ML soln INJECT 14 UNITS BY SUBCUTANEOUS ROUTE 1 TIME PER DAY AT BEDTIME  3     losartan (COZAAR) 25 MG tablet Take 1 tablet (25 mg) by mouth daily 30 tablet 1     magnesium oxide (MAG-OX) 400 MG tablet Take 2.5 tablets (1,000 mg) by mouth daily 180 tablet 3     metFORMIN (GLUCOPHAGE) 500 MG tablet Take 1,000 mg by mouth 2 times daily (with meals)  60 tablet      metoprolol tartrate (LOPRESSOR) 50 MG tablet Take 2 tablets (100 mg) by mouth 3 times daily 180 tablet 1     Pregabalin (LYRICA PO) Take 225 mg by mouth 2 times daily        QUEtiapine Fumarate (SEROQUEL PO) Take 25 mg by mouth 2 times daily       TRAZODONE HCL PO Take 100 mg by mouth At Bedtime       venlafaxine (EFFEXOR-XR) 150 MG 24 hr capsule Take 150 mg by mouth 2 times daily  30 capsule 1     WARFARIN SODIUM PO Take 8.5 mg by mouth every morning       albuterol (VENTOLIN HFA) 108 (90 BASE) MCG/ACT Inhaler Inhale 2 puffs into the lungs every 4 hours as needed for shortness of breath / dyspnea or wheezing       bisacodyl (DULCOLAX) 10 MG Suppository Place 10 mg rectally daily as needed        Emollient (EUCERIN INTENSIVE REPAIR EX) Externally apply topically 2 times daily as needed       glipiZIDE (GLUCOTROL) 5 MG tablet Take 1 tablet (5 mg) by mouth 2 times daily (before meals) 30 tablet      glycerin, adult, ([facility] GLYCERIN ADULT) 2 g SUPP Suppository Place 1 suppository rectally daily as needed for constipation (Patient not taking: Reported on 7/26/2019) 30 suppository 3     lidocaine (XYLOCAINE) 5 % ointment Apply topically 3 times daily For feet       QUEtiapine (SEROQUEL XR) 150 MG TB24 24 hr tablet Take 150 mg by mouth At Bedtime       senna-docusate (SENOKOT-S;PERICOLACE) 8.6-50 MG per tablet Take 1 tablet by mouth daily       sildenafil (REVATIO) 20 MG tablet Take 0.5 tablets (10 mg) by mouth 3 times daily 90 tablet 3       PAST SURGICAL HISTORY:  Past Surgical History:    Procedure Laterality Date     APPENDECTOMY  1980    [facility]? [facility]     C CARDIAC SURG PROCEDURE UNLIST       C HAND/FINGER SURGERY UNLISTED       C SHOULDER SURG PROC UNLISTED       C STOMACH SURGERY PROCEDURE UNLISTED       COLONOSCOPY  2017     DISCECTOMY LUMBAR POSTERIOR MICROSCOPIC THREE+ LEVELS  12/16/2011    Procedure:DISCECTOMY LUMBAR POSTERIOR MICROSCOPIC THREE+ LEVELS; Posterior Decompression L2-S1; Surgeon:CAITIE OSMAN; Location:[facility] OR     FUSION CERVICAL POSTERIOR ONE LEVEL  8/24/2012    Procedure: FUSION CERVICAL POSTERIOR ONE LEVEL;  Posterior Instrumentated Spinal Fusion Cervical 6-7, Right Iliac Crest Bone [place] ;  Surgeon: Caitie Osman MD;  Location: [facility] OR     GRAFT BONE FROM ILIAC CREST  8/24/2012    Procedure: GRAFT BONE FROM ILIAC CREST;;  Surgeon: Caitie Osman MD;  Location: [facility] OR     HC SACROPLASTY       HEAD & NECK SURGERY  2009     IMPLANT PACEMAKER       INJECT STEROID (LOCATION) N/A 2/19/2015    Procedure: INJECT STEROID (LOCATION);  Surgeon: Siri Koch MD;  Location: [facility]     INSERT STIMULATOR AND LEADS INTERNAL DORSAL COLUMN  8/7/2013    Procedure: INSERT STIMULATOR AND LEADS INTERNAL DORSAL COLUMN;  SPINAL CORD STIMULATOR IMPLANT ;  Surgeon: Ricardo Bruno MD;  Location: [facility] OR     INSERT STIMULATOR DORSAL COLUMN  08/13/2013    lead replacemend, 12?2016,  battery replacement 4/4/2016     KNEE SURGERY       LASER CO2 LARYNGOSCOPY N/A 2/19/2015    Procedure: LASER CO2 LARYNGOSCOPY;  Surgeon: Siri Koch MD;  [facility]     LASER CO2 LARYNGOSCOPY, COMPLEX  7/22/2014    Procedure: LASER CO2 LARYNGOSCOPY, COMPLEX;  Surgeon: Siri Koch MD;  [facility]     MYECTOMY SEPTAL  4/14/2014    Procedure: Median Sternotomy, Septal Myectomy, Intraoperative BRENT performed by Dr. Castano, on pump oxygenator.;  Surgeon: Aguila Cannon MD;  [facility]     NECK SURGERY       SHOULDER SURGERY   "2006    RIGHT     STOMACH SURGERY  1980    partial gastrectomy for bleeding ulcers, \"stress related\". [facility] childrens     WRIST SURGERY Left 1988    fractured. 1988 or so       ALLERGIES  Bee venom; Lisinopril; and Penicillins    FAMILY HX:  Family History   Problem Relation Age of Onset     Heart Disease Father 32        MIs x2; s/p CABG     Substance Abuse Father      Hypertension Father      Obesity Father      Hyperlipidemia Father      Hypertension Brother      Diabetes Brother      Glaucoma Maternal Grandmother      Hypertension Maternal Grandmother      Diabetes Maternal Grandfather      Glaucoma Maternal Grandfather      Hypertension Maternal Grandfather      Cerebrovascular Disease Maternal Grandfather      Obesity Maternal Grandfather      Hyperlipidemia Maternal Grandfather      Coronary Artery Disease Maternal Grandfather      Heart Disease Maternal Grandfather      Substance Abuse Other      Cancer Other      Hypertension Other      Obesity Other      Other Cancer Other         all over     Hyperlipidemia Other      Coronary Artery Disease Other      Obesity Brother      Hyperlipidemia Brother      Lymphoma Maternal Uncle      Diabetes Brother      Depression Daughter      Depression Son      Macular Degeneration No family hx of        SOCIAL HX:  Social History     Socioeconomic History     Marital status: Single     Spouse name: None     Number of children: None     Years of education: None     Highest education level: None   Occupational History     None   Social Needs     Financial resource strain: None     Food insecurity     Worry: None     Inability: None     Transportation needs     Medical: None     Non-medical: None   Tobacco Use     Smoking status: Light Tobacco Smoker     Packs/day: 0.12     Years: 35.00     Pack years: 4.20     Types: Cigarettes     Start date: 3/8/1982     Last attempt to quit: 2014     Years since quittin.1     Smokeless tobacco: Never Used   Substance and Sexual " Activity     Alcohol use: Yes     Alcohol/week: 0.0 standard drinks     Comment: rare     Drug use: No     Comment: last using meth 1/2017     Sexual activity: Not Currently     Partners: Female     Birth control/protection: Male Surgical   Lifestyle     Physical activity     Days per week: None     Minutes per session: None     Stress: None   Relationships     Social connections     Talks on phone: None     Gets together: None     Attends Sabianist service: None     Active member of club or organization: None     Attends meetings of clubs or organizations: None     Relationship status: None     Intimate partner violence     Fear of current or ex partner: None     Emotionally abused: None     Physically abused: None     Forced sexual activity: None   Other Topics Concern     Parent/sibling w/ CABG, MI or angioplasty before 65F 55M? Yes   Social History Narrative     None       ROS:  Constitutional: No fever, chills, or sweats. No weight gain/loss.   ENT: No visual disturbance, ear ache, epistaxis, sore throat.   Allergies/Immunologic: Negative.   Respiratory: No cough, hemoptysis.   Cardiovascular: As per HPI.   GI: No nausea, vomiting, hematemesis, melena, or hematochezia.   : No urinary frequency, dysuria, or hematuria.   Integument: Negative.   Psychiatric: Negative.   Neuro: Negative.   Endocrinology: Negative.   Musculoskeletal: No myalgia.    VITAL SIGNS:  There were no vitals taken for this visit.  There is no height or weight on file to calculate BMI.  Wt Readings from Last 2 Encounters:   07/26/19 119.3 kg (263 lb)   10/23/18 115 kg (253 lb 9.6 oz)       PHYSICAL EXAM  Konstantin Sharp IS A 50 year old male.in no acute distress.   General:  no apparent distress, normal body habitus, sitting upright.  ENT/Mouth:  membranes moist, no nasal discharge.  Normal head shape, no apparent injury or laceration.  Eyes:  no scleral icterus, normal conjunctivae.  No observed jaundice.  Neck:  no apparent neck swelling.    Chest/Lungs:  No breathing difficulty while speaking.  No audible wheezing.  No cough during conversation.  Cardiovascular:  No obviously elevated jugular venous pressure.  No apparent edema bilaterally in LE.    Extremities:  no apparent cyanosis.  Skin:  no xanthelasma.  No facial lacerations.  Neurologic:  Normal arm motion bilateral, no tremors.    Psychiatric:  Alert and oriented x3, calm demeanor    The rest of the comprehensive physical examination is deferred due to public health emergency video visit restrictions.      LABS    Lab Results   Component Value Date    WBC 9.3 01/31/2018     Lab Results   Component Value Date    RBC 4.81 01/31/2018     Lab Results   Component Value Date    HGB 15.4 01/31/2018     Lab Results   Component Value Date    HCT 45.0 01/31/2018     No components found for: MCT  Lab Results   Component Value Date    MCV 94 01/31/2018     Lab Results   Component Value Date    MCH 32.0 01/31/2018     Lab Results   Component Value Date    MCHC 34.2 01/31/2018     Lab Results   Component Value Date    RDW 13.6 01/31/2018     Lab Results   Component Value Date     01/31/2018      Recent Labs   Lab Test 01/23/18  1001 03/17/15  1224 02/17/15  0830   NA  --  142 134   POTASSIUM 4.1 4.0 3.7   CHLORIDE  --  110* 105   CO2  --  25 22   ANIONGAP  --  7 7   GLC  --  113* 308*   BUN  --  14 16   CR 0.93 0.96 0.90   TANIA  --  8.8 8.5     Recent Labs   Lab Test 01/29/14 09/30/13  0633 05/15/13  0732   CHOL 205* 140 207*   HDL 27* 35* 33*   * 76 124   TRIG 242* 145 254*   CHOLHDLRATIO  --  4.0 6.4*           IMPRESSION, REPORT, PLAN:   1.  Hypertrophic cardiomyopathy status post myectomy with minimal obstruction, stable.   2.  Diabetes type 2 with peripheral neuropathy with an A1c around 10     3.  Heart block followed by ectopy status post ICD and permanent pacemaker.   4.  Atrial fibrillation with history of DVT, on chronic anticoagulation with Coumadin.   5.  Nicotine dependence,  currently smokes pack a day with a 30-pack-year smoking history.   6.  History of atypical chest pain with a negative coronary calcium score on last CT 07/2015/12/2019.   7.  Glottic stenosis.   8.  Pneumonia.   9.  Chronic back pain.      It was a pleasure to see Mo in follow up. He is clinically doing well.  He needs to stop smoking. He will work on diet and exercise; diabetes not controlled, which he needs to work on.  We discussed his echo needs to be done -- can do this summer.  He needs his device checked.  Continue coumadin for afib; will call if HR goes up again like it did one time since I last saw him.  We will plan to see him back in 12 months. He will let us know if he has any issues in the interim.     DONALD Castellano MD    Please do not hesitate to contact me if you have any questions/concerns.     Sincerely,     Momo Castellano MD

## 2020-05-15 NOTE — PROGRESS NOTES
"Konstantin Sharp is a 50 year old male who is being evaluated via a billable video visit.      The patient has been notified of following:     \"This video visit will be conducted via a call between you and your physician/provider. We have found that certain health care needs can be provided without the need for an in-person physical exam.  This service lets us provide the care you need with a video conversation.  If a prescription is necessary we can send it directly to your pharmacy.  If lab work is needed we can place an order for that and you can then stop by our lab to have the test done at a later time.    Video visits are billed at different rates depending on your insurance coverage.  Please reach out to your insurance provider with any questions.    If during the course of the call the physician/provider feels a video visit is not appropriate, you will not be charged for this service.\"    Patient has given verbal consent for Video visit? Yes    How would you like to obtain your AVS? Mail a copy    Patient would like the video invitation sent by: Text to cell phone: [phone]    Will anyone else be joining your video visit? No    Video modality: [employer] - is now [employer]  Video duration: 16 minutes  Patient location: home  Provider location: [facility]      CARDIOLOGY CONSULTATION:    Mr. Sharp is a 50-year-old gentleman who is known to me with a past medical history significant for hypertrophic cardiomyopathy and myectomy.  He also has diabetes, nicotine dependence at a pack a day now, hypertension, hyperlipidemia.  I met him for the first time in 06/2015.  He had chronic atypical chest pain which has resolved essentially --denies it now.  We did a CTA in 2013 and 2015 and 2019 had a negative coronary calcium score and no obstructive disease.  Nuclear stress test associated with this visit was negative for ischemia. He has diabetes has not been under ideal control with an A1C running around 10; moving to [place] from [place] so has " "to find a new clinic. He has peripheral neuropathy from this as well.       Mr. Sharp underwent an echo summer 2019.  I reviewed those images.  It is unchanged. On hold this year due to Covid.  He continues to have mostly mid ventricular hypertrophy with not a lot of significant obstruction.  He has a normal ejection fraction with no significant valve disease.  He does have an ICD that was implanted in 2016.       He has 4 children, 2 have been screened who are 22 and 24, girls, and 2 have not who are 27 and 28.      He does have sleep apnea and does not use CPAP mask.      PAST MEDICAL HISTORY:  Past Medical History:   Diagnosis Date     Atrial fibrillation (H)      Chest pain     intermittent     Chronic pain      Cognitive disorder     memory loss     Depressive disorder      Dual ICD (implantable cardiac defibrillator) in place 04/29/2014    and pacemaker     GERD (gastroesophageal reflux disease)      H/O traumatic brain injury 2015     Heart attack (H) 1996     HTN (hypertension)      Hypertrophic nonobstructive cardiomyopathy (H) 09/12/2012    s/p ventricular myectomy     Inflammatory arthritis      Intermittent asthma      PAD (peripheral artery disease) (H)      Polysubstance abuse (H)      Seizures (H)     last episode 2014 when \"blood sugar dropped\" according to patient     Sleep apnea     doesn't use cpap     Type 2 diabetes mellitus without complications (H)        CURRENT MEDICATIONS:  Current Outpatient Medications   Medication Sig Dispense Refill     albuterol (2.5 MG/3ML) 0.083% neb solution Take 1 vial by nebulization every 6 hours as needed for shortness of breath / dyspnea or wheezing       amitriptyline (ELAVIL) 25 MG tablet Take 25 mg by mouth At Bedtime        Atorvastatin Calcium (LIPITOR PO) Take 40 mg by mouth daily        LANTUS SOLOSTAR 100 UNIT/ML soln INJECT 14 UNITS BY SUBCUTANEOUS ROUTE 1 TIME PER DAY AT BEDTIME  3     losartan (COZAAR) 25 MG tablet Take 1 tablet (25 mg) by mouth " daily 30 tablet 1     magnesium oxide (MAG-OX) 400 MG tablet Take 2.5 tablets (1,000 mg) by mouth daily 180 tablet 3     metFORMIN (GLUCOPHAGE) 500 MG tablet Take 1,000 mg by mouth 2 times daily (with meals)  60 tablet      metoprolol tartrate (LOPRESSOR) 50 MG tablet Take 2 tablets (100 mg) by mouth 3 times daily 180 tablet 1     Pregabalin (LYRICA PO) Take 225 mg by mouth 2 times daily        QUEtiapine Fumarate (SEROQUEL PO) Take 25 mg by mouth 2 times daily       TRAZODONE HCL PO Take 100 mg by mouth At Bedtime       venlafaxine (EFFEXOR-XR) 150 MG 24 hr capsule Take 150 mg by mouth 2 times daily  30 capsule 1     WARFARIN SODIUM PO Take 8.5 mg by mouth every morning       albuterol (VENTOLIN HFA) 108 (90 BASE) MCG/ACT Inhaler Inhale 2 puffs into the lungs every 4 hours as needed for shortness of breath / dyspnea or wheezing       bisacodyl (DULCOLAX) 10 MG Suppository Place 10 mg rectally daily as needed        Emollient (EUCERIN INTENSIVE REPAIR EX) Externally apply topically 2 times daily as needed       glipiZIDE (GLUCOTROL) 5 MG tablet Take 1 tablet (5 mg) by mouth 2 times daily (before meals) 30 tablet      glycerin, adult, ([facility] GLYCERIN ADULT) 2 g SUPP Suppository Place 1 suppository rectally daily as needed for constipation (Patient not taking: Reported on 7/26/2019) 30 suppository 3     lidocaine (XYLOCAINE) 5 % ointment Apply topically 3 times daily For feet       QUEtiapine (SEROQUEL XR) 150 MG TB24 24 hr tablet Take 150 mg by mouth At Bedtime       senna-docusate (SENOKOT-S;PERICOLACE) 8.6-50 MG per tablet Take 1 tablet by mouth daily       sildenafil (REVATIO) 20 MG tablet Take 0.5 tablets (10 mg) by mouth 3 times daily 90 tablet 3       PAST SURGICAL HISTORY:  Past Surgical History:   Procedure Laterality Date     APPENDECTOMY  1980    [facility]     C CARDIAC SURG PROCEDURE UNLIST       C HAND/FINGER SURGERY UNLISTED       C SHOULDER SURG PROC UNLISTED       C STOMACH SURGERY  "PROCEDURE UNLISTED       COLONOSCOPY  2017     DISCECTOMY LUMBAR POSTERIOR MICROSCOPIC THREE+ LEVELS  12/16/2011    Procedure:DISCECTOMY LUMBAR POSTERIOR MICROSCOPIC THREE+ LEVELS; Posterior Decompression L2-S1; Surgeon:CAITIE OSMAN; Location:[facility] OR     FUSION CERVICAL POSTERIOR ONE LEVEL  8/24/2012    Procedure: FUSION CERVICAL POSTERIOR ONE LEVEL;  Posterior Instrumentated Spinal Fusion Cervical 6-7, Right Iliac Crest Bone [place] ;  Surgeon: Caitie Osman MD;  [facility]     GRAFT BONE FROM ILIAC CREST  8/24/2012    Procedure: GRAFT BONE FROM ILIAC CREST;;  Surgeon: Caitie Osman MD;  [facility]     HC SACROPLASTY       HEAD & NECK SURGERY  2009     IMPLANT PACEMAKER       INJECT STEROID (LOCATION) N/A 2/19/2015    Procedure: INJECT STEROID (LOCATION);  Surgeon: Siri Koch MD;  [facility]     INSERT STIMULATOR AND LEADS INTERNAL DORSAL COLUMN  8/7/2013    Procedure: INSERT STIMULATOR AND LEADS INTERNAL DORSAL COLUMN;  SPINAL CORD STIMULATOR IMPLANT ;  Surgeon: Ricardo Bruno MD;  Location: [facility] OR     INSERT STIMULATOR DORSAL COLUMN  08/13/2013    lead replacemend, 12?2016,  battery replacement 4/4/2016     KNEE SURGERY       LASER CO2 LARYNGOSCOPY N/A 2/19/2015    Procedure: LASER CO2 LARYNGOSCOPY;  Surgeon: Siri Koch MD;  [facility]     LASER CO2 LARYNGOSCOPY, COMPLEX  7/22/2014    Procedure: LASER CO2 LARYNGOSCOPY, COMPLEX;  Surgeon: Siri Koch MD;  [facility]     MYECTOMY SEPTAL  4/14/2014    Procedure: Median Sternotomy, Septal Myectomy, Intraoperative BRENT performed by Dr. Castano, on pump oxygenator.;  Surgeon: Aguila Cannon MD;  [facility]     NECK SURGERY       SHOULDER SURGERY  2006    RIGHT     STOMACH SURGERY  1980    partial gastrectomy for bleeding ulcers, \"stress related\". [facility] children[facility]     WRIST SURGERY Left 1988    fractured. 1988 or so       ALLERGIES  Bee venom; Lisinopril; " and Penicillins    FAMILY HX:  Family History   Problem Relation Age of Onset     Heart Disease Father 32        MIs x2; s/p CABG     Substance Abuse Father      Hypertension Father      Obesity Father      Hyperlipidemia Father      Hypertension Brother      Diabetes Brother      Glaucoma Maternal Grandmother      Hypertension Maternal Grandmother      Diabetes Maternal Grandfather      Glaucoma Maternal Grandfather      Hypertension Maternal Grandfather      Cerebrovascular Disease Maternal Grandfather      Obesity Maternal Grandfather      Hyperlipidemia Maternal Grandfather      Coronary Artery Disease Maternal Grandfather      Heart Disease Maternal Grandfather      Substance Abuse Other      Cancer Other      Hypertension Other      Obesity Other      Other Cancer Other         all over     Hyperlipidemia Other      Coronary Artery Disease Other      Obesity Brother      Hyperlipidemia Brother      Lymphoma Maternal Uncle      Diabetes Brother      Depression Daughter      Depression Son      Macular Degeneration No family hx of        SOCIAL HX:  Social History     Socioeconomic History     Marital status: Single     Spouse name: None     Number of children: None     Years of education: None     Highest education level: None   Occupational History     None   Social Needs     Financial resource strain: None     Food insecurity     Worry: None     Inability: None     Transportation needs     Medical: None     Non-medical: None   Tobacco Use     Smoking status: Light Tobacco Smoker     Packs/day: 0.12     Years: 35.00     Pack years: 4.20     Types: Cigarettes     Start date: 3/8/1982     Last attempt to quit: 2014     Years since quittin.1     Smokeless tobacco: Never Used   Substance and Sexual Activity     Alcohol use: Yes     Alcohol/week: 0.0 standard drinks     Comment: rare     Drug use: No     Comment: last using meth 2017     Sexual activity: Not Currently     Partners: Female     Birth  control/protection: Male Surgical   Lifestyle     Physical activity     Days per week: None     Minutes per session: None     Stress: None   Relationships     Social connections     Talks on phone: None     Gets together: None     Attends Quaker service: None     Active member of club or organization: None     Attends meetings of clubs or organizations: None     Relationship status: None     Intimate partner violence     Fear of current or ex partner: None     Emotionally abused: None     Physically abused: None     Forced sexual activity: None   Other Topics Concern     Parent/sibling w/ CABG, MI or angioplasty before 65F 55M? Yes   Social History Narrative     None       ROS:  Constitutional: No fever, chills, or sweats. No weight gain/loss.   ENT: No visual disturbance, ear ache, epistaxis, sore throat.   Allergies/Immunologic: Negative.   Respiratory: No cough, hemoptysis.   Cardiovascular: As per HPI.   GI: No nausea, vomiting, hematemesis, melena, or hematochezia.   : No urinary frequency, dysuria, or hematuria.   Integument: Negative.   Psychiatric: Negative.   Neuro: Negative.   Endocrinology: Negative.   Musculoskeletal: No myalgia.    VITAL SIGNS:  There were no vitals taken for this visit.  There is no height or weight on file to calculate BMI.  Wt Readings from Last 2 Encounters:   07/26/19 119.3 kg (263 lb)   10/23/18 115 kg (253 lb 9.6 oz)       PHYSICAL EXAM  Konstantin Sharp IS A 50 year old male.in no acute distress.   General:  no apparent distress, normal body habitus, sitting upright.  ENT/Mouth:  membranes moist, no nasal discharge.  Normal head shape, no apparent injury or laceration.  Eyes:  no scleral icterus, normal conjunctivae.  No observed jaundice.  Neck:  no apparent neck swelling.   Chest/Lungs:  No breathing difficulty while speaking.  No audible wheezing.  No cough during conversation.  Cardiovascular:  No obviously elevated jugular venous pressure.  No apparent edema bilaterally  in LE.    Extremities:  no apparent cyanosis.  Skin:  no xanthelasma.  No facial lacerations.  Neurologic:  Normal arm motion bilateral, no tremors.    Psychiatric:  Alert and oriented x3, calm demeanor    The rest of the comprehensive physical examination is deferred due to public health emergency video visit restrictions.      LABS    Lab Results   Component Value Date    WBC 9.3 01/31/2018     Lab Results   Component Value Date    RBC 4.81 01/31/2018     Lab Results   Component Value Date    HGB 15.4 01/31/2018     Lab Results   Component Value Date    HCT 45.0 01/31/2018     No components found for: MCT  Lab Results   Component Value Date    MCV 94 01/31/2018     Lab Results   Component Value Date    MCH 32.0 01/31/2018     Lab Results   Component Value Date    MCHC 34.2 01/31/2018     Lab Results   Component Value Date    RDW 13.6 01/31/2018     Lab Results   Component Value Date     01/31/2018      Recent Labs   Lab Test 01/23/18  1001 03/17/15  1224 02/17/15  0830   NA  --  142 134   POTASSIUM 4.1 4.0 3.7   CHLORIDE  --  110* 105   CO2  --  25 22   ANIONGAP  --  7 7   GLC  --  113* 308*   BUN  --  14 16   CR 0.93 0.96 0.90   TANIA  --  8.8 8.5     Recent Labs   Lab Test 01/29/14 09/30/13  0633 05/15/13  0732   CHOL 205* 140 207*   HDL 27* 35* 33*   * 76 124   TRIG 242* 145 254*   CHOLHDLRATIO  --  4.0 6.4*           IMPRESSION, REPORT, PLAN:   1.  Hypertrophic cardiomyopathy status post myectomy with minimal obstruction, stable.   2.  Diabetes type 2 with peripheral neuropathy with an A1c around 10     3.  Heart block followed by ectopy status post ICD and permanent pacemaker.   4.  Atrial fibrillation with history of DVT, on chronic anticoagulation with Coumadin.   5.  Nicotine dependence, currently smokes pack a day with a 30-pack-year smoking history.   6.  History of atypical chest pain with a negative coronary calcium score on last CT 07/2015/12/2019.   7.  Glottic stenosis.   8.  Pneumonia.    9.  Chronic back pain.      It was a pleasure to see Mo in follow up. He is clinically doing well.  He needs to stop smoking. He will work on diet and exercise; diabetes not controlled, which he needs to work on.  We discussed his echo needs to be done -- can do this summer.  He needs his device checked.  Continue coumadin for afib; will call if HR goes up again like it did one time since I last saw him.  We will plan to see him back in 12 months. He will let us know if he has any issues in the interim.     DONALD Castellano MD

## 2020-05-15 NOTE — PATIENT INSTRUCTIONS
You were seen today in the Adult Congenital and Cardiovascular Genetics Clinic at the [facility].    Cardiology Providers you saw during your visit:  Dr Lia Castellano    Diagnosis:  HCM    Results:  No testing at this time.    Recommendations:    1.  Continue to eat a heart healthy, low salt diet.  2.  Continue to get 20-30 minutes of aerobic activity, 4-5 days per week.  Examples of aerobic activity include walking, running, swimming, cycling, etc.  3.  Continue to observe good oral hygiene, with regular dental visits.  4.  We will get you connected with the device clinic here to send in a remote transmission  5.  Please have an echocardiogram done in the next couple of months.    SBE prophylaxis:   Yes____  No____    Lifelong Bacterial Endocarditis Prophylaxis:  YES____  NO____    If YES is checked, follow the recommendations outlined below:   1. Take antibiotic(s) prior to recommended dental procedures and procedures on the respiratory tract or with infected skin, muscle or bones. SBE prophylaxis is not needed for routine GI and  procedures (ie. Colonoscopy or vaginal delivery)   2. Observe good oral hygiene daily, as advised by your dentist. Get regular professional dental care.   3. Keep cuts clean.   4. Infections should be treated promptly.   5. Symptoms of Infective Endocarditis could include: fever lasting more than 4-5 days or a recurrent fever that initially resolves but returns within 1-2 days)     Exercise restrictions:   Yes____  No____         If yes, list restrictions:  Must be allowed to rest if fatigued or SOB      Work restrictions:  Yes____  No____         If yes, list restrictions:    FASTING CHOLESTEROL was checked in the last 5 years YES___  NO___   Continue to eat a heart healthy, low salt diet.         ____ Fasting lipid panel order today         ____ No changes in medications          ____ I recommend the following changes in your cholesterol medications.:          ____ Please  follow up for cholesterol screening at your primary care physician      Follow-up:  Follow up with Dr Castellano in one year with an echo    For after hours urgent needs, call [phone] and ask to speak to the Adult Congenital Physician on call.  Mention Job Code 0401.    For emergencies call [phone].    For any scheduling needs, please call [facility] Procedure , at [phone]  Thank you for your visit today!  If you have questions or concerns about today's visit, please call me.    Cisco Hercules RN, BSN  Cardiology Care Coordinator  [facility]  [phone]    [address] [place] [address]  Mail Code [id number]  [place] [address]

## 2021-01-14 ENCOUNTER — HEALTH MAINTENANCE LETTER (OUTPATIENT)
Age: 51
End: 2021-01-14

## 2021-05-29 NOTE — PROGRESS NOTES
Outpatient Followup Psychiatric Evaluation      Pertinent History: The patient presents on August 9, 2018 for his first contact with me.  He has been followed at this clinic by the nurse practitioner who last saw him in April 2018.  The patient has had chronic pain issues and is had long-standing depression with insomnia.  The patient has been on several medication trials.  Prior to the nurses visit in April 2018 the patient had been taken off Abilify which was not efficacious.  Both Remeron and trazodone were apparently not effective.  He did have an increase in his Seroquel but had side effects with that and there was a change to Seroquel XR.  He continued to be depressed at that visit and they did increase the patient's Effexor.  He was being followed by medical doctors for his pain which was described as neuropathic pain.  This was likely related to diabetes.    I saw the patient in August 2018.  At that time we began a taper off of the Effexor for consideration of a Cymbalta trial.    In November 2018 we did start the patient on Cymbalta 60 mg a day.  The patient has had significant depression and the patient's sister-in-law set up this appointment on a semi-emergent basis to address this after missing their appointment last week.    I saw the patient in February 2019.  The patient was more depressed and we did increase the patient's Cymbalta and started him on low-dose Remeron at night.    Current Symptoms:   The patient was a vague historian but reports no significant change since the last visit.  He continues to be flat, isolates and is depressed.  He reports that has not improved.  Despite this he has no desire to be dead or thoughts of suicide.  He denies psychosis.  No homicidal ideation.  He states he only sleeps about 2 hours a night.  He does nap during the day.  We discussed sleep hygiene during the interview and he again minimize daytime sleep.  He denied having any new medical issues or side effects  to the medication.  Sister was there and she reported the patient has disinterest in quite a bit of any activity.  He stated he just wanted to go home and go to bed at this point.  No side effects to the medication and no other questions or concerns.  We discussed the possibility of increasing the Remeron and he was in agreement and risks and benefits were discussed.    Current Medications:  Current medications were reviewed.  Please see the chart for additional information.    Medication Compliance: yes    Side Effects to Medications:  no      Vitals:  Wt Readings from Last 3 Encounters:   11/29/15 (!) 245 lb 6.4 oz (111.3 kg)   10/15/15 (!) 283 lb 9.6 oz (128.6 kg)     Temp Readings from Last 3 Encounters:   01/09/16 98.2  F (36.8  C)   01/08/16 97.8  F (36.6  C)   12/30/15 98.4  F (36.9  C)     BP Readings from Last 3 Encounters:   01/09/16 (!) 161/95   01/08/16 130/86   12/30/15 142/80     Pulse Readings from Last 3 Encounters:   01/09/16 (!) 104   12/30/15 85   12/29/15 79       Problem List (Please see medical records):  Please see the chart for full details.  The patient has a variety of underlying medical issues including chronic A. fib, COPD, diabetes mellitus type 2 with resultant neuropathy.  He is described as having chronic pain.  He also has heart block by history, hypertension and morbid obesity.  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  He has had a history which includes a cardiac pacemaker.  He has had back surgery in the past.      Mental Status Exam:   Appearance:  The patient was alert, comfortable and calm.  He was sitting in a chair looking at the floor.  No eye contact.  No effort.  No agitation. Not currently in any pain. No evidence of any shortness of breath.  Behavior:  The patient is calm, cooperative but does not initiate.  He appears disinterested, with no agitation or obvious distress. The patient does participate. No restlessness. No reports of any recent behavioral dyscontrol.  Speech:   Sentence structure is intact.  Somewhat simple and concrete.  Rare.  Limited effort.  Soft-spoken.  Appears baseline.  Able to dialogue. Answers are consistent and somewhat vague.  Not pressured. Not rambling.  Mood/Affect:  Patient appears tired.  Flat and depressed.  Slightly irritable.  No lability.  Thought Content:  No evidence of any psychosis. No reports of any recent psychosis.  Suicidal or Homicidal Thoughts:  None apparent or reported. The patient denies any suicidal or homicidal ideation.   Thought Process/Formulation:  Able to track and follow.  Limited effort however.  The patient does need prompts and structure.  No racing thoughts.  Somewhat concrete.  Somewhat vague.  Associations:  Fair.  No recent change.  Able to process information.  Fund of Knowledge: Impaired.  No reports of any significant recent change.   Attention/Concentration: Needs prompts to attend. Concentration continues impaired.  Slow.  Somewhat concrete.  Not disorganized.  Insight:  Grossly unchanged.  Continues impaired  Judgement:  Grossly unchanged.  Continues impaired  Memory:  Grossly fair.  Needing prompts and structure.    Motor Status:  No recent change reported. No current tremor.   Orientation: No reports of any recent change.    Diagnosis managed and treated at today's visit :    Major depressive disorder, chronic, recurrent, severe, without psychosis.      Plan:  Medication Adjustment:  I have increased the patient's Remeron to 30 mg a day.  Risks and benefits were discussed.    Other:   Patient will return to clinic in 3 months. They agree to call or return with any questions or concerns.    Continue with the support of the clinic, reassurance, and redirection. Staff monitoring and ongoing assessments per team plan. Current psychotropic medication appears to represent the minimum effective dosage and appears medically necessary. We will continue to monitor and reassess. This team will utilize appropriate emergency  services if necessary. I will make myself available if concerns or problems arise.    Rolando Burnham MD

## 2021-06-08 NOTE — PROGRESS NOTES
Patient's impression of how medication is working? Pt stated med are good but he doesn't sleep much. They also need refills.    Compliant with Medication?     Side Effects: None    Current Symptoms : No    Pain (0-10) No  Appetite change Yes and N/A   Negative thoughts No  Alcohol use No  Drug use No  Mood swings Yes  Sleep disturbance Yes  Change in interest Yes  Change in energy Yes  Change in concentration No  Psychosis/Hallucinations No  Anxiety high  Sad/depressed mood high

## 2021-06-08 NOTE — PROGRESS NOTES
.  Assessment / Impression     Major depressive disorder; chronic, moderate.  Psychosis; NOS, in remission.  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  Insomnia  Chronic pain issues  Follow-up medication evaluation.  Patient's impression of how medication is working? Working well, does not want a change  Compliant with medication? yes    Side effects:None       Plan:     PCA is present today and brings patient's medication bottles, we reviewed his medications extensively to make sure that our records were corresponding to what he he is taking.  At this time, he does not wish to make any medication changes.  We'll refer patient to psychology for consult.  Discussed the importance of treating his sleep apnea, his PCA will take him back in to have him reevaluated.  We'll follow up in 3 months, we'll repeat AIMS testing at that time.    Subjective:      HPI: Konstantin Sharp is a 46 y.o. male with  cognitive and mood issues secondary to encephalopathy.  Patient also has chronic pain issues and neuropathy well as sleep apnea.  He is here today with his PCA, she brings with all his medication bottles which we reviewed.  We discussed the number of psychotropic's, patient does not feel comfortable making any changes.  He is still depressed and his PCA feels is due to his family not being in close proximity.  He also continues to suffer from peripheral neuropathy, chronic pain issues, and now has some mouth sores.  He does have a pain clinic appointment and appointment for his neuropathy coming up.  His PCA states she will get him an appointment with a dentist.  Patient also has sleep apnea which is quite severe per his PCA report but is refusing to use CPAP.  Patient continues to have sleep issues but states a lot to his due to the pain that keeps him up at night.  He still feels very depressed and anxious and is interested in seen a psychologist which was recommended by his pharmacist.    Konstantin  has a past  medical history of A-fib; Chronic pain; COPD (chronic obstructive pulmonary disease); Depression; DMII (diabetes mellitus, type 2); Encephalopathy; Heart block; HOCM (hypertrophic obstructive cardiomyopathy); Hypertension; Morbid obesity with BMI of 40.0-44.9, adult; and Tracheal stenosis.  Konstantin  has a past surgical history that includes Cardiac pacemaker placement and Back surgery.  Lisinopril; Penicillins; and Venom-honey bee  Current Outpatient Prescriptions   Medication Sig Dispense Refill     amitriptyline (ELAVIL) 25 MG tablet Take 25 mg by mouth 3 (three) times a day.       ARIPiprazole (ABILIFY) 2 MG tablet Take 1 tablet (2 mg total) by mouth 2 (two) times a day. 60 tablet 6     aspirin 81 MG EC tablet Take 81 mg by mouth daily.       atorvastatin (LIPITOR) 40 MG tablet Take 40 mg by mouth bedtime.       diphenhydrAMINE (BENADRYL) 25 mg tablet Take 25 mg by mouth bedtime as needed for sleep.       famotidine (PEPCID) 20 MG tablet Take 20 mg by mouth 2 (two) times a day.       glipiZIDE (GLUCOTROL) 5 MG 24 hr tablet Take 5 mg by mouth daily.       losartan (COZAAR) 25 MG tablet Take 25 mg by mouth daily.       lubiprostone (AMITIZA) 24 MCG capsule Take 24 mcg by mouth 2 (two) times a day with meals.       magnesium hydroxide (MILK OF MAGNESIA) 800 mg/5 mL suspension Take by mouth daily as needed for constipation.       metFORMIN (FORTAMET) 500 MG (OSM) 24 hr tablet Take 1,000 mg by mouth 2 (two) times a day with meals. Take 850 mg twice a day       metoprolol succinate (TOPROL-XL) 100 MG 24 hr tablet Take 100 mg by mouth daily.       mirtazapine (REMERON) 45 MG tablet Take 45 mg by mouth bedtime.       pramipexole (MIRAPEX) 0.25 MG tablet Take 0.25 mg by mouth 3 (three) times a day.       QUEtiapine (SEROQUEL) 25 MG tablet Take 1 tablet (25 mg total) by mouth 2 (two) times a day. 60 tablet 6     topiramate (TOPAMAX) 50 MG tablet Take 50 mg by mouth 2 (two) times a day.       traZODone (DESYREL) 100 MG  tablet Take 1.5 tablets (150 mg total) by mouth bedtime. 45 tablet 6     warfarin (COUMADIN) 10 MG tablet 1 tab PO daily after 12/8/2015 if all right with your ENT doctor. INR should be checked after resumption of coumadin per primary doctor. 15 tablet 0     ARIPiprazole (ABILIFY) 10 MG tablet Take 1 tablet (10 mg total) by mouth bedtime. 30 tablet 6     LORazepam (ATIVAN) 1 MG tablet Take 1 tablet (1 mg total) by mouth every 4 (four) hours. 60 tablet 0     LORazepam (ATIVAN) 1 MG tablet Take 1 tablet (1 mg total) by mouth 4 (four) times a day. 30 tablet 0     QUEtiapine 150 mg Tb24 Take 1 tablet (150 mg total) by mouth bedtime. 30 tablet 6     venlafaxine (EFFEXOR-XR) 150 MG 24 hr capsule Take 1 capsule (150 mg total) by mouth 2 (two) times a day. 60 capsule 6     No current facility-administered medications for this encounter.      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    Review of Systems  Constitutional: negative  Musculoskeletal:positive for back pain  Neurological: positive for neuropathy  Behavioral/Psych: positive for anxiety, decreased appetite, fatigue and sleep disturbance, negative for hallucinations       Objective:   @VS    Mental Status Exam:     Appearance:  Patient is casually dressed, pleasant and cooperative.  When not actively engaged, he is using his iPhone.  He answers questions appropriately, good eye contact.  He does respond to humor.    Speech / Language : Within normal, Soft and Monotone    Associations: appropriate    Alert and oriented X 3:yes    Mood: Sad  Affect: Congruent w/content of speech    Suicidal / Homicidal ideation:none  If yes, document safety plan:    Fund of knowledge: good    Thought process:Within normal    Judgement / insight: No Evidence of Impairment    Recent and remote memory: Baseline impaired.    Attention: Within normal  Concentration: Within normal    Motor status: Gait normal, no involuntary movements.    Scores: NA    Change in medication? No    Education    Reviewed risks/benefits of medication      Physical Exam: General appearance: alert, appears stated age, cooperative and no distress  Neurologic: Mental status: Alert, oriented, thought content appropriate, affect: blunted, thought content exhibits logical connections.Thought content devoid of any delusions, suicidal, homicidal or obsesional content.

## 2021-06-09 NOTE — PROGRESS NOTES
Patient's impression of how medication is working? F/u appt, no changes still not sleeping, had sleep study done last will states will go back on cpap machine.     Compliant with Medication? Yes     Side Effects: None    Current Symptoms : No    Pain (0-10) No  Appetite change No  Negative thoughts No  Alcohol use No  Drug use No  Mood swings No  Sleep disturbance No  Change in interest No  Change in energy No  Change in concentration No  Psychosis/Hallucinations No  Anxiety moderate  Sad/depressed mood moderate

## 2021-06-09 NOTE — PROGRESS NOTES
".  Assessment / Impression     Major depressive disorder; chronic, moderate.  Psychosis; NOS, in remission.  Insomnia\sleep apnea.  Chronic pain issues  Follow-up medication evaluation.  Patient's impression of how medication is working? Yes, but no one is managing pain medications  Compliant with medication? yes    Side effects:None       Plan:     Encourage compliance with CPAP.  Reviewed medications, no changes made today.  Referral to pain clinic for neck, back, and neuropathic pain.   met with patient and PCA regarding ending appropriate living situation.  We will have patient follow-up in 3 months.    Subjective:      HPI: Konstantin Sharp is a 47 y.o. male with prior history of encephalopathy.  He has persistent mood and cognitive issues.  Patient is here for follow-up medication evaluation along with his PCA.  They both feel the medications are stable.  Patient did have a sleep study, they recommended either surgery or CPAP.  Due to his cardiac issues he probably would not be a candidate for surgery.  Recent states he is willing to retry the CPAP and they will be fitting him with a device at his next appointment.  He continues to have significant neck, back and neuropathic pain in his feet.  He and his PCA report that nobody is managing his pain medications.  They are interested in another pain clinic consult.  Mo states he is a little more depressed and anxious because of her living situation.  They moved out of his previous housing as it was in a dangerous neighborhood and right now they are living with his brothers son and there grandchildren.  Applied for housing 5 times but have been denied as his brother has no rental history.  Patient denies any negative thoughts, thoughts of death or suicide.  No psychotic symptoms.  Of note we did receive a notification from his PCP that he had tested for math but reported that was a \"one time thing\".  Today patient denies any alcohol or drug " use.    Konstantin  has a past medical history of A-fib; Chronic pain; COPD (chronic obstructive pulmonary disease); Depression; DMII (diabetes mellitus, type 2); Encephalopathy; Heart block; HOCM (hypertrophic obstructive cardiomyopathy); Hypertension; Morbid obesity with BMI of 40.0-44.9, adult; and Tracheal stenosis.  Konstantin  has a past surgical history that includes Cardiac pacemaker placement and Back surgery.  Lisinopril; Penicillins; and Venom-honey bee  Current Outpatient Prescriptions   Medication Sig Dispense Refill     amitriptyline (ELAVIL) 25 MG tablet Take 25 mg by mouth 3 (three) times a day.       ARIPiprazole (ABILIFY) 2 MG tablet Take 1 tablet (2 mg total) by mouth 2 (two) times a day. 60 tablet 6     aspirin 81 MG EC tablet Take 81 mg by mouth daily.       atorvastatin (LIPITOR) 40 MG tablet Take 40 mg by mouth bedtime.       CHANTIX STARTING MONTH BOX 0.5 mg (11)- 1 mg (42) tablet TAKE 0.5 MG DAILY DAYS 1-3, THEN 0.5 MG TWICE DAILY DAYS 4-7, THEN 1 MG TWICE DAILY THEREAFTER  0     diphenhydrAMINE (BENADRYL) 25 mg tablet Take 25 mg by mouth bedtime as needed for sleep.       famotidine (PEPCID) 20 MG tablet Take 20 mg by mouth 2 (two) times a day.       glipiZIDE (GLUCOTROL) 5 MG 24 hr tablet Take 5 mg by mouth daily.       IRON,CARB/VIT C/VIT B12/FOLIC (IRON 100 PLUS ORAL) Take by mouth.       losartan (COZAAR) 25 MG tablet Take 25 mg by mouth daily.       lubiprostone (AMITIZA) 24 MCG capsule Take 24 mcg by mouth 2 (two) times a day with meals.       magnesium hydroxide (MILK OF MAGNESIA) 800 mg/5 mL suspension Take by mouth daily as needed for constipation.       metFORMIN (FORTAMET) 500 MG (OSM) 24 hr tablet Take 1,000 mg by mouth 2 (two) times a day with meals. Take 850 mg twice a day       metoprolol succinate (TOPROL-XL) 100 MG 24 hr tablet Take 100 mg by mouth daily.       mirtazapine (REMERON) 45 MG tablet Take 45 mg by mouth bedtime.       pramipexole (MIRAPEX) 0.25 MG tablet Take 0.25 mg  by mouth 3 (three) times a day.       QUEtiapine (SEROQUEL) 25 MG tablet Take 1 tablet (25 mg total) by mouth 2 (two) times a day. 60 tablet 6     topiramate (TOPAMAX) 50 MG tablet Take 50 mg by mouth 2 (two) times a day.       traZODone (DESYREL) 100 MG tablet Take 1.5 tablets (150 mg total) by mouth bedtime. 45 tablet 6     venlafaxine (EFFEXOR-XR) 150 MG 24 hr capsule Take 1 capsule (150 mg total) by mouth 2 (two) times a day. 60 capsule 6     warfarin (COUMADIN) 10 MG tablet 1 tab PO daily after 12/8/2015 if all right with your ENT doctor. INR should be checked after resumption of coumadin per primary doctor. 15 tablet 0     [DISCONTINUED] LORazepam (ATIVAN) 1 MG tablet Take 1 tablet (1 mg total) by mouth 4 (four) times a day as needed for anxiety. 120 tablet 0     No current facility-administered medications for this encounter.      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    Review of Systems  Constitutional: negative  Musculoskeletal:positive for back pain and neck pain  Neurological: positive for Peripheral neuropathy  Behavioral/Psych: positive for anxiety, depression, fatigue and sleep disturbance, negative for excessive alcohol consumption and illegal drug usage        Objective:   @VS    Mental Status Exam:     Appearance: Patient is casually dressed, pleasant and cooperative.  Good eye contact.  Speech is spontaneous, he is much more interactive.  Joking around appropriately with his brother.    Speech / Language : Within normal, Soft and Monotone    Associations: appropriate    Alert and oriented X 3:yes    Mood: Euthymic  Affect: Blunted    Suicidal / Homicidal ideation:none  If yes, document safety plan:    Fund of knowledge: good    Thought process:Within normal    Judgement / insight: Impairment    Recent and remote memory: Baseline impaired.    Attention: Within normal  Concentration: Within normal    Motor status: Gait normal, no involuntary movements.    Scores: NA    Change in medication? No    Education    Reviewed risks/benefits of medication      Physical Exam: General appearance: alert, appears stated age, cooperative and no distress  Neurologic: Mental status: Alert, oriented, thought content appropriate, affect: blunted, thought content exhibits logical connections, when questioned about suicide, the patient expresses no suicidal ideation, no homicidal ideation
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
## 2021-06-10 NOTE — PROGRESS NOTES
Outpatient Psychology Progress Note    Date of Service:  2017  Duration:  50 minutes (1:00 PM - 1:50 PM)    Name:  Konstantin Sharp  :  1970  MRN:  [id number]    Necessity: This session is necessary to address anxiety, depression, and adjustment related to medical status after hospitalization in .    Intervention: Patient expressed ongoing frustration related to decreased level of independence.  He discussed concerns related to his PCA/sister-in-law limiting his activities due to recommendations from his physicians.  Patient noted that several providers at a pain clinic as well as his PCP have encouraged him to follow restrictions with regard to physical activity tolerance due to chronic pain.  However, he expressed discontentment with this, as it limits his ability to rely on himself for day-to-day tasks.  Writer facilitated discussion involving brainstorming how patient may address these concerns.  Writer provided behavioral modeling using discussion of effective communication strategies to address his needs.  Writer checked in regarding psychotherapy goals and progress, as patient indicated that his mood has remained relatively stable recently.  We agreed to continue meeting on a monthly basis in order to give patient adequate time to implement strategies in between sessions.    Mental Status: Patient arrived on-time and was accompanied by his sister-in-law/PCA who remained in the waiting room for duration of session. Patient was casually dressed and adequately groomed.  Patient appeared oriented to person, place, situation, and time, although orientation was not formally assessed.  Remote memory, attention, language, and fund of knowledge appear generally intact and unchanged from patient's baseline. Of note, patient complains of ongoing memory (particularly recent memory) deficits following hospitalization/hypoxic brain injury in . It does not appear that he has undergone any formal  "neuropsychological testing. Mood was described as \"okay\" and affect appeared quite flat.  Processing speed appeared slowed. Patient required moderate prompting to initiate conversation.  No indication of SI/HI. Patient was cooperative and pleasant throughout session. Eye contact was poor, as he primarily looked at the ground or elsewhere throughout session. No overall changes in mental status since initial consultation.    Participation: The patient was able to participate and benefit from treatment as evidenced by his verbal expression of ideas and initiation of topics discussed.    Psychotherapeutic Techniques: Cognitive-behavioral therapy, motivational interviewing and supportive psychotherapy strategies were utilized.    Progress:  The patient feels he is making adequate progress toward goals, as he is beginning to process adjustment and mood concerns and implement strategies. This writer agrees with patient's assessment and will continue to support in monthly outpatient psychotherapy.    Diagnosis: Persistent Depressive Disorder  Generalized Anxiety Disorder  Tobacco Use Disorder  History of Polysubstance Abuse, in Sustained Remission (not dependence)  History of Hypoxic Brain Injury  R/O Bipolar II Disorder (although deferred at this time)    Plan: Patient will return in 1 month to continue cognitive-behavioral therapy for managing anxiety, depression, adjustment/acceptance of illness/injury, and cognitive impairments. Treatment plan is to continue meeting monthly at this time.      Provider Name:  Ana Paula Parish PsyD, LP  Date:  5/30/2017  Time:  1:00 PM        "

## 2021-06-10 NOTE — PROGRESS NOTES
Outpatient Psychology Progress Note    Date of Service:  2017  Duration:  50 minutes (1:00 PM - 1:50 PM)    Name:  Konstantin Sharp  :  1970  MRN:  [id number]    Necessity: This session is necessary to address anxiety, depression, and adjustment related to medical status after hospitalization in .    Intervention: Patient and writer began session by discussing and signing initial treatment plan.  He completed 2 self-report assessments (see scores below).  Patient and writer discussed his primary concerns including anxiety and depression.  He noted that he at times has difficulty falling asleep due to numerous concerns related to family dynamics (e.g., children).  Writer facilitated discussion regarding the notion of control and working toward goals of managing things he can control versus excepting those which he cannot.  Patient also provided general update indicating that he recently moved from [facility] to [facility].  He noted that this was primarily his brother and sister-in-law's decision, thus limiting his motivation and contentment with current dwelling.  Patient and writer also discussed his ongoing concerns related to cognitive deficits.  Although it does not appear that he has had any formal testing, patient continues to report ongoing memory deficits following hypoxic brain injury.  We discussed compensatory strategies and the potential for possible referral for speech therapy in the future.  Patient agreed that he would like to continue psychotherapy with this writer on a monthly basis.    Mental Status: Patient arrived on-time and was accompanied by his sister-in-law/PCA who remained in the waiting room for duration of session. Patient was casually dressed and adequately groomed.  Patient appeared oriented to person, place, situation, and time, although orientation was not formally assessed.  Remote memory, attention, language, and fund of knowledge appear generally intact and  "unchanged from patient's baseline. Of note, patient complains of ongoing memory (particularly recent memory) deficits following hospitalization/hypoxic brain injury in 2015. It does not appear that he has undergone any formal neuropsychological testing. Mood was described as \"okay\" and affect appeared flat.  He described ongoing anxiety, described above. No indication of SI/HI. Patient was cooperative and pleasant throughout session. Eye contact was poor, as he primarily looked at the ground or elsewhere throughout session. No overall changes in mental status since initial consultation.    Participation: The patient was able to participate and benefit from treatment as evidenced by his verbal expression of ideas and initiation of topics discussed.    Psychotherapeutic Techniques: Cognitive-behavioral therapy, motivational interviewing and supportive psychotherapy strategies were utilized.    Assessment: Patient completed the MARQUITA-7 and PHQ-9 as part of initial treatment planning. Scores were as follows: MARQUITA-7: 6 out of 21, indicating a moderate amount of anxiety. PHQ-9: 11 out of 27, indicating a moderate amount of depression.    Progress: Session began by discussing and signing initial treatment plan including working on goals of managing anxiety, depression, increasing adjustment/acceptance of injury, and increasing use of compensatory strategies for cognitive impairments. Please see treatment plan in scanned documents in [employer] tab. The patient feels he is making adequate progress toward goals, as he is beginning to process these concerns and implement strategies. This writer agrees with patient's assessment and will continue to support in monthly outpatient psychotherapy.    Diagnosis: Persistent Depressive Disorder  Generalized Anxiety Disorder  Tobacco Use Disorder  History of Polysubstance Abuse, in Sustained Remission (not dependence)  History of Hypoxic Brain Injury  R/O Bipolar II Disorder (although " deferred at this time)    Plan: Patient will return in 1 month to continue cognitive-behavioral therapy for managing anxiety, depression, adjustment/acceptance of illness/injury, and cognitive impairments. Treatment plan is to continue meeting monthly at this time.      Provider Name:  Ana Paula Parish PsyD, NOLAN  Date:  5/2/2017  Time:  1:00 PM

## 2021-06-11 NOTE — PROGRESS NOTES
.  Assessment / Impression     Major depressive disorder; exacerbated.  Insomnia, sleep disturbance; persistent.  Chronic pain issues.  Follow-up medication evaluation.  Patient's impression of how medication is working? ok  Compliant with medication? yes    Side effects:Constipation       Plan:     Reviewed medications, at this time we will decrease the Abilify to 10 mg until his constipation issues have resolved.  Consider increasing the Abilify to 15 mg daily in the future.  Patient will continue with psychology.  We will have patient follow-up in 3 months.    Subjective:      HPI: Konstantin Sharp is a 47 y.o. male with depression, history of psychosis, insomnia.  He is here for follow-up medication evaluation.  Patient states he is doing well however his PCA feels he is more depressed.  He spends most of his time sleeping, gets up only to take his pills and then has trouble sleeping at night.  He has been having trouble with constipation and finally went to his primary care doctor who discontinued a lot of his medications.  Taken off his Remeron and Ativan as well as his Topamax, iron and Pepcid.  Will be seen a GI doctor in the near future.  Mo denies feeling more depressed but states is tired all the time.  He missed his evening medications because he was too tired to get up.    Konstantin  has a past medical history of A-fib; Chronic pain; COPD (chronic obstructive pulmonary disease); Depression; DMII (diabetes mellitus, type 2); Encephalopathy; Heart block; HOCM (hypertrophic obstructive cardiomyopathy); Hypertension; Morbid obesity with BMI of 40.0-44.9, adult; and Tracheal stenosis.  Konstantin  has a past surgical history that includes Cardiac pacemaker placement and Back surgery.  Lisinopril; Penicillins; and Venom-honey bee  Current Outpatient Prescriptions   Medication Sig Dispense Refill     amitriptyline (ELAVIL) 25 MG tablet Take 25 mg by mouth 3 (three) times a day.       ARIPiprazole (ABILIFY) 10 MG  tablet Take 1 tablet (10 mg total) by mouth daily. 30 tablet 6     aspirin 81 MG EC tablet Take 81 mg by mouth daily.       atorvastatin (LIPITOR) 40 MG tablet Take 40 mg by mouth bedtime.       CHANTIX STARTING MONTH BOX 0.5 mg (11)- 1 mg (42) tablet TAKE 0.5 MG DAILY DAYS 1-3, THEN 0.5 MG TWICE DAILY DAYS 4-7, THEN 1 MG TWICE DAILY THEREAFTER  0     diphenhydrAMINE (BENADRYL) 25 mg tablet Take 25 mg by mouth bedtime as needed for sleep.       glipiZIDE (GLUCOTROL) 5 MG 24 hr tablet Take 5 mg by mouth daily.       losartan (COZAAR) 25 MG tablet Take 25 mg by mouth daily.       lubiprostone (AMITIZA) 24 MCG capsule Take 24 mcg by mouth 2 (two) times a day with meals.       metFORMIN (FORTAMET) 500 MG (OSM) 24 hr tablet Take 1,000 mg by mouth 2 (two) times a day with meals. Take 850 mg twice a day       metoprolol succinate (TOPROL-XL) 100 MG 24 hr tablet Take 100 mg by mouth daily.       pramipexole (MIRAPEX) 0.25 MG tablet Take 0.25 mg by mouth 3 (three) times a day.       pregabalin (LYRICA) 50 MG capsule Take 50 mg by mouth daily.       pregabalin (LYRICA) 75 MG capsule Take 75 mg by mouth daily.       QUEtiapine (SEROQUEL) 25 MG tablet Take 1 tablet (25 mg total) by mouth 2 (two) times a day. 60 tablet 6     traZODone (DESYREL) 100 MG tablet Take 1.5 tablets (150 mg total) by mouth bedtime. 45 tablet 6     venlafaxine (EFFEXOR-XR) 150 MG 24 hr capsule TAKE 1 CAPSULE (150 MG TOTAL) BY MOUTH 2 (TWO) TIMES A DAY. 60 capsule 3     warfarin (COUMADIN) 10 MG tablet 1 tab PO daily after 12/8/2015 if all right with your ENT doctor. INR should be checked after resumption of coumadin per primary doctor. 15 tablet 0     [DISCONTINUED] LORazepam (ATIVAN) 1 MG tablet Take 1 tablet (1 mg total) by mouth 4 (four) times a day as needed for anxiety. 120 tablet 0     No current facility-administered medications for this encounter.      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    Review of Systems  Constitutional: negative  Gastrointestinal: positive for constipation and dry mouth  Neurological: negative  Behavioral/Psych: positive for depression, fatigue, loss of interest in favorite activities and sleep disturbance       Objective:   @VS    Mental Status Exam:     Appearance: Patient is casually dressed, pleasant and cooperative.  He keeps his eyes closed for most of the interview and exam.  He will open them briefly and make eye contact.  He will open them when his phone rings and answers that.    Speech / Language : Within normal    Associations: appropriate    Alert and oriented X 3:yes    Mood: Depressed  Affect: Congruent w/content of speech    Suicidal / Homicidal ideation:none  If yes, document safety plan:    Fund of knowledge: good    Thought process:Slow    Judgement / insight: No Evidence of Impairment    Recent and remote memory: WNL    Attention: Within normal  Concentration: Within normal    Motor status: Gait normal, no involuntary movements.    Scores: NA    Change in medication? Yes    Education    Reviewed risks/benefits of medication      Physical Exam: General appearance: alert, appears stated age, cooperative and no distress  Neurologic: Mental status: Alert, oriented, thought  content appropriate, affect: blunted, thought content exhibits logical connections, when questioned about suicide, the patient expresses no suicidal ideation, no homicidal ideation

## 2021-06-11 NOTE — PROGRESS NOTES
Outpatient Psychology Progress Note    Date of Service:  2017  Duration:  30 minutes (1:00 PM - 1:30 PM)    Name:  Konstantin Sharp  :  1970  MRN:  [id number]    Necessity: This session is necessary to address anxiety, depression, and adjustment related to medical status after hospitalization in .    Intervention: Patient reported that he has been doing relatively well and that his mood and anxiety have continued to remain stable.  He described only a couple of incidents that caused him mild distress/interpersonal difficulty.  Writer facilitated discussion regarding effective communication strategies for expressing his wants, needs, concerns, and emotions.  He stated that he has generally been skilled at this throughout his life which has served him well in interpersonal situations.  Patient stated that his brother and sister-in-law continue to attempt to elicit patient to come out of his room.  He stated that he has always been (even prior to hypoxic injury) relatively reclusive and prefers to be on his own.  He stated that others (e.g., sister-in-law/PCA) are concerned that patient is isolative due to depression, although he denied this is the case.  Patient denied overt depressed mood at this time.  As patient's psychological state has continued to remain stable, we agreed to continue checking and monthly at this time.  Writer reviewed goals for psychotherapy including improving communication to express wants/needs/feelings.  Patient feels that these treatment goals are adequate at this time    Mental Status: Patient arrived on-time and was accompanied by his sister-in-law/PCA who remained in the waiting room for duration of session. Patient was casually dressed and adequately groomed.  Patient appeared oriented to person, place, situation, and time, although orientation was not formally assessed.  Remote memory, attention, language, and fund of knowledge appear generally intact and unchanged from  "patient's baseline. Of note, patient complains of ongoing memory deficits following hospitalization/hypoxic brain injury in 2015. It does not appear that he has undergone any formal neuropsychological testing. Mood was described as \"pretty good\" and affect appeared congruent, yet quite flat.  He denied any depressed mood.  Processing speed appeared slowed. Patient required moderate prompting to initiate conversation, consistent across sessions.  No indication of SI/HI. Patient was cooperative and pleasant throughout session. Eye contact was poor, as he primarily looked at the ground or elsewhere throughout session. No overall changes in mental status since initial consultation.    Participation: The patient was able to participate and benefit from treatment as evidenced by his verbal expression of ideas and initiation of topics discussed.    Psychotherapeutic Techniques: Cognitive-behavioral therapy, motivational interviewing and supportive psychotherapy strategies were utilized.    Progress:  The patient feels he is making adequate progress toward goals, has his mood has remained stable in recent months. This writer agrees with patient's assessment and will continue to support in monthly outpatient psychotherapy as appropriate.    Diagnosis: Persistent Depressive Disorder, currently stable  Generalized Anxiety Disorder, currently stable  Tobacco Use Disorder  History of Polysubstance Abuse, in Sustained Remission (not dependence)  History of Hypoxic Brain Injury  R/O Bipolar II Disorder (although deferred at this time)    Plan: Patient will return in 1 month to continue cognitive-behavioral therapy for managing anxiety, depression, adjustment/acceptance of illness/injury, and cognitive impairments. Treatment plan is to continue meeting monthly at this time.      Provider Name:  Ana Paula Parish PsyD, LP  Date:  6/29/2017  Time:  1:00 PM        "

## 2021-06-12 NOTE — PROGRESS NOTES
Psychology Discharge Note    Patient was initially seen by this writer in psychotherapy related to remote history of brain injury.  He has not followed up with this writer since June 2017.  He was contacted regarding his interest in rescheduling and indicated that he did not wish to do so.  He is considered discharged from this writer's psychological care at this time.      Provider Name: Ana Paula Parish PsyD, NOLAN  Date: 9/5/2017

## 2021-06-13 NOTE — PROGRESS NOTES
Patient's impression of how medication is working? Okay    Compliant with Medication? yes    Side Effects: None    Current Symptoms : Yes    Pain (0-10) No  Appetite change No  Negative thoughts No  Alcohol use No  Drug use No  Mood swings Yes  Sleep disturbance Yes  Change in interest Yes  Change in energy Yes  Change in concentration No  Psychosis/Hallucinations No  Anxiety none  Sad/depressed mood high

## 2021-06-13 NOTE — PROGRESS NOTES
".  Assessment / Impression     Depressive disorder; chronic, moderate.  Insomnia  Chronic pain issues  Follow-up medication evaluation.  Abilify decreased at last visit due to constipation.  Patient's impression of how medication is working? OK  Compliant with medication? yes    Side effects:Constipation       Plan:     We will discontinue trazodone and start mirtazapine 15 mg nightly.  We will leave Abilify 10 at this time, consider discontinuing it in the future.  We will have patient follow-up in 3 months.    Subjective:      HPI: Konstantin Sharp is a 47 y.o. male with her depressive disorder, insomnia and chronic pain issues.  He is here for follow-up medication evaluation along with his PCA.  Patient states he is very depressed.  He continues to have neuropathic pain in his toes and feet and is also having circulation problems.  There is been \"talk\" of amputating his toes due to the circulation issues.  His PCA who is present states they have been \"doing everything\".  He has diabetic shoes, she uses lidocaine ointment and he takes medication for neuropathic pain.  He will be seen a specialist next month.  The thought of losing his toes is made him more depressed.  I did inquire about the Abilify, he has noticed no change in his mood and still has constipation.  He is on a new medication for this but does not know the name.  He still sleeps fragmented due to the pain in his feet.  This will wake him up at night and during the day.  Patient does brighten when talking about the new Star Wars movie that is coming out.  He already has his tickets and is looking forward to that.  He talks about his collectibles.    Konstantin  has a past medical history of A-fib; Chronic pain; COPD (chronic obstructive pulmonary disease); Depression; DMII (diabetes mellitus, type 2); Encephalopathy; Heart block; HOCM (hypertrophic obstructive cardiomyopathy); Hypertension; Morbid obesity with BMI of 40.0-44.9, adult; and Tracheal " stenosis.  Konstantin  has a past surgical history that includes Cardiac pacemaker placement and Back surgery.  Lisinopril; Penicillins; and Venom-honey bee  Current Outpatient Prescriptions   Medication Sig Dispense Refill     amitriptyline (ELAVIL) 25 MG tablet Take 25 mg by mouth 3 (three) times a day.       ARIPiprazole (ABILIFY) 10 MG tablet Take 1 tablet (10 mg total) by mouth daily. 30 tablet 6     atorvastatin (LIPITOR) 40 MG tablet Take 40 mg by mouth bedtime.       CHANTIX STARTING MONTH BOX 0.5 mg (11)- 1 mg (42) tablet TAKE 0.5 MG DAILY DAYS 1-3, THEN 0.5 MG TWICE DAILY DAYS 4-7, THEN 1 MG TWICE DAILY THEREAFTER  0     glipiZIDE (GLUCOTROL) 5 MG 24 hr tablet Take 5 mg by mouth daily.       losartan (COZAAR) 25 MG tablet Take 25 mg by mouth daily.       lubiprostone (AMITIZA) 24 MCG capsule Take 24 mcg by mouth 2 (two) times a day with meals.       metFORMIN (FORTAMET) 500 MG (OSM) 24 hr tablet Take 1,000 mg by mouth 2 (two) times a day with meals. Take 850 mg twice a day       metoprolol succinate (TOPROL-XL) 100 MG 24 hr tablet Take 100 mg by mouth daily.       pramipexole (MIRAPEX) 0.25 MG tablet Take 0.25 mg by mouth 3 (three) times a day.       pregabalin (LYRICA) 50 MG capsule Take 50 mg by mouth daily.       pregabalin (LYRICA) 75 MG capsule Take 75 mg by mouth daily.       QUEtiapine (SEROQUEL) 25 MG tablet Take 1 tablet (25 mg total) by mouth 2 (two) times a day. 60 tablet 3     QUEtiapine (SEROQUEL) 25 MG tablet TAKE 1 TABLET BY MOUTH TWICE DAILY 60 tablet 3     venlafaxine (EFFEXOR-XR) 150 MG 24 hr capsule TAKE 1 CAPSULE (150 MG TOTAL) BY MOUTH 2 (TWO) TIMES A DAY. 60 capsule 3     warfarin (COUMADIN) 10 MG tablet 1 tab PO daily after 12/8/2015 if all right with your ENT doctor. INR should be checked after resumption of coumadin per primary doctor. 15 tablet 0     aspirin 81 MG EC tablet Take 81 mg by mouth daily.       diphenhydrAMINE (BENADRYL) 25 mg tablet Take 25 mg by mouth bedtime as needed  for sleep.       mirtazapine (REMERON) 15 MG tablet Take 1 tablet (15 mg total) by mouth at bedtime. 30 tablet 6     QUEtiapine 150 mg Tb24 Take 1 tablet (150 mg total) by mouth at bedtime. 30 tablet 3     [DISCONTINUED] LORazepam (ATIVAN) 1 MG tablet Take 1 tablet (1 mg total) by mouth 4 (four) times a day as needed for anxiety. 120 tablet 0     No current facility-administered medications for this encounter.      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    Review of Systems  Constitutional: negative  Neurological: positive for paresthesia and Neuropathic pain.  Behavioral/Psych: positive for depression, fatigue and sleep disturbance, negative for anxiety, loss of interest in favorite activities and mood swings       Objective:   @VS    Mental Status Exam:     Appearance: She does well groomed, pleasant and cooperative.  He does make eye contact but keeps sunglasses on during entire exam.    Speech / Language : Within normal and Monotone    Associations: appropriate    Alert and oriented X 3:yes    Mood: Depressed  Affect: Congruent w/content of speech    Suicidal / Homicidal ideation:none  If yes, document safety plan:    Fund of knowledge: good    Thought process:Within  normal    Judgement / insight: No Evidence of Impairment    Recent and remote memory: WNL    Attention: Within normal  Concentration: Within normal    Motor status: Gait normal, no involuntary movements.    Scores: NA    Change in medication? Yes    Education    Reviewed risks/benefits of medication      Physical Exam: General appearance: alert, appears stated age, cooperative and no distress  Neurologic: Mental status: Alert, oriented, thought content appropriate, affect: mood-congruent, thought content exhibits logical connections, when questioned about suicide, the patient expresses no suicidal ideation, no homicidal ideation

## 2021-06-15 NOTE — PROGRESS NOTES
Patient's impression of how medication is working? Depressed, insomnia, little anxiety    Compliant with Medication? yes    Side Effects: Drowsiness    Current Symptoms : Yes    Pain (0-10) Yes  Appetite change No  Negative thoughts No  Alcohol use No  Drug use No  Mood swings Yes  Sleep disturbance Yes  Change in interest Yes  Change in energy Yes  Change in concentration No  Psychosis/Hallucinations No  Anxiety mild  Sad/depressed mood moderate

## 2021-06-15 NOTE — PROGRESS NOTES
".  Assessment / Impression     Major depressive disorder; chronic, exacerbated.  Insomnia; persistent.  Chronic pain issues; persistent.  Follow-up medication evaluation.  Trial of Remeron versus trazodone, patient back on trazodone.  Patient's impression of how medication is working? Not good  Compliant with medication? yes    Side effects:None       Plan:     We will discontinue the Abilify, patient did not find it effective in helping his depression.  We will increase the Seroquel at bedtime to 200 mg nightly.  Moved from XR to immediate release to see if that also aids in helping his insomnia.  Offered psychology consult, patient deferred at this time.  We will have patient follow-up in 3 months, will repeat AIMS testing at that time    Subjective:      HPI: Konstantin Sharp is a 47 y.o. 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  He is here for follow-up medication evaluation along with his PCA.  At last visit, we discontinued the trazodone and had him start mirtazapine at night to help with depression and sleep.  We did receive a call from the PCA that the trazodone seemed to work better so they discontinued the mirtazapine and went back to the trazodone.  Patient states he continues to be depressed and suffer from insomnia.  He has been tired of seeing \"specialist\" and has another appointment with a specialist in the near future to discuss the pain in his feet.  They feel it is vascular, they can assess the vasculature of his lower extremities and decide whether they can open up the vessels or he may need amputation.  His PCA states that to him spends most of his time in his room in bed.  He only gets 4 hours of sleep at night total due to the pain.  He has not been getting out much due to car trouble and his PCA notes that when he is out socializing his depression does lift.    Konstantin  has a past medical history of A-fib; Chronic pain; COPD (chronic obstructive pulmonary disease); Depression; " DMII (diabetes mellitus, type 2); Encephalopathy; Heart block; HOCM (hypertrophic obstructive cardiomyopathy); Hypertension; Morbid obesity with BMI of 40.0-44.9, adult; and Tracheal stenosis.  Konstantin  has a past surgical history that includes Cardiac pacemaker placement and Back surgery.  Lisinopril; Penicillins; and Venom-honey bee  Current Outpatient Prescriptions   Medication Sig Dispense Refill     linaclotide (LINZESS) 290 mcg cap capsule Take 290 mcg by mouth daily before breakfast.       sildenafil, antihypertensive, (REVATIO) 20 mg tablet Take 10 mg by mouth 3 (three) times a day.       amitriptyline (ELAVIL) 25 MG tablet Take 25 mg by mouth 3 (three) times a day.       atorvastatin (LIPITOR) 40 MG tablet Take 40 mg by mouth bedtime.       CHANTIX STARTING MONTH BOX 0.5 mg (11)- 1 mg (42) tablet TAKE 0.5 MG DAILY DAYS 1-3, THEN 0.5 MG TWICE DAILY DAYS 4-7, THEN 1 MG TWICE DAILY THEREAFTER  0     diphenhydrAMINE (BENADRYL) 25 mg tablet Take 25 mg by mouth bedtime as needed for sleep.       glipiZIDE (GLUCOTROL) 5 MG 24 hr tablet Take 5 mg by mouth daily.       losartan (COZAAR) 25 MG tablet Take 25 mg by mouth daily.       metFORMIN (FORTAMET) 500 MG (OSM) 24 hr tablet Take 1,000 mg by mouth 2 (two) times a day with meals. Take 850 mg twice a day       metoprolol succinate (TOPROL-XL) 100 MG 24 hr tablet Take 100 mg by mouth daily.       pregabalin (LYRICA) 50 MG capsule Take 50 mg by mouth daily.       pregabalin (LYRICA) 75 MG capsule Take 75 mg by mouth 2 (two) times a day.        QUEtiapine (SEROQUEL) 200 MG tablet Take 1 tablet (200 mg total) by mouth at bedtime. 30 tablet 6     QUEtiapine (SEROQUEL) 25 MG tablet TAKE 1 TABLET BY MOUTH TWICE DAILY 60 tablet 3     QUEtiapine (SEROQUEL) 25 MG tablet TAKE 1 TABLET (25 MG TOTAL) BY MOUTH 2 (TWO) TIMES A DAY. 60 tablet 3     traZODone (DESYREL) 100 MG tablet TAKE 1 TABLET (100 MG TOTAL) BY MOUTH AT BEDTIME. 30 tablet 3     venlafaxine (EFFEXOR-XR) 150 MG 24  hr capsule TAKE 1 CAPSULE (150 MG TOTAL) BY MOUTH 2 (TWO) TIMES A DAY. 60 capsule 3     warfarin (COUMADIN) 10 MG tablet 1 tab PO daily after 12/8/2015 if all right with your ENT doctor. INR should be checked after resumption of coumadin per primary doctor. 15 tablet 0     [DISCONTINUED] LORazepam (ATIVAN) 1 MG tablet Take 1 tablet (1 mg total) by mouth 4 (four) times a day as needed for anxiety. 120 tablet 0     No current facility-administered medications for this encounter.      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    Review of Systems  Constitutional: negative  Musculoskeletal:positive for bilateral foot pain  Neurological: positive for pain, negative for tremors  Behavioral/Psych: positive for depression, fatigue and sleep disturbance, negative for anxiety, excessive alcohol consumption and illegal drug usage       Objective:   @VS    Mental Status Exam:     Appearance: Patient is casually dressed, sitting quietly, eyes downcast.  He does make eye contact.  Psychomotor slowing per usual.  Patient did brighten when discussing Star Wars.    Speech / Language : Within normal    Associations: appropriate    Alert and oriented X 3:yes    Mood: Depressed  Affect: Congruent  w/content of speech    Suicidal / Homicidal ideation:none  If yes, document safety plan:    Fund of knowledge: good    Thought process:Within normal    Judgement / insight: No Evidence of Impairment    Recent and remote memory: WNL    Attention: Within normal  Concentration: Within normal    Motor status: Gait normal, no involuntary movements.    Scores: NA    Change in medication? No    Education    Reviewed risks/benefits of medication      Physical Exam: General appearance: alert, appears stated age, cooperative and no distress  Neurologic: Mental status: Alert, oriented, thought content appropriate, affect: mood-congruent, thought content exhibits logical connections, when questioned about suicide, the patient expresses no suicidal ideation, no homicidal ideation

## 2021-06-17 NOTE — PROGRESS NOTES
Patient's impression of how medication is working? Not so well    Compliant with Medication? Yes    Side Effects: None    Current Symptoms : Yes    Any Concerns? Sleeping, depression.     Pain (0-10) No  Appetite change No  Sleep disturbance Yes  Change in energy Yes  Change in interest No  Change in concentration No  Psychosis/Hallucinations No  Negative thoughts Yes  Mood swings Yes  Alcohol use No  Drug use No  Anxiety minimal  Sad/depressed mood high

## 2021-06-17 NOTE — PROGRESS NOTES
".  Assessment / Impression     Major depressive disorder; chronic  Insomnia  Chronic pain  Follow-up medication evaluation.  Patient's impression of how medication is working?  Still very depressed  Compliant with medication?  Yes    Side effects:None       Plan:     We will increase the Effexor by 75 mg daily.  If he does have side effects, his PCA will call our office.  Recommended PCA talk to patient's primary regarding increasing the Lyrica at next visit.  We will have patient follow-up in 3 months.    Subjective:      HPI: Konstantin Sharp is a 48 y.o. male with pression, insomnia and chronic pain issues.  He is here for follow-up medication evaluation.  We had several different trials, we DC'd the Abilify as that seemed not to be doing anything.  We tried Remeron versus trazodone that was not as effective.  At last trial we changed his Seroquel from 150-200 although he had side effects.  We went back to Seroquel  mg.  Patient states he is severely depressed because of his constant pain.  He is tired of going to different doctors and now he feels like he is back at [facility] one seen the neurologist.  He determined his lower extremity pain is not vascular at its neuropathy and his PCA feels it is due to his uncontrolled diabetes.  He is now on insulin but they are still having difficulty controlling his blood sugars.  He does have a follow-up with his primary on Monday regarding his diabetes, his PCA would like his Lyrica increased as he still in significant pain.  Patient denies any suicidal ideation but states \"sometimes I think about cutting my feet off myself\".  No real thoughts of self-harm.    Konstantin  has a past medical history of A-fib; Chronic pain; COPD (chronic obstructive pulmonary disease); Depression; DMII (diabetes mellitus, type 2); Encephalopathy; Heart block; HOCM (hypertrophic obstructive cardiomyopathy); Hypertension; Morbid obesity with BMI of 40.0-44.9, adult; and Tracheal " stenosis.  Konstantin  has a past surgical history that includes Cardiac pacemaker placement and Back surgery.  Lisinopril; Penicillins; and Venom-honey bee  Current Outpatient Prescriptions   Medication Sig Dispense Refill     amitriptyline (ELAVIL) 25 MG tablet Take 25 mg by mouth 3 (three) times a day.       ARIPiprazole (ABILIFY) 10 MG tablet Take 1 tablet (10 mg total) by mouth daily. 30 tablet 3     atorvastatin (LIPITOR) 40 MG tablet Take 40 mg by mouth bedtime.       CHANTIX STARTING MONTH BOX 0.5 mg (11)- 1 mg (42) tablet TAKE 0.5 MG DAILY DAYS 1-3, THEN 0.5 MG TWICE DAILY DAYS 4-7, THEN 1 MG TWICE DAILY THEREAFTER  0     diphenhydrAMINE (BENADRYL) 25 mg tablet Take 25 mg by mouth bedtime as needed for sleep.       glipiZIDE (GLUCOTROL) 5 MG 24 hr tablet Take 5 mg by mouth daily.       linaclotide (LINZESS) 290 mcg cap capsule Take 290 mcg by mouth daily before breakfast.       losartan (COZAAR) 25 MG tablet Take 25 mg by mouth daily.       metFORMIN (FORTAMET) 500 MG (OSM) 24 hr tablet Take 1,000 mg by mouth 2 (two) times a day with meals. Take 850 mg twice a day       metoprolol succinate (TOPROL-XL) 100 MG 24 hr tablet Take 100 mg by mouth daily.       pregabalin (LYRICA) 50 MG capsule Take 50 mg by mouth daily.       pregabalin (LYRICA) 75 MG capsule Take 75 mg by mouth 2 (two) times a day.        QUEtiapine (SEROQUEL) 25 MG tablet TAKE 1 TABLET BY MOUTH TWICE DAILY 60 tablet 3     QUEtiapine (SEROQUEL) 25 MG tablet Take 1 tablet (25 mg total) by mouth 2 (two) times a day. 60 tablet 3     QUEtiapine 150 mg Tb24 TAKE 1 TABLET (150 MG TOTAL) BY MOUTH AT BEDTIME. 30 tablet 3     sildenafil, antihypertensive, (REVATIO) 20 mg tablet Take 10 mg by mouth 3 (three) times a day.       traZODone (DESYREL) 100 MG tablet TAKE 1 TABLET (100 MG TOTAL) BY MOUTH AT BEDTIME. 30 tablet 3     venlafaxine (EFFEXOR-XR) 150 MG 24 hr capsule Take 1 capsule (150 mg total) by mouth 2 (two) times a day. 60 capsule 3     warfarin  (COUMADIN) 10 MG tablet 1 tab PO daily after 12/8/2015 if all right with your ENT doctor. INR should be checked after resumption of coumadin per primary doctor. 15 tablet 0     venlafaxine (EFFEXOR XR) 75 MG 24 hr capsule Take 1 capsule (75 mg total) by mouth daily. 30 capsule 6     [DISCONTINUED] LORazepam (ATIVAN) 1 MG tablet Take 1 tablet (1 mg total) by mouth 4 (four) times a day as needed for anxiety. 120 tablet 0     No current facility-administered medications for this encounter.      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    Review of Systems  Constitutional: negative  Neurological: positive for neuorpathic pain  Behavioral/Psych: positive for anxiety, depression, fatigue and sleep disturbance       Objective:   @VS    Mental Status Exam:     Appearance: Patient is casually dressed, pleasant and cooperative.  Good eye contact answers questions appropriately.    Speech / Language : Within normal    Associations: appropriate    Alert and oriented X 3:yes    Mood: Depressed  Affect: Congruent w/content of speech    Suicidal / Homicidal ideation:none  If yes, document safety plan:    Fund of knowledge: good    Thought process:Within normal    Judgement / insight: No  Evidence of Impairment    Recent and remote memory: WNL    Attention: Within normal  Concentration: Within normal    Motor status: Gait normal, no involuntary movements.    Scores: NA    Change in medication? Yes    Education    Reviewed risks/benefits of medication      Physical Exam: General appearance: alert, appears stated age, cooperative and no distress  Neurologic: Mental status: Alert, oriented, thought content appropriate, affect: mood-congruent, thought content exhibits logical connections, when questioned about suicide, the patient expresses no suicidal ideation

## 2021-06-19 NOTE — PROGRESS NOTES
Patient's impression of how medication is working?   Not very well    Compliant with Medication? Yes    Side Effects: None    Current Symptoms : Yes    Pain (0-10) No  Appetite change No  Sleep disturbance Yes  Change in energy Yes  Change in interest Yes  Change in concentration No  Psychosis/Hallucinations No  Negative thoughts Yes  Mood swings Yes  Alcohol use No  Drug use No  Anxiety high  Sad/depressed mood high

## 2021-06-19 NOTE — PROGRESS NOTES
"Outpatient Followup Psychiatric Evaluation      Pertinent History: The patient presents on August 9, 2018 for his first contact with me.  He has been followed at this clinic by the nurse practitioner who last saw him in April 2018.  The patient has had chronic pain issues and is had long-standing depression with insomnia.  The patient has been on several medication trials.  Prior to the nurses visit in April 2018 the patient had been taken off Abilify which was not efficacious.  Both Remeron and trazodone were apparently not effective.  He did have an increase in his Seroquel but had side effects with that and there was a change to Seroquel XR.  He continued to be depressed at that visit and they did increase the patient's Effexor.  He was being followed by medical doctors for his pain which was described as neuropathic pain.  This was likely related to diabetes.    Current Symptoms: Patient presents today with his sister in law for the purposes of medication management.  Patient was a vague historian but reports he is chronically been depressed and has chronic pain.  He believes there is been no significant change in none of the medications have been particularly helpful although later in the interview he states the Seroquel is been helpful but is unsure why.  When asked what I could do for him he states \"knock me out\".  The patient's sister believes his mood is actually gotten worse with the increase in the Effexor.  She reports he continues to be irritable disinterested and flat.  He continues to have poor sleep.  The patient denies that he has any suicidal ideation intent or plan.  He describes himself as a \"cynical pessimist since 1996.  He states that is when his girlfriend left with his kids.  He states he is depressed because of \"life\".  He states he is no longer able to see his grandkids.  One moved to [place] another one is not returning his calls and another one will not see him until he quit smoking.  He " denies having any hallucinations or delusions.  No bizarre ideations.  No suicidality.  He reports no significant change.  He continues to follow-up with his medical doctors and may have his spinal stimulator removed today.  He reports that has made the pain worse.    Talk to the patient about individual therapy states he is not interested.  I do spent some time trying to talk with him about the reasons that may be helpful to him but he states he has no interest and no motivation to do that.    The patient and his brother and sister-in-law may be moving to [place] in the next few months.  They state if they do they are going to establish contact with a provider there.    Current Medications:  Current medications were reviewed.  Please see the chart for additional information.    Medication Compliance: yes    Side Effects to Medications:  no      Vitals:  Wt Readings from Last 3 Encounters:   11/29/15 (!) 245 lb 6.4 oz (111.3 kg)   10/15/15 (!) 283 lb 9.6 oz (128.6 kg)     Temp Readings from Last 3 Encounters:   01/09/16 98.2  F (36.8  C)   01/08/16 97.8  F (36.6  C)   12/30/15 98.4  F (36.9  C)     BP Readings from Last 3 Encounters:   01/09/16 (!) 161/95   01/08/16 130/86   12/30/15 142/80     Pulse Readings from Last 3 Encounters:   01/09/16 (!) 104   12/30/15 85   12/29/15 79       Problem List (Please see medical records):  Please see the chart for full details.  The patient has a variety of underlying medical issues including chronic A. fib, COPD, diabetes mellitus type 2 with resultant neuropathy.  He is described as having chronic pain.  He also has heart block by history, hypertension and morbid obesity.  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  He has had a history which includes a cardiac pacemaker.  He has had back surgery in the past.      Mental Status Exam:   Appearance:  Patient appears slow flat disinterested. Limited eye contact. Limited effort. No obvious shortness of breath. No obvious pain at  this time.  He has his hands folded across his chest he has dark glasses on.  He does not make eye contact.  No initiation.  Behavior:  The patient is disinterested. Limited movement. Not agitated. No restlessness.  Speech: Monotone slow.  Not thick or slurred.  Vague with limited effort.  Not pressured or rambling.  Mood/Affect:  Flat, slow, depressed. No current anxiety or agitation. Not currently labile.  Thought Content:  No evidence of psychosis. No recent reported psychosis.  Suicidal or Homicidal Thoughts:  None apparent or reported.   Thought Process/Formulation:  Slow. Robinson. Limited effort. Limited interest. No evidence of any racing thoughts.  Associations:  Difficult to assess due to limited participation.  No obvious loosening of associations.  Slow. Robinson.  Fund of Knowledge:  Limited effort at this time. Please see the therapy notes. No reports of any significant recent change.  Attention/Concentration:  Disinterested. Flat slow. Concentration is difficult to assess due to limited participation. No reports of any significant recent change.  Insight:  Limited effort.  Apparently baseline.  Judgement:  Limited effort.  Apparently baseline.  Memory:  Limited participation. Slow. Disinterested.   Motor Status:  Limited participation. No current tremor.  Orientation: No reports of any recent change.  Limited effort at this time.    Diagnosis managed and treated at today's visit :    Major depressive disorder, chronic, recurrent, severe, without psychosis.      Plan:  Medication Adjustment:  I am going to taper off the patient's Effexor has that has not been helpful.  We will go down to 300 mg a day for 5 days then down to 150 mg a day for 5 days and then 75 mg for 5 days and then discontinue.  (The patient has a supply of 75 mg pills) we will consider the possibility of a trial of Cymbalta.  Risks and benefits of these medication changes were discussed.    Other:   Patient will return to clinic in  3-4 months.  They may be moving to the [place] area and establish clinic contact there.  We will try and assist with transfer of care once they make that decision.  They agree to call or return with any questions or concerns.    Continue with the support of the clinic, reassurance, and redirection. Staff monitoring and ongoing assessments per team plan. Current psychotropic medication appears to represent the minimum effective dosage and appears medically necessary. We will continue to monitor and reassess. This team will utilize appropriate emergency services if necessary. I will make myself available if concerns or problems arise.    Rolando Burnham MD

## 2021-06-21 NOTE — PROGRESS NOTES
Patient's impression of how medication is working? Its not going the greatest.     Compliant with Medication? Yes     Side Effects: None    Current Symptoms : Yes    Pain (0-10) No  Appetite change No  Sleep disturbance Yes  Change in energy Yes  Change in interest No  Change in concentration No  Psychosis/Hallucinations No  Negative thoughts Yes  Mood swings Yes  Alcohol use No  Drug use No  Anxiety high  Sad/depressed mood high

## 2021-06-21 NOTE — PROGRESS NOTES
"Outpatient Followup Psychiatric Evaluation      Pertinent History: The patient presents on August 9, 2018 for his first contact with me.  He has been followed at this clinic by the nurse practitioner who last saw him in April 2018.  The patient has had chronic pain issues and is had long-standing depression with insomnia.  The patient has been on several medication trials.  Prior to the nurses visit in April 2018 the patient had been taken off Abilify which was not efficacious.  Both Remeron and trazodone were apparently not effective.  He did have an increase in his Seroquel but had side effects with that and there was a change to Seroquel XR.  He continued to be depressed at that visit and they did increase the patient's Effexor.  He was being followed by medical doctors for his pain which was described as neuropathic pain.  This was likely related to diabetes.    I saw the patient in August 2018.  At that time we began a taper off of the Effexor for consideration of a Cymbalta trial.     Current Symptoms:   The patient presents today with his sister-in-law.  He reports that there is been no significant change.  Pain again is the major issue in his life.  He states last week he was supposed to have his neuro stimulator removed because it was not helping but the surgery got canceled due to a lab finding.  He is upset about that.  He states he continues to have difficulty with sleep and is sleeping about 2 or 3 hours a night.  He does admit that he naps but \"not much\".  We did discuss some sleep hygiene.  He denies having any change in cognition.  He denies having any change in his mood.  He still frustrated over his current circumstances but denies having any desire to be dead or thoughts of suicide.  He denies psychosis.  He reports his appetite is \"less but that is okay with me\".  He states his doctor increased his Lyrica about 2 months ago.  He is unsure whether that has been helpful.  He denies having any new " medical issues.  He denies side effects to the medication.  He was able to taper off the Effexor and does not believe there is been any significant change without that medication.  We did discuss the possibility of the Cymbalta trial.  He is in agreement with that.  Risks and benefits were discussed.    The patient states that he may need a letter as he is looking to move apartments that he wants to bring his dog which he defines as necessary regarding or offering him the ability to calm down and improves his mood.    Current Medications:  Current medications were reviewed.  Please see the chart for additional information.    Medication Compliance: yes    Side Effects to Medications:  no      Vitals:  Wt Readings from Last 3 Encounters:   11/29/15 (!) 245 lb 6.4 oz (111.3 kg)   10/15/15 (!) 283 lb 9.6 oz (128.6 kg)     Temp Readings from Last 3 Encounters:   01/09/16 98.2  F (36.8  C)   01/08/16 97.8  F (36.6  C)   12/30/15 98.4  F (36.9  C)     BP Readings from Last 3 Encounters:   01/09/16 (!) 161/95   01/08/16 130/86   12/30/15 142/80     Pulse Readings from Last 3 Encounters:   01/09/16 (!) 104   12/30/15 85   12/29/15 79       Problem List (Please see medical records):  Please see the chart for full details.  The patient has a variety of underlying medical issues including chronic A. fib, COPD, diabetes mellitus type 2 with resultant neuropathy.  He is described as having chronic pain.  He also has heart block by history, hypertension and morbid obesity.  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  He has had a history which includes a cardiac pacemaker.  He has had back surgery in the past.      Mental Status Exam:   Appearance: Patient is in no obvious distress.  No significant pain and no shortness of breath.  The patient however appears quite slow and flat.  No obvious change.  Limited eye contact.  Behavior: Patient does participate but does not initiate much.  Perhaps a bit disinterested and guarded.  Slow.   Limited effort.  No reports of any recent significant behavioral difficulties.  Speech: Slow, monotone and vague.  Not pressured or rambling.  Answers were appropriate.  Mood/Affect: Depressed.  Slow.  The patient appears quite flat and disinterested.  No anxiety.  No lability.  No robinson.  No irritability.  Thought Content:  No evidence of psychosis. No recent reported psychosis.  Suicidal or Homicidal Thoughts:  None apparent or reported.   Thought Process/Formulation: Needing prompts to participate.  Slow and concrete.  No racing thoughts.  The patient is able to follow some conversation.  Associations: Slow.  Not loose.  No racing thoughts.  Fund of Knowledge: Able to participate a bit.  Somewhat limited effort however.  No apparent recent change.  Attention/Concentration: Tracking and following some simple conversation.  The patient is slow and flat.  No obvious recent change.  Insight: No apparent recent change.    Judgement: No apparent recent change.    Memory:   Slow.  A bit slow with limited participation.  Motor Status:   No current tremor.  No reports of any recent change.  Orientation: No reports of any recent change.  Grossly unchanged.    Diagnosis managed and treated at today's visit :    Major depressive disorder, chronic, recurrent, severe, without psychosis.      Plan:  Medication Adjustment:  I have started the patient on Cymbalta 60 mg a day.  We will continue with the other medications as ordered.    Other:   Patient will return to clinic in 3 months. They agree to call or return with any questions or concerns.    Continue with the support of the clinic, reassurance, and redirection. Staff monitoring and ongoing assessments per team plan. Current psychotropic medication appears to represent the minimum effective dosage and appears medically necessary. We will continue to monitor and reassess. This team will utilize appropriate emergency services if necessary. I will make myself available if  concerns or problems arise.    Rolando Burnham MD

## 2021-06-24 NOTE — PROGRESS NOTES
Patient's impression of how medication is working? Pt's sister in law made an emergency apt because of his depression is horrible since the last visit.  Pt admits to being highly depressed.     Compliant with Medication? Yes     Side Effects: None    Current Symptoms : Yes    Pain (0-10) No  Appetite change No  Sleep disturbance Yes  Change in energy Yes   Change in interest Yes  Change in concentration No  Psychosis/Hallucinations No  Negative thoughts Yes  Mood swings Yes  Alcohol use Yes, a few drinks 2/25/2019  Drug use No  Anxiety high  Sad/depressed mood high

## 2021-06-24 NOTE — PROGRESS NOTES
Outpatient Followup Psychiatric Evaluation      Pertinent History: The patient presents on August 9, 2018 for his first contact with me.  He has been followed at this clinic by the nurse practitioner who last saw him in April 2018.  The patient has had chronic pain issues and is had long-standing depression with insomnia.  The patient has been on several medication trials.  Prior to the nurses visit in April 2018 the patient had been taken off Abilify which was not efficacious.  Both Remeron and trazodone were apparently not effective.  He did have an increase in his Seroquel but had side effects with that and there was a change to Seroquel XR.  He continued to be depressed at that visit and they did increase the patient's Effexor.  He was being followed by medical doctors for his pain which was described as neuropathic pain.  This was likely related to diabetes.    I saw the patient in August 2018.  At that time we began a taper off of the Effexor for consideration of a Cymbalta trial.    In November 2018 we did start the patient on Cymbalta 60 mg a day.  The patient has had significant depression and the patient's sister-in-law set up this appointment on a semi-emergent basis to address this after missing their appointment last week.    Current Symptoms:   Patient was extremely vague but admits that he is been more depressed.  He states that started February 2 at the time of his granddaughter's birthday.  The patient's sister-in-law who is present believed he was a bit more depressed over Ramos but certainly over the last few weeks he is gotten worse.  The patient admits to vague fleeting thoughts of wishing he was dead but states he would never harm himself and the sister-in-law agrees he is not actively suicidal.  He is around people all day but often isolates in his room.  We did spend some time talking about the importance of trying to stay social and busy.  The sister-in-law has tried a variety of  activities including coloring activities and candle making but at this point the patient has not shown much interest.    The patient denies having any psychotic symptoms.  He reports he chronically has a very difficult time sleeping and that is unchanged.  He admits he naps a lot during the day and we did talk about sleep hygiene.  The patient denies having any new medical issues although he did have the neurostimulator removed from his back since the last appointment that went without incident.  He denies side effects to the medication.  We spent some time talking about a variety of treatment options and they are willing to increase the patient's Cymbalta and I suggested adding some Remeron at night as well as trying to focus on better sleep hygiene and they were in agreement.  Risks and benefits were discussed.    Current Medications:  Current medications were reviewed.  Please see the chart for additional information.    Medication Compliance: yes    Side Effects to Medications:  no      Vitals:  Wt Readings from Last 3 Encounters:   11/29/15 (!) 245 lb 6.4 oz (111.3 kg)   10/15/15 (!) 283 lb 9.6 oz (128.6 kg)     Temp Readings from Last 3 Encounters:   01/09/16 98.2  F (36.8  C)   01/08/16 97.8  F (36.6  C)   12/30/15 98.4  F (36.9  C)     BP Readings from Last 3 Encounters:   01/09/16 (!) 161/95   01/08/16 130/86   12/30/15 142/80     Pulse Readings from Last 3 Encounters:   01/09/16 (!) 104   12/30/15 85   12/29/15 79       Problem List (Please see medical records):  Please see the chart for full details.  The patient has a variety of underlying medical issues including chronic A. fib, COPD, diabetes mellitus type 2 with resultant neuropathy.  He is described as having chronic pain.  He also has heart block by history, hypertension and morbid obesity.  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  He has had a history which includes a cardiac pacemaker.  He has had back surgery in the past.      Mental Status Exam:    Appearance:  Patient appears slow and flat.  Ended eye contact.  No obvious shortness of breath. No obvious pain at this time.  Behavior: Slow.  Needing prompts to participate.  Disinterested and tired.  Not agitated. No restlessness.  No reports of any significant recent behavioral dyscontrol.  Speech: Slow, monotone and vague with limited initiate but vague.  Not pressured or rambling.  Mood/Affect:  Flat, slow, depressed. No current anxiety or agitation. Not currently labile.  Thought Content:  No evidence of psychosis. No recent reported psychosis.  Suicidal or Homicidal Thoughts:  None apparent or reported.   Thought Process/Formulation:  Slow. [place]. No evidence of any racing thoughts.  Able to track and follow.  Associations: No obvious loosening of associations.  Slow. [place].  Fund of Knowledge:  Please see the therapy notes. No apparent recent change.  Attention/Concentration:  Flat slow.  Able to follow some simple conversation. No apparent recent change.  Insight: No apparent recent change.    Judgement: No apparent recent change.    Memory:   Slow.   Motor Status:   No current tremor.  Orientation: No reports of any recent change.     Diagnosis managed and treated at today's visit :    Major depressive disorder, chronic, recurrent, severe, without psychosis.      Plan:  Medication Adjustment:  I have increased the patient's Cymbalta to 90 mg and started the patient on Remeron 15 mg at night.  Risks and benefits were discussed.    Other:   Patient will return to clinic in 3-4 months. They agree to call or return with any questions or concerns.    Continue with the support of the clinic, reassurance, and redirection. Staff monitoring and ongoing assessments per team plan. Current psychotropic medication appears to represent the minimum effective dosage and appears medically necessary. We will continue to monitor and reassess. This team will utilize appropriate emergency services if necessary. I  will make myself available if concerns or problems arise.    Rolando Burnham MD

## 2021-07-03 NOTE — ADDENDUM NOTE
Addendum Note by Autumn Overton RN at 1/11/2018 11:59 PM     Author: Autumn Overton RN Service: -- Author Type: Registered Nurse    Filed: 1/15/2018 10:42 AM Date of Service: 1/11/2018 11:59 PM Status: Signed    : Autumn Overton RN (Registered Nurse)    Encounter addended by: Autumn Overton RN on: 1/15/2018 10:42 AM<BR>     Actions taken: Charge Capture section accepted

## 2021-07-03 NOTE — ADDENDUM NOTE
Addendum Note by Susan Garner CNP at 4/12/2018  1:08 PM     Author: Susan Garner CNP Service: -- Author Type: Nurse Practitioner    Filed: 4/12/2018  1:08 PM Date of Service: 4/12/2018  1:08 PM Status: Signed    : Susan Garner CNP (Nurse Practitioner)    Encounter addended by: Susan Garner CNP on: 4/12/2018  1:08 PM<BR>     Actions taken: Sign clinical note

## 2021-07-03 NOTE — ADDENDUM NOTE
Addendum Note by Rebecca Beth CMA at 2/26/2019 10:43 AM     Author: Rebecca Beth CMA Service: -- Author Type: Certified Medical Assistant    Filed: 2/26/2019 10:43 AM Date of Service: 2/26/2019 10:43 AM Status: Signed    : Rebecca Beth CMA (Certified Medical Assistant)    Encounter addended by: Rebecca Beth CMA on: 2/26/2019 10:43 AM      Actions taken: Charge Capture section accepted

## 2021-07-03 NOTE — ADDENDUM NOTE
Addendum Note by Sarika Cohen at 7/6/2017  3:35 PM     Author: Sarika Cohen Service: -- Author Type: Medical Assistant    Filed: 7/6/2017  3:35 PM Date of Service: 7/6/2017  3:35 PM Status: Signed    : Sarika Cohen    Encounter addended by: Sarika Cohen MA on: 7/6/2017  3:35 PM<BR>     Actions taken: Charge Capture section accepted

## 2021-07-03 NOTE — ADDENDUM NOTE
Addendum Note by Rebecca Beth CMA at 8/9/2018 10:43 AM     Author: Rebecca Beth CMA Service: -- Author Type: Certified Medical Assistant    Filed: 8/9/2018 10:43 AM Date of Service: 8/9/2018 10:43 AM Status: Signed    : Rebecca Beth CMA (Certified Medical Assistant)    Encounter addended by: Rebecca Beth CMA on: 8/9/2018 10:43 AM<BR>     Actions taken: Charge Capture section accepted

## 2021-07-03 NOTE — ADDENDUM NOTE
Addendum Note by Rebecca Beth CMA at 11/15/2018 11:03 AM     Author: Rebecca Beth CMA Service: -- Author Type: Certified Medical Assistant    Filed: 11/15/2018 11:03 AM Date of Service: 11/15/2018 11:03 AM Status: Signed    : Rebecca Beth CMA (Certified Medical Assistant)    Encounter addended by: Rebecca Beth CMA on: 11/15/2018 11:03 AM      Actions taken: Charge Capture section accepted

## 2021-07-03 NOTE — ADDENDUM NOTE
Addendum Note by Rebecca Beth CMA at 10/12/2017 12:13 PM     Author: Rebecca Beth CMA Service: -- Author Type: Certified Medical Assistant    Filed: 10/12/2017 12:13 PM Date of Service: 10/12/2017 12:13 PM Status: Signed    : Rebecca Beth CMA (Certified Medical Assistant)    Encounter addended by: Rebecca Beth CMA on: 10/12/2017 12:13 PM<BR>     Actions taken: Charge Capture section accepted

## 2021-08-06 NOTE — PROGRESS NOTES
Patient's impression of how medication is working? Pt said not well, not sleeping and felling depressed.     Compliant with Medication? yes pt missed med last night because he was tired.    Side Effects: Other and depression    Current Symptoms : No    Pain (0-10) Yes  Appetite change No  Negative thoughts No  Alcohol use No  Drug use No  Mood swings Yes  Sleep disturbance No  Change in interest Yes  Change in energy Yes  Change in concentration Yes  Psychosis/Hallucinations No  Anxiety moderate  Sad/depressed mood moderate

## 2021-08-28 ENCOUNTER — HEALTH MAINTENANCE LETTER (OUTPATIENT)
Age: 51
End: 2021-08-28

## 2021-10-23 ENCOUNTER — HEALTH MAINTENANCE LETTER (OUTPATIENT)
Age: 51
End: 2021-10-23

## 2021-11-30 ENCOUNTER — TRANSCRIBE ORDERS (OUTPATIENT)
Dept: OTHER | Age: 51
End: 2021-11-30
Payer: MEDICAID

## 2021-11-30 DIAGNOSIS — I48.0 PAROXYSMAL ATRIAL FIBRILLATION (H): Primary | ICD-10-CM

## 2021-11-30 DIAGNOSIS — I42.2 HYPERTROPHIC NONOBSTRUCTIVE CARDIOMYOPATHY (H): ICD-10-CM

## 2021-11-30 DIAGNOSIS — I10 ESSENTIAL HYPERTENSION: ICD-10-CM

## 2021-12-13 ENCOUNTER — TRANSCRIBE ORDERS (OUTPATIENT)
Dept: OTHER | Age: 51
End: 2021-12-13
Payer: MEDICAID

## 2021-12-13 DIAGNOSIS — E11.51 DIABETES TYPE 2 WITH ATHEROSCLEROSIS OF ARTERIES OF EXTREMITIES (H): Primary | ICD-10-CM

## 2021-12-13 DIAGNOSIS — I70.209 DIABETES TYPE 2 WITH ATHEROSCLEROSIS OF ARTERIES OF EXTREMITIES (H): Primary | ICD-10-CM

## 2021-12-13 DIAGNOSIS — G62.89 OTHER POLYNEUROPATHY: ICD-10-CM

## 2021-12-13 DIAGNOSIS — G89.29 OTHER CHRONIC PAIN: ICD-10-CM

## 2021-12-28 ENCOUNTER — VIRTUAL VISIT (OUTPATIENT)
Dept: NEUROLOGY | Facility: CLINIC | Age: 51
End: 2021-12-28
Payer: MEDICAID

## 2021-12-28 DIAGNOSIS — F39 MOOD DISORDER (H): Primary | ICD-10-CM

## 2021-12-28 PROCEDURE — 99443 PR PHYSICIAN TELEPHONE EVALUATION 21-30 MIN: CPT | Performed by: PSYCHIATRY & NEUROLOGY

## 2021-12-28 RX ORDER — POTASSIUM CHLORIDE 750 MG/1
1 TABLET, EXTENDED RELEASE ORAL DAILY
COMMUNITY
Start: 2021-05-14 | End: 2022-04-05

## 2021-12-28 RX ORDER — TAMSULOSIN HYDROCHLORIDE 0.4 MG/1
0.4 CAPSULE ORAL DAILY
COMMUNITY
Start: 2021-04-12 | End: 2022-04-05

## 2021-12-28 RX ORDER — DULOXETIN HYDROCHLORIDE 20 MG/1
20 CAPSULE, DELAYED RELEASE ORAL DAILY
COMMUNITY
Start: 2021-08-29 | End: 2022-01-11

## 2021-12-28 RX ORDER — CLONIDINE HYDROCHLORIDE 0.1 MG/1
0.1 TABLET ORAL
COMMUNITY
Start: 2021-11-11

## 2021-12-28 RX ORDER — LANOLIN ALCOHOL/MO/W.PET/CERES
100 CREAM (GRAM) TOPICAL 2 TIMES DAILY
COMMUNITY
Start: 2021-09-17 | End: 2023-01-01

## 2021-12-28 RX ORDER — DOCUSATE SODIUM 100 MG/1
100 CAPSULE, LIQUID FILLED ORAL 2 TIMES DAILY
COMMUNITY
Start: 2021-04-12

## 2021-12-28 RX ORDER — FUROSEMIDE 40 MG
TABLET ORAL
COMMUNITY
Start: 2021-08-29

## 2021-12-28 RX ORDER — OLANZAPINE 5 MG/1
1 TABLET ORAL AT BEDTIME
COMMUNITY
Start: 2021-11-12 | End: 2023-01-01

## 2021-12-28 NOTE — NURSING NOTE
Chief Complaint   Patient presents with     Medication Follow-up     JOSH Bansal on 12/28/2021 at 11:01 AM
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
## 2022-01-01 ENCOUNTER — MYC REFILL (OUTPATIENT)
Dept: CARDIOLOGY | Facility: CLINIC | Age: 52
End: 2022-01-01

## 2022-01-01 ENCOUNTER — VIRTUAL VISIT (OUTPATIENT)
Dept: NEUROLOGY | Facility: CLINIC | Age: 52
End: 2022-01-01
Payer: MEDICAID

## 2022-01-01 ENCOUNTER — LAB (OUTPATIENT)
Dept: LAB | Facility: CLINIC | Age: 52
End: 2022-01-01
Payer: MEDICAID

## 2022-01-01 ENCOUNTER — HOSPITAL ENCOUNTER (EMERGENCY)
Facility: CLINIC | Age: 52
Discharge: HOME | End: 2022-12-02
Attending: EMERGENCY MEDICINE | Admitting: EMERGENCY MEDICINE
Payer: MEDICAID

## 2022-01-01 ENCOUNTER — ANCILLARY PROCEDURE (OUTPATIENT)
Dept: CARDIOLOGY | Facility: CLINIC | Age: 52
End: 2022-01-01
Attending: INTERNAL MEDICINE
Payer: MEDICAID

## 2022-01-01 ENCOUNTER — HOSPITAL ENCOUNTER (OUTPATIENT)
Dept: CT IMAGING | Facility: CLINIC | Age: 52
Discharge: HOME | End: 2022-08-02
Payer: MEDICAID

## 2022-01-01 ENCOUNTER — LAB REQUISITION (OUTPATIENT)
Dept: LAB | Facility: CLINIC | Age: 52
End: 2022-01-01
Payer: MEDICAID

## 2022-01-01 ENCOUNTER — TELEPHONE (OUTPATIENT)
Dept: CARDIOLOGY | Facility: CLINIC | Age: 52
End: 2022-01-01

## 2022-01-01 ENCOUNTER — MYC MEDICAL ADVICE (OUTPATIENT)
Dept: CARDIOLOGY | Facility: CLINIC | Age: 52
End: 2022-01-01

## 2022-01-01 ENCOUNTER — APPOINTMENT (OUTPATIENT)
Dept: GENERAL RADIOLOGY | Facility: CLINIC | Age: 52
End: 2022-01-01
Attending: INTERNAL MEDICINE
Payer: MEDICAID

## 2022-01-01 ENCOUNTER — MEDICAL CORRESPONDENCE (OUTPATIENT)
Dept: HEALTH INFORMATION MANAGEMENT | Facility: CLINIC | Age: 52
End: 2022-01-01

## 2022-01-01 ENCOUNTER — APPOINTMENT (OUTPATIENT)
Dept: GENERAL RADIOLOGY | Facility: CLINIC | Age: 52
End: 2022-01-01
Attending: EMERGENCY MEDICINE
Payer: MEDICAID

## 2022-01-01 ENCOUNTER — TRANSCRIBE ORDERS (OUTPATIENT)
Dept: OTHER | Age: 52
End: 2022-01-01

## 2022-01-01 ENCOUNTER — TELEPHONE (OUTPATIENT)
Dept: NEUROLOGY | Facility: CLINIC | Age: 52
End: 2022-01-01

## 2022-01-01 ENCOUNTER — HOSPITAL ENCOUNTER (OUTPATIENT)
Facility: CLINIC | Age: 52
Discharge: HOME | End: 2022-07-18
Attending: INTERNAL MEDICINE | Admitting: INTERNAL MEDICINE
Payer: MEDICAID

## 2022-01-01 ENCOUNTER — HOSPITAL ENCOUNTER (OUTPATIENT)
Dept: CT IMAGING | Facility: CLINIC | Age: 52
Discharge: HOME | End: 2022-08-02
Attending: INTERNAL MEDICINE
Payer: MEDICAID

## 2022-01-01 ENCOUNTER — HEALTH MAINTENANCE LETTER (OUTPATIENT)
Age: 52
End: 2022-01-01

## 2022-01-01 ENCOUNTER — ANESTHESIA (OUTPATIENT)
Dept: SURGERY | Facility: CLINIC | Age: 52
End: 2022-01-01
Payer: MEDICAID

## 2022-01-01 ENCOUNTER — ANCILLARY PROCEDURE (OUTPATIENT)
Dept: CARDIOLOGY | Facility: CLINIC | Age: 52
End: 2022-01-01
Attending: NURSE PRACTITIONER
Payer: MEDICAID

## 2022-01-01 ENCOUNTER — ANESTHESIA EVENT (OUTPATIENT)
Dept: SURGERY | Facility: CLINIC | Age: 52
End: 2022-01-01
Payer: MEDICAID

## 2022-01-01 ENCOUNTER — HOSPITAL ENCOUNTER (EMERGENCY)
Facility: CLINIC | Age: 52
Discharge: HOME | End: 2022-07-20
Attending: EMERGENCY MEDICINE | Admitting: EMERGENCY MEDICINE
Payer: MEDICAID

## 2022-01-01 VITALS
OXYGEN SATURATION: 95 % | HEART RATE: 98 BPM | SYSTOLIC BLOOD PRESSURE: 132 MMHG | HEIGHT: 71 IN | WEIGHT: 220 LBS | BODY MASS INDEX: 30.8 KG/M2 | DIASTOLIC BLOOD PRESSURE: 73 MMHG | RESPIRATION RATE: 16 BRPM | TEMPERATURE: 98 F

## 2022-01-01 VITALS
OXYGEN SATURATION: 93 % | BODY MASS INDEX: 31.14 KG/M2 | SYSTOLIC BLOOD PRESSURE: 152 MMHG | WEIGHT: 222.44 LBS | DIASTOLIC BLOOD PRESSURE: 83 MMHG | HEIGHT: 71 IN | TEMPERATURE: 98.8 F | HEART RATE: 105 BPM | RESPIRATION RATE: 16 BRPM

## 2022-01-01 VITALS — HEART RATE: 95 BPM | DIASTOLIC BLOOD PRESSURE: 66 MMHG | OXYGEN SATURATION: 98 % | SYSTOLIC BLOOD PRESSURE: 122 MMHG

## 2022-01-01 VITALS
DIASTOLIC BLOOD PRESSURE: 83 MMHG | RESPIRATION RATE: 16 BRPM | HEART RATE: 86 BPM | SYSTOLIC BLOOD PRESSURE: 126 MMHG | TEMPERATURE: 98.8 F | OXYGEN SATURATION: 95 %

## 2022-01-01 DIAGNOSIS — Z98.890 S/P VENTRICULAR SEPTAL MYECTOMY: ICD-10-CM

## 2022-01-01 DIAGNOSIS — G89.4 CHRONIC PAIN SYNDROME: Primary | ICD-10-CM

## 2022-01-01 DIAGNOSIS — I42.2 HYPERTROPHIC NONOBSTRUCTIVE CARDIOMYOPATHY (H): ICD-10-CM

## 2022-01-01 DIAGNOSIS — I42.2 HYPERTROPHIC CARDIOMYOPATHY (H): ICD-10-CM

## 2022-01-01 DIAGNOSIS — Z01.818 ENCOUNTER FOR OTHER PREPROCEDURAL EXAMINATION: ICD-10-CM

## 2022-01-01 DIAGNOSIS — I25.2 OLD MYOCARDIAL INFARCTION: ICD-10-CM

## 2022-01-01 DIAGNOSIS — E08.42 DIABETES MELLITUS DUE TO UNDERLYING CONDITION WITH DIABETIC POLYNEUROPATHY (H): ICD-10-CM

## 2022-01-01 DIAGNOSIS — Z95.810 AUTOMATIC IMPLANTABLE CARDIOVERTER-DEFIBRILLATOR IN SITU: ICD-10-CM

## 2022-01-01 DIAGNOSIS — I10 ESSENTIAL HYPERTENSION: ICD-10-CM

## 2022-01-01 DIAGNOSIS — F39 MOOD DISORDER (H): Primary | ICD-10-CM

## 2022-01-01 DIAGNOSIS — Z87.09 HISTORY OF COPD: ICD-10-CM

## 2022-01-01 DIAGNOSIS — R00.0 SINUS TACHYCARDIA: ICD-10-CM

## 2022-01-01 DIAGNOSIS — F39 MOOD DISORDER (H): ICD-10-CM

## 2022-01-01 DIAGNOSIS — J40 BRONCHITIS: ICD-10-CM

## 2022-01-01 DIAGNOSIS — I48.91 ATRIAL FIBRILLATION (H): ICD-10-CM

## 2022-01-01 DIAGNOSIS — I48.91 ATRIAL FIBRILLATION, UNSPECIFIED TYPE (H): ICD-10-CM

## 2022-01-01 DIAGNOSIS — I42.2 HYPERTROPHIC NONOBSTRUCTIVE CARDIOMYOPATHY (H): Primary | ICD-10-CM

## 2022-01-01 DIAGNOSIS — E11.51 TYPE 2 DIABETES MELLITUS WITH DIABETIC PERIPHERAL ANGIOPATHY WITHOUT GANGRENE (H): ICD-10-CM

## 2022-01-01 DIAGNOSIS — Z20.822 ENCOUNTER FOR LABORATORY TESTING FOR COVID-19 VIRUS: ICD-10-CM

## 2022-01-01 DIAGNOSIS — Z95.810 AUTOMATIC IMPLANTABLE CARDIOVERTER-DEFIBRILLATOR IN SITU: Primary | ICD-10-CM

## 2022-01-01 DIAGNOSIS — L50.9 HIVES: ICD-10-CM

## 2022-01-01 DIAGNOSIS — I70.209 UNSPECIFIED ATHEROSCLEROSIS OF NATIVE ARTERIES OF EXTREMITIES, UNSPECIFIED EXTREMITY (H): ICD-10-CM

## 2022-01-01 DIAGNOSIS — Z48.89 ENCOUNTER FOR POSTOPERATIVE WOUND CHECK: ICD-10-CM

## 2022-01-01 DIAGNOSIS — G89.18 ACUTE POST-OPERATIVE PAIN: ICD-10-CM

## 2022-01-01 DIAGNOSIS — E78.00 PURE HYPERCHOLESTEROLEMIA, UNSPECIFIED: ICD-10-CM

## 2022-01-01 LAB
ABO/RH(D): NORMAL
ALBUMIN SERPL BCG-MCNC: 4.2 G/DL (ref 3.5–5.2)
ALP SERPL-CCNC: 132 U/L (ref 40–129)
ALT SERPL W P-5'-P-CCNC: 10 U/L (ref 10–50)
ANION GAP SERPL CALCULATED.3IONS-SCNC: 11 MMOL/L (ref 7–15)
ANION GAP SERPL CALCULATED.3IONS-SCNC: 15 MMOL/L (ref 7–15)
ANION GAP SERPL CALCULATED.3IONS-SCNC: 15 MMOL/L (ref 7–15)
ANION GAP SERPL CALCULATED.3IONS-SCNC: 17 MMOL/L (ref 7–15)
ANTIBODY SCREEN: NEGATIVE
AST SERPL W P-5'-P-CCNC: 18 U/L (ref 10–50)
ATRIAL RATE - MUSE: 102 BPM
ATRIAL RATE - MUSE: 110 BPM
ATRIAL RATE - MUSE: 94 BPM
BACTERIA BLD CULT: NO GROWTH
BACTERIA BLD CULT: NO GROWTH
BASOPHILS # BLD AUTO: 0.1 10E3/UL (ref 0–0.2)
BASOPHILS # BLD AUTO: 0.1 10E3/UL (ref 0–0.2)
BASOPHILS NFR BLD AUTO: 0 %
BASOPHILS NFR BLD AUTO: 1 %
BILIRUB SERPL-MCNC: 0.5 MG/DL
BUN SERPL-MCNC: 12.5 MG/DL (ref 6–20)
BUN SERPL-MCNC: 15 MG/DL (ref 6–20)
BUN SERPL-MCNC: 9.3 MG/DL (ref 6–20)
BUN SERPL-MCNC: 9.5 MG/DL (ref 6–20)
CA-I BLD-MCNC: 5 MG/DL (ref 4.4–5.2)
CALCIUM SERPL-MCNC: 8.5 MG/DL (ref 8.6–10)
CALCIUM SERPL-MCNC: 8.9 MG/DL (ref 8.6–10)
CALCIUM SERPL-MCNC: 9.8 MG/DL (ref 8.6–10)
CALCIUM SERPL-MCNC: 9.8 MG/DL (ref 8.6–10)
CHLORIDE SERPL-SCNC: 100 MMOL/L (ref 98–107)
CHLORIDE SERPL-SCNC: 100 MMOL/L (ref 98–107)
CHLORIDE SERPL-SCNC: 102 MMOL/L (ref 98–107)
CHLORIDE SERPL-SCNC: 96 MMOL/L (ref 98–107)
CHOLEST SERPL-MCNC: 241 MG/DL
CPB POCT: NO
CREAT SERPL-MCNC: 0.64 MG/DL (ref 0.67–1.17)
CREAT SERPL-MCNC: 0.69 MG/DL (ref 0.67–1.17)
CREAT SERPL-MCNC: 0.78 MG/DL (ref 0.67–1.17)
CREAT SERPL-MCNC: 0.8 MG/DL (ref 0.67–1.17)
CRP SERPL-MCNC: 81.7 MG/L
DEPRECATED HCO3 PLAS-SCNC: 22 MMOL/L (ref 22–29)
DEPRECATED HCO3 PLAS-SCNC: 23 MMOL/L (ref 22–29)
DEPRECATED HCO3 PLAS-SCNC: 25 MMOL/L (ref 22–29)
DEPRECATED HCO3 PLAS-SCNC: 25 MMOL/L (ref 22–29)
DIASTOLIC BLOOD PRESSURE - MUSE: NORMAL MMHG
EOSINOPHIL # BLD AUTO: 0.2 10E3/UL (ref 0–0.7)
EOSINOPHIL # BLD AUTO: 0.3 10E3/UL (ref 0–0.7)
EOSINOPHIL NFR BLD AUTO: 1 %
EOSINOPHIL NFR BLD AUTO: 3 %
ERYTHROCYTE [DISTWIDTH] IN BLOOD BY AUTOMATED COUNT: 12.2 % (ref 10–15)
ERYTHROCYTE [DISTWIDTH] IN BLOOD BY AUTOMATED COUNT: 12.4 % (ref 10–15)
ERYTHROCYTE [DISTWIDTH] IN BLOOD BY AUTOMATED COUNT: 12.5 % (ref 10–15)
ERYTHROCYTE [SEDIMENTATION RATE] IN BLOOD BY WESTERGREN METHOD: 8 MM/HR (ref 0–20)
FASTING STATUS PATIENT QL REPORTED: YES
FLUAV RNA SPEC QL NAA+PROBE: NEGATIVE
FLUBV RNA RESP QL NAA+PROBE: NEGATIVE
GFR SERPL CREATININE-BSD FRML MDRD: >90 ML/MIN/1.73M2
GLUCOSE BLD-MCNC: 151 MG/DL (ref 70–99)
GLUCOSE BLDC GLUCOMTR-MCNC: 174 MG/DL (ref 70–99)
GLUCOSE BLDC GLUCOMTR-MCNC: 175 MG/DL (ref 70–99)
GLUCOSE SERPL-MCNC: 144 MG/DL (ref 70–99)
GLUCOSE SERPL-MCNC: 153 MG/DL (ref 70–99)
GLUCOSE SERPL-MCNC: 180 MG/DL (ref 70–99)
GLUCOSE SERPL-MCNC: 199 MG/DL (ref 70–99)
GLUCOSE SERPL-MCNC: 201 MG/DL (ref 70–99)
HCO3 BLDV-SCNC: 32 MMOL/L (ref 21–28)
HCT VFR BLD AUTO: 43.9 % (ref 40–53)
HCT VFR BLD AUTO: 46 % (ref 40–53)
HCT VFR BLD AUTO: 55.6 % (ref 40–53)
HCT VFR BLD CALC: 56 % (ref 40–53)
HDLC SERPL-MCNC: 33 MG/DL
HGB BLD-MCNC: 15.2 G/DL (ref 13.3–17.7)
HGB BLD-MCNC: 16.3 G/DL (ref 13.3–17.7)
HGB BLD-MCNC: 19 G/DL (ref 13.3–17.7)
HGB BLD-MCNC: 19.7 G/DL (ref 13.3–17.7)
IMM GRANULOCYTES # BLD: 0 10E3/UL
IMM GRANULOCYTES # BLD: 0.1 10E3/UL
IMM GRANULOCYTES NFR BLD: 0 %
IMM GRANULOCYTES NFR BLD: 0 %
INTERPRETATION ECG - MUSE: NORMAL
LDLC SERPL CALC-MCNC: ABNORMAL MG/DL
LDLC SERPL DIRECT ASSAY-MCNC: 124 MG/DL
LYMPHOCYTES # BLD AUTO: 3 10E3/UL (ref 0.8–5.3)
LYMPHOCYTES # BLD AUTO: 3.8 10E3/UL (ref 0.8–5.3)
LYMPHOCYTES NFR BLD AUTO: 23 %
LYMPHOCYTES NFR BLD AUTO: 26 %
MCH RBC QN AUTO: 31.8 PG (ref 26.5–33)
MCH RBC QN AUTO: 31.8 PG (ref 26.5–33)
MCH RBC QN AUTO: 32.1 PG (ref 26.5–33)
MCHC RBC AUTO-ENTMCNC: 34.6 G/DL (ref 31.5–36.5)
MCHC RBC AUTO-ENTMCNC: 35.4 G/DL (ref 31.5–36.5)
MCHC RBC AUTO-ENTMCNC: 35.4 G/DL (ref 31.5–36.5)
MCV RBC AUTO: 90 FL (ref 78–100)
MCV RBC AUTO: 91 FL (ref 78–100)
MCV RBC AUTO: 92 FL (ref 78–100)
MDC_IDC_LEAD_IMPLANT_DT: NORMAL
MDC_IDC_LEAD_LOCATION: NORMAL
MDC_IDC_LEAD_LOCATION_DETAIL_1: NORMAL
MDC_IDC_LEAD_MFG: NORMAL
MDC_IDC_LEAD_MODEL: NORMAL
MDC_IDC_LEAD_POLARITY_TYPE: NORMAL
MDC_IDC_LEAD_SERIAL: NORMAL
MDC_IDC_LEAD_SPECIAL_FUNCTION: NORMAL
MDC_IDC_MSMT_BATTERY_DTM: NORMAL
MDC_IDC_MSMT_BATTERY_REMAINING_LONGEVITY: 1 MO
MDC_IDC_MSMT_BATTERY_REMAINING_LONGEVITY: 1 MO
MDC_IDC_MSMT_BATTERY_REMAINING_LONGEVITY: 110 MO
MDC_IDC_MSMT_BATTERY_REMAINING_LONGEVITY: 110 MO
MDC_IDC_MSMT_BATTERY_REMAINING_LONGEVITY: 86 MO
MDC_IDC_MSMT_BATTERY_RRT_TRIGGER: 2.73
MDC_IDC_MSMT_BATTERY_RRT_TRIGGER: 2.73
MDC_IDC_MSMT_BATTERY_RRT_TRIGGER: NORMAL
MDC_IDC_MSMT_BATTERY_STATUS: NORMAL
MDC_IDC_MSMT_BATTERY_VOLTAGE: 2.71 V
MDC_IDC_MSMT_BATTERY_VOLTAGE: 2.71 V
MDC_IDC_MSMT_BATTERY_VOLTAGE: 3.14 V
MDC_IDC_MSMT_BATTERY_VOLTAGE: 3.16 V
MDC_IDC_MSMT_CAP_CHARGE_DTM: NORMAL
MDC_IDC_MSMT_CAP_CHARGE_DTM: NORMAL
MDC_IDC_MSMT_CAP_CHARGE_ENERGY: 18 J
MDC_IDC_MSMT_CAP_CHARGE_ENERGY: 18 J
MDC_IDC_MSMT_CAP_CHARGE_ENERGY: 40 J
MDC_IDC_MSMT_CAP_CHARGE_TIME: 0 S
MDC_IDC_MSMT_CAP_CHARGE_TIME: 5.21
MDC_IDC_MSMT_CAP_CHARGE_TIME: 5.21
MDC_IDC_MSMT_CAP_CHARGE_TYPE: NORMAL
MDC_IDC_MSMT_LEADCHNL_RA_IMPEDANCE_VALUE: 399 OHM
MDC_IDC_MSMT_LEADCHNL_RA_IMPEDANCE_VALUE: 437 OHM
MDC_IDC_MSMT_LEADCHNL_RA_IMPEDANCE_VALUE: 437 OHM
MDC_IDC_MSMT_LEADCHNL_RA_IMPEDANCE_VALUE: 475 OHM
MDC_IDC_MSMT_LEADCHNL_RA_IMPEDANCE_VALUE: 475 OHM
MDC_IDC_MSMT_LEADCHNL_RA_IMPEDANCE_VALUE: 494 OHM
MDC_IDC_MSMT_LEADCHNL_RA_PACING_THRESHOLD_AMPLITUDE: 0.5 V
MDC_IDC_MSMT_LEADCHNL_RA_PACING_THRESHOLD_AMPLITUDE: 2.5 V
MDC_IDC_MSMT_LEADCHNL_RA_PACING_THRESHOLD_PULSEWIDTH: 0.4 MS
MDC_IDC_MSMT_LEADCHNL_RA_SENSING_INTR_AMPL: 2.62 MV
MDC_IDC_MSMT_LEADCHNL_RA_SENSING_INTR_AMPL: 2.62 MV
MDC_IDC_MSMT_LEADCHNL_RA_SENSING_INTR_AMPL: 2.88 MV
MDC_IDC_MSMT_LEADCHNL_RA_SENSING_INTR_AMPL: 2.88 MV
MDC_IDC_MSMT_LEADCHNL_RA_SENSING_INTR_AMPL: 5.1 MV
MDC_IDC_MSMT_LEADCHNL_RA_SENSING_INTR_AMPL: 5.4 MV
MDC_IDC_MSMT_LEADCHNL_RA_SENSING_INTR_AMPL: 6.8 MV
MDC_IDC_MSMT_LEADCHNL_RA_SENSING_INTR_AMPL: 6.8 MV
MDC_IDC_MSMT_LEADCHNL_RV_IMPEDANCE_VALUE: 1026 OHM
MDC_IDC_MSMT_LEADCHNL_RV_IMPEDANCE_VALUE: 1045 OHM
MDC_IDC_MSMT_LEADCHNL_RV_IMPEDANCE_VALUE: 285 OHM
MDC_IDC_MSMT_LEADCHNL_RV_IMPEDANCE_VALUE: 304 OHM
MDC_IDC_MSMT_LEADCHNL_RV_IMPEDANCE_VALUE: 323 OHM
MDC_IDC_MSMT_LEADCHNL_RV_IMPEDANCE_VALUE: 323 OHM
MDC_IDC_MSMT_LEADCHNL_RV_IMPEDANCE_VALUE: 342 OHM
MDC_IDC_MSMT_LEADCHNL_RV_IMPEDANCE_VALUE: 380 OHM
MDC_IDC_MSMT_LEADCHNL_RV_IMPEDANCE_VALUE: 399 OHM
MDC_IDC_MSMT_LEADCHNL_RV_IMPEDANCE_VALUE: 418 OHM
MDC_IDC_MSMT_LEADCHNL_RV_IMPEDANCE_VALUE: 494 OHM
MDC_IDC_MSMT_LEADCHNL_RV_PACING_THRESHOLD_AMPLITUDE: 0.5 V
MDC_IDC_MSMT_LEADCHNL_RV_PACING_THRESHOLD_AMPLITUDE: 0.62 V
MDC_IDC_MSMT_LEADCHNL_RV_PACING_THRESHOLD_AMPLITUDE: 0.75 V
MDC_IDC_MSMT_LEADCHNL_RV_PACING_THRESHOLD_AMPLITUDE: 0.75 V
MDC_IDC_MSMT_LEADCHNL_RV_PACING_THRESHOLD_AMPLITUDE: 1.38 V
MDC_IDC_MSMT_LEADCHNL_RV_PACING_THRESHOLD_AMPLITUDE: 1.38 V
MDC_IDC_MSMT_LEADCHNL_RV_PACING_THRESHOLD_PULSEWIDTH: 0.4 MS
MDC_IDC_MSMT_LEADCHNL_RV_SENSING_INTR_AMPL: 16.4 MV
MDC_IDC_MSMT_LEADCHNL_RV_SENSING_INTR_AMPL: 9.5 MV
MDC_IDC_PG_IMPLANT_DTM: NORMAL
MDC_IDC_PG_MFG: NORMAL
MDC_IDC_PG_MODEL: NORMAL
MDC_IDC_PG_SERIAL: NORMAL
MDC_IDC_PG_TYPE: NORMAL
MDC_IDC_SESS_CLINIC_NAME: NORMAL
MDC_IDC_SESS_DTM: NORMAL
MDC_IDC_SESS_TYPE: NORMAL
MDC_IDC_SET_BRADY_AT_MODE_SWITCH_RATE: 171 {BEATS}/MIN
MDC_IDC_SET_BRADY_HYSTRATE: NORMAL
MDC_IDC_SET_BRADY_HYSTRATE: NORMAL
MDC_IDC_SET_BRADY_LOWRATE: 55 {BEATS}/MIN
MDC_IDC_SET_BRADY_LOWRATE: 60 {BEATS}/MIN
MDC_IDC_SET_BRADY_MAX_SENSOR_RATE: 120 {BEATS}/MIN
MDC_IDC_SET_BRADY_MAX_TRACKING_RATE: 140 {BEATS}/MIN
MDC_IDC_SET_BRADY_MODE: NORMAL
MDC_IDC_SET_BRADY_PAV_DELAY_LOW: 180 MS
MDC_IDC_SET_BRADY_SAV_DELAY_LOW: 150 MS
MDC_IDC_SET_LEADCHNL_RA_PACING_AMPLITUDE: 3 V
MDC_IDC_SET_LEADCHNL_RA_PACING_AMPLITUDE: 3 V
MDC_IDC_SET_LEADCHNL_RA_PACING_AMPLITUDE: 3.5 V
MDC_IDC_SET_LEADCHNL_RA_PACING_ANODE_ELECTRODE_1: NORMAL
MDC_IDC_SET_LEADCHNL_RA_PACING_ANODE_LOCATION_1: NORMAL
MDC_IDC_SET_LEADCHNL_RA_PACING_CAPTURE_MODE: NORMAL
MDC_IDC_SET_LEADCHNL_RA_PACING_CATHODE_ELECTRODE_1: NORMAL
MDC_IDC_SET_LEADCHNL_RA_PACING_CATHODE_LOCATION_1: NORMAL
MDC_IDC_SET_LEADCHNL_RA_PACING_POLARITY: NORMAL
MDC_IDC_SET_LEADCHNL_RA_PACING_PULSEWIDTH: 0.4 MS
MDC_IDC_SET_LEADCHNL_RA_PACING_PULSEWIDTH: 1 MS
MDC_IDC_SET_LEADCHNL_RA_PACING_PULSEWIDTH: 1 MS
MDC_IDC_SET_LEADCHNL_RA_SENSING_ANODE_ELECTRODE_1: NORMAL
MDC_IDC_SET_LEADCHNL_RA_SENSING_ANODE_LOCATION_1: NORMAL
MDC_IDC_SET_LEADCHNL_RA_SENSING_CATHODE_ELECTRODE_1: NORMAL
MDC_IDC_SET_LEADCHNL_RA_SENSING_CATHODE_LOCATION_1: NORMAL
MDC_IDC_SET_LEADCHNL_RA_SENSING_POLARITY: NORMAL
MDC_IDC_SET_LEADCHNL_RA_SENSING_SENSITIVITY: 0.3 MV
MDC_IDC_SET_LEADCHNL_RV_PACING_AMPLITUDE: 3.5 V
MDC_IDC_SET_LEADCHNL_RV_PACING_AMPLITUDE: 5 V
MDC_IDC_SET_LEADCHNL_RV_PACING_AMPLITUDE: 5 V
MDC_IDC_SET_LEADCHNL_RV_PACING_ANODE_ELECTRODE_1: NORMAL
MDC_IDC_SET_LEADCHNL_RV_PACING_ANODE_LOCATION_1: NORMAL
MDC_IDC_SET_LEADCHNL_RV_PACING_CAPTURE_MODE: NORMAL
MDC_IDC_SET_LEADCHNL_RV_PACING_CATHODE_ELECTRODE_1: NORMAL
MDC_IDC_SET_LEADCHNL_RV_PACING_CATHODE_LOCATION_1: NORMAL
MDC_IDC_SET_LEADCHNL_RV_PACING_POLARITY: NORMAL
MDC_IDC_SET_LEADCHNL_RV_PACING_PULSEWIDTH: 0.4 MS
MDC_IDC_SET_LEADCHNL_RV_SENSING_ANODE_ELECTRODE_1: NORMAL
MDC_IDC_SET_LEADCHNL_RV_SENSING_ANODE_LOCATION_1: NORMAL
MDC_IDC_SET_LEADCHNL_RV_SENSING_CATHODE_ELECTRODE_1: NORMAL
MDC_IDC_SET_LEADCHNL_RV_SENSING_CATHODE_LOCATION_1: NORMAL
MDC_IDC_SET_LEADCHNL_RV_SENSING_POLARITY: NORMAL
MDC_IDC_SET_LEADCHNL_RV_SENSING_SENSITIVITY: 0.3 MV
MDC_IDC_SET_ZONE_DETECTION_BEATS_DENOMINATOR: 40 {BEATS}
MDC_IDC_SET_ZONE_DETECTION_BEATS_NUMERATOR: 30 {BEATS}
MDC_IDC_SET_ZONE_DETECTION_INTERVAL: 270 MS
MDC_IDC_SET_ZONE_DETECTION_INTERVAL: 350 MS
MDC_IDC_SET_ZONE_DETECTION_INTERVAL: 360 MS
MDC_IDC_SET_ZONE_DETECTION_INTERVAL: 400 MS
MDC_IDC_SET_ZONE_DETECTION_INTERVAL: NORMAL
MDC_IDC_SET_ZONE_DETECTION_INTERVAL: NORMAL
MDC_IDC_SET_ZONE_TYPE: NORMAL
MDC_IDC_STAT_AT_BURDEN_PERCENT: 0 %
MDC_IDC_STAT_AT_DTM_END: NORMAL
MDC_IDC_STAT_AT_DTM_START: NORMAL
MDC_IDC_STAT_BRADY_AP_VP_PERCENT: 0.96 %
MDC_IDC_STAT_BRADY_AP_VP_PERCENT: 0.97 %
MDC_IDC_STAT_BRADY_AP_VP_PERCENT: 1.32 %
MDC_IDC_STAT_BRADY_AP_VP_PERCENT: 1.35 %
MDC_IDC_STAT_BRADY_AP_VP_PERCENT: 2.33 %
MDC_IDC_STAT_BRADY_AP_VS_PERCENT: 0 %
MDC_IDC_STAT_BRADY_AS_VP_PERCENT: 97.57 %
MDC_IDC_STAT_BRADY_AS_VP_PERCENT: 98.56 %
MDC_IDC_STAT_BRADY_AS_VP_PERCENT: 98.59 %
MDC_IDC_STAT_BRADY_AS_VP_PERCENT: 98.99 %
MDC_IDC_STAT_BRADY_AS_VP_PERCENT: 98.99 %
MDC_IDC_STAT_BRADY_AS_VS_PERCENT: 0.04 %
MDC_IDC_STAT_BRADY_AS_VS_PERCENT: 0.04 %
MDC_IDC_STAT_BRADY_AS_VS_PERCENT: 0.08 %
MDC_IDC_STAT_BRADY_AS_VS_PERCENT: 0.09 %
MDC_IDC_STAT_BRADY_AS_VS_PERCENT: 0.09 %
MDC_IDC_STAT_BRADY_DTM_END: NORMAL
MDC_IDC_STAT_BRADY_DTM_START: NORMAL
MDC_IDC_STAT_BRADY_RA_PERCENT_PACED: 0.1 %
MDC_IDC_STAT_BRADY_RA_PERCENT_PACED: 0.97 %
MDC_IDC_STAT_BRADY_RA_PERCENT_PACED: 0.97 %
MDC_IDC_STAT_BRADY_RA_PERCENT_PACED: 1.32 %
MDC_IDC_STAT_BRADY_RA_PERCENT_PACED: 1.35 %
MDC_IDC_STAT_BRADY_RA_PERCENT_PACED: 2.32 %
MDC_IDC_STAT_BRADY_RV_PERCENT_PACED: 99 %
MDC_IDC_STAT_BRADY_RV_PERCENT_PACED: 99.91 %
MDC_IDC_STAT_BRADY_RV_PERCENT_PACED: 99.96 %
MDC_IDC_STAT_BRADY_RV_PERCENT_PACED: 99.96 %
MDC_IDC_STAT_CRT_DTM_END: NORMAL
MDC_IDC_STAT_CRT_DTM_START: NORMAL
MDC_IDC_STAT_EPISODE_RECENT_COUNT: 0
MDC_IDC_STAT_EPISODE_RECENT_COUNT_DTM_END: NORMAL
MDC_IDC_STAT_EPISODE_RECENT_COUNT_DTM_START: NORMAL
MDC_IDC_STAT_EPISODE_TOTAL_COUNT: 0
MDC_IDC_STAT_EPISODE_TOTAL_COUNT: 2
MDC_IDC_STAT_EPISODE_TOTAL_COUNT: 29
MDC_IDC_STAT_EPISODE_TOTAL_COUNT: 29
MDC_IDC_STAT_EPISODE_TOTAL_COUNT_DTM_END: NORMAL
MDC_IDC_STAT_EPISODE_TOTAL_COUNT_DTM_START: NORMAL
MDC_IDC_STAT_EPISODE_TYPE: NORMAL
MDC_IDC_STAT_TACHYTHERAPY_ATP_DELIVERED_RECENT: 0
MDC_IDC_STAT_TACHYTHERAPY_ATP_DELIVERED_TOTAL: 0
MDC_IDC_STAT_TACHYTHERAPY_RECENT_DTM_END: NORMAL
MDC_IDC_STAT_TACHYTHERAPY_RECENT_DTM_START: NORMAL
MDC_IDC_STAT_TACHYTHERAPY_SHOCKS_ABORTED_RECENT: 0
MDC_IDC_STAT_TACHYTHERAPY_SHOCKS_ABORTED_TOTAL: 0
MDC_IDC_STAT_TACHYTHERAPY_SHOCKS_DELIVERED_RECENT: 0
MDC_IDC_STAT_TACHYTHERAPY_SHOCKS_DELIVERED_TOTAL: 0
MDC_IDC_STAT_TACHYTHERAPY_SHOCKS_DELIVERED_TOTAL: 2
MDC_IDC_STAT_TACHYTHERAPY_SHOCKS_DELIVERED_TOTAL: 2
MDC_IDC_STAT_TACHYTHERAPY_TOTAL_DTM_END: NORMAL
MDC_IDC_STAT_TACHYTHERAPY_TOTAL_DTM_START: NORMAL
MONOCYTES # BLD AUTO: 1 10E3/UL (ref 0–1.3)
MONOCYTES # BLD AUTO: 1.1 10E3/UL (ref 0–1.3)
MONOCYTES NFR BLD AUTO: 6 %
MONOCYTES NFR BLD AUTO: 8 %
NEUTROPHILS # BLD AUTO: 11.6 10E3/UL (ref 1.6–8.3)
NEUTROPHILS # BLD AUTO: 7.2 10E3/UL (ref 1.6–8.3)
NEUTROPHILS NFR BLD AUTO: 62 %
NEUTROPHILS NFR BLD AUTO: 70 %
NONHDLC SERPL-MCNC: 208 MG/DL
NRBC # BLD AUTO: 0 10E3/UL
NRBC # BLD AUTO: 0 10E3/UL
NRBC BLD AUTO-RTO: 0 /100
NRBC BLD AUTO-RTO: 0 /100
NT-PROBNP SERPL-MCNC: 132 PG/ML (ref 0–900)
P AXIS - MUSE: 69 DEGREES
P AXIS - MUSE: 76 DEGREES
P AXIS - MUSE: NORMAL DEGREES
PATH REPORT.COMMENTS IMP SPEC: NORMAL
PATH REPORT.COMMENTS IMP SPEC: NORMAL
PATH REPORT.FINAL DX SPEC: NORMAL
PATH REPORT.GROSS SPEC: NORMAL
PATH REPORT.MICROSCOPIC SPEC OTHER STN: NORMAL
PATH REPORT.RELEVANT HX SPEC: NORMAL
PCO2 BLDV: 57 MM HG (ref 40–50)
PH BLDV: 7.35 [PH] (ref 7.32–7.43)
PHOTO IMAGE: NORMAL
PLATELET # BLD AUTO: 189 10E3/UL (ref 150–450)
PLATELET # BLD AUTO: 244 10E3/UL (ref 150–450)
PLATELET # BLD AUTO: 245 10E3/UL (ref 150–450)
PO2 BLDV: 18 MM HG (ref 25–47)
POTASSIUM BLD-SCNC: 4.1 MMOL/L (ref 3.4–5.3)
POTASSIUM SERPL-SCNC: 3.1 MMOL/L (ref 3.4–5.3)
POTASSIUM SERPL-SCNC: 3.3 MMOL/L (ref 3.4–5.3)
POTASSIUM SERPL-SCNC: 3.5 MMOL/L (ref 3.4–5.3)
POTASSIUM SERPL-SCNC: 4.3 MMOL/L (ref 3.4–5.3)
PR INTERVAL - MUSE: 140 MS
PR INTERVAL - MUSE: 152 MS
PR INTERVAL - MUSE: 186 MS
PROCALCITONIN SERPL IA-MCNC: 0.05 NG/ML
PROT SERPL-MCNC: 7.6 G/DL (ref 6.4–8.3)
QRS DURATION - MUSE: 170 MS
QRS DURATION - MUSE: 184 MS
QRS DURATION - MUSE: 184 MS
QT - MUSE: 412 MS
QT - MUSE: 430 MS
QT - MUSE: 432 MS
QTC - MUSE: 540 MS
QTC - MUSE: 557 MS
QTC - MUSE: 560 MS
R AXIS - MUSE: -74 DEGREES
R AXIS - MUSE: -82 DEGREES
R AXIS - MUSE: -89 DEGREES
RBC # BLD AUTO: 4.78 10E6/UL (ref 4.4–5.9)
RBC # BLD AUTO: 5.13 10E6/UL (ref 4.4–5.9)
RBC # BLD AUTO: 6.14 10E6/UL (ref 4.4–5.9)
RSV RNA SPEC NAA+PROBE: NEGATIVE
SAO2 % BLDV: 24 % (ref 94–100)
SARS-COV-2 RNA RESP QL NAA+PROBE: NEGATIVE
SARS-COV-2 RNA RESP QL NAA+PROBE: NEGATIVE
SODIUM BLD-SCNC: 140 MMOL/L (ref 133–144)
SODIUM SERPL-SCNC: 136 MMOL/L (ref 136–145)
SODIUM SERPL-SCNC: 137 MMOL/L (ref 136–145)
SODIUM SERPL-SCNC: 138 MMOL/L (ref 136–145)
SODIUM SERPL-SCNC: 140 MMOL/L (ref 136–145)
SPECIMEN EXPIRATION DATE: NORMAL
SYSTOLIC BLOOD PRESSURE - MUSE: NORMAL MMHG
T AXIS - MUSE: 100 DEGREES
T AXIS - MUSE: 90 DEGREES
T AXIS - MUSE: 93 DEGREES
TRIGL SERPL-MCNC: 639 MG/DL
VENTRICULAR RATE- MUSE: 102 BPM
VENTRICULAR RATE- MUSE: 110 BPM
VENTRICULAR RATE- MUSE: 94 BPM
WBC # BLD AUTO: 11.5 10E3/UL (ref 4–11)
WBC # BLD AUTO: 11.6 10E3/UL (ref 4–11)
WBC # BLD AUTO: 16.9 10E3/UL (ref 4–11)

## 2022-01-01 PROCEDURE — 93010 ELECTROCARDIOGRAM REPORT: CPT | Performed by: EMERGENCY MEDICINE

## 2022-01-01 PROCEDURE — 250N000011 HC RX IP 250 OP 636: Performed by: NURSE ANESTHETIST, CERTIFIED REGISTERED

## 2022-01-01 PROCEDURE — 83721 ASSAY OF BLOOD LIPOPROTEIN: CPT | Mod: ORL,91 | Performed by: FAMILY MEDICINE

## 2022-01-01 PROCEDURE — 86850 RBC ANTIBODY SCREEN: CPT | Performed by: INTERNAL MEDICINE

## 2022-01-01 PROCEDURE — 93287 PERI-PX DEVICE EVAL & PRGR: CPT

## 2022-01-01 PROCEDURE — 93287 PERI-PX DEVICE EVAL & PRGR: CPT | Mod: 26 | Performed by: INTERNAL MEDICINE

## 2022-01-01 PROCEDURE — 99214 OFFICE O/P EST MOD 30 MIN: CPT | Mod: 95 | Performed by: PSYCHIATRY & NEUROLOGY

## 2022-01-01 PROCEDURE — 999N000054 HC STATISTIC EKG NON-CHARGEABLE

## 2022-01-01 PROCEDURE — 999N000065 XR CHEST PORT 1 VIEW

## 2022-01-01 PROCEDURE — 250N000025 HC SEVOFLURANE, PER MIN: Performed by: INTERNAL MEDICINE

## 2022-01-01 PROCEDURE — C1753 CATH, INTRAVAS ULTRASOUND: HCPCS | Performed by: INTERNAL MEDICINE

## 2022-01-01 PROCEDURE — 258N000003 HC RX IP 258 OP 636: Performed by: INTERNAL MEDICINE

## 2022-01-01 PROCEDURE — 93283 PRGRMG EVAL IMPLANTABLE DFB: CPT

## 2022-01-01 PROCEDURE — 250N000009 HC RX 250: Performed by: INTERNAL MEDICINE

## 2022-01-01 PROCEDURE — 272N000001 HC OR GENERAL SUPPLY STERILE: Performed by: INTERNAL MEDICINE

## 2022-01-01 PROCEDURE — 71045 X-RAY EXAM CHEST 1 VIEW: CPT | Mod: 26 | Performed by: RADIOLOGY

## 2022-01-01 PROCEDURE — 250N000013 HC RX MED GY IP 250 OP 250 PS 637: Performed by: NURSE ANESTHETIST, CERTIFIED REGISTERED

## 2022-01-01 PROCEDURE — 71046 X-RAY EXAM CHEST 2 VIEWS: CPT | Mod: 26 | Performed by: RADIOLOGY

## 2022-01-01 PROCEDURE — 999N000064 CARDIAC DEVICE CHECK - INPATIENT

## 2022-01-01 PROCEDURE — 250N000011 HC RX IP 250 OP 636: Performed by: INTERNAL MEDICINE

## 2022-01-01 PROCEDURE — 93010 ELECTROCARDIOGRAM REPORT: CPT | Mod: 76 | Performed by: INTERNAL MEDICINE

## 2022-01-01 PROCEDURE — 88300 SURGICAL PATH GROSS: CPT | Mod: TC | Performed by: INTERNAL MEDICINE

## 2022-01-01 PROCEDURE — 999N000141 HC STATISTIC PRE-PROCEDURE NURSING ASSESSMENT: Performed by: INTERNAL MEDICINE

## 2022-01-01 PROCEDURE — C1721 AICD, DUAL CHAMBER: HCPCS | Performed by: INTERNAL MEDICINE

## 2022-01-01 PROCEDURE — 83880 ASSAY OF NATRIURETIC PEPTIDE: CPT | Performed by: EMERGENCY MEDICINE

## 2022-01-01 PROCEDURE — C1777 LEAD, AICD, ENDO SINGLE COIL: HCPCS | Performed by: INTERNAL MEDICINE

## 2022-01-01 PROCEDURE — 86140 C-REACTIVE PROTEIN: CPT | Performed by: EMERGENCY MEDICINE

## 2022-01-01 PROCEDURE — C1898 LEAD, PMKR, OTHER THAN TRANS: HCPCS | Performed by: INTERNAL MEDICINE

## 2022-01-01 PROCEDURE — 999N000127 HC STATISTIC PERIPHERAL IV START W US GUIDANCE

## 2022-01-01 PROCEDURE — 370N000017 HC ANESTHESIA TECHNICAL FEE, PER MIN: Performed by: INTERNAL MEDICINE

## 2022-01-01 PROCEDURE — 84145 PROCALCITONIN (PCT): CPT | Performed by: EMERGENCY MEDICINE

## 2022-01-01 PROCEDURE — 250N000013 HC RX MED GY IP 250 OP 250 PS 637: Performed by: EMERGENCY MEDICINE

## 2022-01-01 PROCEDURE — 85027 COMPLETE CBC AUTOMATED: CPT | Performed by: INTERNAL MEDICINE

## 2022-01-01 PROCEDURE — 36415 COLL VENOUS BLD VENIPUNCTURE: CPT | Performed by: INTERNAL MEDICINE

## 2022-01-01 PROCEDURE — 80048 BASIC METABOLIC PNL TOTAL CA: CPT | Performed by: INTERNAL MEDICINE

## 2022-01-01 PROCEDURE — 99213 OFFICE O/P EST LOW 20 MIN: CPT | Mod: 95 | Performed by: PSYCHIATRY & NEUROLOGY

## 2022-01-01 PROCEDURE — 36415 COLL VENOUS BLD VENIPUNCTURE: CPT | Performed by: EMERGENCY MEDICINE

## 2022-01-01 PROCEDURE — 250N000009 HC RX 250: Performed by: NURSE ANESTHETIST, CERTIFIED REGISTERED

## 2022-01-01 PROCEDURE — 80061 LIPID PANEL: CPT | Mod: ORL | Performed by: FAMILY MEDICINE

## 2022-01-01 PROCEDURE — 82962 GLUCOSE BLOOD TEST: CPT

## 2022-01-01 PROCEDURE — 82947 ASSAY GLUCOSE BLOOD QUANT: CPT | Mod: ORL | Performed by: FAMILY MEDICINE

## 2022-01-01 PROCEDURE — C1894 INTRO/SHEATH, NON-LASER: HCPCS | Performed by: INTERNAL MEDICINE

## 2022-01-01 PROCEDURE — 87637 SARSCOV2&INF A&B&RSV AMP PRB: CPT | Performed by: EMERGENCY MEDICINE

## 2022-01-01 PROCEDURE — 84681 ASSAY OF C-PEPTIDE: CPT | Mod: ORL | Performed by: FAMILY MEDICINE

## 2022-01-01 PROCEDURE — 75574 CT ANGIO HRT W/3D IMAGE: CPT | Mod: 26 | Performed by: STUDENT IN AN ORGANIZED HEALTH CARE EDUCATION/TRAINING PROGRAM

## 2022-01-01 PROCEDURE — 250N000012 HC RX MED GY IP 250 OP 636 PS 637: Performed by: EMERGENCY MEDICINE

## 2022-01-01 PROCEDURE — 99285 EMERGENCY DEPT VISIT HI MDM: CPT | Mod: 25 | Performed by: EMERGENCY MEDICINE

## 2022-01-01 PROCEDURE — 272N000002 HC OR SUPPLY OTHER OPNP: Performed by: INTERNAL MEDICINE

## 2022-01-01 PROCEDURE — 96365 THER/PROPH/DIAG IV INF INIT: CPT | Performed by: EMERGENCY MEDICINE

## 2022-01-01 PROCEDURE — 93283 PRGRMG EVAL IMPLANTABLE DFB: CPT | Performed by: INTERNAL MEDICINE

## 2022-01-01 PROCEDURE — 258N000003 HC RX IP 258 OP 636: Performed by: NURSE ANESTHETIST, CERTIFIED REGISTERED

## 2022-01-01 PROCEDURE — 85004 AUTOMATED DIFF WBC COUNT: CPT | Performed by: EMERGENCY MEDICINE

## 2022-01-01 PROCEDURE — C1769 GUIDE WIRE: HCPCS | Performed by: INTERNAL MEDICINE

## 2022-01-01 PROCEDURE — 80048 BASIC METABOLIC PNL TOTAL CA: CPT | Mod: ORL | Performed by: FAMILY MEDICINE

## 2022-01-01 PROCEDURE — 93005 ELECTROCARDIOGRAM TRACING: CPT | Performed by: EMERGENCY MEDICINE

## 2022-01-01 PROCEDURE — 80048 BASIC METABOLIC PNL TOTAL CA: CPT | Performed by: EMERGENCY MEDICINE

## 2022-01-01 PROCEDURE — 999N000133 HC STATISTIC PP CARE STAGE 2

## 2022-01-01 PROCEDURE — 99284 EMERGENCY DEPT VISIT MOD MDM: CPT | Mod: CS,25 | Performed by: EMERGENCY MEDICINE

## 2022-01-01 PROCEDURE — U0003 INFECTIOUS AGENT DETECTION BY NUCLEIC ACID (DNA OR RNA); SEVERE ACUTE RESPIRATORY SYNDROME CORONAVIRUS 2 (SARS-COV-2) (CORONAVIRUS DISEASE [COVID-19]), AMPLIFIED PROBE TECHNIQUE, MAKING USE OF HIGH THROUGHPUT TECHNOLOGIES AS DESCRIBED BY CMS-2020-01-R: HCPCS | Performed by: FAMILY MEDICINE

## 2022-01-01 PROCEDURE — 87040 BLOOD CULTURE FOR BACTERIA: CPT | Performed by: EMERGENCY MEDICINE

## 2022-01-01 PROCEDURE — 93296 REM INTERROG EVL PM/IDS: CPT

## 2022-01-01 PROCEDURE — C9803 HOPD COVID-19 SPEC COLLECT: HCPCS | Performed by: EMERGENCY MEDICINE

## 2022-01-01 PROCEDURE — C1887 CATHETER, GUIDING: HCPCS | Performed by: INTERNAL MEDICINE

## 2022-01-01 PROCEDURE — 999N000285 HC STATISTIC VASC ACCESS LAB DRAW WITH PIV START

## 2022-01-01 PROCEDURE — 82947 ASSAY GLUCOSE BLOOD QUANT: CPT

## 2022-01-01 PROCEDURE — 250N000011 HC RX IP 250 OP 636: Performed by: STUDENT IN AN ORGANIZED HEALTH CARE EDUCATION/TRAINING PROGRAM

## 2022-01-01 PROCEDURE — 93005 ELECTROCARDIOGRAM TRACING: CPT

## 2022-01-01 PROCEDURE — 250N000013 HC RX MED GY IP 250 OP 250 PS 637: Performed by: STUDENT IN AN ORGANIZED HEALTH CARE EDUCATION/TRAINING PROGRAM

## 2022-01-01 PROCEDURE — 82947 ASSAY GLUCOSE BLOOD QUANT: CPT | Performed by: EMERGENCY MEDICINE

## 2022-01-01 PROCEDURE — 250N000011 HC RX IP 250 OP 636: Performed by: EMERGENCY MEDICINE

## 2022-01-01 PROCEDURE — 71046 X-RAY EXAM CHEST 2 VIEWS: CPT

## 2022-01-01 PROCEDURE — C1779 LEAD, PMKR, TRANSVENOUS VDD: HCPCS | Performed by: INTERNAL MEDICINE

## 2022-01-01 PROCEDURE — 75574 CT ANGIO HRT W/3D IMAGE: CPT

## 2022-01-01 PROCEDURE — 360N000086 HC SURGERY LEVEL 6 W/ FLUORO, PER MIN: Performed by: INTERNAL MEDICINE

## 2022-01-01 PROCEDURE — 93283 PRGRMG EVAL IMPLANTABLE DFB: CPT | Mod: 26 | Performed by: INTERNAL MEDICINE

## 2022-01-01 PROCEDURE — 99283 EMERGENCY DEPT VISIT LOW MDM: CPT | Mod: CS | Performed by: EMERGENCY MEDICINE

## 2022-01-01 PROCEDURE — 250N000011 HC RX IP 250 OP 636: Performed by: ANESTHESIOLOGY

## 2022-01-01 PROCEDURE — 88300 SURGICAL PATH GROSS: CPT | Mod: 26 | Performed by: PATHOLOGY

## 2022-01-01 PROCEDURE — 85652 RBC SED RATE AUTOMATED: CPT | Performed by: EMERGENCY MEDICINE

## 2022-01-01 PROCEDURE — 710N000010 HC RECOVERY PHASE 1, LEVEL 2, PER MIN: Performed by: INTERNAL MEDICINE

## 2022-01-01 PROCEDURE — 82803 BLOOD GASES ANY COMBINATION: CPT

## 2022-01-01 PROCEDURE — 85025 COMPLETE CBC W/AUTO DIFF WBC: CPT | Performed by: EMERGENCY MEDICINE

## 2022-01-01 PROCEDURE — 410N000003 HC PER-PERFUSION 1ST 30 MIN: Performed by: INTERNAL MEDICINE

## 2022-01-01 PROCEDURE — 93295 DEV INTERROG REMOTE 1/2/MLT: CPT | Performed by: INTERNAL MEDICINE

## 2022-01-01 DEVICE — ICD DDPA2D4 COBALT XT DR MRI DF4 USA
Type: IMPLANTABLE DEVICE | Site: CHEST | Status: FUNCTIONAL
Brand: COBALT™ XT DR MRI SURESCAN™

## 2022-01-01 DEVICE — LEAD 5076-52 MRI US RCMCRD
Type: IMPLANTABLE DEVICE | Site: CHEST | Status: FUNCTIONAL
Brand: CAPSUREFIX NOVUS MRI™ SURESCAN®

## 2022-01-01 RX ORDER — ONDANSETRON 2 MG/ML
INJECTION INTRAMUSCULAR; INTRAVENOUS PRN
Status: DISCONTINUED | OUTPATIENT
Start: 2022-01-01 | End: 2022-01-01

## 2022-01-01 RX ORDER — RIVAROXABAN 10 MG/1
TABLET, FILM COATED ORAL
Qty: 180 TABLET | Refills: 3 | OUTPATIENT
Start: 2022-01-01

## 2022-01-01 RX ORDER — NALOXONE HYDROCHLORIDE 0.4 MG/ML
0.2 INJECTION, SOLUTION INTRAMUSCULAR; INTRAVENOUS; SUBCUTANEOUS
Status: DISCONTINUED | OUTPATIENT
Start: 2022-01-01 | End: 2022-01-01

## 2022-01-01 RX ORDER — DULOXETIN HYDROCHLORIDE 30 MG/1
90 CAPSULE, DELAYED RELEASE ORAL DAILY
Qty: 90 CAPSULE | Refills: 3 | Status: SHIPPED | OUTPATIENT
Start: 2022-01-01 | End: 2022-01-01 | Stop reason: DRUGHIGH

## 2022-01-01 RX ORDER — METOPROLOL TARTRATE 25 MG/1
50-100 TABLET, FILM COATED ORAL
Status: CANCELLED | OUTPATIENT
Start: 2022-01-01

## 2022-01-01 RX ORDER — DIPHENHYDRAMINE HYDROCHLORIDE 50 MG/ML
25-50 INJECTION INTRAMUSCULAR; INTRAVENOUS
Status: DISCONTINUED | OUTPATIENT
Start: 2022-01-01 | End: 2022-01-01 | Stop reason: HOSPADM

## 2022-01-01 RX ORDER — LOSARTAN POTASSIUM 25 MG/1
25 TABLET ORAL DAILY
Status: DISCONTINUED | OUTPATIENT
Start: 2022-01-01 | End: 2022-01-01 | Stop reason: HOSPADM

## 2022-01-01 RX ORDER — SODIUM CHLORIDE, SODIUM LACTATE, POTASSIUM CHLORIDE, CALCIUM CHLORIDE 600; 310; 30; 20 MG/100ML; MG/100ML; MG/100ML; MG/100ML
INJECTION, SOLUTION INTRAVENOUS CONTINUOUS PRN
Status: DISCONTINUED | OUTPATIENT
Start: 2022-01-01 | End: 2022-01-01

## 2022-01-01 RX ORDER — FENTANYL CITRATE 50 UG/ML
25 INJECTION, SOLUTION INTRAMUSCULAR; INTRAVENOUS EVERY 5 MIN PRN
Status: DISCONTINUED | OUTPATIENT
Start: 2022-01-01 | End: 2022-01-01 | Stop reason: HOSPADM

## 2022-01-01 RX ORDER — DIPHENHYDRAMINE HCL 50 MG
CAPSULE ORAL
Qty: 1 CAPSULE | Refills: 0 | Status: CANCELLED | OUTPATIENT
Start: 2022-01-01

## 2022-01-01 RX ORDER — SODIUM CHLORIDE, SODIUM GLUCONATE, SODIUM ACETATE, POTASSIUM CHLORIDE AND MAGNESIUM CHLORIDE 526; 502; 368; 37; 30 MG/100ML; MG/100ML; MG/100ML; MG/100ML; MG/100ML
INJECTION, SOLUTION INTRAVENOUS CONTINUOUS PRN
Status: DISCONTINUED | OUTPATIENT
Start: 2022-01-01 | End: 2022-01-01

## 2022-01-01 RX ORDER — OXYCODONE HYDROCHLORIDE 5 MG/1
5 TABLET ORAL ONCE
Status: COMPLETED | OUTPATIENT
Start: 2022-01-01 | End: 2022-01-01

## 2022-01-01 RX ORDER — NALOXONE HYDROCHLORIDE 0.4 MG/ML
0.4 INJECTION, SOLUTION INTRAMUSCULAR; INTRAVENOUS; SUBCUTANEOUS
Status: DISCONTINUED | OUTPATIENT
Start: 2022-01-01 | End: 2022-01-01

## 2022-01-01 RX ORDER — METHYLPREDNISOLONE 32 MG/1
TABLET ORAL
Qty: 2 TABLET | Refills: 0 | Status: CANCELLED | OUTPATIENT
Start: 2022-01-01

## 2022-01-01 RX ORDER — OXYCODONE HYDROCHLORIDE 5 MG/1
5 TABLET ORAL EVERY 4 HOURS PRN
Status: DISCONTINUED | OUTPATIENT
Start: 2022-01-01 | End: 2022-01-01 | Stop reason: HOSPADM

## 2022-01-01 RX ORDER — FENTANYL CITRATE 50 UG/ML
INJECTION, SOLUTION INTRAMUSCULAR; INTRAVENOUS PRN
Status: DISCONTINUED | OUTPATIENT
Start: 2022-01-01 | End: 2022-01-01

## 2022-01-01 RX ORDER — METHYLPREDNISOLONE SODIUM SUCCINATE 125 MG/2ML
125 INJECTION, POWDER, LYOPHILIZED, FOR SOLUTION INTRAMUSCULAR; INTRAVENOUS
Status: DISCONTINUED | OUTPATIENT
Start: 2022-01-01 | End: 2022-01-01 | Stop reason: HOSPADM

## 2022-01-01 RX ORDER — DIPHENHYDRAMINE HCL 25 MG
25 CAPSULE ORAL ONCE
Status: COMPLETED | OUTPATIENT
Start: 2022-01-01 | End: 2022-01-01

## 2022-01-01 RX ORDER — DULOXETIN HYDROCHLORIDE 60 MG/1
60 CAPSULE, DELAYED RELEASE ORAL DAILY
Status: DISCONTINUED | OUTPATIENT
Start: 2022-01-01 | End: 2022-01-01 | Stop reason: HOSPADM

## 2022-01-01 RX ORDER — METOPROLOL TARTRATE 1 MG/ML
1-2 INJECTION, SOLUTION INTRAVENOUS EVERY 5 MIN PRN
Status: DISCONTINUED | OUTPATIENT
Start: 2022-01-01 | End: 2022-01-01 | Stop reason: HOSPADM

## 2022-01-01 RX ORDER — PROPOFOL 10 MG/ML
INJECTION, EMULSION INTRAVENOUS PRN
Status: DISCONTINUED | OUTPATIENT
Start: 2022-01-01 | End: 2022-01-01

## 2022-01-01 RX ORDER — ACYCLOVIR 200 MG/1
0-1 CAPSULE ORAL
Status: DISCONTINUED | OUTPATIENT
Start: 2022-01-01 | End: 2022-01-01 | Stop reason: HOSPADM

## 2022-01-01 RX ORDER — OXYCODONE AND ACETAMINOPHEN 5; 325 MG/1; MG/1
1 TABLET ORAL EVERY 4 HOURS PRN
Status: DISCONTINUED | OUTPATIENT
Start: 2022-01-01 | End: 2022-01-01 | Stop reason: HOSPADM

## 2022-01-01 RX ORDER — NALOXONE HYDROCHLORIDE 0.4 MG/ML
0.4 INJECTION, SOLUTION INTRAMUSCULAR; INTRAVENOUS; SUBCUTANEOUS
Status: DISCONTINUED | OUTPATIENT
Start: 2022-01-01 | End: 2022-01-01 | Stop reason: HOSPADM

## 2022-01-01 RX ORDER — VANCOMYCIN HYDROCHLORIDE 1 G/20ML
INJECTION, POWDER, LYOPHILIZED, FOR SOLUTION INTRAVENOUS CONTINUOUS PRN
Status: DISCONTINUED | OUTPATIENT
Start: 2022-01-01 | End: 2022-01-01 | Stop reason: HOSPADM

## 2022-01-01 RX ORDER — NALOXONE HYDROCHLORIDE 0.4 MG/ML
0.2 INJECTION, SOLUTION INTRAMUSCULAR; INTRAVENOUS; SUBCUTANEOUS
Status: DISCONTINUED | OUTPATIENT
Start: 2022-01-01 | End: 2022-01-01 | Stop reason: HOSPADM

## 2022-01-01 RX ORDER — CLINDAMYCIN PHOSPHATE 900 MG/50ML
900 INJECTION, SOLUTION INTRAVENOUS
Status: COMPLETED | OUTPATIENT
Start: 2022-01-01 | End: 2022-01-01

## 2022-01-01 RX ORDER — DULOXETIN HYDROCHLORIDE 60 MG/1
CAPSULE, DELAYED RELEASE ORAL
Qty: 30 CAPSULE | Refills: 3 | Status: SHIPPED | OUTPATIENT
Start: 2022-01-01 | End: 2022-01-01 | Stop reason: DRUGHIGH

## 2022-01-01 RX ORDER — METHYLPREDNISOLONE 32 MG/1
TABLET ORAL
Qty: 2 TABLET | Refills: 0 | Status: SHIPPED | OUTPATIENT
Start: 2022-01-01 | End: 2022-01-01

## 2022-01-01 RX ORDER — POTASSIUM CHLORIDE 29.8 MG/ML
INJECTION INTRAVENOUS PRN
Status: DISCONTINUED | OUTPATIENT
Start: 2022-01-01 | End: 2022-01-01

## 2022-01-01 RX ORDER — PREDNISONE 20 MG/1
TABLET ORAL
Qty: 10 TABLET | Refills: 0 | Status: SHIPPED | OUTPATIENT
Start: 2022-01-01 | End: 2022-01-01

## 2022-01-01 RX ORDER — ONDANSETRON 4 MG/1
4 TABLET, ORALLY DISINTEGRATING ORAL EVERY 30 MIN PRN
Status: DISCONTINUED | OUTPATIENT
Start: 2022-01-01 | End: 2022-01-01 | Stop reason: HOSPADM

## 2022-01-01 RX ORDER — CLINDAMYCIN HCL 300 MG
300 CAPSULE ORAL 4 TIMES DAILY
Qty: 20 CAPSULE | Refills: 0 | Status: SHIPPED | OUTPATIENT
Start: 2022-01-01 | End: 2022-01-01

## 2022-01-01 RX ORDER — TRAZODONE HYDROCHLORIDE 100 MG/1
TABLET ORAL
Qty: 30 TABLET | Refills: 3 | Status: SHIPPED | OUTPATIENT
Start: 2022-01-01 | End: 2023-01-01

## 2022-01-01 RX ORDER — ACETAMINOPHEN 325 MG/1
650 TABLET ORAL EVERY 4 HOURS PRN
Status: DISCONTINUED | OUTPATIENT
Start: 2022-01-01 | End: 2022-01-01

## 2022-01-01 RX ORDER — HYDROMORPHONE HCL IN WATER/PF 6 MG/30 ML
0.2 PATIENT CONTROLLED ANALGESIA SYRINGE INTRAVENOUS EVERY 5 MIN PRN
Status: DISCONTINUED | OUTPATIENT
Start: 2022-01-01 | End: 2022-01-01 | Stop reason: HOSPADM

## 2022-01-01 RX ORDER — ALBUTEROL SULFATE 90 UG/1
6 AEROSOL, METERED RESPIRATORY (INHALATION) ONCE
Status: COMPLETED | OUTPATIENT
Start: 2022-01-01 | End: 2022-01-01

## 2022-01-01 RX ORDER — SODIUM CHLORIDE, SODIUM LACTATE, POTASSIUM CHLORIDE, CALCIUM CHLORIDE 600; 310; 30; 20 MG/100ML; MG/100ML; MG/100ML; MG/100ML
INJECTION, SOLUTION INTRAVENOUS CONTINUOUS
Status: DISCONTINUED | OUTPATIENT
Start: 2022-01-01 | End: 2022-01-01 | Stop reason: HOSPADM

## 2022-01-01 RX ORDER — TRAZODONE HYDROCHLORIDE 100 MG/1
100 TABLET ORAL AT BEDTIME
Status: DISCONTINUED | OUTPATIENT
Start: 2022-01-01 | End: 2022-01-01 | Stop reason: HOSPADM

## 2022-01-01 RX ORDER — LIDOCAINE HYDROCHLORIDE 20 MG/ML
INJECTION, SOLUTION INFILTRATION; PERINEURAL PRN
Status: DISCONTINUED | OUTPATIENT
Start: 2022-01-01 | End: 2022-01-01

## 2022-01-01 RX ORDER — IOPAMIDOL 755 MG/ML
120 INJECTION, SOLUTION INTRAVASCULAR ONCE
Status: COMPLETED | OUTPATIENT
Start: 2022-01-01 | End: 2022-01-01

## 2022-01-01 RX ORDER — ATORVASTATIN CALCIUM 80 MG/1
80 TABLET, FILM COATED ORAL DAILY
Status: DISCONTINUED | OUTPATIENT
Start: 2022-01-01 | End: 2022-01-01 | Stop reason: HOSPADM

## 2022-01-01 RX ORDER — PREDNISONE 20 MG/1
40 TABLET ORAL ONCE
Status: COMPLETED | OUTPATIENT
Start: 2022-01-01 | End: 2022-01-01

## 2022-01-01 RX ORDER — FUROSEMIDE 20 MG/1
10 TABLET ORAL DAILY
Status: DISCONTINUED | OUTPATIENT
Start: 2022-01-01 | End: 2022-01-01 | Stop reason: HOSPADM

## 2022-01-01 RX ORDER — DIPHENHYDRAMINE HCL 50 MG
CAPSULE ORAL
Qty: 1 CAPSULE | Refills: 0 | Status: SHIPPED | OUTPATIENT
Start: 2022-01-01 | End: 2022-01-01

## 2022-01-01 RX ORDER — DULOXETIN HYDROCHLORIDE 60 MG/1
120 CAPSULE, DELAYED RELEASE ORAL DAILY
Qty: 60 CAPSULE | Refills: 3 | Status: SHIPPED | OUTPATIENT
Start: 2022-01-01 | End: 2023-01-01

## 2022-01-01 RX ORDER — OXYCODONE AND ACETAMINOPHEN 5; 325 MG/1; MG/1
1 TABLET ORAL EVERY 4 HOURS PRN
Status: DISCONTINUED | OUTPATIENT
Start: 2022-01-01 | End: 2022-01-01

## 2022-01-01 RX ORDER — ONDANSETRON 2 MG/ML
4 INJECTION INTRAMUSCULAR; INTRAVENOUS
Status: DISCONTINUED | OUTPATIENT
Start: 2022-01-01 | End: 2022-01-01 | Stop reason: HOSPADM

## 2022-01-01 RX ORDER — ALBUTEROL SULFATE 90 UG/1
AEROSOL, METERED RESPIRATORY (INHALATION) PRN
Status: DISCONTINUED | OUTPATIENT
Start: 2022-01-01 | End: 2022-01-01

## 2022-01-01 RX ORDER — LIDOCAINE 40 MG/G
CREAM TOPICAL
Status: DISCONTINUED | OUTPATIENT
Start: 2022-01-01 | End: 2022-01-01 | Stop reason: HOSPADM

## 2022-01-01 RX ORDER — NITROGLYCERIN 0.4 MG/1
.4-.8 TABLET SUBLINGUAL
Status: DISCONTINUED | OUTPATIENT
Start: 2022-01-01 | End: 2022-01-01 | Stop reason: HOSPADM

## 2022-01-01 RX ORDER — ONDANSETRON 2 MG/ML
4 INJECTION INTRAMUSCULAR; INTRAVENOUS EVERY 30 MIN PRN
Status: DISCONTINUED | OUTPATIENT
Start: 2022-01-01 | End: 2022-01-01 | Stop reason: HOSPADM

## 2022-01-01 RX ORDER — SODIUM CHLORIDE 9 MG/ML
INJECTION, SOLUTION INTRAVENOUS CONTINUOUS
Status: DISCONTINUED | OUTPATIENT
Start: 2022-01-01 | End: 2022-01-01 | Stop reason: HOSPADM

## 2022-01-01 RX ORDER — DIPHENHYDRAMINE HCL 25 MG
25 CAPSULE ORAL
Status: DISCONTINUED | OUTPATIENT
Start: 2022-01-01 | End: 2022-01-01 | Stop reason: HOSPADM

## 2022-01-01 RX ADMIN — FENTANYL CITRATE 50 MCG: 50 INJECTION, SOLUTION INTRAMUSCULAR; INTRAVENOUS at 11:36

## 2022-01-01 RX ADMIN — FAMOTIDINE 20 MG: 20 INJECTION, SOLUTION INTRAVENOUS at 01:42

## 2022-01-01 RX ADMIN — SUGAMMADEX 200 MG: 100 INJECTION, SOLUTION INTRAVENOUS at 13:02

## 2022-01-01 RX ADMIN — LIDOCAINE HYDROCHLORIDE 100 MG: 20 INJECTION, SOLUTION INFILTRATION; PERINEURAL at 08:58

## 2022-01-01 RX ADMIN — Medication 10 MG: at 10:56

## 2022-01-01 RX ADMIN — OXYCODONE HYDROCHLORIDE 5 MG: 5 TABLET ORAL at 01:21

## 2022-01-01 RX ADMIN — POTASSIUM CHLORIDE 20 MEQ: 29.8 INJECTION, SOLUTION INTRAVENOUS at 09:36

## 2022-01-01 RX ADMIN — SODIUM CHLORIDE, POTASSIUM CHLORIDE, SODIUM LACTATE AND CALCIUM CHLORIDE: 600; 310; 30; 20 INJECTION, SOLUTION INTRAVENOUS at 10:22

## 2022-01-01 RX ADMIN — FENTANYL CITRATE 50 MCG: 50 INJECTION, SOLUTION INTRAMUSCULAR; INTRAVENOUS at 11:39

## 2022-01-01 RX ADMIN — ALBUTEROL SULFATE 6 PUFF: 90 AEROSOL, METERED RESPIRATORY (INHALATION) at 15:40

## 2022-01-01 RX ADMIN — Medication 10 MG: at 11:28

## 2022-01-01 RX ADMIN — Medication 10 MG: at 11:33

## 2022-01-01 RX ADMIN — SODIUM CHLORIDE, SODIUM GLUCONATE, SODIUM ACETATE, POTASSIUM CHLORIDE AND MAGNESIUM CHLORIDE: 526; 502; 368; 37; 30 INJECTION, SOLUTION INTRAVENOUS at 08:41

## 2022-01-01 RX ADMIN — ONDANSETRON 4 MG: 2 INJECTION INTRAMUSCULAR; INTRAVENOUS at 12:06

## 2022-01-01 RX ADMIN — IOPAMIDOL 120 ML: 755 INJECTION, SOLUTION INTRAVENOUS at 10:54

## 2022-01-01 RX ADMIN — Medication 100 MG: at 08:58

## 2022-01-01 RX ADMIN — FENTANYL CITRATE 100 MCG: 50 INJECTION, SOLUTION INTRAMUSCULAR; INTRAVENOUS at 10:16

## 2022-01-01 RX ADMIN — DIPHENHYDRAMINE HYDROCHLORIDE 25 MG: 25 CAPSULE ORAL at 01:21

## 2022-01-01 RX ADMIN — PREDNISONE 40 MG: 20 TABLET ORAL at 01:21

## 2022-01-01 RX ADMIN — FENTANYL CITRATE 50 MCG: 50 INJECTION, SOLUTION INTRAMUSCULAR; INTRAVENOUS at 09:08

## 2022-01-01 RX ADMIN — PROPOFOL 70 MG: 10 INJECTION, EMULSION INTRAVENOUS at 08:58

## 2022-01-01 RX ADMIN — CLINDAMYCIN PHOSPHATE 900 MG: 900 INJECTION, SOLUTION INTRAVENOUS at 09:08

## 2022-01-01 RX ADMIN — Medication 20 MG: at 10:06

## 2022-01-01 RX ADMIN — FENTANYL CITRATE 100 MCG: 50 INJECTION, SOLUTION INTRAMUSCULAR; INTRAVENOUS at 12:20

## 2022-01-01 RX ADMIN — FENTANYL CITRATE 25 MCG: 50 INJECTION, SOLUTION INTRAMUSCULAR; INTRAVENOUS at 14:22

## 2022-01-01 RX ADMIN — PROPOFOL 60 MG: 10 INJECTION, EMULSION INTRAVENOUS at 09:02

## 2022-01-01 RX ADMIN — FENTANYL CITRATE 50 MCG: 50 INJECTION, SOLUTION INTRAMUSCULAR; INTRAVENOUS at 08:58

## 2022-01-01 RX ADMIN — NITROGLYCERIN 0.8 MG: 0.4 TABLET SUBLINGUAL at 10:44

## 2022-01-01 RX ADMIN — ALBUTEROL SULFATE 6 PUFF: 108 INHALANT RESPIRATORY (INHALATION) at 13:15

## 2022-01-01 ASSESSMENT — ENCOUNTER SYMPTOMS
CONFUSION: 0
COUGH: 1
VOMITING: 0
LIGHT-HEADEDNESS: 1
SHORTNESS OF BREATH: 1
WHEEZING: 1
SHORTNESS OF BREATH: 1
ABDOMINAL PAIN: 0
ARTHRALGIAS: 0
BRUISES/BLEEDS EASILY: 0
NAUSEA: 0
SEIZURES: 0
NAUSEA: 0
ARTHRALGIAS: 0
FEVER: 0
DIARRHEA: 0
VOMITING: 0
HEADACHES: 0
NECK STIFFNESS: 0
HEADACHES: 0
CHILLS: 0
SORE THROAT: 1
APPETITE CHANGE: 0
FEVER: 0
CHILLS: 1
DIFFICULTY URINATING: 0
EYE REDNESS: 0
COLOR CHANGE: 0
EYE REDNESS: 0
COLOR CHANGE: 0
NECK STIFFNESS: 0
ABDOMINAL PAIN: 0
CONFUSION: 0

## 2022-01-01 ASSESSMENT — PATIENT HEALTH QUESTIONNAIRE - PHQ9: SUM OF ALL RESPONSES TO PHQ QUESTIONS 1-9: 12

## 2022-01-01 ASSESSMENT — ACTIVITIES OF DAILY LIVING (ADL): ADLS_ACUITY_SCORE: 37

## 2022-01-07 DIAGNOSIS — I48.91 ATRIAL FIBRILLATION, UNSPECIFIED TYPE (H): Primary | ICD-10-CM

## 2022-01-07 DIAGNOSIS — R00.0 SINUS TACHYCARDIA: ICD-10-CM

## 2022-01-10 ENCOUNTER — ANCILLARY PROCEDURE (OUTPATIENT)
Dept: CARDIOLOGY | Facility: CLINIC | Age: 52
End: 2022-01-10
Attending: INTERNAL MEDICINE
Payer: MEDICAID

## 2022-01-10 DIAGNOSIS — R00.0 SINUS TACHYCARDIA: ICD-10-CM

## 2022-01-10 DIAGNOSIS — I48.91 ATRIAL FIBRILLATION, UNSPECIFIED TYPE (H): ICD-10-CM

## 2022-01-10 PROCEDURE — 93295 DEV INTERROG REMOTE 1/2/MLT: CPT | Performed by: INTERNAL MEDICINE

## 2022-01-10 PROCEDURE — 93296 REM INTERROG EVL PM/IDS: CPT

## 2022-01-11 ENCOUNTER — VIRTUAL VISIT (OUTPATIENT)
Dept: NEUROLOGY | Facility: CLINIC | Age: 52
End: 2022-01-11
Payer: MEDICAID

## 2022-01-11 DIAGNOSIS — F39 MOOD DISORDER (H): Primary | ICD-10-CM

## 2022-01-11 PROCEDURE — 99214 OFFICE O/P EST MOD 30 MIN: CPT | Mod: 95 | Performed by: PSYCHIATRY & NEUROLOGY

## 2022-01-11 PROCEDURE — 90785 PSYTX COMPLEX INTERACTIVE: CPT | Mod: 95 | Performed by: PSYCHIATRY & NEUROLOGY

## 2022-01-11 RX ORDER — DULOXETIN HYDROCHLORIDE 60 MG/1
60 CAPSULE, DELAYED RELEASE ORAL DAILY
Qty: 30 CAPSULE | Refills: 3 | Status: SHIPPED | OUTPATIENT
Start: 2022-01-11 | End: 2022-01-21 | Stop reason: DRUGHIGH

## 2022-01-11 NOTE — PROGRESS NOTES
Patient's impression of how medication is working? Meds are not working   Compliant with Medication? Yes      Side Effects: None     Current Symptoms : Yes     Pain (0-10) Yes  Appetite change Yes  Sleep disturbance No  Change in energy No  Change in interest No  Change in concentration Yes  Psychosis/Hallucinations No  Negative thoughts Yes  Mood swings No  Alcohol use Yesweek  Drug use No  Anxiety Yes  Sad/depressed mood Yes     Questions or concerns?: No                                                          Phone Start Time: 10:15 am     Phone End Time:  10:20 am     Total time of phone conversation: 5 minutes     There were no vitals filed for this visit.     Patient would like the video invitation sent by: [employer]  Number/e-mail address: [phone]         JOSH Bansal
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
## 2022-01-20 ENCOUNTER — TELEPHONE (OUTPATIENT)
Dept: NEUROLOGY | Facility: CLINIC | Age: 52
End: 2022-01-20
Payer: MEDICAID

## 2022-01-20 DIAGNOSIS — F39 MOOD DISORDER (H): Primary | ICD-10-CM

## 2022-01-20 NOTE — TELEPHONE ENCOUNTER
At patients last appointment, Dr. Burnham  increased his duloxetine from 20 mg to 60 mg. Patient is now having side effects that are making him uncomfortable. Patient is wondering if the increase was too high? Please contact patient's sister in law Leilani at [phone], thanks.

## 2022-01-20 NOTE — TELEPHONE ENCOUNTER
Called and spoke to Leilani. Per Leilani, pt feels agitated and is having lots of energy after taking the Duloxetine and pt does not like the feeling of this. Leilani is wondering that maybe the dose is too high. Please advise. Thanks.    JOSH Bansal on 1/20/2022 at 1:29 PM

## 2022-01-21 RX ORDER — DULOXETIN HYDROCHLORIDE 20 MG/1
40 CAPSULE, DELAYED RELEASE ORAL DAILY
Qty: 60 CAPSULE | Refills: 3 | Status: SHIPPED | OUTPATIENT
Start: 2022-01-21 | End: 2022-04-05 | Stop reason: DRUGHIGH

## 2022-01-24 NOTE — TELEPHONE ENCOUNTER
"Called and spoke to Leilani and relayed Dr. Burnham's message to her:    \"I have decreased the Cymbalta down to 40 mg a day. \"    Informed her that the Rx was sent to the pharmacy last Friday.    She had no other questions.    JOSH Bansal on 1/24/2022 at 9:12 AM    "

## 2022-01-31 ENCOUNTER — OFFICE VISIT (OUTPATIENT)
Dept: ANESTHESIOLOGY | Facility: CLINIC | Age: 52
End: 2022-01-31
Payer: MEDICAID

## 2022-01-31 DIAGNOSIS — G89.29 OTHER CHRONIC PAIN: Primary | ICD-10-CM

## 2022-01-31 PROCEDURE — 99207 PR NO BILLABLE SERVICE THIS VISIT: CPT | Performed by: ANESTHESIOLOGY

## 2022-01-31 ASSESSMENT — PAIN SCALES - GENERAL: PAINLEVEL: SEVERE PAIN (7)

## 2022-01-31 NOTE — LETTER
1/31/2022       RE: Konstantin Sharp  [address]  [place] [facility] [address]     Dear Colleague,    Thank you for referring your patient, Konstantin Sharp, to the [facility] FOR COMPREHENSIVE PAIN MANAGEMENT [facility]. Please see a copy of my visit note below.    Mo presents to the clinic with his family member who is his PCA for medical marijuana certification. I d/w patient that I am unable to certify for medical marijuana. The clinic evaluation is for interventional pain management plan and other supportive therapies. The patient stated he has tried all of these and is interested in medical marijuana. The patient chose to explore other pain clinics for medical marijuana certification.       Again, thank you for allowing me to participate in the care of your patient.      Sincerely,    Alice Corea MD

## 2022-01-31 NOTE — PROGRESS NOTES
Mo presents to the clinic with his family member who is his PCA for medical marijuana certification. I d/w patient that I am unable to certify for medical marijuana. The clinic evaluation is for interventional pain management plan and other supportive therapies. The patient stated he has tried all of these and is interested in medical marijuana. The patient chose to explore other pain clinics for medical marijuana certification.

## 2022-01-31 NOTE — NURSING NOTE
Patient presents with:  Consult: UMMARICEL NEW,RM 10, patient reports, 7/10, Neck,lower back,hands, feet      Severe Pain (7)         What medications are you using for pain?   Lyrica    (New patients only) Have you been seen by another pain clinic/ provider?       , referred patient to pain clinic.       Patient is interested in medical cannabis.        Jesus Manuel Osullivan, EMT

## 2022-01-31 NOTE — LETTER
1/31/2022      RE: Konstantin Sharp  [address] [facility] [address]       Mo presents to the clinic with his family member who is his PCA for medical marijuana certification. I d/w patient that I am unable to certify for medical marijuana. The clinic evaluation is for interventional pain management plan and other supportive therapies. The patient stated he has tried all of these and is interested in medical marijuana. The patient chose to explore other pain clinics for medical marijuana certification.       Alice Corea MD

## 2022-02-10 LAB
MDC_IDC_LEAD_IMPLANT_DT: NORMAL
MDC_IDC_LEAD_IMPLANT_DT: NORMAL
MDC_IDC_LEAD_LOCATION: NORMAL
MDC_IDC_LEAD_LOCATION: NORMAL
MDC_IDC_LEAD_MFG: NORMAL
MDC_IDC_LEAD_MFG: NORMAL
MDC_IDC_LEAD_MODEL: NORMAL
MDC_IDC_LEAD_MODEL: NORMAL
MDC_IDC_LEAD_POLARITY_TYPE: NORMAL
MDC_IDC_LEAD_POLARITY_TYPE: NORMAL
MDC_IDC_LEAD_SERIAL: NORMAL
MDC_IDC_LEAD_SERIAL: NORMAL
MDC_IDC_MSMT_BATTERY_DTM: NORMAL
MDC_IDC_MSMT_BATTERY_REMAINING_LONGEVITY: 5 MO
MDC_IDC_MSMT_BATTERY_RRT_TRIGGER: 2.73
MDC_IDC_MSMT_BATTERY_STATUS: NORMAL
MDC_IDC_MSMT_BATTERY_VOLTAGE: 2.82 V
MDC_IDC_MSMT_CAP_CHARGE_DTM: NORMAL
MDC_IDC_MSMT_CAP_CHARGE_ENERGY: 18 J
MDC_IDC_MSMT_CAP_CHARGE_TIME: 4.66
MDC_IDC_MSMT_CAP_CHARGE_TYPE: NORMAL
MDC_IDC_MSMT_LEADCHNL_RA_IMPEDANCE_VALUE: 475 OHM
MDC_IDC_MSMT_LEADCHNL_RA_PACING_THRESHOLD_AMPLITUDE: 2.5 V
MDC_IDC_MSMT_LEADCHNL_RA_PACING_THRESHOLD_PULSEWIDTH: 0.4 MS
MDC_IDC_MSMT_LEADCHNL_RA_SENSING_INTR_AMPL: 2.88 MV
MDC_IDC_MSMT_LEADCHNL_RA_SENSING_INTR_AMPL: 2.88 MV
MDC_IDC_MSMT_LEADCHNL_RV_IMPEDANCE_VALUE: 1045 OHM
MDC_IDC_MSMT_LEADCHNL_RV_IMPEDANCE_VALUE: 323 OHM
MDC_IDC_MSMT_LEADCHNL_RV_PACING_THRESHOLD_AMPLITUDE: 2 V
MDC_IDC_MSMT_LEADCHNL_RV_PACING_THRESHOLD_PULSEWIDTH: 0.4 MS
MDC_IDC_MSMT_LEADCHNL_RV_SENSING_INTR_AMPL: 9.5 MV
MDC_IDC_MSMT_LEADCHNL_RV_SENSING_INTR_AMPL: 9.5 MV
MDC_IDC_PG_IMPLANT_DTM: NORMAL
MDC_IDC_PG_MFG: NORMAL
MDC_IDC_PG_MODEL: NORMAL
MDC_IDC_PG_SERIAL: NORMAL
MDC_IDC_PG_TYPE: NORMAL
MDC_IDC_SESS_CLINIC_NAME: NORMAL
MDC_IDC_SESS_DTM: NORMAL
MDC_IDC_SESS_TYPE: NORMAL
MDC_IDC_SET_BRADY_AT_MODE_SWITCH_RATE: 171 {BEATS}/MIN
MDC_IDC_SET_BRADY_HYSTRATE: NORMAL
MDC_IDC_SET_BRADY_LOWRATE: 60 {BEATS}/MIN
MDC_IDC_SET_BRADY_MAX_SENSOR_RATE: 120 {BEATS}/MIN
MDC_IDC_SET_BRADY_MAX_TRACKING_RATE: 140 {BEATS}/MIN
MDC_IDC_SET_BRADY_MODE: NORMAL
MDC_IDC_SET_BRADY_PAV_DELAY_LOW: 180 MS
MDC_IDC_SET_BRADY_SAV_DELAY_LOW: 150 MS
MDC_IDC_SET_LEADCHNL_RA_PACING_AMPLITUDE: 5 V
MDC_IDC_SET_LEADCHNL_RA_PACING_ANODE_ELECTRODE_1: NORMAL
MDC_IDC_SET_LEADCHNL_RA_PACING_ANODE_LOCATION_1: NORMAL
MDC_IDC_SET_LEADCHNL_RA_PACING_CAPTURE_MODE: NORMAL
MDC_IDC_SET_LEADCHNL_RA_PACING_CATHODE_ELECTRODE_1: NORMAL
MDC_IDC_SET_LEADCHNL_RA_PACING_CATHODE_LOCATION_1: NORMAL
MDC_IDC_SET_LEADCHNL_RA_PACING_POLARITY: NORMAL
MDC_IDC_SET_LEADCHNL_RA_PACING_PULSEWIDTH: 1 MS
MDC_IDC_SET_LEADCHNL_RA_SENSING_ANODE_ELECTRODE_1: NORMAL
MDC_IDC_SET_LEADCHNL_RA_SENSING_ANODE_LOCATION_1: NORMAL
MDC_IDC_SET_LEADCHNL_RA_SENSING_CATHODE_ELECTRODE_1: NORMAL
MDC_IDC_SET_LEADCHNL_RA_SENSING_CATHODE_LOCATION_1: NORMAL
MDC_IDC_SET_LEADCHNL_RA_SENSING_POLARITY: NORMAL
MDC_IDC_SET_LEADCHNL_RA_SENSING_SENSITIVITY: 0.3 MV
MDC_IDC_SET_LEADCHNL_RV_PACING_AMPLITUDE: 4 V
MDC_IDC_SET_LEADCHNL_RV_PACING_ANODE_ELECTRODE_1: NORMAL
MDC_IDC_SET_LEADCHNL_RV_PACING_ANODE_LOCATION_1: NORMAL
MDC_IDC_SET_LEADCHNL_RV_PACING_CAPTURE_MODE: NORMAL
MDC_IDC_SET_LEADCHNL_RV_PACING_CATHODE_ELECTRODE_1: NORMAL
MDC_IDC_SET_LEADCHNL_RV_PACING_CATHODE_LOCATION_1: NORMAL
MDC_IDC_SET_LEADCHNL_RV_PACING_POLARITY: NORMAL
MDC_IDC_SET_LEADCHNL_RV_PACING_PULSEWIDTH: 0.4 MS
MDC_IDC_SET_LEADCHNL_RV_SENSING_ANODE_ELECTRODE_1: NORMAL
MDC_IDC_SET_LEADCHNL_RV_SENSING_ANODE_LOCATION_1: NORMAL
MDC_IDC_SET_LEADCHNL_RV_SENSING_CATHODE_ELECTRODE_1: NORMAL
MDC_IDC_SET_LEADCHNL_RV_SENSING_CATHODE_LOCATION_1: NORMAL
MDC_IDC_SET_LEADCHNL_RV_SENSING_POLARITY: NORMAL
MDC_IDC_SET_LEADCHNL_RV_SENSING_SENSITIVITY: 0.3 MV
MDC_IDC_SET_ZONE_DETECTION_BEATS_DENOMINATOR: 40 {BEATS}
MDC_IDC_SET_ZONE_DETECTION_BEATS_NUMERATOR: 30 {BEATS}
MDC_IDC_SET_ZONE_DETECTION_INTERVAL: 270 MS
MDC_IDC_SET_ZONE_DETECTION_INTERVAL: 350 MS
MDC_IDC_SET_ZONE_DETECTION_INTERVAL: 360 MS
MDC_IDC_SET_ZONE_DETECTION_INTERVAL: 400 MS
MDC_IDC_SET_ZONE_DETECTION_INTERVAL: NORMAL
MDC_IDC_SET_ZONE_TYPE: NORMAL
MDC_IDC_STAT_AT_BURDEN_PERCENT: 0 %
MDC_IDC_STAT_AT_DTM_END: NORMAL
MDC_IDC_STAT_AT_DTM_START: NORMAL
MDC_IDC_STAT_BRADY_AP_VP_PERCENT: 6.22 %
MDC_IDC_STAT_BRADY_AP_VS_PERCENT: 0 %
MDC_IDC_STAT_BRADY_AS_VP_PERCENT: 93.71 %
MDC_IDC_STAT_BRADY_AS_VS_PERCENT: 0.07 %
MDC_IDC_STAT_BRADY_DTM_END: NORMAL
MDC_IDC_STAT_BRADY_DTM_START: NORMAL
MDC_IDC_STAT_BRADY_RA_PERCENT_PACED: 6.21 %
MDC_IDC_STAT_BRADY_RV_PERCENT_PACED: 99.92 %
MDC_IDC_STAT_EPISODE_RECENT_COUNT: 0
MDC_IDC_STAT_EPISODE_RECENT_COUNT_DTM_END: NORMAL
MDC_IDC_STAT_EPISODE_RECENT_COUNT_DTM_START: NORMAL
MDC_IDC_STAT_EPISODE_TOTAL_COUNT: 0
MDC_IDC_STAT_EPISODE_TOTAL_COUNT: 2
MDC_IDC_STAT_EPISODE_TOTAL_COUNT: 2
MDC_IDC_STAT_EPISODE_TOTAL_COUNT: 29
MDC_IDC_STAT_EPISODE_TOTAL_COUNT_DTM_END: NORMAL
MDC_IDC_STAT_EPISODE_TOTAL_COUNT_DTM_START: NORMAL
MDC_IDC_STAT_EPISODE_TYPE: NORMAL
MDC_IDC_STAT_TACHYTHERAPY_ATP_DELIVERED_RECENT: 0
MDC_IDC_STAT_TACHYTHERAPY_ATP_DELIVERED_TOTAL: 0
MDC_IDC_STAT_TACHYTHERAPY_RECENT_DTM_END: NORMAL
MDC_IDC_STAT_TACHYTHERAPY_RECENT_DTM_START: NORMAL
MDC_IDC_STAT_TACHYTHERAPY_SHOCKS_ABORTED_RECENT: 0
MDC_IDC_STAT_TACHYTHERAPY_SHOCKS_ABORTED_TOTAL: 0
MDC_IDC_STAT_TACHYTHERAPY_SHOCKS_DELIVERED_RECENT: 0
MDC_IDC_STAT_TACHYTHERAPY_SHOCKS_DELIVERED_TOTAL: 2
MDC_IDC_STAT_TACHYTHERAPY_TOTAL_DTM_END: NORMAL
MDC_IDC_STAT_TACHYTHERAPY_TOTAL_DTM_START: NORMAL

## 2022-02-12 ENCOUNTER — HEALTH MAINTENANCE LETTER (OUTPATIENT)
Age: 52
End: 2022-02-12

## 2022-02-16 ENCOUNTER — HOSPITAL ENCOUNTER (EMERGENCY)
Facility: CLINIC | Age: 52
Discharge: HOME | End: 2022-02-16
Attending: STUDENT IN AN ORGANIZED HEALTH CARE EDUCATION/TRAINING PROGRAM | Admitting: STUDENT IN AN ORGANIZED HEALTH CARE EDUCATION/TRAINING PROGRAM
Payer: MEDICAID

## 2022-02-16 ENCOUNTER — APPOINTMENT (OUTPATIENT)
Dept: CT IMAGING | Facility: CLINIC | Age: 52
End: 2022-02-16
Attending: PHYSICIAN ASSISTANT
Payer: MEDICAID

## 2022-02-16 VITALS
OXYGEN SATURATION: 96 % | SYSTOLIC BLOOD PRESSURE: 138 MMHG | RESPIRATION RATE: 18 BRPM | DIASTOLIC BLOOD PRESSURE: 83 MMHG | TEMPERATURE: 99 F | HEART RATE: 95 BPM

## 2022-02-16 DIAGNOSIS — L02.31 GLUTEAL ABSCESS: Primary | ICD-10-CM

## 2022-02-16 LAB
ANION GAP SERPL CALCULATED.3IONS-SCNC: 9 MMOL/L (ref 3–14)
BASOPHILS # BLD AUTO: 0.1 10E3/UL (ref 0–0.2)
BASOPHILS NFR BLD AUTO: 1 %
BUN SERPL-MCNC: 12 MG/DL (ref 7–30)
CALCIUM SERPL-MCNC: 9.6 MG/DL (ref 8.5–10.1)
CHLORIDE BLD-SCNC: 101 MMOL/L (ref 94–109)
CO2 SERPL-SCNC: 27 MMOL/L (ref 20–32)
CREAT SERPL-MCNC: 0.65 MG/DL (ref 0.66–1.25)
EOSINOPHIL # BLD AUTO: 0.2 10E3/UL (ref 0–0.7)
EOSINOPHIL NFR BLD AUTO: 1 %
ERYTHROCYTE [DISTWIDTH] IN BLOOD BY AUTOMATED COUNT: 12.6 % (ref 10–15)
GFR SERPL CREATININE-BSD FRML MDRD: >90 ML/MIN/1.73M2
GLUCOSE BLD-MCNC: 272 MG/DL (ref 70–99)
HCT VFR BLD AUTO: 52.2 % (ref 40–53)
HGB BLD-MCNC: 18.2 G/DL (ref 13.3–17.7)
HOLD SPECIMEN: NORMAL
HOLD SPECIMEN: NORMAL
IMM GRANULOCYTES # BLD: 0 10E3/UL
IMM GRANULOCYTES NFR BLD: 0 %
LACTATE SERPL-SCNC: 2.1 MMOL/L (ref 0.7–2)
LYMPHOCYTES # BLD AUTO: 3.3 10E3/UL (ref 0.8–5.3)
LYMPHOCYTES NFR BLD AUTO: 23 %
MCH RBC QN AUTO: 31.6 PG (ref 26.5–33)
MCHC RBC AUTO-ENTMCNC: 34.9 G/DL (ref 31.5–36.5)
MCV RBC AUTO: 91 FL (ref 78–100)
MONOCYTES # BLD AUTO: 0.8 10E3/UL (ref 0–1.3)
MONOCYTES NFR BLD AUTO: 6 %
NEUTROPHILS # BLD AUTO: 9.9 10E3/UL (ref 1.6–8.3)
NEUTROPHILS NFR BLD AUTO: 69 %
NRBC # BLD AUTO: 0 10E3/UL
NRBC BLD AUTO-RTO: 0 /100
PLATELET # BLD AUTO: 281 10E3/UL (ref 150–450)
POTASSIUM BLD-SCNC: 3.5 MMOL/L (ref 3.4–5.3)
RADIOLOGIST FLAGS: ABNORMAL
RBC # BLD AUTO: 5.76 10E6/UL (ref 4.4–5.9)
SODIUM SERPL-SCNC: 137 MMOL/L (ref 133–144)
WBC # BLD AUTO: 14.3 10E3/UL (ref 4–11)

## 2022-02-16 PROCEDURE — 85025 COMPLETE CBC W/AUTO DIFF WBC: CPT | Performed by: PHYSICIAN ASSISTANT

## 2022-02-16 PROCEDURE — 87040 BLOOD CULTURE FOR BACTERIA: CPT | Performed by: PHYSICIAN ASSISTANT

## 2022-02-16 PROCEDURE — 83605 ASSAY OF LACTIC ACID: CPT | Performed by: PHYSICIAN ASSISTANT

## 2022-02-16 PROCEDURE — 36415 COLL VENOUS BLD VENIPUNCTURE: CPT | Performed by: PHYSICIAN ASSISTANT

## 2022-02-16 PROCEDURE — 250N000011 HC RX IP 250 OP 636: Performed by: STUDENT IN AN ORGANIZED HEALTH CARE EDUCATION/TRAINING PROGRAM

## 2022-02-16 PROCEDURE — 74177 CT ABD & PELVIS W/CONTRAST: CPT

## 2022-02-16 PROCEDURE — 87070 CULTURE OTHR SPECIMN AEROBIC: CPT | Performed by: PHYSICIAN ASSISTANT

## 2022-02-16 PROCEDURE — 82310 ASSAY OF CALCIUM: CPT | Performed by: PHYSICIAN ASSISTANT

## 2022-02-16 PROCEDURE — 99285 EMERGENCY DEPT VISIT HI MDM: CPT | Mod: 25 | Performed by: STUDENT IN AN ORGANIZED HEALTH CARE EDUCATION/TRAINING PROGRAM

## 2022-02-16 PROCEDURE — 74177 CT ABD & PELVIS W/CONTRAST: CPT | Mod: 26 | Performed by: RADIOLOGY

## 2022-02-16 PROCEDURE — 99284 EMERGENCY DEPT VISIT MOD MDM: CPT | Performed by: STUDENT IN AN ORGANIZED HEALTH CARE EDUCATION/TRAINING PROGRAM

## 2022-02-16 RX ORDER — IOPAMIDOL 755 MG/ML
135 INJECTION, SOLUTION INTRAVASCULAR ONCE
Status: COMPLETED | OUTPATIENT
Start: 2022-02-16 | End: 2022-02-16

## 2022-02-16 RX ORDER — CLINDAMYCIN HCL 300 MG
300 CAPSULE ORAL 4 TIMES DAILY
Qty: 40 CAPSULE | Refills: 0 | Status: SHIPPED | OUTPATIENT
Start: 2022-02-16 | End: 2022-02-26

## 2022-02-16 RX ORDER — METHOCARBAMOL 500 MG/1
500 TABLET, FILM COATED ORAL 4 TIMES DAILY PRN
Qty: 8 TABLET | Refills: 0 | Status: SHIPPED | OUTPATIENT
Start: 2022-02-16 | End: 2022-02-20

## 2022-02-16 RX ADMIN — IOPAMIDOL 135 ML: 755 INJECTION, SOLUTION INTRAVENOUS at 15:15

## 2022-02-16 ASSESSMENT — ENCOUNTER SYMPTOMS: WOUND: 1

## 2022-02-16 NOTE — ED PROVIDER NOTES
"ED Provider Note  [facility]      History     Chief Complaint   Patient presents with     Abscess     HPI  Konstantin Sharp is a 51 year old male who has past medical history of diabetes, cardiomyopathy, atrial fibrillation currently anticoagulated amongst others who presents to the emergency department today for left-sided gluteal abscess.  Reports that pain started approximately 1 week ago, has been getting progressively worse, he has presented to his primary care doctor who recommended he come here.  Upon my evaluation the abscess is now spontaneously draining.  Has history of previous pilonidal cyst in the same area, which later grew out MRSA.  He denies fevers, chills, other physical complaints.    This problem is acute, sudden onset, constant, gradually worsening, recurrent, moderate in intensity.  I reviewed his past medical, past social, and past surgical history, the relevant details of which are listed in the above HPI.    Past Medical History  Past Medical History:   Diagnosis Date     Atrial fibrillation (H)      Chest pain     intermittent     Chronic pain      Cognitive disorder     memory loss     Depressive disorder      Dual ICD (implantable cardiac defibrillator) in place 04/29/2014    and pacemaker     GERD (gastroesophageal reflux disease)      H/O traumatic brain injury 2015     Heart attack (H) 1996     HTN (hypertension)      Hypertrophic nonobstructive cardiomyopathy (H) 09/12/2012    s/p ventricular myectomy     Inflammatory arthritis      Intermittent asthma      PAD (peripheral artery disease) (H)      Polysubstance abuse (H)      Seizures (H)     last episode 2014 when \"blood sugar dropped\" according to patient     Sleep apnea     doesn't use cpap     Type 2 diabetes mellitus without complications (H)      Past Surgical History:   Procedure Laterality Date     APPENDECTOMY  [facility]     BACK SURGERY       COLONOSCOPY  2017     DISCECTOMY " "LUMBAR POSTERIOR MICROSCOPIC THREE+ LEVELS  12/16/2011    Procedure:DISCECTOMY LUMBAR POSTERIOR MICROSCOPIC THREE+ LEVELS; Posterior Decompression L2-S1; Surgeon:CAITIE OSMAN; Location:[facility] OR     FUSION CERVICAL POSTERIOR ONE LEVEL  8/24/2012    Procedure: FUSION CERVICAL POSTERIOR ONE LEVEL;  Posterior Instrumentated Spinal Fusion Cervical 6-7, Right Iliac Crest Bone [place] ;  Surgeon: Caitie Osman MD;  Location[facility] OR     GRAFT BONE FROM ILIAC CREST  8/24/2012    Procedure: GRAFT BONE FROM ILIAC CREST;;  Surgeon: Caitie Osman MD;  [facility] OR     HEAD & NECK SURGERY  2009     IMPLANT PACEMAKER       IMPLANT PACEMAKER       INJECT STEROID (LOCATION) N/A 2/19/2015    Procedure: INJECT STEROID (LOCATION);  Surgeon: Siri Koch MD;  [facility]     INSERT STIMULATOR AND LEADS INTERNAL DORSAL COLUMN  8/7/2013    Procedure: INSERT STIMULATOR AND LEADS INTERNAL DORSAL COLUMN;  SPINAL CORD STIMULATOR IMPLANT ;  Surgeon: Ricardo Bruno MD;  Location: [facility] OR     INSERT STIMULATOR DORSAL COLUMN  08/13/2013    lead replacemend, 12?2016,  battery replacement 4/4/2016     IR GASTROSTOMY TUBE PERCUTANEOUS PLCMNT  10/29/2015     KNEE SURGERY       LASER CO2 LARYNGOSCOPY N/A 2/19/2015    Procedure: LASER CO2 LARYNGOSCOPY;  Surgeon: Siri Koch MD;  [facility]     LASER CO2 LARYNGOSCOPY, COMPLEX  7/22/2014    Procedure: LASER CO2 LARYNGOSCOPY, COMPLEX;  Surgeon: Siri Koch MD;  [facility]     MYECTOMY SEPTAL  4/14/2014    Procedure: Median Sternotomy, Septal Myectomy, Intraoperative BRENT performed by Dr. Castano, on pump oxygenator.;  Surgeon: Aguila Cannon MD;  [facility]     NECK SURGERY       CO SPINE SURGERY PROCEDURE UNLISTED       SHOULDER SURGERY  2006    RIGHT     STOMACH SURGERY  1980    partial gastrectomy for bleeding ulcers, \"stress related\". [facility]     WRIST SURGERY Left 1988    fractured. 1988 or so "     [facility] CARDIAC SURG PROCEDURE UNLIST       [facility] HAND/FINGER SURGERY UNLISTED       [facility] SHOULDER SURG PROC UNLISTED       [facility] STOMACH SURGERY PROCEDURE UNLISTED       clindamycin (CLEOCIN) 300 MG capsule  methocarbamol (ROBAXIN) 500 MG tablet  Atorvastatin Calcium (LIPITOR PO)  cloNIDine (CATAPRES) 0.1 MG tablet  docusate sodium (DSS) 100 MG capsule  DULoxetine (CYMBALTA) 20 MG capsule  furosemide (LASIX) 40 MG tablet  losartan (COZAAR) 25 MG tablet  magnesium oxide (MAG-OX) 400 MG tablet  metFORMIN (GLUCOPHAGE) 500 MG tablet  OLANZapine (ZYPREXA) 5 MG tablet  potassium chloride ER (KLOR-CON M) 10 MEQ CR tablet  Pregabalin (LYRICA PO)  QUEtiapine Fumarate (SEROQUEL PO)  rivaroxaban ANTICOAGULANT (XARELTO) 10 MG TABS tablet  tamsulosin (FLOMAX) 0.4 MG capsule  thiamine (B-1) 100 MG tablet  TRAZODONE HCL PO  WARFARIN SODIUM PO      Allergies   Allergen Reactions     Bee Venom Swelling     Lisinopril      TABS  Severe cough     Penicillins Hives and Difficulty breathing     Family History  Family History   Problem Relation Age of Onset     Heart Disease Father 32        MIs x2; s/p CABG     Substance Abuse Father      Hypertension Father      Obesity Father      Hyperlipidemia Father      Hypertension Brother      Diabetes Brother      Glaucoma Maternal Grandmother      Hypertension Maternal Grandmother      Diabetes Maternal Grandfather      Glaucoma Maternal Grandfather      Hypertension Maternal Grandfather      Cerebrovascular Disease Maternal Grandfather      Obesity Maternal Grandfather      Hyperlipidemia Maternal Grandfather      Coronary Artery Disease Maternal Grandfather      Heart Disease Maternal Grandfather      Substance Abuse Other      Cancer Other      Hypertension Other      Obesity Other      Other Cancer Other         all over     Hyperlipidemia Other      Coronary Artery Disease Other      Obesity Brother      Hyperlipidemia Brother      Lymphoma Maternal Uncle      Diabetes Brother      Depression  Daughter      Depression Son      Macular Degeneration No family hx of      Heart Failure Father      Social History   Social History     Tobacco Use     Smoking status: Current Every Day Smoker     Packs/day: 0.12     Years: 35.00     Pack years: 4.20     Types: Cigarettes     Start date: 3/8/1982     Last attempt to quit: 2014     Years since quittin.8     Smokeless tobacco: Never Used   Substance Use Topics     Alcohol use: Yes     Alcohol/week: 0.0 standard drinks     Comment: rare     Drug use: No     Comment: last using meth 2017      Past medical history, past surgical history, medications, allergies, family history, and social history were reviewed with the patient. No additional pertinent items.       Review of Systems   Skin: Positive for wound.   All other systems reviewed and are negative.    A complete review of systems was performed with pertinent positives and negatives noted in the HPI, and all other systems negative.    Physical Exam   BP: 137/82  Pulse: 94  Temp: 99.2  F (37.3  C)  Resp: 18  SpO2: 96 %  Physical Exam  Vitals and nursing note reviewed.   Constitutional:       Appearance: He is not toxic-appearing.   HENT:      Head: Normocephalic and atraumatic.      Right Ear: External ear normal.      Left Ear: External ear normal.      Nose: Nose normal.   Eyes:      Extraocular Movements: Extraocular movements intact.      Conjunctiva/sclera: Conjunctivae normal.   Cardiovascular:      Rate and Rhythm: Normal rate and regular rhythm.   Pulmonary:      Effort: Pulmonary effort is normal.      Breath sounds: Normal breath sounds.   Abdominal:      General: There is no distension.      Palpations: Abdomen is soft.      Tenderness: There is no abdominal tenderness.   Genitourinary:     Comments: External rectal/gluteal exam chaperoned by ROSSY Serra    Swelling and induration to left gluteal cleft, active serosanguineous drainage from the bottom of the abscess  Musculoskeletal:          General: No deformity or signs of injury.      Cervical back: Neck supple. No rigidity.   Skin:     General: Skin is warm.      Findings: No rash.   Neurological:      General: No focal deficit present.      Mental Status: He is alert. Mental status is at baseline.   Psychiatric:         Mood and Affect: Mood normal.         Behavior: Behavior normal.       ED Course      Procedures       The medical record was reviewed and interpreted.  Current labs reviewed and interpreted.  Current images reviewed and interpreted: Left gluteal cleft abscess, no deep tracking, no evidence of perirectal or other deep abscess.  Managed outpatient prescription medications.        Results for orders placed or performed during the hospital encounter of 02/16/22   CT Abdomen Pelvis w Contrast     Status: None (Preliminary result)    Impression    RESIDENT PRELIMINARY INTERPRETATION  IMPRESSION:   2.2 cm left gluteal cleft abscess.    [Urgent Result: Left gluteal cleft abscess]    Finding was identified on 2/16/2022 3:42 PM.     Ilsa Cheung PA-C was contacted by Dr. Worrell at 2/16/2022 4:01 PM  and verbalized understanding of the urgent finding.    Lactic acid whole blood     Status: Abnormal   Result Value Ref Range    Lactic Acid 2.1 (H) 0.7 - 2.0 mmol/L   Basic metabolic panel     Status: Abnormal   Result Value Ref Range    Sodium 137 133 - 144 mmol/L    Potassium 3.5 3.4 - 5.3 mmol/L    Chloride 101 94 - 109 mmol/L    Carbon Dioxide (CO2) 27 20 - 32 mmol/L    Anion Gap 9 3 - 14 mmol/L    Urea Nitrogen 12 7 - 30 mg/dL    Creatinine 0.65 (L) 0.66 - 1.25 mg/dL    Calcium 9.6 8.5 - 10.1 mg/dL    Glucose 272 (H) 70 - 99 mg/dL    GFR Estimate >90 >60 mL/min/1.73m2   CBC with platelets and differential     Status: Abnormal   Result Value Ref Range    WBC Count 14.3 (H) 4.0 - 11.0 10e3/uL    RBC Count 5.76 4.40 - 5.90 10e6/uL    Hemoglobin 18.2 (H) 13.3 - 17.7 g/dL    Hematocrit 52.2 40.0 - 53.0 %    MCV 91 78 - 100 fL    MCH 31.6 26.5  - 33.0 pg    MCHC 34.9 31.5 - 36.5 g/dL    RDW 12.6 10.0 - 15.0 %    Platelet Count 281 150 - 450 10e3/uL    % Neutrophils 69 %    % Lymphocytes 23 %    % Monocytes 6 %    % Eosinophils 1 %    % Basophils 1 %    % Immature Granulocytes 0 %    NRBCs per 100 WBC 0 <1 /100    Absolute Neutrophils 9.9 (H) 1.6 - 8.3 10e3/uL    Absolute Lymphocytes 3.3 0.8 - 5.3 10e3/uL    Absolute Monocytes 0.8 0.0 - 1.3 10e3/uL    Absolute Eosinophils 0.2 0.0 - 0.7 10e3/uL    Absolute Basophils 0.1 0.0 - 0.2 10e3/uL    Absolute Immature Granulocytes 0.0 <=0.4 10e3/uL    Absolute NRBCs 0.0 10e3/uL   Extra Blue Top Tube     Status: None   Result Value Ref Range    Hold Specimen JIC    Extra Red Top Tube     Status: None   Result Value Ref Range    Hold Specimen JI    Abscess Aerobic Bacterial Culture Routine with Gram Stain     Status: Abnormal (Preliminary result)    Specimen: Pilonidal Cyst; Abscess   Result Value Ref Range    Gram Stain Result 4+ Gram positive cocci (A)     Gram Stain Result 1+ Gram positive bacilli (A)     Gram Stain Result 4+ WBC seen (A)    CBC with platelets differential     Status: Abnormal    Narrative    The following orders were created for panel order CBC with platelets differential.  Procedure                               Abnormality         Status                     ---------                               -----------         ------                     CBC with platelets and d...[[id number]]  Abnormal            Final result                 Please view results for these tests on the individual orders.   Virgie Draw     Status: None    Narrative    The following orders were created for panel order Virgie Draw.  Procedure                               Abnormality         Status                     ---------                               -----------         ------                     Extra Blue Top Tube[[id number]]                              Final result               Extra Red Top Tube[[id number]]                                Final result                 Please view results for these tests on the individual orders.     Medications   iopamidol (ISOVUE-370) solution 135 mL (135 mLs Intravenous Given 2/16/22 1515)   sodium chloride (PF) 0.9% PF flush 84 mL (84 mLs Intravenous Given 2/16/22 1515)        Assessments & Plan (with Medical Decision Making)   MDM:    51 year old M who presents for left-sided gluteal abscess, labs unremarkable aside from mildly elevated lactate, however he is not febrile, tachycardic, no other sirs criteria, therefore doubt serious bacterial sepsis, no IV fluids or IV antibiotics initiated.  CT scan shows isolated left gluteal abscess with no deep tracking material.  On exam he is already begun to spontaneously drain, so no drainage procedure performed at this time.  Will send with course of MRSA, wound cultures taken.  As this is a recurrent abscess issue for her and will send follow-up with PCP as well as follow-up with general surgery.    On reevaluation he is clinically unchanged.  I discussed all test results and the plan of care with the patient, who is agreeable to discharge with outpatient follow-up.  Strict return precautions given and all questions answered prior to discharge.    I have reviewed the nursing notes. I have reviewed the findings, diagnosis, plan and need for follow up with the patient.    Discharge Medication List as of 2/16/2022  4:51 PM      START taking these medications    Details   clindamycin (CLEOCIN) 300 MG capsule Take 1 capsule (300 mg) by mouth 4 times daily for 10 days, Disp-40 capsule, R-0, E-Prescribe      methocarbamol (ROBAXIN) 500 MG tablet Take 1 tablet (500 mg) by mouth 4 times daily as needed for other (pain), Disp-8 tablet, R-0, E-Prescribe             Final diagnoses:   Gluteal abscess       --  Ritika Clay MD  [facility] EMERGENCY DEPARTMENT  2/16/2022

## 2022-02-16 NOTE — ED TRIAGE NOTES
Pt presents to triage from the clinic experiencing pain on his L buttox from an abscess which he noticed one week ago. Pt states that his primary MD referred him for further managment. Pt rates pain 7/10.    Significant hx includes diabetes, cardiomyopathy, pt has pacemaker.    Jailene Batres RN on 2/16/2022 at 11:18 AM

## 2022-02-16 NOTE — ED PROVIDER NOTES
ED Triage Provider Note  [facility]  Encounter Date: Feb 16, 2022    History:  Chief Complaint   Patient presents with     Abscess     Konstantin Sharp is a 51 year old male past medical history for poorly controlled T2D, pacemaker anticoagulated with xerelto, hx MRSA, hx pilonidal cyst who presents to the ED with concerns for abscess. Patient presents to ER after being seen by PCP and told to come for evaluation.    1 week ago patient started developing pain and swelling in his gluteal cleft, which has been getting progressively worse.  He denies any drainage, systemic systems, chest pain SOB.  He reports history of pilonidal cyst as well as MRSA and current anticoagulation.  He has not been putting anything on the area.    Review of Systems:  Negative for fever SOB Chest Pain    Exam:  /82   Pulse 94   Temp 99.2  F (37.3  C)   Resp 18   SpO2 96%   General: No acute distress. Appears stated age.   Cardio: Regular rate, extremities well perfused  Resp: Normal work of breathing, grossly normal respiratory rate  Neuro: Alert  Skin:  Female chaperone present.  Examination of the gluteal cleft in the triage room reveals draining erythematous tender papule, approximately 2 cm in diameter.  I was able to express a moderate amount of purulent sanguinous drainage from the wound and obtained aerobic culture which was sent to lab.     Medical Decision Making:  Patient arriving to the ED with problem as above. A medical screening exam was performed. CBC BMP lactate blood cultures wound culture IV placement orders initiated from Triage. The patient is appropriate to wait in triage.      Ilsa Cheung PA-C on 2/16/2022 at 12:53 PM       Ilsa Cheung PA-C  02/16/22 1758

## 2022-02-17 ENCOUNTER — TELEPHONE (OUTPATIENT)
Dept: SURGERY | Facility: CLINIC | Age: 52
End: 2022-02-17
Payer: MEDICAID

## 2022-02-17 NOTE — TELEPHONE ENCOUNTER
[facility] Call Center    Phone Message    May a detailed message be left on voicemail: yes     Reason for Call: Appointment Intake      Referring Provider Name: Ritika Clay MD    Diagnosis and/or Symptoms: recurrent gluteal/pilonidal abscess    -Referral came through as Gen. Surg.  Sending per guidelines. Thank you!     Action Taken: Message routed to:  [facility] Colorectal     Travel Screening: Not Applicable

## 2022-02-17 NOTE — RESULT ENCOUNTER NOTE
[facility] Emergency Dept discharge antibiotic prescribed: Clindamycin (Cleocin) 300 mg PO capsule, 1 capsule (300 mg) by mouth 4 times daily for 10 days  Incision and Drainage performed in [facility] Emergency Dept [Yes or No]: No  Recommendations in treatment per [facility] ED Lab Result culture protocol

## 2022-02-17 NOTE — TELEPHONE ENCOUNTER
Abscess on tailbone x1 week. ER 2/16 and drained it, placed patient on antibiotics. Denies fevers and chills, pain is improving. Patient will follow up in 2 weeks with a possible I&D, 3/9/22 with KEELEY Rodriguez, RN BSN  [facility] Colon Rectal Surgery  [phone]

## 2022-02-17 NOTE — TELEPHONE ENCOUNTER
Diagnosis, Referred by & from: ED on 2/16/22 perirectal abscess, possible I & D   Appt date: 3/9/2022   /NOTES STATUS DETAILS   OFFICE NOTE from referring provider N/A     OFFICE NOTE from other specialist Care Everywhere / Internal [facility]:  2/16/22 - PCC OV with Veronica Brown NP    [facility]:  6/16/20 - [facility] OV with Dr. Aguilar    [facility]:  7/30/18 - GI OV with ORLY Mhuammad   DISCHARGE SUMMARY from hospital [facility]:  9/3/16 - Admission with Dr. Abel   DISCHARGE REPORT from the ER Internal [facility]:  2/16/22 - ED OV with Dr. Clay   OPERATIVE REPORT N/A    MEDICATION LIST Internal    LABS     BIOPSIES/PATHOLOGY RELATED TO DIAGNOSIS Care Everywhere [facility]:  12/21/20 - Colon Biopsy (Case: [id number])    DIAGNOSTIC PROCEDURES     COLONOSCOPY Care Everywhere / Internal [facility]:  12/21/20 - Colonoscopy    [facility]  [facility]   IMAGING (DISC & REPORT)      CT Received / Internal [facility]:  2/16/22 - CT Abd/Pelvis    [facility]:  6/26/21 - CT Abd/Pelvis   XRAY Received / Internal [facility]:  7/2/21 - XR Abdomen  6/25/21 - XR Abdomen  6/19/21 - XR Abdomen  6/15/21 - XR Abdomen  1/27/21 - XR Abdomen    [facility]:  2/27/18 - XR Abdomen     Records Requested  02/17/22    Facility  [facility]  Fax: [phone]   Outcome * 2/17/22 2:08 PM Faxed req to [facility] for images to be pushed to [facility]. - Danica    * 2/23/22 7:33 AM Faxed 2nd urg req to [facility] for images to be pushed to [facility]. - Danica

## 2022-02-19 ENCOUNTER — TELEPHONE (OUTPATIENT)
Dept: EMERGENCY MEDICINE | Facility: CLINIC | Age: 52
End: 2022-02-19
Payer: MEDICAID

## 2022-02-19 LAB
BACTERIA ABSC ANAEROBE+AEROBE CULT: ABNORMAL
BACTERIA ABSC ANAEROBE+AEROBE CULT: ABNORMAL
GRAM STAIN RESULT: ABNORMAL

## 2022-02-19 NOTE — TELEPHONE ENCOUNTER
[facility] Emergency Department/Urgent Care Lab result notification:    [facility] ED lab result protocol used  Culture     Reason for call  Notify of lab results, assess symptoms,  review ED providers recommendations/discharge instructions (if necessary) and advise per ED lab result f/u protocol    Lab Result   Final Abscess Aerobic Bacterial Culture (specimen - Pilonidal Cyst; abscess) report on 2/19/22  [facility] Emergency Dept discharge antibiotic prescribed: Clindamycin (Cleocin) 300 mg PO capsule, 1 capsule (300 mg) by mouth 4 times daily for 10 days  #1. Bacteria, Streptococcus agalactiae B,  is [NOT TESTED] to antibiotic.  Incision and Drainage performed in the [facility] ED: NO  Recommendations in treatment per [facility] ED lab result culture protocol  Information table from Emergency Dept Provider visit on 2/16/22  Symptoms reported at ED visit (Chief complaint, HPI) Abscess      HPI  Konstantin Sharp is a 51 year old male who has past medical history of diabetes, cardiomyopathy, atrial fibrillation currently anticoagulated amongst others who presents to the emergency department today for left-sided gluteal abscess.  Reports that pain started approximately 1 week ago, has been getting progressively worse, he has presented to his primary care doctor who recommended he come here.  Upon my evaluation the abscess is now spontaneously draining.  Has history of previous pilonidal cyst in the same area, which later grew out MRSA.  He denies fevers, chills, other physical complaints.     ED providers Impression and Plan (applicable information)   51 year old M who presents for left-sided gluteal abscess, labs unremarkable aside from mildly elevated lactate, however he is not febrile, tachycardic, no other sirs criteria, therefore doubt serious bacterial sepsis, no IV fluids or IV antibiotics initiated.  CT scan shows isolated left gluteal abscess with no deep tracking material.  On exam he is  already begun to spontaneously drain, so no drainage procedure performed at this time.  Will send with course of MRSA, wound cultures taken.  As this is a recurrent abscess issue for her and will send follow-up with PCP as well as follow-up with general surgery.     On reevaluation he is clinically unchanged.  I discussed all test results and the plan of care with the patient, who is agreeable to discharge with outpatient follow-up.  Strict return precautions given and all questions answered prior to discharge.   Miscellaneous information na     RN Assessment (Patient s current Symptoms), include time called.  [Insert Left message here if message left]  4:33PM: Spoke with patient. He states he is improving and is taking the antibiotic without any issues.   RN Recommendations/Instructions per [facility] ED lab result protocol  Patient notified of lab result and treatment recommendations.    Advised to continue taking the antibiotic as directed and to follow up with the PCP within a week as directed by the ED provider.  The patient is comfortable with the information given and has no further questions.     Please Contact your PCP clinic or return to the Emergency department if your:    Symptoms worsen or other concerning symptom's.    PCP follow-up Questions asked: YES       Lisa Batres RN  [facility] [employer] [facility]  Emergency Dept Lab Result RN  Ph# [phone]     Copy of Lab result   Abscess Aerobic Bacterial Culture Routine with Gram Stain  Order: [id number]   Collected 2/16/2022 12:59 PM     Status: Final result     Visible to patient: Yes (not seen)    Specimen Information: Pilonidal Cyst; Abscess         3 Result Notes    Culture 4+ Streptococcus agalactiae (Group B Streptococcus) Abnormal     This organism is susceptible to ampicillin, penicillin, vancomycin and the cephalosporins. If treatment is required and your patient is allergic to penicillin, contact the microbiology lab within 5  days to request susceptibility testing.   3+ Normal pratik                Gram Stain Result   Abnormal   4+ Gram positive cocci      1+ Gram positive bacilli      4+ WBC seen   Predominantly PMNs           Resulting Agency: [facility]           Specimen Collected: 02/16/22 12:59 PM Last Resulted: 02/19/22  7:58 AM

## 2022-02-21 LAB — BACTERIA BLD CULT: NO GROWTH

## 2022-02-22 ENCOUNTER — VIRTUAL VISIT (OUTPATIENT)
Dept: NEUROLOGY | Facility: CLINIC | Age: 52
End: 2022-02-22
Payer: MEDICAID

## 2022-02-22 DIAGNOSIS — F39 MOOD DISORDER (H): Primary | ICD-10-CM

## 2022-02-22 PROCEDURE — 99214 OFFICE O/P EST MOD 30 MIN: CPT | Mod: 95 | Performed by: PSYCHIATRY & NEUROLOGY

## 2022-02-22 NOTE — PROGRESS NOTES
Phone Start Time:  9:35 am  Phone End Time:  9:40 am     Total time of phone conversation: 5 minutes     There were no vitals filed for this visit.     Patient would like the video invitation sent by: [facility]  Number/e-mail address: [phone]       JOSH Bansal     Initial Psychiatric Clinic Intake note:    Referral Source: Self-referred      HPI / Chief Complaint:  Patient is known to me from prior clinic contacted the patient has not been seen by me in over 2 years.  Unfortunately old records are difficult to access at this time due to the new computer system.  The patient and his wife are extremely vague historians but it appears he has been followed through the [facility] by psychiatry.  Medications are listed as above and both the patient and wife report there is been multiple medication changes in the last few years but they are unaware of what these were.  We will try and clarify all of this.  Patient presents today to establish care at this clinic.  The patient's medication list is as described in the nurse's note according to the limited information we have available to us.    The patient reestablished contact with his clinic in December 2021.  He had been followed through [facility].  He had had multiple medication changes over the last few years but we have limited information when we first saw the patient.  We were attempting to clarify his current medication list and past medication trials.  At that visit the patient had significant heart movements noted around his mouth.  The family also reported he had lost 60 pounds in 6 months.  He was having worsening depression and anxiety.    The patient was seen for a follow-up visit on January 11 of 2022.  The patient has been seen by multiple providers prior to that over the course of more than a year.  When I saw him at that visit he was on Cymbalta 20 mg a day.  His mood was worse.  He was having low motivation and low energy but no  thoughts of harming himself.  He was willing to restart with a psychotherapist and I did order that.  I increased his Cymbalta to 60 mg a day.        HPI:  I had an opportunity to interview the patient as well as his wife today.  Both reported things seem to be improving with regard to the patient's mood.  He states he is less depressed and has not thought at all about death.  He has no thoughts of harming himself or anybody else.  He denies having any hallucinations or delusions.  He denies having any new side effects to the medication.  He continues with his baseline tremor.  Although they are a bit vague this apparently has been chronic with no change in the last few months although they will continue to monitor that.    The patient is apparently sleeping better at night.  He has low appetite and typically only needs eats 1 meal a day but he has been trying to lose some weight and they will continue to monitor that.  No change in cognition.  No new medical issues or diagnoses and no new allergies.  We discussed a variety of treatment options and they would like to continue with the current treatment plan as ordered and make no other changes at this time.      Mental status exam:  Appearance: Good eye contact.  Calm.  He is muscle movements continuing appear grossly unchanged.  Behavior: Patient has been doing well from a behavior standpoint.  He was calm and comfortable with me during the interview.  Speech: Answers were appropriate with good spontaneity.  Not pressured or rambling.    Mood/Affect: Slightly flat but not significantly depressed.  No irritability or robinson.  Thought content: No hallucinations or delusions reported or observed  Thought formation: No recent change.  [place] and somewhat slow.  No racing thoughts.  Continues somewhat vague.  Associations: Somewhat impaired.  No recent change  Fund of knowledge: Unchanged.  Attention/Concentration: Somewhat disinterested but able to attend and track  relatively well.  Insight: Impaired  Judgment: Impaired  Memory: Impaired  Motor status: Patient has oral and tongue movements, this appears unchanged from the last visit.  The family reports no obvious change.  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  The patient seems to be doing better with his mood and level of alertness.  We will consider a reduction in his neuroleptic in the future.      Total time for chart review, documentation and coordination of care: 26 minutes.    Rolando Burnham MD

## 2022-02-22 NOTE — LETTER
2/22/2022         RE: Konstantin Sharp  [address] [facility] [address]        Dear Colleague,    Thank you for referring your patient, Konstantin Sharp, to the [facility]. Please see a copy of my visit note below.      Phone Start Time:  9:35 am  Phone End Time:  9:40 am     Total time of phone conversation: 5 minutes     There were no vitals filed for this visit.     Patient would like the video invitation sent by: [facility]  Number/e-mail address: [phone]       JOSH Bansal     Initial Psychiatric Clinic Intake note:    Referral Source: Self-referred      HPI / Chief Complaint:  Patient is known to me from prior clinic contacted the patient has not been seen by me in over 2 years.  Unfortunately old records are difficult to access at this time due to the new computer system.  The patient and his wife are extremely vague historians but it appears he has been followed through the [facility] by psychiatry.  Medications are listed as above and both the patient and wife report there is been multiple medication changes in the last few years but they are unaware of what these were.  We will try and clarify all of this.  Patient presents today to establish care at this clinic.  The patient's medication list is as described in the nurse's note according to the limited information we have available to us.    The patient reestablished contact with his clinic in December 2021.  He had been followed through [facility].  He had had multiple medication changes over the last few years but we have limited information when we first saw the patient.  We were attempting to clarify his current medication list and past medication trials.  At that visit the patient had significant heart movements noted around his mouth.  The family also reported he had lost 60 pounds in 6 months.  He was having worsening depression and anxiety.    The patient was seen for a follow-up visit on  January 11 of 2022.  The patient has been seen by multiple providers prior to that over the course of more than a year.  When I saw him at that visit he was on Cymbalta 20 mg a day.  His mood was worse.  He was having low motivation and low energy but no thoughts of harming himself.  He was willing to restart with a psychotherapist and I did order that.  I increased his Cymbalta to 60 mg a day.        HPI:  I had an opportunity to interview the patient as well as his wife today.  Both reported things seem to be improving with regard to the patient's mood.  He states he is less depressed and has not thought at all about death.  He has no thoughts of harming himself or anybody else.  He denies having any hallucinations or delusions.  He denies having any new side effects to the medication.  He continues with his baseline tremor.  Although they are a bit vague this apparently has been chronic with no change in the last few months although they will continue to monitor that.    The patient is apparently sleeping better at night.  He has low appetite and typically only needs eats 1 meal a day but he has been trying to lose some weight and they will continue to monitor that.  No change in cognition.  No new medical issues or diagnoses and no new allergies.  We discussed a variety of treatment options and they would like to continue with the current treatment plan as ordered and make no other changes at this time.      Mental status exam:  Appearance: Good eye contact.  Calm.  He is muscle movements continuing appear grossly unchanged.  Behavior: Patient has been doing well from a behavior standpoint.  He was calm and comfortable with me during the interview.  Speech: Answers were appropriate with good spontaneity.  Not pressured or rambling.    Mood/Affect: Slightly flat but not significantly depressed.  No irritability or robinson.  Thought content: No hallucinations or delusions reported or observed  Thought formation: No  recent change.  [place] and somewhat slow.  No racing thoughts.  Continues somewhat vague.  Associations: Somewhat impaired.  No recent change  Fund of knowledge: Unchanged.  Attention/Concentration: Somewhat disinterested but able to attend and track relatively well.  Insight: Impaired  Judgment: Impaired  Memory: Impaired  Motor status: Patient has oral and tongue movements, this appears unchanged from the last visit.  The family reports no obvious change.  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  The patient seems to be doing better with his mood and level of alertness.  We will consider a reduction in his neuroleptic in the future.      Total time for chart review, documentation and coordination of care: 26 minutes.    Rolando Burnham MD      Again, thank you for allowing me to participate in the care of your patient.        Sincerely,        Rolando Burnham MD

## 2022-02-25 ENCOUNTER — LAB REQUISITION (OUTPATIENT)
Dept: LAB | Facility: CLINIC | Age: 52
End: 2022-02-25
Payer: MEDICAID

## 2022-02-25 DIAGNOSIS — E11.51 TYPE 2 DIABETES MELLITUS WITH DIABETIC PERIPHERAL ANGIOPATHY WITHOUT GANGRENE (H): ICD-10-CM

## 2022-02-25 DIAGNOSIS — I70.209 UNSPECIFIED ATHEROSCLEROSIS OF NATIVE ARTERIES OF EXTREMITIES, UNSPECIFIED EXTREMITY (H): ICD-10-CM

## 2022-02-25 LAB
CREAT UR-MCNC: 14 MG/DL
MICROALBUMIN UR-MCNC: <5 MG/L
MICROALBUMIN/CREAT UR: NORMAL MG/G{CREAT}

## 2022-02-25 PROCEDURE — 82043 UR ALBUMIN QUANTITATIVE: CPT | Mod: ORL | Performed by: FAMILY MEDICINE

## 2022-03-04 ENCOUNTER — ANCILLARY PROCEDURE (OUTPATIENT)
Dept: CARDIOLOGY | Facility: CLINIC | Age: 52
End: 2022-03-04
Attending: INTERNAL MEDICINE
Payer: MEDICAID

## 2022-03-04 VITALS
SYSTOLIC BLOOD PRESSURE: 134 MMHG | OXYGEN SATURATION: 96 % | HEART RATE: 81 BPM | WEIGHT: 216.2 LBS | HEART RATE: 81 BPM | DIASTOLIC BLOOD PRESSURE: 78 MMHG | DIASTOLIC BLOOD PRESSURE: 78 MMHG | WEIGHT: 216.2 LBS | OXYGEN SATURATION: 96 % | BODY MASS INDEX: 30.15 KG/M2 | BODY MASS INDEX: 30.15 KG/M2 | SYSTOLIC BLOOD PRESSURE: 134 MMHG

## 2022-03-04 DIAGNOSIS — R00.0 SINUS TACHYCARDIA: ICD-10-CM

## 2022-03-04 DIAGNOSIS — I48.91 ATRIAL FIBRILLATION, UNSPECIFIED TYPE (H): ICD-10-CM

## 2022-03-04 DIAGNOSIS — I42.2 HYPERTROPHIC CARDIOMYOPATHY (H): ICD-10-CM

## 2022-03-04 DIAGNOSIS — T82.110A FRACTURED ATRIAL PACEMAKER LEAD WIRE, INITIAL ENCOUNTER: Primary | ICD-10-CM

## 2022-03-04 LAB
LVEF ECHO: NORMAL
MDC_IDC_LEAD_IMPLANT_DT: NORMAL
MDC_IDC_LEAD_IMPLANT_DT: NORMAL
MDC_IDC_LEAD_LOCATION: NORMAL
MDC_IDC_LEAD_LOCATION: NORMAL
MDC_IDC_LEAD_MFG: NORMAL
MDC_IDC_LEAD_MFG: NORMAL
MDC_IDC_LEAD_MODEL: NORMAL
MDC_IDC_LEAD_MODEL: NORMAL
MDC_IDC_LEAD_POLARITY_TYPE: NORMAL
MDC_IDC_LEAD_POLARITY_TYPE: NORMAL
MDC_IDC_LEAD_SERIAL: NORMAL
MDC_IDC_LEAD_SERIAL: NORMAL
MDC_IDC_MSMT_BATTERY_DTM: NORMAL
MDC_IDC_MSMT_BATTERY_REMAINING_LONGEVITY: 5 MO
MDC_IDC_MSMT_BATTERY_RRT_TRIGGER: 2.73
MDC_IDC_MSMT_BATTERY_STATUS: NORMAL
MDC_IDC_MSMT_BATTERY_VOLTAGE: 2.8 V
MDC_IDC_MSMT_CAP_CHARGE_DTM: NORMAL
MDC_IDC_MSMT_CAP_CHARGE_ENERGY: 18 J
MDC_IDC_MSMT_CAP_CHARGE_TIME: 4.66
MDC_IDC_MSMT_CAP_CHARGE_TYPE: NORMAL
MDC_IDC_MSMT_LEADCHNL_RA_IMPEDANCE_VALUE: 513 OHM
MDC_IDC_MSMT_LEADCHNL_RA_PACING_THRESHOLD_AMPLITUDE: 2 V
MDC_IDC_MSMT_LEADCHNL_RA_PACING_THRESHOLD_PULSEWIDTH: 1 MS
MDC_IDC_MSMT_LEADCHNL_RA_SENSING_INTR_AMPL: 3.4 MV
MDC_IDC_MSMT_LEADCHNL_RV_IMPEDANCE_VALUE: 1083 OHM
MDC_IDC_MSMT_LEADCHNL_RV_IMPEDANCE_VALUE: 361 OHM
MDC_IDC_MSMT_LEADCHNL_RV_PACING_THRESHOLD_AMPLITUDE: 2.5 V
MDC_IDC_MSMT_LEADCHNL_RV_PACING_THRESHOLD_PULSEWIDTH: 0.4 MS
MDC_IDC_MSMT_LEADCHNL_RV_SENSING_INTR_AMPL: 9.5 MV
MDC_IDC_PG_IMPLANT_DTM: NORMAL
MDC_IDC_PG_MFG: NORMAL
MDC_IDC_PG_MODEL: NORMAL
MDC_IDC_PG_SERIAL: NORMAL
MDC_IDC_PG_TYPE: NORMAL
MDC_IDC_SESS_CLINIC_NAME: NORMAL
MDC_IDC_SESS_DTM: NORMAL
MDC_IDC_SESS_TYPE: NORMAL
MDC_IDC_SET_BRADY_AT_MODE_SWITCH_RATE: 171 {BEATS}/MIN
MDC_IDC_SET_BRADY_HYSTRATE: NORMAL
MDC_IDC_SET_BRADY_LOWRATE: 60 {BEATS}/MIN
MDC_IDC_SET_BRADY_MAX_SENSOR_RATE: 120 {BEATS}/MIN
MDC_IDC_SET_BRADY_MAX_TRACKING_RATE: 140 {BEATS}/MIN
MDC_IDC_SET_BRADY_MODE: NORMAL
MDC_IDC_SET_BRADY_PAV_DELAY_LOW: 180 MS
MDC_IDC_SET_BRADY_SAV_DELAY_LOW: 150 MS
MDC_IDC_SET_LEADCHNL_RA_PACING_AMPLITUDE: 3 V
MDC_IDC_SET_LEADCHNL_RA_PACING_ANODE_ELECTRODE_1: NORMAL
MDC_IDC_SET_LEADCHNL_RA_PACING_ANODE_LOCATION_1: NORMAL
MDC_IDC_SET_LEADCHNL_RA_PACING_CAPTURE_MODE: NORMAL
MDC_IDC_SET_LEADCHNL_RA_PACING_CATHODE_ELECTRODE_1: NORMAL
MDC_IDC_SET_LEADCHNL_RA_PACING_CATHODE_LOCATION_1: NORMAL
MDC_IDC_SET_LEADCHNL_RA_PACING_POLARITY: NORMAL
MDC_IDC_SET_LEADCHNL_RA_PACING_PULSEWIDTH: 1 MS
MDC_IDC_SET_LEADCHNL_RA_SENSING_ANODE_ELECTRODE_1: NORMAL
MDC_IDC_SET_LEADCHNL_RA_SENSING_ANODE_LOCATION_1: NORMAL
MDC_IDC_SET_LEADCHNL_RA_SENSING_CATHODE_ELECTRODE_1: NORMAL
MDC_IDC_SET_LEADCHNL_RA_SENSING_CATHODE_LOCATION_1: NORMAL
MDC_IDC_SET_LEADCHNL_RA_SENSING_POLARITY: NORMAL
MDC_IDC_SET_LEADCHNL_RA_SENSING_SENSITIVITY: 0.3 MV
MDC_IDC_SET_LEADCHNL_RV_PACING_AMPLITUDE: 3.5 V
MDC_IDC_SET_LEADCHNL_RV_PACING_ANODE_ELECTRODE_1: NORMAL
MDC_IDC_SET_LEADCHNL_RV_PACING_ANODE_LOCATION_1: NORMAL
MDC_IDC_SET_LEADCHNL_RV_PACING_CAPTURE_MODE: NORMAL
MDC_IDC_SET_LEADCHNL_RV_PACING_CATHODE_ELECTRODE_1: NORMAL
MDC_IDC_SET_LEADCHNL_RV_PACING_CATHODE_LOCATION_1: NORMAL
MDC_IDC_SET_LEADCHNL_RV_PACING_POLARITY: NORMAL
MDC_IDC_SET_LEADCHNL_RV_PACING_PULSEWIDTH: 0.4 MS
MDC_IDC_SET_LEADCHNL_RV_SENSING_ANODE_ELECTRODE_1: NORMAL
MDC_IDC_SET_LEADCHNL_RV_SENSING_ANODE_LOCATION_1: NORMAL
MDC_IDC_SET_LEADCHNL_RV_SENSING_CATHODE_ELECTRODE_1: NORMAL
MDC_IDC_SET_LEADCHNL_RV_SENSING_CATHODE_LOCATION_1: NORMAL
MDC_IDC_SET_LEADCHNL_RV_SENSING_POLARITY: NORMAL
MDC_IDC_SET_LEADCHNL_RV_SENSING_SENSITIVITY: 0.3 MV
MDC_IDC_SET_ZONE_DETECTION_BEATS_DENOMINATOR: 40 {BEATS}
MDC_IDC_SET_ZONE_DETECTION_BEATS_NUMERATOR: 30 {BEATS}
MDC_IDC_SET_ZONE_DETECTION_INTERVAL: 270 MS
MDC_IDC_SET_ZONE_DETECTION_INTERVAL: 350 MS
MDC_IDC_SET_ZONE_DETECTION_INTERVAL: 360 MS
MDC_IDC_SET_ZONE_DETECTION_INTERVAL: 400 MS
MDC_IDC_SET_ZONE_DETECTION_INTERVAL: NORMAL
MDC_IDC_SET_ZONE_TYPE: NORMAL
MDC_IDC_STAT_AT_BURDEN_PERCENT: 0 %
MDC_IDC_STAT_AT_DTM_END: NORMAL
MDC_IDC_STAT_AT_DTM_START: NORMAL
MDC_IDC_STAT_BRADY_AP_VP_PERCENT: 8.01 %
MDC_IDC_STAT_BRADY_AP_VS_PERCENT: 0 %
MDC_IDC_STAT_BRADY_AS_VP_PERCENT: 91.92 %
MDC_IDC_STAT_BRADY_AS_VS_PERCENT: 0.07 %
MDC_IDC_STAT_BRADY_DTM_END: NORMAL
MDC_IDC_STAT_BRADY_DTM_START: NORMAL
MDC_IDC_STAT_BRADY_RA_PERCENT_PACED: 7.98 %
MDC_IDC_STAT_BRADY_RV_PERCENT_PACED: 99.91 %
MDC_IDC_STAT_EPISODE_RECENT_COUNT: 0
MDC_IDC_STAT_EPISODE_RECENT_COUNT_DTM_END: NORMAL
MDC_IDC_STAT_EPISODE_RECENT_COUNT_DTM_START: NORMAL
MDC_IDC_STAT_EPISODE_TOTAL_COUNT: 0
MDC_IDC_STAT_EPISODE_TOTAL_COUNT: 2
MDC_IDC_STAT_EPISODE_TOTAL_COUNT: 2
MDC_IDC_STAT_EPISODE_TOTAL_COUNT: 29
MDC_IDC_STAT_EPISODE_TOTAL_COUNT_DTM_END: NORMAL
MDC_IDC_STAT_EPISODE_TOTAL_COUNT_DTM_START: NORMAL
MDC_IDC_STAT_EPISODE_TYPE: NORMAL
MDC_IDC_STAT_TACHYTHERAPY_ATP_DELIVERED_RECENT: 0
MDC_IDC_STAT_TACHYTHERAPY_ATP_DELIVERED_TOTAL: 0
MDC_IDC_STAT_TACHYTHERAPY_RECENT_DTM_END: NORMAL
MDC_IDC_STAT_TACHYTHERAPY_RECENT_DTM_START: NORMAL
MDC_IDC_STAT_TACHYTHERAPY_SHOCKS_ABORTED_RECENT: 0
MDC_IDC_STAT_TACHYTHERAPY_SHOCKS_ABORTED_TOTAL: 0
MDC_IDC_STAT_TACHYTHERAPY_SHOCKS_DELIVERED_RECENT: 0
MDC_IDC_STAT_TACHYTHERAPY_SHOCKS_DELIVERED_TOTAL: 2
MDC_IDC_STAT_TACHYTHERAPY_TOTAL_DTM_END: NORMAL
MDC_IDC_STAT_TACHYTHERAPY_TOTAL_DTM_START: NORMAL

## 2022-03-04 PROCEDURE — 99215 OFFICE O/P EST HI 40 MIN: CPT | Mod: 25 | Performed by: INTERNAL MEDICINE

## 2022-03-04 PROCEDURE — 99207 PR STATISTIC IV PUSH SINGLE INITIAL SUBSTANCE: CPT | Performed by: STUDENT IN AN ORGANIZED HEALTH CARE EDUCATION/TRAINING PROGRAM

## 2022-03-04 PROCEDURE — 93306 TTE W/DOPPLER COMPLETE: CPT | Performed by: STUDENT IN AN ORGANIZED HEALTH CARE EDUCATION/TRAINING PROGRAM

## 2022-03-04 PROCEDURE — G0463 HOSPITAL OUTPT CLINIC VISIT: HCPCS

## 2022-03-04 PROCEDURE — 99205 OFFICE O/P NEW HI 60 MIN: CPT | Mod: 25 | Performed by: INTERNAL MEDICINE

## 2022-03-04 PROCEDURE — 93283 PRGRMG EVAL IMPLANTABLE DFB: CPT | Performed by: INTERNAL MEDICINE

## 2022-03-04 PROCEDURE — 93005 ELECTROCARDIOGRAM TRACING: CPT

## 2022-03-04 RX ADMIN — Medication 5 ML: at 13:05

## 2022-03-04 ASSESSMENT — PAIN SCALES - GENERAL
PAINLEVEL: NO PAIN (0)
PAINLEVEL: NO PAIN (0)

## 2022-03-04 NOTE — PATIENT INSTRUCTIONS
You were seen today in the Adult Congenital and Cardiovascular Genetics Clinic at the [facility].    Cardiology Providers you saw during your visit:  DONADL Castellano MD and Ritesh Lacy MD    Diagnosis:   a fib, hypertrophic cardiomyopathy, s/p ventricular spetal myectomy and ICD placement, Hypertension    Results:  DONALD Castellano MD and Ritesh Lacy MD reviewed the results of your echo and device check testing today in clinic.    Recommendations:    1. Continue to eat a heart healthy, low salt diet.  2. Continue to get 20-30 minutes of aerobic activity, 4-5 days per week.  Examples of aerobic activity include walking, running, swimming, cycling, etc.  3. Continue to observe good oral hygiene, with regular dental visits.  4. When it is time to get your generator changed, we will extract and reimplant the RA lead  5. Try to stop smoking   6. Please follow up with endocrinologist for your elevated A1Cs      SBE prophylaxis:   Yes____  No__x__    Lifelong Bacterial Endocarditis Prophylaxis:  YES____  NO____    If YES is checked, follow the recommendations outlined below:  1. Take antibiotic(s) prior to recommended dental procedures and procedures on the respiratory tract or with infected skin, muscle or bones. SBE prophylaxis is not needed for routine GI and  procedures (ie. Colonoscopy or vaginal delivery)  2. Observe good oral hygiene daily, as advised by your dentist. Get regular professional dental care.  3. Keep cuts clean.  4. Infections should be treated promptly.  5. Symptoms of Infective Endocarditis could include: fever lasting more than 4-5 days or a recurrent fever that initially resolves but returns within 1-2 days)      Exercise restrictions:   Yes__X__  No____         If yes, list restrictions:  Must be allowed to rest if fatigued or SOB      Work restrictions:  Yes____  No_X___         If yes, list restrictions:    FASTING CHOLESTEROL was checked in the last 5 years YES_x__  NO___  (2021)  Continue to eat a heart healthy, low salt diet.         ____ Fasting lipid panel order today         ____ No changes in medications          ____ I recommend the following changes in your cholesterol medications.:          ____ Please follow up for cholesterol screening at your primary care physician      Follow-up:  Follow up with Dr. Lacy and Dr. Castellano after you get your generator changed when the time comes with an echo prior.    If you have questions or concerns please contact us at:    YAJAIRA DozierN, RN    Elizabeth James (Scheduling)  Nurse Care Coordinator     Clinic   Adult Congenital and CV Genetics   Adult Congenital and CV Genetic  [facility]   [facility]  (P) [phone]     (P) [phone]         (F) [phone]        For after hours urgent needs, call [phone] and ask to speak to the Adult Congenital Physician on call.  Mention Job Code 0401.    For emergencies call 911.    [facility]  [facility]   [facility]  Mail Code [id number]  [address] [place] [address]

## 2022-03-04 NOTE — LETTER
3/4/2022      RE: Konstantin Sharp  [address] [facility] [address]       Dear Colleague,    Thank you for the opportunity to participate in the care of your patient, Konstantin Sharp, at the [facility] [place] [facility]. Please see a copy of my visit note below.        CV GENETICS ELECTROPHYSIOLOGY CLINIC VISIT    Assessment/Recommendations   Assessment/Plan:    Mr. Sharp is a 51 year old male who has a past medical history significant for HCM s/p septal myomectomy 2014, s/p ICD 4/29/14, DM, asthma, HTN, remote history of AF, CLARY, chronic pain from multiple cervical spine surgeries, depression, and anxiety. He reports feeling well. He has some RAY which has been chronic for him and unchanged. He denies chest discomfort, palpitations, abdominal fullness/bloating or peripheral edema, shortness of breath, paroxysmal nocturnal dyspnea, orthopnea, lightheadedness, dizziness, pre-syncope, or syncope. Last echo from 2019 showed s/p septal myectomy LVEF 60-65% and no significant valvular abnormalities. Echo today shows LVEF 60-65%, LVH present, no LVOT obstruction, normal RV size/function, and no significant valvular abnormalities. Device interrogation shows normal device function, RA pacing threshold remains elevated, 0% AF and no ventricular arrhythmias, intrinsic CHB, and AP 8%,  100%. RRT is estimated in about 5 months. Current cardiac medications include: Lasix, Losratan, Lipitor, and Warfarin.        Hypertrophic cardiomyopathy  -s/p septal myomectomy with minimal residual obstruction.   -Encouraged adequate hydration and avoidance of strenous physical activity   -Reinforced exercise recommendations with HCM (e.g. 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  - Discussed in detail with  patient family screening   Risk stratification for sudden cardiac death   He had ICD implanted in 2014. Now RA lead thresholds rising. We discussed lead management options in detail including placement of new RA lead and retention of old one or RA lead extraction and replacement. We discussed indications, risks, and benefits in detail with patient.He favored extraction and replacement. We discussed the indications for lead extraction, the extraction procedure and the risks of extraction.  These risks include vascular tear, cardiac tear, clotting and occlusion of a vessel, infection, damage to other components of the implanted device, bleeding, death, and need for emergency cardiopulmonary bypass, sternotomy or thoracotomy.  He states understanding and would like to pursue extraction/replacement. We will plan for this when his generator reaches DA.   Follow up 3 months after extraction.        History of Present Illness/Subjective    Mr. Konstantin Sharp is a 51 year old male who comes in today for EP consultation of HCM, device and lead management.    Mr. Sharp is a 51 year old male who has a past medical history significant for HCM s/p septal myomectomy 2014, s/p ICD 4/29/14, DM, asthma, HTN, remote history of AF, CLARY, chronic pain from multiple cervical spine surgeries, depression, and anxiety.     He was admitted on 2/27/2014 with chest pains on exertion that he had complained about before, only this time it was more persistent. He had a peak LVOT gradient of 56 mmHg that was dynamic on stress echo. His septal thickness is 22 mm maximum. Given his symptomatic obstruction the patient was referred to CV surgery for myomectomy that he underwent on 4/14/2014. The patient developed intraoperative complete heart block that has not resolved. Complete AV block after myomectomy for HCM, with low likelihood of recovery. We continue to follow patient for CHB s/p septal myectomy. Patient has class IIA indication for ICD  implant given unexplained syncope and first degree relative with sudden cardiac death, also clear pacing indication with AV dissociation. We explained these options in detail to the patient and he wished to have ICD implanted. A dual-chamber ICD was implanted on 2014.    EP Visit 14: Today, the patient presents for follow-up. Device site has healed very well with no problems.  The patient denies any syncope since the event, no shortness of breath or chest pain at rest or exertion, no palpitations, no orthopnea.  His device interrogation done on 2014 shows no arrhythmias with normal in stable sensing and pacing thresholds. His EKG today shows an atrially paced and V paced rhythm. He is doing very well and recovering nicely after surgery.  He reports that he does feel better compared to before the procedure.    He reports feeling well. He has some RAY which has been chronic for him and unchanged. He denies chest discomfort, palpitations, abdominal fullness/bloating or peripheral edema, shortness of breath, paroxysmal nocturnal dyspnea, orthopnea, lightheadedness, dizziness, pre-syncope, or syncope. Last echo from 2019 showed s/p septal myectomy LVEF 60-65% and no significant valvular abnormalities. Echo today shows LVEF 60-65%, LVH present, no LVOT obstruction, normal RV size/function, and no significant valvular abnormalities. Device interrogation shows normal device function, RA pacing threshold remains elevated, 0% AF and no ventricular arrhythmias, intrinsic CHB, and AP 8%,  100%. RRT is estimated in about 5 months. Current cardiac medications include: Lasix, Losratan, Lipitor, and Warfarin.      Family History:  Family history is remarkable for HCM in his father who  at 54 years from suspected HCM complications.  No additional information is known about paternal history, including number of aunts and uncles, cause of death, etc.  Mo's two full brothers are in reported good health.  Maternal  half brother had a brain injury after an ATV accident but no known heart problems.  Limited information is known about Mo's mother.  She had three brother who all  in their 50's and 60's of cancer, emphysema, and heart attack.    I have reviewed and updated the patient's Past Medical History, Social History, Family History and Medication List.     Cardiographics (Personally Reviewed) :   3/4/22 Echo:  Interpretation Summary  Known HCM s/p septal myectomy  Global and regional left ventricular function is normal with an EF of 60-65%.  Concentric wall thickening consistent with left ventricular hypertrophy is  present. Dynamic outflow tract obstruction is not present.  The right ventricle is normal in function and size.  No significant valvular abnormalities  No pericardial effusion  IVC diameter <2.1 cm collapsing >50% with sniff suggests a normal RA pressure  of 3 mmHg.  This study was compared with the study from 19 there has been no  significant change .    2019 Echo:   Interpretation Summary  HCM s/p septal myectomy. No FADIA and no detectable resting LVOT gradient.  Global and regional left ventricular function is normal with an EF of 60-65%.  Abnormal septal/apical motion consistent with pacemaker.  No significant valvular abnormalities  No pericardial effusion is present.     Compared to the prior study dated 10/23/2018: There has been no significant  Change.    2018 NM Stress Test:   Impression:   1. There is mild hypokinesia involving the distal mid septum which   corresponds with the small nondominant typical reduced perfusion. No   changes to indicate ischemia.   2. Questionable small possibly marginal/oblique vessel nontransmural   infarct involving the mid distal septum, not typical of large vessel   distribution. Small amount of wall motion abnormalities within this   region.      CT Angio:   FINDINGS:     CORONARY CALCIUM SCORE: The total Agatston calcium score is 0, Left  main: 0, left  anterior descendin,  circumflex: 0, right coronary  artery: 0.      CORONARY ANGIOGRAPHY : Trivial coronary artery disease is most likely  present. However the studies were hampered by significant motion  artifact and by the patient's body habitus and also by artifact from  the pacemaker leads.    2014 CMR:  FINDINGS   1. Normal left ventricular size and systolic function with a   calculated ejection fraction of 70%.   2. Asymmetric basal septal hypertrophy with a maximal wall thickness   of 22 mm in the basal anteroseptum.   3. There is systolic anterior motion of the anterior mitral leaflet   and the mitral valve chord with a systolic signal void in the left   ventricular outflow tract consistent with obstruction.   Velocity-encoded phase contrast imaging revealed at least a 60 mmHg   gradient in the left ventricular outflow tract at rest.   4. There is posteriorly directed mitral regurgitation.   5. The tricuspid and aortic valves are normal.   6. Delayed enhancement images reveal patchy areas of hyperenhancement   in the basal anterior septum at the right ventricular insertion point   and in the basal and mid anterolateral segments. These areas of   hyperenhancement are not significantly different when directly   compared to the prior CMR of 2012.   7. There is mild left atrial enlargement. The right atrial size is   normal.   8. There is no pericardial effusion.        Physical Examination   /78 (BP Location: Left arm, Patient Position: Chair, Cuff Size: Adult Large)   Pulse 81   Wt 98.1 kg (216 lb 3.2 oz)   SpO2 96%   BMI 30.15 kg/m    Wt Readings from Last 3 Encounters:   19 119.3 kg (263 lb)   10/23/18 115 kg (253 lb 9.6 oz)   18 115.2 kg (254 lb)     General Appearance:   Alert, well-appearing and in no acute distress.   HEENT: Atraumatic, normocephalic. PERRL.  MMM.   Chest/Lungs:   Respirations unlabored.  Lungs are clear to auscultation.   Cardiovascular:   Regular rate  and rhythm.  S1/S2. No murmur.    Abdomen:  Soft, nontender, nondistended.   Extremities: No cyanosis or clubbing. No edema.    Musculoskeletal: Moves all extremities.  Gait normal.   Skin: Warm, dry, intact.    Neurologic: Mood and affect are appropriate.  Alert and oriented to person, place, time, and situation.          Medications  Allergies   Current Outpatient Medications   Medication Sig Dispense Refill     Atorvastatin Calcium (LIPITOR PO) Take 80 mg by mouth daily        cloNIDine (CATAPRES) 0.1 MG tablet Take 0.1 mg by mouth 3 times daily       docusate sodium (DSS) 100 MG capsule Take 100 mg by mouth 2 times daily       DULoxetine (CYMBALTA) 20 MG capsule Take 2 capsules (40 mg) by mouth daily 60 capsule 3     furosemide (LASIX) 40 MG tablet TAKE 1/2 A TABLET DAILY       losartan (COZAAR) 25 MG tablet Take 1 tablet (25 mg) by mouth daily 30 tablet 1     magnesium oxide (MAG-OX) 400 MG tablet Take 2.5 tablets (1,000 mg) by mouth daily 180 tablet 3     metFORMIN (GLUCOPHAGE) 500 MG tablet Take 500 mg by mouth 2 times daily (with meals)  60 tablet      OLANZapine (ZYPREXA) 5 MG tablet Take 1 tablet by mouth At Bedtime       potassium chloride ER (KLOR-CON M) 10 MEQ CR tablet Take 1 tablet by mouth daily       Pregabalin (LYRICA PO) Take 100 mg by mouth 3 times daily  (Patient not taking: Reported on 2/22/2022)       QUEtiapine Fumarate (SEROQUEL PO) Take 25 mg by mouth 2 times daily       rivaroxaban ANTICOAGULANT (XARELTO) 10 MG TABS tablet Take 10 mg by mouth 2 times daily (with meals)       tamsulosin (FLOMAX) 0.4 MG capsule Take 0.4 mg by mouth daily       thiamine (B-1) 100 MG tablet Take 100 mg by mouth 2 times daily       TRAZODONE HCL PO Take 100 mg by mouth At Bedtime (Patient not taking: Reported on 2/22/2022)       WARFARIN SODIUM PO Take 8.5 mg by mouth every morning (Patient not taking: Reported on 2/22/2022)      Allergies   Allergen Reactions     Bee Venom Swelling     Lisinopril       TABS  Severe cough     Penicillins Hives and Difficulty breathing         Lab Results (Personally Reviewed)    Chemistry/lipid CBC Cardiac Enzymes/BNP/TSH/INR   Lab Results   Component Value Date    BUN 12 02/16/2022     02/16/2022    CO2 27 02/16/2022     Creatinine   Date Value Ref Range Status   02/16/2022 0.65 (L) 0.66 - 1.25 mg/dL Final   01/23/2018 0.93 0.66 - 1.25 mg/dL Final       Lab Results   Component Value Date    CHOL 205 (A) 01/29/2014    HDL 27 (A) 01/29/2014     (A) 01/29/2014      Lab Results   Component Value Date    WBC 14.3 (H) 02/16/2022    HGB 18.2 (H) 02/16/2022    HCT 52.2 02/16/2022    MCV 91 02/16/2022     02/16/2022    Lab Results   Component Value Date    TSH 0.52 01/17/2015    INR 0.98 01/31/2018        The patient states understanding and is agreeable with the plan.     Total time spent on patient visit, reviewing notes, imaging, labs, orders, and completing necessary documentation: 60 minutes.               Please do not hesitate to contact me if you have any questions/concerns.     Sincerely,     Ritesh Lacy MD

## 2022-03-04 NOTE — NURSING NOTE
Med Reconcile: Reviewed and verified all current medications with the patient. The updated medication list was printed and given to the patient.    Return Appointment: return after you have had your generator replaced when the time comes. Patient given instructions regarding scheduling next clinic visit. Patient demonstrated understanding of this information and agreed to call with further questions or concerns.    Patient stated he understood all health information given and agreed to call with further questions or concerns.

## 2022-03-04 NOTE — PROGRESS NOTES
CV GENETICS ELECTROPHYSIOLOGY CLINIC VISIT    Assessment/Recommendations   Assessment/Plan:    Mr. Sharp is a 51 year old male who has a past medical history significant for HCM s/p septal myomectomy 2014, s/p ICD 4/29/14, DM, asthma, HTN, remote history of AF, CLARY, chronic pain from multiple cervical spine surgeries, depression, and anxiety. He reports feeling well. He has some RAY which has been chronic for him and unchanged. He denies chest discomfort, palpitations, abdominal fullness/bloating or peripheral edema, shortness of breath, paroxysmal nocturnal dyspnea, orthopnea, lightheadedness, dizziness, pre-syncope, or syncope. Last echo from 2019 showed s/p septal myectomy LVEF 60-65% and no significant valvular abnormalities. Echo today shows LVEF 60-65%, LVH present, no LVOT obstruction, normal RV size/function, and no significant valvular abnormalities. Device interrogation shows normal device function, RA pacing threshold remains elevated, 0% AF and no ventricular arrhythmias, intrinsic CHB, and AP 8%,  100%. RRT is estimated in about 5 months. Current cardiac medications include: Lasix, Losratan, Lipitor, and Warfarin.        Hypertrophic cardiomyopathy  -s/p septal myomectomy with minimal residual obstruction.   -Encouraged adequate hydration and avoidance of strenous physical activity   -Reinforced exercise recommendations with HCM (e.g. 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  - Discussed in detail with patient family screening   Risk stratification for sudden cardiac death   He had ICD implanted in 2014. Now RA lead thresholds rising. We discussed lead management options in detail including placement of new RA lead and retention of old one or RA lead extraction and replacement. We discussed indications, risks, and benefits in  detail with patient.He favored extraction and replacement. We discussed the indications for lead extraction, the extraction procedure and the risks of extraction.  These risks include vascular tear, cardiac tear, clotting and occlusion of a vessel, infection, damage to other components of the implanted device, bleeding, death, and need for emergency cardiopulmonary bypass, sternotomy or thoracotomy.  He states understanding and would like to pursue extraction/replacement. We will plan for this when his generator reaches DA.   Follow up 3 months after extraction.        History of Present Illness/Subjective    Mr. Konstantin Sharp is a 51 year old male who comes in today for EP consultation of HCM, device and lead management.    Mr. Sharp is a 51 year old male who has a past medical history significant for HCM s/p septal myomectomy 2014, s/p ICD 4/29/14, DM, asthma, HTN, remote history of AF, CLARY, chronic pain from multiple cervical spine surgeries, depression, and anxiety.     He was admitted on 2/27/2014 with chest pains on exertion that he had complained about before, only this time it was more persistent. He had a peak LVOT gradient of 56 mmHg that was dynamic on stress echo. His septal thickness is 22 mm maximum. Given his symptomatic obstruction the patient was referred to CV surgery for myomectomy that he underwent on 4/14/2014. The patient developed intraoperative complete heart block that has not resolved. Complete AV block after myomectomy for HCM, with low likelihood of recovery. We continue to follow patient for CHB s/p septal myectomy. Patient has class IIA indication for ICD implant given unexplained syncope and first degree relative with sudden cardiac death, also clear pacing indication with AV dissociation. We explained these options in detail to the patient and he wished to have ICD implanted. A dual-chamber ICD was implanted on 4/29/2014.    EP Visit 8/18/14: Today, the patient presents for  follow-up. Device site has healed very well with no problems.  The patient denies any syncope since the event, no shortness of breath or chest pain at rest or exertion, no palpitations, no orthopnea.  His device interrogation done on 2014 shows no arrhythmias with normal in stable sensing and pacing thresholds. His EKG today shows an atrially paced and V paced rhythm. He is doing very well and recovering nicely after surgery.  He reports that he does feel better compared to before the procedure.    He reports feeling well. He has some RAY which has been chronic for him and unchanged. He denies chest discomfort, palpitations, abdominal fullness/bloating or peripheral edema, shortness of breath, paroxysmal nocturnal dyspnea, orthopnea, lightheadedness, dizziness, pre-syncope, or syncope. Last echo from  showed s/p septal myectomy LVEF 60-65% and no significant valvular abnormalities. Echo today shows LVEF 60-65%, LVH present, no LVOT obstruction, normal RV size/function, and no significant valvular abnormalities. Device interrogation shows normal device function, RA pacing threshold remains elevated, 0% AF and no ventricular arrhythmias, intrinsic CHB, and AP 8%,  100%. RRT is estimated in about 5 months. Current cardiac medications include: Lasix, Losratan, Lipitor, and Warfarin.      Family History:  Family history is remarkable for HCM in his father who  at 54 years from suspected HCM complications.  No additional information is known about paternal history, including number of aunts and uncles, cause of death, etc.  Mo's two full brothers are in reported good health.  Maternal half brother had a brain injury after an ATV accident but no known heart problems.  Limited information is known about Mo's mother.  She had three brother who all  in their 50's and 60's of cancer, emphysema, and heart attack.    I have reviewed and updated the patient's Past Medical History, Social History, Family  History and Medication List.     Cardiographics (Personally Reviewed) :   3/4/22 Echo:  Interpretation Summary  Known HCM s/p septal myectomy  Global and regional left ventricular function is normal with an EF of 60-65%.  Concentric wall thickening consistent with left ventricular hypertrophy is  present. Dynamic outflow tract obstruction is not present.  The right ventricle is normal in function and size.  No significant valvular abnormalities  No pericardial effusion  IVC diameter <2.1 cm collapsing >50% with sniff suggests a normal RA pressure  of 3 mmHg.  This study was compared with the study from 19 there has been no  significant change .    2019 Echo:   Interpretation Summary  HCM s/p septal myectomy. No FADIA and no detectable resting LVOT gradient.  Global and regional left ventricular function is normal with an EF of 60-65%.  Abnormal septal/apical motion consistent with pacemaker.  No significant valvular abnormalities  No pericardial effusion is present.     Compared to the prior study dated 10/23/2018: There has been no significant  Change.    2018 NM Stress Test:   Impression:   1. There is mild hypokinesia involving the distal mid septum which   corresponds with the small nondominant typical reduced perfusion. No   changes to indicate ischemia.   2. Questionable small possibly marginal/oblique vessel nontransmural   infarct involving the mid distal septum, not typical of large vessel   distribution. Small amount of wall motion abnormalities within this   region.      CT Angio:   FINDINGS:     CORONARY CALCIUM SCORE: The total Agatston calcium score is 0, Left  main: 0, left anterior descendin,  circumflex: 0, right coronary  artery: 0.      CORONARY ANGIOGRAPHY : Trivial coronary artery disease is most likely  present. However the studies were hampered by significant motion  artifact and by the patient's body habitus and also by artifact from  the pacemaker leads.     CMR:  FINDINGS    1. Normal left ventricular size and systolic function with a   calculated ejection fraction of 70%.   2. Asymmetric basal septal hypertrophy with a maximal wall thickness   of 22 mm in the basal anteroseptum.   3. There is systolic anterior motion of the anterior mitral leaflet   and the mitral valve chord with a systolic signal void in the left   ventricular outflow tract consistent with obstruction.   Velocity-encoded phase contrast imaging revealed at least a 60 mmHg   gradient in the left ventricular outflow tract at rest.   4. There is posteriorly directed mitral regurgitation.   5. The tricuspid and aortic valves are normal.   6. Delayed enhancement images reveal patchy areas of hyperenhancement   in the basal anterior septum at the right ventricular insertion point   and in the basal and mid anterolateral segments. These areas of   hyperenhancement are not significantly different when directly   compared to the prior CMR of 07/24/2012.   7. There is mild left atrial enlargement. The right atrial size is   normal.   8. There is no pericardial effusion.        Physical Examination   /78 (BP Location: Left arm, Patient Position: Chair, Cuff Size: Adult Large)   Pulse 81   Wt 98.1 kg (216 lb 3.2 oz)   SpO2 96%   BMI 30.15 kg/m    Wt Readings from Last 3 Encounters:   07/26/19 119.3 kg (263 lb)   10/23/18 115 kg (253 lb 9.6 oz)   07/30/18 115.2 kg (254 lb)     General Appearance:   Alert, well-appearing and in no acute distress.   HEENT: Atraumatic, normocephalic. PERRL.  MMM.   Chest/Lungs:   Respirations unlabored.  Lungs are clear to auscultation.   Cardiovascular:   Regular rate and rhythm.  S1/S2. No murmur.    Abdomen:  Soft, nontender, nondistended.   Extremities: No cyanosis or clubbing. No edema.    Musculoskeletal: Moves all extremities.  Gait normal.   Skin: Warm, dry, intact.    Neurologic: Mood and affect are appropriate.  Alert and oriented to person, place, time, and situation.           Medications  Allergies   Current Outpatient Medications   Medication Sig Dispense Refill     Atorvastatin Calcium (LIPITOR PO) Take 80 mg by mouth daily        cloNIDine (CATAPRES) 0.1 MG tablet Take 0.1 mg by mouth 3 times daily       docusate sodium (DSS) 100 MG capsule Take 100 mg by mouth 2 times daily       DULoxetine (CYMBALTA) 20 MG capsule Take 2 capsules (40 mg) by mouth daily 60 capsule 3     furosemide (LASIX) 40 MG tablet TAKE 1/2 A TABLET DAILY       losartan (COZAAR) 25 MG tablet Take 1 tablet (25 mg) by mouth daily 30 tablet 1     magnesium oxide (MAG-OX) 400 MG tablet Take 2.5 tablets (1,000 mg) by mouth daily 180 tablet 3     metFORMIN (GLUCOPHAGE) 500 MG tablet Take 500 mg by mouth 2 times daily (with meals)  60 tablet      OLANZapine (ZYPREXA) 5 MG tablet Take 1 tablet by mouth At Bedtime       potassium chloride ER (KLOR-CON M) 10 MEQ CR tablet Take 1 tablet by mouth daily       Pregabalin (LYRICA PO) Take 100 mg by mouth 3 times daily  (Patient not taking: Reported on 2/22/2022)       QUEtiapine Fumarate (SEROQUEL PO) Take 25 mg by mouth 2 times daily       rivaroxaban ANTICOAGULANT (XARELTO) 10 MG TABS tablet Take 10 mg by mouth 2 times daily (with meals)       tamsulosin (FLOMAX) 0.4 MG capsule Take 0.4 mg by mouth daily       thiamine (B-1) 100 MG tablet Take 100 mg by mouth 2 times daily       TRAZODONE HCL PO Take 100 mg by mouth At Bedtime (Patient not taking: Reported on 2/22/2022)       WARFARIN SODIUM PO Take 8.5 mg by mouth every morning (Patient not taking: Reported on 2/22/2022)      Allergies   Allergen Reactions     Bee Venom Swelling     Lisinopril      TABS  Severe cough     Penicillins Hives and Difficulty breathing         Lab Results (Personally Reviewed)    Chemistry/lipid CBC Cardiac Enzymes/BNP/TSH/INR   Lab Results   Component Value Date    BUN 12 02/16/2022     02/16/2022    CO2 27 02/16/2022     Creatinine   Date Value Ref Range Status   02/16/2022 0.65 (L)  0.66 - 1.25 mg/dL Final   01/23/2018 0.93 0.66 - 1.25 mg/dL Final       Lab Results   Component Value Date    CHOL 205 (A) 01/29/2014    HDL 27 (A) 01/29/2014     (A) 01/29/2014      Lab Results   Component Value Date    WBC 14.3 (H) 02/16/2022    HGB 18.2 (H) 02/16/2022    HCT 52.2 02/16/2022    MCV 91 02/16/2022     02/16/2022    Lab Results   Component Value Date    TSH 0.52 01/17/2015    INR 0.98 01/31/2018        The patient states understanding and is agreeable with the plan.   Ritesh Lacy MD [facility]  Cardiology - Electrophysiology    Total time spent on patient visit, reviewing notes, imaging, labs, orders, and completing necessary documentation: 60 minutes.

## 2022-03-04 NOTE — PATIENT INSTRUCTIONS
You were seen today in the Adult Congenital and Cardiovascular Genetics Clinic at the [facility].    Cardiology Providers you saw during your visit:  DONALD Castellano MD and Ritesh Lacy MD    Diagnosis:   a fib, hypertrophic cardiomyopathy, s/p ventricular spetal myectomy and ICD placement, Hypertension    Results:  DONALD Castellano MD and Ritesh Lacy MD reviewed the results of your echo and device check testing today in clinic.    Recommendations:    1. Continue to eat a heart healthy, low salt diet.  2. Continue to get 20-30 minutes of aerobic activity, 4-5 days per week.  Examples of aerobic activity include walking, running, swimming, cycling, etc.  3. Continue to observe good oral hygiene, with regular dental visits.  4. When it is time to get your generator changed, we will extract and reimplant the RA lead  5. Try to stop smoking   6. Please follow up with endocrinologist for your elevated A1Cs      SBE prophylaxis:   Yes____  No__x__    Lifelong Bacterial Endocarditis Prophylaxis:  YES____  NO____    If YES is checked, follow the recommendations outlined below:  1. Take antibiotic(s) prior to recommended dental procedures and procedures on the respiratory tract or with infected skin, muscle or bones. SBE prophylaxis is not needed for routine GI and  procedures (ie. Colonoscopy or vaginal delivery)  2. Observe good oral hygiene daily, as advised by your dentist. Get regular professional dental care.  3. Keep cuts clean.  4. Infections should be treated promptly.  5. Symptoms of Infective Endocarditis could include: fever lasting more than 4-5 days or a recurrent fever that initially resolves but returns within 1-2 days)      Exercise restrictions:   Yes__X__  No____         If yes, list restrictions:  Must be allowed to rest if fatigued or SOB      Work restrictions:  Yes____  No_X___         If yes, list restrictions:    FASTING CHOLESTEROL was checked in the last 5 years YES_x__  NO___  (2021)  Continue to eat a heart healthy, low salt diet.         ____ Fasting lipid panel order today         ____ No changes in medications          ____ I recommend the following changes in your cholesterol medications.:          ____ Please follow up for cholesterol screening at your primary care physician      Follow-up:  Follow up with Dr. Lacy and Dr. Castellano after you get your generator changed when the time comes with an echo prior.    If you have questions or concerns please contact us at:    YAJAIRA DozierN, RN    Elizabeth James (Scheduling)  Nurse Care Coordinator     Clinic   Adult Congenital and CV Genetics   Adult Congenital and CV Genetic  [facility]   [facility]  (P) [phone]     (P) [phone]         (F) [phone]        For after hours urgent needs, call [phone] and ask to speak to the Adult Congenital Physician on call.  Mention Job Code [id number].    For emergencies call 911.    [facility]  [facility]   [facility]  Mail Code [id number]  [address] [place] [address]

## 2022-03-04 NOTE — LETTER
3/4/2022      RE: Konstantin Sharp  [address] [facility] [address]       Dear Colleague,    Thank you for the opportunity to participate in the care of your patient, Konstantin Sharp, at the [facility] [place] [facility]. Please see a copy of my visit note below.    CARDIOLOGY CONSULTATION:    Mr. Sharp is a 51-year-old gentleman who is known to me with a past medical history significant for hypertrophic cardiomyopathy and myectomy.  He also has diabetes that is not under control, nicotine dependence at a pack a day now, not ready to quit, hypertension, hyperlipidemia.      I met him for the first time in 06/2015.  He had chronic atypical chest pain which has resolved essentially --denies it now.  We did a CTA in 2013 and 2015 and 2019 had a negative coronary calcium score and no obstructive disease. He had a coronary angiogram elsewhere last year and that was negative for obstructive disease.     Mr. Sharp underwent an echo and it is unchanged with normal EF and no obstruction and no significant valve disease.  He does have an ICD that was implanted in 2016 and the lead is not working well so the the plan is to remove it when the device needs to be replaced later this year; he met with Dr. Lacy today.       He has 4 children, 2 have been screened who are 22 and 24, girls, and 2 have not who are 27 and 28.      He does have sleep apnea and does not use CPAP mask.  He is here with his significant other. He had a hard year with intubation due to recurrent pneumonia twice and tracheostomy.  No covid.      PAST MEDICAL HISTORY:  Past Medical History:   Diagnosis Date     Atrial fibrillation (H)      Chest pain     intermittent     Chronic pain      Cognitive disorder     memory loss     Depressive disorder      Dual ICD (implantable cardiac defibrillator) in place 04/29/2014    and pacemaker     GERD (gastroesophageal reflux disease)      H/O  "traumatic brain injury 2015     Heart attack (H) 1996     HTN (hypertension)      Hypertrophic nonobstructive cardiomyopathy (H) 09/12/2012    s/p ventricular myectomy     Inflammatory arthritis      Intermittent asthma      PAD (peripheral artery disease) (H)      Polysubstance abuse (H)      Seizures (H)     last episode 2014 when \"blood sugar dropped\" according to patient     Sleep apnea     doesn't use cpap     Type 2 diabetes mellitus without complications (H)        CURRENT MEDICATIONS:  Current Outpatient Medications   Medication Sig Dispense Refill     Atorvastatin Calcium (LIPITOR PO) Take 80 mg by mouth daily        cloNIDine (CATAPRES) 0.1 MG tablet Take 0.1 mg by mouth 3 times daily       docusate sodium (DSS) 100 MG capsule Take 100 mg by mouth 2 times daily       DULoxetine (CYMBALTA) 20 MG capsule Take 2 capsules (40 mg) by mouth daily 60 capsule 3     furosemide (LASIX) 40 MG tablet TAKE 1/2 A TABLET DAILY       losartan (COZAAR) 25 MG tablet Take 1 tablet (25 mg) by mouth daily 30 tablet 1     magnesium oxide (MAG-OX) 400 MG tablet Take 2.5 tablets (1,000 mg) by mouth daily 180 tablet 3     metFORMIN (GLUCOPHAGE) 500 MG tablet Take 500 mg by mouth 2 times daily (with meals)  60 tablet      OLANZapine (ZYPREXA) 5 MG tablet Take 1 tablet by mouth At Bedtime       potassium chloride ER (KLOR-CON M) 10 MEQ CR tablet Take 1 tablet by mouth daily       Pregabalin (LYRICA PO) Take 100 mg by mouth 3 times daily  (Patient not taking: Reported on 2/22/2022)       QUEtiapine Fumarate (SEROQUEL PO) Take 25 mg by mouth 2 times daily       rivaroxaban ANTICOAGULANT (XARELTO) 10 MG TABS tablet Take 10 mg by mouth 2 times daily (with meals)       tamsulosin (FLOMAX) 0.4 MG capsule Take 0.4 mg by mouth daily       thiamine (B-1) 100 MG tablet Take 100 mg by mouth 2 times daily       TRAZODONE HCL PO Take 100 mg by mouth At Bedtime (Patient not taking: Reported on 2/22/2022)       WARFARIN SODIUM PO Take 8.5 mg by " mouth every morning (Patient not taking: Reported on 2/22/2022)         PAST SURGICAL HISTORY:  Past Surgical History:   Procedure Laterality Date     APPEN[facility]CTOMY  [facility]     BACK SURGERY       COLONOSCOPY  2017     DISCECTOMY LUMBAR POSTERIOR MICROSCOPIC THREE+ LEVELS  12/16/2011    Procedure:DISCECTOMY LUMBAR POSTERIOR MICROSCOPIC THREE+ LEVELS; Posterior Decompression L2-S1; Surgeon:CAITIE OSMAN; [facility]     FUSION CERVICAL POSTERIOR ONE LEVEL  8/24/2012    Procedure: FUSION CERVICAL POSTERIOR ONE LEVEL;  Posterior Instrumentated Spinal Fusion Cervical 6-7, Right Iliac Crest Bone [place] ;  Surgeon: Caitie Osman MD;  [facility]     GRAFT BONE FROM ILIAC CREST  8/24/2012    Procedure: GRAFT BONE FROM ILIAC CREST;;  Surgeon: Caitie Osman MD;  [facility]     HEAD & NECK SURGERY  2009     IMPLANT PACEMAKER       IMPLANT PACEMAKER       INJECT STEROID (LOCATION) N/A 2/19/2015    Procedure: INJECT STEROID (LOCATION);  Surgeon: Siri Koch MD;  [facility]     INSERT STIMULATOR AND LEADS INTERNAL DORSAL COLUMN  8/7/2013    Procedure: INSERT STIMULATOR AND LEADS INTERNAL DORSAL COLUMN;  SPINAL CORD STIMULATOR IMPLANT ;  Surgeon: Ricardo Bruno MD;  [facility]     INSERT STIMULATOR DORSAL COLUMN  08/13/2013    lead replacemend, 12?2016,  battery replacement 4/4/2016     IR GASTROSTOMY TUBE PERCUTANEOUS PLCMNT  10/29/2015     KNEE SURGERY       LASER CO2 LARYNGOSCOPY N/A 2/19/2015    Procedure: LASER CO2 LARYNGOSCOPY;  Surgeon: Siri Koch MD;  [facility]     LASER CO2 LARYNGOSCOPY, COMPLEX  7/22/2014    Procedure: LASER CO2 LARYNGOSCOPY, COMPLEX;  Surgeon: Siri Koch MD;  [facility]     MYECTOMY SEPTAL  4/14/2014    Procedure: Median Sternotomy, Septal Myectomy, Intraoperative BRENT performed by Dr. Castano, on pump oxygenator.;  Surgeon: Aguila Cannon MD;  [facility]  "OR     NECK SURGERY       NC SPINE SURGERY PROCEDURE UNLISTED       SHOULDER SURGERY  2006    RIGHT     STOMACH SURGERY  1980    partial gastrectomy for bleeding ulcers, \"stress related\". [facility] children[facility]     WRIST SURGERY Left 1988    fractured. 1988 or so     Z CARDIAC SURG PROCEDURE UNLIST       Z HAND/FINGER SURGERY UNLISTED       [facility] SHOULDER SURG PROC UNLISTED       [facility] STOMACH SURGERY PROCEDURE UNLISTED         ALLERGIES  Bee venom, Lisinopril, and Penicillins    FAMILY HX:  Family History   Problem Relation Age of Onset     Heart Disease Father 32        MIs x2; s/p CABG     Substance Abuse Father      Hypertension Father      Obesity Father      Hyperlipidemia Father      Hypertension Brother      Diabetes Brother      Glaucoma Maternal Grandmother      Hypertension Maternal Grandmother      Diabetes Maternal Grandfather      Glaucoma Maternal Grandfather      Hypertension Maternal Grandfather      Cerebrovascular Disease Maternal Grandfather      Obesity Maternal Grandfather      Hyperlipidemia Maternal Grandfather      Coronary Artery Disease Maternal Grandfather      Heart Disease Maternal Grandfather      Substance Abuse Other      Cancer Other      Hypertension Other      Obesity Other      Other Cancer Other         all over     Hyperlipidemia Other      Coronary Artery Disease Other      Obesity Brother      Hyperlipidemia Brother      Lymphoma Maternal Uncle      Diabetes Brother      Depression Daughter      Depression Son      Macular Degeneration No family hx of      Heart Failure Father        SOCIAL HX:  Social History     Socioeconomic History     Marital status: Single     Spouse name: Not on file     Number of children: Not on file     Years of education: Not on file     Highest education level: Not on file   Occupational History     Not on file   Tobacco Use     Smoking status: Current Every Day Smoker     Packs/day: 0.12     Years: 35.00     Pack years: 4.20     Types: Cigarettes     " Start date: 3/8/1982     Last attempt to quit: 2014     Years since quittin.9     Smokeless tobacco: Never Used   Substance and Sexual Activity     Alcohol use: Yes     Alcohol/week: 0.0 standard drinks     Comment: rare     Drug use: No     Comment: last using meth 2017     Sexual activity: Not Currently     Partners: Female     Birth control/protection: Male Surgical   Other Topics Concern     Parent/sibling w/ CABG, MI or angioplasty before 65F 55M? Yes   Social History Narrative     Not on file     Social Determinants of Health     Financial Resource Strain: Not on file   Food Insecurity: Not on file   Transportation Needs: Not on file   Physical Activity: Not on file   Stress: Not on file   Social Connections: Not on file   Intimate Partner Violence: Not on file   Housing Stability: Not on file       ROS:  Constitutional: No fever, chills, or sweats. No weight gain/loss.   ENT: No visual disturbance, ear ache, epistaxis, sore throat.   Allergies/Immunologic: Negative.   Respiratory: No cough, hemoptysis.   Cardiovascular: As per HPI.   GI: No nausea, vomiting, hematemesis, melena, or hematochezia.   : No urinary frequency, dysuria, or hematuria.   Integument: Negative.   Psychiatric: Negative.   Neuro: Negative.   Endocrinology: Negative.   Musculoskeletal: No myalgia.    VITAL SIGNS:  /78   Pulse 81   Wt 98.1 kg (216 lb 3.2 oz)   SpO2 96%   BMI 30.15 kg/m    Body mass index is 30.15 kg/m .  Wt Readings from Last 2 Encounters:   22 98.1 kg (216 lb 3.2 oz)   22 98.1 kg (216 lb 3.2 oz)       PHYSICAL EXAM  Konstantin Sharp IS A 51 year old male.in no acute distress.  HEENT: Unremarkable.  Neck: JVP normal.    Lungs: CTA.  Cor: RRR. Normal S1 and S2.  No murmur, rub, or gallop.      LABS    Lab Results   Component Value Date    WBC 14.3 2022    WBC 9.3 2018     Lab Results   Component Value Date    RBC 5.76 2022    RBC 4.81 2018     Lab Results   Component  Value Date    HGB 18.2 02/16/2022    HGB 15.4 01/31/2018     Lab Results   Component Value Date    HCT 52.2 02/16/2022    HCT 45.0 01/31/2018     No components found for: MCT  Lab Results   Component Value Date    MCV 91 02/16/2022    MCV 94 01/31/2018     Lab Results   Component Value Date    MCH 31.6 02/16/2022    MCH 32.0 01/31/2018     Lab Results   Component Value Date    MCHC 34.9 02/16/2022    MCHC 34.2 01/31/2018     Lab Results   Component Value Date    RDW 12.6 02/16/2022    RDW 13.6 01/31/2018     Lab Results   Component Value Date     02/16/2022     01/31/2018      Recent Labs   Lab Test 02/16/22  1256 01/23/18  1001 03/17/15  1224     --  142   POTASSIUM 3.5 4.1 4.0   CHLORIDE 101  --  110*   CO2 27  --  25   ANIONGAP 9  --  7   *  --  113*   BUN 12  --  14   CR 0.65* 0.93 0.96   TANIA 9.6  --  8.8     Recent Labs   Lab Test 01/29/14  0000   CHOL 205*   HDL 27*   *   TRIG 242*          IMPRESSION, REPORT, PLAN:   1.  Hypertrophic cardiomyopathy status post myectomy with minimal obstruction, stable.   2.  Diabetes type 2 with peripheral neuropathy not under control  3.  Heart block followed by ectopy status post ICD and permanent pacemaker nearing [facility].   4.  Atrial fibrillation with history of DVT, on chronic anticoagulation with Coumadin.   5.  Nicotine dependence, currently smokes pack a day with a 30-pack-year smoking history.   6.  History of atypical chest pain with a negative coronary calcium score on last CT 07/2015/12/2019. Cath elsewhere 2021 negative for obstructive disease   7.  Glottic stenosis.   8.  Pneumonia.   9.  Chronic back pain.      It was a pleasure to see Mo in follow up. He is clinically doing well.  He needs to stop smoking but is not ready. He will work on diet and exercise; diabetes not controlled, which he needs to work on and we discussed Jardiance, which he says his primary is working on getting pre-approval for.  We also discussed an insulin  pump.  Cholesterol and BP controlled.    Continue coumadin for afib.  He will have device and lead replaced later this year.  We will plan to see him back in 12 months with echo. He will let us know if he has any issues in the interim.     DONALD Castellano MD  43 minutes face-face, documentation and review of records on day of visit

## 2022-03-04 NOTE — PROGRESS NOTES
"CARDIOLOGY CONSULTATION:    Mr. Sharp is a 51-year-old gentleman who is known to me with a past medical history significant for hypertrophic cardiomyopathy and myectomy.  He also has diabetes that is not under control, nicotine dependence at a pack a day now, not ready to quit, hypertension, hyperlipidemia.      I met him for the first time in 06/2015.  He had chronic atypical chest pain which has resolved essentially --denies it now.  We did a CTA in 2013 and 2015 and 2019 had a negative coronary calcium score and no obstructive disease. He had a coronary angiogram elsewhere last year and that was negative for obstructive disease.     Mr. Sharp underwent an echo and it is unchanged with normal EF and no obstruction and no significant valve disease.  He does have an ICD that was implanted in 2016 and the lead is not working well so the the plan is to remove it when the device needs to be replaced later this year; he met with Dr. Lacy today.       He has 4 children, 2 have been screened who are 22 and 24, girls, and 2 have not who are 27 and 28.      He does have sleep apnea and does not use CPAP mask.  He is here with his significant other. He had a hard year with intubation due to recurrent pneumonia twice and tracheostomy.  No covid.      PAST MEDICAL HISTORY:  Past Medical History:   Diagnosis Date     Atrial fibrillation (H)      Chest pain     intermittent     Chronic pain      Cognitive disorder     memory loss     Depressive disorder      Dual ICD (implantable cardiac defibrillator) in place 04/29/2014    and pacemaker     GERD (gastroesophageal reflux disease)      H/O traumatic brain injury 2015     Heart attack (H) 1996     HTN (hypertension)      Hypertrophic nonobstructive cardiomyopathy (H) 09/12/2012    s/p ventricular myectomy     Inflammatory arthritis      Intermittent asthma      PAD (peripheral artery disease) (H)      Polysubstance abuse (H)      Seizures (H)     last episode 2014 when \"blood " "sugar dropped\" according to patient     Sleep apnea     doesn't use cpap     Type 2 diabetes mellitus without complications (H)        CURRENT MEDICATIONS:  Current Outpatient Medications   Medication Sig Dispense Refill     Atorvastatin Calcium (LIPITOR PO) Take 80 mg by mouth daily        cloNIDine (CATAPRES) 0.1 MG tablet Take 0.1 mg by mouth 3 times daily       docusate sodium (DSS) 100 MG capsule Take 100 mg by mouth 2 times daily       DULoxetine (CYMBALTA) 20 MG capsule Take 2 capsules (40 mg) by mouth daily 60 capsule 3     furosemide (LASIX) 40 MG tablet TAKE 1/2 A TABLET DAILY       losartan (COZAAR) 25 MG tablet Take 1 tablet (25 mg) by mouth daily 30 tablet 1     magnesium oxide (MAG-OX) 400 MG tablet Take 2.5 tablets (1,000 mg) by mouth daily 180 tablet 3     metFORMIN (GLUCOPHAGE) 500 MG tablet Take 500 mg by mouth 2 times daily (with meals)  60 tablet      OLANZapine (ZYPREXA) 5 MG tablet Take 1 tablet by mouth At Bedtime       potassium chloride ER (KLOR-CON M) 10 MEQ CR tablet Take 1 tablet by mouth daily       Pregabalin (LYRICA PO) Take 100 mg by mouth 3 times daily  (Patient not taking: Reported on 2/22/2022)       QUEtiapine Fumarate (SEROQUEL PO) Take 25 mg by mouth 2 times daily       rivaroxaban ANTICOAGULANT (XARELTO) 10 MG TABS tablet Take 10 mg by mouth 2 times daily (with meals)       tamsulosin (FLOMAX) 0.4 MG capsule Take 0.4 mg by mouth daily       thiamine (B-1) 100 MG tablet Take 100 mg by mouth 2 times daily       TRAZODONE HCL PO Take 100 mg by mouth At Bedtime (Patient not taking: Reported on 2/22/2022)       WARFARIN SODIUM PO Take 8.5 mg by mouth every morning (Patient not taking: Reported on 2/22/2022)         PAST SURGICAL HISTORY:  Past Surgical History:   Procedure Laterality Date     APPENDECTOMY  [facility]    [facility]     BACK SURGERY       COLONOSCOPY  2017     DISCECTOMY LUMBAR POSTERIOR MICROSCOPIC THREE+ LEVELS  12/16/2011    Procedure:DISCECTOMY LUMBAR " "POSTERIOR MICROSCOPIC THREE+ LEVELS; Posterior Decompression L2-S1; Surgeon:CAITIE OSMAN; [facility]     FUSION CERVICAL POSTERIOR ONE LEVEL  8/24/2012    Procedure: FUSION CERVICAL POSTERIOR ONE LEVEL;  Posterior Instrumentated Spinal Fusion Cervical 6-7, Right Iliac Crest Bone [place] ;  Surgeon: Caitie Osman MD;  [facility] OR     GRAFT BONE FROM ILIAC CREST  8/24/2012    Procedure: GRAFT BONE FROM ILIAC CREST;;  Surgeon: Caitie Osman MD;  [facility]     HEAD & NECK SURGERY  2009     IMPLANT PACEMAKER       IMPLANT PACEMAKER       INJECT STEROID (LOCATION) N/A 2/19/2015    Procedure: INJECT STEROID (LOCATION);  Surgeon: Siri Koch MD;  [facility]     INSERT STIMULATOR AND LEADS INTERNAL DORSAL COLUMN  8/7/2013    Procedure: INSERT STIMULATOR AND LEADS INTERNAL DORSAL COLUMN;  SPINAL CORD STIMULATOR IMPLANT ;  Surgeon: Ricardo Bruno MD;  [facility] OR     INSERT STIMULATOR DORSAL COLUMN  08/13/2013    lead replacemend, 12?2016,  battery replacement 4/4/2016     IR GASTROSTOMY TUBE PERCUTANEOUS PLCMNT  10/29/2015     KNEE SURGERY       LASER CO2 LARYNGOSCOPY N/A 2/19/2015    Procedure: LASER CO2 LARYNGOSCOPY;  Surgeon: Siri Koch MD;  [facility]     LASER CO2 LARYNGOSCOPY, COMPLEX  7/22/2014    Procedure: LASER CO2 LARYNGOSCOPY, COMPLEX;  Surgeon: Siri Koch MD;  [facility]     MYECTOMY SEPTAL  4/14/2014    Procedure: Median Sternotomy, Septal Myectomy, Intraoperative BRENT performed by Dr. Castano, on pump oxygenator.;  Surgeon: Aguila Cannon MD;  [facility]     NECK SURGERY       [facility] SPINE SURGERY PROCEDURE UNLISTED       SHOULDER SURGERY  2006    RIGHT     STOMACH SURGERY  1980    partial gastrectomy for bleeding ulcers, \"stress related\". [facility] children[facility]     WRIST SURGERY Left 1988    fractured. 1988 or so     [facility] CARDIAC SURG PROCEDURE UNLIST       [facility] HAND/FINGER SURGERY UNLISTED       " [facility] SHOULDER SURG PROC UNLISTED       [facility] STOMACH SURGERY PROCEDURE UNLISTED         ALLERGIES  Bee venom, Lisinopril, and Penicillins    FAMILY HX:  Family History   Problem Relation Age of Onset     Heart Disease Father 32        MIs x2; s/p CABG     Substance Abuse Father      Hypertension Father      Obesity Father      Hyperlipidemia Father      Hypertension Brother      Diabetes Brother      Glaucoma Maternal Grandmother      Hypertension Maternal Grandmother      Diabetes Maternal Grandfather      Glaucoma Maternal Grandfather      Hypertension Maternal Grandfather      Cerebrovascular Disease Maternal Grandfather      Obesity Maternal Grandfather      Hyperlipidemia Maternal Grandfather      Coronary Artery Disease Maternal Grandfather      Heart Disease Maternal Grandfather      Substance Abuse Other      Cancer Other      Hypertension Other      Obesity Other      Other Cancer Other         all over     Hyperlipidemia Other      Coronary Artery Disease Other      Obesity Brother      Hyperlipidemia Brother      Lymphoma Maternal Uncle      Diabetes Brother      Depression Daughter      Depression Son      Macular Degeneration No family hx of      Heart Failure Father        SOCIAL HX:  Social History     Socioeconomic History     Marital status: Single     Spouse name: Not on file     Number of children: Not on file     Years of education: Not on file     Highest education level: Not on file   Occupational History     Not on file   Tobacco Use     Smoking status: Current Every Day Smoker     Packs/day: 0.12     Years: 35.00     Pack years: 4.20     Types: Cigarettes     Start date: 3/8/1982     Last attempt to quit: 2014     Years since quittin.9     Smokeless tobacco: Never Used   Substance and Sexual Activity     Alcohol use: Yes     Alcohol/week: 0.0 standard drinks     Comment: rare     Drug use: No     Comment: last using meth 2017     Sexual activity: Not Currently     Partners: Female      Birth control/protection: Male Surgical   Other Topics Concern     Parent/sibling w/ CABG, MI or angioplasty before 65F 55M? Yes   Social History Narrative     Not on file     Social Determinants of Health     Financial Resource Strain: Not on file   Food Insecurity: Not on file   Transportation Needs: Not on file   Physical Activity: Not on file   Stress: Not on file   Social Connections: Not on file   Intimate Partner Violence: Not on file   Housing Stability: Not on file       ROS:  Constitutional: No fever, chills, or sweats. No weight gain/loss.   ENT: No visual disturbance, ear ache, epistaxis, sore throat.   Allergies/Immunologic: Negative.   Respiratory: No cough, hemoptysis.   Cardiovascular: As per HPI.   GI: No nausea, vomiting, hematemesis, melena, or hematochezia.   : No urinary frequency, dysuria, or hematuria.   Integument: Negative.   Psychiatric: Negative.   Neuro: Negative.   Endocrinology: Negative.   Musculoskeletal: No myalgia.    VITAL SIGNS:  /78   Pulse 81   Wt 98.1 kg (216 lb 3.2 oz)   SpO2 96%   BMI 30.15 kg/m    Body mass index is 30.15 kg/m .  Wt Readings from Last 2 Encounters:   03/04/22 98.1 kg (216 lb 3.2 oz)   03/04/22 98.1 kg (216 lb 3.2 oz)       PHYSICAL EXAM  Konstantin Sharp IS A 51 year old male.in no acute distress.  HEENT: Unremarkable.  Neck: JVP normal.    Lungs: CTA.  Cor: RRR. Normal S1 and S2.  No murmur, rub, or gallop.      LABS    Lab Results   Component Value Date    WBC 14.3 02/16/2022    WBC 9.3 01/31/2018     Lab Results   Component Value Date    RBC 5.76 02/16/2022    RBC 4.81 01/31/2018     Lab Results   Component Value Date    HGB 18.2 02/16/2022    HGB 15.4 01/31/2018     Lab Results   Component Value Date    HCT 52.2 02/16/2022    HCT 45.0 01/31/2018     No components found for: MCT  Lab Results   Component Value Date    MCV 91 02/16/2022    MCV 94 01/31/2018     Lab Results   Component Value Date    MCH 31.6 02/16/2022    MCH 32.0 01/31/2018     Lab  Results   Component Value Date    [facility]    MCHC 34.2 01/31/2018     Lab Results   Component Value Date    RDW 12.6 02/16/2022    RDW 13.6 01/31/2018     Lab Results   Component Value Date     02/16/2022     01/31/2018      Recent Labs   Lab Test 02/16/22  1256 01/23/18  1001 03/17/15  1224     --  142   POTASSIUM 3.5 4.1 4.0   CHLORIDE 101  --  110*   CO2 27  --  25   ANIONGAP 9  --  7   *  --  113*   BUN 12  --  14   CR 0.65* 0.93 0.96   TANIA 9.6  --  8.8     Recent Labs   Lab Test 01/29/14  0000   CHOL 205*   HDL 27*   *   TRIG 242*          IMPRESSION, REPORT, PLAN:   1.  Hypertrophic cardiomyopathy status post myectomy with minimal obstruction, stable.   2.  Diabetes type 2 with peripheral neuropathy not under control  3.  Heart block followed by ectopy status post ICD and permanent pacemaker nearing [facility].   4.  Atrial fibrillation with history of DVT, on chronic anticoagulation with Coumadin.   5.  Nicotine dependence, currently smokes pack a day with a 30-pack-year smoking history.   6.  History of atypical chest pain with a negative coronary calcium score on last CT 07/2015/12/2019. Cath elsewhere 2021 negative for obstructive disease   7.  Glottic stenosis.   8.  Pneumonia.   9.  Chronic back pain.      It was a pleasure to see Mo in follow up. He is clinically doing well.  He needs to stop smoking but is not ready. He will work on diet and exercise; diabetes not controlled, which he needs to work on and we discussed Jardiance, which he says his primary is working on getting pre-approval for.  We also discussed an insulin pump.  Cholesterol and BP controlled.    Continue coumadin for afib.  He will have device and lead replaced later this year.  We will plan to see him back in 12 months with echo. He will let us know if he has any issues in the interim.     DONALD Castellano MD  43 minutes face-face, documentation and review of records on day of visit

## 2022-03-04 NOTE — NURSING NOTE
Chief Complaint   Patient presents with     New Patient     51 year old male with history of a fib, hypertrophic cardiomyopathy, s/p ventricular spetal myectomy and ICD placement, Hypertension, hyperlipidemia, limb ischemia and DM presenting for follow up     Vitals were taken and medications reconciled.    Aniket Warren, EMT  2:21 PM

## 2022-03-04 NOTE — PATIENT INSTRUCTIONS
It was a pleasure to see you in clinic today.  Please do not hesitate to call with any questions or concerns.  You are scheduled for a remote transmission on 6/3/2022.  We look forward to seeing you in clinic at your next device check in 6-12 months.    Joel Saba, RN  Electrophysiology Nurse Clinician  [facility]    During Business Hours Please Call:  [phone]  After Hours Please Call:  [phone] - select option #4 and ask for job code [phone]

## 2022-03-08 ENCOUNTER — MYC MEDICAL ADVICE (OUTPATIENT)
Dept: SURGERY | Facility: CLINIC | Age: 52
End: 2022-03-08
Payer: MEDICAID

## 2022-03-08 LAB
ATRIAL RATE - MUSE: 78 BPM
DIASTOLIC BLOOD PRESSURE - MUSE: NORMAL MMHG
INTERPRETATION ECG - MUSE: NORMAL
P AXIS - MUSE: 92 DEGREES
PR INTERVAL - MUSE: 188 MS
QRS DURATION - MUSE: 172 MS
QT - MUSE: 434 MS
QTC - MUSE: 494 MS
R AXIS - MUSE: -80 DEGREES
SYSTOLIC BLOOD PRESSURE - MUSE: NORMAL MMHG
T AXIS - MUSE: 93 DEGREES
VENTRICULAR RATE- MUSE: 78 BPM

## 2022-03-09 ENCOUNTER — PRE VISIT (OUTPATIENT)
Dept: SURGERY | Facility: CLINIC | Age: 52
End: 2022-03-09
Payer: MEDICAID

## 2022-03-24 ENCOUNTER — LAB REQUISITION (OUTPATIENT)
Dept: LAB | Facility: CLINIC | Age: 52
End: 2022-03-24
Payer: MEDICAID

## 2022-03-24 DIAGNOSIS — Z79.899 OTHER LONG TERM (CURRENT) DRUG THERAPY: ICD-10-CM

## 2022-03-24 LAB — CREAT UR-MCNC: 28 MG/DL

## 2022-03-24 PROCEDURE — 80307 DRUG TEST PRSMV CHEM ANLYZR: CPT | Mod: ORL | Performed by: FAMILY MEDICINE

## 2022-03-28 LAB
CODEINE UR CFM-MCNC: 143 NG/ML
CODEINE/CREAT UR: 511 NG/MG {CREAT}

## 2022-04-05 ENCOUNTER — VIRTUAL VISIT (OUTPATIENT)
Dept: NEUROLOGY | Facility: CLINIC | Age: 52
End: 2022-04-05
Payer: MEDICAID

## 2022-04-05 DIAGNOSIS — F39 MOOD DISORDER (H): ICD-10-CM

## 2022-04-05 PROCEDURE — 99214 OFFICE O/P EST MOD 30 MIN: CPT | Mod: 95 | Performed by: PSYCHIATRY & NEUROLOGY

## 2022-04-05 PROCEDURE — 90785 PSYTX COMPLEX INTERACTIVE: CPT | Mod: 95 | Performed by: PSYCHIATRY & NEUROLOGY

## 2022-04-05 RX ORDER — DULOXETIN HYDROCHLORIDE 60 MG/1
CAPSULE, DELAYED RELEASE ORAL
Qty: 30 CAPSULE | Refills: 3 | Status: SHIPPED | OUTPATIENT
Start: 2022-04-05 | End: 2022-05-02

## 2022-04-05 RX ORDER — TRAZODONE HYDROCHLORIDE 50 MG/1
50 TABLET, FILM COATED ORAL AT BEDTIME
Qty: 30 TABLET | Refills: 3 | Status: SHIPPED | OUTPATIENT
Start: 2022-04-05 | End: 2022-05-04 | Stop reason: DRUGHIGH

## 2022-04-05 NOTE — NURSING NOTE
Chief Complaint   Patient presents with     Medication Follow-up         JOSH Bansal on 4/5/2022 at 9:53 AM

## 2022-04-05 NOTE — LETTER
"    4/5/2022         RE: Konstantin Sharp  [address] [facility] [address]        Dear Colleague,    Thank you for referring your patient, Konstantin Sharp, to the [facility]. Please see a copy of my visit note below.    Konstantin Sharp is a 52 year old male who is being evaluated via a billable video visit in light of the ongoing global health crisis (COVID-19) that requires us to abide by social distancing mandates in order to reduce the risk of COVID-19 exposure.       The patient has been notified of following:     \"This video visit will be conducted via a video call between you and your physician/provider. We have found that certain health care needs can be provided without the need for a physical exam.  This service lets us provide the care you need with a short phone/video conversation.  If a prescription is necessary we can send it directly to your pharmacy.  If lab work is needed we can place an order for that and you can then stop by our lab to have the test done at a later time.    If during the course of the call the physician/provider feels a video visit is not appropriate, you will not be charged for this service.\"     Patient has given verbal consent to a video visit? Yes    Consent was obtained for this service by one of our care team members    Phone Start Time:  9:50 am  Phone End Time:  9:53 am     Total time of phone conversation: 3 minutes     There were no vitals filed for this visit.     Patient would like the video invitation sent by: [email or web address]  Number/e-mail address:  [phone]         JOSH Bansal         Initial Psychiatric Clinic Intake note:    Referral Source: Self-referred      HPI / Chief Complaint:  Patient is known to me from prior clinic contacted the patient has not been seen by me in over 2 years.  Unfortunately old records are difficult to access at this time due to the new computer system.  The patient and his wife are extremely vague " historians but it appears he has been followed through the [facility] by psychiatry.  Medications are listed as above and both the patient and wife report there is been multiple medication changes in the last few years but they are unaware of what these were.  We will try and clarify all of this.  Patient presents today to establish care at this clinic.  The patient's medication list is as described in the nurse's note according to the limited information we have available to us.    The patient reestablished contact with his clinic in December 2021.  He had been followed through [facility].  He had had multiple medication changes over the last few years but we have limited information when we first saw the patient.  We were attempting to clarify his current medication list and past medication trials.  At that visit the patient had significant heart movements noted around his mouth.  The family also reported he had lost 60 pounds in 6 months.  He was having worsening depression and anxiety.    The patient was seen for a follow-up visit on January 11 of 2022.  The patient has been seen by multiple providers prior to that over the course of more than a year.  When I saw him at that visit he was on Cymbalta 20 mg a day.  His mood was worse.  He was having low motivation and low energy but no thoughts of harming himself.  He was willing to restart with a psychotherapist and I did order that.  I increased his Cymbalta to 60 mg a day.    I saw the patient on February 22, 2022.  I also interviewed his wife and both reported the patient seem to be improving with regard to his mood and was less depressed with no thoughts of wishing he was dead.  He continued with his baseline tremor.  He was sleeping well at night.  There were no new medical issues.  We continued with the treatment plan at that time.      HPI:  The patient presents today for medication check.  Leliani is present and participates in the  interview as well.  They apparently moved up the appointment because the patient has had some ongoing depressive symptoms and is not sleeping well.  He does have trazodone ordered but apparently they did not fill it and he has not been taking that.  He has been taking Cymbalta 40 mg a day.  He does not have a psychotherapist as we previously have suggested and I have reordered that.    The patient was a vague historian and he initially told me he was doing well and had no concerns.  Leilani however stated that last week he had asked to have somebody help and commit suicide.  When asked about this he stated he chronically has thoughts of wishing he was dead.  Both he and the niece were laughing throughout this portion of the interview and said this was chronic.  He has no plan or desire.  He has had fleeting thoughts of crashing his car but has not acted on this and states he would not never actually do it.  He denies having any hallucinations or delusions.  He denies having any robinson.  He denies having any other concerns or complaints.  He is being worked up for an intake pacemaker and this is making him a bit anxious.  He is able to get out on occasionally and apparently went to the zoo with his niece last week and they had a good time.  He reports that he is able to contract for safety.  He denies any significant side effects to the medication.  He is willing to start with the psychotherapist.      Mental status exam:  Appearance: Calm, no obvious pain or distress.  Less muscle movements then in the prior exam.  Behavior: No reports of any behavioral dyscontrol.  No anger outbursts.  He was calm and comfortable with me but a bit flat and passive.  Speech: Not pressured or rambling.  Vague and somewhat monotone.  All this appears unchanged.  Mood/Affect: Slightly flat but not significantly depressed.  No reports of any significant changes.  He is chronically with low-level depression.  No irritability or  robinson.  Thought content: No hallucinations or delusions reported or observed, the patient denies such.  Thought formation: No recent change.  [place] and somewhat slow.  No racing thoughts.  Continues somewhat vague.  Associations: Somewhat impaired.  No recent change  Fund of knowledge: Unchanged.  Attention/Concentration: Needing occasional cues and prompts but able to track adequately.  Insight: Impaired, no obvious change  Judgment: Impaired, chronically with no recent reports of any change  Memory: Impaired  Motor status: Patient has oral and tongue movements, this appears unchanged from the last visit.  The family reports no obvious change.  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  I have reminded the patient that trazodone is available for his complaints of difficulty with sleep.  He apparently never filled that has not been using that so I have reordered that.  Have also reordered individual therapy and encouraged him to follow-up with that.  The patient is scheduled for a medical follow-up visit later today.  He will talk to that physician about his chronic fatigue and some recent weight loss.    Total time for chart review, documentation and coordination of care: 25 minutes.  Patient was seen through [facility].    Rolando Burnham MD      Again, thank you for allowing me to participate in the care of your patient.        Sincerely,        Rolando Burnham MD

## 2022-04-05 NOTE — PROGRESS NOTES
"Konstantin Sharp is a 52 year old male who is being evaluated via a billable video visit in light of the ongoing global health crisis (COVID-19) that requires us to abide by social distancing mandates in order to reduce the risk of COVID-19 exposure.       The patient has been notified of following:     \"This video visit will be conducted via a video call between you and your physician/provider. We have found that certain health care needs can be provided without the need for a physical exam.  This service lets us provide the care you need with a short phone/video conversation.  If a prescription is necessary we can send it directly to your pharmacy.  If lab work is needed we can place an order for that and you can then stop by our lab to have the test done at a later time.    If during the course of the call the physician/provider feels a video visit is not appropriate, you will not be charged for this service.\"     Patient has given verbal consent to a video visit? Yes    Consent was obtained for this service by one of our care team members    Phone Start Time:  9:50 am  Phone End Time:  9:53 am     Total time of phone conversation: 3 minutes     There were no vitals filed for this visit.     Patient would like the video invitation sent by: [employer]  Number/e-mail address:  [phone]         JOSH Bansal         Initial Psychiatric Clinic Intake note:    Referral Source: Self-referred      HPI / Chief Complaint:  Patient is known to me from prior clinic contacted the patient has not been seen by me in over 2 years.  Unfortunately old records are difficult to access at this time due to the new computer system.  The patient and his wife are extremely vague historians but it appears he has been followed through the [facility] system by psychiatry.  Medications are listed as above and both the patient and wife report there is been multiple medication changes in the last few years but they are unaware of what these " were.  We will try and clarify all of this.  Patient presents today to establish care at this clinic.  The patient's medication list is as described in the nurse's note according to the limited information we have available to us.    The patient reestablished contact with his clinic in December 2021.  He had been followed through [facility].  He had had multiple medication changes over the last few years but we have limited information when we first saw the patient.  We were attempting to clarify his current medication list and past medication trials.  At that visit the patient had significant heart movements noted around his mouth.  The family also reported he had lost 60 pounds in 6 months.  He was having worsening depression and anxiety.    The patient was seen for a follow-up visit on January 11 of 2022.  The patient has been seen by multiple providers prior to that over the course of more than a year.  When I saw him at that visit he was on Cymbalta 20 mg a day.  His mood was worse.  He was having low motivation and low energy but no thoughts of harming himself.  He was willing to restart with a psychotherapist and I did order that.  I increased his Cymbalta to 60 mg a day.    I saw the patient on February 22, 2022.  I also interviewed his wife and both reported the patient seem to be improving with regard to his mood and was less depressed with no thoughts of wishing he was dead.  He continued with his baseline tremor.  He was sleeping well at night.  There were no new medical issues.  We continued with the treatment plan at that time.      HPI:  The patient presents today for medication check.  Leilani is present and participates in the interview as well.  They apparently moved up the appointment because the patient has had some ongoing depressive symptoms and is not sleeping well.  He does have trazodone ordered but apparently they did not fill it and he has not been taking that.  He has been  taking Cymbalta 40 mg a day.  He does not have a psychotherapist as we previously have suggested and I have reordered that.    The patient was a vague historian and he initially told me he was doing well and had no concerns.  Leilani however stated that last week he had asked to have somebody help and commit suicide.  When asked about this he stated he chronically has thoughts of wishing he was dead.  Both he and the niece were laughing throughout this portion of the interview and said this was chronic.  He has no plan or desire.  He has had fleeting thoughts of crashing his car but has not acted on this and states he would not never actually do it.  He denies having any hallucinations or delusions.  He denies having any robinson.  He denies having any other concerns or complaints.  He is being worked up for an intake pacemaker and this is making him a bit anxious.  He is able to get out on occasionally and apparently went to the zoo with his niece last week and they had a good time.  He reports that he is able to contract for safety.  He denies any significant side effects to the medication.  He is willing to start with the psychotherapist.      Mental status exam:  Appearance: Calm, no obvious pain or distress.  Less muscle movements then in the prior exam.  Behavior: No reports of any behavioral dyscontrol.  No anger outbursts.  He was calm and comfortable with me but a bit flat and passive.  Speech: Not pressured or rambling.  Vague and somewhat monotone.  All this appears unchanged.  Mood/Affect: Slightly flat but not significantly depressed.  No reports of any significant changes.  He is chronically with low-level depression.  No irritability or robinson.  Thought content: No hallucinations or delusions reported or observed, the patient denies such.  Thought formation: No recent change.  [place] and somewhat slow.  No racing thoughts.  Continues somewhat vague.  Associations: Somewhat impaired.  No recent  change  Fund of knowledge: Unchanged.  Attention/Concentration: Needing occasional cues and prompts but able to track adequately.  Insight: Impaired, no obvious change  Judgment: Impaired, chronically with no recent reports of any change  Memory: Impaired  Motor status: Patient has oral and tongue movements, this appears unchanged from the last visit.  The family reports no obvious change.  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  I have reminded the patient that trazodone is available for his complaints of difficulty with sleep.  He apparently never filled that has not been using that so I have reordered that.  Have also reordered individual therapy and encouraged him to follow-up with that.  The patient is scheduled for a medical follow-up visit later today.  He will talk to that physician about his chronic fatigue and some recent weight loss.    Total time for chart review, documentation and coordination of care: 25 minutes.  Patient was seen through [facility].    Rolando Burnham MD

## 2022-04-09 ENCOUNTER — HEALTH MAINTENANCE LETTER (OUTPATIENT)
Age: 52
End: 2022-04-09

## 2022-04-21 ENCOUNTER — LAB REQUISITION (OUTPATIENT)
Dept: LAB | Facility: CLINIC | Age: 52
End: 2022-04-21
Payer: MEDICAID

## 2022-04-21 DIAGNOSIS — I10 ESSENTIAL (PRIMARY) HYPERTENSION: ICD-10-CM

## 2022-04-21 LAB
CHOLEST SERPL-MCNC: 121 MG/DL
FASTING STATUS PATIENT QL REPORTED: ABNORMAL
HDLC SERPL-MCNC: 30 MG/DL
LDLC SERPL CALC-MCNC: 16 MG/DL
NONHDLC SERPL-MCNC: 91 MG/DL
TRIGL SERPL-MCNC: 376 MG/DL

## 2022-04-21 PROCEDURE — 80061 LIPID PANEL: CPT | Mod: ORL | Performed by: FAMILY MEDICINE

## 2022-04-29 DIAGNOSIS — F39 MOOD DISORDER (H): ICD-10-CM

## 2022-04-29 NOTE — TELEPHONE ENCOUNTER
Health Call Center    Phone Message    May a detailed message be left on voicemail: yes     Reason for Call: Medication Question or concern regarding medication   Prescription Clarification  Name of Medication: DULoxetine (CYMBALTA) 60 MG capsule  Prescribing Provider: ALIE CÁRDENAS   Pharmacy: [facility] [id number] [place] [facility] [place], [place] - [address] [place] [address]     What on the order needs clarification? Chase from [facility] is calling to figure out how the medication should be administered to the patient. He says somewhere down the line there was  60 MG capsules 1x daily with a 30 MG for a total of 90 MG. Pt just has a 20 MG bottle for 2 capsules daily from 03/28. He wants to know which Rx is correct.    Please contact Chase to clarify.  Ph: [phone]      Action Taken: Message routed to:  Other: [facility]    Travel Screening: Not Applicable

## 2022-05-02 RX ORDER — DULOXETIN HYDROCHLORIDE 60 MG/1
60 CAPSULE, DELAYED RELEASE ORAL DAILY
Qty: 30 CAPSULE | Refills: 3 | Status: SHIPPED | OUTPATIENT
Start: 2022-05-02 | End: 2022-01-01

## 2022-05-02 NOTE — TELEPHONE ENCOUNTER
Called Chase from [facility] back and informed him that per Dr. Burnham, pt is to take Cymbalta 60 mg daily. Per Chase asked that we call the pharmacy because the Rx that was sent was not clear on directions. I checked the Rx that was sent to the pharmacy on 4/5/2022 had no directions on how to take it.     Dr. Burnham, I've T'd up the medication for you to review and approve as appropriate.    Thank you.    JOSH Bansal on 5/2/2022 at 12:37 PM

## 2022-05-02 NOTE — TELEPHONE ENCOUNTER
My last note states we had increased his Cymbalta to 60 mg a day.  I am fine with the 60 mg pill or 2 30 mg pills or 3 20 mg pills, just so he could 60 mg a day of that medication.  Thanks

## 2022-05-02 NOTE — TELEPHONE ENCOUNTER
"Dr. Burnham,     Please see message below from caller:    \"Chase from [facility] is calling to figure out how the medication should be administered to the patient. He says somewhere down the line there was  60 MG capsules 1x daily with a 30 MG for a total of 90 MG. Pt just has a 20 MG bottle for 2 capsules daily from 03/28. He wants to know which Rx is correct.\"    Per your notes from visit 4/5/2022, it says you have increased the patient's Cymbalta to 60 mg a day.     Is the pt supposed to be taking the 60 MG along with the 30 mg also for a total of 90 mg? Please advise to what is the correct dose the pt is supposed to be taking.    Thank you.    JOSH Bansal on 5/2/2022 at 8:10 AM    "

## 2022-05-03 ENCOUNTER — VIRTUAL VISIT (OUTPATIENT)
Dept: NEUROLOGY | Facility: CLINIC | Age: 52
End: 2022-05-03
Payer: MEDICAID

## 2022-05-03 DIAGNOSIS — F39 MOOD DISORDER (H): Primary | ICD-10-CM

## 2022-05-03 PROCEDURE — 99214 OFFICE O/P EST MOD 30 MIN: CPT | Mod: 95 | Performed by: PSYCHIATRY & NEUROLOGY

## 2022-05-03 NOTE — PROGRESS NOTES
"Konstantin Sharp is a 52 year old male who is being evaluated via a billable video visit in light of the ongoing global health crisis (COVID-19) that requires us to abide by social distancing mandates in order to reduce the risk of COVID-19 exposure.       The patient has been notified of following:     \"This video visit will be conducted via a video call between you and your physician/provider. We have found that certain health care needs can be provided without the need for a physical exam.  This service lets us provide the care you need with a short phone/video conversation.  If a prescription is necessary we can send it directly to your pharmacy.  If lab work is needed we can place an order for that and you can then stop by our lab to have the test done at a later time.    If during the course of the call the physician/provider feels a video visit is not appropriate, you will not be charged for this service.\"     Patient has given verbal consent to a video visit? Yes    Consent was obtained for this service by one of our care team members      Any new medication (other provider):   No   Meds started at last appointment:  No   Results: N/A  Meds increased/decreased at last appointment:    Yes, Cymbalta   Results: working okay     Patient's impression of how medication is working? Working good      Compliant with Medication? Yes       Side Effects: No   Pain: Yes , in both hands and feet, back and neck  Appetite change No    Sleep disturbance Yes   Change in energy No   Change in interest No   Change in concentration No    Psychosis/Hallucinations No   Negative thoughts No   Mood swings No   Alcohol use Yes   Drug use No   Anxiety Yes    Sad/depressed mood Yes     Questions or concerns?: No                                                          Phone Start Time: 10:12 AM    Phone End Time:  10:20    Total time of phone conversation: 8 minutes    There were no vitals filed for this visit.    Patient would like the " video invitation sent by: [facility]  Number/e-mail address:  [phone]    JOSH Bansal    Outpatient Follow up TBI Evaluation     Pertinent History: Patient is known to me from prior clinic contacted the patient has not been seen by me in over 2 years.  Unfortunately old records are difficult to access at this time due to the new computer system.  The patient and his wife are extremely vague historians but it appears he has been followed through the [facility] system by psychiatry.  Medications are listed as above and both the patient and wife report there is been multiple medication changes in the last few years but they are unaware of what these were.  We will try and clarify all of this.  Patient presents today to establish care at this clinic.  The patient's medication list is as described in the nurse's note according to the limited information we have available to us.     The patient reestablished contact with his clinic in December 2021.  He had been followed through [facility].  He had had multiple medication changes over the last few years but we have limited information when we first saw the patient.  We were attempting to clarify his current medication list and past medication trials.  At that visit the patient had significant heart movements noted around his mouth.  The family also reported he had lost 60 pounds in 6 months.  He was having worsening depression and anxiety.     The patient was seen for a follow-up visit on January 11 of 2022.  The patient has been seen by multiple providers prior to that over the course of more than a year.  When I saw him at that visit he was on Cymbalta 20 mg a day.  His mood was worse.  He was having low motivation and low energy but no thoughts of harming himself.  He was willing to restart with a psychotherapist and I did order that.  I increased his Cymbalta to 60 mg a day.     I saw the patient on February 22, 2022.  I also interviewed his wife and  both reported the patient seem to be improving with regard to his mood and was less depressed with no thoughts of wishing he was dead.  He continued with his baseline tremor.  He was sleeping well at night.  There were no new medical issues.  We continued with the treatment plan at that time.    The patient had a follow-up with me on April 5, 2022.  We did increase his Cymbalta to 60 mg a day and did remind him that he had trazodone available that he was not using.  We also ordered some individual therapy.  He was complaining of some chronic fatigue and weight loss and he was going to have that addressed through his primary care doctor.         HPI:  Patient presents today for the purposes of medication management.   I had an opportunity to interview the patient as well as Leilani today.  They said the trazodone was quite helpful for the first week but now he is having trouble sleep again.  He is typically falling asleep but then wakes at 2 or 3 in the morning.  He states his mood is good.  No hopelessness or worthlessness or guilt.  No hallucinations or delusions.  No thoughts of suicide.    He states they recently traveled to [place] for vacation and that went well.  He is still hoping to get his 's license.  He is able to contract for safety and denies having any psychotic symptoms.  There is been no other new medical issues or concerns and he is tolerating the medication.  Both would like to continue with the current treatment plan.     We discussed some treatment options and have elected to continue with the recent medication changes.  We did discuss sleep hygiene.  The patient will continue to follow-up with his primary care doctor.     Current Medications: Please see chart. Medications personally reviewed.     Patient Active Problem List    Diagnosis Date Noted     Chronic pain due to trauma 09/03/2016     Priority: Medium     Overview:   Broken back/neck 1996/2007 respectively. Did have procedure for  spine stimulator       Esophageal reflux 09/03/2016     Priority: Medium     Essential hypertension 09/03/2016     Priority: Medium     History of traumatic brain injury 09/03/2016     Priority: Medium     Large bowel obstruction (H) 09/03/2016     Priority: Medium     S/P laminectomy 09/03/2016     Priority: Medium     Overview:   replacement of failed spinal cord stimulator & placement of epidural paddle electrode - 9/2/2016       Tobacco dependence 09/03/2016     Priority: Medium     Cognitive disorder 06/16/2016     Priority: Medium     Type 2 diabetes mellitus with diabetic neuropathy (H) 05/02/2016     Priority: Medium     Seizures (H) 02/01/2016     Priority: Medium     Respiratory failure (H) 10/19/2015     Priority: Medium     Insomnia 03/13/2015     Priority: Medium     [facility] 03/11/2015     Priority: Medium     For urgent after hours needs, please call [phone] and ask to speak with the [facility] Physician on call - mention job code [phone].           Dysphonia 01/05/2015     Priority: Medium     Postprocedural subglottic stenosis 07/22/2014     Priority: Medium     Pre-operative cardiovascular examination 07/16/2014     Priority: Medium     Automatic implantable cardioverter-defibrillator in situ- [employer], dual chamber- DEPENDENT 04/30/2014     Priority: Medium     Implanted 4/29/14 by Dr. Lacy  Problem list name updated by automated process. Provider to review       S/P ventricular septal myectomy 04/14/2014     Priority: Medium     Syncope 12/28/2013     Priority: Medium     Atrial fibrillation (H) 05/15/2013     Priority: Medium     Asthma 01/24/2013     Priority: Medium     Depressive disorder 01/24/2013     Priority: Medium     Old myocardial infarction 01/24/2013     Priority: Medium     Osteoarthrosis 01/24/2013     Priority: Medium     Spinal stenosis, lumbar region, with neurogenic claudication  "11/15/2012     Priority: Medium     Lumbar radicular pain 11/15/2012     Priority: Medium     s/p PSF C6-7 for anterior pseudarthrosis 10/11/2012     Priority: Medium     Hypertrophic nonobstructive cardiomyopathy (H) 09/12/2012     Priority: Medium     Chest pain 09/12/2012     Priority: Medium     Sinus tachycardia 09/12/2012     Priority: Medium     Pseudoarthrosis of cervical spine (H) 08/24/2012     Priority: Medium     Chondromalacia of patella 10/25/2011     Priority: Medium     Anticoagulant long-term use 02/18/2011     Priority: Medium     Cervical vertebral fusion 12/08/2010     Priority: Medium     Herniated cervical intervertebral disc 06/26/2008     Priority: Medium     Past Medical History:   Diagnosis Date     Atrial fibrillation (H)      Chest pain     intermittent     Chronic pain      Cognitive disorder     memory loss     Depressive disorder      Dual ICD (implantable cardiac defibrillator) in place 04/29/2014    and pacemaker     GERD (gastroesophageal reflux disease)      H/O traumatic brain injury 2015     Heart attack (H) 1996     HTN (hypertension)      Hypertrophic nonobstructive cardiomyopathy (H) 09/12/2012    s/p ventricular myectomy     Inflammatory arthritis      Intermittent asthma      PAD (peripheral artery disease) (H)      Polysubstance abuse (H)      Seizures (H)     last episode 2014 when \"blood sugar dropped\" according to patient     Sleep apnea     doesn't use cpap     Type 2 diabetes mellitus without complications (H)      Past Surgical History:   Procedure Laterality Date     APPENDECTOMY  1980    [facility]     BACK SURGERY       COLONOSCOPY  2017     DISCECTOMY LUMBAR POSTERIOR MICROSCOPIC THREE+ LEVELS  12/16/2011    Procedure:DISCECTOMY LUMBAR POSTERIOR MICROSCOPIC THREE+ LEVELS; Posterior Decompression L2-S1; Surgeon:CAITIE OSMAN; [facility]     FUSION CERVICAL POSTERIOR ONE LEVEL  8/24/2012    Procedure: FUSION CERVICAL POSTERIOR ONE " "LEVEL;  Posterior Instrumentated Spinal Fusion Cervical 6-7, Right Iliac Crest Bone [place] ;  Surgeon: Lopez Funez MD;  [facility]     GRAFT BONE FROM ILIAC CREST  8/24/2012    Procedure: GRAFT BONE FROM ILIAC CREST;;  Surgeon: Lopez Funez MD;  [facility]     HEAD & NECK SURGERY  2009     IMPLANT PACEMAKER       IMPLANT PACEMAKER       INJECT STEROID (LOCATION) N/A 2/19/2015    Procedure: INJECT STEROID (LOCATION);  Surgeon: Siri Koch MD;  [facility]     INSERT STIMULATOR AND LEADS INTERNAL DORSAL COLUMN  8/7/2013    Procedure: INSERT STIMULATOR AND LEADS INTERNAL DORSAL COLUMN;  SPINAL CORD STIMULATOR IMPLANT ;  Surgeon: Ricardo Bruno MD;  Location: [facility] OR     INSERT STIMULATOR DORSAL COLUMN  08/13/2013    lead replacemend, 12?2016,  battery replacement 4/4/2016     IR GASTROSTOMY TUBE PERCUTANEOUS PLCMNT  10/29/2015     KNEE SURGERY       LASER CO2 LARYNGOSCOPY N/A 2/19/2015    Procedure: LASER CO2 LARYNGOSCOPY;  Surgeon: Siri Koch MD;  [facility]     LASER CO2 LARYNGOSCOPY, COMPLEX  7/22/2014    Procedure: LASER CO2 LARYNGOSCOPY, COMPLEX;  Surgeon: Siri Koch MD;  [facility]     MYECTOMY SEPTAL  4/14/2014    Procedure: Median Sternotomy, Septal Myectomy, Intraoperative BRENT performed by Dr. Castano, on pump oxygenator.;  Surgeon: Aguila Cannon MD;  [facility]     NECK SURGERY       AK SPINE SURGERY PROCEDURE UNLISTED       SHOULDER SURGERY  2006    RIGHT     STOMACH SURGERY  1980    partial gastrectomy for bleeding ulcers, \"stress related\". [facility]     WRIST SURGERY Left 1988    fractured. 1988 or so     [facility] CARDIAC SURG PROCEDURE UNLIST       [facility] HAND/FINGER SURGERY UNLISTED       [facility] SHOULDER SURG PROC UNLISTED       [facility] STOMACH SURGERY PROCEDURE UNLISTED       Family History   Problem Relation Age of Onset     Heart Disease Father 32        MIs x2; s/p CABG     Substance Abuse Father      " Hypertension Father      Obesity Father      Hyperlipidemia Father      Hypertension Brother      Diabetes Brother      Glaucoma Maternal Grandmother      Hypertension Maternal Grandmother      Diabetes Maternal Grandfather      Glaucoma Maternal Grandfather      Hypertension Maternal Grandfather      Cerebrovascular Disease Maternal Grandfather      Obesity Maternal Grandfather      Hyperlipidemia Maternal Grandfather      Coronary Artery Disease Maternal Grandfather      Heart Disease Maternal Grandfather      Substance Abuse Other      Cancer Other      Hypertension Other      Obesity Other      Other Cancer Other         all over     Hyperlipidemia Other      Coronary Artery Disease Other      Obesity Brother      Hyperlipidemia Brother      Lymphoma Maternal Uncle      Diabetes Brother      Depression Daughter      Depression Son      Macular Degeneration No family hx of      Heart Failure Father      Current Outpatient Medications   Medication Sig Dispense Refill     Atorvastatin Calcium (LIPITOR PO) Take 80 mg by mouth daily        cloNIDine (CATAPRES) 0.1 MG tablet Take 0.1 mg by mouth 3 times daily       docusate sodium (DSS) 100 MG capsule Take 100 mg by mouth 2 times daily       DULoxetine (CYMBALTA) 60 MG capsule Take 1 capsule (60 mg) by mouth daily 30 capsule 3     furosemide (LASIX) 40 MG tablet TAKE 1/2 A TABLET DAILY       losartan (COZAAR) 25 MG tablet Take 1 tablet (25 mg) by mouth daily 30 tablet 1     magnesium oxide (MAG-OX) 400 MG tablet Take 2.5 tablets (1,000 mg) by mouth daily 180 tablet 3     metFORMIN (GLUCOPHAGE) 500 MG tablet Take 500 mg by mouth 2 times daily (with meals)  60 tablet      OLANZapine (ZYPREXA) 5 MG tablet Take 1 tablet by mouth At Bedtime       QUEtiapine Fumarate (SEROQUEL PO) Take 25 mg by mouth 2 times daily       rivaroxaban ANTICOAGULANT (XARELTO) 10 MG TABS tablet Take 10 mg by mouth 2 times daily (with meals)       thiamine (B-1) 100 MG tablet Take 100 mg by mouth  2 times daily       traZODone (DESYREL) 50 MG tablet Take 1 tablet (50 mg) by mouth At Bedtime 30 tablet 3       Allergies   Allergen Reactions     Bee Venom Swelling     Lisinopril      TABS  Severe cough     Penicillins Hives and Difficulty breathing     Social History     Socioeconomic History     Marital status: Single     Spouse name: Not on file     Number of children: Not on file     Years of education: Not on file     Highest education level: Not on file   Occupational History     Not on file   Tobacco Use     Smoking status: Current Every Day Smoker     Packs/day: 0.12     Years: 35.00     Pack years: 4.20     Types: Cigarettes     Start date: 3/8/1982     Last attempt to quit: 2014     Years since quittin.0     Smokeless tobacco: Never Used   Substance and Sexual Activity     Alcohol use: Yes     Alcohol/week: 0.0 standard drinks     Comment: rare     Drug use: No     Comment: last using meth 2017     Sexual activity: Not Currently     Partners: Female     Birth control/protection: Male Surgical   Other Topics Concern     Parent/sibling w/ CABG, MI or angioplasty before 65F 55M? Yes   Social History Narrative     Not on file     Social Determinants of Health     Financial Resource Strain: Not on file   Food Insecurity: Not on file   Transportation Needs: Not on file   Physical Activity: Not on file   Stress: Not on file   Social Connections: Not on file   Intimate Partner Violence: Not on file   Housing Stability: Not on file       The following portions of the patient's history were reviewed and updated as appropriate: allergies, current medications, past family history, past medical history, past social history, past surgical history and problem list.    Review of Systems  A comprehensive review of systems was negative except for what is noted above    Mental Status Exam  Appearance:  Alert.  Good eye contact.  No pain.  Behavior:  No behavioral dyscontrol.  He is calm and comfortable with me.   Participation was good today.  Speech: Not pressured or rambling.  Slightly better in flexion but still overall monotone.  Answers were appropriate. All this appears unchanged.  Mood/Affect: No robinson.  Continues slightly flat.  No lability or irritability.  Thought content: No hallucinations or delusions reported or observed, the patient denies such.  Thought formation: No recent change.  [place] and somewhat slow.  No racing thoughts.  Continues somewhat vague.  Associations: Somewhat impaired.  Able to track and follow during the interview. No recent change  Fund of knowledge: Unchanged.  Attention/Concentration: Tracking adequately.  Not disorganized.  No racing thoughts.  Insight: Impaired, no obvious change.  Patient and the niece denies any recent changes.  Judgment: Impaired, chronically with no recent reports of any change  Memory: Impaired  Motor status: Continues with some mouth movements.  Perhaps slightly better.  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    Other:   Patient will return to clinic in  to 3 months. They agree to call or return sooner with any questions or concerns.  Risks and benefits were discussed.  Continue with his individual therapist.     Continue with the support of the clinic, reassurance, and redirection. Staff monitoring and ongoing assessments per team plan. Current psychotropic medication appears to represent the minimum effective dosage and appears medically necessary. We will continue to monitor and reassess. This team will utilize appropriate emergency services if necessary. I will make myself available if concerns or problems arise.    Total time spent with the patient today was 25 minutes with greater than 50% of the time spent in counseling and care coordination. The patient  agrees to call before then with any questions, concerns or problems. We will assess for the appropriateness of possible psychotropic medication trials/changes. The patient will seek out appropriate emergency services should that become necessary.    Video Visit Details    Type of service: Video Visit    Video Start Time: 10:20 AM    Video End Time: 10:33 AM    Total time for video call: 13 minutes    Originating Location: Patient's home    Distant Location:  [facility]    Mode of Communication: Video call via [employer]    Total time for chart review, interview with family and patient, coordination of care and documentation is 25 minutes.    Patient Instructions   It was nice speaking with you today for our office video visit. The following is a summary of our visit.    General Information:    If lab work was done today as part of your evaluation you will generally be contacted via My Chart, mail, or phone with the results within 1-5 days. If there is an alarming result we will contact you by phone. Lab results come back at varying times, I generally wait until all labs are resulted before making comments on results. Please note labs are automatically released to My Chart once available.     If you need refills please contact your pharmacist. They will send a refill request to me to review. Please allow 3 business days for us to process all refill requests.     Please call or send a medical message through My Chart, with any questions or concerns.    If you need any paperwork completed please fax forms to [phone]. Please state if you would like a copy of the completed paperwork, mailed or faxed back to the patient and a fax number to fax the paperwork to. Please allow up to 10 business days for paperwork to be completed.    Rolando Burnham MD

## 2022-05-03 NOTE — NURSING NOTE
Chief Complaint   Patient presents with     Medication Follow-up       JOSH Bansal on 5/3/2022 at 10:21 AM

## 2022-05-03 NOTE — LETTER
"    5/3/2022         RE: Konstantin Sharp  [address] [facility] [address]        Dear Colleague,    Thank you for referring your patient, Konstantin Sharp, to the [facility]. Please see a copy of my visit note below.    Konstantin Sharp is a 52 year old male who is being evaluated via a billable video visit in light of the ongoing global health crisis (COVID-19) that requires us to abide by social distancing mandates in order to reduce the risk of COVID-19 exposure.       The patient has been notified of following:     \"This video visit will be conducted via a video call between you and your physician/provider. We have found that certain health care needs can be provided without the need for a physical exam.  This service lets us provide the care you need with a short phone/video conversation.  If a prescription is necessary we can send it directly to your pharmacy.  If lab work is needed we can place an order for that and you can then stop by our lab to have the test done at a later time.    If during the course of the call the physician/provider feels a video visit is not appropriate, you will not be charged for this service.\"     Patient has given verbal consent to a video visit? Yes    Consent was obtained for this service by one of our care team members      Any new medication (other provider):   No   Meds started at last appointment:  No   Results: N/A  Meds increased/decreased at last appointment:    Yes, Cymbalta   Results: working okay     Patient's impression of how medication is working? Working good      Compliant with Medication? Yes       Side Effects: No   Pain: Yes , in both hands and feet, back and neck  Appetite change No    Sleep disturbance Yes   Change in energy No   Change in interest No   Change in concentration No    Psychosis/Hallucinations No   Negative thoughts No   Mood swings No   Alcohol use Yes   Drug use No   Anxiety Yes    Sad/depressed mood Yes "     Questions or concerns?: No                                                          Phone Start Time: 10:12 AM    Phone End Time:  10:20    Total time of phone conversation: 8 minutes    There were no vitals filed for this visit.    Patient would like the video invitation sent by: [employer]  Number/e-mail address:  [phone]    JOSH Bansal    Outpatient Follow up TBI Evaluation     Pertinent History: Patient is known to me from prior clinic contacted the patient has not been seen by me in over 2 years.  Unfortunately old records are difficult to access at this time due to the new computer system.  The patient and his wife are extremely vague historians but it appears he has been followed through the [facility] by psychiatry.  Medications are listed as above and both the patient and wife report there is been multiple medication changes in the last few years but they are unaware of what these were.  We will try and clarify all of this.  Patient presents today to establish care at this clinic.  The patient's medication list is as described in the nurse's note according to the limited information we have available to us.     The patient reestablished contact with his clinic in December 2021.  He had been followed through [facility].  He had had multiple medication changes over the last few years but we have limited information when we first saw the patient.  We were attempting to clarify his current medication list and past medication trials.  At that visit the patient had significant heart movements noted around his mouth.  The family also reported he had lost 60 pounds in 6 months.  He was having worsening depression and anxiety.     The patient was seen for a follow-up visit on January 11 of 2022.  The patient has been seen by multiple providers prior to that over the course of more than a year.  When I saw him at that visit he was on Cymbalta 20 mg a day.  His mood was worse.  He was having  low motivation and low energy but no thoughts of harming himself.  He was willing to restart with a psychotherapist and I did order that.  I increased his Cymbalta to 60 mg a day.     I saw the patient on February 22, 2022.  I also interviewed his wife and both reported the patient seem to be improving with regard to his mood and was less depressed with no thoughts of wishing he was dead.  He continued with his baseline tremor.  He was sleeping well at night.  There were no new medical issues.  We continued with the treatment plan at that time.    The patient had a follow-up with me on April 5, 2022.  We did increase his Cymbalta to 60 mg a day and did remind him that he had trazodone available that he was not using.  We also ordered some individual therapy.  He was complaining of some chronic fatigue and weight loss and he was going to have that addressed through his primary care doctor.         HPI:  Patient presents today for the purposes of medication management.   I had an opportunity to interview the patient as well as Leilani today.  They said the trazodone was quite helpful for the first week but now he is having trouble sleep again.  He is typically falling asleep but then wakes at 2 or 3 in the morning.  He states his mood is good.  No hopelessness or worthlessness or guilt.  No hallucinations or delusions.  No thoughts of suicide.    He states they recently traveled to [place] for vacation and that went well.  He is still hoping to get his 's license.  He is able to contract for safety and denies having any psychotic symptoms.  There is been no other new medical issues or concerns and he is tolerating the medication.  Both would like to continue with the current treatment plan.     We discussed some treatment options and have elected to continue with the recent medication changes.  We did discuss sleep hygiene.  The patient will continue to follow-up with his primary care doctor.     Current  Medications: Please see chart. Medications personally reviewed.     Patient Active Problem List    Diagnosis Date Noted     Chronic pain due to trauma 09/03/2016     Priority: Medium     Overview:   Broken back/neck 1996/2007 respectively. Did have procedure for spine stimulator       Esophageal reflux 09/03/2016     Priority: Medium     Essential hypertension 09/03/2016     Priority: Medium     History of traumatic brain injury 09/03/2016     Priority: Medium     Large bowel obstruction (H) 09/03/2016     Priority: Medium     S/P laminectomy 09/03/2016     Priority: Medium     Overview:   replacement of failed spinal cord stimulator & placement of epidural paddle electrode - 9/2/2016       Tobacco dependence 09/03/2016     Priority: Medium     Cognitive disorder 06/16/2016     Priority: Medium     Type 2 diabetes mellitus with diabetic neuropathy (H) 05/02/2016     Priority: Medium     Seizures (H) 02/01/2016     Priority: Medium     Respiratory failure (H) 10/19/2015     Priority: Medium     Insomnia 03/13/2015     Priority: Medium     [facility] 03/11/2015     Priority: Medium     For urgent after hours needs, please call [phone] and ask to speak with the Adult [facility] Physician on call - mention job code [phone].           Dysphonia 01/05/2015     Priority: Medium     Postprocedural subglottic stenosis 07/22/2014     Priority: Medium     Pre-operative cardiovascular examination 07/16/2014     Priority: Medium     Automatic implantable cardioverter-defibrillator in situ- [employer], dual chamber- DEPENDENT 04/30/2014     Priority: Medium     Implanted 4/29/14 by Dr. Lacy  Problem list name updated by automated process. Provider to review       S/P ventricular septal myectomy 04/14/2014     Priority: Medium     Syncope 12/28/2013     Priority: Medium     Atrial fibrillation (H) 05/15/2013     Priority: Medium     Asthma  "01/24/2013     Priority: Medium     Depressive disorder 01/24/2013     Priority: Medium     Old myocardial infarction 01/24/2013     Priority: Medium     Osteoarthrosis 01/24/2013     Priority: Medium     Spinal stenosis, lumbar region, with neurogenic claudication 11/15/2012     Priority: Medium     Lumbar radicular pain 11/15/2012     Priority: Medium     s/p PSF C6-7 for anterior pseudarthrosis 10/11/2012     Priority: Medium     Hypertrophic nonobstructive cardiomyopathy (H) 09/12/2012     Priority: Medium     Chest pain 09/12/2012     Priority: Medium     Sinus tachycardia 09/12/2012     Priority: Medium     Pseudoarthrosis of cervical spine (H) 08/24/2012     Priority: Medium     Chondromalacia of patella 10/25/2011     Priority: Medium     Anticoagulant long-term use 02/18/2011     Priority: Medium     Cervical vertebral fusion 12/08/2010     Priority: Medium     Herniated cervical intervertebral disc 06/26/2008     Priority: Medium     Past Medical History:   Diagnosis Date     Atrial fibrillation (H)      Chest pain     intermittent     Chronic pain      Cognitive disorder     memory loss     Depressive disorder      Dual ICD (implantable cardiac defibrillator) in place 04/29/2014    and pacemaker     GERD (gastroesophageal reflux disease)      H/O traumatic brain injury 2015     Heart attack (H) 1996     HTN (hypertension)      Hypertrophic nonobstructive cardiomyopathy (H) 09/12/2012    s/p ventricular myectomy     Inflammatory arthritis      Intermittent asthma      PAD (peripheral artery disease) (H)      Polysubstance abuse (H)      Seizures (H)     last episode 2014 when \"blood sugar dropped\" according to patient     Sleep apnea     doesn't use cpap     Type 2 diabetes mellitus without complications (H)      Past Surgical History:   Procedure Laterality Date     APPENDECTOMY  1980    [facility]     BACK SURGERY       COLONOSCOPY  2017     DISCECTOMY LUMBAR POSTERIOR MICROSCOPIC THREE+ " "LEVELS  12/16/2011    Procedure:DISCECTOMY LUMBAR POSTERIOR MICROSCOPIC THREE+ LEVELS; Posterior Decompression L2-S1; Surgeon:CAITIE OSMAN; Location:[facility]     FUSION CERVICAL POSTERIOR ONE LEVEL  8/24/2012    Procedure: FUSION CERVICAL POSTERIOR ONE LEVEL;  Posterior Instrumentated Spinal Fusion Cervical 6-7, Right Iliac Crest Bone [place] ;  Surgeon: Caitie Osman MD;  [facility]     GRAFT BONE FROM ILIAC CREST  8/24/2012    Procedure: GRAFT BONE FROM ILIAC CREST;;  Surgeon: Caitie Osman MD;  [facility]     HEAD & NECK SURGERY  2009     IMPLANT PACEMAKER       IMPLANT PACEMAKER       INJECT STEROID (LOCATION) N/A 2/19/2015    Procedure: INJECT STEROID (LOCATION);  Surgeon: Siri Koch MD;  [facility]     INSERT STIMULATOR AND LEADS INTERNAL DORSAL COLUMN  8/7/2013    Procedure: INSERT STIMULATOR AND LEADS INTERNAL DORSAL COLUMN;  SPINAL CORD STIMULATOR IMPLANT ;  Surgeon: Ricardo Bruno MD;  Location: [facility] OR     INSERT STIMULATOR DORSAL COLUMN  08/13/2013    lead replacemend, 12?2016,  battery replacement 4/4/2016     IR GASTROSTOMY TUBE PERCUTANEOUS PLCMNT  10/29/2015     KNEE SURGERY       LASER CO2 LARYNGOSCOPY N/A 2/19/2015    Procedure: LASER CO2 LARYNGOSCOPY;  Surgeon: Siri Koch MD;  [facility]     LASER CO2 LARYNGOSCOPY, COMPLEX  7/22/2014    Procedure: LASER CO2 LARYNGOSCOPY, COMPLEX;  Surgeon: Siri Koch MD;  [facility]     MYECTOMY SEPTAL  4/14/2014    Procedure: Median Sternotomy, Septal Myectomy, Intraoperative BRENT performed by Dr. Castano, on pump oxygenator.;  Surgeon: Aguila Cannon MD;  [facility]     NECK SURGERY       PA SPINE SURGERY PROCEDURE UNLISTED       SHOULDER SURGERY  2006    RIGHT     STOMACH SURGERY  1980    partial gastrectomy for bleeding ulcers, \"stress related\". [facility]     WRIST SURGERY Left 1988    fractured. 1988 or so     [facility] CARDIAC SURG PROCEDURE " UNLIST       ZC HAND/FINGER SURGERY UNLISTED       ZZC SHOULDER SURG PROC UNLISTED       [facility] STOMACH SURGERY PROCEDURE UNLISTED       Family History   Problem Relation Age of Onset     Heart Disease Father 32        MIs x2; s/p CABG     Substance Abuse Father      Hypertension Father      Obesity Father      Hyperlipidemia Father      Hypertension Brother      Diabetes Brother      Glaucoma Maternal Grandmother      Hypertension Maternal Grandmother      Diabetes Maternal Grandfather      Glaucoma Maternal Grandfather      Hypertension Maternal Grandfather      Cerebrovascular Disease Maternal Grandfather      Obesity Maternal Grandfather      Hyperlipidemia Maternal Grandfather      Coronary Artery Disease Maternal Grandfather      Heart Disease Maternal Grandfather      Substance Abuse Other      Cancer Other      Hypertension Other      Obesity Other      Other Cancer Other         all over     Hyperlipidemia Other      Coronary Artery Disease Other      Obesity Brother      Hyperlipidemia Brother      Lymphoma Maternal Uncle      Diabetes Brother      Depression Daughter      Depression Son      Macular Degeneration No family hx of      Heart Failure Father      Current Outpatient Medications   Medication Sig Dispense Refill     Atorvastatin Calcium (LIPITOR PO) Take 80 mg by mouth daily        cloNIDine (CATAPRES) 0.1 MG tablet Take 0.1 mg by mouth 3 times daily       docusate sodium (DSS) 100 MG capsule Take 100 mg by mouth 2 times daily       DULoxetine (CYMBALTA) 60 MG capsule Take 1 capsule (60 mg) by mouth daily 30 capsule 3     furosemide (LASIX) 40 MG tablet TAKE 1/2 A TABLET DAILY       losartan (COZAAR) 25 MG tablet Take 1 tablet (25 mg) by mouth daily 30 tablet 1     magnesium oxide (MAG-OX) 400 MG tablet Take 2.5 tablets (1,000 mg) by mouth daily 180 tablet 3     metFORMIN (GLUCOPHAGE) 500 MG tablet Take 500 mg by mouth 2 times daily (with meals)  60 tablet      OLANZapine (ZYPREXA) 5 MG tablet Take  1 tablet by mouth At Bedtime       QUEtiapine Fumarate (SEROQUEL PO) Take 25 mg by mouth 2 times daily       rivaroxaban ANTICOAGULANT (XARELTO) 10 MG TABS tablet Take 10 mg by mouth 2 times daily (with meals)       thiamine (B-1) 100 MG tablet Take 100 mg by mouth 2 times daily       traZODone (DESYREL) 50 MG tablet Take 1 tablet (50 mg) by mouth At Bedtime 30 tablet 3       Allergies   Allergen Reactions     Bee Venom Swelling     Lisinopril      TABS  Severe cough     Penicillins Hives and Difficulty breathing     Social History     Socioeconomic History     Marital status: Single     Spouse name: Not on file     Number of children: Not on file     Years of education: Not on file     Highest education level: Not on file   Occupational History     Not on file   Tobacco Use     Smoking status: Current Every Day Smoker     Packs/day: 0.12     Years: 35.00     Pack years: 4.20     Types: Cigarettes     Start date: 3/8/1982     Last attempt to quit: 2014     Years since quittin.0     Smokeless tobacco: Never Used   Substance and Sexual Activity     Alcohol use: Yes     Alcohol/week: 0.0 standard drinks     Comment: rare     Drug use: No     Comment: last using meth 2017     Sexual activity: Not Currently     Partners: Female     Birth control/protection: Male Surgical   Other Topics Concern     Parent/sibling w/ CABG, MI or angioplasty before 65F 55M? Yes   Social History Narrative     Not on file     Social Determinants of Health     Financial Resource Strain: Not on file   Food Insecurity: Not on file   Transportation Needs: Not on file   Physical Activity: Not on file   Stress: Not on file   Social Connections: Not on file   Intimate Partner Violence: Not on file   Housing Stability: Not on file       The following portions of the patient's history were reviewed and updated as appropriate: allergies, current medications, past family history, past medical history, past social history, past surgical history  and problem list.    Review of Systems  A comprehensive review of systems was negative except for what is noted above    Mental Status Exam  Appearance:  Alert.  Good eye contact.  No pain.  Behavior:  No behavioral dyscontrol.  He is calm and comfortable with me.  Participation was good today.  Speech: Not pressured or rambling.  Slightly better in flexion but still overall monotone.  Answers were appropriate. All this appears unchanged.  Mood/Affect: No robinson.  Continues slightly flat.  No lability or irritability.  Thought content: No hallucinations or delusions reported or observed, the patient denies such.  Thought formation: No recent change.  [place] and somewhat slow.  No racing thoughts.  Continues somewhat vague.  Associations: Somewhat impaired.  Able to track and follow during the interview. No recent change  Fund of knowledge: Unchanged.  Attention/Concentration: Tracking adequately.  Not disorganized.  No racing thoughts.  Insight: Impaired, no obvious change.  Patient and the niece denies any recent changes.  Judgment: Impaired, chronically with no recent reports of any change  Memory: Impaired  Motor status: Continues with some mouth movements.  Perhaps slightly better.  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    Other:   Patient will return to clinic in  to 3 months. They agree to call or return sooner with any questions or concerns.  Risks and benefits were discussed.  Continue with his individual therapist.     Continue with the support of the clinic, reassurance, and redirection. Staff monitoring and ongoing assessments per team plan. Current psychotropic medication appears to represent the minimum effective dosage and appears medically necessary. We will continue to monitor and reassess. This  team will utilize appropriate emergency services if necessary. I will make myself available if concerns or problems arise.    Total time spent with the patient today was 25 minutes with greater than 50% of the time spent in counseling and care coordination. The patient agrees to call before then with any questions, concerns or problems. We will assess for the appropriateness of possible psychotropic medication trials/changes. The patient will seek out appropriate emergency services should that become necessary.    Video Visit Details    Type of service: Video Visit    Video Start Time: 10:20 AM    Video End Time: 10:33 AM    Total time for video call: 13 minutes    Originating Location: Patient's home    Distant Location:  [facility]    Mode of Communication: Video call via [employer]    Total time for chart review, interview with family and patient, coordination of care and documentation is 25 minutes.    Patient Instructions   It was nice speaking with you today for our office video visit. The following is a summary of our visit.    General Information:    If lab work was done today as part of your evaluation you will generally be contacted via My Chart, mail, or phone with the results within 1-5 days. If there is an alarming result we will contact you by phone. Lab results come back at varying times, I generally wait until all labs are resulted before making comments on results. Please note labs are automatically released to My Chart once available.     If you need refills please contact your pharmacist. They will send a refill request to me to review. Please allow 3 business days for us to process all refill requests.     Please call or send a medical message through My Chart, with any questions or concerns.    If you need any paperwork completed please fax forms to [phone]. Please state if you would like a copy of the completed paperwork, mailed or faxed back to the  patient and a fax number to fax the paperwork to. Please allow up to 10 business days for paperwork to be completed.    Rolando Burnham MD        Again, thank you for allowing me to participate in the care of your patient.        Sincerely,        Rolando Burnham MD

## 2022-05-04 ENCOUNTER — TELEPHONE (OUTPATIENT)
Dept: NEUROLOGY | Facility: CLINIC | Age: 52
End: 2022-05-04
Payer: MEDICAID

## 2022-05-04 DIAGNOSIS — F39 MOOD DISORDER (H): Primary | ICD-10-CM

## 2022-05-04 RX ORDER — TRAZODONE HYDROCHLORIDE 100 MG/1
100 TABLET ORAL AT BEDTIME
Qty: 30 TABLET | Refills: 3 | Status: SHIPPED | OUTPATIENT
Start: 2022-05-04 | End: 2022-01-01

## 2022-05-04 NOTE — TELEPHONE ENCOUNTER
Pt had a visit with you yesterday. I called the pt and per pt said that he was supposed to get a prescription for Trazodone 100 MG sent to his pharmacy? I looked in your notes and it did not say anything about increasing Trazodone from 50 mg to 100 mg. Please advise.    JOSH Bansal on 5/4/2022 at 11:51 AM

## 2022-05-04 NOTE — TELEPHONE ENCOUNTER
Called and informed the pt that Dr. Burnham sent Rx for Trazodone 100 MG to [facility] [id number] [facility] - [place], [place] - [address] [place] [address].    Pt verbalized understanding and had no other questions.    JOSH Bansal on 5/4/2022 at 3:34 PM

## 2022-05-04 NOTE — TELEPHONE ENCOUNTER
traZODone (DESYREL) 50 MG  tablettraZODone (DESYREL) 50 MG tablet     Pharmacy  [facility] phone [phone]

## 2022-06-01 ENCOUNTER — ANCILLARY PROCEDURE (OUTPATIENT)
Dept: CARDIOLOGY | Facility: CLINIC | Age: 52
End: 2022-06-01
Attending: INTERNAL MEDICINE
Payer: MEDICAID

## 2022-06-01 DIAGNOSIS — R00.0 SINUS TACHYCARDIA: ICD-10-CM

## 2022-06-01 DIAGNOSIS — I48.91 ATRIAL FIBRILLATION, UNSPECIFIED TYPE (H): ICD-10-CM

## 2022-06-01 LAB
MDC_IDC_LEAD_IMPLANT_DT: NORMAL
MDC_IDC_LEAD_IMPLANT_DT: NORMAL
MDC_IDC_LEAD_LOCATION: NORMAL
MDC_IDC_LEAD_LOCATION: NORMAL
MDC_IDC_LEAD_MFG: NORMAL
MDC_IDC_LEAD_MFG: NORMAL
MDC_IDC_LEAD_MODEL: NORMAL
MDC_IDC_LEAD_MODEL: NORMAL
MDC_IDC_LEAD_POLARITY_TYPE: NORMAL
MDC_IDC_LEAD_POLARITY_TYPE: NORMAL
MDC_IDC_LEAD_SERIAL: NORMAL
MDC_IDC_LEAD_SERIAL: NORMAL
MDC_IDC_MSMT_BATTERY_DTM: NORMAL
MDC_IDC_MSMT_BATTERY_REMAINING_LONGEVITY: 2 MO
MDC_IDC_MSMT_BATTERY_RRT_TRIGGER: 2.73
MDC_IDC_MSMT_BATTERY_STATUS: NORMAL
MDC_IDC_MSMT_BATTERY_VOLTAGE: 2.77 V
MDC_IDC_MSMT_CAP_CHARGE_DTM: NORMAL
MDC_IDC_MSMT_CAP_CHARGE_ENERGY: 18 J
MDC_IDC_MSMT_CAP_CHARGE_TIME: 4.93
MDC_IDC_MSMT_CAP_CHARGE_TYPE: NORMAL
MDC_IDC_MSMT_LEADCHNL_RA_IMPEDANCE_VALUE: 513 OHM
MDC_IDC_MSMT_LEADCHNL_RA_PACING_THRESHOLD_AMPLITUDE: 1.5 V
MDC_IDC_MSMT_LEADCHNL_RA_PACING_THRESHOLD_PULSEWIDTH: 0.4 MS
MDC_IDC_MSMT_LEADCHNL_RA_SENSING_INTR_AMPL: 2.75 MV
MDC_IDC_MSMT_LEADCHNL_RV_IMPEDANCE_VALUE: 1083 OHM
MDC_IDC_MSMT_LEADCHNL_RV_PACING_THRESHOLD_AMPLITUDE: 1.75 V
MDC_IDC_MSMT_LEADCHNL_RV_PACING_THRESHOLD_AMPLITUDE: 2.75 V
MDC_IDC_MSMT_LEADCHNL_RV_PACING_THRESHOLD_PULSEWIDTH: 0.4 MS
MDC_IDC_MSMT_LEADCHNL_RV_PACING_THRESHOLD_PULSEWIDTH: 0.4 MS
MDC_IDC_PG_IMPLANT_DTM: NORMAL
MDC_IDC_PG_MFG: NORMAL
MDC_IDC_PG_MODEL: NORMAL
MDC_IDC_PG_SERIAL: NORMAL
MDC_IDC_PG_TYPE: NORMAL
MDC_IDC_SESS_CLINIC_NAME: NORMAL
MDC_IDC_SESS_DTM: NORMAL
MDC_IDC_SESS_TYPE: NORMAL
MDC_IDC_SET_BRADY_AT_MODE_SWITCH_RATE: 171 {BEATS}/MIN
MDC_IDC_SET_BRADY_HYSTRATE: NORMAL
MDC_IDC_SET_BRADY_LOWRATE: 60 {BEATS}/MIN
MDC_IDC_SET_BRADY_MAX_SENSOR_RATE: 120 {BEATS}/MIN
MDC_IDC_SET_BRADY_MAX_TRACKING_RATE: 140 {BEATS}/MIN
MDC_IDC_SET_BRADY_MODE: NORMAL
MDC_IDC_SET_BRADY_PAV_DELAY_LOW: 180 MS
MDC_IDC_SET_BRADY_SAV_DELAY_LOW: 150 MS
MDC_IDC_SET_LEADCHNL_RA_PACING_AMPLITUDE: 3 V
MDC_IDC_SET_LEADCHNL_RA_PACING_ANODE_ELECTRODE_1: NORMAL
MDC_IDC_SET_LEADCHNL_RA_PACING_ANODE_LOCATION_1: NORMAL
MDC_IDC_SET_LEADCHNL_RA_PACING_CAPTURE_MODE: NORMAL
MDC_IDC_SET_LEADCHNL_RA_PACING_CATHODE_ELECTRODE_1: NORMAL
MDC_IDC_SET_LEADCHNL_RA_PACING_CATHODE_LOCATION_1: NORMAL
MDC_IDC_SET_LEADCHNL_RA_PACING_POLARITY: NORMAL
MDC_IDC_SET_LEADCHNL_RA_PACING_PULSEWIDTH: 1 MS
MDC_IDC_SET_LEADCHNL_RA_SENSING_ANODE_ELECTRODE_1: NORMAL
MDC_IDC_SET_LEADCHNL_RA_SENSING_ANODE_LOCATION_1: NORMAL
MDC_IDC_SET_LEADCHNL_RA_SENSING_CATHODE_ELECTRODE_1: NORMAL
MDC_IDC_SET_LEADCHNL_RA_SENSING_CATHODE_LOCATION_1: NORMAL
MDC_IDC_SET_LEADCHNL_RA_SENSING_POLARITY: NORMAL
MDC_IDC_SET_LEADCHNL_RA_SENSING_SENSITIVITY: 0.3 MV
MDC_IDC_SET_LEADCHNL_RV_PACING_AMPLITUDE: 5 V
MDC_IDC_SET_LEADCHNL_RV_PACING_ANODE_ELECTRODE_1: NORMAL
MDC_IDC_SET_LEADCHNL_RV_PACING_ANODE_LOCATION_1: NORMAL
MDC_IDC_SET_LEADCHNL_RV_PACING_CAPTURE_MODE: NORMAL
MDC_IDC_SET_LEADCHNL_RV_PACING_CATHODE_ELECTRODE_1: NORMAL
MDC_IDC_SET_LEADCHNL_RV_PACING_CATHODE_LOCATION_1: NORMAL
MDC_IDC_SET_LEADCHNL_RV_PACING_POLARITY: NORMAL
MDC_IDC_SET_LEADCHNL_RV_PACING_PULSEWIDTH: 0.4 MS
MDC_IDC_SET_LEADCHNL_RV_SENSING_ANODE_ELECTRODE_1: NORMAL
MDC_IDC_SET_LEADCHNL_RV_SENSING_ANODE_LOCATION_1: NORMAL
MDC_IDC_SET_LEADCHNL_RV_SENSING_CATHODE_ELECTRODE_1: NORMAL
MDC_IDC_SET_LEADCHNL_RV_SENSING_CATHODE_LOCATION_1: NORMAL
MDC_IDC_SET_LEADCHNL_RV_SENSING_POLARITY: NORMAL
MDC_IDC_SET_LEADCHNL_RV_SENSING_SENSITIVITY: 0.3 MV
MDC_IDC_SET_ZONE_DETECTION_BEATS_DENOMINATOR: 40 {BEATS}
MDC_IDC_SET_ZONE_DETECTION_BEATS_NUMERATOR: 30 {BEATS}
MDC_IDC_SET_ZONE_DETECTION_INTERVAL: 270 MS
MDC_IDC_SET_ZONE_DETECTION_INTERVAL: 350 MS
MDC_IDC_SET_ZONE_DETECTION_INTERVAL: 360 MS
MDC_IDC_SET_ZONE_DETECTION_INTERVAL: 400 MS
MDC_IDC_SET_ZONE_DETECTION_INTERVAL: NORMAL
MDC_IDC_SET_ZONE_TYPE: NORMAL
MDC_IDC_STAT_AT_BURDEN_PERCENT: 0 %
MDC_IDC_STAT_AT_DTM_END: NORMAL
MDC_IDC_STAT_AT_DTM_START: NORMAL
MDC_IDC_STAT_BRADY_AP_VP_PERCENT: 5.13 %
MDC_IDC_STAT_BRADY_AP_VS_PERCENT: 0 %
MDC_IDC_STAT_BRADY_AS_VP_PERCENT: 94.76 %
MDC_IDC_STAT_BRADY_AS_VS_PERCENT: 0.1 %
MDC_IDC_STAT_BRADY_DTM_END: NORMAL
MDC_IDC_STAT_BRADY_DTM_START: NORMAL
MDC_IDC_STAT_BRADY_RA_PERCENT_PACED: 5.13 %
MDC_IDC_STAT_BRADY_RV_PERCENT_PACED: 99.89 %
MDC_IDC_STAT_EPISODE_RECENT_COUNT: 0
MDC_IDC_STAT_EPISODE_RECENT_COUNT_DTM_END: NORMAL
MDC_IDC_STAT_EPISODE_RECENT_COUNT_DTM_START: NORMAL
MDC_IDC_STAT_EPISODE_TOTAL_COUNT: 0
MDC_IDC_STAT_EPISODE_TOTAL_COUNT: 2
MDC_IDC_STAT_EPISODE_TOTAL_COUNT: 2
MDC_IDC_STAT_EPISODE_TOTAL_COUNT: 29
MDC_IDC_STAT_EPISODE_TOTAL_COUNT_DTM_END: NORMAL
MDC_IDC_STAT_EPISODE_TOTAL_COUNT_DTM_START: NORMAL
MDC_IDC_STAT_EPISODE_TYPE: NORMAL
MDC_IDC_STAT_TACHYTHERAPY_ATP_DELIVERED_RECENT: 0
MDC_IDC_STAT_TACHYTHERAPY_ATP_DELIVERED_TOTAL: 0
MDC_IDC_STAT_TACHYTHERAPY_RECENT_DTM_END: NORMAL
MDC_IDC_STAT_TACHYTHERAPY_RECENT_DTM_START: NORMAL
MDC_IDC_STAT_TACHYTHERAPY_SHOCKS_ABORTED_RECENT: 0
MDC_IDC_STAT_TACHYTHERAPY_SHOCKS_ABORTED_TOTAL: 0
MDC_IDC_STAT_TACHYTHERAPY_SHOCKS_DELIVERED_RECENT: 0
MDC_IDC_STAT_TACHYTHERAPY_SHOCKS_DELIVERED_TOTAL: 2
MDC_IDC_STAT_TACHYTHERAPY_TOTAL_DTM_END: NORMAL
MDC_IDC_STAT_TACHYTHERAPY_TOTAL_DTM_START: NORMAL

## 2022-06-01 PROCEDURE — 93283 PRGRMG EVAL IMPLANTABLE DFB: CPT | Performed by: INTERNAL MEDICINE

## 2022-06-03 ENCOUNTER — TELEPHONE (OUTPATIENT)
Dept: CARDIOLOGY | Facility: CLINIC | Age: 52
End: 2022-06-03
Payer: MEDICAID

## 2022-06-03 NOTE — TELEPHONE ENCOUNTER
I called Mr Sharp today to update him on the plans for his lead extraction and replacement of his ICD system. We had planned extracting the atrial lead and replacing it at the time of generator change. During the last device clinic visit, we noticed that his RV lead thresholds are increasing again and he has 2 months left on his device. I recommended that we also extract and replace the RV lad since he is dependant. Now with the RV output being higher, the life of the device is going to be shorter and I advised that we start planning the extraction and replacement of the ICD system. We went over the procedure again with the risks and alternatives. He expressed his understanding of the new plan and the risks of the procedure and desire to proceed. We will start the scheduling process.

## 2022-06-08 DIAGNOSIS — I48.91 ATRIAL FIBRILLATION, UNSPECIFIED TYPE (H): Primary | ICD-10-CM

## 2022-06-08 DIAGNOSIS — T82.110A FRACTURED ATRIAL PACEMAKER LEAD WIRE, INITIAL ENCOUNTER: ICD-10-CM

## 2022-06-08 DIAGNOSIS — I42.2 HYPERTROPHIC CARDIOMYOPATHY (H): ICD-10-CM

## 2022-06-08 NOTE — PROGRESS NOTES
Date: 6/8/2022    Time of Call: 2:53 PM     Diagnosis:  Lead extraction pre op     [ TORB ] Ordering provider: Nely Dietz NP  Order:chest CT non contrast     Order received by: Sarika Zendejas RN      Follow-up/additional notes: sent to [facility] scheduling

## 2022-06-09 DIAGNOSIS — I42.2 HYPERTROPHIC CARDIOMYOPATHY (H): Primary | ICD-10-CM

## 2022-06-13 DIAGNOSIS — I42.2 HYPERTROPHIC CARDIOMYOPATHY (H): ICD-10-CM

## 2022-06-13 DIAGNOSIS — I42.2 HYPERTROPHIC NONOBSTRUCTIVE CARDIOMYOPATHY (H): Primary | ICD-10-CM

## 2022-06-13 DIAGNOSIS — Z95.810 AUTOMATIC IMPLANTABLE CARDIOVERTER-DEFIBRILLATOR IN SITU: ICD-10-CM

## 2022-06-13 DIAGNOSIS — Z98.890 S/P VENTRICULAR SEPTAL MYECTOMY: ICD-10-CM

## 2022-06-13 RX ORDER — CLINDAMYCIN PHOSPHATE 900 MG/50ML
900 INJECTION, SOLUTION INTRAVENOUS
Status: CANCELLED | OUTPATIENT
Start: 2022-06-13

## 2022-06-13 RX ORDER — SODIUM CHLORIDE 9 MG/ML
INJECTION, SOLUTION INTRAVENOUS CONTINUOUS
Status: CANCELLED | OUTPATIENT
Start: 2022-06-13

## 2022-06-13 RX ORDER — LIDOCAINE 40 MG/G
CREAM TOPICAL
Status: CANCELLED | OUTPATIENT
Start: 2022-06-13

## 2022-06-14 ENCOUNTER — ANCILLARY PROCEDURE (OUTPATIENT)
Dept: CT IMAGING | Facility: CLINIC | Age: 52
End: 2022-06-14
Attending: NURSE PRACTITIONER
Payer: MEDICAID

## 2022-06-14 DIAGNOSIS — I42.2 HYPERTROPHIC CARDIOMYOPATHY (H): ICD-10-CM

## 2022-06-14 PROCEDURE — 71250 CT THORAX DX C-: CPT | Performed by: RADIOLOGY

## 2022-06-22 ENCOUNTER — ANCILLARY PROCEDURE (OUTPATIENT)
Dept: CARDIOLOGY | Facility: CLINIC | Age: 52
End: 2022-06-22
Attending: INTERNAL MEDICINE
Payer: MEDICAID

## 2022-06-22 DIAGNOSIS — I48.91 ATRIAL FIBRILLATION, UNSPECIFIED TYPE (H): ICD-10-CM

## 2022-06-22 DIAGNOSIS — R00.0 SINUS TACHYCARDIA: ICD-10-CM

## 2022-06-22 PROCEDURE — 93295 DEV INTERROG REMOTE 1/2/MLT: CPT | Performed by: INTERNAL MEDICINE

## 2022-06-22 PROCEDURE — 93296 REM INTERROG EVL PM/IDS: CPT

## 2022-06-23 ENCOUNTER — ANCILLARY PROCEDURE (OUTPATIENT)
Dept: CARDIOLOGY | Facility: CLINIC | Age: 52
End: 2022-06-23
Attending: INTERNAL MEDICINE
Payer: MEDICAID

## 2022-06-23 DIAGNOSIS — I48.91 ATRIAL FIBRILLATION, UNSPECIFIED TYPE (H): ICD-10-CM

## 2022-06-23 DIAGNOSIS — R00.0 SINUS TACHYCARDIA: ICD-10-CM

## 2022-06-23 PROCEDURE — 93283 PRGRMG EVAL IMPLANTABLE DFB: CPT | Performed by: INTERNAL MEDICINE

## 2022-06-23 NOTE — PATIENT INSTRUCTIONS
It was a pleasure to see you in clinic today.  Please do not hesitate to call with any questions or concerns.  We look forward to seeing you in clinic at your next device check 7-10 days post-op ICD generator change.    Raven Pierre, RN, MS, CCRN  Electrophysiology Nurse Clinician  [facility]    During Business Hours Please Call:  [phone]  After Hours Please Call:  [phone] - select option #4 and ask for job code [phone]

## 2022-07-13 NOTE — TELEPHONE ENCOUNTER
Called Mo to discuss his upcoming procedure on the 18th. We talked through instructions. He understood his medication hold times and where to look if he has any questions. Informed him to hold his Xarelto 2 days prior and to not take his metformin the day of- he understood and had no further questions.  Jaida Cabrales RN on 7/13/2022 at 11:30 AM

## 2022-07-18 NOTE — DISCHARGE INSTRUCTIONS
Home Care after an ICD implant    Wound care:  Keep your incision (surgery wound) dry for 3 days.  After 3 days, you may remove the outer bandage.  Keep the strips of tape on.  They will be removed at your clinic visit.  Check for signs of infection each day.  These include increased redness, swelling, drainage or a fever over 101 F (38.3 C).  Call us immediately if you see any of these signs.  If there are no signs of infection, you may shower in 3 days.  Do not submerge the incision (in a bath tub, hot tub, or swimming pool) until fully healed.  Pain:   You may have mild to moderate pain for 3 to 5 days.  Take acetaminophen (Tylenol) or ibuprofen (Advil) for the pain.  Call us if the pain is severe or lasts more than 5 days.    Activity:  You should slowly go back to your normal activities after 24 hours.  Healing will take 4 to 6 weeks.  No driving for 3 days  Avoid climbing a ladder alone.  It is best to stay within 4 feet of the ground.  Avoid anything that may cause rough contact or a hard hit to your chest.  This includes football, hockey, and other contact sports.  Do not go swimming or boating alone.      For at least 4 weeks:  Do not raise your affected arm above your shoulder.  Do not use your affected arm to push, pull, or lift anything over 10 pounds.  Avoid repetitive upper body activities for 6 weeks (ie: golf, swimming, and weight lifting)    Follow Up Visits:  Return to the clinic in 7 to 10 days to have your device and wound checked.    Telling others about your device:  Before you have any medical tests or treatments, tell the doctors, dentists, and other care providers about your device.  There are a few tests and treatments that may interfere with your device.  (These include MRI, radiation therapy, electrocautery, and others.)  Your care team may need to take special steps to keep you safe.  Before you leave the hospital, you will receive a temporary ID card.  A permanent card will be mailed  to you about 6 to 8 weeks later.  Always carry the ID card with you.  It has important details about your device.  You should also get a MedicAlert ID.  Please ask us for a MedicAlert brochure, or go to [email or web address].    Safety near electrical equipment:  All of these are safe to use when in good repair:  Microwaves  Radios  Cordless phone  Remote controls  Small electrical tools  Cell phones: Keep cell phones at least 6 inches from your device.  Do not carry the phone in a pocket near your device.  Security vásquez: It is okay to walk through security vásquez at the airports and department stores.  Tell airport security that you have a defibrillator.  They should keep the screening wand at least 6 inches from your device.  Full-body scanners are safe.    Avoid the following:   MRI tests in the hospital unless you have a MRI safe defibrillator.   Arc welding, chain saws and high-powered industrial or commercial tools.   Power lines, power plants and large power generators.   Electric body fat scales.   Magnetic mattress pads or pillow.    What to do after a shock from your ICD:  Stop what you re doing and rest.  If you feel fine before and after the shock, call the device clinic.  If you feel unwell or receive more than one shock, call 911 or go to the emergency room.  A shock could mean that your condition has changed and you may need to see a doctor.    Questions?  Please call [facility]   Device Nurse:          Business Hours:  [phone]    After Hours:  [phone]   Choose option 4, then ask for the on-call device nurse at job code [id number].    Your next device clinic appointment is scheduled on:    Monday, July 25th, 2022 at 1:30 PM.                                                 [facility]  [facility] [address]  [address] [place] [address]  [id number]  Groin Sites:  For 24 hours:        Have an adult stay with you for 24 hours.        Relax  and take it easy.        Drink plenty of fluids.        You may eat your normal diet, unless your doctor tells you otherwise.        Do NOT make any important or legal decisions.        Do NOT drive or operate machines at home or at work.        Do NOT drink alcohol.        Care of groin site:        Remove the Band-Aid after 24 hours. If there is minor oozing, apply another Band-aid and remove it after 12 hours.         Do NOT take a bath, or use a hot tub or pool for at least 3 days. You may shower.         It is normal to have a small bruise or lump at the site.        Do not scrub the site.        Do not use lotion or powder near the puncture site for 3 days.        For the first 2 days: Do not stoop or squat. When you cough, sneeze or move your bowels, hold your hand over the puncture site and press gently.        Do not lift more than 10 pounds for at least 3 to 5 days.        For 2 days, do NOT have sex or do any heavy exercise.     If you start bleeding from the site in your groin:  Lie down flat and press firmly on the site.  Call your physician immediately, or, come to the emergency room.    Call 911 right away if you have bleeding that is heavy or does not stop.     Call your doctor if:        You have a large or growing hard lump around the site.        The site is red, swollen, hot or tender.        Blood or fluid is draining from the site.        You have chills or a fever greater than 101 F (38 C).        Your leg or arm turns bluish, feels numb or cool.        You have hives, a rash or unusual itching.

## 2022-07-18 NOTE — ANESTHESIA CARE TRANSFER NOTE
Patient: Konstantin Sharp    Procedure: Procedure(s):  EXTRACTION, ELECTRODE LEAD, MYOCARDIAL, USING LASER, WITH ANESTHESIA  Implantable Cardioverter Defibrillator Generator Replacement Dual       Diagnosis: Hypertrophic nonobstructive cardiomyopathy (H) [I42.2]  S/P ventricular septal myectomy [Z98.890]  Automatic implantable cardioverter-defibrillator in situ [Z95.810]  Hypertrophic cardiomyopathy (H) [I42.2]  Diagnosis Additional Information: No value filed.    Anesthesia Type:   General     Note:    Oropharynx: oropharynx clear of all foreign objects and spontaneously breathing  Level of Consciousness: awake  Oxygen Supplementation: nasal cannula  Level of Supplemental Oxygen (L/min / FiO2): 3  Independent Airway: airway patency satisfactory and stable  Dentition: dentition unchanged      Patient transferred to: PACU    Handoff Report: Identifed the Patient, Identified the Reponsible Provider, Reviewed the pertinent medical history, Discussed the surgical course, Reviewed Intra-OP anesthesia mangement and issues during anesthesia, Set expectations for post-procedure period and Allowed opportunity for questions and acknowledgement of understanding      Vitals:  Vitals Value Taken Time   /96 07/18/22 1334   Temp     Pulse 114 07/18/22 1339   Resp     SpO2 95 % 07/18/22 1339   Vitals shown include unvalidated device data.    Electronically Signed By: SHAHEED Fernández CRNA  July 18, 2022  1:40 PM

## 2022-07-18 NOTE — ANESTHESIA POSTPROCEDURE EVALUATION
Patient: Konstantin Sharp    Procedure: Procedure(s):  EXTRACTION, ELECTRODE LEAD, MYOCARDIAL, USING LASER, WITH ANESTHESIA  Implantable Cardioverter Defibrillator Generator Replacement Dual       Anesthesia Type:  General    Note:  Disposition: Outpatient   Postop Pain Control: Uneventful            Sign Out: Well controlled pain   PONV: No   Neuro/Psych: Uneventful            Sign Out: Acceptable/Baseline neuro status   Airway/Respiratory: Uneventful            Sign Out: Acceptable/Baseline resp. status   CV/Hemodynamics: Uneventful            Sign Out: Acceptable CV status; No obvious hypovolemia; No obvious fluid overload   Other NRE: NONE   DID A NON-ROUTINE EVENT OCCUR? No           Last vitals:  Vitals Value Taken Time   /83 07/18/22 1445   Temp 37.1  C (98.8  F) 07/18/22 1333   Pulse 105 07/18/22 1447   Resp 17 07/18/22 1415   SpO2 93 % 07/18/22 1447   Vitals shown include unvalidated device data.    Electronically Signed By: Aden Garcia MD  July 18, 2022  2:49 PM

## 2022-07-18 NOTE — H&P
Electrophysiology Pre-Procedure History and Physical    Konstantin Sharp MRN# [id number]   Age: 52 year old YOB: 1970      Date of Procedure: 7/18/2022  [facility]      Date of Exam 7/18/2022 Facility (Same day)       Home clinic: [facility]  Primary care provider: Damon Cooper  HPI:  Mr. Sharp is a 52 year old male who has a past medical history significant for HCM s/p septal myomectomy 2014, CHB s/p ICD 4/29/14, DM, asthma, HTN, remote history of AF, CLARY, chronic pain from multiple cervical spine surgeries, depression, and anxiety. Given his symptomatic obstruction the patient was referred to CV surgery for myomectomy that he underwent on 4/14/2014. The patient developed intraoperative complete heart block that has not resolved. Patient has class IIA indication for ICD implant given unexplained syncope and first degree relative with sudden cardiac death, also clear pacing indication with AV dissociation. We explained these options in detail to the patient and he wished to have ICD implanted. A dual-chamber ICD was implanted on 4/29/2014. Now with RA and RV lead dysfunctions. He is pacing dependent. We discussed lead management options in detail including placement of new leads and retention of old ones or lead extractions and replacement. He elected to pursue extraction and reimplant. Last echo showed LVEF 60-65%, LVH present, no LVOT obstruction, normal RV size/function, and no significant valvular abnormalities.         Active problem list:     Patient Active Problem List    Diagnosis Date Noted     Chronic pain due to trauma 09/03/2016     Priority: Medium     Overview:   Broken back/neck 1996/2007 respectively. Did have procedure for spine stimulator       Esophageal reflux 09/03/2016     Priority: Medium     Essential hypertension 09/03/2016     Priority: Medium     History of traumatic brain injury 09/03/2016     Priority:  Medium     Large bowel obstruction (H) 09/03/2016     Priority: Medium     S/P laminectomy 09/03/2016     Priority: Medium     Overview:   replacement of failed spinal cord stimulator & placement of epidural paddle electrode - 9/2/2016       Tobacco dependence 09/03/2016     Priority: Medium     Cognitive disorder 06/16/2016     Priority: Medium     Type 2 diabetes mellitus with diabetic neuropathy (H) 05/02/2016     Priority: Medium     Seizures (H) 02/01/2016     Priority: Medium     Respiratory failure (H) 10/19/2015     Priority: Medium     Insomnia 03/13/2015     Priority: Medium     [facility] 03/11/2015     Priority: Medium     For urgent after hours needs, please call [phone] and ask to speak with the [facility] Physician on call - mention job code [phone].           Dysphonia 01/05/2015     Priority: Medium     Postprocedural subglottic stenosis 07/22/2014     Priority: Medium     Pre-operative cardiovascular examination 07/16/2014     Priority: Medium     Automatic implantable cardioverter-defibrillator in situ- [employer], dual chamber- DEPENDENT 04/30/2014     Priority: Medium     Implanted 4/29/14 by Dr. Lacy  Problem list name updated by automated process. Provider to review       S/P ventricular septal myectomy 04/14/2014     Priority: Medium     Syncope 12/28/2013     Priority: Medium     Atrial fibrillation (H) 05/15/2013     Priority: Medium     Asthma 01/24/2013     Priority: Medium     Depressive disorder 01/24/2013     Priority: Medium     Old myocardial infarction 01/24/2013     Priority: Medium     Osteoarthrosis 01/24/2013     Priority: Medium     Spinal stenosis, lumbar region, with neurogenic claudication 11/15/2012     Priority: Medium     Lumbar radicular pain 11/15/2012     Priority: Medium     s/p PSF C6-7 for anterior pseudarthrosis 10/11/2012     Priority: Medium     Hypertrophic nonobstructive  cardiomyopathy (H) 2012     Priority: Medium     Chest pain 2012     Priority: Medium     Sinus tachycardia 2012     Priority: Medium     Pseudoarthrosis of cervical spine (H) 2012     Priority: Medium     Chondromalacia of patella 10/25/2011     Priority: Medium     Anticoagulant long-term use 2011     Priority: Medium     Cervical vertebral fusion 2010     Priority: Medium     Herniated cervical intervertebral disc 2008     Priority: Medium            Medications (include herbals and vitamins):      No current facility-administered medications for this encounter.     Facility-Administered Medications Ordered in Other Encounters   Medication     sodium chloride (PF) 0.9% PF flush 10 mL           Medication List      There are no discharge medications for this visit.              Allergies:      Allergies   Allergen Reactions     Bee Venom Swelling     Lisinopril      TABS  Severe cough     Penicillins Hives and Difficulty breathing             Social History:     Social History     Tobacco Use     Smoking status: Current Every Day Smoker     Packs/day: 0.12     Years: 35.00     Pack years: 4.20     Types: Cigarettes     Start date: 3/8/1982     Last attempt to quit: 2014     Years since quittin.2     Smokeless tobacco: Never Used   Substance Use Topics     Alcohol use: Yes     Alcohol/week: 0.0 standard drinks     Comment: rare            Physical Exam:   All vitals have been reviewed  No data found.  No intake/output data recorded.  Airway assessment:   Patient is able to open mouth wide  Patient is able to stick out tongue}      ENT:   Normocephalic, without obvious abnormality, atraumatic, sinuses nontender on palpation, external ears without lesions, oral pharynx with moist mucous membranes, tonsils without erythema or exudates, gums normal and good dentition.     Neck:   Supple, symmetrical, trachea midline, no adenopathy, thyroid symmetric, not enlarged and no  tenderness, skin normal     Lungs:   No increased work of breathing, good air exchange, clear to auscultation bilaterally, no crackles or wheezing     Cardiovascular:   Normal apical impulse, regular rate and rhythm, normal S1 and S2, no S3 or S4, and no murmur noted             Lab / Radiology Results:     Lab Results   Component Value Date    WBC 14.3 02/16/2022    WBC 9.3 01/31/2018    RBC 5.76 02/16/2022    RBC 4.81 01/31/2018    HGB 18.2 02/16/2022    HGB 15.4 01/31/2018    HCT 52.2 02/16/2022    HCT 45.0 01/31/2018    MCV 91 02/16/2022    MCV 94 01/31/2018    RDW 12.6 02/16/2022    RDW 13.6 01/31/2018     02/16/2022     01/31/2018      Lab Results   Component Value Date    WBC 14.3 02/16/2022    WBC 9.3 01/31/2018     Lab Results   Component Value Date     02/16/2022     01/31/2018     Lab Results   Component Value Date    HGB 18.2 02/16/2022    HGB 15.4 01/31/2018    HCT 52.2 02/16/2022    HCT 45.0 01/31/2018     Lab Results   Component Value Date     06/27/2022     03/17/2015    CO2 23 06/27/2022    CO2 27 02/16/2022    CO2 25 03/17/2015    BUN 15.0 06/27/2022    BUN 12 02/16/2022    BUN 14 03/17/2015    CREAT 1.1 01/17/2015     Lab Results   Component Value Date     06/27/2022     03/17/2015    CO2 23 06/27/2022    CO2 27 02/16/2022    CO2 25 03/17/2015    BUN 15.0 06/27/2022    BUN 12 02/16/2022    BUN 14 03/17/2015    CREAT 1.1 01/17/2015     Lab Results   Component Value Date     06/27/2022     03/17/2015     No components found for: K  Lab Results   Component Value Date    BUN 15.0 06/27/2022    BUN 12 02/16/2022    BUN 14 03/17/2015    CREAT 1.1 01/17/2015     No components found for: AP, ALB, PROT, SGOT, SGPT, TBIL, DBILCALC  No components found for: ALB  No components found for: PTINR  No components found for: APTT[APTT}  No components found for: UCOLOR, UCLARITY, USPGRAV, UPH, UPROTEIN, UGLUCOSE, UKETONE, UBILI, UBLOOD, UNITRITE,  UUROBIL, ULEUKEST, USQEPI, URENEPI, UWBC, URBC, UBACTERIA, UHYALINE  Lab Results   Component Value Date    TSH 0.52 01/17/2015             Plan:   Patient's active problems diagnostically and therapeutically optimized for the planned procedure. Patient here for ICD extraction and reimplant. Procedure explained in detail to patient including indications, risks, and benefits. I had a long discussion with the patient and family about lead extraction.  We discussed the indications for lead extraction, the extraction procedure and the risks of extraction.  These risks include vascular tear, cardiac tear, clotting and occlusion of a vessel, infection, damage to other components of the implanted device, bleeding, death, and need for emergency cardiopulmonary bypass, sternotomy or thoracotomy.  The patient understands and wishes to proceed.  Signed consent was obtained and is on the chart.He states understanding and wishes to procced.     SHAHEED Ortega CNP  Electrophysiology Consult Service  Pager: [phone]

## 2022-07-18 NOTE — PROGRESS NOTES
Patient ready for discharge. He tolerated a regular diet, ambulated, and voided without issue. Bilateral groin sites and left upper chest remain unchanged. Patient refused offer of a sling. PIV removed x 2. Patient and sister-in-law, Leilani verbalize understanding of all discharge instructions. Patient assisted to main entrance via wheelchair with all belongings accompanied by staff and Leilani (AI) to wait for ride home.

## 2022-07-18 NOTE — PROGRESS NOTES
Patient off bedrest at 1530, Yeung catheter removed. Patient ambulated in the hallway and back to room. Bilateral groin sites remain soft and flat to palpation with no bleeding or hematoma. Left upper chest wound with no change. Patient denies pain or discomfort outside of baseline. He is alert and oriented and able to make needs known. Will continue to monitor.

## 2022-07-18 NOTE — ANESTHESIA PREPROCEDURE EVALUATION
"Anesthesia Pre-Procedure Evaluation    Patient: Konstantin Sharp   MRN: [id number] : 1970        Procedure : Procedure(s):  EXTRACTION, ELECTRODE LEAD, MYOCARDIAL, USING LASER, WITH ANESTHESIA  Implantable Cardioverter Defibrillator Generator Replacement Dual          Past Medical History:   Diagnosis Date     Atrial fibrillation (H)      Chest pain     intermittent     Chronic pain      Cognitive disorder     memory loss     Depressive disorder      Dual ICD (implantable cardiac defibrillator) in place 2014    and pacemaker     GERD (gastroesophageal reflux disease)      H/O traumatic brain injury      Heart attack (H)      HTN (hypertension)      Hypertrophic nonobstructive cardiomyopathy (H) 2012    s/p ventricular myectomy     Inflammatory arthritis      Intermittent asthma      PAD (peripheral artery disease) (H)      Polysubstance abuse (H)      Seizures (H)     last episode  when \"blood sugar dropped\" according to patient     Sleep apnea     doesn't use cpap     Type 2 diabetes mellitus without complications (H)       Past Surgical History:   Procedure Laterality Date     APPENDECTOMY      [facility]     BACK SURGERY       COLONOSCOPY  2017     DISCECTOMY LUMBAR POSTERIOR MICROSCOPIC THREE+ LEVELS  2011    Procedure:DISCECTOMY LUMBAR POSTERIOR MICROSCOPIC THREE+ LEVELS; Posterior Decompression L2-S1; Surgeon:CAITIE FUNEZ; Location:[facility]     FUSION CERVICAL POSTERIOR ONE LEVEL  2012    Procedure: FUSION CERVICAL POSTERIOR ONE LEVEL;  Posterior Instrumentated Spinal Fusion Cervical 6-7, Right Iliac Crest Bone [place] ;  Surgeon: Caitie Funez MD;  [facility]     GRAFT BONE FROM ILIAC CREST  2012    Procedure: GRAFT BONE FROM ILIAC CREST;;  Surgeon: Caitie Funez MD;  [facility]     HEAD & NECK SURGERY       IMPLANT PACEMAKER       IMPLANT PACEMAKER       INJECT STEROID (LOCATION) N/A 2015    " "Procedure: INJECT STEROID (LOCATION);  Surgeon: Siri Koch MD;  [facility]     INSERT STIMULATOR AND LEADS INTERNAL DORSAL COLUMN  2013    Procedure: INSERT STIMULATOR AND LEADS INTERNAL DORSAL COLUMN;  SPINAL CORD STIMULATOR IMPLANT ;  Surgeon: Ricardo Bruno MD;  Location: [facility]     INSERT STIMULATOR DORSAL COLUMN  2013    lead replacemend, 12?2016,  battery replacement 2016     IR GASTROSTOMY TUBE PERCUTANEOUS PLCMNT  10/29/2015     KNEE SURGERY       LASER CO2 LARYNGOSCOPY N/A 2015    Procedure: LASER CO2 LARYNGOSCOPY;  Surgeon: Siri Koch MD;  [facility]     LASER CO2 LARYNGOSCOPY, COMPLEX  2014    Procedure: LASER CO2 LARYNGOSCOPY, COMPLEX;  Surgeon: Siri Koch MD;  [facility]     MYECTOMY SEPTAL  2014    Procedure: Median Sternotomy, Septal Myectomy, Intraoperative BRENT performed by Dr. Castano, on pump oxygenator.;  Surgeon: Aguila Cannon MD;  [facility]     NECK SURGERY       IA SPINE SURGERY PROCEDURE UNLISTED       SHOULDER SURGERY      RIGHT     STOMACH SURGERY      partial gastrectomy for bleeding ulcers, \"stress related\". [facility]     WRIST SURGERY Left     fractured. 1988 or so     [facility] CARDIAC SURG PROCEDURE UNLIST       [facility] HAND/FINGER SURGERY UNLISTED       [facility] SHOULDER SURG PROC UNLISTED       [facility] STOMACH SURGERY PROCEDURE UNLISTED        Allergies   Allergen Reactions     Bee Venom Swelling     Lisinopril      TABS  Severe cough     Penicillins Hives and Difficulty breathing      Social History     Tobacco Use     Smoking status: Current Every Day Smoker     Packs/day: 0.12     Years: 35.00     Pack years: 4.20     Types: Cigarettes     Start date: 3/8/1982     Last attempt to quit: 2014     Years since quittin.2     Smokeless tobacco: Never Used   Substance Use Topics     Alcohol use: Yes     Alcohol/week: 0.0 standard drinks     Comment: rare      Wt Readings from " Last 1 Encounters:   03/04/22 98.1 kg (216 lb 3.2 oz)        Anesthesia Evaluation   Pt has had prior anesthetic. Type: General.    No history of anesthetic complications       ROS/MED HX  ENT/Pulmonary: Comment: Subglottic stenosis, h/o trach in setting of prolonged intubation postop, since removed    (+) sleep apnea, Intermittent, asthma     Neurologic: Comment: S/p c-spine fusion, lumbar spine surgery   (-) no seizures and no CVA   Cardiovascular: Comment: HCM s/p septal myectomy 2014  Afib  Complete heart block, family h/o sudden cardiac death, ICD placement now with malfunctioning leads, plan for extraction and replacement    (+) hypertension-----fainting (syncope). ICD Previous cardiac testing   Echo: Date: 3/4/22 Results:  EF 60-65%, normal RV function, moderate concentric wall thickening, no dynamic outflow obstruction noted  Stress Test: Date: Results:    ECG Reviewed: Date: Results:    Cath: Date: Results:      METS/Exercise Tolerance:     Hematologic:       Musculoskeletal:       GI/Hepatic:     (+) GERD,     Renal/Genitourinary:       Endo:     (+) type II DM,     Psychiatric/Substance Use:       Infectious Disease:       Malignancy:       Other:            Physical Exam    Airway        Mallampati: III   TM distance: < 3 FB   Neck ROM: limited   Mouth opening: > 3 cm    Respiratory Devices and Support         Dental       (+) upper dentures and lower dentures      Cardiovascular          Rhythm and rate: regular and normal     Pulmonary           breath sounds clear to auscultation           OUTSIDE LABS:  CBC:   Lab Results   Component Value Date    WBC 14.3 (H) 02/16/2022    WBC 9.3 01/31/2018    HGB 18.2 (H) 02/16/2022    HGB 15.4 01/31/2018    HCT 52.2 02/16/2022    HCT 45.0 01/31/2018     02/16/2022     01/31/2018     BMP:   Lab Results   Component Value Date     06/27/2022     02/16/2022    POTASSIUM 3.5 06/27/2022    POTASSIUM 3.5 02/16/2022    CHLORIDE 96 (L) 06/27/2022     CHLORIDE 101 02/16/2022    CO2 23 06/27/2022    CO2 27 02/16/2022    BUN 15.0 06/27/2022    BUN 12 02/16/2022    CR 0.78 06/27/2022    CR 0.65 (L) 02/16/2022     (H) 07/18/2022     (H) 06/27/2022     COAGS:   Lab Results   Component Value Date    PTT 26 02/19/2015    INR 0.98 01/31/2018    FIBR 280 04/14/2014     POC:   Lab Results   Component Value Date     (H) 01/31/2018     HEPATIC:   Lab Results   Component Value Date    ALBUMIN 3.4 01/17/2015    PROTTOTAL 6.7 (L) 01/17/2015    ALT 30 01/17/2015    AST 23 01/17/2015    ALKPHOS 81 01/17/2015    BILITOTAL 0.4 01/17/2015    KALLIE 34 01/17/2015     OTHER:   Lab Results   Component Value Date    PH 7.43 04/26/2014    LACT 2.1 (H) 02/16/2022    A1C 8.1 (H) 01/23/2018    TANIA 9.8 06/27/2022    PHOS 4.1 01/17/2015    MAG 2.1 01/18/2015    LIPASE 29 04/17/2014    AMYLASE 38 04/17/2014    TSH 0.52 01/17/2015    T4 0.83 03/01/2014    CRP 19.6 (H) 06/06/2014    SED 8 09/30/2013       Anesthesia Plan    ASA Status:  3   NPO Status:  NPO Appropriate    Anesthesia Type: General.     - Airway: ETT   Induction: Intravenous.   Maintenance: Balanced.   Techniques and Equipment:     - Airway: Video-Laryngoscope     - Lines/Monitors: 2nd IV, Arterial Line, Central Line, NIRS, BRENT            BRENT Absolute Contra-indication: NONE            BRENT Relative Contra-indication: NONE     - Blood: Blood in Room     Consents    Anesthesia Plan(s) and associated risks, benefits, and realistic alternatives discussed. Questions answered and patient/representative(s) expressed understanding.    - Discussed:     - Discussed with:  Patient         Postoperative Care    Pain management: IV analgesics.   PONV prophylaxis: Ondansetron (or other 5HT-3)     Comments:                Tianna Geronimo MD

## 2022-07-18 NOTE — PROGRESS NOTES
Patient arrived to , [address], via litter from the PACU s/p lead extraction. Left upper chest [place] in place with small amount of drainage marked and without change. Bilateral groin sites, left side soft and flat to palpation with no bleeding or hematoma. Right groin site soft and flat to palpation with no bleeding or hematoma but scant pinpoint sized drainage marked and without change. Patient is alert and able to make needs known. He is eating now. Bedrest until 1530.

## 2022-07-18 NOTE — ANESTHESIA PROCEDURE NOTES
Perioperative BRENT Procedure Note    Staff -        Anesthesiologist:  Tianna Geronimo MD       Performed By: anesthesiologist  Preanesthesia Checklist:  Patient identified, IV assessed, risks and benefits discussed, monitors and equipment assessed, procedure being performed at surgeon's request and anesthesia consent obtained.    BRENT Probe Insertion    Probe Status PRE Insertion: NO obvious damage  Probe type:  Adult 3D  Bite block used:   Molar  Insertion Technique: Jaw Lift  Insertion complications: None obvious  Billing Report:BRENT report by Anesthesiologist (See Separate Report note)  Probe Status POST Removal: NO obvious damage    BRENT Report  General Procedure Information  Images for this study have been archived.  Modalities: 2D and Color flow mapping  Diagnostic Indications comments: Laser lead extraction.  Echocardiographic and Doppler Measurements  Right Ventricle:  Cavity size normal.    Hypertrophy not present.   Thrombus not present.    Global function normal.     Left Ventricle:  Cavity size normal.    Hypertrophy present.   Thrombus not present.   Global Function normal.   Ejection Fraction 60%.      Ventricular Regional Function:  1- Basal Anteroseptal:  normal  2- Basal Anterior:  normal  3- Basal Anterolateral:  normal  4- Basal Inferolateral:  normal  5- Basal Inferior:  normal  6- Basal Inferoseptal:  normal  7- Mid Anteroseptal:  normal  8- Mid Anterior:  normal  9- Mid Anterolateral:  normal  10- Mid Inferolateral:  normal  11- Mid Inferior:  normal  12- Mid Inferoseptal:  normal  13- Apical Anterior:  normal  14- Apical Lateral:  normal  15- Apical Inferior:  normal  16- Apical Septal:  normal  17- [place]:  normal    Valves  Aortic Valve: Annulus normal.  Stenosis not present.  Regurgitation +1.  Leaflet motions normal.    Mitral Valve: Annulus normal.  Stenosis not present.  Regurgitation +1.  Leaflets normal.  Leaflet motions normal.    Tricuspid Valve: Annulus normal.  Stenosis not  present.  Regurgitation absent.  Leaflets normal.  Leaflet motions normal.          Right Atrium:  Size normal.   Spontaneous echo contrast not present.   Thrombus not present.   Tumor not present.   Device present.   Left Atrium: Size normal.  Spontaneous echo contrast not present.  Thrombus not present.  Tumor not present.  Device not present.             Post Intervention Findings  Procedure(s) performed:  Other (see comments) (laser lead extraction). Global function:  Unchanged. Regional wall motion: Unchanged. Surgeon(s) notified of all postintervention findings: Yes.                 Echocardiogram Comments  Echocardiogram comments: Limited echo performed for laser lead extraction.  Normal LVEF, normal RV function.  Concentric LV hypertrophy.  Trace MR, trace AI.  Mild pericardial effusion at baseline.  Post lead extraction- trace increase in effusion around RV free wall, remained stable.  Cardiologist notified of all findings..

## 2022-07-18 NOTE — ANESTHESIA PROCEDURE NOTES
Airway       Patient location during procedure: OR       Procedure Start/Stop Times: 7/18/2022 9:03 AM  Staff -        CRNA: Brennan Contreras APRN CRNA       Performed By: CRNA  Consent for Airway        Urgency: elective  Indications and Patient Condition       Indications for airway management: mohan-procedural       Induction type:intravenous       Mask difficulty assessment: 2 - vent by mask + OA or adjuvant +/- NMBA    Final Airway Details       Final airway type: endotracheal airway       Successful airway: ETT - single and Oral  Endotracheal Airway Details        ETT size (mm): 7.0       Cuffed: yes       Successful intubation technique: video laryngoscopy       VL Blade Size: MAC 4       Grade View of Cords: 1       Adjucts: stylet       Position: Right       Measured from: gums/teeth       Secured at (cm): 22       Bite block used: None    Post intubation assessment        Placement verified by: capnometry, equal breath sounds and chest rise        Number of attempts at approach: 1       Secured with: commercial tube serrato       Ease of procedure: easy       Dentition: Intact and Unchanged    Medication(s) Administered   Medication Administration Time: 7/18/2022 9:03 AM

## 2022-07-20 NOTE — DISCHARGE INSTRUCTIONS
Thank you for your patience today.  Please follow-up with your regular doctor or cardiologist in the next 2-3 days for further evaluation and follow-up care.  Please call to schedule an appointment.  Please continue your own medications.  Please take steroids daily as directed and take Benadryl as needed for itching.  Please return to the ER if you develop high fever, increased pain, redness, swelling to your wound, drainage from the wound, or any worsening of your current symptoms.  It was a pleasure taking care of you today.  We hope you feel better soon.

## 2022-07-20 NOTE — ED PROVIDER NOTES
"    [place] EMERGENCY DEPARTMENT ([facility])  7/19/22    History     Chief Complaint   Patient presents with     Allergic Reaction     Chest Pain     HPI  Konstantin Sharp is a 52 year old male PMH significant for hypertrophic cardiomyopathy s/p septal myomectomy (2014), s/p dual ICD placement in 2014 with reimplantation 2 days ago, DM, COPD, MRSA pneumonia, HTN, history of A. fib, CLARY, chronic pain from multiple cervical spine surgeries, depression, and anxiety who presents to the ED for evaluation of chest pain potential allergic reaction.  Patient reports that he had his pacemaker replaced 2 days ago and since discharge he has developed a rash where his skin was cleaned with the \"orange-cream\", likely Betadine.  He states that the rash has become itchy, developed hives, and worsened since he was discharged.  Patient has had several prior surgeries and denies having this reaction previously.  Patient indicates the rash is only where the sterilizing cream was rubbed.  Patient reports that he has been taking Benadryl every 6 hours with no improvement, last dose at 6 PM last night.    He also reports abnormal chest pain following the surgery.  He has had previous pacemaker placement and states this pain feels increased more than the normal following a surgery.  He states that the chest pain radiates into his left shoulder and his back.  Patient describes this as a pressure.  He also endorses some shortness of breath with this pain as well as lightheadedness when walking or standing.  He denies fever, chills, nausea, or vomiting.  Patient reports he has been eating and drinking well.  He has been taking Tylenol to manage his pain.  Patient does report a history of MRSA.  Per review of the chart, this was approximately a year ago when the patient developed MRSA pneumonia and had a severe COPD exacerbation with acute hypoxic respiratory failure requiring him to be intubated and then have trach and PEG placement.  " "Patient was in the hospital from 6/15/2021 - 8/29/2021.    Past Medical History  Past Medical History:   Diagnosis Date     Atrial fibrillation (H)      Chest pain     intermittent     Chronic pain      Cognitive disorder     memory loss     Depressive disorder      Dual ICD (implantable cardiac defibrillator) in place 04/29/2014    and pacemaker     GERD (gastroesophageal reflux disease)      H/O traumatic brain injury 2015     Heart attack (H) 1996     HTN (hypertension)      Hypertrophic nonobstructive cardiomyopathy (H) 09/12/2012    s/p ventricular myectomy     Inflammatory arthritis      Intermittent asthma      PAD (peripheral artery disease) (H)      Polysubstance abuse (H)      Seizures (H)     last episode 2014 when \"blood sugar dropped\" according to patient     Sleep apnea     doesn't use cpap     Type 2 diabetes mellitus without complications (H)      Past Surgical History:   Procedure Laterality Date     APPENDECTOMY  1980    [facility]     BACK SURGERY       COLONOSCOPY  2017     DISCECTOMY LUMBAR POSTERIOR MICROSCOPIC THREE+ LEVELS  12/16/2011    Procedure:DISCECTOMY LUMBAR POSTERIOR MICROSCOPIC THREE+ LEVELS; Posterior Decompression L2-S1; Surgeon:CAITIE FUNEZ; Location:[facility] OR     EP ICD GENERATOR REPLACEMENT DUAL N/A 7/18/2022    Preliminary Information:  Procedure: Implantable Cardioverter Defibrillator Generator Replacement Dual;  Surgeon: Ritesh Lacy MD;  Location: [facility] OR     FUSION CERVICAL POSTERIOR ONE LEVEL  8/24/2012    Procedure: FUSION CERVICAL POSTERIOR ONE LEVEL;  Posterior Instrumentated Spinal Fusion Cervical 6-7, Right Iliac Crest Bone [place] ;  Surgeon: Caitie Funez MD;  Location: [facility] OR     GRAFT BONE FROM ILIAC CREST  8/24/2012    Procedure: GRAFT BONE FROM ILIAC CREST;;  Surgeon: Caitie Funez MD;  Location: [facility] OR     HEAD & NECK SURGERY  2009     IMPLANT PACEMAKER       IMPLANT PACEMAKER       INJECT STEROID (LOCATION) N/A " "2/19/2015    Procedure: INJECT STEROID (LOCATION);  Surgeon: Siri Koch MD;  Location: [facility] OR     INSERT STIMULATOR AND LEADS INTERNAL DORSAL COLUMN  8/7/2013    Procedure: INSERT STIMULATOR AND LEADS INTERNAL DORSAL COLUMN;  SPINAL CORD STIMULATOR IMPLANT ;  Surgeon: Ricardo Bruno MD;  Location: [facility] OR     INSERT STIMULATOR DORSAL COLUMN  08/13/2013    lead replacemend, 12?2016,  battery replacement 4/4/2016     IR GASTROSTOMY TUBE PERCUTANEOUS PLCMNT  10/29/2015     KNEE SURGERY       LASER CO2 LARYNGOSCOPY N/A 2/19/2015    Procedure: LASER CO2 LARYNGOSCOPY;  Surgeon: Siri Koch MD;  [facility]: [facility] OR     LASER CO2 LARYNGOSCOPY, COMPLEX  7/22/2014    Procedure: LASER CO2 LARYNGOSCOPY, COMPLEX;  Surgeon: Siri Koch MD;  [facility]     MYECTOMY SEPTAL  4/14/2014    Procedure: Median Sternotomy, Septal Myectomy, Intraoperative BRENT performed by Dr. Castano, on pump oxygenator.;  Surgeon: Aguila Cannon MD;  Location: [facility]     NECK SURGERY       HI SPINE SURGERY PROCEDURE UNLISTED       SHOULDER SURGERY  2006    RIGHT     STOMACH SURGERY  1980    partial gastrectomy for bleeding ulcers, \"stress related\". [facility]     WRIST SURGERY Left 1988    fractured. 1988 or so     [facility] CARDIAC SURG PROCEDURE UNLIST       [facility] HAND/FINGER SURGERY UNLISTED       [facility] SHOULDER SURG PROC UNLISTED       [facility] STOMACH SURGERY PROCEDURE UNLISTED       predniSONE (DELTASONE) 20 MG tablet  Atorvastatin Calcium (LIPITOR PO)  clindamycin (CLEOCIN) 300 MG capsule  cloNIDine (CATAPRES) 0.1 MG tablet  docusate sodium (COLACE) 100 MG capsule  DULoxetine (CYMBALTA) 60 MG capsule  furosemide (LASIX) 40 MG tablet  losartan (COZAAR) 25 MG tablet  magnesium oxide (MAG-OX) 400 MG tablet  metFORMIN (GLUCOPHAGE) 500 MG tablet  OLANZapine (ZYPREXA) 5 MG tablet  QUEtiapine Fumarate (SEROQUEL PO)  rivaroxaban ANTICOAGULANT (XARELTO) 10 MG TABS tablet  thiamine (B-1) 100 MG tablet  traZODone " (DESYREL) 100 MG tablet      Allergies   Allergen Reactions     Bee Venom Swelling     Lisinopril      TABS  Severe cough     Penicillins Hives and Difficulty breathing     Family History  Family History   Problem Relation Age of Onset     Heart Disease Father 32        MIs x2; s/p CABG     Substance Abuse Father      Hypertension Father      Obesity Father      Hyperlipidemia Father      Hypertension Brother      Diabetes Brother      Glaucoma Maternal Grandmother      Hypertension Maternal Grandmother      Diabetes Maternal Grandfather      Glaucoma Maternal Grandfather      Hypertension Maternal Grandfather      Cerebrovascular Disease Maternal Grandfather      Obesity Maternal Grandfather      Hyperlipidemia Maternal Grandfather      Coronary Artery Disease Maternal Grandfather      Heart Disease Maternal Grandfather      Substance Abuse Other      Cancer Other      Hypertension Other      Obesity Other      Other Cancer Other         all over     Hyperlipidemia Other      Coronary Artery Disease Other      Obesity Brother      Hyperlipidemia Brother      Lymphoma Maternal Uncle      Diabetes Brother      Depression Daughter      Depression Son      Macular Degeneration No family hx of      Heart Failure Father      Social History   Social History     Tobacco Use     Smoking status: Current Every Day Smoker     Packs/day: 0.12     Years: 35.00     Pack years: 4.20     Types: Cigarettes     Start date: 3/8/1982     Last attempt to quit: 2014     Years since quittin.2     Smokeless tobacco: Never Used   Substance Use Topics     Alcohol use: Yes     Alcohol/week: 0.0 standard drinks     Comment: rare     Drug use: No     Comment: last using meth 2017      Past medical history, past surgical history, medications, allergies, family history, and social history were reviewed with the patient. No additional pertinent items.       Review of Systems   Constitutional: Negative for appetite change, chills and  fever.   HENT: Negative for congestion.    Eyes: Negative for redness.   Respiratory: Positive for shortness of breath.    Cardiovascular: Positive for chest pain.   Gastrointestinal: Negative for abdominal pain, nausea and vomiting.   Endocrine: Negative for polyuria.   Genitourinary: Negative for difficulty urinating.   Musculoskeletal: Negative for arthralgias and neck stiffness.   Skin: Positive for rash. Negative for color change.   Allergic/Immunologic: Negative for immunocompromised state.   Neurological: Positive for light-headedness. Negative for headaches.   Hematological: Does not bruise/bleed easily.   Psychiatric/Behavioral: Negative for confusion.   All other systems reviewed and are negative.    A complete review of systems was performed with pertinent positives and negatives noted in the HPI, and all other systems negative.    Physical Exam   BP: (!) 162/101  Pulse: 117  Temp: 98.8  F (37.1  C)  Resp: 22  SpO2: 99 %  Physical Exam   General: Afebrile, no acute distress   HEENT: Normocephalic, atraumatic, conjunctivae normal. MMM  Neck: non-tender, supple  Cardio: regular rate. regular rhythm   Resp: Normal work of breathing, no respiratory distress, lungs clear bilaterally, no wheezing, rhonchi, rales  Chest/Back:  Left chest wall status post recent pacemaker reimplantation with mild tenderness to palpation, warmth, swelling, wound appears to be healing well with no drainage no visual signs of trauma, no CVA tenderness   Abdomen: soft, non distension, no tenderness, no peritoneal signs   Neuro: alert and fully oriented. CN II-XII grossly intact. Grossly normal strength and sensation in all extremities.   MSK: no deformities. Normal range of motion  Integumentary/Skin:  Urticaria to upper chest and lower abdomen  Psych: normal affect, normal behavior    ED Course      Procedures   12:22 AM  The patient was seen and examined by Erin Pryor MD in Room ED10.          EKG Interpretation:       Interpreted by Erin Pryor MD  Time reviewed: 0015  Symptoms at time of EKG: chest pain   Rhythm: Atrial sensed ventricular paced rhythm  Rate: 110 bpm  Axis: Left axis  Ectopy: none  Conduction: normal  ST Segments/ T Waves: No acute ischemic change  Q Waves: none  Comparison to prior: Unchanged    Clinical Impression: Atrial sensed ventricular paced rhythm with no acute ischemic change and no significant change when compared to prior         Results for orders placed or performed during the hospital encounter of 07/19/22   XR Chest 2 Views     Status: None    Narrative    EXAM: XR CHEST 2 VW  LOCATION: [facility]  DATE/TIME: 7/20/2022 1:23 AM    INDICATION: Chest pain. Swelling at pacemaker site, post replacement on 7/18/2022.  COMPARISON: Portable chest single view 7/18/2022 at 1430 hours.      Impression    IMPRESSION: Left chest pacer, leads in the heart. Cardiac size approaches upper limits of normal with normal pulmonary vascularity. Sternotomy. Lungs are clear. Resolved strands of plate-like atelectasis in the mid/lower lungs seen previously. No   adenopathy or effusion. Postoperative changes at the cervicothoracic junction. Monitoring leads have been removed. Mild degenerative changes both shoulders and the spine.   Basic metabolic panel     Status: Abnormal   Result Value Ref Range    Creatinine 0.64 (L) 0.67 - 1.17 mg/dL    Sodium 138 136 - 145 mmol/L    Potassium 3.3 (L) 3.4 - 5.3 mmol/L    Urea Nitrogen 9.3 6.0 - 20.0 mg/dL    Chloride 102 98 - 107 mmol/L    Carbon Dioxide (CO2) 25 22 - 29 mmol/L    Anion Gap 11 7 - 15 mmol/L    Glucose 180 (H) 70 - 99 mg/dL    GFR Estimate >90 >60 mL/min/1.73m2    Calcium 8.9 8.6 - 10.0 mg/dL   CRP inflammation     Status: Abnormal   Result Value Ref Range    CRP Inflammation 81.70 (H) <5.00 mg/L   Erythrocyte sedimentation rate auto     Status: Normal   Result Value Ref Range    Erythrocyte Sedimentation Rate 8  0 - 20 mm/hr   Procalcitonin     Status: Abnormal   Result Value Ref Range    Procalcitonin 0.05 (H) <0.05 ng/mL   CBC with platelets and differential     Status: Abnormal   Result Value Ref Range    WBC Count 11.5 (H) 4.0 - 11.0 10e3/uL    RBC Count 4.78 4.40 - 5.90 10e6/uL    Hemoglobin 15.2 13.3 - 17.7 g/dL    Hematocrit 43.9 40.0 - 53.0 %    MCV 92 78 - 100 fL    MCH 31.8 26.5 - 33.0 pg    MCHC 34.6 31.5 - 36.5 g/dL    RDW 12.4 10.0 - 15.0 %    Platelet Count 189 150 - 450 10e3/uL    % Neutrophils 62 %    % Lymphocytes 26 %    % Monocytes 8 %    % Eosinophils 3 %    % Basophils 1 %    % Immature Granulocytes 0 %    NRBCs per 100 WBC 0 <1 /100    Absolute Neutrophils 7.2 1.6 - 8.3 10e3/uL    Absolute Lymphocytes 3.0 0.8 - 5.3 10e3/uL    Absolute Monocytes 1.0 0.0 - 1.3 10e3/uL    Absolute Eosinophils 0.3 0.0 - 0.7 10e3/uL    Absolute Basophils 0.1 0.0 - 0.2 10e3/uL    Absolute Immature Granulocytes 0.0 <=0.4 10e3/uL    Absolute NRBCs 0.0 10e3/uL   EKG 12-lead, tracing only     Status: None (Preliminary result)   Result Value Ref Range    Systolic Blood Pressure  mmHg    Diastolic Blood Pressure  mmHg    Ventricular Rate 110 BPM    Atrial Rate 110 BPM    NV Interval 152 ms    QRS Duration 184 ms     ms    QTc 557 ms    P Axis  degrees    R AXIS -89 degrees    T Axis 93 degrees    Interpretation ECG       Atrial-sensed ventricular-paced rhythm  Abnormal ECG     CBC with platelets differential     Status: Abnormal    Narrative    The following orders were created for panel order CBC with platelets differential.  Procedure                               Abnormality         Status                     ---------                               -----------         ------                     CBC with platelets and d...[[id number]]  Abnormal            Final result                 Please view results for these tests on the individual orders.     Medications   famotidine (PEPCID) infusion 20 mg (0 mg Intravenous Stopped  7/20/22 0209)   predniSONE (DELTASONE) tablet 40 mg (40 mg Oral Given 7/20/22 0121)   diphenhydrAMINE (BENADRYL) capsule 25 mg (25 mg Oral Given 7/20/22 0121)   oxyCODONE (ROXICODONE) tablet 5 mg (5 mg Oral Given 7/20/22 0121)        Assessments & Plan (with Medical Decision Making)   Konstantin Sharp is a 52 year old male PMH significant for hypertrophic cardiomyopathy s/p septal myomectomy (2014), s/p dual ICD placement in 2014 with reimplantation 2 days ago, DM, COPD, MRSA pneumonia, HTN, history of A. fib, CLARY, chronic pain from multiple cervical spine surgeries, depression, and anxiety who presents to the ED for evaluation of chest pain potential allergic reaction.  Upon arrival patient is well-appearing, afebrile, mild distress secondary to the itching and discomfort.  Patient here status post recent ICD reimplantation yesterday with allergic reaction and hives which appear to be secondary to the cleaning prep (Betadine?).  Patient with no airway compromise or respiratory distress, Benadryl at home with no improvement of symptoms.  Will treat with Pepcid, Benadryl,'s prednisone.  Patient also here with increase in chest wall discomfort at the site of his implantation along with some warmth and swelling.  Could be inflammatory versus early infection.  Comprehensive labs were performed which are remarkable for white blood cell count 11.5, hemoglobin 15.2, no acute metabolic electrolyte abnormality, CRP elevated 81.7, sed rate 8.  Pro-Roni 0.05.  I discussed with cardiology at this time will continue close outpatient follow-up, follow-up on blood cultures, hold off on antibiotics.  I suspect most likely this is related to the recent procedure with pain and swelling secondary to inflammation and possibly related to allergic reaction as well.  Patient is afebrile and overall nontoxic-appearing.  On reevaluation the emergency department patient feeling much better with improvement of his symptoms after medications  provided.  We will send patient home with a prescription for prednisone, recommend continuation of the Benadryl, close outpatient follow-up with his primary care provider and cardiologist and strict return precautions discussed if any worsening of his symptoms, shortness of breath, high fever, increased pain, redness, swelling, drainage at his incision site.  Patient understands and agrees with plan.    I have reviewed the nursing notes. I have reviewed the findings, diagnosis, plan and need for follow up with the patient.    New Prescriptions    PREDNISONE (DELTASONE) 20 MG TABLET    Take two tablets (= 40mg) each day for 5 (five) days       Final diagnoses:   Hives   Acute post-operative pain   Encounter for postoperative wound check     I, Elvis Chavez, am serving as a trained medical scribe to document services personally performed by Erin Pryor MD, based on the provider's statements to me.     IErin MD, was physically present and have reviewed and verified the accuracy of this note documented by Elvis Chavez.    --  Erin Pryor MD  [facility] EMERGENCY DEPARTMENT  7/19/2022     Erin Pryor MD  07/20/22 2003

## 2022-07-20 NOTE — ED TRIAGE NOTES
"    Pt had pacemaker and leads replaced yesterday, since then he has been taking benadryl due to a reaction to the \"Orange stuff they put all over me\".  Pt also reports lightheadedness, pain to the left chest-feels like his lung.  "

## 2022-07-22 NOTE — TELEPHONE ENCOUNTER
Left message to check in on patient post gen change/lead extraction. Provided callback number.  Jaida Cabrales RN on 7/22/2022 at 4:21 PM

## 2022-07-26 NOTE — TELEPHONE ENCOUNTER
Patient of Dr. Lacy    Patient sent a [employer] message, see message below.    May a detailed message be left on voicemail: yes     Current symptom or concern: I've been having chest pains and am concerned that my cardiomyopathy is coming back

## 2022-07-26 NOTE — TELEPHONE ENCOUNTER
"Called Mo to discuss his symptoms. He reports that for the past few months he had had chest pain. I also asked him about his ED visit last week after his gen change and lead extraction and he reports that the chest pain he has been having has not become better or worse after the ED visit. Pressed him more about why he is calling in today with chest pain. He states that for 15-60 minutes he feels a \"retching and tightness in his chest when he is sitting or walking around.\" He states this happens upwards of 5 times a day and sometimes he wants to call an ambulance when he feels this way. On good days he says it only happens a couple of times a day. He states he has been having these pains more often.   Advised him that if he feels like he wants to call an ambulance, to go to the ED if it is safe. I told him I would also reach out to Dr. Lacy for any additional notes. Will reach back out to Mo once I have heard back.  Jaida Cabrales RN on 7/26/2022 at 9:43 AM    " discharged

## 2022-07-27 NOTE — TELEPHONE ENCOUNTER
Date: 7/27/2022    Time of Call: 2:48 PM     Diagnosis: HCM, a fib     [ TORB ] Ordering provider: Dr. Lacy  Order: CT angiogram heart     Order received by: ROSSY Sena     Follow-up/additional notes: Called Mo to discuss CT angiogram to look at the leads and coronaries around his heart. Mo agreed to plan and had no further questions. Will reach out to schedule.  Jaida Cabrales RN on 7/27/2022 at 2:49 PM

## 2022-07-28 NOTE — OP NOTE
OPERATIVE DATE: 7/18/2022    PRE-OPERATIVE DIAGNOSIS:  1) Infected AICD  Patient Active Problem List   Diagnosis     Herniated cervical intervertebral disc     Chondromalacia of patella     Pseudoarthrosis of cervical spine (H)     Hypertrophic nonobstructive cardiomyopathy (H)     Chest pain     Sinus tachycardia     s/p PSF C6-7 for anterior pseudarthrosis     Spinal stenosis, lumbar region, with neurogenic claudication     Lumbar radicular pain     Atrial fibrillation (H)     Syncope     S/P ventricular septal myectomy     Automatic implantable cardioverter-defibrillator in situ- Medtronic, dual chamber- DEPENDENT     Pre-operative cardiovascular examination     Postprocedural subglottic stenosis     Dysphonia     Patient [facility] by [facility]     Insomnia     Seizures (H)     Type 2 diabetes mellitus with diabetic neuropathy (H)     Anticoagulant long-term use     Asthma     Cervical vertebral fusion     Chronic pain due to trauma     Cognitive disorder     Depressive disorder     Esophageal reflux     Essential hypertension     History of traumatic brain injury     Large bowel obstruction (H)     Old myocardial infarction     Osteoarthrosis     Respiratory failure (H)     S/P laminectomy     Tobacco dependence     POST-OPERATIVE DIAGNOSIS:  1) Infected AICD  Patient Active Problem List   Diagnosis     Herniated cervical intervertebral disc     Chondromalacia of patella     Pseudoarthrosis of cervical spine (H)     Hypertrophic nonobstructive cardiomyopathy (H)     Chest pain     Sinus tachycardia     s/p PSF C6-7 for anterior pseudarthrosis     Spinal stenosis, lumbar region, with neurogenic claudication     Lumbar radicular pain     Atrial fibrillation (H)     Syncope     S/P ventricular septal myectomy     Automatic implantable cardioverter-defibrillator in situ- Medtronic, dual chamber- DEPENDENT     Pre-operative cardiovascular examination     Postprocedural  subglottic stenosis     Dysphonia     Patient is [facility] by [facility]     Insomnia     Seizures (H)     Type 2 diabetes mellitus with diabetic neuropathy (H)     Anticoagulant long-term use     Asthma     Cervical vertebral fusion     Chronic pain due to trauma     Cognitive disorder     Depressive disorder     Esophageal reflux     Essential hypertension     History of traumatic brain injury     Large bowel obstruction (H)     Old myocardial infarction     Osteoarthrosis     Respiratory failure (H)     S/P laminectomy     Tobacco dependence     PROCEDURE:  1) Laser lead extraction standby  2) Removal of ICD generator    : Ritesh Lacy MD    CO-SURGEON: Ricardo Yoo MD    ANESTHESIA: GETA    ESTIMATED BLOOD LOSS: 10cc    INDICATIONS:  Mr. Konstantin Sharp is a 52 year old year old male admitted with AICD lead dysfunction.  We were asked to standby for laser lead extraction.  Risks and benefits of the operation were explained to the patient and their family including, but not limited to, bleeding, infection, stroke and even death.  They understood these risks and agreed to proceed electively.    OPERATIVE REPORT:  The patient was transferred to the operating room and positioned supine on the OR table.  General anesthesia was monitored by the anesthesia team. The patients chest abdomen and bilateral lower extremities were clipped, prepped and draped in sterile fashion.  A pre-procedure time-out was performed confirming the correct patient, correct site and correct procedure.    AICD generator removal and laser lead extraction was performed by Dr. Lacy and team.  Please refer to his dictation for details.  No operative intervention was needed after lead extraction.    All needle, sponge and instrument counts were correct at the end of the case.    Ricardo Yoo  Cardiothoracic Surgery  Pager: [phone]

## 2022-08-01 NOTE — TELEPHONE ENCOUNTER
Called patient to let him know I sent a premed prescription for his CT scan tomorrow, he needs to take the Medrol 32mg by mouth 12 hours and 2 hours prior to his scan and the Benadryl 50mg 1 hour prior to scan. Patient understood instructions and had no further questions.  Jaida Cabrales RN on 8/1/2022 at 5:47 PM

## 2022-08-02 NOTE — PROGRESS NOTES
Pt arrived for Coronary CT angiogram. Test, meds and side effects reviewed with pt.  Resting  bpm.  Patient is pacemaker dependent.  Patient was pretreated with Medrol and benadryl prior to his arrival today.  Device RN turned down the pacing to 55 per verbal order on scanner.  Administered 0.8 mg SL nitro on CTA table per order. CTA completed.   Pacer setting restored to original settings on table.  Patient tolerated procedure well and denies symptoms of allergic reaction.  Post monitoring completed and VSS.  D/C instructions reviewed with pt whom verbalized understanding of need to increase PO fluids today. D/C to gold waiting room accompanied by staff.
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
## 2022-08-18 NOTE — NURSING NOTE
Chief Complaint   Patient presents with     Follow Up       JOSH Bansal on 8/18/2022 at 10:52 AM
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
## 2022-08-22 NOTE — TELEPHONE ENCOUNTER
methylPREDNISolone (MEDROL) 32 MG tablet      Last Written Prescription Date:  8/1/2022  Last Fill Quantity: 2 tab,   # refills: 0  Last Office Visit : 3/4/2022  [facility]  Future Office visit:  10/28/2022    Routing refill request to provider for review/approval because:  Refill needs review- is patient having another scan?  - patient requesting refill.

## 2022-08-25 NOTE — TELEPHONE ENCOUNTER
Nitza called back and was transferred to me.  I relayed Dr. Burnham's note about patients cymbalta dose being raised from 60 mg to 90 mg    Jackson Mcclain, SAJAN ATC on 8/25/2022 at 1:48 PM

## 2022-08-25 NOTE — TELEPHONE ENCOUNTER
[facility] home health called and  they need to confirm dosage for the patient, home care RN Nitza please call and confirm increase for Duloxetine 90 MG,    Nitza cell [phone]    Thanks

## 2022-08-25 NOTE — TELEPHONE ENCOUNTER
Left message for Nitza-nurse to call clinic back.    Per Dr. Burnham's notes from pt's last visit on 8/18/2022:       Plan:  Medication Adjustment:  I have increased the patient's Cymbalta to 90 mg a day.       JOSH Bansal on 8/25/2022 at 1:19 PM

## 2022-08-25 NOTE — TELEPHONE ENCOUNTER
Refill request for trazodone 100 mg   Last follow-up 8/18/22; next follow-up 11/17/22  Medication T'd for review and signature  Jackson Mcclain, SAJAN ATC on 8/25/2022 at 12:55 PM      traZODone (DESYREL) 100 MG tablet 30 tablet 3 5/4/2022  --   Sig - Route: Take 1 tablet (100 mg) by mouth At Bedtime - Oral         Please deny Duloxetine 60 mg.  Dosage was changed at last appt.

## 2022-10-13 NOTE — LETTER
10/13/2022         RE: Konstantin Sharp  [address] [place] [address]        Dear Colleague,    Thank you for referring your patient, Konstantin Sharp, to the [facility]. Please see a copy of my visit note below.      Outpatient Follow up TBI Evaluation     Pertinent History: Patient is known to me from prior clinic contacted the patient has not been seen by me in over 2 years.  Unfortunately old records are difficult to access at this time due to the new computer system.  The patient and his wife are extremely vague historians but it appears he has been followed through the [facility] by psychiatry.  Medications are listed as above and both the patient and wife report there is been multiple medication changes in the last few years but they are unaware of what these were.  We will try and clarify all of this.  Patient presents today to establish care at this clinic.  The patient's medication list is as described in the nurse's note according to the limited information we have available to us.     The patient reestablished contact with his clinic in December 2021.  He had been followed through [facility].  He had had multiple medication changes over the last few years but we have limited information when we first saw the patient.  We were attempting to clarify his current medication list and past medication trials.  At that visit the patient had significant heart movements noted around his mouth.  The family also reported he had lost 60 pounds in 6 months.  He was having worsening depression and anxiety.     The patient was seen for a follow-up visit on January 11 of 2022.  The patient has been seen by multiple providers prior to that over the course of more than a year.  When I saw him at that visit he was on Cymbalta 20 mg a day.  His mood was worse.  He was having low motivation and low energy but no thoughts of harming himself.  He was willing to restart  with a psychotherapist and I did order that.  I increased his Cymbalta to 60 mg a day.     I saw the patient on February 22, 2022.  I also interviewed his wife and both reported the patient seem to be improving with regard to his mood and was less depressed with no thoughts of wishing he was dead.  He continued with his baseline tremor.  He was sleeping well at night.  There were no new medical issues.  We continued with the treatment plan at that time.    The patient had a follow-up with me on April 5, 2022.  We did increase his Cymbalta to 60 mg a day and did remind him that he had trazodone available that he was not using.  We also ordered some individual therapy.  He was complaining of some chronic fatigue and weight loss and he was going to have that addressed through his primary care doctor.    The patient was seen for follow-up in May 2022 along with Leilani.  He was having some trouble with sleep at that time but was otherwise doing relatively well.  He and his family had recently returned from a road trip to [place] and that went well.  He was doing well and we did not make any medication changes.    The patient was seen on August 18, 2022.  At that time he was doing relatively well but was still having some depression.  He was tolerating the medication and felt that the medication was at least partially helpful.  We elected to increase the patient's Cymbalta to 90 mg a day.       HPI:  Patient presents today for the purposes of medication management.   On my interview with the patient today he states that initially with the prior increase in Cymbalta his mood was better and he was doing well but he stated the last week or 2 his mood has been down again.  He reported no thoughts of harming himself or anybody else.  The patient denied having any hallucinations or delusions.  No robinson.  He reported sleep was still variable.  He often went to bed about 1 AM and got up at 5 and he did admit he occasionally naps  during the day and again I talked with him about sleep hygiene and to avoid napping.  He stated socially things are going fairly well he went to [place] visit family but stated that was not particularly fine.  He is able to contract for safety and denies having any side effects to the medication.  He denies having any new medical issues or concerns.  We did discuss a variety of treatment options and have elected to increase his Cymbalta to 120 mg a day.  Risks and benefits were discussed.     We discussed some treatment options and have elected to increase the Cymbalta to 120 mg a day.       Current Medications: Please see chart. Medications personally reviewed.     Patient Active Problem List    Diagnosis Date Noted     Chronic pain due to trauma 09/03/2016     Priority: Medium     Overview:   Broken back/neck 1996/2007 respectively. Did have procedure for spine stimulator       Esophageal reflux 09/03/2016     Priority: Medium     Essential hypertension 09/03/2016     Priority: Medium     History of traumatic brain injury 09/03/2016     Priority: Medium     Large bowel obstruction (H) 09/03/2016     Priority: Medium     S/P laminectomy 09/03/2016     Priority: Medium     Overview:   replacement of failed spinal cord stimulator & placement of epidural paddle electrode - 9/2/2016       Tobacco dependence 09/03/2016     Priority: Medium     Cognitive disorder 06/16/2016     Priority: Medium     Type 2 diabetes mellitus with diabetic neuropathy (H) 05/02/2016     Priority: Medium     Seizures (H) 02/01/2016     Priority: Medium     Respiratory failure (H) 10/19/2015     Priority: Medium     Insomnia 03/13/2015     Priority: Medium     [facility] 03/11/2015     Priority: Medium     For urgent after hours needs, please call [phone] and ask to speak with the Adult [facility] Physician on call - mention job code [phone].            Dysphonia 01/05/2015     Priority: Medium     Postprocedural subglottic stenosis 07/22/2014     Priority: Medium     Pre-operative cardiovascular examination 07/16/2014     Priority: Medium     Automatic implantable cardioverter-defibrillator in situ- [employer], dual chamber- DEPENDENT 04/30/2014     Priority: Medium     Implanted 4/29/14 by Dr. Lacy  Problem list name updated by automated process. Provider to review       S/P ventricular septal myectomy 04/14/2014     Priority: Medium     Syncope 12/28/2013     Priority: Medium     Atrial fibrillation (H) 05/15/2013     Priority: Medium     Asthma 01/24/2013     Priority: Medium     Depressive disorder 01/24/2013     Priority: Medium     Old myocardial infarction 01/24/2013     Priority: Medium     Osteoarthrosis 01/24/2013     Priority: Medium     Spinal stenosis, lumbar region, with neurogenic claudication 11/15/2012     Priority: Medium     Lumbar radicular pain 11/15/2012     Priority: Medium     s/p PSF C6-7 for anterior pseudarthrosis 10/11/2012     Priority: Medium     Hypertrophic nonobstructive cardiomyopathy (H) 09/12/2012     Priority: Medium     Chest pain 09/12/2012     Priority: Medium     Sinus tachycardia 09/12/2012     Priority: Medium     Pseudoarthrosis of cervical spine (H) 08/24/2012     Priority: Medium     Chondromalacia of patella 10/25/2011     Priority: Medium     Anticoagulant long-term use 02/18/2011     Priority: Medium     Cervical vertebral fusion 12/08/2010     Priority: Medium     Herniated cervical intervertebral disc 06/26/2008     Priority: Medium     Past Medical History:   Diagnosis Date     Atrial fibrillation (H)      Chest pain     intermittent     Chronic pain      Cognitive disorder     memory loss     Depressive disorder      Dual ICD (implantable cardiac defibrillator) in place 04/29/2014    and pacemaker     GERD (gastroesophageal reflux disease)      H/O traumatic brain injury 2015     Heart attack (H) 1996     HTN  "(hypertension)      Hypertrophic nonobstructive cardiomyopathy (H) 09/12/2012    s/p ventricular myectomy     Inflammatory arthritis      Intermittent asthma      PAD (peripheral artery disease) (H)      Polysubstance abuse (H)      Seizures (H)     last episode 2014 when \"blood sugar dropped\" according to patient     Sleep apnea     doesn't use cpap     Type 2 diabetes mellitus without complications (H)      Past Surgical History:   Procedure Laterality Date     APPENDECTOMY  1980    [facility]     BACK SURGERY       COLONOSCOPY  2017     DISCECTOMY LUMBAR POSTERIOR MICROSCOPIC THREE+ LEVELS  12/16/2011    Procedure:DISCECTOMY LUMBAR POSTERIOR MICROSCOPIC THREE+ LEVELS; Posterior Decompression L2-S1; Surgeon:CAITIE OSMAN; Location:[facility]     EP ICD GENERATOR REPLACEMENT DUAL N/A 7/18/2022    Procedure: Implantable Cardioverter Defibrillator Generator Replacement Dual;  Surgeon: Ritesh Lacy MD;  Location: [facility] OR     FUSION CERVICAL POSTERIOR ONE LEVEL  8/24/2012    Procedure: FUSION CERVICAL POSTERIOR ONE LEVEL;  Posterior Instrumentated Spinal Fusion Cervical 6-7, Right Iliac C[place] ;  Surgeon: Caitie Osman MD;  [facility]     GRAFT BONE FROM ILIAC CREST  8/24/2012    Procedure: GRAFT BONE FROM ILIAC CREST;;  Surgeon: Caitie Osman MD;  [facility]: [facility]     HEAD & NECK SURGERY  2009     IMPLANT PACEMAKER       IMPLANT PACEMAKER       INJECT STEROID (LOCATION) N/A 2/19/2015    Procedure: INJECT STEROID (LOCATION);  Surgeon: Siri Koch MD;  [facility]: [facility]     INSERT STIMULATOR AND LEADS INTERNAL DORSAL COLUMN  8/7/2013    Procedure: INSERT STIMULATOR AND LEADS INTERNAL DORSAL COLUMN;  SPINAL CORD STIMULATOR IMPLANT ;  Surgeon: Ricardo Bruno MD;  Location: [facility] OR     INSERT STIMULATOR DORSAL COLUMN  08/13/2013    lead replacemend, 12?2016,  battery replacement 4/4/2016     IR GASTROSTOMY TUBE PERCUTANEOUS PLCMNT  10/29/2015     KNEE " "SURGERY       LASER CO2 LARYNGOSCOPY N/A 2/19/2015    Procedure: LASER CO2 LARYNGOSCOPY;  Surgeon: Siri Koch MD;  [facility]     LASER CO2 LARYNGOSCOPY, COMPLEX  7/22/2014    Procedure: LASER CO2 LARYNGOSCOPY, COMPLEX;  Surgeon: Siri Koch MD;  [facility]     MYECTOMY SEPTAL  4/14/2014    Procedure: Median Sternotomy, Septal Myectomy, Intraoperative BRENT performed by Dr. Castano, on pump oxygenator.;  Surgeon: Aguila Cannon MD;  Location: [facility]     NECK SURGERY       [facility] SPINE SURGERY PROCEDURE UNLISTED       SHOULDER SURGERY  2006    RIGHT     STOMACH SURGERY  1980    partial gastrectomy for bleeding ulcers, \"stress related\". [facility]     WRIST SURGERY Left 1988    fractured. 1988 or so     [facility] CARDIAC SURG PROCEDURE UNLIST       [facility] HAND/FINGER SURGERY UNLISTED       [facility] SHOULDER SURG PROC UNLISTED       [facility] STOMACH SURGERY PROCEDURE UNLISTED       Family History   Problem Relation Age of Onset     Heart Disease Father 32        MIs x2; s/p CABG     Substance Abuse Father      Hypertension Father      Obesity Father      Hyperlipidemia Father      Hypertension Brother      Diabetes Brother      Glaucoma Maternal Grandmother      Hypertension Maternal Grandmother      Diabetes Maternal Grandfather      Glaucoma Maternal Grandfather      Hypertension Maternal Grandfather      Cerebrovascular Disease Maternal Grandfather      Obesity Maternal Grandfather      Hyperlipidemia Maternal Grandfather      Coronary Artery Disease Maternal Grandfather      Heart Disease Maternal Grandfather      Substance Abuse Other      Cancer Other      Hypertension Other      Obesity Other      Other Cancer Other         all over     Hyperlipidemia Other      Coronary Artery Disease Other      Obesity Brother      Hyperlipidemia Brother      Lymphoma Maternal Uncle      Diabetes Brother      Depression Daughter      Depression Son      Macular Degeneration No family hx of      Heart " Failure Father      Current Outpatient Medications   Medication Sig Dispense Refill     Atorvastatin Calcium (LIPITOR PO) Take 80 mg by mouth daily        cloNIDine (CATAPRES) 0.1 MG tablet Take 0.1 mg by mouth 3 times daily       diphenhydrAMINE (BENADRYL) 50 MG capsule Take Benadryl 1 tablet (50mg) 1 hour prior to scan 1 capsule 0     docusate sodium (COLACE) 100 MG capsule Take 100 mg by mouth 2 times daily       DULoxetine (CYMBALTA) 30 MG capsule Take 3 capsules (90 mg) by mouth daily 90 capsule 3     DULoxetine (CYMBALTA) 60 MG capsule TAKE 1 CAPSULE BY MOUTH EVERY DAY 30 capsule 3     furosemide (LASIX) 40 MG tablet TAKE 1/2 A TABLET DAILY       losartan (COZAAR) 25 MG tablet Take 1 tablet (25 mg) by mouth daily 30 tablet 1     magnesium oxide (MAG-OX) 400 MG tablet Take 2.5 tablets (1,000 mg) by mouth daily 180 tablet 3     metFORMIN (GLUCOPHAGE) 500 MG tablet Take 500 mg by mouth 2 times daily (with meals)  60 tablet      methylPREDNISolone (MEDROL) 32 MG tablet Take methylprednisolone 1 tablet (32mg) by mouth 12 hours prior to scan and another dose 2 hours prior to scan. 2 tablet 0     OLANZapine (ZYPREXA) 5 MG tablet Take 1 tablet by mouth At Bedtime       predniSONE (DELTASONE) 20 MG tablet Take two tablets (= 40mg) each day for 5 (five) days 10 tablet 0     QUEtiapine Fumarate (SEROQUEL PO) Take 25 mg by mouth 2 times daily       rivaroxaban ANTICOAGULANT (XARELTO) 10 MG TABS tablet Take 10 mg by mouth 2 times daily (with meals)       thiamine (B-1) 100 MG tablet Take 100 mg by mouth 2 times daily       traZODone (DESYREL) 100 MG tablet TAKE 1 TABLET BY MOUTH AT BEDTIME 30 tablet 3       Allergies   Allergen Reactions     Betadine Prepstick Plus Hives, Swelling and Rash     From procedure on 7/18/2022     Bee Venom Swelling     Lisinopril      TABS  Severe cough     Penicillins Hives and Difficulty breathing     Social History     Socioeconomic History     Marital status: Single     Spouse name: Not on  file     Number of children: Not on file     Years of education: Not on file     Highest education level: Not on file   Occupational History     Not on file   Tobacco Use     Smoking status: Every Day     Packs/day: 0.12     Years: 35.00     Pack years: 4.20     Types: Cigarettes     Start date: 3/8/1982     Last attempt to quit: 2014     Years since quittin.5     Smokeless tobacco: Never   Substance and Sexual Activity     Alcohol use: Yes     Alcohol/week: 0.0 standard drinks     Comment: rare     Drug use: No     Comment: last using meth 2017     Sexual activity: Not Currently     Partners: Female     Birth control/protection: Male Surgical   Other Topics Concern     Parent/sibling w/ CABG, MI or angioplasty before 65F 55M? Yes   Social History Narrative     Not on file     Social Determinants of Health     Financial Resource Strain: Not on file   Food Insecurity: Not on file   Transportation Needs: Not on file   Physical Activity: Not on file   Stress: Not on file   Social Connections: Not on file   Intimate Partner Violence: Not on file   Housing Stability: Not on file       The following portions of the patient's history were reviewed and updated as appropriate: allergies, current medications, past family history, past medical history, past social history, past surgical history and problem list.    Review of Systems  A comprehensive review of systems was negative except for what is noted above    Mental Status Exam  Appearance:  No obvious pain.  Perhaps slightly flattened.  He has purple hair and that he recently dyed to show his support for the [employer].  Behavior:  Pleasant.  A bit flat.  He denies having any recent irritability or behavioral dyscontrol.  Speech:  Somewhat monotone.  Not pressured or rambling.  Answers were consistent and appropriate but a bit vague.  Mood: A bit flat perhaps slightly depressed but no irritability or lability.  Thought content: No hallucinations or  delusions   Thought formation: No recent change.  Continues vague but able to participate appropriately.  Associations: Fair.  No changes.  Able to track and follow during the interview.   Only needing occasional prompts.  Fund of knowledge: Unchanged.  Attention/Concentration: Tracking adequately.  He denies any recent changes.  Not disorganized.  No racing thoughts.  Insight: Impaired, no obvious change.  Judgment: Impaired, chronically with no recent reports of any change.  No obvious change on exam.  Memory: Impaired  Motor status: No mouth movements on exam today.  The patient reports no new tremors or asymmetries.  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    Other:   Patient will return to clinic in  to 6 to 8 weeks. They agree to call or return sooner with any questions or concerns.  Risks and benefits were discussed.  Continue with his individual therapist.     Continue with the support of the clinic, reassurance, and redirection. Staff monitoring and ongoing assessments per team plan. Current psychotropic medication appears to represent the minimum effective dosage and appears medically necessary. We will continue to monitor and reassess. This team will utilize appropriate emergency services if necessary. I will make myself available if concerns or problems arise.    Total time spent with the patient today was 25 minutes with greater than 50% of the time spent in counseling and care coordination. The patient agrees to call before then with any questions, concerns or problems. We will assess for the appropriateness of possible psychotropic medication trials/changes. The patient will seek out appropriate emergency services should that become necessary.    Video Visit Details    Type of service:  Video Visit    Video Start Time: 10:22 AM    Video End Time: 10:38 AM    Total time for video call: 16 minutes    Originating Location: Patient's home    Distant Location:  [facility]    Mode of Communication: Video call via [email or web address]    Total time for chart review, interview with family and patient, coordination of care and documentation is 25 minutes.    Patient Instructions   It was nice speaking with you today for our office video visit. The following is a summary of our visit.    General Information:    If lab work was done today as part of your evaluation you will generally be contacted via My Chart, mail, or phone with the results within 1-5 days. If there is an alarming result we will contact you by phone. Lab results come back at varying times, I generally wait until all labs are resulted before making comments on results. Please note labs are automatically released to My Chart once available.     If you need refills please contact your pharmacist. They will send a refill request to me to review. Please allow 3 business days for us to process all refill requests.     Please call or send a medical message through My Chart, with any questions or concerns.    If you need any paperwork completed please fax forms to [phone]. Please state if you would like a copy of the completed paperwork, mailed or faxed back to the patient and a fax number to fax the paperwork to. Please allow up to 10 business days for paperwork to be completed.    Rolando Burnham MD        Again, thank you for allowing me to participate in the care of your patient.        Sincerely,        Rolando Burnham MD

## 2022-10-13 NOTE — PATIENT INSTRUCTIONS
I have increased the patient's Cymbalta to 120 mg a day.  Risks and benefits were discussed.  He should return to this clinic in 2 to 3 months for medication check and agrees to call before then with any questions or concerns.

## 2022-10-13 NOTE — PROGRESS NOTES
Outpatient Follow up TBI Evaluation     Pertinent History: Patient is known to me from prior clinic contacted the patient has not been seen by me in over 2 years.  Unfortunately old records are difficult to access at this time due to the new computer system.  The patient and his wife are extremely vague historians but it appears he has been followed through the [facility] by psychiatry.  Medications are listed as above and both the patient and wife report there is been multiple medication changes in the last few years but they are unaware of what these were.  We will try and clarify all of this.  Patient presents today to establish care at this clinic.  The patient's medication list is as described in the nurse's note according to the limited information we have available to us.     The patient reestablished contact with his clinic in December 2021.  He had been followed through [facility].  He had had multiple medication changes over the last few years but we have limited information when we first saw the patient.  We were attempting to clarify his current medication list and past medication trials.  At that visit the patient had significant heart movements noted around his mouth.  The family also reported he had lost 60 pounds in 6 months.  He was having worsening depression and anxiety.     The patient was seen for a follow-up visit on January 11 of 2022.  The patient has been seen by multiple providers prior to that over the course of more than a year.  When I saw him at that visit he was on Cymbalta 20 mg a day.  His mood was worse.  He was having low motivation and low energy but no thoughts of harming himself.  He was willing to restart with a psychotherapist and I did order that.  I increased his Cymbalta to 60 mg a day.     I saw the patient on February 22, 2022.  I also interviewed his wife and both reported the patient seem to be improving with regard to his mood and was less depressed  with no thoughts of wishing he was dead.  He continued with his baseline tremor.  He was sleeping well at night.  There were no new medical issues.  We continued with the treatment plan at that time.    The patient had a follow-up with me on April 5, 2022.  We did increase his Cymbalta to 60 mg a day and did remind him that he had trazodone available that he was not using.  We also ordered some individual therapy.  He was complaining of some chronic fatigue and weight loss and he was going to have that addressed through his primary care doctor.    The patient was seen for follow-up in May 2022 along with Leilani.  He was having some trouble with sleep at that time but was otherwise doing relatively well.  He and his family had recently returned from a road trip to [place] and that went well.  He was doing well and we did not make any medication changes.    The patient was seen on August 18, 2022.  At that time he was doing relatively well but was still having some depression.  He was tolerating the medication and felt that the medication was at least partially helpful.  We elected to increase the patient's Cymbalta to 90 mg a day.       HPI:  Patient presents today for the purposes of medication management.   On my interview with the patient today he states that initially with the prior increase in Cymbalta his mood was better and he was doing well but he stated the last week or 2 his mood has been down again.  He reported no thoughts of harming himself or anybody else.  The patient denied having any hallucinations or delusions.  No robinson.  He reported sleep was still variable.  He often went to bed about 1 AM and got up at 5 and he did admit he occasionally naps during the day and again I talked with him about sleep hygiene and to avoid napping.  He stated socially things are going fairly well he went to [place] visit family but stated that was not particularly fine.  He is able to contract for safety and  denies having any side effects to the medication.  He denies having any new medical issues or concerns.  We did discuss a variety of treatment options and have elected to increase his Cymbalta to 120 mg a day.  Risks and benefits were discussed.     We discussed some treatment options and have elected to increase the Cymbalta to 120 mg a day.       Current Medications: Please see chart. Medications personally reviewed.     Patient Active Problem List    Diagnosis Date Noted     Chronic pain due to trauma 09/03/2016     Priority: Medium     Overview:   Broken back/neck 1996/2007 respectively. Did have procedure for spine stimulator       Esophageal reflux 09/03/2016     Priority: Medium     Essential hypertension 09/03/2016     Priority: Medium     History of traumatic brain injury 09/03/2016     Priority: Medium     Large bowel obstruction (H) 09/03/2016     Priority: Medium     S/P laminectomy 09/03/2016     Priority: Medium     Overview:   replacement of failed spinal cord stimulator & placement of epidural paddle electrode - 9/2/2016       Tobacco dependence 09/03/2016     Priority: Medium     Cognitive disorder 06/16/2016     Priority: Medium     Type 2 diabetes mellitus with diabetic neuropathy (H) 05/02/2016     Priority: Medium     Seizures (H) 02/01/2016     Priority: Medium     Respiratory failure (H) 10/19/2015     Priority: Medium     Insomnia 03/13/2015     Priority: Medium     [facility] 03/11/2015     Priority: Medium     For urgent after hours needs, please call [phone] and ask to speak with the Adult [facility] Physician on call - mention job code [phone].           Dysphonia 01/05/2015     Priority: Medium     Postprocedural subglottic stenosis 07/22/2014     Priority: Medium     Pre-operative cardiovascular examination 07/16/2014     Priority: Medium     Automatic implantable cardioverter-defibrillator in situ-  [employer]tr[employer], dual chamber- DEPENDENT 04/30/2014     Priority: Medium     Implanted 4/29/14 by Dr. Lacy  Problem list name updated by automated process. Provider to review       S/P ventricular septal myectomy 04/14/2014     Priority: Medium     Syncope 12/28/2013     Priority: Medium     Atrial fibrillation (H) 05/15/2013     Priority: Medium     Asthma 01/24/2013     Priority: Medium     Depressive disorder 01/24/2013     Priority: Medium     Old myocardial infarction 01/24/2013     Priority: Medium     Osteoarthrosis 01/24/2013     Priority: Medium     Spinal stenosis, lumbar region, with neurogenic claudication 11/15/2012     Priority: Medium     Lumbar radicular pain 11/15/2012     Priority: Medium     s/p PSF C6-7 for anterior pseudarthrosis 10/11/2012     Priority: Medium     Hypertrophic nonobstructive cardiomyopathy (H) 09/12/2012     Priority: Medium     Chest pain 09/12/2012     Priority: Medium     Sinus tachycardia 09/12/2012     Priority: Medium     Pseudoarthrosis of cervical spine (H) 08/24/2012     Priority: Medium     Chondromalacia of patella 10/25/2011     Priority: Medium     Anticoagulant long-term use 02/18/2011     Priority: Medium     Cervical vertebral fusion 12/08/2010     Priority: Medium     Herniated cervical intervertebral disc 06/26/2008     Priority: Medium     Past Medical History:   Diagnosis Date     Atrial fibrillation (H)      Chest pain     intermittent     Chronic pain      Cognitive disorder     memory loss     Depressive disorder      Dual ICD (implantable cardiac defibrillator) in place 04/29/2014    and pacemaker     GERD (gastroesophageal reflux disease)      H/O traumatic brain injury 2015     Heart attack (H) 1996     HTN (hypertension)      Hypertrophic nonobstructive cardiomyopathy (H) 09/12/2012    s/p ventricular myectomy     Inflammatory arthritis      Intermittent asthma      PAD (peripheral artery disease) (H)      Polysubstance abuse (H)      Seizures (H)      "last episode 2014 when \"blood sugar dropped\" according to patient     Sleep apnea     doesn't use cpap     Type 2 diabetes mellitus without complications (H)      Past Surgical History:   Procedure Laterality Date     APPENDECTOMY  1980    [facility]     BACK SURGERY       COLONOSCOPY  2017     DISCECTOMY LUMBAR POSTERIOR MICROSCOPIC THREE+ LEVELS  12/16/2011    Procedure:DISCECTOMY LUMBAR POSTERIOR MICROSCOPIC THREE+ LEVELS; Posterior Decompression L2-S1; Surgeon:CAITIE OSMAN; Location:[facility]     EP ICD GENERATOR REPLACEMENT DUAL N/A 7/18/2022    Procedure: Implantable Cardioverter Defibrillator Generator Replacement Dual;  Surgeon: Ritesh Lacy MD;  Location: [facility] OR     FUSION CERVICAL POSTERIOR ONE LEVEL  8/24/2012    Procedure: FUSION CERVICAL POSTERIOR ONE LEVEL;  Posterior Instrumentated Spinal Fusion Cervical 6-7, Right Iliac Crest Bone [place] ;  Surgeon: Caitie Osman MD;  [facility]     GRAFT BONE FROM ILIAC CREST  8/24/2012    Procedure: GRAFT BONE FROM ILIAC CREST;;  Surgeon: Caitie Osman MD;  [facility]     HEAD & NECK SURGERY  2009     IMPLANT PACEMAKER       IMPLANT PACEMAKER       INJECT STEROID (LOCATION) N/A 2/19/2015    Procedure: INJECT STEROID (LOCATION);  Surgeon: Siri Koch MD;  [facility]     INSERT STIMULATOR AND LEADS INTERNAL DORSAL COLUMN  8/7/2013    Procedure: INSERT STIMULATOR AND LEADS INTERNAL DORSAL COLUMN;  SPINAL CORD STIMULATOR IMPLANT ;  Surgeon: Ricardo Bruno MD;  Location: [facility] OR     INSERT STIMULATOR DORSAL COLUMN  08/13/2013    lead replacemend, 12?2016,  battery replacement 4/4/2016     IR GASTROSTOMY TUBE PERCUTANEOUS PLCMNT  10/29/2015     KNEE SURGERY       LASER CO2 LARYNGOSCOPY N/A 2/19/2015    Procedure: LASER CO2 LARYNGOSCOPY;  Surgeon: Siri Koch MD;  [facility]     LASER CO2 LARYNGOSCOPY, COMPLEX  7/22/2014    Procedure: LASER CO2 LARYNGOSCOPY, COMPLEX;  Surgeon: " "Siri Koch MD;  [facility]     MYECTOMY SEPTAL  4/14/2014    Procedure: Median Sternotomy, Septal Myectomy, Intraoperative BRENT performed by Dr. Castano, on pump oxygenator.;  Surgeon: Aguila Cannon MD;  [facility]     NECK SURGERY       ND SPINE SURGERY PROCEDURE UNLISTED       SHOULDER SURGERY  2006    RIGHT     STOMACH SURGERY  1980    partial gastrectomy for bleeding ulcers, \"stress related\". [facility]     WRIST SURGERY Left 1988    fractured. 1988 or so     [facility] CARDIAC SURG PROCEDURE UNLIST       [facility] HAND/FINGER SURGERY UNLISTED       [facility] SHOULDER SURG PROC UNLISTED       [facility] STOMACH SURGERY PROCEDURE UNLISTED       Family History   Problem Relation Age of Onset     Heart Disease Father 32        MIs x2; s/p CABG     Substance Abuse Father      Hypertension Father      Obesity Father      Hyperlipidemia Father      Hypertension Brother      Diabetes Brother      Glaucoma Maternal Grandmother      Hypertension Maternal Grandmother      Diabetes Maternal Grandfather      Glaucoma Maternal Grandfather      Hypertension Maternal Grandfather      Cerebrovascular Disease Maternal Grandfather      Obesity Maternal Grandfather      Hyperlipidemia Maternal Grandfather      Coronary Artery Disease Maternal Grandfather      Heart Disease Maternal Grandfather      Substance Abuse Other      Cancer Other      Hypertension Other      Obesity Other      Other Cancer Other         all over     Hyperlipidemia Other      Coronary Artery Disease Other      Obesity Brother      Hyperlipidemia Brother      Lymphoma Maternal Uncle      Diabetes Brother      Depression Daughter      Depression Son      Macular Degeneration No family hx of      Heart Failure Father      Current Outpatient Medications   Medication Sig Dispense Refill     Atorvastatin Calcium (LIPITOR PO) Take 80 mg by mouth daily        cloNIDine (CATAPRES) 0.1 MG tablet Take 0.1 mg by mouth 3 times daily       diphenhydrAMINE " (BENADRYL) 50 MG capsule Take Benadryl 1 tablet (50mg) 1 hour prior to scan 1 capsule 0     docusate sodium (COLACE) 100 MG capsule Take 100 mg by mouth 2 times daily       DULoxetine (CYMBALTA) 30 MG capsule Take 3 capsules (90 mg) by mouth daily 90 capsule 3     DULoxetine (CYMBALTA) 60 MG capsule TAKE 1 CAPSULE BY MOUTH EVERY DAY 30 capsule 3     furosemide (LASIX) 40 MG tablet TAKE 1/2 A TABLET DAILY       losartan (COZAAR) 25 MG tablet Take 1 tablet (25 mg) by mouth daily 30 tablet 1     magnesium oxide (MAG-OX) 400 MG tablet Take 2.5 tablets (1,000 mg) by mouth daily 180 tablet 3     metFORMIN (GLUCOPHAGE) 500 MG tablet Take 500 mg by mouth 2 times daily (with meals)  60 tablet      methylPREDNISolone (MEDROL) 32 MG tablet Take methylprednisolone 1 tablet (32mg) by mouth 12 hours prior to scan and another dose 2 hours prior to scan. 2 tablet 0     OLANZapine (ZYPREXA) 5 MG tablet Take 1 tablet by mouth At Bedtime       predniSONE (DELTASONE) 20 MG tablet Take two tablets (= 40mg) each day for 5 (five) days 10 tablet 0     QUEtiapine Fumarate (SEROQUEL PO) Take 25 mg by mouth 2 times daily       rivaroxaban ANTICOAGULANT (XARELTO) 10 MG TABS tablet Take 10 mg by mouth 2 times daily (with meals)       thiamine (B-1) 100 MG tablet Take 100 mg by mouth 2 times daily       traZODone (DESYREL) 100 MG tablet TAKE 1 TABLET BY MOUTH AT BEDTIME 30 tablet 3       Allergies   Allergen Reactions     Betadine Prepstick Plus Hives, Swelling and Rash     From procedure on 7/18/2022     Bee Venom Swelling     Lisinopril      TABS  Severe cough     Penicillins Hives and Difficulty breathing     Social History     Socioeconomic History     Marital status: Single     Spouse name: Not on file     Number of children: Not on file     Years of education: Not on file     Highest education level: Not on file   Occupational History     Not on file   Tobacco Use     Smoking status: Every Day     Packs/day: 0.12     Years: 35.00     Pack  years: 4.20     Types: Cigarettes     Start date: 3/8/1982     Last attempt to quit: 2014     Years since quittin.5     Smokeless tobacco: Never   Substance and Sexual Activity     Alcohol use: Yes     Alcohol/week: 0.0 standard drinks     Comment: rare     Drug use: No     Comment: last using meth 2017     Sexual activity: Not Currently     Partners: Female     Birth control/protection: Male Surgical   Other Topics Concern     Parent/sibling w/ CABG, MI or angioplasty before 65F 55M? Yes   Social History Narrative     Not on file     Social Determinants of Health     Financial Resource Strain: Not on file   Food Insecurity: Not on file   Transportation Needs: Not on file   Physical Activity: Not on file   Stress: Not on file   Social Connections: Not on file   Intimate Partner Violence: Not on file   Housing Stability: Not on file       The following portions of the patient's history were reviewed and updated as appropriate: allergies, current medications, past family history, past medical history, past social history, past surgical history and problem list.    Review of Systems  A comprehensive review of systems was negative except for what is noted above    Mental Status Exam  Appearance:  No obvious pain.  Perhaps slightly flattened.  He has purple hair and that he recently dyed to show his support for the [employer].  Behavior:  Pleasant.  A bit flat.  He denies having any recent irritability or behavioral dyscontrol.  Speech:  Somewhat monotone.  Not pressured or rambling.  Answers were consistent and appropriate but a bit vague.  Mood: A bit flat perhaps slightly depressed but no irritability or lability.  Thought content: No hallucinations or delusions   Thought formation: No recent change.  Continues vague but able to participate appropriately.  Associations: Fair.  No changes.  Able to track and follow during the interview.   Only needing occasional prompts.  Fund of knowledge:  Unchanged.  Attention/Concentration: Tracking adequately.  He denies any recent changes.  Not disorganized.  No racing thoughts.  Insight: Impaired, no obvious change.  Judgment: Impaired, chronically with no recent reports of any change.  No obvious change on exam.  Memory: Impaired  Motor status: No mouth movements on exam today.  The patient reports no new tremors or asymmetries.  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    Other:   Patient will return to clinic in  to 6 to 8 weeks. They agree to call or return sooner with any questions or concerns.  Risks and benefits were discussed.  Continue with his individual therapist.     Continue with the support of the clinic, reassurance, and redirection. Staff monitoring and ongoing assessments per team plan. Current psychotropic medication appears to represent the minimum effective dosage and appears medically necessary. We will continue to monitor and reassess. This team will utilize appropriate emergency services if necessary. I will make myself available if concerns or problems arise.    Total time spent with the patient today was 25 minutes with greater than 50% of the time spent in counseling and care coordination. The patient agrees to call before then with any questions, concerns or problems. We will assess for the appropriateness of possible psychotropic medication trials/changes. The patient will seek out appropriate emergency services should that become necessary.    Video Visit Details    Type of service: Video Visit    Video Start Time: 10:22 AM    Video End Time: 10:38 AM    Total time for video call: 16 minutes    Originating Location: Patient's home    Distant Location:  [facility]    Mode of Communication:  Video call via [employer]    Total time for chart review, interview with family and patient, coordination of care and documentation is 25 minutes.    Patient Instructions   It was nice speaking with you today for our office video visit. The following is a summary of our visit.    General Information:    If lab work was done today as part of your evaluation you will generally be contacted via My Chart, mail, or phone with the results within 1-5 days. If there is an alarming result we will contact you by phone. Lab results come back at varying times, I generally wait until all labs are resulted before making comments on results. Please note labs are automatically released to My Chart once available.     If you need refills please contact your pharmacist. They will send a refill request to me to review. Please allow 3 business days for us to process all refill requests.     Please call or send a medical message through My Chart, with any questions or concerns.    If you need any paperwork completed please fax forms to [phone]. Please state if you would like a copy of the completed paperwork, mailed or faxed back to the patient and a fax number to fax the paperwork to. Please allow up to 10 business days for paperwork to be completed.    Rolando Burnham MD

## 2022-10-13 NOTE — NURSING NOTE
Konstantin is a 52 year old who is being evaluated via a billable video visit.      How would you like to obtain your AVS? MyChart  If the video visit is dropped, the invitation should be resent by: Text to cell phone: [phone]  Will anyone else be joining your video visit? No

## 2022-10-14 NOTE — TELEPHONE ENCOUNTER
Returned call to Chase, clarified that dose for cymbalta was increased to 120mg daily.     He verbalizes understanding, nothing further needed.     Daniel Weems RN, BSN  [facility]

## 2022-10-14 NOTE — TELEPHONE ENCOUNTER
[facility] Call Center    Phone Message    May a detailed message be left on voicemail: yes     Reason for Call: Other:  Chase from [facility] is calling to request verbal orders for medication change. Pt was taking DULoxetine 90 MG but he says the medication was changed to 120 MG. Chase would like Dr. Burnham or a nurse to call to authorize changing the medication to 120 MG. Please call Chase directly Ph: [phone]      Action Taken: Message routed to:  Other: [facility]    Travel Screening: Not Applicable

## 2022-11-02 NOTE — TELEPHONE ENCOUNTER
methylPREDNISolone (MEDROL) 32 MG tablet      Take methylprednisolone 1 tablet (32mg) by mouth 12 hours prior to scan and another dose 2 hours prior to scan.  Last Written Prescription Date:  8-1-22  Last Fill Quantity: 2,   # refills: 0    diphenhydrAMINE (BENADRYL) 50 MG capsule    Take Benadryl 1 tablet (50mg) 1 hour prior to scan  Last Written Prescription Date:  8-1-22  Last Fill Quantity: 1,   # refills: 0  Last Office Visit : 3-4-22  Future Office visit:  none    Routing refill request to provider for review/approval because:  Meds not on protocol   ( ? Scan, no appt for scan found)

## 2022-11-11 NOTE — TELEPHONE ENCOUNTER
[facility] Call Center    Phone Message    May a detailed message be left on voicemail: yes     Reason for Call: Medication Refill Request    Has the patient contacted the pharmacy for the refill? Yes   Name of medication being requested: rivaroxaban ANTICOAGULANT (XARELTO) 10 MG TABS tablet  Provider who prescribed the medication: Dr. Lacy  Pharmacy: [facility]/pharmacy #[id number] - [place], [place] - [address]      Date medication is needed: ASAP     Action Taken: Other: cardio    Travel Screening: Not Applicable    Thank you!  [facility]

## 2022-11-11 NOTE — TELEPHONE ENCOUNTER
Centralized Medication Refill Team note:      rivaroxaban ANTICOAGULANT (XARELTO) 10 MG TABS tablet    Last Written Prescription Date:  Reported/ historical  Last Fill Quantity: -,   # refills: -   Last Office Visit : 3-4-22 March and Bambi  Future Office visit:  none    Routing refill request to provider for review/approval because:  Medication is reported/historical. Pended at 10 mg twice daily.  7/18/22 ICD extraction and reimplant.    Warfarin discontinued per med list 4/5/2    Provider who prescribed the medication: Dr. Lacy  Pharmacy: [facility]/pharmacy #[id number] - [place], [place] - [address]

## 2022-11-17 NOTE — PATIENT INSTRUCTIONS
The patient is doing well and tolerating the medication.  We will make no medication changes at this time.  The patient should return to this clinic in 2 to 3 months for medication check.

## 2022-11-17 NOTE — LETTER
11/17/2022         RE: Konstantin Sharp  [address] [place] [address]        Dear Colleague,    Thank you for referring your patient, Konstantin Sharp, to the [facility]. Please see a copy of my visit note below.        Outpatient Follow up TBI Evaluation     Pertinent History: Patient is known to me from prior clinic contacted the patient has not been seen by me in over 2 years.  Unfortunately old records are difficult to access at this time due to the new computer system.  The patient and his wife are extremely vague historians but it appears he has been followed through the [facility] by psychiatry.  Medications are listed as above and both the patient and wife report there is been multiple medication changes in the last few years but they are unaware of what these were.  We will try and clarify all of this.  Patient presents today to establish care at this clinic.  The patient's medication list is as described in the nurse's note according to the limited information we have available to us.     The patient reestablished contact with his clinic in December 2021.  He had been followed through [facility].  He had had multiple medication changes over the last few years but we have limited information when we first saw the patient.  We were attempting to clarify his current medication list and past medication trials.  At that visit the patient had significant heart movements noted around his mouth.  The family also reported he had lost 60 pounds in 6 months.  He was having worsening depression and anxiety.     The patient was seen for a follow-up visit on January 11 of 2022.  The patient has been seen by multiple providers prior to that over the course of more than a year.  When I saw him at that visit he was on Cymbalta 20 mg a day.  His mood was worse.  He was having low motivation and low energy but no thoughts of harming himself.  He was willing to  restart with a psychotherapist and I did order that.  I increased his Cymbalta to 60 mg a day.     I saw the patient on February 22, 2022.  I also interviewed his wife and both reported the patient seem to be improving with regard to his mood and was less depressed with no thoughts of wishing he was dead.  He continued with his baseline tremor.  He was sleeping well at night.  There were no new medical issues.  We continued with the treatment plan at that time.    The patient had a follow-up with me on April 5, 2022.  We did increase his Cymbalta to 60 mg a day and did remind him that he had trazodone available that he was not using.  We also ordered some individual therapy.  He was complaining of some chronic fatigue and weight loss and he was going to have that addressed through his primary care doctor.    The patient was seen for follow-up in May 2022 along with Leilani.  He was having some trouble with sleep at that time but was otherwise doing relatively well.  He and his family had recently returned from a road trip to [place] and that went well.  He was doing well and we did not make any medication changes.    The patient was seen on August 18, 2022.  At that time he was doing relatively well but was still having some depression.  He was tolerating the medication and felt that the medication was at least partially helpful.  We elected to increase the patient's Cymbalta to 90 mg a day.    The patient was seen in October 2022.  At that time he was having some trouble with sleep and he felt this was likely related to pain.  We elected to increase his Cymbalta to 120 mg a day and he was going to continue with his medical treatments and cares.       HPI:  Patient presents today for the purposes of medication management.   Today on my interview with the patient he states his main concern is ongoing pain.  He states this is disrupted his sleep.  He is being followed by the pain clinic and they apparently had  recommended bating THC products.  I suspect this may be complicating things a bit but in any case he preferred to stay with that treatment.  He states he is not napping during the day and we did discuss sleep hygiene.    The patient reports no feelings of hopelessness or worthlessness or guilt.  No robinson.  No hallucinations or delusions.  He tells me there is been no change in cognition.  He reports no other new medical issues or concerns and he believes the antidepressants have been very helpful with his mood.  He is able to contract for safety and has no other questions.     We discussed some treatment options and have elected to continue with the current medications.       Current Medications: Please see chart. Medications personally reviewed.     Patient Active Problem List    Diagnosis Date Noted     Chronic pain due to trauma 09/03/2016     Priority: Medium     Overview:   Broken back/neck 1996/2007 respectively. Did have procedure for spine stimulator       Esophageal reflux 09/03/2016     Priority: Medium     Essential hypertension 09/03/2016     Priority: Medium     History of traumatic brain injury 09/03/2016     Priority: Medium     Large bowel obstruction (H) 09/03/2016     Priority: Medium     S/P laminectomy 09/03/2016     Priority: Medium     Overview:   replacement of failed spinal cord stimulator & placement of epidural paddle electrode - 9/2/2016       Tobacco dependence 09/03/2016     Priority: Medium     Cognitive disorder 06/16/2016     Priority: Medium     Type 2 diabetes mellitus with diabetic neuropathy (H) 05/02/2016     Priority: Medium     Seizures (H) 02/01/2016     Priority: Medium     Respiratory failure (H) 10/19/2015     Priority: Medium     Insomnia 03/13/2015     Priority: Medium     [facility] 03/11/2015     Priority: Medium     For urgent after hours needs, please call [phone] and ask to speak with the Adult  [facility] Physician on call - mention job code [phone].           Dysphonia 01/05/2015     Priority: Medium     Postprocedural subglottic stenosis 07/22/2014     Priority: Medium     Pre-operative cardiovascular examination 07/16/2014     Priority: Medium     Automatic implantable cardioverter-defibrillator in situ- [employer], dual chamber- DEPENDENT 04/30/2014     Priority: Medium     Implanted 4/29/14 by Dr. Lacy  Problem list name updated by automated process. Provider to review       S/P ventricular septal myectomy 04/14/2014     Priority: Medium     Syncope 12/28/2013     Priority: Medium     Atrial fibrillation (H) 05/15/2013     Priority: Medium     Asthma 01/24/2013     Priority: Medium     Depressive disorder 01/24/2013     Priority: Medium     Old myocardial infarction 01/24/2013     Priority: Medium     Osteoarthrosis 01/24/2013     Priority: Medium     Spinal stenosis, lumbar region, with neurogenic claudication 11/15/2012     Priority: Medium     Lumbar radicular pain 11/15/2012     Priority: Medium     s/p PSF C6-7 for anterior pseudarthrosis 10/11/2012     Priority: Medium     Hypertrophic nonobstructive cardiomyopathy (H) 09/12/2012     Priority: Medium     Chest pain 09/12/2012     Priority: Medium     Sinus tachycardia 09/12/2012     Priority: Medium     Pseudoarthrosis of cervical spine (H) 08/24/2012     Priority: Medium     Chondromalacia of patella 10/25/2011     Priority: Medium     Anticoagulant long-term use 02/18/2011     Priority: Medium     Cervical vertebral fusion 12/08/2010     Priority: Medium     Herniated cervical intervertebral disc 06/26/2008     Priority: Medium     Past Medical History:   Diagnosis Date     Atrial fibrillation (H)      Chest pain     intermittent     Chronic pain      Cognitive disorder     memory loss     Depressive disorder      Dual ICD (implantable cardiac defibrillator) in place 04/29/2014    and pacemaker     GERD (gastroesophageal reflux  "disease)      H/O traumatic brain injury 2015     Heart attack (H) 1996     HTN (hypertension)      Hypertrophic nonobstructive cardiomyopathy (H) 09/12/2012    s/p ventricular myectomy     Inflammatory arthritis      Intermittent asthma      PAD (peripheral artery disease) (H)      Polysubstance abuse (H)      Seizures (H)     last episode 2014 when \"blood sugar dropped\" according to patient     Sleep apnea     doesn't use cpap     Type 2 diabetes mellitus without complications (H)      Past Surgical History:   Procedure Laterality Date     APPENDECTOMY  1980    [facility]     BACK SURGERY       COLONOSCOPY  2017     DISCECTOMY LUMBAR POSTERIOR MICROSCOPIC THREE+ LEVELS  12/16/2011    Procedure:DISCECTOMY LUMBAR POSTERIOR MICROSCOPIC THREE+ LEVELS; Posterior Decompression L2-S1; Surgeon:CAITIE OSMAN; Location:[facility] OR     EP ICD GENERATOR REPLACEMENT DUAL N/A 7/18/2022    Procedure: Implantable Cardioverter Defibrillator Generator Replacement Dual;  Surgeon: Ritesh Lacy MD;  Location: [facility] OR     FUSION CERVICAL POSTERIOR ONE LEVEL  8/24/2012    Procedure: FUSION CERVICAL POSTERIOR ONE LEVEL;  Posterior Instrumentated Spinal Fusion Cervical 6-7, Right Iliac Crest Bone [place] ;  Surgeon: Caitie Osman MD;  Location: [facility] OR     GRAFT BONE FROM ILIAC CREST  8/24/2012    Procedure: GRAFT BONE FROM ILIAC CREST;;  Surgeon: Caitie Osman MD;  [facility]: [facility]     HEAD & NECK SURGERY  2009     IMPLANT PACEMAKER       IMPLANT PACEMAKER       INJECT STEROID (LOCATION) N/A 2/19/2015    Procedure: INJECT STEROID (LOCATION);  Surgeon: Siri Koch MD;  [facility]: [facility]     INSERT STIMULATOR AND LEADS INTERNAL DORSAL COLUMN  8/7/2013    Procedure: INSERT STIMULATOR AND LEADS INTERNAL DORSAL COLUMN;  SPINAL CORD STIMULATOR IMPLANT ;  Surgeon: Ricardo Bruno MD;  Location: [facility] OR     INSERT STIMULATOR DORSAL COLUMN  08/13/2013    lead replacemend, 12?2016,  battery " "replacement 4/4/2016     IR GASTROSTOMY TUBE PERCUTANEOUS PLCMNT  10/29/2015     KNEE SURGERY       LASER CO2 LARYNGOSCOPY N/A 2/19/2015    Procedure: LASER CO2 LARYNGOSCOPY;  Surgeon: Siri Koch MD;  [facility]     LASER CO2 LARYNGOSCOPY, COMPLEX  7/22/2014    Procedure: LASER CO2 LARYNGOSCOPY, COMPLEX;  Surgeon: Siri Koch MD;  [facility]     MYECTOMY SEPTAL  4/14/2014    Procedure: Median Sternotomy, Septal Myectomy, Intraoperative BRENT performed by Dr. Castano, on pump oxygenator.;  Surgeon: Aguila Cannon MD;  Location: [facility] OR     NECK SURGERY       VA SPINE SURGERY PROCEDURE UNLISTED       SHOULDER SURGERY  2006    RIGHT     STOMACH SURGERY  1980    partial gastrectomy for bleeding ulcers, \"stress related\". [facility]     WRIST SURGERY Left 1988    fractured. 1988 or so     Z CARDIAC SURG PROCEDURE UNLIST       Z HAND/FINGER SURGERY UNLISTED       Z SHOULDER SURG PROC UNLISTED       [facility] STOMACH SURG[facility] PROCED[facility] UNLISTED       Family History   Problem Relation Age of Onset     Heart Disease Father 32        MIs x2; s/p CABG     Substance Abuse Father      Hypertension Father      Obesity Father      Hyperlipidemia Father      Hypertension Brother      Diabetes Brother      Glaucoma Maternal Grandmother      Hypertension Maternal Grandmother      Diabetes Maternal Grandfather      Glaucoma Maternal Grandfather      Hypertension Maternal Grandfather      Cerebrovascular Disease Maternal Grandfather      Obesity Maternal Grandfather      Hyperlipidemia Maternal Grandfather      Coronary Artery Disease Maternal Grandfather      Heart Disease Maternal Grandfather      Substance Abuse Other      Cancer Other      Hypertension Other      Obesity Other      Other Cancer Other         all over     Hyperlipidemia Other      Coronary Artery Disease Other      Obesity Brother      Hyperlipidemia Brother      Lymphoma Maternal Uncle      Diabetes Brother      " Depression Daughter      Depression Son      Macular Degeneration No family hx of      Heart Failure Father      Current Outpatient Medications   Medication Sig Dispense Refill     Atorvastatin Calcium (LIPITOR PO) Take 80 mg by mouth daily        docusate sodium (COLACE) 100 MG capsule Take 100 mg by mouth 2 times daily       DULoxetine (CYMBALTA) 60 MG capsule Take 2 capsules (120 mg) by mouth daily 60 capsule 3     furosemide (LASIX) 40 MG tablet TAKE 1/2 A TABLET DAILY       losartan (COZAAR) 25 MG tablet Take 1 tablet (25 mg) by mouth daily 30 tablet 1     metFORMIN (GLUCOPHAGE) 500 MG tablet Take 500 mg by mouth 2 times daily (with meals)  60 tablet      OLANZapine (ZYPREXA) 5 MG tablet Take 1 tablet by mouth At Bedtime       QUEtiapine Fumarate (SEROQUEL PO) Take 25 mg by mouth 2 times daily       rivaroxaban ANTICOAGULANT (XARELTO) 10 MG TABS tablet Take 10 mg by mouth 2 times daily (with meals)       thiamine (B-1) 100 MG tablet Take 100 mg by mouth 2 times daily       traZODone (DESYREL) 100 MG tablet TAKE 1 TABLET BY MOUTH AT BEDTIME 30 tablet 3     cloNIDine (CATAPRES) 0.1 MG tablet Take 0.1 mg by mouth 3 times daily       diphenhydrAMINE (BENADRYL) 50 MG capsule Take Benadryl 1 tablet (50mg) 1 hour prior to scan 1 capsule 0     magnesium oxide (MAG-OX) 400 MG tablet Take 2.5 tablets (1,000 mg) by mouth daily (Patient not taking: Reported on 11/17/2022) 180 tablet 3     methylPREDNISolone (MEDROL) 32 MG tablet Take methylprednisolone 1 tablet (32mg) by mouth 12 hours prior to scan and another dose 2 hours prior to scan. 2 tablet 0     predniSONE (DELTASONE) 20 MG tablet Take two tablets (= 40mg) each day for 5 (five) days (Patient not taking: Reported on 11/17/2022) 10 tablet 0       Allergies   Allergen Reactions     Betadine Prepstick Plus Hives, Swelling and Rash     From procedure on 7/18/2022     Bee Venom Swelling     Lisinopril      TABS  Severe cough     Penicillins Hives and Difficulty breathing      Social History     Socioeconomic History     Marital status: Single     Spouse name: Not on file     Number of children: Not on file     Years of education: Not on file     Highest education level: Not on file   Occupational History     Not on file   Tobacco Use     Smoking status: Every Day     Packs/day: 0.12     Years: 35.00     Pack years: 4.20     Types: Cigarettes     Start date: 3/8/1982     Last attempt to quit: 2014     Years since quittin.6     Smokeless tobacco: Never   Substance and Sexual Activity     Alcohol use: Yes     Alcohol/week: 0.0 standard drinks     Comment: rare     Drug use: No     Comment: last using meth 2017     Sexual activity: Not Currently     Partners: Female     Birth control/protection: Male Surgical   Other Topics Concern     Parent/sibling w/ CABG, MI or angioplasty before 65F 55M? Yes   Social History Narrative     Not on file     Social Determinants of Health     Financial Resource Strain: Not on file   Food Insecurity: Not on file   Transportation Needs: Not on file   Physical Activity: Not on file   Stress: Not on file   Social Connections: Not on file   Intimate Partner Violence: Not on file   Housing Stability: Not on file       The following portions of the patient's history were reviewed and updated as appropriate: allergies, current medications, past family history, past medical history, past social history, past surgical history and problem list.    Review of Systems  A comprehensive review of systems was negative except for what is noted above    Mental Status Exam  Appearance:  Alert, comfortable and calm.  Behavior:  Pleasant.  Pleasant.  Alert.  No evidence of any agitation or irritability.  Speech:  Better inflection today.  Not pressured or rambling.  Answers were consistent and appropriate but a bit vague.  Mood: Less depressed.  Calm.  No irritability or lability.  Thought content: No hallucinations or delusions   Thought formation: No recent  change.  Continues vague but able to participate appropriately.  Associations: Fair.  Tracking fairly well today.  No obvious deficits.  Fund of knowledge: Unchanged.  Attention/Concentration: Participating and tracking well today with good initiation.  Insight: Fair.  No obvious significant change.  Judgment: Impaired, chronically with no recent reports of any change.  No obvious change on exam.  Memory: Impaired  Motor status: No mouth movements on exam today.  The patient reports no new tremors or asymmetries.  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    Other:   Patient will return to clinic in  to 2 to 3 months.  He agrees to call or return sooner with any questions or concerns.  Risks and benefits were discussed.  Continue with his individual therapist.     Continue with the support of the clinic, reassurance, and redirection. Staff monitoring and ongoing assessments per team plan. Current psychotropic medication appears to represent the minimum effective dosage and appears medically necessary. We will continue to monitor and reassess. This team will utilize appropriate emergency services if necessary. I will make myself available if concerns or problems arise.    Total time spent with the patient today was 25 minutes with greater than 50% of the time spent in counseling and care coordination. The patient agrees to call before then with any questions, concerns or problems. We will assess for the appropriateness of possible psychotropic medication trials/changes. The patient will seek out appropriate emergency services should that become necessary.    Video Visit Details    Type of service: Video Visit    Video Start Time: 12:52 PM    Video End Time: 1:03 PM    Total time for video call: 11  minutes    Originating Location: Patient's home    Distant Location:  [facility]    Mode of Communication: Video call via [employer]    Total time for chart review, interview with family and patient, coordination of care and documentation is 25 minutes.    Patient Instructions   It was nice speaking with you today for our office video visit. The following is a summary of our visit.    General Information:    If lab work was done today as part of your evaluation you will generally be contacted via My Chart, mail, or phone with the results within 1-5 days. If there is an alarming result we will contact you by phone. Lab results come back at varying times, I generally wait until all labs are resulted before making comments on results. Please note labs are automatically released to My Chart once available.     If you need refills please contact your pharmacist. They will send a refill request to me to review. Please allow 3 business days for us to process all refill requests.     Please call or send a medical message through My Chart, with any questions or concerns.    If you need any paperwork completed please fax forms to [phone]. Please state if you would like a copy of the completed paperwork, mailed or faxed back to the patient and a fax number to fax the paperwork to. Please allow up to 10 business days for paperwork to be completed.    Rolando Burnham MD        Again, thank you for allowing me to participate in the care of your patient.        Sincerely,        Rolando Burnham MD

## 2022-11-17 NOTE — PROGRESS NOTES
Outpatient Follow up TBI Evaluation     Pertinent History: Patient is known to me from prior clinic contacted the patient has not been seen by me in over 2 years.  Unfortunately old records are difficult to access at this time due to the new computer system.  The patient and his wife are extremely vague historians but it appears he has been followed through [facility] by psychiatry.  Medications are listed as above and both the patient and wife report there is been multiple medication changes in the last few years but they are unaware of what these were.  We will try and clarify all of this.  Patient presents today to establish care at this clinic.  The patient's medication list is as described in the nurse's note according to the limited information we have available to us.     The patient reestablished contact with his clinic in December 2021.  He had been followed through [facility].  He had had multiple medication changes over the last few years but we have limited information when we first saw the patient.  We were attempting to clarify his current medication list and past medication trials.  At that visit the patient had significant heart movements noted around his mouth.  The family also reported he had lost 60 pounds in 6 months.  He was having worsening depression and anxiety.     The patient was seen for a follow-up visit on January 11 of 2022.  The patient has been seen by multiple providers prior to that over the course of more than a year.  When I saw him at that visit he was on Cymbalta 20 mg a day.  His mood was worse.  He was having low motivation and low energy but no thoughts of harming himself.  He was willing to restart with a psychotherapist and I did order that.  I increased his Cymbalta to 60 mg a day.     I saw the patient on February 22, 2022.  I also interviewed his wife and both reported the patient seem to be improving with regard to his mood and was less depressed  with no thoughts of wishing he was dead.  He continued with his baseline tremor.  He was sleeping well at night.  There were no new medical issues.  We continued with the treatment plan at that time.    The patient had a follow-up with me on April 5, 2022.  We did increase his Cymbalta to 60 mg a day and did remind him that he had trazodone available that he was not using.  We also ordered some individual therapy.  He was complaining of some chronic fatigue and weight loss and he was going to have that addressed through his primary care doctor.    The patient was seen for follow-up in May 2022 along with Leilani.  He was having some trouble with sleep at that time but was otherwise doing relatively well.  He and his family had recently returned from a road trip to [place] and that went well.  He was doing well and we did not make any medication changes.    The patient was seen on August 18, 2022.  At that time he was doing relatively well but was still having some depression.  He was tolerating the medication and felt that the medication was at least partially helpful.  We elected to increase the patient's Cymbalta to 90 mg a day.    The patient was seen in October 2022.  At that time he was having some trouble with sleep and he felt this was likely related to pain.  We elected to increase his Cymbalta to 120 mg a day and he was going to continue with his medical treatments and cares.       HPI:  Patient presents today for the purposes of medication management.   Today on my interview with the patient he states his main concern is ongoing pain.  He states this is disrupted his sleep.  He is being followed by the pain clinic and they apparently had recommended bating THC products.  I suspect this may be complicating things a bit but in any case he preferred to stay with that treatment.  He states he is not napping during the day and we did discuss sleep hygiene.    The patient reports no feelings of hopelessness or  worthlessness or guilt.  No robinson.  No hallucinations or delusions.  He tells me there is been no change in cognition.  He reports no other new medical issues or concerns and he believes the antidepressants have been very helpful with his mood.  He is able to contract for safety and has no other questions.     We discussed some treatment options and have elected to continue with the current medications.       Current Medications: Please see chart. Medications personally reviewed.     Patient Active Problem List    Diagnosis Date Noted     Chronic pain due to trauma 09/03/2016     Priority: Medium     Overview:   Broken back/neck 1996/2007 respectively. Did have procedure for spine stimulator       Esophageal reflux 09/03/2016     Priority: Medium     Essential hypertension 09/03/2016     Priority: Medium     History of traumatic brain injury 09/03/2016     Priority: Medium     Large bowel obstruction (H) 09/03/2016     Priority: Medium     S/P laminectomy 09/03/2016     Priority: Medium     Overview:   replacement of failed spinal cord stimulator & placement of epidural paddle electrode - 9/2/2016       Tobacco dependence 09/03/2016     Priority: Medium     Cognitive disorder 06/16/2016     Priority: Medium     Type 2 diabetes mellitus with diabetic neuropathy (H) 05/02/2016     Priority: Medium     Seizures (H) 02/01/2016     Priority: Medium     Respiratory failure (H) 10/19/2015     Priority: Medium     Insomnia 03/13/2015     Priority: Medium     [facility] 03/11/2015     Priority: Medium     For urgent after hours needs, please call [phone] and ask to speak with the [facility] Physician on call - mention job code [phone].           Dysphonia 01/05/2015     Priority: Medium     Postprocedural subglottic stenosis 07/22/2014     Priority: Medium     Pre-operative cardiovascular examination 07/16/2014     Priority: Medium      Automatic implantable cardioverter-defibrillator in situ- [employer], dual chamber- DEPENDENT 04/30/2014     Priority: Medium     Implanted 4/29/14 by Dr. Lacy  Problem list name updated by automated process. Provider to review       S/P ventricular septal myectomy 04/14/2014     Priority: Medium     Syncope 12/28/2013     Priority: Medium     Atrial fibrillation (H) 05/15/2013     Priority: Medium     Asthma 01/24/2013     Priority: Medium     Depressive disorder 01/24/2013     Priority: Medium     Old myocardial infarction 01/24/2013     Priority: Medium     Osteoarthrosis 01/24/2013     Priority: Medium     Spinal stenosis, lumbar region, with neurogenic claudication 11/15/2012     Priority: Medium     Lumbar radicular pain 11/15/2012     Priority: Medium     s/p PSF C6-7 for anterior pseudarthrosis 10/11/2012     Priority: Medium     Hypertrophic nonobstructive cardiomyopathy (H) 09/12/2012     Priority: Medium     Chest pain 09/12/2012     Priority: Medium     Sinus tachycardia 09/12/2012     Priority: Medium     Pseudoarthrosis of cervical spine (H) 08/24/2012     Priority: Medium     Chondromalacia of patella 10/25/2011     Priority: Medium     Anticoagulant long-term use 02/18/2011     Priority: Medium     Cervical vertebral fusion 12/08/2010     Priority: Medium     Herniated cervical intervertebral disc 06/26/2008     Priority: Medium     Past Medical History:   Diagnosis Date     Atrial fibrillation (H)      Chest pain     intermittent     Chronic pain      Cognitive disorder     memory loss     Depressive disorder      Dual ICD (implantable cardiac defibrillator) in place 04/29/2014    and pacemaker     GERD (gastroesophageal reflux disease)      H/O traumatic brain injury 2015     Heart attack (H) 1996     HTN (hypertension)      Hypertrophic nonobstructive cardiomyopathy (H) 09/12/2012    s/p ventricular myectomy     Inflammatory arthritis      Intermittent asthma      PAD (peripheral artery disease)  "(H)      Polysubstance abuse (H)      Seizures (H)     last episode 2014 when \"blood sugar dropped\" according to patient     Sleep apnea     doesn't use cpap     Type 2 diabetes mellitus without complications (H)      Past Surgical History:   Procedure Laterality Date     APPENDECTOMY  1980    [facility]     BACK SURGERY       COLONOSCOPY  2017     DISCECTOMY LUMBAR POSTERIOR MICROSCOPIC THREE+ LEVELS  12/16/2011    Procedure:DISCECTOMY LUMBAR POSTERIOR MICROSCOPIC THREE+ LEVELS; Posterior Decompression L2-S1; Surgeon:CAITIE OSMAN; Location:[facility]     EP ICD GENERATOR REPLACEMENT DUAL N/A 7/18/2022    Procedure: Implantable Cardioverter Defibrillator Generator Replacement Dual;  Surgeon: Ritesh Lacy MD;  [facility]: [facility]     FUSION CERVICAL POSTERIOR ONE LEVEL  8/24/2012    Procedure: FUSION CERVICAL POSTERIOR ONE LEVEL;  Posterior Instrumentated Spinal Fusion Cervical 6-7, Right Iliac Crest Bone Mystic ;  Surgeon: Caitie Osman MD;  [facility]     GRAFT BONE FROM ILIAC CREST  8/24/2012    Procedure: GRAFT BONE FROM ILIAC CREST;;  Surgeon: Caitie Osman MD;  [facility]     HEAD & NECK SURGERY  2009     IMPLANT PACEMAKER       IMPLANT PACEMAKER       INJECT STEROID (LOCATION) N/A 2/19/2015    Procedure: INJECT STEROID (LOCATION);  Surgeon: Siri Koch MD;  [facility]     INSERT STIMULATOR AND LEADS INTERNAL DORSAL COLUMN  8/7/2013    Procedure: INSERT STIMULATOR AND LEADS INTERNAL DORSAL COLUMN;  SPINAL CORD STIMULATOR IMPLANT ;  Surgeon: Ricardo Bruno MD;  Location: [facility] OR     INSERT STIMULATOR DORSAL COLUMN  08/13/2013    lead replacemend, 12?2016,  battery replacement 4/4/2016     IR GASTROSTOMY TUBE PERCUTANEOUS PLCMNT  10/29/2015     KNEE SURGERY       LASER CO2 LARYNGOSCOPY N/A 2/19/2015    Procedure: LASER CO2 LARYNGOSCOPY;  Surgeon: Siri Koch MD;  [facility]     LASER CO2 LARYNGOSCOPY, COMPLEX  7/22/2014    " "Procedure: LASER CO2 LARYNGOSCOPY, COMPLEX;  Surgeon: Siri Koch MD;  [facility]     MYECTOMY SEPTAL  4/14/2014    Procedure: Median Sternotomy, Septal Myectomy, Intraoperative BRENT performed by Dr. Castano, on pump oxygenator.;  Surgeon: Aguila Cannon MD;  [facility]     NECK SURGERY       PA SPINE SURGERY PROCEDURE UNLISTED       SHOULDER SURGERY  2006    RIGHT     STOMACH SURGERY  1980    partial gastrectomy for bleeding ulcers, \"stress related\". [facility]     WRIST SURGERY Left 1988    fractured. 1988 or so     [facility] CARDIAC SURG PROCEDURE UNLIST       [facility] HAND/FINGER SURGERY UNLISTED       [facility] SHOULDER SURG PROC UNLISTED       [facility] STOMACH SURGERY PROCEDURE UNLISTED       Family History   Problem Relation Age of Onset     Heart Disease Father 32        MIs x2; s/p CABG     Substance Abuse Father      Hypertension Father      Obesity Father      Hyperlipidemia Father      Hypertension Brother      Diabetes Brother      Glaucoma Maternal Grandmother      Hypertension Maternal Grandmother      Diabetes Maternal Grandfather      Glaucoma Maternal Grandfather      Hypertension Maternal Grandfather      Cerebrovascular Disease Maternal Grandfather      Obesity Maternal Grandfather      Hyperlipidemia Maternal Grandfather      Coronary Artery Disease Maternal Grandfather      Heart Disease Maternal Grandfather      Substance Abuse Other      Cancer Other      Hypertension Other      Obesity Other      Other Cancer Other         all over     Hyperlipidemia Other      Coronary Artery Disease Other      Obesity Brother      Hyperlipidemia Brother      Lymphoma Maternal Uncle      Diabetes Brother      Depression Daughter      Depression Son      Macular Degeneration No family hx of      Heart Failure Father      Current Outpatient Medications   Medication Sig Dispense Refill     Atorvastatin Calcium (LIPITOR PO) Take 80 mg by mouth daily        docusate sodium (COLACE) 100 MG capsule " Take 100 mg by mouth 2 times daily       DULoxetine (CYMBALTA) 60 MG capsule Take 2 capsules (120 mg) by mouth daily 60 capsule 3     furosemide (LASIX) 40 MG tablet TAKE 1/2 A TABLET DAILY       losartan (COZAAR) 25 MG tablet Take 1 tablet (25 mg) by mouth daily 30 tablet 1     metFORMIN (GLUCOPHAGE) 500 MG tablet Take 500 mg by mouth 2 times daily (with meals)  60 tablet      OLANZapine (ZYPREXA) 5 MG tablet Take 1 tablet by mouth At Bedtime       QUEtiapine Fumarate (SEROQUEL PO) Take 25 mg by mouth 2 times daily       rivaroxaban ANTICOAGULANT (XARELTO) 10 MG TABS tablet Take 10 mg by mouth 2 times daily (with meals)       thiamine (B-1) 100 MG tablet Take 100 mg by mouth 2 times daily       traZODone (DESYREL) 100 MG tablet TAKE 1 TABLET BY MOUTH AT BEDTIME 30 tablet 3     cloNIDine (CATAPRES) 0.1 MG tablet Take 0.1 mg by mouth 3 times daily       diphenhydrAMINE (BENADRYL) 50 MG capsule Take Benadryl 1 tablet (50mg) 1 hour prior to scan 1 capsule 0     magnesium oxide (MAG-OX) 400 MG tablet Take 2.5 tablets (1,000 mg) by mouth daily (Patient not taking: Reported on 11/17/2022) 180 tablet 3     methylPREDNISolone (MEDROL) 32 MG tablet Take methylprednisolone 1 tablet (32mg) by mouth 12 hours prior to scan and another dose 2 hours prior to scan. 2 tablet 0     predniSONE (DELTASONE) 20 MG tablet Take two tablets (= 40mg) each day for 5 (five) days (Patient not taking: Reported on 11/17/2022) 10 tablet 0       Allergies   Allergen Reactions     Betadine Prepstick Plus Hives, Swelling and Rash     From procedure on 7/18/2022     Bee Venom Swelling     Lisinopril      TABS  Severe cough     Penicillins Hives and Difficulty breathing     Social History     Socioeconomic History     Marital status: Single     Spouse name: Not on file     Number of children: Not on file     Years of education: Not on file     Highest education level: Not on file   Occupational History     Not on file   Tobacco Use     Smoking status:  Every Day     Packs/day: 0.12     Years: 35.00     Pack years: 4.20     Types: Cigarettes     Start date: 3/8/1982     Last attempt to quit: 2014     Years since quittin.6     Smokeless tobacco: Never   Substance and Sexual Activity     Alcohol use: Yes     Alcohol/week: 0.0 standard drinks     Comment: rare     Drug use: No     Comment: last using meth 2017     Sexual activity: Not Currently     Partners: Female     Birth control/protection: Male Surgical   Other Topics Concern     Parent/sibling w/ CABG, MI or angioplasty before 65F 55M? Yes   Social History Narrative     Not on file     Social Determinants of Health     Financial Resource Strain: Not on file   Food Insecurity: Not on file   Transportation Needs: Not on file   Physical Activity: Not on file   Stress: Not on file   Social Connections: Not on file   Intimate Partner Violence: Not on file   Housing Stability: Not on file       The following portions of the patient's history were reviewed and updated as appropriate: allergies, current medications, past family history, past medical history, past social history, past surgical history and problem list.    Review of Systems  A comprehensive review of systems was negative except for what is noted above    Mental Status Exam  Appearance:  Alert, comfortable and calm.  Behavior:  Pleasant.  Pleasant.  Alert.  No evidence of any agitation or irritability.  Speech:  Better inflection today.  Not pressured or rambling.  Answers were consistent and appropriate but a bit vague.  Mood: Less depressed.  Calm.  No irritability or lability.  Thought content: No hallucinations or delusions   Thought formation: No recent change.  Continues vague but able to participate appropriately.  Associations: Fair.  Tracking fairly well today.  No obvious deficits.  Fund of knowledge: Unchanged.  Attention/Concentration: Participating and tracking well today with good initiation.  Insight: Fair.  No obvious significant  change.  Judgment: Impaired, chronically with no recent reports of any change.  No obvious change on exam.  Memory: Impaired  Motor status: No mouth movements on exam today.  The patient reports no new tremors or asymmetries.  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    Other:   Patient will return to clinic in  to 2 to 3 months.  He agrees to call or return sooner with any questions or concerns.  Risks and benefits were discussed.  Continue with his individual therapist.     Continue with the support of the clinic, reassurance, and redirection. Staff monitoring and ongoing assessments per team plan. Current psychotropic medication appears to represent the minimum effective dosage and appears medically necessary. We will continue to monitor and reassess. This team will utilize appropriate emergency services if necessary. I will make myself available if concerns or problems arise.    Total time spent with the patient today was 25 minutes with greater than 50% of the time spent in counseling and care coordination. The patient agrees to call before then with any questions, concerns or problems. We will assess for the appropriateness of possible psychotropic medication trials/changes. The patient will seek out appropriate emergency services should that become necessary.    Video Visit Details    Type of service: Video Visit    Video Start Time: 12:52 PM    Video End Time: 1:03 PM    Total time for video call: 11 minutes    Originating Location: Patient's home    Distant Location:  [facility]    Mode of Communication: Video call via [employer]    Total time for chart review, interview with family and patient, coordination of care and documentation is 25 minutes.    Patient  Instructions   It was nice speaking with you today for our office video visit. The following is a summary of our visit.    General Information:    If lab work was done today as part of your evaluation you will generally be contacted via My Chart, mail, or phone with the results within 1-5 days. If there is an alarming result we will contact you by phone. Lab results come back at varying times, I generally wait until all labs are resulted before making comments on results. Please note labs are automatically released to My Chart once available.     If you need refills please contact your pharmacist. They will send a refill request to me to review. Please allow 3 business days for us to process all refill requests.     Please call or send a medical message through My Chart, with any questions or concerns.    If you need any paperwork completed please fax forms to [phone]. Please state if you would like a copy of the completed paperwork, mailed or faxed back to the patient and a fax number to fax the paperwork to. Please allow up to 10 business days for paperwork to be completed.    Rolando Burnham MD

## 2022-11-18 NOTE — TELEPHONE ENCOUNTER
[facility] Call Center    Phone Message    May a detailed message be left on voicemail: yes     Reason for Call: Medication Refill Request    Has the patient contacted the pharmacy for the refill? Yes   Name of medication being requested: rivaroxaban ANTICOAGULANT (XARELTO) 10 MG TABS  Provider who prescribed the medication: Requesting from Dr. Lacy  Pharmacy:  [facility]Y #[id number] - [place], [place] - [address]    Date medication is needed: 11/18/22       Patient is completely out of this medication. See encounter dated 11/11/22 for more information related to this request.    Thank you!  [facility]        Action Taken: Other: Cardiology    Travel Screening: Not Applicable

## 2022-11-25 NOTE — TELEPHONE ENCOUNTER
[facility] Call Center    Phone Message    May a detailed message be left on voicemail: yes     Reason for Call: Medication Refill Request    Has the patient contacted the pharmacy for the refill? Yes   Name of medication being requested: Xarelto  Provider who prescribed the medication: Dr. Lacy  Pharmacy:      [facility]ACY #[id number] - [place], [place] - [address] [place] [address]      Date medication is needed: 11/25/22   Patient has been out of this medication for 2 weeks. Please call to pharmacy today.       Action Taken: Other: cardiology    Travel Screening: Not Applicable

## 2022-11-28 NOTE — TELEPHONE ENCOUNTER
Health Call Center    Phone Message    May a detailed message be left on voicemail: yes     Reason for Call: Medication Question or concern regarding medication   Prescription Clarification  Name of Medication: Xarelto  Prescribing Provider: Dr. Lacy   Pharmacy:    [facility]/PHARMACY #[id number] - [place], [place] - [address]       What on the order needs clarification? Insurance will not cover 2 pills a day and pharmacy is getting sent back every prior auth they are sending. Please call pharmacy to discuss.          Action Taken: Message routed to:  Other: Cardiology    Travel Screening: Not Applicable     Thank you!  [facility]

## 2022-11-29 NOTE — TELEPHONE ENCOUNTER
Date: 11/29/2022    Time of Call: 2:41 PM     Diagnosis: HCM     [ TORB ] Ordering provider: Dr. Lacy  Order: okay to switch from Xarelto 10mg BID to 20mg daily     Order received by: ROSSY Sena     Follow-up/additional notes: Prescription updated and sent in. Patient notified.  Jaida Cabrales RN on 11/29/2022 at 2:41 PM

## 2022-11-29 NOTE — TELEPHONE ENCOUNTER
Patient has straight medical assistance for insurance.  They allow maximum of 1 tablet daily.  Xarelto is covered in strengths 10mg, 15mg, and 20mg tablets.     Is provider willing to switch patient to 20mg for once daily dosing? If so a new Rx will just need to be sent to the pharmacy.     Insurance will require medical reasoning why patient needs twice daily dosing of medication.  If provider would like to continue with request please provide medical reasoning for twice daily dosing and route back to PA team to re-initiate quantity review.

## 2022-12-02 NOTE — ED TRIAGE NOTES
Triage Assessment     Row Name 12/02/22 1418       Triage Assessment (Adult)    Airway WDL WDL       Respiratory WDL    Respiratory WDL X       Skin Circulation/Temperature WDL    Skin Circulation/Temperature WDL X;temperature  diaphoretic    Skin Temperature warm       Cardiac WDL    Cardiac WDL WDL       Peripheral/Neurovascular WDL    Peripheral Neurovascular WDL WDL       Cognitive/Neuro/Behavioral WDL    Cognitive/Neuro/Behavioral WDL WDL

## 2022-12-02 NOTE — ED PROVIDER NOTES
"    [place] EMERGENCY DEPARTMENT ([facility])  12/02/22        History     Chief Complaint   Patient presents with     Cough     concern for pneumonia     HPI     Konstantin Sharp is a 52 year old male who has a significant past medical history including DM2, hypertrophic cardiomyopathy s/p septal myomectomy (2014), s/p dual ICD placement in 2014 with reimplantation 7/2022, DM, COPD (not taking any meds for this), h/o MRSA pneumonia, HTN, history of atrial fib, and respiratory failure with prolonged hospitalization in [place], [place] necessitating tracheostomy for 3 months per patient report (now decannulated), presenting to the ED for evaluation of productive cough. He has noticed wheezing, yellow sputum, SOB, and diaphoresis for the past four days. Nausea and diarrhea began two days ago.  He states that spouse has been ill. He is up to date on vaccinations. No fever, sore throat, or abdominal pain. Denies leg pain, swelling, or any blood in stool.     Past Medical History:   Diagnosis Date     Atrial fibrillation (H)      Chest pain     intermittent     Chronic pain      Cognitive disorder     memory loss     Depressive disorder      Dual ICD (implantable cardiac defibrillator) in place 04/29/2014    and pacemaker     GERD (gastroesophageal reflux disease)      H/O traumatic brain injury 2015     Heart attack (H) 1996     HTN (hypertension)      Hypertrophic nonobstructive cardiomyopathy (H) 09/12/2012    s/p ventricular myectomy     Inflammatory arthritis      Intermittent asthma      PAD (peripheral artery disease) (H)      Polysubstance abuse (H)      Seizures (H)     last episode 2014 when \"blood sugar dropped\" according to patient     Sleep apnea     doesn't use cpap     Type 2 diabetes mellitus without complications (H)        Past Surgical History:   Procedure Laterality Date     APPENDECTOMY  1980    [facility]     BACK SURGERY       COLONOSCOPY  2017     DISCECTOMY LUMBAR POSTERIOR " MICROSCOPIC THREE+ LEVELS  12/16/2011    Procedure:DISCECTOMY LUMBAR POSTERIOR MICROSCOPIC THREE+ LEVELS; Posterior Decompression L2-S1; Surgeon:CAITIE OSMAN; [facility]     EP ICD GENERATOR REPLACEMENT DUAL N/A 7/18/2022    Procedure: Implantable Cardioverter Defibrillator Generator Replacement Dual;  Surgeon: Ritesh Lacy MD;  [facility]     FUSION CERVICAL POSTERIOR ONE LEVEL  8/24/2012    Procedure: FUSION CERVICAL POSTERIOR ONE LEVEL;  Posterior Instrumentated Spinal Fusion Cervical 6-7, Right Iliac Crest Bone [place] ;  Surgeon: Caitie Osman MD;  [facility]     GRAFT BONE FROM ILIAC CREST  8/24/2012    Procedure: GRAFT BONE FROM ILIAC CREST;;  Surgeon: Caitie Osman MD;  [facility]     HEAD & NECK SURGERY  2009     IMPLANT PACEMAKER       IMPLANT PACEMAKER       INJECT STEROID (LOCATION) N/A 2/19/2015    Procedure: INJECT STEROID (LOCATION);  Surgeon: Siri Koch MD;  [facility]     INSERT STIMULATOR AND LEADS INTERNAL DORSAL COLUMN  8/7/2013    Procedure: INSERT STIMULATOR AND LEADS INTERNAL DORSAL COLUMN;  SPINAL CORD STIMULATOR IMPLANT ;  Surgeon: Ricardo Bruno MD;  Location: [facility] OR     INSERT STIMULATOR DORSAL COLUMN  08/13/2013    lead replacemend, 12?2016,  battery replacement 4/4/2016     IR GASTROSTOMY TUBE PERCUTANEOUS PLCMNT  10/29/2015     KNEE SURGERY       LASER CO2 LARYNGOSCOPY N/A 2/19/2015    Procedure: LASER CO2 LARYNGOSCOPY;  Surgeon: Siri Koch MD;  [facility]     LASER CO2 LARYNGOSCOPY, COMPLEX  7/22/2014    Procedure: LASER CO2 LARYNGOSCOPY, COMPLEX;  Surgeon: Siri Koch MD;  [facility]     MYECTOMY SEPTAL  4/14/2014    Procedure: Median Sternotomy, Septal Myectomy, Intraoperative BRENT performed by Dr. Castano, on pump oxygenator.;  Surgeon: Aguila Cannon MD;  [facility]     NECK SURGERY       ME SPINE SURGERY PROCEDURE UNLISTED       SHOULDER SURGERY  2006     "RIGHT     STOMACH SURGERY  1980    partial gastrectomy for bleeding ulcers, \"stress related\". mpls childrens     WRIST SURGERY Left 1988    fractured. 1988 or so     [facility] CARDIAC SURG PROCEDURE UNLIST       [facility] HAND/FINGER SURGERY UNLISTED       [facility] SHOULDER SURG PROC UNLISTED       [facility] STOMACH SURGERY PROCEDURE UNLISTED         Family History   Problem Relation Age of Onset     Heart Disease Father 32        MIs x2; s/p CABG     Substance Abuse Father      Hypertension Father      Obesity Father      Hyperlipidemia Father      Hypertension Brother      Diabetes Brother      Glaucoma Maternal Grandmother      Hypertension Maternal Grandmother      Diabetes Maternal Grandfather      Glaucoma Maternal Grandfather      Hypertension Maternal Grandfather      Cerebrovascular Disease Maternal Grandfather      Obesity Maternal Grandfather      Hyperlipidemia Maternal Grandfather      Coronary Artery Disease Maternal Grandfather      Heart Disease Maternal Grandfather      Substance Abuse Other      Cancer Other      Hypertension Other      Obesity Other      Other Cancer Other         all over     Hyperlipidemia Other      Coronary Artery Disease Other      Obesity Brother      Hyperlipidemia Brother      Lymphoma Maternal Uncle      Diabetes Brother      Depression Daughter      Depression Son      Macular Degeneration No family hx of      Heart Failure Father        Social History     Tobacco Use     Smoking status: Every Day     Packs/day: 0.12     Years: 35.00     Pack years: 4.20     Types: Cigarettes     Start date: 3/8/1982     Last attempt to quit: 2014     Years since quittin.6     Smokeless tobacco: Never   Substance Use Topics     Alcohol use: Yes     Alcohol/week: 0.0 standard drinks     Comment: rare         Past Medical History  Past Medical History:   Diagnosis Date     Atrial fibrillation (H)      Chest pain     intermittent     Chronic pain      Cognitive disorder     memory loss     Depressive " "disorder      Dual ICD (implantable cardiac defibrillator) in place 04/29/2014    and pacemaker     GERD (gastroesophageal reflux disease)      H/O traumatic brain injury 2015     Heart attack (H) 1996     HTN (hypertension)      Hypertrophic nonobstructive cardiomyopathy (H) 09/12/2012    s/p ventricular myectomy     Inflammatory arthritis      Intermittent asthma      PAD (peripheral artery disease) (H)      Polysubstance abuse (H)      Seizures (H)     last episode 2014 when \"blood sugar dropped\" according to patient     Sleep apnea     doesn't use cpap     Type 2 diabetes mellitus without complications (H)      Past Surgical History:   Procedure Laterality Date     APPENDECTOMY  1980    [facility]     BACK SURGERY       COLONOSCOPY  2017     DISCECTOMY LUMBAR POSTERIOR MICROSCOPIC THREE+ LEVELS  12/16/2011    Procedure:DISCECTOMY LUMBAR POSTERIOR MICROSCOPIC THREE+ LEVELS; Posterior Decompression L2-S1; Surgeon:CAITIE FUNEZ; [facility]     EP ICD GENERATOR REPLACEMENT DUAL N/A 7/18/2022    Procedure: Implantable Cardioverter Defibrillator Generator Replacement Dual;  Surgeon: Ritesh Lacy MD;  [facility]     FUSION CERVICAL POSTERIOR ONE LEVEL  8/24/2012    Procedure: FUSION CERVICAL POSTERIOR ONE LEVEL;  Posterior Instrumentated Spinal Fusion Cervical 6-7, Right Iliac Crest Bone [place] ;  Surgeon: Caitie Funez MD;  [facility]     GRAFT BONE FROM ILIAC CREST  8/24/2012    Procedure: GRAFT BONE FROM ILIAC CREST;;  Surgeon: Caitie Funez MD;  [facility]     HEAD & NECK SURGERY  2009     IMPLANT PACEMAKER       IMPLANT PACEMAKER       INJECT STEROID (LOCATION) N/A 2/19/2015    Procedure: INJECT STEROID (LOCATION);  Surgeon: Siri Koch MD;  [facility]     INSERT STIMULATOR AND LEADS INTERNAL DORSAL COLUMN  8/7/2013    Procedure: INSERT STIMULATOR AND LEADS INTERNAL DORSAL COLUMN;  SPINAL CORD STIMULATOR IMPLANT ;  Surgeon: " "Will, Ricardo MILLS MD;  Location:  OR     INSERT STIMULATOR DORSAL COLUMN  08/13/2013    lead replacemend, 12?2016,  battery replacement 4/4/2016     IR GASTROSTOMY TUBE PERCUTANEOUS PLCMNT  10/29/2015     KNEE SURGERY       LASER CO2 LARYNGOSCOPY N/A 2/19/2015    Procedure: LASER CO2 LARYNGOSCOPY;  Surgeon: Siri Koch MD;  [facility]     LASER CO2 LARYNGOSCOPY, COMPLEX  7/22/2014    Procedure: LASER CO2 LARYNGOSCOPY, COMPLEX;  Surgeon: Siri Koch MD;  [facility]     MYECTOMY SEPTAL  4/14/2014    Procedure: Median Sternotomy, Septal Myectomy, Intraoperative BRENT performed by Dr. Castano, on pump oxygenator.;  Surgeon: Aguila Cannon MD;  [facility]     NECK SURGERY       DE SPINE SURGERY PROCEDURE UNLISTED       SHOULDER SURGERY  2006    RIGHT     STOMACH SURGERY  1980    partial gastrectomy for bleeding ulcers, \"stress related\". mpls childrens     WRIST SURGERY Left 1988    fractured. 1988 or so     [facility] CARDIAC SURG PROCEDURE UNLIST       [facility] HAND/FINGER SURGERY UNLISTED       [facility] SHOULDER SURG PROC UNLISTED       [facility] STOMACH SURGERY PROCEDURE UNLISTED       Atorvastatin Calcium (LIPITOR PO)  cloNIDine (CATAPRES) 0.1 MG tablet  docusate sodium (COLACE) 100 MG capsule  DULoxetine (CYMBALTA) 60 MG capsule  furosemide (LASIX) 40 MG tablet  losartan (COZAAR) 25 MG tablet  metFORMIN (GLUCOPHAGE) 500 MG tablet  OLANZapine (ZYPREXA) 5 MG tablet  QUEtiapine Fumarate (SEROQUEL PO)  rivaroxaban ANTICOAGULANT (XARELTO) 20 MG TABS tablet  thiamine (B-1) 100 MG tablet  traZODone (DESYREL) 100 MG tablet      Allergies   Allergen Reactions     Betadine Prepstick Plus Hives, Swelling and Rash     From procedure on 7/18/2022     Bee Venom Swelling     Lisinopril      TABS  Severe cough     Penicillins Hives and Difficulty breathing     Family History  Family History   Problem Relation Age of Onset     Heart Disease Father 32        MIs x2; s/p CABG     Substance Abuse Father      " Hypertension Father      Obesity Father      Hyperlipidemia Father      Hypertension Brother      Diabetes Brother      Glaucoma Maternal Grandmother      Hypertension Maternal Grandmother      Diabetes Maternal Grandfather      Glaucoma Maternal Grandfather      Hypertension Maternal Grandfather      Cerebrovascular Disease Maternal Grandfather      Obesity Maternal Grandfather      Hyperlipidemia Maternal Grandfather      Coronary Artery Disease Maternal Grandfather      Heart Disease Maternal Grandfather      Substance Abuse Other      Cancer Other      Hypertension Other      Obesity Other      Other Cancer Other         all over     Hyperlipidemia Other      Coronary Artery Disease Other      Obesity Brother      Hyperlipidemia Brother      Lymphoma Maternal Uncle      Diabetes Brother      Depression Daughter      Depression Son      Macular Degeneration No family hx of      Heart Failure Father      Social History   Social History     Tobacco Use     Smoking status: Every Day     Packs/day: 0.12     Years: 35.00     Pack years: 4.20     Types: Cigarettes     Start date: 3/8/1982     Last attempt to quit: 2014     Years since quittin.6     Smokeless tobacco: Never   Substance Use Topics     Alcohol use: Yes     Alcohol/week: 0.0 standard drinks     Comment: rare     Drug use: No     Comment: last using meth 2017      Past medical history, past surgical history, medications, allergies, family history, and social history were reviewed with the patient. No additional pertinent items.       Review of Systems   Constitutional: Positive for chills. Negative for fever.   HENT: Positive for congestion and sore throat.    Eyes: Negative for redness.   Respiratory: Positive for cough, shortness of breath and wheezing.    Cardiovascular: Negative for chest pain.   Gastrointestinal: Negative for abdominal pain, diarrhea, nausea and vomiting.   Musculoskeletal: Negative for arthralgias and neck stiffness.   Skin:  "Negative for color change.   Neurological: Negative for headaches.   Psychiatric/Behavioral: Negative for confusion.   All other systems reviewed and are negative.    A complete review of systems was performed with pertinent positives and negatives noted in the HPI, and all other systems negative.    Physical Exam   BP: (!) 154/87  Pulse: 89  Temp: 98  F (36.7  C)  Resp: 16  Height: 180.3 cm (5' 11\")  Weight: 99.8 kg (220 lb)  SpO2: 95 %  Physical Exam  Vitals and nursing note reviewed.   Constitutional:       General: He is not in acute distress.     Appearance: He is not diaphoretic.      Comments: Adult male, sitting up in bed, coughing.   HENT:      Head: Normocephalic and atraumatic.      Mouth/Throat:      Mouth: Oropharynx is clear and moist. Mucous membranes are moist.      Pharynx: Oropharynx is clear. No oropharyngeal exudate.   Eyes:      General: No scleral icterus.     Pupils: Pupils are equal, round, and reactive to light.   Cardiovascular:      Rate and Rhythm: Normal rate.      Pulses: Normal pulses and intact distal pulses.      Heart sounds: Normal heart sounds. No murmur heard.  Pulmonary:      Effort: No respiratory distress.      Breath sounds: Wheezing present.      Comments: Frequent cough. Some scattered wheezing noted bilaterally. Occasional rhonchi noted. No respiratory distress  Abdominal:      General: Bowel sounds are normal.      Palpations: Abdomen is soft.      Tenderness: There is no abdominal tenderness.   Musculoskeletal:         General: No tenderness or edema.      Right lower leg: No edema.      Left lower leg: No edema.   Skin:     General: Skin is warm.      Findings: No rash.   Neurological:      General: No focal deficit present.   Psychiatric:         Mood and Affect: Mood normal.         ED Course      Procedures       The medical record was reviewed and interpreted.  Current labs reviewed and interpreted.  Current images reviewed and interpreted: CXR - no focal " infiltrates.              Results for orders placed or performed during the hospital encounter of 12/02/22   Chest XR,  PA & LAT     Status: None    Narrative    EXAM: XR CHEST 2 VIEWS  12/2/2022 4:10 PM     HISTORY:  cough, eval for infiltrate       COMPARISON:  Chest radiograph 7/20/2022    TECHNIQUE: PA and lateral views of the chest    FINDINGS:   Left chest wall ICD with leads in the right atrium and right  ventricle. Surgical changes of the chest with unchanged fractured  superiormost median sternotomy wire. Cervical fusion hardware..    The trachea is midline. The cardiomediastinal silhouette is within  normal limits. The pulmonary vasculature is distinct. No appreciable  pneumothorax or pleural effusion. No focal airspace opacity. No acute  osseous abnormality.      Impression    IMPRESSION: No acute airspace disease.    I have personally reviewed the examination and initial interpretation  and I agree with the findings.    RAKAN RIZVI MD         SYSTEM ID:  [id number]   Symptomatic; Yes; 11/28/2022 Influenza A/B & SARS-CoV2 (COVID-19) Virus PCR Multiplex Nasopharyngeal     Status: Normal    Specimen: Nasopharyngeal; Swab   Result Value Ref Range    Influenza A PCR Negative Negative    Influenza B PCR Negative Negative    RSV PCR Negative Negative    SARS CoV2 PCR Negative Negative    Narrative    Testing was performed using the Xpert Xpress CoV2/Flu/RSV Assay on the [facility] GeneXpert Instrument. This test should be ordered for the detection of SARS-CoV-2 and influenza viruses in individuals who meet clinical and/or epidemiological criteria. Test performance is unknown in asymptomatic patients. This test is for in vitro diagnostic use under the FDA EUA for laboratories certified under CLIA to perform high or moderate complexity testing. This test has not been FDA cleared or approved. A negative result does not rule out the presence of PCR inhibitors in the specimen or target RNA in concentration below the  limit of detection for the assay. If only one viral target is positive but coinfection with multiple targets is suspected, the sample should be re-tested with another FDA cleared, approved, or authorized test, if coinfection would change clinical management. This test was validated by the [facility] [employer]. These laboratories are certified under the Clinical Laboratory Improvement Amendments of 1988 (CLIA-88) as qualified to perform high complexity laboratory testing.   Comprehensive metabolic panel     Status: Abnormal   Result Value Ref Range    Sodium 140 136 - 145 mmol/L    Potassium 4.3 3.4 - 5.3 mmol/L    Chloride 100 98 - 107 mmol/L    Carbon Dioxide (CO2) 25 22 - 29 mmol/L    Anion Gap 15 7 - 15 mmol/L    Urea Nitrogen 9.5 6.0 - 20.0 mg/dL    Creatinine 0.80 0.67 - 1.17 mg/dL    Calcium 9.8 8.6 - 10.0 mg/dL    Glucose 153 (H) 70 - 99 mg/dL    Alkaline Phosphatase 132 (H) 40 - 129 U/L    AST 18 10 - 50 U/L    ALT 10 10 - 50 U/L    Protein Total 7.6 6.4 - 8.3 g/dL    Albumin 4.2 3.5 - 5.2 g/dL    Bilirubin Total 0.5 <=1.2 mg/dL    GFR Estimate >90 >60 mL/min/1.73m2   Nt probnp inpatient     Status: Normal   Result Value Ref Range    N terminal Pro BNP Inpatient 132 0 - 900 pg/mL   CBC with platelets and differential     Status: Abnormal   Result Value Ref Range    WBC Count 16.9 (H) 4.0 - 11.0 10e3/uL    RBC Count 6.14 (H) 4.40 - 5.90 10e6/uL    Hemoglobin 19.7 (H) 13.3 - 17.7 g/dL    Hematocrit 55.6 (H) 40.0 - 53.0 %    MCV 91 78 - 100 fL    MCH 32.1 26.5 - 33.0 pg    MCHC 35.4 31.5 - 36.5 g/dL    RDW 12.5 10.0 - 15.0 %    Platelet Count 245 150 - 450 10e3/uL    % Neutrophils 70 %    % Lymphocytes 23 %    % Monocytes 6 %    % Eosinophils 1 %    % Basophils 0 %    % Immature Granulocytes 0 %    NRBCs per 100 WBC 0 <1 /100    Absolute Neutrophils 11.6 (H) 1.6 - 8.3 10e3/uL    Absolute Lymphocytes 3.8 0.8 - 5.3 10e3/uL    Absolute Monocytes 1.1 0.0 - 1.3 10e3/uL    Absolute Eosinophils 0.2 0.0 - 0.7  10e3/uL    Absolute Basophils 0.1 0.0 - 0.2 10e3/uL    Absolute Immature Granulocytes 0.1 <=0.4 10e3/uL    Absolute NRBCs 0.0 10e3/uL   iStat Gases Electrolytes ICA Glucose Venous, POCT     Status: Abnormal   Result Value Ref Range    CPB Applied No     Hematocrit POCT 56 (H) 40 - 53 %    Calcium, Ionized Whole Blood POCT 5.0 4.4 - 5.2 mg/dL    Glucose Whole Blood POCT 151 (H) 70 - 99 mg/dL    Bicarbonate Venous POCT 32 (H) 21 - 28 mmol/L    Hemoglobin POCT 19.0 (H) 13.3 - 17.7 g/dL    Potassium POCT 4.1 3.4 - 5.3 mmol/L    Sodium POCT 140 133 - 144 mmol/L    pCO2 Venous POCT 57 (H) 40 - 50 mm Hg    pO2 Venous POCT 18 (L) 25 - 47 mm Hg    pH Venous POCT 7.35 7.32 - 7.43    O2 Sat, Venous POCT 24 (L) 94 - 100 %   CBC with Platelets & Differential     Status: Abnormal    Narrative    The following orders were created for panel order CBC with Platelets & Differential.  Procedure                               Abnormality         Status                     ---------                               -----------         ------                     CBC with platelets and d...[[id number]]  Abnormal            Final result                 Please view results for these tests on the individual orders.       Medications   albuterol (PROVENTIL HFA/VENTOLIN HFA) inhaler (6 puffs Inhalation Given 12/2/22 1540)        Assessments & Plan (with Medical Decision Making)   Patient presents to the ED with symptoms consistent with respiratory infection.  Need to consider bacterial or viral etiology. Possible influenza, Covid, RSV, other viral etiology.  Also likely COPD exacerbation secondary to respiratory infection.  The fact that he likely has very uncontrolled disease as he is not on any medications is likely complicating the situation currently.  We did establish IV access and we did draw blood for laboratory analysis.  CBC shows leukocytosis with a white count of 16.9.  Hemoglobin is 19.7, either reflecting hemoconcentration or possibly  increased red blood cell production from chronic pulmonary disease.  CMP shows normal electrolytes, normal creatinine.  Glucose is 153, alkaline phosphatase 132, LFTs otherwise within normal limits.  BNP is normal at 132.  I-STAT VBG shows pH of 7.35, PCO2 of 57, PO2 of 18, bicarb 32.  SARS-CoV-2 PCR swab is negative.  Influenza A is negative, influenza B is negative, RSV is negative.  Chest x-ray was done and this is read by radiology as no acute airspace disease.    Patient has been given albuterol inhaler here in the emergency department.  Most likely his symptoms are due to another viral process as he was negative for COVID, negative for flu, and negative for RSV.  Plan was to offer patient possible admission for COPD exacerbation potentially due to viral respiratory infection, but before this conversation could be completed, the patient told the triage nurse that he wanted to leave and left AMA prior to me speaking with him about options.    I have reviewed the nursing notes. I have reviewed the findings, diagnosis, plan and need for follow up with the patient.    Discharge Medication List as of 12/2/2022  6:13 PM          Final diagnoses:   Bronchitis   History of COPD       --  Estrella Gusman MD  [facility] EMERGENCY DEPARTMENT  12/2/2022     Estrella Gusman MD  12/02/22 2152

## 2022-12-02 NOTE — ED TRIAGE NOTES
Pt arrives ambulatory to triage w/ c/o cough, subjective fever, sob. Likens symptoms to past pneumonia. Further endorses diarrhea. States o2 sats as low as 81% at home. Covid and flu taken at clinic yesterday. Vaccinated for flu, covid; denies sick contacts.

## 2022-12-16 NOTE — TELEPHONE ENCOUNTER
Called and left a  for Mo, informed him if he is not feeling well he should go to urgent care or the ED. Provided callback number.  Jaida Cabrales RN on 12/16/2022 at 2:20 PM

## 2022-12-16 NOTE — TELEPHONE ENCOUNTER
[facility] Call Center    Phone Message    May a detailed message be left on voicemail: yes     Reason for Call: Other: Home health RN Chase from [facility] wanted to update care team that pt pulse was elevated, pt had the sweats and tenp was very low but couldn't get a good reading, Chase told pt to go to urgent care and wanted to let his team know what was happening     Action Taken: Message routed to:  [facility] ([facility] Cardio    Travel Screening: Not Applicable

## 2022-12-26 NOTE — LETTER
1/11/2022         RE: Konstantin Sharp  [address] [facility] [address]        Dear Colleague,    Thank you for referring your patient, Konstantin Sharp, to the [facility]. Please see a copy of my visit note below.    Patient's impression of how medication is working? Meds are not working   Compliant with Medication? Yes      Side Effects: None     Current Symptoms : Yes     Pain (0-10) Yes  Appetite change Yes  Sleep disturbance No  Change in energy No  Change in interest No  Change in concentration Yes  Psychosis/Hallucinations No  Negative thoughts Yes  Mood swings No  Alcohol use Yesweek  Drug use No  Anxiety Yes  Sad/depressed mood Yes     Questions or concerns?: No                                                          Phone Start Time: 10:15 am     Phone End Time:  10:20 am     Total time of phone conversation: 5 minutes     There were no vitals filed for this visit.     Patient would like the video invitation sent by: [employer]  Number/e-mail address: [phone]         JOSH Bansal          Phone End Time:  10:55 am     Total time of phone conversation: 5 minutes     There were no vitals filed for this visit.     Patient would like the video invitation sent by: [employer]  Number/e-mail address: [phone]       JOSH Bansal     Initial Psychiatric Clinic Intake note:    Referral Source: Self-referred      HPI / Chief Complaint:  Patient is known to me from prior clinic contacted the patient has not been seen by me in over 2 years.  Unfortunately old records are difficult to access at this time due to the new computer system.  The patient and his wife are extremely vague historians but it appears he has been followed through the [facility] system by psychiatry.  Medications are listed as above and both the patient and wife report there is been multiple medication changes in the last few years but they are unaware of what these were.  We will try and clarify all of  Problem: Breathing Pattern Ineffective  Goal: Air exchange is effective, demonstrated by Sp02 sat of greater then or = 92% (or as ordered)  Outcome: Outcome Not Met, Continue to Monitor     Problem: Pneumonia  Goal: S/S of acute pneumonia are resolved  Description: If acute pneumonia is present, monitor for resolution of fever, cough, secretions and other test values based on presentation.  Outcome: Outcome Not Met, Continue to Monitor     Problem: Activity Intolerance  Goal: # Functional status is maintained or returned to baseline  Outcome: Outcome Not Met, Continue to Monitor     Problem: Impaired Physical Mobility  Goal: # Bed mobility, ambulation, and ADLs are maintained or returned to baseline during hospitalization  Outcome: Outcome Not Met, Continue to Monitor      this.  Patient presents today to establish care at this clinic.  The patient's medication list is as described in the nurse's note according to the limited information we have available to us.    The patient reestablished contact with his clinic in December 2021.  He had been followed through [facility].  He had had multiple medication changes over the last few years but we have limited information when we first saw the patient.  We were attempting to clarify his current medication list and past medication trials.  At that visit the patient had significant heart movements noted around his mouth.  The family also reported he had lost 60 pounds in 6 months.  He was having worsening depression and anxiety.    HPI:  The patient presents at this time for a follow-up visit.  I saw him for a reading intake at the very end of December.  We are in the process apparently trying to get his old records.  We did not make any medication changes at the last visit.  On my interview with the patient today I had an opportunity to talk with the patient as well as his wife and niece.  She assured me that the patient has not been on any Effexor since he was hospitalized quite a while ago in another hospital system.  He is currently on just Cymbalta 20 mg a day as an antidepressant.  Both patient and wife believe the patient's mood has gotten worse over the last few years and continues to be quite poor.  The patient describes having low motivation and low interest and low energy.  He has no thoughts of harming himself and denies having any suicidal ideation, intent or plan.  He denies having any hallucinations or delusions.  He reports he has been having trouble with sleep but 2 weeks ago began sleeping somewhat better.  We again discussed sleep hygiene measures.  Patient reports no other new medical issues or concerns no new diagnoses and no new allergies.    The patient states he is willing to reestablish with a  psychotherapist and I did put in an order to make that happen.  The patient asked me about driving again.  He apparently had his license taken away due to agitation.  I do not understand the details with this and the patient and his wife were vague historians.  We will review old records.  We will await psychology assessment.  We may need to have the patient repeat a driving evaluation but at this point I am not able to clear him for driving based on the limited information that I have.      Mental status exam:  Appearance: Patient continues to show some mouth movements although a bit less intense than a few weeks ago.  He did not appear to be in any pain or distress.  No shortness of breath.  Behavior: No reports of any significant behavioral dyscontrol.  Patient was calm and comfortable with me but a bit flat with limited initiation.  Speech: Slow and monotone.  Not pressured or rambling.  Answers were consistent but vague.  G  Mood/Affect: Depressed, slow and flat.  No reports of any robinson.  No evidence of any irritability or agitation with me.  Thought content: No hallucinations or delusions reported or observed  Thought formation: [place] and slow.  No racing thoughts.  Vague.  No obvious change.  Limited effort  Associations: Somewhat impaired  Fund of knowledge: Limited effort but continues to appear impaired.  No obvious change.  Attention/Concentration: Disinterested with limited effort.  Distractible.  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  No evidence of any robinson and no evidence of any psychosis.  He continues with the tardive movements as  previously described but perhaps a bit less intense today.  I did try and clarify the patient's current medication list.  The patient's wife participated and actually showed me the pill bottles.  He has been off Effexor for quite some time.  He is currently only on Cymbalta 20 mg a day and continues to complain of depressed mood.    Plan:   I have increased the patient's duloxetine to 60 mg a day.  I have ordered the patient individual therapy.  We will continue with his medical care through his primary care clinic.      Total time for chart review, documentation and coordination of care: 25 minutes.    Rolando Burnham MD      Again, thank you for allowing me to participate in the care of your patient.        Sincerely,        Rolando Burnham MD

## 2023-01-01 ENCOUNTER — LAB REQUISITION (OUTPATIENT)
Dept: LAB | Facility: CLINIC | Age: 53
End: 2023-01-01
Payer: MEDICAID

## 2023-01-01 ENCOUNTER — TRANSCRIBE ORDERS (OUTPATIENT)
Dept: OTHER | Age: 53
End: 2023-01-01

## 2023-01-01 ENCOUNTER — TELEPHONE (OUTPATIENT)
Dept: CARDIOLOGY | Facility: CLINIC | Age: 53
End: 2023-01-01
Payer: MEDICAID

## 2023-01-01 ENCOUNTER — RESULTS ONLY (OUTPATIENT)
Dept: EMERGENCY MEDICINE | Facility: CLINIC | Age: 53
End: 2023-01-01

## 2023-01-01 ENCOUNTER — TELEPHONE (OUTPATIENT)
Dept: UROLOGY | Facility: CLINIC | Age: 53
End: 2023-01-01

## 2023-01-01 ENCOUNTER — CARE COORDINATION (OUTPATIENT)
Dept: CARDIOLOGY | Facility: CLINIC | Age: 53
End: 2023-01-01
Payer: MEDICAID

## 2023-01-01 ENCOUNTER — APPOINTMENT (OUTPATIENT)
Dept: CARDIOLOGY | Facility: CLINIC | Age: 53
End: 2023-01-01
Attending: INTERNAL MEDICINE
Payer: MEDICAID

## 2023-01-01 ENCOUNTER — APPOINTMENT (OUTPATIENT)
Dept: CT IMAGING | Facility: CLINIC | Age: 53
End: 2023-01-01
Attending: EMERGENCY MEDICINE
Payer: MEDICAID

## 2023-01-01 ENCOUNTER — HOSPITAL ENCOUNTER (INPATIENT)
Facility: CLINIC | Age: 53
LOS: 1 days | Discharge: LEFT BEFORE DISCHARGE | End: 2023-04-26
Attending: EMERGENCY MEDICINE | Admitting: STUDENT IN AN ORGANIZED HEALTH CARE EDUCATION/TRAINING PROGRAM
Payer: MEDICAID

## 2023-01-01 ENCOUNTER — APPOINTMENT (OUTPATIENT)
Dept: GENERAL RADIOLOGY | Facility: CLINIC | Age: 53
End: 2023-01-01
Attending: EMERGENCY MEDICINE
Payer: MEDICAID

## 2023-01-01 ENCOUNTER — TELEPHONE (OUTPATIENT)
Dept: NEUROLOGY | Facility: CLINIC | Age: 53
End: 2023-01-01
Payer: MEDICAID

## 2023-01-01 ENCOUNTER — HEALTH MAINTENANCE LETTER (OUTPATIENT)
Age: 53
End: 2023-01-01

## 2023-01-01 ENCOUNTER — ANCILLARY PROCEDURE (OUTPATIENT)
Dept: CARDIOLOGY | Facility: CLINIC | Age: 53
End: 2023-01-01
Attending: EMERGENCY MEDICINE
Payer: MEDICAID

## 2023-01-01 ENCOUNTER — HOSPITAL ENCOUNTER (OUTPATIENT)
Facility: CLINIC | Age: 53
End: 2023-01-01
Attending: INTERNAL MEDICINE | Admitting: INTERNAL MEDICINE
Payer: MEDICAID

## 2023-01-01 ENCOUNTER — APPOINTMENT (OUTPATIENT)
Dept: ULTRASOUND IMAGING | Facility: CLINIC | Age: 53
End: 2023-01-01
Attending: EMERGENCY MEDICINE
Payer: MEDICAID

## 2023-01-01 ENCOUNTER — HOSPITAL ENCOUNTER (EMERGENCY)
Facility: CLINIC | Age: 53
Discharge: HOME | End: 2023-06-22
Attending: EMERGENCY MEDICINE | Admitting: EMERGENCY MEDICINE
Payer: MEDICAID

## 2023-01-01 ENCOUNTER — MEDICAL CORRESPONDENCE (OUTPATIENT)
Dept: HEALTH INFORMATION MANAGEMENT | Facility: CLINIC | Age: 53
End: 2023-01-01
Payer: MEDICAID

## 2023-01-01 ENCOUNTER — TELEPHONE (OUTPATIENT)
Dept: GASTROENTEROLOGY | Facility: CLINIC | Age: 53
End: 2023-01-01
Payer: MEDICAID

## 2023-01-01 ENCOUNTER — VIRTUAL VISIT (OUTPATIENT)
Dept: NEUROLOGY | Facility: CLINIC | Age: 53
End: 2023-01-01
Payer: MEDICAID

## 2023-01-01 ENCOUNTER — OFFICE VISIT (OUTPATIENT)
Dept: FAMILY MEDICINE | Facility: CLINIC | Age: 53
End: 2023-01-01
Payer: MEDICAID

## 2023-01-01 ENCOUNTER — TELEPHONE (OUTPATIENT)
Dept: CARDIOLOGY | Facility: CLINIC | Age: 53
End: 2023-01-01

## 2023-01-01 ENCOUNTER — ANCILLARY PROCEDURE (OUTPATIENT)
Dept: ULTRASOUND IMAGING | Facility: CLINIC | Age: 53
End: 2023-01-01
Attending: NURSE PRACTITIONER
Payer: MEDICAID

## 2023-01-01 ENCOUNTER — HOSPITAL ENCOUNTER (INPATIENT)
Facility: CLINIC | Age: 53
LOS: 1 days | Discharge: HOME | End: 2023-02-05
Attending: EMERGENCY MEDICINE | Admitting: STUDENT IN AN ORGANIZED HEALTH CARE EDUCATION/TRAINING PROGRAM
Payer: MEDICAID

## 2023-01-01 ENCOUNTER — TELEPHONE (OUTPATIENT)
Dept: ANESTHESIOLOGY | Facility: CLINIC | Age: 53
End: 2023-01-01

## 2023-01-01 ENCOUNTER — HOSPITAL ENCOUNTER (OUTPATIENT)
Dept: MRI IMAGING | Facility: CLINIC | Age: 53
Discharge: HOME | End: 2023-03-21
Attending: INTERNAL MEDICINE
Payer: MEDICAID

## 2023-01-01 ENCOUNTER — PRE VISIT (OUTPATIENT)
Dept: GASTROENTEROLOGY | Facility: CLINIC | Age: 53
End: 2023-01-01

## 2023-01-01 ENCOUNTER — HOSPITAL ENCOUNTER (EMERGENCY)
Facility: CLINIC | Age: 53
Discharge: HOME | End: 2023-01-23
Attending: EMERGENCY MEDICINE | Admitting: EMERGENCY MEDICINE
Payer: MEDICAID

## 2023-01-01 VITALS
SYSTOLIC BLOOD PRESSURE: 124 MMHG | HEART RATE: 84 BPM | HEIGHT: 71 IN | OXYGEN SATURATION: 96 % | BODY MASS INDEX: 31.36 KG/M2 | DIASTOLIC BLOOD PRESSURE: 78 MMHG | TEMPERATURE: 98.9 F | RESPIRATION RATE: 18 BRPM | WEIGHT: 224 LBS

## 2023-01-01 VITALS
OXYGEN SATURATION: 98 % | HEART RATE: 79 BPM | RESPIRATION RATE: 18 BRPM | SYSTOLIC BLOOD PRESSURE: 121 MMHG | DIASTOLIC BLOOD PRESSURE: 65 MMHG | TEMPERATURE: 98.2 F

## 2023-01-01 VITALS
HEART RATE: 90 BPM | OXYGEN SATURATION: 99 % | RESPIRATION RATE: 18 BRPM | SYSTOLIC BLOOD PRESSURE: 135 MMHG | TEMPERATURE: 98.2 F | DIASTOLIC BLOOD PRESSURE: 79 MMHG

## 2023-01-01 VITALS
OXYGEN SATURATION: 94 % | DIASTOLIC BLOOD PRESSURE: 86 MMHG | RESPIRATION RATE: 17 BRPM | SYSTOLIC BLOOD PRESSURE: 160 MMHG | TEMPERATURE: 99.7 F | HEART RATE: 102 BPM

## 2023-01-01 VITALS
SYSTOLIC BLOOD PRESSURE: 127 MMHG | HEART RATE: 115 BPM | DIASTOLIC BLOOD PRESSURE: 82 MMHG | RESPIRATION RATE: 25 BRPM | TEMPERATURE: 98.3 F | WEIGHT: 224.6 LBS | OXYGEN SATURATION: 96 % | HEIGHT: 71 IN | BODY MASS INDEX: 31.44 KG/M2

## 2023-01-01 VITALS — HEART RATE: 80 BPM | SYSTOLIC BLOOD PRESSURE: 117 MMHG | DIASTOLIC BLOOD PRESSURE: 75 MMHG | RESPIRATION RATE: 16 BRPM

## 2023-01-01 DIAGNOSIS — I25.2 OLD MYOCARDIAL INFARCTION: Primary | ICD-10-CM

## 2023-01-01 DIAGNOSIS — R07.89 OTHER CHEST PAIN: Primary | ICD-10-CM

## 2023-01-01 DIAGNOSIS — I42.2 HYPERTROPHIC NONOBSTRUCTIVE CARDIOMYOPATHY (H): ICD-10-CM

## 2023-01-01 DIAGNOSIS — R42 LIGHTHEADEDNESS: ICD-10-CM

## 2023-01-01 DIAGNOSIS — E11.40 TYPE 2 DIABETES MELLITUS WITH DIABETIC NEUROPATHY, WITH LONG-TERM CURRENT USE OF INSULIN (H): ICD-10-CM

## 2023-01-01 DIAGNOSIS — Z95.810 STATUS POST INTERNAL CARDIAC DEFIBRILLATOR PROCEDURE: ICD-10-CM

## 2023-01-01 DIAGNOSIS — F39 MOOD DISORDER (H): ICD-10-CM

## 2023-01-01 DIAGNOSIS — E11.65 TYPE 2 DIABETES MELLITUS WITH HYPERGLYCEMIA, WITH LONG-TERM CURRENT USE OF INSULIN (H): ICD-10-CM

## 2023-01-01 DIAGNOSIS — R79.89 ELEVATED LACTIC ACID LEVEL: ICD-10-CM

## 2023-01-01 DIAGNOSIS — K76.0 NONALCOHOLIC HEPATOSTEATOSIS: ICD-10-CM

## 2023-01-01 DIAGNOSIS — F39 MOOD DISORDER (H): Primary | ICD-10-CM

## 2023-01-01 DIAGNOSIS — I10 ESSENTIAL HYPERTENSION: ICD-10-CM

## 2023-01-01 DIAGNOSIS — I42.2 HYPERTROPHIC CARDIOMYOPATHY (H): ICD-10-CM

## 2023-01-01 DIAGNOSIS — I70.209 DIABETES TYPE II WITH ATHEROSCLEROSIS OF ARTERIES OF EXTREMITIES (H): Primary | ICD-10-CM

## 2023-01-01 DIAGNOSIS — E87.29 HIGH ANION GAP METABOLIC ACIDOSIS: ICD-10-CM

## 2023-01-01 DIAGNOSIS — I42.2 HYPERTROPHIC NONOBSTRUCTIVE CARDIOMYOPATHY (H): Primary | ICD-10-CM

## 2023-01-01 DIAGNOSIS — Z12.5 SCREENING FOR PROSTATE CANCER: ICD-10-CM

## 2023-01-01 DIAGNOSIS — Z12.11 SCREENING FOR COLON CANCER: Primary | ICD-10-CM

## 2023-01-01 DIAGNOSIS — R11.2 NAUSEA AND VOMITING, UNSPECIFIED VOMITING TYPE: ICD-10-CM

## 2023-01-01 DIAGNOSIS — I45.4 NONSPECIFIC INTRAVENTRICULAR BLOCK: ICD-10-CM

## 2023-01-01 DIAGNOSIS — R10.11 RUQ ABDOMINAL PAIN: ICD-10-CM

## 2023-01-01 DIAGNOSIS — Z11.59 NEED FOR HEPATITIS C SCREENING TEST: ICD-10-CM

## 2023-01-01 DIAGNOSIS — M48.062 SPINAL STENOSIS, LUMBAR REGION, WITH NEUROGENIC CLAUDICATION: ICD-10-CM

## 2023-01-01 DIAGNOSIS — E11.40 TYPE 2 DIABETES MELLITUS WITH DIABETIC NEUROPATHY, WITHOUT LONG-TERM CURRENT USE OF INSULIN (H): ICD-10-CM

## 2023-01-01 DIAGNOSIS — R07.9 CHEST PAIN: ICD-10-CM

## 2023-01-01 DIAGNOSIS — R10.11 RIGHT UPPER QUADRANT PAIN: ICD-10-CM

## 2023-01-01 DIAGNOSIS — K21.9 GASTROESOPHAGEAL REFLUX DISEASE, UNSPECIFIED WHETHER ESOPHAGITIS PRESENT: Primary | ICD-10-CM

## 2023-01-01 DIAGNOSIS — G89.28 CHRONIC NECK PAIN WITH HISTORY OF CERVICAL SPINAL SURGERY: ICD-10-CM

## 2023-01-01 DIAGNOSIS — I70.209 UNSPECIFIED ATHEROSCLEROSIS OF NATIVE ARTERIES OF EXTREMITIES, UNSPECIFIED EXTREMITY (H): ICD-10-CM

## 2023-01-01 DIAGNOSIS — E78.00 PURE HYPERCHOLESTEROLEMIA, UNSPECIFIED: ICD-10-CM

## 2023-01-01 DIAGNOSIS — R10.11 ABDOMINAL PAIN, RIGHT UPPER QUADRANT: ICD-10-CM

## 2023-01-01 DIAGNOSIS — R33.9 URINARY RETENTION WITH INCOMPLETE BLADDER EMPTYING: ICD-10-CM

## 2023-01-01 DIAGNOSIS — I10 ESSENTIAL (PRIMARY) HYPERTENSION: ICD-10-CM

## 2023-01-01 DIAGNOSIS — Z98.890 S/P VENTRICULAR SEPTAL MYECTOMY: ICD-10-CM

## 2023-01-01 DIAGNOSIS — S20.211A CONTUSION OF RIGHT CHEST WALL, INITIAL ENCOUNTER: ICD-10-CM

## 2023-01-01 DIAGNOSIS — R00.0 SINUS TACHYCARDIA: ICD-10-CM

## 2023-01-01 DIAGNOSIS — E11.51 DIABETES TYPE II WITH ATHEROSCLEROSIS OF ARTERIES OF EXTREMITIES (H): Primary | ICD-10-CM

## 2023-01-01 DIAGNOSIS — I48.91 ATRIAL FIBRILLATION, UNSPECIFIED TYPE (H): ICD-10-CM
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
## 2023-01-01 DIAGNOSIS — R80.9 URINE PROTEIN INCREASED: Primary | ICD-10-CM

## 2023-01-01 DIAGNOSIS — E11.51 TYPE 2 DIABETES MELLITUS WITH DIABETIC PERIPHERAL ANGIOPATHY WITHOUT GANGRENE (H): ICD-10-CM

## 2023-01-01 DIAGNOSIS — R60.0 BILATERAL LEG EDEMA: Primary | ICD-10-CM

## 2023-01-01 DIAGNOSIS — R10.13 EPIGASTRIC PAIN: ICD-10-CM

## 2023-01-01 DIAGNOSIS — Z79.01 LONG TERM (CURRENT) USE OF ANTICOAGULANTS: ICD-10-CM

## 2023-01-01 DIAGNOSIS — R68.81 EARLY SATIETY: ICD-10-CM

## 2023-01-01 DIAGNOSIS — Z79.4 TYPE 2 DIABETES MELLITUS WITH HYPERGLYCEMIA, WITH LONG-TERM CURRENT USE OF INSULIN (H): ICD-10-CM

## 2023-01-01 DIAGNOSIS — Z98.890 CHRONIC NECK PAIN WITH HISTORY OF CERVICAL SPINAL SURGERY: ICD-10-CM

## 2023-01-01 DIAGNOSIS — R06.02 SHORTNESS OF BREATH: ICD-10-CM

## 2023-01-01 DIAGNOSIS — R07.81 PLEURITIC CHEST PAIN: ICD-10-CM

## 2023-01-01 DIAGNOSIS — M54.2 CHRONIC NECK PAIN WITH HISTORY OF CERVICAL SPINAL SURGERY: ICD-10-CM

## 2023-01-01 DIAGNOSIS — R73.9 HYPERGLYCEMIA: ICD-10-CM

## 2023-01-01 DIAGNOSIS — R39.13 URINARY STREAM SPLITTING: Primary | ICD-10-CM

## 2023-01-01 DIAGNOSIS — Z79.4 TYPE 2 DIABETES MELLITUS WITH DIABETIC NEUROPATHY, WITH LONG-TERM CURRENT USE OF INSULIN (H): ICD-10-CM

## 2023-01-01 DIAGNOSIS — R07.9 CHEST PAIN, UNSPECIFIED TYPE: ICD-10-CM

## 2023-01-01 LAB
ALBUMIN SERPL BCG-MCNC: 3.6 G/DL (ref 3.5–5.2)
ALBUMIN SERPL BCG-MCNC: 3.8 G/DL (ref 3.5–5.2)
ALBUMIN SERPL BCG-MCNC: 4 G/DL (ref 3.5–5.2)
ALBUMIN SERPL BCG-MCNC: 4.3 G/DL (ref 3.5–5.2)
ALBUMIN SERPL BCG-MCNC: 4.6 G/DL (ref 3.5–5.2)
ALBUMIN UR-MCNC: NEGATIVE MG/DL
ALDOST SERPL-MCNC: 46.9 NG/DL (ref 0–31)
ALDOST/RENIN PLAS-RTO: 1.1 {RATIO} (ref 0–25)
ALP SERPL-CCNC: 104 U/L (ref 40–129)
ALP SERPL-CCNC: 114 U/L (ref 40–129)
ALP SERPL-CCNC: 115 U/L (ref 40–129)
ALP SERPL-CCNC: 118 U/L (ref 40–129)
ALP SERPL-CCNC: 77 U/L (ref 40–129)
ALP SERPL-CCNC: 83 U/L (ref 40–129)
ALP SERPL-CCNC: 99 U/L (ref 40–129)
ALT SERPL W P-5'-P-CCNC: 10 U/L (ref 10–50)
ALT SERPL W P-5'-P-CCNC: 10 U/L (ref 10–50)
ALT SERPL W P-5'-P-CCNC: 14 U/L (ref 10–50)
ALT SERPL W P-5'-P-CCNC: 23 U/L (ref 10–50)
ALT SERPL W P-5'-P-CCNC: 39 U/L (ref 10–50)
ALT SERPL W P-5'-P-CCNC: 40 U/L (ref 0–70)
ALT SERPL W P-5'-P-CCNC: 7 U/L (ref 10–50)
AMPHETAMINES UR QL SCN: NORMAL
AMYLASE SERPL-CCNC: 27 U/L (ref 28–100)
ANION GAP SERPL CALCULATED.3IONS-SCNC: 10 MMOL/L (ref 7–15)
ANION GAP SERPL CALCULATED.3IONS-SCNC: 11 MMOL/L (ref 7–15)
ANION GAP SERPL CALCULATED.3IONS-SCNC: 12 MMOL/L (ref 7–15)
ANION GAP SERPL CALCULATED.3IONS-SCNC: 14 MMOL/L (ref 7–15)
ANION GAP SERPL CALCULATED.3IONS-SCNC: 15 MMOL/L (ref 7–15)
ANION GAP SERPL CALCULATED.3IONS-SCNC: 18 MMOL/L (ref 7–15)
ANION GAP SERPL CALCULATED.3IONS-SCNC: 18 MMOL/L (ref 7–15)
ANION GAP SERPL CALCULATED.3IONS-SCNC: 23 MMOL/L (ref 7–15)
ANION GAP SERPL CALCULATED.3IONS-SCNC: 27 MMOL/L (ref 7–15)
ANION GAP SERPL CALCULATED.3IONS-SCNC: 28 MMOL/L (ref 7–15)
APPEARANCE UR: CLEAR
APTT PPP: 28 SECONDS (ref 22–38)
AST SERPL W P-5'-P-CCNC: 12 U/L (ref 10–50)
AST SERPL W P-5'-P-CCNC: 15 U/L (ref 10–50)
AST SERPL W P-5'-P-CCNC: 15 U/L (ref 10–50)
AST SERPL W P-5'-P-CCNC: 17 U/L (ref 10–50)
AST SERPL W P-5'-P-CCNC: 20 U/L (ref 10–50)
AST SERPL W P-5'-P-CCNC: 47 U/L (ref 10–50)
AST SERPL W P-5'-P-CCNC: 78 U/L (ref 0–45)
ATRIAL RATE - MUSE: 111 BPM
ATRIAL RATE - MUSE: 113 BPM
ATRIAL RATE - MUSE: 77 BPM
ATRIAL RATE - MUSE: 81 BPM
ATRIAL RATE - MUSE: 85 BPM
ATRIAL RATE - MUSE: 93 BPM
B-OH-BUTYR SERPL-MCNC: <0.18 MMOL/L
B-OH-BUTYR SERPL-SCNC: 0.3 MMOL/L
B-OH-BUTYR SERPL-SCNC: 0.3 MMOL/L
BACTERIA BLD CULT: NO GROWTH
BACTERIA BLD CULT: NO GROWTH
BARBITURATES UR QL SCN: NORMAL
BASE EXCESS BLDV CALC-SCNC: 2.9 MMOL/L (ref -7.7–1.9)
BASOPHILS # BLD AUTO: 0.1 10E3/UL (ref 0–0.2)
BASOPHILS # BLD MANUAL: 0 10E3/UL (ref 0–0.2)
BASOPHILS NFR BLD AUTO: 1 %
BASOPHILS NFR BLD MANUAL: 0 %
BENZODIAZ UR QL SCN: NORMAL
BILIRUB SERPL-MCNC: 0.2 MG/DL
BILIRUB SERPL-MCNC: 0.3 MG/DL
BILIRUB SERPL-MCNC: 0.4 MG/DL
BILIRUB SERPL-MCNC: 0.5 MG/DL
BILIRUB SERPL-MCNC: 0.9 MG/DL
BILIRUB UR QL STRIP: NEGATIVE
BUN SERPL-MCNC: 12.6 MG/DL (ref 6–20)
BUN SERPL-MCNC: 13.2 MG/DL (ref 6–20)
BUN SERPL-MCNC: 13.4 MG/DL (ref 6–20)
BUN SERPL-MCNC: 13.7 MG/DL (ref 6–20)
BUN SERPL-MCNC: 14.6 MG/DL (ref 6–20)
BUN SERPL-MCNC: 14.7 MG/DL (ref 6–20)
BUN SERPL-MCNC: 15.4 MG/DL (ref 6–20)
BUN SERPL-MCNC: 15.6 MG/DL (ref 6–20)
BUN SERPL-MCNC: 15.8 MG/DL (ref 6–20)
BUN SERPL-MCNC: 9.8 MG/DL (ref 6–20)
BZE UR QL SCN: NORMAL
C PEPTIDE SERPL-MCNC: 5.4 NG/ML (ref 0.9–6.9)
CA-I BLD-MCNC: 4.7 MG/DL (ref 4.4–5.2)
CALCIUM SERPL-MCNC: 10 MG/DL (ref 8.6–10)
CALCIUM SERPL-MCNC: 8.5 MG/DL (ref 8.6–10)
CALCIUM SERPL-MCNC: 8.6 MG/DL (ref 8.6–10)
CALCIUM SERPL-MCNC: 8.8 MG/DL (ref 8.6–10)
CALCIUM SERPL-MCNC: 8.9 MG/DL (ref 8.6–10)
CALCIUM SERPL-MCNC: 9.4 MG/DL (ref 8.6–10)
CALCIUM SERPL-MCNC: 9.7 MG/DL (ref 8.6–10)
CALCIUM SERPL-MCNC: 9.8 MG/DL (ref 8.6–10)
CANNABINOIDS UR QL SCN: NORMAL
CHLORIDE SERPL-SCNC: 103 MMOL/L (ref 98–107)
CHLORIDE SERPL-SCNC: 104 MMOL/L (ref 98–107)
CHLORIDE SERPL-SCNC: 105 MMOL/L (ref 98–107)
CHLORIDE SERPL-SCNC: 106 MMOL/L (ref 98–107)
CHLORIDE SERPL-SCNC: 94 MMOL/L (ref 98–107)
CHLORIDE SERPL-SCNC: 94 MMOL/L (ref 98–107)
CHLORIDE SERPL-SCNC: 96 MMOL/L (ref 98–107)
CHLORIDE SERPL-SCNC: 97 MMOL/L (ref 98–107)
CHLORIDE SERPL-SCNC: 98 MMOL/L (ref 98–107)
CHLORIDE SERPL-SCNC: 99 MMOL/L (ref 98–107)
CHOLEST SERPL-MCNC: 147 MG/DL
COLOR UR AUTO: ABNORMAL
CPB POCT: NO
CREAT BLD-MCNC: 0.9 MG/DL (ref 0.7–1.3)
CREAT SERPL-MCNC: 0.63 MG/DL (ref 0.67–1.17)
CREAT SERPL-MCNC: 0.63 MG/DL (ref 0.67–1.17)
CREAT SERPL-MCNC: 0.68 MG/DL (ref 0.67–1.17)
CREAT SERPL-MCNC: 0.7 MG/DL (ref 0.67–1.17)
CREAT SERPL-MCNC: 0.72 MG/DL (ref 0.67–1.17)
CREAT SERPL-MCNC: 0.81 MG/DL (ref 0.67–1.17)
CREAT SERPL-MCNC: 0.82 MG/DL (ref 0.67–1.17)
CREAT SERPL-MCNC: 0.82 MG/DL (ref 0.67–1.17)
CREAT SERPL-MCNC: 0.83 MG/DL (ref 0.67–1.17)
CREAT SERPL-MCNC: 0.96 MG/DL (ref 0.67–1.17)
CREAT UR-MCNC: 60.5 MG/DL
CRP SERPL-MCNC: 103 MG/L
DEPRECATED HCO3 PLAS-SCNC: 13 MMOL/L (ref 22–29)
DEPRECATED HCO3 PLAS-SCNC: 15 MMOL/L (ref 22–29)
DEPRECATED HCO3 PLAS-SCNC: 16 MMOL/L (ref 22–29)
DEPRECATED HCO3 PLAS-SCNC: 20 MMOL/L (ref 22–29)
DEPRECATED HCO3 PLAS-SCNC: 20 MMOL/L (ref 22–29)
DEPRECATED HCO3 PLAS-SCNC: 22 MMOL/L (ref 22–29)
DEPRECATED HCO3 PLAS-SCNC: 24 MMOL/L (ref 22–29)
DEPRECATED HCO3 PLAS-SCNC: 24 MMOL/L (ref 22–29)
DEPRECATED HCO3 PLAS-SCNC: 25 MMOL/L (ref 22–29)
DEPRECATED HCO3 PLAS-SCNC: 25 MMOL/L (ref 22–29)
DIASTOLIC BLOOD PRESSURE - MUSE: NORMAL MMHG
EOSINOPHIL # BLD AUTO: 0.2 10E3/UL (ref 0–0.7)
EOSINOPHIL # BLD AUTO: 0.4 10E3/UL (ref 0–0.7)
EOSINOPHIL # BLD AUTO: 0.5 10E3/UL (ref 0–0.7)
EOSINOPHIL # BLD MANUAL: 1.1 10E3/UL (ref 0–0.7)
EOSINOPHIL NFR BLD AUTO: 1 %
EOSINOPHIL NFR BLD AUTO: 3 %
EOSINOPHIL NFR BLD AUTO: 5 %
EOSINOPHIL NFR BLD MANUAL: 10 %
ERYTHROCYTE [DISTWIDTH] IN BLOOD BY AUTOMATED COUNT: 12.3 % (ref 10–15)
ERYTHROCYTE [DISTWIDTH] IN BLOOD BY AUTOMATED COUNT: 12.7 % (ref 10–15)
ETHANOL SERPL-MCNC: 0.05 G/DL
GFR SERPL CREATININE-BSD FRML MDRD: >60 ML/MIN/1.73M2
GFR SERPL CREATININE-BSD FRML MDRD: >90 ML/MIN/1.73M2
GLUCOSE BLD-MCNC: 380 MG/DL (ref 70–99)
GLUCOSE BLDC GLUCOMTR-MCNC: 216 MG/DL (ref 70–99)
GLUCOSE BLDC GLUCOMTR-MCNC: 230 MG/DL (ref 70–99)
GLUCOSE BLDC GLUCOMTR-MCNC: 336 MG/DL (ref 70–99)
GLUCOSE SERPL-MCNC: 140 MG/DL (ref 70–99)
GLUCOSE SERPL-MCNC: 157 MG/DL (ref 70–99)
GLUCOSE SERPL-MCNC: 183 MG/DL (ref 70–99)
GLUCOSE SERPL-MCNC: 200 MG/DL (ref 70–99)
GLUCOSE SERPL-MCNC: 208 MG/DL (ref 70–99)
GLUCOSE SERPL-MCNC: 247 MG/DL (ref 70–99)
GLUCOSE SERPL-MCNC: 340 MG/DL (ref 70–99)
GLUCOSE SERPL-MCNC: 345 MG/DL (ref 70–99)
GLUCOSE SERPL-MCNC: 412 MG/DL (ref 70–99)
GLUCOSE SERPL-MCNC: 516 MG/DL (ref 70–99)
GLUCOSE UR STRIP-MCNC: >=1000 MG/DL
HBA1C MFR BLD: 7.5 %
HCO3 BLDV-SCNC: 27 MMOL/L (ref 21–28)
HCO3 BLDV-SCNC: 27 MMOL/L (ref 21–28)
HCO3 BLDV-SCNC: 28 MMOL/L (ref 21–28)
HCO3 BLDV-SCNC: 28 MMOL/L (ref 21–28)
HCT VFR BLD AUTO: 45.2 % (ref 40–53)
HCT VFR BLD AUTO: 45.4 % (ref 40–53)
HCT VFR BLD AUTO: 45.6 % (ref 40–53)
HCT VFR BLD AUTO: 45.8 % (ref 40–53)
HCT VFR BLD AUTO: 47.4 % (ref 40–53)
HCT VFR BLD AUTO: 47.9 % (ref 40–53)
HCT VFR BLD CALC: 45 % (ref 40–53)
HCV AB SERPL QL IA: NONREACTIVE
HDLC SERPL-MCNC: 35 MG/DL
HGB BLD-MCNC: 14.8 G/DL (ref 13.3–17.7)
HGB BLD-MCNC: 15.3 G/DL (ref 13.3–17.7)
HGB BLD-MCNC: 15.7 G/DL (ref 13.3–17.7)
HGB BLD-MCNC: 15.7 G/DL (ref 13.3–17.7)
HGB BLD-MCNC: 15.8 G/DL (ref 13.3–17.7)
HGB BLD-MCNC: 16 G/DL (ref 13.3–17.7)
HGB BLD-MCNC: 16.7 G/DL (ref 13.3–17.7)
HGB UR QL STRIP: NEGATIVE
HOLD SPECIMEN: NORMAL
IMM GRANULOCYTES # BLD: 0 10E3/UL
IMM GRANULOCYTES # BLD: 0 10E3/UL
IMM GRANULOCYTES # BLD: 0.1 10E3/UL
IMM GRANULOCYTES NFR BLD: 0 %
INR PPP: 1 (ref 0.85–1.15)
INTERPRETATION ECG - MUSE: NORMAL
KETONES UR STRIP-MCNC: NEGATIVE MG/DL
LACTATE BLD-SCNC: 1.7 MMOL/L
LACTATE BLD-SCNC: 3 MMOL/L
LACTATE SERPL-SCNC: 1.7 MMOL/L (ref 0.7–2)
LACTATE SERPL-SCNC: 2 MMOL/L (ref 0.7–2)
LDLC SERPL CALC-MCNC: 62 MG/DL
LEUKOCYTE ESTERASE UR QL STRIP: NEGATIVE
LIPASE SERPL-CCNC: 15 U/L (ref 13–60)
LIPASE SERPL-CCNC: 27 U/L (ref 13–60)
LIPASE SERPL-CCNC: 31 U/L (ref 13–60)
LIPASE SERPL-CCNC: 47 U/L (ref 13–60)
LVEF ECHO: NORMAL
LYMPHOCYTES # BLD AUTO: 2.3 10E3/UL (ref 0.8–5.3)
LYMPHOCYTES # BLD AUTO: 4.1 10E3/UL (ref 0.8–5.3)
LYMPHOCYTES # BLD AUTO: 4.3 10E3/UL (ref 0.8–5.3)
LYMPHOCYTES # BLD MANUAL: 4.3 10E3/UL (ref 0.8–5.3)
LYMPHOCYTES NFR BLD AUTO: 13 %
LYMPHOCYTES NFR BLD AUTO: 38 %
LYMPHOCYTES NFR BLD AUTO: 46 %
LYMPHOCYTES NFR BLD MANUAL: 40 %
MAGNESIUM SERPL-MCNC: 2 MG/DL (ref 1.7–2.3)
MAGNESIUM SERPL-MCNC: 2.1 MG/DL (ref 1.7–2.3)
MCH RBC QN AUTO: 30.8 PG (ref 26.5–33)
MCH RBC QN AUTO: 31 PG (ref 26.5–33)
MCH RBC QN AUTO: 31.3 PG (ref 26.5–33)
MCH RBC QN AUTO: 31.4 PG (ref 26.5–33)
MCH RBC QN AUTO: 31.5 PG (ref 26.5–33)
MCH RBC QN AUTO: 32.2 PG (ref 26.5–33)
MCHC RBC AUTO-ENTMCNC: 32.7 G/DL (ref 31.5–36.5)
MCHC RBC AUTO-ENTMCNC: 33.4 G/DL (ref 31.5–36.5)
MCHC RBC AUTO-ENTMCNC: 34.3 G/DL (ref 31.5–36.5)
MCHC RBC AUTO-ENTMCNC: 34.6 G/DL (ref 31.5–36.5)
MCHC RBC AUTO-ENTMCNC: 34.6 G/DL (ref 31.5–36.5)
MCHC RBC AUTO-ENTMCNC: 35.2 G/DL (ref 31.5–36.5)
MCV RBC AUTO: 90 FL (ref 78–100)
MCV RBC AUTO: 91 FL (ref 78–100)
MCV RBC AUTO: 92 FL (ref 78–100)
MCV RBC AUTO: 92 FL (ref 78–100)
MCV RBC AUTO: 93 FL (ref 78–100)
MCV RBC AUTO: 94 FL (ref 78–100)
MDC_IDC_LEAD_IMPLANT_DT: NORMAL
MDC_IDC_LEAD_LOCATION: NORMAL
MDC_IDC_LEAD_LOCATION_DETAIL_1: NORMAL
MDC_IDC_LEAD_MFG: NORMAL
MDC_IDC_LEAD_MODEL: NORMAL
MDC_IDC_LEAD_POLARITY_TYPE: NORMAL
MDC_IDC_LEAD_SERIAL: NORMAL
MDC_IDC_LEAD_SPECIAL_FUNCTION: NORMAL
MDC_IDC_MSMT_BATTERY_DTM: NORMAL
MDC_IDC_MSMT_BATTERY_REMAINING_LONGEVITY: 120 MO
MDC_IDC_MSMT_BATTERY_REMAINING_LONGEVITY: 122 MO
MDC_IDC_MSMT_BATTERY_REMAINING_LONGEVITY: 122 MO
MDC_IDC_MSMT_BATTERY_REMAINING_LONGEVITY: 124 MO
MDC_IDC_MSMT_BATTERY_REMAINING_LONGEVITY: 126 MO
MDC_IDC_MSMT_BATTERY_RRT_TRIGGER: NORMAL
MDC_IDC_MSMT_BATTERY_STATUS: NORMAL
MDC_IDC_MSMT_BATTERY_STATUS: NORMAL
MDC_IDC_MSMT_BATTERY_VOLTAGE: 2.97 V
MDC_IDC_MSMT_BATTERY_VOLTAGE: 2.97 V
MDC_IDC_MSMT_BATTERY_VOLTAGE: 3.01 V
MDC_IDC_MSMT_BATTERY_VOLTAGE: 3.02 V
MDC_IDC_MSMT_BATTERY_VOLTAGE: 3.04 V
MDC_IDC_MSMT_CAP_CHARGE_DTM: NORMAL
MDC_IDC_MSMT_CAP_CHARGE_ENERGY: 18 J
MDC_IDC_MSMT_CAP_CHARGE_TIME: 3.9 S
MDC_IDC_MSMT_CAP_CHARGE_TIME: 4 S
MDC_IDC_MSMT_CAP_CHARGE_TYPE: NORMAL
MDC_IDC_MSMT_LEADCHNL_RA_IMPEDANCE_VALUE: 475 OHM
MDC_IDC_MSMT_LEADCHNL_RA_IMPEDANCE_VALUE: 551 OHM
MDC_IDC_MSMT_LEADCHNL_RA_IMPEDANCE_VALUE: 570 OHM
MDC_IDC_MSMT_LEADCHNL_RA_IMPEDANCE_VALUE: 589 OHM
MDC_IDC_MSMT_LEADCHNL_RA_IMPEDANCE_VALUE: 608 OHM
MDC_IDC_MSMT_LEADCHNL_RA_PACING_THRESHOLD_AMPLITUDE: 0.75 V
MDC_IDC_MSMT_LEADCHNL_RA_PACING_THRESHOLD_AMPLITUDE: 0.88 V
MDC_IDC_MSMT_LEADCHNL_RA_PACING_THRESHOLD_AMPLITUDE: 0.88 V
MDC_IDC_MSMT_LEADCHNL_RA_PACING_THRESHOLD_PULSEWIDTH: 0.4 MS
MDC_IDC_MSMT_LEADCHNL_RA_SENSING_INTR_AMPL: 5.1 MV
MDC_IDC_MSMT_LEADCHNL_RA_SENSING_INTR_AMPL: 5.6 MV
MDC_IDC_MSMT_LEADCHNL_RA_SENSING_INTR_AMPL: 5.8 MV
MDC_IDC_MSMT_LEADCHNL_RA_SENSING_INTR_AMPL: 6.5 MV
MDC_IDC_MSMT_LEADCHNL_RA_SENSING_INTR_AMPL: 6.6 MV
MDC_IDC_MSMT_LEADCHNL_RV_IMPEDANCE_VALUE: 342 OHM
MDC_IDC_MSMT_LEADCHNL_RV_IMPEDANCE_VALUE: 361 OHM
MDC_IDC_MSMT_LEADCHNL_RV_IMPEDANCE_VALUE: 380 OHM
MDC_IDC_MSMT_LEADCHNL_RV_IMPEDANCE_VALUE: 380 OHM
MDC_IDC_MSMT_LEADCHNL_RV_IMPEDANCE_VALUE: 437 OHM
MDC_IDC_MSMT_LEADCHNL_RV_IMPEDANCE_VALUE: 475 OHM
MDC_IDC_MSMT_LEADCHNL_RV_IMPEDANCE_VALUE: 513 OHM
MDC_IDC_MSMT_LEADCHNL_RV_IMPEDANCE_VALUE: 551 OHM
MDC_IDC_MSMT_LEADCHNL_RV_PACING_THRESHOLD_AMPLITUDE: 0.62 V
MDC_IDC_MSMT_LEADCHNL_RV_PACING_THRESHOLD_AMPLITUDE: 0.75 V
MDC_IDC_MSMT_LEADCHNL_RV_PACING_THRESHOLD_PULSEWIDTH: 0.4 MS
MDC_IDC_MSMT_LEADCHNL_RV_SENSING_INTR_AMPL: 21.6 MV
MDC_IDC_MSMT_LEADCHNL_RV_SENSING_INTR_AMPL: 21.6 MV
MDC_IDC_PG_IMPLANT_DTM: NORMAL
MDC_IDC_PG_MFG: NORMAL
MDC_IDC_PG_MODEL: NORMAL
MDC_IDC_PG_SERIAL: NORMAL
MDC_IDC_PG_TYPE: NORMAL
MDC_IDC_SESS_CLINIC_NAME: NORMAL
MDC_IDC_SESS_DTM: NORMAL
MDC_IDC_SESS_TYPE: NORMAL
MDC_IDC_SET_BRADY_AT_MODE_SWITCH_RATE: 171 {BEATS}/MIN
MDC_IDC_SET_BRADY_LOWRATE: 60 {BEATS}/MIN
MDC_IDC_SET_BRADY_MAX_SENSOR_RATE: 120 {BEATS}/MIN
MDC_IDC_SET_BRADY_MAX_TRACKING_RATE: 140 {BEATS}/MIN
MDC_IDC_SET_BRADY_MODE: NORMAL
MDC_IDC_SET_BRADY_PAV_DELAY_LOW: 180 MS
MDC_IDC_SET_BRADY_SAV_DELAY_LOW: 150 MS
MDC_IDC_SET_LEADCHNL_RA_PACING_AMPLITUDE: 1.5 V
MDC_IDC_SET_LEADCHNL_RA_PACING_ANODE_ELECTRODE_1: NORMAL
MDC_IDC_SET_LEADCHNL_RA_PACING_ANODE_LOCATION_1: NORMAL
MDC_IDC_SET_LEADCHNL_RA_PACING_CAPTURE_MODE: NORMAL
MDC_IDC_SET_LEADCHNL_RA_PACING_CATHODE_ELECTRODE_1: NORMAL
MDC_IDC_SET_LEADCHNL_RA_PACING_CATHODE_LOCATION_1: NORMAL
MDC_IDC_SET_LEADCHNL_RA_PACING_POLARITY: NORMAL
MDC_IDC_SET_LEADCHNL_RA_PACING_PULSEWIDTH: 0.4 MS
MDC_IDC_SET_LEADCHNL_RA_SENSING_ANODE_ELECTRODE_1: NORMAL
MDC_IDC_SET_LEADCHNL_RA_SENSING_ANODE_LOCATION_1: NORMAL
MDC_IDC_SET_LEADCHNL_RA_SENSING_CATHODE_ELECTRODE_1: NORMAL
MDC_IDC_SET_LEADCHNL_RA_SENSING_CATHODE_LOCATION_1: NORMAL
MDC_IDC_SET_LEADCHNL_RA_SENSING_POLARITY: NORMAL
MDC_IDC_SET_LEADCHNL_RA_SENSING_SENSITIVITY: 0.3 MV
MDC_IDC_SET_LEADCHNL_RV_PACING_AMPLITUDE: 2 V
MDC_IDC_SET_LEADCHNL_RV_PACING_ANODE_ELECTRODE_1: NORMAL
MDC_IDC_SET_LEADCHNL_RV_PACING_ANODE_LOCATION_1: NORMAL
MDC_IDC_SET_LEADCHNL_RV_PACING_CAPTURE_MODE: NORMAL
MDC_IDC_SET_LEADCHNL_RV_PACING_CATHODE_ELECTRODE_1: NORMAL
MDC_IDC_SET_LEADCHNL_RV_PACING_CATHODE_LOCATION_1: NORMAL
MDC_IDC_SET_LEADCHNL_RV_PACING_POLARITY: NORMAL
MDC_IDC_SET_LEADCHNL_RV_PACING_PULSEWIDTH: 0.4 MS
MDC_IDC_SET_LEADCHNL_RV_SENSING_ANODE_ELECTRODE_1: NORMAL
MDC_IDC_SET_LEADCHNL_RV_SENSING_ANODE_LOCATION_1: NORMAL
MDC_IDC_SET_LEADCHNL_RV_SENSING_CATHODE_ELECTRODE_1: NORMAL
MDC_IDC_SET_LEADCHNL_RV_SENSING_CATHODE_LOCATION_1: NORMAL
MDC_IDC_SET_LEADCHNL_RV_SENSING_POLARITY: NORMAL
MDC_IDC_SET_LEADCHNL_RV_SENSING_SENSITIVITY: 0.3 MV
MDC_IDC_SET_ZONE_DETECTION_BEATS_DENOMINATOR: 40 {BEATS}
MDC_IDC_SET_ZONE_DETECTION_BEATS_NUMERATOR: 30 {BEATS}
MDC_IDC_SET_ZONE_DETECTION_INTERVAL: 270 MS
MDC_IDC_SET_ZONE_DETECTION_INTERVAL: 350 MS
MDC_IDC_SET_ZONE_DETECTION_INTERVAL: 360 MS
MDC_IDC_SET_ZONE_DETECTION_INTERVAL: 400 MS
MDC_IDC_SET_ZONE_TYPE: NORMAL
MDC_IDC_STAT_AT_BURDEN_PERCENT: 0 %
MDC_IDC_STAT_AT_DTM_END: NORMAL
MDC_IDC_STAT_AT_DTM_START: NORMAL
MDC_IDC_STAT_BRADY_AP_VP_PERCENT: 2.12 %
MDC_IDC_STAT_BRADY_AP_VP_PERCENT: 2.75 %
MDC_IDC_STAT_BRADY_AP_VP_PERCENT: 6.71 %
MDC_IDC_STAT_BRADY_AP_VP_PERCENT: 6.71 %
MDC_IDC_STAT_BRADY_AP_VP_PERCENT: 7.38 %
MDC_IDC_STAT_BRADY_AP_VS_PERCENT: 0 %
MDC_IDC_STAT_BRADY_AS_VP_PERCENT: 92.53 %
MDC_IDC_STAT_BRADY_AS_VP_PERCENT: 93.22 %
MDC_IDC_STAT_BRADY_AS_VP_PERCENT: 93.22 %
MDC_IDC_STAT_BRADY_AS_VP_PERCENT: 97.1 %
MDC_IDC_STAT_BRADY_AS_VP_PERCENT: 97.8 %
MDC_IDC_STAT_BRADY_AS_VS_PERCENT: 0.07 %
MDC_IDC_STAT_BRADY_AS_VS_PERCENT: 0.07 %
MDC_IDC_STAT_BRADY_AS_VS_PERCENT: 0.08 %
MDC_IDC_STAT_BRADY_AS_VS_PERCENT: 0.09 %
MDC_IDC_STAT_BRADY_AS_VS_PERCENT: 0.15 %
MDC_IDC_STAT_BRADY_DTM_END: NORMAL
MDC_IDC_STAT_BRADY_DTM_START: NORMAL
MDC_IDC_STAT_BRADY_RA_PERCENT_PACED: 2.12 %
MDC_IDC_STAT_BRADY_RA_PERCENT_PACED: 2.75 %
MDC_IDC_STAT_BRADY_RA_PERCENT_PACED: 6.72 %
MDC_IDC_STAT_BRADY_RA_PERCENT_PACED: 6.72 %
MDC_IDC_STAT_BRADY_RA_PERCENT_PACED: 7.41 %
MDC_IDC_STAT_BRADY_RV_PERCENT_PACED: 99.84 %
MDC_IDC_STAT_BRADY_RV_PERCENT_PACED: 99.9 %
MDC_IDC_STAT_BRADY_RV_PERCENT_PACED: 99.92 %
MDC_IDC_STAT_BRADY_RV_PERCENT_PACED: 99.93 %
MDC_IDC_STAT_BRADY_RV_PERCENT_PACED: 99.93 %
MDC_IDC_STAT_CRT_DTM_END: NORMAL
MDC_IDC_STAT_CRT_DTM_START: NORMAL
MDC_IDC_STAT_EPISODE_RECENT_COUNT: 0
MDC_IDC_STAT_EPISODE_RECENT_COUNT_DTM_END: NORMAL
MDC_IDC_STAT_EPISODE_RECENT_COUNT_DTM_START: NORMAL
MDC_IDC_STAT_EPISODE_TOTAL_COUNT: 0
MDC_IDC_STAT_EPISODE_TOTAL_COUNT_DTM_END: NORMAL
MDC_IDC_STAT_EPISODE_TOTAL_COUNT_DTM_START: NORMAL
MDC_IDC_STAT_EPISODE_TYPE: NORMAL
MDC_IDC_STAT_TACHYTHERAPY_ATP_DELIVERED_RECENT: 0
MDC_IDC_STAT_TACHYTHERAPY_ATP_DELIVERED_TOTAL: 0
MDC_IDC_STAT_TACHYTHERAPY_RECENT_DTM_END: NORMAL
MDC_IDC_STAT_TACHYTHERAPY_RECENT_DTM_START: NORMAL
MDC_IDC_STAT_TACHYTHERAPY_SHOCKS_ABORTED_RECENT: 0
MDC_IDC_STAT_TACHYTHERAPY_SHOCKS_ABORTED_TOTAL: 0
MDC_IDC_STAT_TACHYTHERAPY_SHOCKS_DELIVERED_RECENT: 0
MDC_IDC_STAT_TACHYTHERAPY_SHOCKS_DELIVERED_TOTAL: 0
MDC_IDC_STAT_TACHYTHERAPY_TOTAL_DTM_END: NORMAL
MDC_IDC_STAT_TACHYTHERAPY_TOTAL_DTM_START: NORMAL
MICROALBUMIN UR-MCNC: 24.7 MG/L
MICROALBUMIN/CREAT UR: 40.83 MG/G CR (ref 0–17)
MONOCYTES # BLD AUTO: 0.7 10E3/UL (ref 0–1.3)
MONOCYTES # BLD AUTO: 0.7 10E3/UL (ref 0–1.3)
MONOCYTES # BLD AUTO: 1.8 10E3/UL (ref 0–1.3)
MONOCYTES # BLD MANUAL: 0.3 10E3/UL (ref 0–1.3)
MONOCYTES NFR BLD AUTO: 10 %
MONOCYTES NFR BLD AUTO: 6 %
MONOCYTES NFR BLD AUTO: 8 %
MONOCYTES NFR BLD MANUAL: 3 %
NEUTROPHILS # BLD AUTO: 12.7 10E3/UL (ref 1.6–8.3)
NEUTROPHILS # BLD AUTO: 3.8 10E3/UL (ref 1.6–8.3)
NEUTROPHILS # BLD AUTO: 5.5 10E3/UL (ref 1.6–8.3)
NEUTROPHILS # BLD MANUAL: 5.1 10E3/UL (ref 1.6–8.3)
NEUTROPHILS NFR BLD AUTO: 40 %
NEUTROPHILS NFR BLD AUTO: 52 %
NEUTROPHILS NFR BLD AUTO: 75 %
NEUTROPHILS NFR BLD MANUAL: 47 %
NITRATE UR QL: NEGATIVE
NONHDLC SERPL-MCNC: 112 MG/DL
NRBC # BLD AUTO: 0 10E3/UL
NRBC BLD AUTO-RTO: 0 /100
NT-PROBNP SERPL-MCNC: 233 PG/ML (ref 0–900)
O2/TOTAL GAS SETTING VFR VENT: 0 %
OPIATES UR QL SCN: NORMAL
OSMOLALITY SERPL: 299 MMOL/KG (ref 275–295)
P AXIS - MUSE: 33 DEGREES
P AXIS - MUSE: 67 DEGREES
P AXIS - MUSE: 73 DEGREES
P AXIS - MUSE: 80 DEGREES
P AXIS - MUSE: NORMAL DEGREES
P AXIS - MUSE: NORMAL DEGREES
PCO2 BLDV: 42 MM HG (ref 40–50)
PCO2 BLDV: 43 MM HG (ref 40–50)
PCO2 BLDV: 43 MM HG (ref 40–50)
PCO2 BLDV: 45 MM HG (ref 40–50)
PH BLDV: 7.4 [PH] (ref 7.32–7.43)
PH BLDV: 7.41 [PH] (ref 7.32–7.43)
PH BLDV: 7.42 [PH] (ref 7.32–7.43)
PH BLDV: 7.42 [PH] (ref 7.32–7.43)
PH UR STRIP: 5.5 [PH] (ref 5–7)
PLAT MORPH BLD: ABNORMAL
PLATELET # BLD AUTO: 214 10E3/UL (ref 150–450)
PLATELET # BLD AUTO: 239 10E3/UL (ref 150–450)
PLATELET # BLD AUTO: 245 10E3/UL (ref 150–450)
PLATELET # BLD AUTO: 246 10E3/UL (ref 150–450)
PLATELET # BLD AUTO: 272 10E3/UL (ref 150–450)
PLATELET # BLD AUTO: 312 10E3/UL (ref 150–450)
PO2 BLDV: 19 MM HG (ref 25–47)
PO2 BLDV: 19 MM HG (ref 25–47)
PO2 BLDV: 55 MM HG (ref 25–47)
PO2 BLDV: 57 MM HG (ref 25–47)
POTASSIUM BLD-SCNC: 3.5 MMOL/L (ref 3.4–5.3)
POTASSIUM SERPL-SCNC: 3.5 MMOL/L (ref 3.4–5.3)
POTASSIUM SERPL-SCNC: 3.6 MMOL/L (ref 3.4–5.3)
POTASSIUM SERPL-SCNC: 3.6 MMOL/L (ref 3.4–5.3)
POTASSIUM SERPL-SCNC: 3.7 MMOL/L (ref 3.4–5.3)
POTASSIUM SERPL-SCNC: 3.8 MMOL/L (ref 3.4–5.3)
POTASSIUM SERPL-SCNC: 3.9 MMOL/L (ref 3.4–5.3)
POTASSIUM SERPL-SCNC: 4 MMOL/L (ref 3.4–5.3)
POTASSIUM SERPL-SCNC: 4.5 MMOL/L (ref 3.4–5.3)
POTASSIUM SERPL-SCNC: 4.5 MMOL/L (ref 3.4–5.3)
PR INTERVAL - MUSE: 164 MS
PR INTERVAL - MUSE: 168 MS
PR INTERVAL - MUSE: 168 MS
PR INTERVAL - MUSE: 174 MS
PR INTERVAL - MUSE: 176 MS
PR INTERVAL - MUSE: NORMAL MS
PROT SERPL-MCNC: 5.8 G/DL (ref 6.4–8.3)
PROT SERPL-MCNC: 5.9 G/DL (ref 6.4–8.3)
PROT SERPL-MCNC: 6 G/DL (ref 6.4–8.3)
PROT SERPL-MCNC: 6 G/DL (ref 6.4–8.3)
PROT SERPL-MCNC: 6.6 G/DL (ref 6.4–8.3)
PROT SERPL-MCNC: 7.1 G/DL (ref 6.4–8.3)
PROT SERPL-MCNC: 7.5 G/DL (ref 6.4–8.3)
PSA SERPL DL<=0.01 NG/ML-MCNC: 0.78 NG/ML (ref 0–3.5)
QRS DURATION - MUSE: 178 MS
QRS DURATION - MUSE: 178 MS
QRS DURATION - MUSE: 182 MS
QRS DURATION - MUSE: 184 MS
QRS DURATION - MUSE: 186 MS
QRS DURATION - MUSE: 190 MS
QT - MUSE: 398 MS
QT - MUSE: 398 MS
QT - MUSE: 440 MS
QT - MUSE: 448 MS
QT - MUSE: 450 MS
QT - MUSE: 458 MS
QTC - MUSE: 509 MS
QTC - MUSE: 532 MS
QTC - MUSE: 533 MS
QTC - MUSE: 543 MS
QTC - MUSE: 545 MS
QTC - MUSE: 547 MS
R AXIS - MUSE: -78 DEGREES
R AXIS - MUSE: -81 DEGREES
R AXIS - MUSE: -83 DEGREES
R AXIS - MUSE: -87 DEGREES
R AXIS - MUSE: 269 DEGREES
R AXIS - MUSE: 270 DEGREES
RBC # BLD AUTO: 4.8 10E6/UL (ref 4.4–5.9)
RBC # BLD AUTO: 5 10E6/UL (ref 4.4–5.9)
RBC # BLD AUTO: 5.02 10E6/UL (ref 4.4–5.9)
RBC # BLD AUTO: 5.02 10E6/UL (ref 4.4–5.9)
RBC # BLD AUTO: 5.16 10E6/UL (ref 4.4–5.9)
RBC # BLD AUTO: 5.18 10E6/UL (ref 4.4–5.9)
RBC MORPH BLD: ABNORMAL
RBC URINE: 0 /HPF
RENIN PLAS-CCNC: 41.8 NG/ML/HR
SALICYLATES SERPL-MCNC: <0.5 MG/DL
SAO2 % BLDV: 30 % (ref 94–100)
SAO2 % BLDV: 31 % (ref 94–100)
SAO2 % BLDV: 88 % (ref 94–100)
SARS-COV-2 RNA RESP QL NAA+PROBE: NEGATIVE
SODIUM BLD-SCNC: 135 MMOL/L (ref 133–144)
SODIUM SERPL-SCNC: 132 MMOL/L (ref 136–145)
SODIUM SERPL-SCNC: 134 MMOL/L (ref 136–145)
SODIUM SERPL-SCNC: 135 MMOL/L (ref 136–145)
SODIUM SERPL-SCNC: 137 MMOL/L (ref 136–145)
SODIUM SERPL-SCNC: 138 MMOL/L (ref 136–145)
SODIUM SERPL-SCNC: 139 MMOL/L (ref 136–145)
SODIUM SERPL-SCNC: 139 MMOL/L (ref 136–145)
SODIUM SERPL-SCNC: 140 MMOL/L (ref 136–145)
SODIUM SERPL-SCNC: 141 MMOL/L (ref 136–145)
SODIUM SERPL-SCNC: 141 MMOL/L (ref 136–145)
SP GR UR STRIP: 1 (ref 1–1.03)
SYSTOLIC BLOOD PRESSURE - MUSE: NORMAL MMHG
T AXIS - MUSE: 81 DEGREES
T AXIS - MUSE: 84 DEGREES
T AXIS - MUSE: 89 DEGREES
T AXIS - MUSE: 89 DEGREES
T AXIS - MUSE: 94 DEGREES
T AXIS - MUSE: 97 DEGREES
TRIGL SERPL-MCNC: 249 MG/DL
TROPONIN T BLD-MCNC: 0 UG/L
TROPONIN T BLD-MCNC: 0.01 UG/L
TROPONIN T SERPL HS-MCNC: 17 NG/L
TROPONIN T SERPL HS-MCNC: 18 NG/L
TROPONIN T SERPL HS-MCNC: 20 NG/L
TROPONIN T SERPL HS-MCNC: 21 NG/L
TROPONIN T SERPL HS-MCNC: 21 NG/L
TROPONIN T SERPL HS-MCNC: 26 NG/L
TROPONIN T SERPL HS-MCNC: 27 NG/L
UROBILINOGEN UR STRIP-MCNC: NORMAL MG/DL
VENTRICULAR RATE- MUSE: 112 BPM
VENTRICULAR RATE- MUSE: 113 BPM
VENTRICULAR RATE- MUSE: 77 BPM
VENTRICULAR RATE- MUSE: 81 BPM
VENTRICULAR RATE- MUSE: 85 BPM
VENTRICULAR RATE- MUSE: 93 BPM
WBC # BLD AUTO: 10.2 10E3/UL (ref 4–11)
WBC # BLD AUTO: 10.7 10E3/UL (ref 4–11)
WBC # BLD AUTO: 10.8 10E3/UL (ref 4–11)
WBC # BLD AUTO: 17.1 10E3/UL (ref 4–11)
WBC # BLD AUTO: 7.6 10E3/UL (ref 4–11)
WBC # BLD AUTO: 9.4 10E3/UL (ref 4–11)
WBC URINE: 0 /HPF

## 2023-01-01 PROCEDURE — 93287 PERI-PX DEVICE EVAL & PRGR: CPT

## 2023-01-01 PROCEDURE — 36415 COLL VENOUS BLD VENIPUNCTURE: CPT

## 2023-01-01 PROCEDURE — 99284 EMERGENCY DEPT VISIT MOD MDM: CPT | Performed by: EMERGENCY MEDICINE

## 2023-01-01 PROCEDURE — 999N000208 ECHOCARDIOGRAM COMPLETE

## 2023-01-01 PROCEDURE — 99214 OFFICE O/P EST MOD 30 MIN: CPT | Mod: VID | Performed by: PSYCHIATRY & NEUROLOGY

## 2023-01-01 PROCEDURE — 84132 ASSAY OF SERUM POTASSIUM: CPT | Performed by: FAMILY MEDICINE

## 2023-01-01 PROCEDURE — 82043 UR ALBUMIN QUANTITATIVE: CPT | Performed by: FAMILY MEDICINE

## 2023-01-01 PROCEDURE — 255N000002 HC RX 255 OP 636: Performed by: INTERNAL MEDICINE

## 2023-01-01 PROCEDURE — 250N000011 HC RX IP 250 OP 636: Performed by: INTERNAL MEDICINE

## 2023-01-01 PROCEDURE — 80053 COMPREHEN METABOLIC PANEL: CPT | Performed by: HOSPITALIST

## 2023-01-01 PROCEDURE — 99291 CRITICAL CARE FIRST HOUR: CPT | Mod: 25 | Performed by: EMERGENCY MEDICINE

## 2023-01-01 PROCEDURE — 96361 HYDRATE IV INFUSION ADD-ON: CPT | Performed by: EMERGENCY MEDICINE

## 2023-01-01 PROCEDURE — 80053 COMPREHEN METABOLIC PANEL: CPT | Performed by: EMERGENCY MEDICINE

## 2023-01-01 PROCEDURE — 83690 ASSAY OF LIPASE: CPT | Mod: ORL | Performed by: FAMILY MEDICINE

## 2023-01-01 PROCEDURE — 83605 ASSAY OF LACTIC ACID: CPT

## 2023-01-01 PROCEDURE — 250N000012 HC RX MED GY IP 250 OP 636 PS 637

## 2023-01-01 PROCEDURE — 93287 PERI-PX DEVICE EVAL & PRGR: CPT | Mod: 26 | Performed by: INTERNAL MEDICINE

## 2023-01-01 PROCEDURE — 71260 CT THORAX DX C+: CPT | Mod: 26 | Performed by: RADIOLOGY

## 2023-01-01 PROCEDURE — 83605 ASSAY OF LACTIC ACID: CPT | Performed by: EMERGENCY MEDICINE

## 2023-01-01 PROCEDURE — 82803 BLOOD GASES ANY COMBINATION: CPT

## 2023-01-01 PROCEDURE — 71046 X-RAY EXAM CHEST 2 VIEWS: CPT | Mod: 26 | Performed by: RADIOLOGY

## 2023-01-01 PROCEDURE — 93295 DEV INTERROG REMOTE 1/2/MLT: CPT | Performed by: INTERNAL MEDICINE

## 2023-01-01 PROCEDURE — G0103 PSA SCREENING: HCPCS | Performed by: FAMILY MEDICINE

## 2023-01-01 PROCEDURE — 84244 ASSAY OF RENIN: CPT | Mod: 90 | Performed by: FAMILY MEDICINE

## 2023-01-01 PROCEDURE — 85730 THROMBOPLASTIN TIME PARTIAL: CPT | Performed by: EMERGENCY MEDICINE

## 2023-01-01 PROCEDURE — 93005 ELECTROCARDIOGRAM TRACING: CPT | Performed by: EMERGENCY MEDICINE

## 2023-01-01 PROCEDURE — 99212 OFFICE O/P EST SF 10 MIN: CPT | Mod: VID | Performed by: INTERNAL MEDICINE

## 2023-01-01 PROCEDURE — 93306 TTE W/DOPPLER COMPLETE: CPT | Mod: 26 | Performed by: INTERNAL MEDICINE

## 2023-01-01 PROCEDURE — 71275 CT ANGIOGRAPHY CHEST: CPT

## 2023-01-01 PROCEDURE — 93010 ELECTROCARDIOGRAM REPORT: CPT | Performed by: EMERGENCY MEDICINE

## 2023-01-01 PROCEDURE — 36415 COLL VENOUS BLD VENIPUNCTURE: CPT | Performed by: FAMILY MEDICINE

## 2023-01-01 PROCEDURE — 80053 COMPREHEN METABOLIC PANEL: CPT

## 2023-01-01 PROCEDURE — 258N000003 HC RX IP 258 OP 636: Performed by: EMERGENCY MEDICINE

## 2023-01-01 PROCEDURE — 85027 COMPLETE CBC AUTOMATED: CPT | Performed by: EMERGENCY MEDICINE

## 2023-01-01 PROCEDURE — 120N000002 HC R&B MED SURG/OB UMMC

## 2023-01-01 PROCEDURE — 99000 SPECIMEN HANDLING OFFICE-LAB: CPT | Performed by: FAMILY MEDICINE

## 2023-01-01 PROCEDURE — 99207 CARDIAC DEVICE CHECK - REMOTE: CPT | Performed by: INTERNAL MEDICINE

## 2023-01-01 PROCEDURE — 76700 US EXAM ABDOM COMPLETE: CPT | Mod: GC | Performed by: STUDENT IN AN ORGANIZED HEALTH CARE EDUCATION/TRAINING PROGRAM

## 2023-01-01 PROCEDURE — 83735 ASSAY OF MAGNESIUM: CPT | Performed by: EMERGENCY MEDICINE

## 2023-01-01 PROCEDURE — 83036 HEMOGLOBIN GLYCOSYLATED A1C: CPT

## 2023-01-01 PROCEDURE — 99285 EMERGENCY DEPT VISIT HI MDM: CPT | Mod: 25 | Performed by: EMERGENCY MEDICINE

## 2023-01-01 PROCEDURE — 250N000011 HC RX IP 250 OP 636: Mod: JZ | Performed by: EMERGENCY MEDICINE

## 2023-01-01 PROCEDURE — 82565 ASSAY OF CREATININE: CPT

## 2023-01-01 PROCEDURE — 82962 GLUCOSE BLOOD TEST: CPT

## 2023-01-01 PROCEDURE — 84484 ASSAY OF TROPONIN QUANT: CPT

## 2023-01-01 PROCEDURE — 84484 ASSAY OF TROPONIN QUANT: CPT | Performed by: EMERGENCY MEDICINE

## 2023-01-01 PROCEDURE — 93018 CV STRESS TEST I&R ONLY: CPT | Performed by: INTERNAL MEDICINE

## 2023-01-01 PROCEDURE — 99214 OFFICE O/P EST MOD 30 MIN: CPT | Mod: 25 | Performed by: FAMILY MEDICINE

## 2023-01-01 PROCEDURE — 85025 COMPLETE CBC W/AUTO DIFF WBC: CPT | Performed by: EMERGENCY MEDICINE

## 2023-01-01 PROCEDURE — 36415 COLL VENOUS BLD VENIPUNCTURE: CPT | Performed by: EMERGENCY MEDICINE

## 2023-01-01 PROCEDURE — 82077 ASSAY SPEC XCP UR&BREATH IA: CPT | Performed by: EMERGENCY MEDICINE

## 2023-01-01 PROCEDURE — 250N000011 HC RX IP 250 OP 636: Performed by: EMERGENCY MEDICINE

## 2023-01-01 PROCEDURE — 71275 CT ANGIOGRAPHY CHEST: CPT | Mod: 26 | Performed by: RADIOLOGY

## 2023-01-01 PROCEDURE — 99284 EMERGENCY DEPT VISIT MOD MDM: CPT | Mod: 25 | Performed by: EMERGENCY MEDICINE

## 2023-01-01 PROCEDURE — 86803 HEPATITIS C AB TEST: CPT | Performed by: FAMILY MEDICINE

## 2023-01-01 PROCEDURE — 250N000009 HC RX 250: Performed by: EMERGENCY MEDICINE

## 2023-01-01 PROCEDURE — 85025 COMPLETE CBC W/AUTO DIFF WBC: CPT

## 2023-01-01 PROCEDURE — U0005 INFEC AGEN DETEC AMPLI PROBE: HCPCS

## 2023-01-01 PROCEDURE — 85027 COMPLETE CBC AUTOMATED: CPT

## 2023-01-01 PROCEDURE — 83880 ASSAY OF NATRIURETIC PEPTIDE: CPT | Performed by: EMERGENCY MEDICINE

## 2023-01-01 PROCEDURE — 76705 ECHO EXAM OF ABDOMEN: CPT | Mod: 26 | Performed by: RADIOLOGY

## 2023-01-01 PROCEDURE — 36415 COLL VENOUS BLD VENIPUNCTURE: CPT | Performed by: HOSPITALIST

## 2023-01-01 PROCEDURE — 75563 CARD MRI W/STRESS IMG & DYE: CPT | Mod: 26 | Performed by: INTERNAL MEDICINE

## 2023-01-01 PROCEDURE — 83930 ASSAY OF BLOOD OSMOLALITY: CPT | Performed by: EMERGENCY MEDICINE

## 2023-01-01 PROCEDURE — 250N000013 HC RX MED GY IP 250 OP 250 PS 637: Performed by: EMERGENCY MEDICINE

## 2023-01-01 PROCEDURE — 80307 DRUG TEST PRSMV CHEM ANLYZR: CPT | Performed by: EMERGENCY MEDICINE

## 2023-01-01 PROCEDURE — 99239 HOSP IP/OBS DSCHRG MGMT >30: CPT | Performed by: STUDENT IN AN ORGANIZED HEALTH CARE EDUCATION/TRAINING PROGRAM

## 2023-01-01 PROCEDURE — 99213 OFFICE O/P EST LOW 20 MIN: CPT | Mod: 95 | Performed by: PSYCHIATRY & NEUROLOGY

## 2023-01-01 PROCEDURE — 87040 BLOOD CULTURE FOR BACTERIA: CPT | Performed by: EMERGENCY MEDICINE

## 2023-01-01 PROCEDURE — 82150 ASSAY OF AMYLASE: CPT | Mod: ORL | Performed by: FAMILY MEDICINE

## 2023-01-01 PROCEDURE — 76604 US EXAM CHEST: CPT | Performed by: EMERGENCY MEDICINE

## 2023-01-01 PROCEDURE — 83690 ASSAY OF LIPASE: CPT | Performed by: EMERGENCY MEDICINE

## 2023-01-01 PROCEDURE — 82010 KETONE BODYS QUAN: CPT | Performed by: EMERGENCY MEDICINE

## 2023-01-01 PROCEDURE — 85007 BL SMEAR W/DIFF WBC COUNT: CPT | Performed by: EMERGENCY MEDICINE

## 2023-01-01 PROCEDURE — 82040 ASSAY OF SERUM ALBUMIN: CPT

## 2023-01-01 PROCEDURE — 93308 TTE F-UP OR LMTD: CPT | Performed by: EMERGENCY MEDICINE

## 2023-01-01 PROCEDURE — 71046 X-RAY EXAM CHEST 2 VIEWS: CPT

## 2023-01-01 PROCEDURE — 84484 ASSAY OF TROPONIN QUANT: CPT | Mod: 91 | Performed by: EMERGENCY MEDICINE

## 2023-01-01 PROCEDURE — 76604 US EXAM CHEST: CPT | Mod: 26 | Performed by: EMERGENCY MEDICINE

## 2023-01-01 PROCEDURE — 74177 CT ABD & PELVIS W/CONTRAST: CPT

## 2023-01-01 PROCEDURE — 74177 CT ABD & PELVIS W/CONTRAST: CPT | Mod: 26 | Performed by: RADIOLOGY

## 2023-01-01 PROCEDURE — A9585 GADOBUTROL INJECTION: HCPCS | Performed by: INTERNAL MEDICINE

## 2023-01-01 PROCEDURE — 82803 BLOOD GASES ANY COMBINATION: CPT | Performed by: EMERGENCY MEDICINE

## 2023-01-01 PROCEDURE — 81001 URINALYSIS AUTO W/SCOPE: CPT | Performed by: EMERGENCY MEDICINE

## 2023-01-01 PROCEDURE — 82947 ASSAY GLUCOSE BLOOD QUANT: CPT

## 2023-01-01 PROCEDURE — 82947 ASSAY GLUCOSE BLOOD QUANT: CPT | Performed by: EMERGENCY MEDICINE

## 2023-01-01 PROCEDURE — 93308 TTE F-UP OR LMTD: CPT | Mod: 26 | Performed by: EMERGENCY MEDICINE

## 2023-01-01 PROCEDURE — 86140 C-REACTIVE PROTEIN: CPT | Performed by: EMERGENCY MEDICINE

## 2023-01-01 PROCEDURE — 71045 X-RAY EXAM CHEST 1 VIEW: CPT | Mod: 26 | Performed by: STUDENT IN AN ORGANIZED HEALTH CARE EDUCATION/TRAINING PROGRAM

## 2023-01-01 PROCEDURE — 75563 CARD MRI W/STRESS IMG & DYE: CPT

## 2023-01-01 PROCEDURE — 99386 PREV VISIT NEW AGE 40-64: CPT | Performed by: FAMILY MEDICINE

## 2023-01-01 PROCEDURE — 85610 PROTHROMBIN TIME: CPT | Performed by: EMERGENCY MEDICINE

## 2023-01-01 PROCEDURE — 80048 BASIC METABOLIC PNL TOTAL CA: CPT | Mod: ORL | Performed by: FAMILY MEDICINE

## 2023-01-01 PROCEDURE — 80061 LIPID PANEL: CPT | Mod: ORL | Performed by: FAMILY MEDICINE

## 2023-01-01 PROCEDURE — 93016 CV STRESS TEST SUPVJ ONLY: CPT | Performed by: INTERNAL MEDICINE

## 2023-01-01 PROCEDURE — 93296 REM INTERROG EVL PM/IDS: CPT

## 2023-01-01 PROCEDURE — 250N000013 HC RX MED GY IP 250 OP 250 PS 637

## 2023-01-01 PROCEDURE — 82088 ASSAY OF ALDOSTERONE: CPT | Performed by: FAMILY MEDICINE

## 2023-01-01 PROCEDURE — 80179 DRUG ASSAY SALICYLATE: CPT

## 2023-01-01 PROCEDURE — 96374 THER/PROPH/DIAG INJ IV PUSH: CPT | Performed by: EMERGENCY MEDICINE

## 2023-01-01 PROCEDURE — 999N000054 HC STATISTIC EKG NON-CHARGEABLE

## 2023-01-01 PROCEDURE — 93010 ELECTROCARDIOGRAM REPORT: CPT | Mod: 76 | Performed by: INTERNAL MEDICINE

## 2023-01-01 PROCEDURE — 96374 THER/PROPH/DIAG INJ IV PUSH: CPT | Mod: 59 | Performed by: EMERGENCY MEDICINE

## 2023-01-01 PROCEDURE — 93005 ELECTROCARDIOGRAM TRACING: CPT

## 2023-01-01 PROCEDURE — 76705 ECHO EXAM OF ABDOMEN: CPT

## 2023-01-01 PROCEDURE — 80053 COMPREHEN METABOLIC PANEL: CPT | Mod: ORL | Performed by: NURSE PRACTITIONER

## 2023-01-01 PROCEDURE — 99222 1ST HOSP IP/OBS MODERATE 55: CPT | Mod: GC | Performed by: INTERNAL MEDICINE

## 2023-01-01 PROCEDURE — 82570 ASSAY OF URINE CREATININE: CPT | Performed by: FAMILY MEDICINE

## 2023-01-01 PROCEDURE — 71045 X-RAY EXAM CHEST 1 VIEW: CPT

## 2023-01-01 RX ORDER — AMLODIPINE BESYLATE 10 MG/1
10 TABLET ORAL
Qty: 90 TABLET | Refills: 3 | Status: SHIPPED | OUTPATIENT
Start: 2023-01-01

## 2023-01-01 RX ORDER — GADOBUTROL 604.72 MG/ML
10 INJECTION INTRAVENOUS ONCE
Status: COMPLETED | OUTPATIENT
Start: 2023-01-01 | End: 2023-01-01

## 2023-01-01 RX ORDER — FUROSEMIDE 20 MG
40 TABLET ORAL DAILY
Status: DISCONTINUED | OUTPATIENT
Start: 2023-01-01 | End: 2023-01-01 | Stop reason: HOSPADM

## 2023-01-01 RX ORDER — POTASSIUM CHLORIDE 750 MG/1
1 TABLET, EXTENDED RELEASE ORAL
COMMUNITY
Start: 2023-01-01

## 2023-01-01 RX ORDER — FUROSEMIDE 20 MG
20 TABLET ORAL DAILY
Status: DISCONTINUED | OUTPATIENT
Start: 2023-01-01 | End: 2023-01-01 | Stop reason: HOSPADM

## 2023-01-01 RX ORDER — REGADENOSON 0.08 MG/ML
0.4 INJECTION, SOLUTION INTRAVENOUS ONCE
Status: COMPLETED | OUTPATIENT
Start: 2023-01-01 | End: 2023-01-01

## 2023-01-01 RX ORDER — TAMSULOSIN HYDROCHLORIDE 0.4 MG/1
0.4 CAPSULE ORAL DAILY
Status: DISCONTINUED | OUTPATIENT
Start: 2023-01-01 | End: 2023-01-01 | Stop reason: HOSPADM

## 2023-01-01 RX ORDER — CETIRIZINE HYDROCHLORIDE 10 MG/1
10 TABLET ORAL DAILY
Status: DISCONTINUED | OUTPATIENT
Start: 2023-01-01 | End: 2023-01-01 | Stop reason: HOSPADM

## 2023-01-01 RX ORDER — VERAPAMIL HYDROCHLORIDE 80 MG/1
80 TABLET ORAL DAILY
Qty: 60 TABLET | Refills: 1 | Status: SHIPPED | OUTPATIENT
Start: 2023-01-01 | End: 2023-01-01 | Stop reason: ALTCHOICE

## 2023-01-01 RX ORDER — METOPROLOL SUCCINATE 100 MG/1
1 TABLET, EXTENDED RELEASE ORAL
COMMUNITY
Start: 2023-01-01

## 2023-01-01 RX ORDER — DIPHENHYDRAMINE HYDROCHLORIDE 50 MG/ML
25-50 INJECTION INTRAMUSCULAR; INTRAVENOUS
Status: DISCONTINUED | OUTPATIENT
Start: 2023-01-01 | End: 2023-01-01 | Stop reason: HOSPADM

## 2023-01-01 RX ORDER — SODIUM CHLORIDE 9 MG/ML
INJECTION, SOLUTION INTRAVENOUS CONTINUOUS
Status: ACTIVE | OUTPATIENT
Start: 2023-01-01 | End: 2023-01-01

## 2023-01-01 RX ORDER — ONDANSETRON 8 MG/1
8 TABLET, ORALLY DISINTEGRATING ORAL EVERY 8 HOURS PRN
Qty: 10 TABLET | Refills: 0 | Status: SHIPPED | OUTPATIENT
Start: 2023-01-01 | End: 2023-06-25

## 2023-01-01 RX ORDER — ONDANSETRON 2 MG/ML
4 INJECTION INTRAMUSCULAR; INTRAVENOUS EVERY 6 HOURS PRN
Status: DISCONTINUED | OUTPATIENT
Start: 2023-01-01 | End: 2023-01-01 | Stop reason: HOSPADM

## 2023-01-01 RX ORDER — TAMSULOSIN HYDROCHLORIDE 0.4 MG/1
0.4 CAPSULE ORAL DAILY
COMMUNITY
Start: 2023-01-01

## 2023-01-01 RX ORDER — DIPHENHYDRAMINE HCL 25 MG
25 CAPSULE ORAL
Status: DISCONTINUED | OUTPATIENT
Start: 2023-01-01 | End: 2023-01-01 | Stop reason: HOSPADM

## 2023-01-01 RX ORDER — ACYCLOVIR 200 MG/1
0-1 CAPSULE ORAL
Status: DISCONTINUED | OUTPATIENT
Start: 2023-01-01 | End: 2023-01-01 | Stop reason: HOSPADM

## 2023-01-01 RX ORDER — LIDOCAINE 40 MG/G
CREAM TOPICAL
Status: DISCONTINUED | OUTPATIENT
Start: 2023-01-01 | End: 2023-01-01 | Stop reason: HOSPADM

## 2023-01-01 RX ORDER — ONDANSETRON 2 MG/ML
4 INJECTION INTRAMUSCULAR; INTRAVENOUS
Status: DISCONTINUED | OUTPATIENT
Start: 2023-01-01 | End: 2023-01-01 | Stop reason: HOSPADM

## 2023-01-01 RX ORDER — PANTOPRAZOLE SODIUM 40 MG/1
40 TABLET, DELAYED RELEASE ORAL
COMMUNITY
Start: 2023-01-01

## 2023-01-01 RX ORDER — ATORVASTATIN CALCIUM 40 MG/1
80 TABLET, FILM COATED ORAL DAILY
Status: DISCONTINUED | OUTPATIENT
Start: 2023-01-01 | End: 2023-01-01 | Stop reason: HOSPADM

## 2023-01-01 RX ORDER — ALBUTEROL SULFATE 90 UG/1
2 AEROSOL, METERED RESPIRATORY (INHALATION) EVERY 4 HOURS PRN
COMMUNITY
Start: 2021-08-29

## 2023-01-01 RX ORDER — TRAZODONE HYDROCHLORIDE 100 MG/1
100 TABLET ORAL AT BEDTIME
Status: DISCONTINUED | OUTPATIENT
Start: 2023-01-01 | End: 2023-01-01 | Stop reason: HOSPADM

## 2023-01-01 RX ORDER — OLANZAPINE 5 MG/1
7.5 TABLET ORAL AT BEDTIME
Qty: 30 TABLET | Refills: 0 | Status: SHIPPED | OUTPATIENT
Start: 2023-01-01 | End: 2023-01-01

## 2023-01-01 RX ORDER — DIAZEPAM 5 MG
5 TABLET ORAL EVERY 30 MIN PRN
Status: DISCONTINUED | OUTPATIENT
Start: 2023-01-01 | End: 2023-01-01 | Stop reason: HOSPADM

## 2023-01-01 RX ORDER — IOPAMIDOL 755 MG/ML
135 INJECTION, SOLUTION INTRAVASCULAR ONCE
Status: COMPLETED | OUTPATIENT
Start: 2023-01-01 | End: 2023-01-01

## 2023-01-01 RX ORDER — OLANZAPINE 5 MG/1
7.5 TABLET ORAL AT BEDTIME
Qty: 30 TABLET | Refills: 0 | Status: SHIPPED | OUTPATIENT
Start: 2023-01-01

## 2023-01-01 RX ORDER — DIAZEPAM 10 MG/2ML
5-10 INJECTION, SOLUTION INTRAMUSCULAR; INTRAVENOUS EVERY 30 MIN PRN
Status: DISCONTINUED | OUTPATIENT
Start: 2023-01-01 | End: 2023-01-01 | Stop reason: HOSPADM

## 2023-01-01 RX ORDER — AMOXICILLIN 250 MG
1 CAPSULE ORAL
Status: DISCONTINUED | OUTPATIENT
Start: 2023-01-01 | End: 2023-01-01 | Stop reason: HOSPADM

## 2023-01-01 RX ORDER — FENTANYL CITRATE 50 UG/ML
50 INJECTION, SOLUTION INTRAMUSCULAR; INTRAVENOUS EVERY 30 MIN PRN
Status: DISCONTINUED | OUTPATIENT
Start: 2023-01-01 | End: 2023-01-01 | Stop reason: DRUGHIGH

## 2023-01-01 RX ORDER — ONDANSETRON 2 MG/ML
8 INJECTION INTRAMUSCULAR; INTRAVENOUS ONCE
Status: COMPLETED | OUTPATIENT
Start: 2023-01-01 | End: 2023-01-01

## 2023-01-01 RX ORDER — LOSARTAN POTASSIUM 50 MG/1
50 TABLET ORAL DAILY
Status: DISCONTINUED | OUTPATIENT
Start: 2023-01-01 | End: 2023-01-01 | Stop reason: HOSPADM

## 2023-01-01 RX ORDER — FLUMAZENIL 0.1 MG/ML
0.2 INJECTION, SOLUTION INTRAVENOUS
Status: DISCONTINUED | OUTPATIENT
Start: 2023-01-01 | End: 2023-01-01 | Stop reason: HOSPADM

## 2023-01-01 RX ORDER — ONDANSETRON 4 MG/1
4 TABLET, ORALLY DISINTEGRATING ORAL EVERY 6 HOURS PRN
Status: DISCONTINUED | OUTPATIENT
Start: 2023-01-01 | End: 2023-01-01 | Stop reason: HOSPADM

## 2023-01-01 RX ORDER — ALBUTEROL SULFATE 90 UG/1
2 AEROSOL, METERED RESPIRATORY (INHALATION) EVERY 4 HOURS PRN
Status: DISCONTINUED | OUTPATIENT
Start: 2023-01-01 | End: 2023-01-01 | Stop reason: HOSPADM

## 2023-01-01 RX ORDER — OXYCODONE HYDROCHLORIDE 5 MG/1
5 TABLET ORAL ONCE
Status: COMPLETED | OUTPATIENT
Start: 2023-01-01 | End: 2023-01-01

## 2023-01-01 RX ORDER — NITROGLYCERIN 0.4 MG/1
0.4 TABLET SUBLINGUAL EVERY 5 MIN PRN
Status: DISCONTINUED | OUTPATIENT
Start: 2023-01-01 | End: 2023-01-01

## 2023-01-01 RX ORDER — DULOXETIN HYDROCHLORIDE 60 MG/1
120 CAPSULE, DELAYED RELEASE ORAL DAILY
Status: DISCONTINUED | OUTPATIENT
Start: 2023-01-01 | End: 2023-01-01 | Stop reason: HOSPADM

## 2023-01-01 RX ORDER — NICOTINE POLACRILEX 4 MG
15-30 LOZENGE BUCCAL
Status: DISCONTINUED | OUTPATIENT
Start: 2023-01-01 | End: 2023-01-01 | Stop reason: HOSPADM

## 2023-01-01 RX ORDER — OLANZAPINE 7.5 MG/1
7.5 TABLET, FILM COATED ORAL AT BEDTIME
Status: DISCONTINUED | OUTPATIENT
Start: 2023-01-01 | End: 2023-01-01 | Stop reason: HOSPADM

## 2023-01-01 RX ORDER — FOLIC ACID 1 MG/1
1 TABLET ORAL ONCE
Status: COMPLETED | OUTPATIENT
Start: 2023-01-01 | End: 2023-01-01

## 2023-01-01 RX ORDER — DEXTROSE MONOHYDRATE 25 G/50ML
25-50 INJECTION, SOLUTION INTRAVENOUS
Status: DISCONTINUED | OUTPATIENT
Start: 2023-01-01 | End: 2023-01-01 | Stop reason: HOSPADM

## 2023-01-01 RX ORDER — LOSARTAN POTASSIUM 50 MG/1
50 TABLET ORAL DAILY
Qty: 90 TABLET | Refills: 3 | Status: SHIPPED | OUTPATIENT
Start: 2023-01-01

## 2023-01-01 RX ORDER — IOPAMIDOL 755 MG/ML
72 INJECTION, SOLUTION INTRAVASCULAR ONCE
Status: COMPLETED | OUTPATIENT
Start: 2023-01-01 | End: 2023-01-01

## 2023-01-01 RX ORDER — METHYLPREDNISOLONE SODIUM SUCCINATE 125 MG/2ML
125 INJECTION, POWDER, LYOPHILIZED, FOR SOLUTION INTRAMUSCULAR; INTRAVENOUS
Status: DISCONTINUED | OUTPATIENT
Start: 2023-01-01 | End: 2023-01-01 | Stop reason: HOSPADM

## 2023-01-01 RX ORDER — POLYETHYLENE GLYCOL 3350 17 G/17G
17 POWDER, FOR SOLUTION ORAL DAILY PRN
Status: DISCONTINUED | OUTPATIENT
Start: 2023-01-01 | End: 2023-01-01 | Stop reason: HOSPADM

## 2023-01-01 RX ORDER — DOCUSATE SODIUM 50MG AND SENNOSIDES 8.6MG 8.6; 5 MG/1; MG/1
1 TABLET, FILM COATED ORAL
COMMUNITY
Start: 2023-01-01

## 2023-01-01 RX ORDER — LOSARTAN POTASSIUM 25 MG/1
25 TABLET ORAL DAILY
Status: DISCONTINUED | OUTPATIENT
Start: 2023-01-01 | End: 2023-01-01 | Stop reason: HOSPADM

## 2023-01-01 RX ORDER — PANTOPRAZOLE SODIUM 20 MG/1
20 TABLET, DELAYED RELEASE ORAL
Status: DISCONTINUED | OUTPATIENT
Start: 2023-01-01 | End: 2023-01-01 | Stop reason: HOSPADM

## 2023-01-01 RX ORDER — OLANZAPINE 5 MG/1
5 TABLET ORAL AT BEDTIME
Qty: 30 TABLET | Refills: 0 | Status: SHIPPED | OUTPATIENT
Start: 2023-01-01 | End: 2023-01-01

## 2023-01-01 RX ORDER — DOCUSATE SODIUM 100 MG/1
100 CAPSULE, LIQUID FILLED ORAL 2 TIMES DAILY
Status: DISCONTINUED | OUTPATIENT
Start: 2023-01-01 | End: 2023-01-01 | Stop reason: HOSPADM

## 2023-01-01 RX ORDER — TRAZODONE HYDROCHLORIDE 100 MG/1
100 TABLET ORAL AT BEDTIME
Qty: 30 TABLET | Refills: 4 | Status: SHIPPED | OUTPATIENT
Start: 2023-01-01 | End: 2023-01-01

## 2023-01-01 RX ORDER — METOPROLOL SUCCINATE 50 MG/1
100 TABLET, EXTENDED RELEASE ORAL DAILY
Status: DISCONTINUED | OUTPATIENT
Start: 2023-01-01 | End: 2023-01-01 | Stop reason: HOSPADM

## 2023-01-01 RX ORDER — TRAZODONE HYDROCHLORIDE 100 MG/1
100 TABLET ORAL AT BEDTIME
Qty: 30 TABLET | Refills: 4 | Status: SHIPPED | OUTPATIENT
Start: 2023-01-01

## 2023-01-01 RX ORDER — CANAGLIFLOZIN 300 MG/1
300 TABLET, FILM COATED ORAL
COMMUNITY
Start: 2023-01-01

## 2023-01-01 RX ORDER — DULOXETIN HYDROCHLORIDE 60 MG/1
120 CAPSULE, DELAYED RELEASE ORAL DAILY
Qty: 60 CAPSULE | Refills: 3 | Status: SHIPPED | OUTPATIENT
Start: 2023-01-01

## 2023-01-01 RX ORDER — NITROGLYCERIN 0.4 MG/1
0.4 TABLET SUBLINGUAL EVERY 5 MIN PRN
Status: DISCONTINUED | OUTPATIENT
Start: 2023-01-01 | End: 2023-01-01 | Stop reason: HOSPADM

## 2023-01-01 RX ORDER — ATORVASTATIN CALCIUM 20 MG/1
1 TABLET, FILM COATED ORAL
COMMUNITY
Start: 2023-01-01

## 2023-01-01 RX ORDER — COLCHICINE 0.6 MG/1
0.6 TABLET ORAL 2 TIMES DAILY
Qty: 30 TABLET | Refills: 0 | Status: SHIPPED | OUTPATIENT
Start: 2023-01-01

## 2023-01-01 RX ORDER — ALBUTEROL SULFATE 90 UG/1
2 AEROSOL, METERED RESPIRATORY (INHALATION) EVERY 5 MIN PRN
Status: DISCONTINUED | OUTPATIENT
Start: 2023-01-01 | End: 2023-01-01 | Stop reason: HOSPADM

## 2023-01-01 RX ORDER — AMLODIPINE BESYLATE 5 MG/1
1 TABLET ORAL
COMMUNITY
Start: 2023-01-01 | End: 2023-01-01

## 2023-01-01 RX ORDER — AMINOPHYLLINE 25 MG/ML
100 INJECTION, SOLUTION INTRAVENOUS ONCE
Status: DISCONTINUED | OUTPATIENT
Start: 2023-01-01 | End: 2023-01-01 | Stop reason: HOSPADM

## 2023-01-01 RX ORDER — CLONIDINE HYDROCHLORIDE 0.1 MG/1
0.1 TABLET ORAL
Status: DISCONTINUED | OUTPATIENT
Start: 2023-01-01 | End: 2023-01-01 | Stop reason: HOSPADM

## 2023-01-01 RX ORDER — IOPAMIDOL 755 MG/ML
100 INJECTION, SOLUTION INTRAVASCULAR ONCE
Status: COMPLETED | OUTPATIENT
Start: 2023-01-01 | End: 2023-01-01

## 2023-01-01 RX ORDER — DIAZEPAM 5 MG
10 TABLET ORAL EVERY 30 MIN PRN
Status: DISCONTINUED | OUTPATIENT
Start: 2023-01-01 | End: 2023-01-01 | Stop reason: HOSPADM

## 2023-01-01 RX ORDER — CETIRIZINE HYDROCHLORIDE 10 MG/1
1 TABLET ORAL
COMMUNITY
Start: 2023-01-01

## 2023-01-01 RX ORDER — CAFFEINE CITRATE 20 MG/ML
60 SOLUTION INTRAVENOUS
Status: DISCONTINUED | OUTPATIENT
Start: 2023-01-01 | End: 2023-01-01 | Stop reason: HOSPADM

## 2023-01-01 RX ORDER — AMLODIPINE BESYLATE 5 MG/1
10 TABLET ORAL DAILY
Status: DISCONTINUED | OUTPATIENT
Start: 2023-01-01 | End: 2023-01-01 | Stop reason: HOSPADM

## 2023-01-01 RX ORDER — PANTOPRAZOLE SODIUM 40 MG/1
40 TABLET, DELAYED RELEASE ORAL
Status: DISCONTINUED | OUTPATIENT
Start: 2023-01-01 | End: 2023-01-01 | Stop reason: HOSPADM

## 2023-01-01 RX ORDER — DULOXETIN HYDROCHLORIDE 60 MG/1
120 CAPSULE, DELAYED RELEASE ORAL DAILY
Qty: 60 CAPSULE | Refills: 3 | Status: SHIPPED | OUTPATIENT
Start: 2023-01-01 | End: 2023-01-01

## 2023-01-01 RX ADMIN — IOPAMIDOL 100 ML: 755 INJECTION, SOLUTION INTRAVENOUS at 20:25

## 2023-01-01 RX ADMIN — GADOBUTROL 6 ML: 604.72 INJECTION INTRAVENOUS at 11:44

## 2023-01-01 RX ADMIN — SODIUM CHLORIDE 1000 ML: 9 INJECTION, SOLUTION INTRAVENOUS at 00:48

## 2023-01-01 RX ADMIN — THIAMINE HCL TAB 100 MG 100 MG: 100 TAB at 20:08

## 2023-01-01 RX ADMIN — SODIUM CHLORIDE 1000 ML: 9 INJECTION, SOLUTION INTRAVENOUS at 18:07

## 2023-01-01 RX ADMIN — NITROGLYCERIN 0.4 MG: 0.4 TABLET SUBLINGUAL at 20:08

## 2023-01-01 RX ADMIN — FENTANYL CITRATE 50 MCG: 50 INJECTION, SOLUTION INTRAMUSCULAR; INTRAVENOUS at 18:10

## 2023-01-01 RX ADMIN — OXYCODONE HYDROCHLORIDE 5 MG: 5 TABLET ORAL at 13:08

## 2023-01-01 RX ADMIN — DULOXETINE HYDROCHLORIDE 120 MG: 60 CAPSULE, DELAYED RELEASE ORAL at 07:37

## 2023-01-01 RX ADMIN — NITROGLYCERIN 0.4 MG: 0.4 TABLET SUBLINGUAL at 20:25

## 2023-01-01 RX ADMIN — SODIUM CHLORIDE, PRESERVATIVE FREE: 5 INJECTION INTRAVENOUS at 20:29

## 2023-01-01 RX ADMIN — INSULIN ASPART 2 UNITS: 100 INJECTION, SOLUTION INTRAVENOUS; SUBCUTANEOUS at 12:18

## 2023-01-01 RX ADMIN — SODIUM CHLORIDE, POTASSIUM CHLORIDE, SODIUM LACTATE AND CALCIUM CHLORIDE 500 ML: 600; 310; 30; 20 INJECTION, SOLUTION INTRAVENOUS at 22:32

## 2023-01-01 RX ADMIN — LIDOCAINE HYDROCHLORIDE 30 ML: 20 SOLUTION ORAL; TOPICAL at 20:09

## 2023-01-01 RX ADMIN — CLONIDINE HYDROCHLORIDE 0.1 MG: 0.1 TABLET ORAL at 12:18

## 2023-01-01 RX ADMIN — REGADENOSON 0.4 MG: 0.08 INJECTION, SOLUTION INTRAVENOUS at 12:11

## 2023-01-01 RX ADMIN — IOPAMIDOL 135 ML: 755 INJECTION, SOLUTION INTRAVENOUS at 11:47

## 2023-01-01 RX ADMIN — FOLIC ACID 1 MG: 1 TABLET ORAL at 20:08

## 2023-01-01 RX ADMIN — OLANZAPINE 7.5 MG: 7.5 TABLET ORAL at 01:32

## 2023-01-01 RX ADMIN — ATORVASTATIN CALCIUM 80 MG: 40 TABLET, FILM COATED ORAL at 07:36

## 2023-01-01 RX ADMIN — NITROGLYCERIN 0.4 MG: 0.4 TABLET SUBLINGUAL at 18:10

## 2023-01-01 RX ADMIN — HUMAN ALBUMIN MICROSPHERES AND PERFLUTREN 5 ML: 10; .22 INJECTION, SOLUTION INTRAVENOUS at 08:56

## 2023-01-01 RX ADMIN — GADOBUTROL 10 ML: 604.72 INJECTION INTRAVENOUS at 11:43

## 2023-01-01 RX ADMIN — TRAZODONE HYDROCHLORIDE 100 MG: 100 TABLET ORAL at 01:32

## 2023-01-01 RX ADMIN — PANTOPRAZOLE SODIUM 20 MG: 20 TABLET, DELAYED RELEASE ORAL at 07:38

## 2023-01-01 RX ADMIN — IOPAMIDOL 72 ML: 755 INJECTION, SOLUTION INTRAVENOUS at 02:57

## 2023-01-01 RX ADMIN — ONDANSETRON 8 MG: 2 INJECTION INTRAMUSCULAR; INTRAVENOUS at 00:48

## 2023-01-01 ASSESSMENT — ENCOUNTER SYMPTOMS
HEMATURIA: 0
COUGH: 0
DIZZINESS: 1
NERVOUS/ANXIOUS: 1
HEARTBURN: 0
DIZZINESS: 1
CONSTIPATION: 1
ABDOMINAL PAIN: 1
FREQUENCY: 1
DYSURIA: 1
MYALGIAS: 1
SHORTNESS OF BREATH: 1
WEAKNESS: 1
HEADACHES: 0
WEAKNESS: 1
PARESTHESIAS: 0
FEVER: 0
SORE THROAT: 0
SHORTNESS OF BREATH: 1
CONSTIPATION: 1
HEMATURIA: 0
DYSURIA: 1
NERVOUS/ANXIOUS: 1
PALPITATIONS: 1
EYE PAIN: 0
SORE THROAT: 0
CHILLS: 0
FEVER: 0
PALPITATIONS: 1
EYE PAIN: 0
NAUSEA: 0
CHILLS: 0
MYALGIAS: 1
HEMATOCHEZIA: 0
HEADACHES: 0
FREQUENCY: 1
ARTHRALGIAS: 1
COUGH: 0
ARTHRALGIAS: 1
PARESTHESIAS: 0
JOINT SWELLING: 0
HEARTBURN: 0
NAUSEA: 0
DIARRHEA: 0
HEMATOCHEZIA: 0
ABDOMINAL PAIN: 1
JOINT SWELLING: 0
DIARRHEA: 0

## 2023-01-01 ASSESSMENT — ACTIVITIES OF DAILY LIVING (ADL)
ADLS_ACUITY_SCORE: 37

## 2023-01-01 ASSESSMENT — ASTHMA QUESTIONNAIRES
EMERGENCY_ROOM_LAST_YEAR_TOTAL: TWO
ACT_TOTALSCORE: 21
QUESTION_4 LAST FOUR WEEKS HOW OFTEN HAVE YOU USED YOUR RESCUE INHALER OR NEBULIZER MEDICATION (SUCH AS ALBUTEROL): NOT AT ALL
QUESTION_2 LAST FOUR WEEKS HOW OFTEN HAVE YOU HAD SHORTNESS OF BREATH: THREE TO SIX TIMES A WEEK
QUESTION_3 LAST FOUR WEEKS HOW OFTEN DID YOUR ASTHMA SYMPTOMS (WHEEZING, COUGHING, SHORTNESS OF BREATH, CHEST TIGHTNESS OR PAIN) WAKE YOU UP AT NIGHT OR EARLIER THAN USUAL IN THE MORNING: NOT AT ALL
QUESTION_1 LAST FOUR WEEKS HOW MUCH OF THE TIME DID YOUR ASTHMA KEEP YOU FROM GETTING AS MUCH DONE AT WORK, SCHOOL OR AT HOME: NONE OF THE TIME
ACT_TOTALSCORE: 21
QUESTION_5 LAST FOUR WEEKS HOW WOULD YOU RATE YOUR ASTHMA CONTROL: SOMEWHAT CONTROLLED

## 2023-01-01 ASSESSMENT — PATIENT HEALTH QUESTIONNAIRE - PHQ9
SUM OF ALL RESPONSES TO PHQ QUESTIONS 1-9: 12
10. IF YOU CHECKED OFF ANY PROBLEMS, HOW DIFFICULT HAVE THESE PROBLEMS MADE IT FOR YOU TO DO YOUR WORK, TAKE CARE OF THINGS AT HOME, OR GET ALONG WITH OTHER PEOPLE: NOT DIFFICULT AT ALL
SUM OF ALL RESPONSES TO PHQ QUESTIONS 1-9: 12
SUM OF ALL RESPONSES TO PHQ QUESTIONS 1-9: 12

## 2023-01-01 ASSESSMENT — PAIN SCALES - GENERAL: PAINLEVEL: EXTREME PAIN (8)

## 2023-01-05 NOTE — TELEPHONE ENCOUNTER
Medication refill request for OLANZapine (ZYPREXA) 5 MG tablet.     Last Written Prescription Date:  N/A  Last Fill Quantity: N/A,  # refills: N/A  Last office visit provider:  11/17/2022  Next appointment scheduled:   1/12/2023 10:00 AM (Arrive by 9:45 AM) Rolando Burnham MD [facility]       Medication T'd for review and signature    JOSH Bansal on 1/5/2023 at 11:00 AM

## 2023-01-16 NOTE — TELEPHONE ENCOUNTER
"[facility] Call Center    Phone Message    May a detailed message be left on voicemail: yes     Reason for Call: Other: Patient called to schedule a follow up appointment with Dr. Corea, writer saw last appointment, scheduled as Return, was marked as No Show.   Patient last seen on 1/31/2022.   Patient had also been scheduled with Dr. Bhakta 11/15/2022 as New Patient.   Patient does also have a referral on file for \"One Time Consultation\". Writer unsure if appointment needs to be booked as a Return visit or if it needs to be booked as a One Time Consultation visit. Please review.    Action Taken: Message routed to:  [facility]    Travel Screening: Not Applicable                                                                      "

## 2023-01-19 NOTE — TELEPHONE ENCOUNTER
[facility] Call Center    Phone Message    May a detailed message be left on voicemail: yes     Reason for Call: Appointment Intake    Referring Provider Name: Severson, Hayley, MD  Diagnosis and/or Symptoms: Urinary retention with incomplete bladder emptying, incomplete voiding, weak stream, post-Yeung in hospital.    Patient being referred for Urgent Appointment (3-5 days) by referring provider. Sending encounter message for clinic review and follow-up with patient for scheduling. Thank you!     Action Taken: Message routed to:  [facility] Urology    Travel Screening: Not Applicable

## 2023-01-19 NOTE — PROGRESS NOTES
Outpatient Follow up TBI Evaluation     Pertinent History: Patient is known to me from prior clinic contacted the patient has not been seen by me in over 2 years.  Unfortunately old records are difficult to access at this time due to the new computer system.  The patient and his wife are extremely vague historians but it appears he has been followed through the [place] system by psychiatry.  Medications are listed as above and both the patient and wife report there is been multiple medication changes in the last few years but they are unaware of what these were.  We will try and clarify all of this.  Patient presents today to establish care at this clinic.  The patient's medication list is as described in the nurse's note according to the limited information we have available to us.     The patient reestablished contact with his clinic in December 2021.  He had been followed through [facility].  He had had multiple medication changes over the last few years but we have limited information when we first saw the patient.  We were attempting to clarify his current medication list and past medication trials.  At that visit the patient had significant heart movements noted around his mouth.  The family also reported he had lost 60 pounds in 6 months.  He was having worsening depression and anxiety.     The patient was seen for a follow-up visit on January 11 of 2022.  The patient has been seen by multiple providers prior to that over the course of more than a year.  When I saw him at that visit he was on Cymbalta 20 mg a day.  His mood was worse.  He was having low motivation and low energy but no thoughts of harming himself.  He was willing to restart with a psychotherapist and I did order that.  I increased his Cymbalta to 60 mg a day.     I saw the patient on February 22, 2022.  I also interviewed his wife and both reported the patient seem to be improving with regard to his mood and was less depressed  with no thoughts of wishing he was dead.  He continued with his baseline tremor.  He was sleeping well at night.  There were no new medical issues.  We continued with the treatment plan at that time.    The patient had a follow-up with me on April 5, 2022.  We did increase his Cymbalta to 60 mg a day and did remind him that he had trazodone available that he was not using.  We also ordered some individual therapy.  He was complaining of some chronic fatigue and weight loss and he was going to have that addressed through his primary care doctor.    The patient was seen for follow-up in May 2022 along with Leilani.  He was having some trouble with sleep at that time but was otherwise doing relatively well.  He and his family had recently returned from a road trip to [place] and that went well.  He was doing well and we did not make any medication changes.    The patient was seen on August 18, 2022.  At that time he was doing relatively well but was still having some depression.  He was tolerating the medication and felt that the medication was at least partially helpful.  We elected to increase the patient's Cymbalta to 90 mg a day.    The patient was seen in October 2022.  At that time he was having some trouble with sleep and he felt this was likely related to pain.  We elected to increase his Cymbalta to 120 mg a day and he was going to continue with his medical treatments and cares.    I saw the patient in November 2022.  He was doing relatively well at that time and tolerating the medication.  The family felt he was doing well.  We elected to continue with the current treatment plan and did not make any changes.       HPI:  Patient presents today for the purposes of medication management.   The patient presented today 30 minutes late and we did do a phone interview.  He stated that Leilani told him to let us know that he was too sleepy during the day.  Otherwise the patient had no specific concerns or complaints.   He stated his mood was good.  No depression or anxiety.  No thoughts of suicide.  No hallucinations or delusions and no robinson.  He told me he felt he was sleeping fairly well at night but was tired during the day.  He denied having any new medical issues or concerns.  No other side effects to the medication.  He denied having any change in cognition and denied having any other significant side effects to the medication.     We discussed some treatment options and have elected to eliminate his low dose of morning Seroquel.       Current Medications: Please see chart. Medications personally reviewed.     Patient Active Problem List    Diagnosis Date Noted     Chronic pain due to trauma 09/03/2016     Priority: Medium     Overview:   Broken back/neck 1996/2007 respectively. Did have procedure for spine stimulator       Esophageal reflux 09/03/2016     Priority: Medium     Essential hypertension 09/03/2016     Priority: Medium     History of traumatic brain injury 09/03/2016     Priority: Medium     Large bowel obstruction (H) 09/03/2016     Priority: Medium     S/P laminectomy 09/03/2016     Priority: Medium     Overview:   replacement of failed spinal cord stimulator & placement of epidural paddle electrode - 9/2/2016       Tobacco dependence 09/03/2016     Priority: Medium     Cognitive disorder 06/16/2016     Priority: Medium     Type 2 diabetes mellitus with diabetic neuropathy (H) 05/02/2016     Priority: Medium     Seizures (H) 02/01/2016     Priority: Medium     Respiratory failure (H) 10/19/2015     Priority: Medium     Insomnia 03/13/2015     Priority: Medium     [facility] 03/11/2015     Priority: Medium     For urgent after hours needs, please call [phone] and ask to speak with the Adult [facility] Physician on call - mention job code [phone].           Dysphonia 01/05/2015     Priority: Medium     Postprocedural subglottic  stenosis 07/22/2014     Priority: Medium     Pre-operative cardiovascular examination 07/16/2014     Priority: Medium     Automatic implantable cardioverter-defibrillator in situ- [employer], dual chamber- DEPENDENT 04/30/2014     Priority: Medium     Implanted 4/29/14 by Dr. Lacy  Problem list name updated by automated process. Provider to review       S/P ventricular septal myectomy 04/14/2014     Priority: Medium     Syncope 12/28/2013     Priority: Medium     Atrial fibrillation (H) 05/15/2013     Priority: Medium     Asthma 01/24/2013     Priority: Medium     Depressive disorder 01/24/2013     Priority: Medium     Old myocardial infarction 01/24/2013     Priority: Medium     Osteoarthrosis 01/24/2013     Priority: Medium     Spinal stenosis, lumbar region, with neurogenic claudication 11/15/2012     Priority: Medium     Lumbar radicular pain 11/15/2012     Priority: Medium     s/p PSF C6-7 for anterior pseudarthrosis 10/11/2012     Priority: Medium     Hypertrophic nonobstructive cardiomyopathy (H) 09/12/2012     Priority: Medium     Chest pain 09/12/2012     Priority: Medium     Sinus tachycardia 09/12/2012     Priority: Medium     Pseudoarthrosis of cervical spine (H) 08/24/2012     Priority: Medium     Chondromalacia of patella 10/25/2011     Priority: Medium     Anticoagulant long-term use 02/18/2011     Priority: Medium     Cervical vertebral fusion 12/08/2010     Priority: Medium     Herniated cervical intervertebral disc 06/26/2008     Priority: Medium     Past Medical History:   Diagnosis Date     Atrial fibrillation (H)      Chest pain     intermittent     Chronic pain      Cognitive disorder     memory loss     Depressive disorder      Dual ICD (implantable cardiac defibrillator) in place 04/29/2014    and pacemaker     GERD (gastroesophageal reflux disease)      H/O traumatic brain injury 2015     Heart attack (H) 1996     HTN (hypertension)      Hypertrophic nonobstructive cardiomyopathy (H)  "09/12/2012    s/p ventricular myectomy     Inflammatory arthritis      Intermittent asthma      PAD (peripheral artery disease) (H)      Polysubstance abuse (H)      Seizures (H)     last episode 2014 when \"blood sugar dropped\" according to patient     Sleep apnea     doesn't use cpap     Type 2 diabetes mellitus without complications (H)      Past Surgical History:   Procedure Laterality Date     APPENDECTOMY  1980    [facility]     BACK SURGERY       COLONOSCOPY  2017     DISCECTOMY LUMBAR POSTERIOR MICROSCOPIC THREE+ LEVELS  12/16/2011    Procedure:DISCECTOMY LUMBAR POSTERIOR MICROSCOPIC THREE+ LEVELS; Posterior Decompression L2-S1; Surgeon:CAITIE OSMAN; Location:[facility]     EP ICD GENERATOR REPLACEMENT DUAL N/A 7/18/2022    Procedure: Implantable Cardioverter Defibrillator Generator Replacement Dual;  Surgeon: Ritesh Lacy MD;  [facility]: [facility]     FUSION CERVICAL POSTERIOR ONE LEVEL  8/24/2012    Procedure: FUSION CERVICAL POSTERIOR ONE LEVEL;  Posterior Instrumentated Spinal Fusion Cervical 6-7, Right Iliac Crest Bone [place] ;  Surgeon: Caitie Osman MD;  [facility]: [facility]     GRAFT BONE FROM ILIAC CREST  8/24/2012    Procedure: GRAFT BONE FROM ILIAC CREST;;  Surgeon: Caitie Osman MD;  [facility]: [facility]     HEAD & NECK SURGERY  2009     IMPLANT PACEMAKER       IMPLANT PACEMAKER       INJECT STEROID (LOCATION) N/A 2/19/2015    Procedure: INJECT STEROID (LOCATION);  Surgeon: Siri Koch MD;  [facility]     INSERT STIMULATOR AND LEADS INTERNAL DORSAL COLUMN  8/7/2013    Procedure: INSERT STIMULATOR AND LEADS INTERNAL DORSAL COLUMN;  SPINAL CORD STIMULATOR IMPLANT ;  Surgeon: Ricardo Bruno MD;  Location: [facility] OR     INSERT STIMULATOR DORSAL COLUMN  08/13/2013    lead replacemend, 12?2016,  battery replacement 4/4/2016     IR GASTROSTOMY TUBE PERCUTANEOUS PLCMNT  10/29/2015     KNEE SURGERY       LASER CO2 LARYNGOSCOPY N/A 2/19/2015    Procedure: " "LASER CO2 LARYNGOSCOPY;  Surgeon: Siri Koch MD;  [facility]     LASER CO2 LARYNGOSCOPY, COMPLEX  7/22/2014    Procedure: LASER CO2 LARYNGOSCOPY, COMPLEX;  Surgeon: Siri Koch MD;  [facility]     MYECTOMY SEPTAL  4/14/2014    Procedure: Median Sternotomy, Septal Myectomy, Intraoperative BRENT performed by Dr. Castano, on pump oxygenator.;  Surgeon: Aguila Cannon MD;  [facility]: [facility]     NECK SURGERY       MD SPINE SURGERY PROCEDURE UNLISTED       SHOULDER SURGERY  2006    RIGHT     STOMACH SURGERY  1980    partial gastrectomy for bleeding ulcers, \"stress related\". [facility]     WRIST SURGERY Left 1988    fractured. 1988 or so     Z CARDIAC SURG PROCEDURE UNLIST       Z HAND/FINGER SURGERY UNLISTED       [facility] SHOULDER SURG PROC UNLISTED       [facility] STOMACH SURGERY PROCEDURE UNLISTED       Family History   Problem Relation Age of Onset     Heart Disease Father 32        MIs x2; s/p CABG     Substance Abuse Father      Hypertension Father      Obesity Father      Hyperlipidemia Father      Hypertension Brother      Diabetes Brother      Glaucoma Maternal Grandmother      Hypertension Maternal Grandmother      Diabetes Maternal Grandfather      Glaucoma Maternal Grandfather      Hypertension Maternal Grandfather      Cerebrovascular Disease Maternal Grandfather      Obesity Maternal Grandfather      Hyperlipidemia Maternal Grandfather      Coronary Artery Disease Maternal Grandfather      Heart Disease Maternal Grandfather      Substance Abuse Other      Cancer Other      Hypertension Other      Obesity Other      Other Cancer Other         all over     Hyperlipidemia Other      Coronary Artery Disease Other      Obesity Brother      Hyperlipidemia Brother      Lymphoma Maternal Uncle      Diabetes Brother      Depression Daughter      Depression Son      Macular Degeneration No family hx of      Heart Failure Father      Current Outpatient Medications   Medication " Sig Dispense Refill     Atorvastatin Calcium (LIPITOR PO) Take 80 mg by mouth daily        cloNIDine (CATAPRES) 0.1 MG tablet Take 0.1 mg by mouth 3 times daily       docusate sodium (COLACE) 100 MG capsule Take 100 mg by mouth 2 times daily       DULoxetine (CYMBALTA) 60 MG capsule Take 2 capsules (120 mg) by mouth daily 60 capsule 3     furosemide (LASIX) 40 MG tablet TAKE 1/2 A TABLET DAILY       losartan (COZAAR) 25 MG tablet Take 1 tablet (25 mg) by mouth daily 30 tablet 1     metFORMIN (GLUCOPHAGE) 500 MG tablet Take 500 mg by mouth 2 times daily (with meals)  60 tablet      OLANZapine (ZYPREXA) 5 MG tablet Take 1 tablet (5 mg) by mouth At Bedtime 30 tablet 0     rivaroxaban ANTICOAGULANT (XARELTO) 20 MG TABS tablet Take 1 tablet (20 mg) by mouth daily 90 tablet 3     thiamine (B-1) 100 MG tablet Take 100 mg by mouth 2 times daily       traZODone (DESYREL) 100 MG tablet TAKE 1 TABLET BY MOUTH AT BEDTIME 30 tablet 3       Allergies   Allergen Reactions     Betadine Prepstick Plus Hives, Swelling and Rash     From procedure on 2022     Bee Venom Swelling     Lisinopril      TABS  Severe cough     Penicillins Hives and Difficulty breathing     Social History     Socioeconomic History     Marital status: Single     Spouse name: Not on file     Number of children: Not on file     Years of education: Not on file     Highest education level: Not on file   Occupational History     Not on file   Tobacco Use     Smoking status: Every Day     Packs/day: 0.12     Years: 35.00     Pack years: 4.20     Types: Cigarettes     Start date: 3/8/1982     Last attempt to quit: 2014     Years since quittin.7     Smokeless tobacco: Never   Substance and Sexual Activity     Alcohol use: Yes     Alcohol/week: 0.0 standard drinks     Comment: rare     Drug use: No     Comment: last using meth 2017     Sexual activity: Not Currently     Partners: Female     Birth control/protection: Male Surgical   Other Topics Concern      Parent/sibling w/ CABG, MI or angioplasty before 65F 55M? Yes   Social History Narrative     Not on file     Social Determinants of Health     Financial Resource Strain: Not on file   Food Insecurity: Not on file   Transportation Needs: Not on file   Physical Activity: Not on file   Stress: Not on file   Social Connections: Not on file   Intimate Partner Violence: Not on file   Housing Stability: Not on file       The following portions of the patient's history were reviewed and updated as appropriate: allergies, current medications, past family history, past medical history, past social history, past surgical history and problem list.    Review of Systems  A comprehensive review of systems was negative except for what is noted above    Mental Status Exam  Appearance:  Alert, comfortable and calm.  Behavior:  He denies having any recent behavioral difficulty.  He was comfortable polite and calm with me.  Speech:  Better inflection again today.  Not thick or slurred.  Good initiation.  Not pressured or rambling.   Mood: Fairly bright.  Not obviously depressed.  No anxiety or irritability.  No robinson.  Thought content: No hallucinations or delusions   Thought formation: No recent change.  No obvious difficulties.  Continues vague but able to participate appropriately.  Associations: Fair.  Tracking fairly well today.  No significant change from the last visit.  No obvious deficits.  Fund of knowledge: Unchanged.  Attention/Concentration: Participating and tracking well today with good initiation.  Pleasant and polite.  Insight: Fair.  No obvious significant change.  Judgment: Impaired, chronically with no recent reports of any change.  No obvious change on exam.  Memory: Impaired  Motor status:  The patient reports no new tremors or asymmetries.  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    Other:   Patient will return to clinic in  to 2 to 3 months.  He agrees to call or return sooner with any questions or concerns.  Risks and benefits were discussed.  Continue with his individual therapist.     Continue with the support of the clinic, reassurance, and redirection. Staff monitoring and ongoing assessments per team plan. Current psychotropic medication appears to represent the minimum effective dosage and appears medically necessary. We will continue to monitor and reassess. This team will utilize appropriate emergency services if necessary. I will make myself available if concerns or problems arise.    Total time spent with the patient today was 25 minutes with greater than 50% of the time spent in counseling and care coordination. The patient agrees to call before then with any questions, concerns or problems. We will assess for the appropriateness of possible psychotropic medication trials/changes. The patient will seek out appropriate emergency services should that become necessary.    Video Visit Details    Type of service: Video Visit    Video Start Time: 830 a.m.    Video End Time: 8:40 AM    Total time for video call: 11 minutes    Originating Location: Patient's home    Distant Location:  [facility]    Mode of Communication: Video call via [employer]    Total time for chart review, interview with family and patient, coordination of care and documentation is 25 minutes.    Patient Instructions   It was nice speaking with you today for our office video visit. The following is a summary of our visit.    General Information:    If lab work was done today as part of your evaluation you will generally be contacted via My Chart, mail, or phone with the results within 1-5 days. If there is an alarming result we will contact you by phone. Lab results come back at varying times, I  generally wait until all labs are resulted before making comments on results. Please note labs are automatically released to My Chart once available.     If you need refills please contact your pharmacist. They will send a refill request to me to review. Please allow 3 business days for us to process all refill requests.     Please call or send a medical message through My Chart, with any questions or concerns.    If you need any paperwork completed please fax forms to [phone]. Please state if you would like a copy of the completed paperwork, mailed or faxed back to the patient and a fax number to fax the paperwork to. Please allow up to 10 business days for paperwork to be completed.    Rolando Burnham MD

## 2023-01-19 NOTE — PATIENT INSTRUCTIONS
Patient is doing fairly well but having some daytime sleepiness so I did discontinue his daytime low-dose Seroquel.  He should return to this clinic in 2 to 3 months for medication check and he agrees to call before then with any questions or concerns.

## 2023-01-19 NOTE — LETTER
1/19/2023         RE: Konstantin Sharp  [address] [place] [address]        Dear Colleague,    Thank you for referring your patient, Konstantin Sharp, to the [facility]. Please see a copy of my visit note below.        Outpatient Follow up TBI Evaluation     Pertinent History: Patient is known to me from prior clinic contacted the patient has not been seen by me in over 2 years.  Unfortunately old records are difficult to access at this time due to the new computer system.  The patient and his wife are extremely vague historians but it appears he has been followed through the [facility] by psychiatry.  Medications are listed as above and both the patient and wife report there is been multiple medication changes in the last few years but they are unaware of what these were.  We will try and clarify all of this.  Patient presents today to establish care at this clinic.  The patient's medication list is as described in the nurse's note according to the limited information we have available to us.     The patient reestablished contact with his clinic in December 2021.  He had been followed through [facility].  He had had multiple medication changes over the last few years but we have limited information when we first saw the patient.  We were attempting to clarify his current medication list and past medication trials.  At that visit the patient had significant heart movements noted around his mouth.  The family also reported he had lost 60 pounds in 6 months.  He was having worsening depression and anxiety.     The patient was seen for a follow-up visit on January 11 of 2022.  The patient has been seen by multiple providers prior to that over the course of more than a year.  When I saw him at that visit he was on Cymbalta 20 mg a day.  His mood was worse.  He was having low motivation and low energy but no thoughts of harming himself.  He was willing to  restart with a psychotherapist and I did order that.  I increased his Cymbalta to 60 mg a day.     I saw the patient on February 22, 2022.  I also interviewed his wife and both reported the patient seem to be improving with regard to his mood and was less depressed with no thoughts of wishing he was dead.  He continued with his baseline tremor.  He was sleeping well at night.  There were no new medical issues.  We continued with the treatment plan at that time.    The patient had a follow-up with me on April 5, 2022.  We did increase his Cymbalta to 60 mg a day and did remind him that he had trazodone available that he was not using.  We also ordered some individual therapy.  He was complaining of some chronic fatigue and weight loss and he was going to have that addressed through his primary care doctor.    The patient was seen for follow-up in May 2022 along with Leilani.  He was having some trouble with sleep at that time but was otherwise doing relatively well.  He and his family had recently returned from a road trip to [place] and that went well.  He was doing well and we did not make any medication changes.    The patient was seen on August 18, 2022.  At that time he was doing relatively well but was still having some depression.  He was tolerating the medication and felt that the medication was at least partially helpful.  We elected to increase the patient's Cymbalta to 90 mg a day.    The patient was seen in October 2022.  At that time he was having some trouble with sleep and he felt this was likely related to pain.  We elected to increase his Cymbalta to 120 mg a day and he was going to continue with his medical treatments and cares.    I saw the patient in November 2022.  He was doing relatively well at that time and tolerating the medication.  The family felt he was doing well.  We elected to continue with the current treatment plan and did not make any changes.       HPI:  Patient presents today for  the purposes of medication management.   The patient presented today 30 minutes late and we did do a phone interview.  He stated that Leilani told him to let us know that he was too sleepy during the day.  Otherwise the patient had no specific concerns or complaints.  He stated his mood was good.  No depression or anxiety.  No thoughts of suicide.  No hallucinations or delusions and no robinson.  He told me he felt he was sleeping fairly well at night but was tired during the day.  He denied having any new medical issues or concerns.  No other side effects to the medication.  He denied having any change in cognition and denied having any other significant side effects to the medication.     We discussed some treatment options and have elected to eliminate his low dose of morning Seroquel.       Current Medications: Please see chart. Medications personally reviewed.     Patient Active Problem List    Diagnosis Date Noted     Chronic pain due to trauma 09/03/2016     Priority: Medium     Overview:   Broken back/neck 1996/2007 respectively. Did have procedure for spine stimulator       Esophageal reflux 09/03/2016     Priority: Medium     Essential hypertension 09/03/2016     Priority: Medium     History of traumatic brain injury 09/03/2016     Priority: Medium     Large bowel obstruction (H) 09/03/2016     Priority: Medium     S/P laminectomy 09/03/2016     Priority: Medium     Overview:   replacement of failed spinal cord stimulator & placement of epidural paddle electrode - 9/2/2016       Tobacco dependence 09/03/2016     Priority: Medium     Cognitive disorder 06/16/2016     Priority: Medium     Type 2 diabetes mellitus with diabetic neuropathy (H) 05/02/2016     Priority: Medium     Seizures (H) 02/01/2016     Priority: Medium     Respiratory failure (H) 10/19/2015     Priority: Medium     Insomnia 03/13/2015     Priority: Medium     [facility]  03/11/2015     Priority: Medium     For urgent after hours needs, please call [phone] and ask to speak with the [facility] Physician on call - mention job code [phone].           Dysphonia 01/05/2015     Priority: Medium     Postprocedural subglottic stenosis 07/22/2014     Priority: Medium     Pre-operative cardiovascular examination 07/16/2014     Priority: Medium     Automatic implantable cardioverter-defibrillator in situ- [employer], dual chamber- DEPENDENT 04/30/2014     Priority: Medium     Implanted 4/29/14 by Dr. Lacy  Problem list name updated by automated process. Provider to review       S/P ventricular septal myectomy 04/14/2014     Priority: Medium     Syncope 12/28/2013     Priority: Medium     Atrial fibrillation (H) 05/15/2013     Priority: Medium     Asthma 01/24/2013     Priority: Medium     Depressive disorder 01/24/2013     Priority: Medium     Old myocardial infarction 01/24/2013     Priority: Medium     Osteoarthrosis 01/24/2013     Priority: Medium     Spinal stenosis, lumbar region, with neurogenic claudication 11/15/2012     Priority: Medium     Lumbar radicular pain 11/15/2012     Priority: Medium     s/p PSF C6-7 for anterior pseudarthrosis 10/11/2012     Priority: Medium     Hypertrophic nonobstructive cardiomyopathy (H) 09/12/2012     Priority: Medium     Chest pain 09/12/2012     Priority: Medium     Sinus tachycardia 09/12/2012     Priority: Medium     Pseudoarthrosis of cervical spine (H) 08/24/2012     Priority: Medium     Chondromalacia of patella 10/25/2011     Priority: Medium     Anticoagulant long-term use 02/18/2011     Priority: Medium     Cervical vertebral fusion 12/08/2010     Priority: Medium     Herniated cervical intervertebral disc 06/26/2008     Priority: Medium     Past Medical History:   Diagnosis Date     Atrial fibrillation (H)      Chest pain     intermittent     Chronic pain      Cognitive disorder     memory loss     Depressive disorder   "    Dual ICD (implantable cardiac defibrillator) in place 04/29/2014    and pacemaker     GERD (gastroesophageal reflux disease)      H/O traumatic brain injury 2015     Heart attack (H) 1996     HTN (hypertension)      Hypertrophic nonobstructive cardiomyopathy (H) 09/12/2012    s/p ventricular myectomy     Inflammatory arthritis      Intermittent asthma      PAD (peripheral artery disease) (H)      Polysubstance abuse (H)      Seizures (H)     last episode 2014 when \"blood sugar dropped\" according to patient     Sleep apnea     doesn't use cpap     Type 2 diabetes mellitus without complications (H)      Past Surgical History:   Procedure Laterality Date     APPENDECTOMY  1980    [facility]     BACK SURGERY       COLONOSCOPY  2017     DISCECTOMY LUMBAR POSTERIOR MICROSCOPIC THREE+ LEVELS  12/16/2011    Procedure:DISCECTOMY LUMBAR POSTERIOR MICROSCOPIC THREE+ LEVELS; Posterior Decompression L2-S1; Surgeon:CAITIE FUNEZ; Location:[facility] OR     EP ICD GENERATOR REPLACEMENT DUAL N/A 7/18/2022    Procedure: Implantable Cardioverter Defibrillator Generator Replacement Dual;  Surgeon: Ritesh Lacy MD;  Location: [facility] OR     FUSION CERVICAL POSTERIOR ONE LEVEL  8/24/2012    Procedure: FUSION CERVICAL POSTERIOR ONE LEVEL;  Posterior Instrumentated Spinal Fusion Cervical 6-7, Right Iliac Crest Bone [place] ;  Surgeon: Caitie Funez MD;  [facility]: [facility] OR     GRAFT BONE FROM ILIAC CREST  8/24/2012    Procedure: GRAFT BONE FROM ILIAC CREST;;  Surgeon: Caitie Funez MD;  [facility]: [facility] OR     HEAD & NECK SURGERY  2009     IMPLANT PACEMAKER       IMPLANT PACEMAKER       INJECT STEROID (LOCATION) N/A 2/19/2015    Procedure: INJECT STEROID (LOCATION);  Surgeon: Siri Koch MD;  Location: [facility] OR     INSERT STIMULATOR AND LEADS INTERNAL DORSAL COLUMN  8/7/2013    Procedure: INSERT STIMULATOR AND LEADS INTERNAL DORSAL COLUMN;  SPINAL CORD STIMULATOR IMPLANT ;  Surgeon: Damion" "Ricardo MILLS MD;  Location:  OR     INSERT STIMULATOR DORSAL COLUMN  08/13/2013    lead replacemend, 12?2016,  battery replacement 4/4/2016     IR GASTROSTOMY TUBE PERCUTANEOUS PLCMNT  10/29/2015     KNEE SURGERY       LASER CO2 LARYNGOSCOPY N/A 2/19/2015    Procedure: LASER CO2 LARYNGOSCOPY;  Surgeon: Siri Koch MD;  [facility]: [facility] OR     LASER CO2 LARYNGOSCOPY, COMPLEX  7/22/2014    Procedure: LASER CO2 LARYNGOSCOPY, COMPLEX;  Surgeon: Siri Koch MD;  [facility]: [facility] OR     MYECTOMY SEPTAL  4/14/2014    Procedure: Median Sternotomy, Septal Myectomy, Intraoperative BRENT performed by Dr. Castano, on pump oxygenator.;  Surgeon: Aguila Cannon MD;  Location: [facility] OR     NECK SURGERY       [place] SPINE SURGERY PROCEDURE UNLISTED       SHOULDER SURGERY  2006    RIGHT     STOMACH SURGERY  1980    partial gastrectomy for bleeding ulcers, \"stress related\". [facility]     WRIST SURGERY Left 1988    fractured. 1988 or so     [place] CARDIAC SURG PROCEDURE UNLIST       [facility] HAND/FINGER SURGERY UNLISTED       [facility] SHOULDER SURG PROC UNLISTED       [facility] STOMACH SURGERY PROCEDURE UNLISTED       Family History   Problem Relation Age of Onset     Heart Disease Father 32        MIs x2; s/p CABG     Substance Abuse Father      Hypertension Father      Obesity Father      Hyperlipidemia Father      Hypertension Brother      Diabetes Brother      Glaucoma Maternal Grandmother      Hypertension Maternal Grandmother      Diabetes Maternal Grandfather      Glaucoma Maternal Grandfather      Hypertension Maternal Grandfather      Cerebrovascular Disease Maternal Grandfather      Obesity Maternal Grandfather      Hyperlipidemia Maternal Grandfather      Coronary Artery Disease Maternal Grandfather      Heart Disease Maternal Grandfather      Substance Abuse Other      Cancer Other      Hypertension Other      Obesity Other      Other Cancer Other         all over     Hyperlipidemia Other      Coronary Artery Disease " Other      Obesity Brother      Hyperlipidemia Brother      Lymphoma Maternal Uncle      Diabetes Brother      Depression Daughter      Depression Son      Macular Degeneration No family hx of      Heart Failure Father      Current Outpatient Medications   Medication Sig Dispense Refill     Atorvastatin Calcium (LIPITOR PO) Take 80 mg by mouth daily        cloNIDine (CATAPRES) 0.1 MG tablet Take 0.1 mg by mouth 3 times daily       docusate sodium (COLACE) 100 MG capsule Take 100 mg by mouth 2 times daily       DULoxetine (CYMBALTA) 60 MG capsule Take 2 capsules (120 mg) by mouth daily 60 capsule 3     furosemide (LASIX) 40 MG tablet TAKE 1/2 A TABLET DAILY       losartan (COZAAR) 25 MG tablet Take 1 tablet (25 mg) by mouth daily 30 tablet 1     metFORMIN (GLUCOPHAGE) 500 MG tablet Take 500 mg by mouth 2 times daily (with meals)  60 tablet      OLANZapine (ZYPREXA) 5 MG tablet Take 1 tablet (5 mg) by mouth At Bedtime 30 tablet 0     rivaroxaban ANTICOAGULANT (XARELTO) 20 MG TABS tablet Take 1 tablet (20 mg) by mouth daily 90 tablet 3     thiamine (B-1) 100 MG tablet Take 100 mg by mouth 2 times daily       traZODone (DESYREL) 100 MG tablet TAKE 1 TABLET BY MOUTH AT BEDTIME 30 tablet 3       Allergies   Allergen Reactions     Betadine Prepstick Plus Hives, Swelling and Rash     From procedure on 2022     Bee Venom Swelling     Lisinopril      TABS  Severe cough     Penicillins Hives and Difficulty breathing     Social History     Socioeconomic History     Marital status: Single     Spouse name: Not on file     Number of children: Not on file     Years of education: Not on file     Highest education level: Not on file   Occupational History     Not on file   Tobacco Use     Smoking status: Every Day     Packs/day: 0.12     Years: 35.00     Pack years: 4.20     Types: Cigarettes     Start date: 3/8/1982     Last attempt to quit: 2014     Years since quittin.7     Smokeless tobacco: Never   Substance and  Sexual Activity     Alcohol use: Yes     Alcohol/week: 0.0 standard drinks     Comment: rare     Drug use: No     Comment: last using meth 1/2017     Sexual activity: Not Currently     Partners: Female     Birth control/protection: Male Surgical   Other Topics Concern     Parent/sibling w/ CABG, MI or angioplasty before 65F 55M? Yes   Social History Narrative     Not on file     Social Determinants of Health     Financial Resource Strain: Not on file   Food Insecurity: Not on file   Transportation Needs: Not on file   Physical Activity: Not on file   Stress: Not on file   Social Connections: Not on file   Intimate Partner Violence: Not on file   Housing Stability: Not on file       The following portions of the patient's history were reviewed and updated as appropriate: allergies, current medications, past family history, past medical history, past social history, past surgical history and problem list.    Review of Systems  A comprehensive review of systems was negative except for what is noted above    Mental Status Exam  Appearance:  Alert, comfortable and calm.  Behavior:  He denies having any recent behavioral difficulty.  He was comfortable polite and calm with me.  Speech:  Better inflection again today.  Not thick or slurred.  Good initiation.  Not pressured or rambling.   Mood: Fairly bright.  Not obviously depressed.  No anxiety or irritability.  No robinson.  Thought content: No hallucinations or delusions   Thought formation: No recent change.  No obvious difficulties.  Continues vague but able to participate appropriately.  Associations: Fair.  Tracking fairly well today.  No significant change from the last visit.  No obvious deficits.  Fund of knowledge: Unchanged.  Attention/Concentration: Participating and tracking well today with good initiation.  Pleasant and polite.  Insight: Fair.  No obvious significant change.  Judgment: Impaired, chronically with no recent reports of any change.  No obvious  change on exam.  Memory: Impaired  Motor status:  The patient reports no new tremors or asymmetries.  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    Other:   Patient will return to clinic in  to 2 to 3 months.  He agrees to call or return sooner with any questions or concerns.  Risks and benefits were discussed.  Continue with his individual therapist.     Continue with the support of the clinic, reassurance, and redirection. Staff monitoring and ongoing assessments per team plan. Current psychotropic medication appears to represent the minimum effective dosage and appears medically necessary. We will continue to monitor and reassess. This team will utilize appropriate emergency services if necessary. I will make myself available if concerns or problems arise.    Total time spent with the patient today was 25 minutes with greater than 50% of the time spent in counseling and care coordination. The patient agrees to call before then with any questions, concerns or problems. We will assess for the appropriateness of possible psychotropic medication trials/changes. The patient will seek out appropriate emergency services should that become necessary.    Video Visit Details    Type of service: Video Visit    Video Start Time: 830 a.m.    Video End Time: 8:40 AM    Total time for video call: 11 minutes    Originating Location: Patient's home    Distant Location:  [facility]    Mode of Communication: Video call via [employer]    Total time for chart review, interview with family and patient, coordination of care and documentation is 25 minutes.    Patient Instructions   It was nice speaking with you today for our office video visit. The following is a summary of our visit.    General  Information:    If lab work was done today as part of your evaluation you will generally be contacted via My Chart, mail, or phone with the results within 1-5 days. If there is an alarming result we will contact you by phone. Lab results come back at varying times, I generally wait until all labs are resulted before making comments on results. Please note labs are automatically released to My Chart once available.     If you need refills please contact your pharmacist. They will send a refill request to me to review. Please allow 3 business days for us to process all refill requests.     Please call or send a medical message through My Chart, with any questions or concerns.    If you need any paperwork completed please fax forms to [phone]. Please state if you would like a copy of the completed paperwork, mailed or faxed back to the patient and a fax number to fax the paperwork to. Please allow up to 10 business days for paperwork to be completed.    Rolando Burnham MD        Again, thank you for allowing me to participate in the care of your patient.        Sincerely,        Rolando Burnham MD
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
## 2023-01-23 NOTE — DISCHARGE INSTRUCTIONS
You can use tylenol as needed for your pain. As discussed, it's possible that you could have a blood clot in your right arm. You are declining ultrasound today. Please follow up with your outpatient provider as soon as possible. Return to the ER with any new or worsening symptoms or any other concerns.
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
## 2023-01-24 NOTE — TELEPHONE ENCOUNTER
REFERRAL INFORMATION:    Referring Provider:  Severson, Hayley, MD    Referring Clinic:  [facility]    Reason for Visit/Diagnosis:   Gastroesophageal reflux disease, unspecified whether esophagitis present   Early satiety      FUTURE VISIT INFORMATION:    Appointment Date: 3/1/2023     NOTES STATUS DETAILS   OFFICE NOTE from Referring Provider Care Everywhere [facility]:   1/18/2023 Office visit with Severson, H   OFFICE NOTE from Other Specialist Care Everywhere [facility]:  2/16/22 - PCC OV with Veronica Brown NP     [place]:  6/16/20 - URO OV with Dr. Aguilar     [facility]:  7/30/18 - GI OV with ORLY Muhammad   HOSPITAL DISCHARGE SUMMARY/  ED VISITS Care Everywhere 1/23/2023, 12/2/2022 [facility] ED with KELLY Newman [facility]:  9/3/16 - Admission with Dr. Abel    [facility]:  2/16/22 - ED OV with Dr. Clay   OPERATIVE REPORT N/A    MEDICATION LIST Internal         ENDOSCOPY  N/A    COLONOSCOPY Care Everywhere [place]:  12/21/20 - Colonoscopy     [facility]:  5/15/18 - Colonoscopy   ERCP N/A    EUS N/A    STOOL TESTING N/A    PERTINENT LABS N/A    PATHOLOGY REPORTS (RELATED) Care Everywhere [place]:  12/21/20 - Colon Biopsy (Case: [id number])    IMAGING (CT, MRI, EGD, MRCP, Small Bowel Follow Through/SBT, MR/CT Enterography) Received CT:   1/23/2023 Chest Abdomen- [facility]   2/16/202 Abdomen Pelvis - [facility]   6/26/2021 Abdomen Pelvis-[facility]:  7/2/21 - XR Abdomen  6/25/21 - XR Abdomen  6/19/21 - XR Abdomen  6/15/21 - XR Abdomen  1/27/21 - XR Abdomen     [facility]:  2/27/18 - XR Abdomen

## 2023-02-04 PROBLEM — R79.89 ELEVATED LACTIC ACID LEVEL: Status: ACTIVE | Noted: 2023-01-01

## 2023-02-05 NOTE — ED TRIAGE NOTES
51 y/o male c/o CP. Per EMS pre-arrival report ST elevation in II, III, Avf. NTG, ASA given PTA.
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
## 2023-02-05 NOTE — ED NOTES
Bed: ED01  Expected date: 2/4/23  Expected time: 7:56 PM  Means of arrival: Ambulance  Comments:  [facility] 14

## 2023-02-05 NOTE — H&P
[facility]    History and Physical - Medicine Service, [facility] TEAM        Date of Admission:  2/4/2023    Assessment & Plan    Konstantin Sharp is a 52 year old male with a past medical history significant for HCM s/p septal myomectomy 2014, s/p ICD 4/29/14, T2DM, asthma, HTN, remote history of paroxysmal AF, CLARY, chronic pain from multiple cervical spine surgeries, depression, and anxiety. Admitted for Chest Pain      Probable Vasospastic Angina  Hx Chronic Atypical Chest Pain  Paroxysmal Atrial Fibrillation  Hypertrophic Cardiomyopathy s/p septal myomectomy 2014, s/p ICD 4/29/14  Presents with dull stabbing chest pain localized to left upper chest radiating to left arm associated with dyspnea and diaphoresis. States he has been having this chest pain intermittently for a year now and describes pain as similar to chest pain he had back in 2013 prior to myomectomy. Endorses anxiety as precipitating event prior to onset of chest pain and resolves within few minutes however this recent occurrence lasted for about 2 hours and resolved with nitroglycerin.Endorses meth use however last use about 3 weeks ago. 42 pack years of tobacco use.Received nitroglycerin and aspirin en route to ED given concerns for ST Elevation however per ED provider discussion with cardiologist, EKG not concerning for STEMI. Troponin wnl. CTA Chest on presentation with no evidence of PE or dissection. Had a CTA (08/2022) with a total calcium score of 190, mild non-obstructive CAD with no stenosis.  Cardiac device interrogation (02/2023) with no arrhythmias recorded. TTE (03/2022) with an EF of 60-65%,wall thickening consistent with hypertrophic cardiomyopathy and LVH. Concern for possible vasospastic angina given angina preceded by anxiety and resolving with nitroglycerin, recreational drug use (Meth) and tobacco use and could possibly benefit from a calcium blocker but will consult cardiology for  further evaluation.   -Cardiology Consult, appreciate recs  -TTE  -PTA Rivaroxaban  -Telemetry    GERD  Endorses intermittent symptoms of heart burn and regurgitation.  -Started on Pantoprazole QD    HTN  -Holding PTA Clonidine, Lasix, Losartan given acute drop in BP s/p Nitroglycerin    T2DM  BS of 412 on presentation.Improved to 230. States compliance with pta metformin. Home blood sugar readings in mid to high 100s. Hgb A1C 8.1 (2018)  -Holding PTA Metformin  -MDSSI  -Hgb A1C    MDD  -PTA Duloxetine    Insomnia  -PTA Trazodone    Lactic Acidosis, Resolved  Lactate of 3.0 on presentation. Repeat LA 1.7         Diet:  Carbs Consistent   DVT Prophylaxis: DOAC  Yeung Catheter: Not present  Fluids: N/A  Lines: None     Cardiac Monitoring: None  Code Status: Full Code      Clinically Significant Risk Factors Present on Admission               # Drug Induced Coagulation Defect: home medication list includes an anticoagulant medication    # Hypertension: home medication list includes antihypertensive(s)              Disposition Plan      Expected Discharge Date: 02/05/2023                The patient's care was discussed with the Attending Physician, Dr. Huang.      Jaguar Thomas MD  Medicine Service, [facility]  Securely message with [employer] (more info)  Text page via [facility] Paging/Directory   See signed in provider for up to date coverage information  ______________________________________________________________________    Chief Complaint   Chest Pain    History is obtained from the patient    History of Present Illness    Konstantin Sharp is a 52 year old male with a past medical history significant for HCM s/p septal myomectomy 2014, s/p ICD 4/29/14, T2DM, asthma, HTN, remote history of paroxysmal AF, CLARY, chronic pain from multiple cervical spine surgeries, depression, and anxiety.     Mr. Sharp presents with concerns for a dull stabbing chest pain localized to  left upper chest radiating to left arm associated with dyspnea and diaphoresis. States that he has been having this chest pain intermittently for about a year now and most times, anxiety brings on the chest pain. Pain resolves over a few minutes but the chest pain prior to presentation (02/04) lasted for about 2 hours. Chest pain is similar to same pain he was having back in 2013 before he had a cardiac work-up however chest pain in 2013 was not preceded by anxiety.    Endorses Meth use and states last use was 3 weeks ago. Smokes a pack of cigarette every day for the last 42 years. Drinks alcohol intermittently with last intake of 1 drink few hours prior to onset of chest pain.     On EMS arrival, there were concerns for ST elevation on EKG and a STEMI was called in the field and pager activated. He received nitroglycerin and aspirin en route to the hospital which resolved the chest pain and per patient's report caused some lightheadedness. On arrival, EKG with no concerns for STEMI per ED provider discussion with Cardiologist    He denies any shortness of breath, cough, abdominal pain or chest pain currently. Endorses heart burn and regurgitation.     On chart review, patient had atypical chest pain back in 2013 and had a cardiac work-up which included  a CTA in 2013 and 2015 and 2019 and had a negative coronary calcium score and no obstructive disease per cards note. Repeat CTA in 08/2022 with a total calcium score of 190, mild non-obstructive CAD with no stenosis. Recent Cardiac device interrogation (02/2023) with no arrhythmias recorded. TTE (03/2022) with an EF of 60-65%,wall thickening consistent with hypertrophic cardiomyopathy and LVH.      ED COURSE  -VS: /32, RR 19, , AF, SPO2 100% RA  -Labs: CBC unremarkable, AG 18-->21, Trop 18, LA 3.0.  -Imaging: CTA with no PE or evidence of dissection. EKG with no concerns for STEMI per ED provider discussion with Cardiologist  -Intervention: 500cc LR,  "    Past Medical History    Past Medical History:   Diagnosis Date     Atrial fibrillation (H)      Chest pain     intermittent     Chronic pain      Cognitive disorder     memory loss     Depressive disorder      Dual ICD (implantable cardiac defibrillator) in place 04/29/2014    and pacemaker     GERD (gastroesophageal reflux disease)      H/O traumatic brain injury 2015     Heart attack (H) 1996     HTN (hypertension)      Hypertrophic nonobstructive cardiomyopathy (H) 09/12/2012    s/p ventricular myectomy     Inflammatory arthritis      Intermittent asthma      PAD (peripheral artery disease) (H)      Polysubstance abuse (H)      Seizures (H)     last episode 2014 when \"blood sugar dropped\" according to patient     Sleep apnea     doesn't use cpap     Type 2 diabetes mellitus without complications (H)        Past Surgical History   Past Surgical History:   Procedure Laterality Date     APPENDECTOMY  1980    [facility]     BACK SURGERY       COLONOSCOPY  2017     DISCECTOMY LUMBAR POSTERIOR MICROSCOPIC THREE+ LEVELS  12/16/2011    Procedure:DISCECTOMY LUMBAR POSTERIOR MICROSCOPIC THREE+ LEVELS; Posterior Decompression L2-S1; Surgeon:CAITIE FUNEZ; Location:[facility] OR     EP ICD GENERATOR REPLACEMENT DUAL N/A 7/18/2022    Procedure: Implantable Cardioverter Defibrillator Generator Replacement Dual;  Surgeon: Ritesh Lacy MD;  Location: [facility] OR     FUSION CERVICAL POSTERIOR ONE LEVEL  8/24/2012    Procedure: FUSION CERVICAL POSTERIOR ONE LEVEL;  Posterior Instrumentated Spinal Fusion Cervical 6-7, Right Iliac Crest Bone Datil ;  Surgeon: Caitie Funez MD;  Location: [facility] OR     GRAFT BONE FROM ILIAC CREST  8/24/2012    Procedure: GRAFT BONE FROM ILIAC CREST;;  Surgeon: Caitie Funez MD;  Location: [facility] OR     HEAD & NECK SURGERY  2009     IMPLANT PACEMAKER       IMPLANT PACEMAKER       INJECT STEROID (LOCATION) N/A 2/19/2015    Procedure: INJECT STEROID (LOCATION);  " "Surgeon: Siri Koch MD;  Location: [facility] OR     INSERT STIMULATOR AND LEADS INTERNAL DORSAL COLUMN  8/7/2013    Procedure: INSERT STIMULATOR AND LEADS INTERNAL DORSAL COLUMN;  SPINAL CORD STIMULATOR IMPLANT ;  Surgeon: Ricardo Bruno MD;  Location: [facility] OR     INSERT STIMULATOR DORSAL COLUMN  08/13/2013    lead replacemend, 12?2016,  battery replacement 4/4/2016     IR GASTROSTOMY TUBE PERCUTANEOUS PLCMNT  10/29/2015     KNEE SURGERY       LASER CO2 LARYNGOSCOPY N/A 2/19/2015    Procedure: LASER CO2 LARYNGOSCOPY;  Surgeon: Siri Koch MD;  Location: [facility] OR     LASER CO2 LARYNGOSCOPY, COMPLEX  7/22/2014    Procedure: LASER CO2 LARYNGOSCOPY, COMPLEX;  Surgeon: Siri Koch MD;  Location: [facility]     MYECTOMY SEPTAL  4/14/2014    Procedure: Median Sternotomy, Septal Myectomy, Intraoperative BRENT performed by Dr. Castano, on pump oxygenator.;  Surgeon: Aguila Cannon MD;  Location: [facility] OR     NECK SURGERY       ME SPINE SURGERY PROCEDURE UNLISTED       SHOULDER SURGERY  2006    RIGHT     STOMACH SURGERY  1980    partial gastrectomy for bleeding ulcers, \"stress related\". [facility]     WRIST SURGERY Left 1988    fractured. 1988 or so     [facility] CARDIAC SURG PROCEDURE UNLIST       [facility] HAND/FINGER SURGERY UNLISTED       [facility] SHOULDER SURG PROC UNLISTED       [facility] STOMACH SURGERY PROCEDURE UNLISTED         Prior to Admission Medications   Prior to Admission Medications   Prescriptions Last Dose Informant Patient Reported? Taking?   Atorvastatin Calcium (LIPITOR PO)   Yes No   Sig: Take 80 mg by mouth daily    DULoxetine (CYMBALTA) 60 MG capsule   No No   Sig: Take 2 capsules (120 mg) by mouth daily   OLANZapine (ZYPREXA) 5 MG tablet   No No   Sig: Take 1 tablet (5 mg) by mouth At Bedtime   cloNIDine (CATAPRES) 0.1 MG tablet   Yes No   Sig: Take 0.1 mg by mouth 3 times daily   docusate sodium (COLACE) 100 MG capsule   Yes No   Sig: Take 100 mg by mouth 2 times daily   furosemide " (LASIX) 40 MG tablet   Yes No   Sig: TAKE 1/2 A TABLET DAILY   losartan (COZAAR) 25 MG tablet   No No   Sig: Take 1 tablet (25 mg) by mouth daily   metFORMIN (GLUCOPHAGE) 500 MG tablet   Yes No   Sig: Take 500 mg by mouth 2 times daily (with meals)    rivaroxaban ANTICOAGULANT (XARELTO) 20 MG TABS tablet   No No   Sig: Take 1 tablet (20 mg) by mouth daily   traZODone (DESYREL) 100 MG tablet   No No   Sig: TAKE 1 TABLET BY MOUTH AT BEDTIME      Facility-Administered Medications: None        Review of Systems    The 10 point Review of Systems is negative other than noted in the HPI or here.      Physical Exam   Vital Signs: Temp: 98.1  F (36.7  C) Temp src: Oral BP: 117/77 Pulse: 96   Resp: 17 SpO2: 94 %      Weight: 0 lbs 0 oz    General Appearance: NAD, Conversant, Laying in bed  Respiratory: Minimal Wh  Cardiovascular: RRR. No m/r/g  GI: Soft,   Skin: Dry, no rush  Neuro: A/O X4    Medical Decision Making       Please see A&P for additional details of medical decision making.      Data     I have personally reviewed the following data over the past 24 hrs:    10.7  \   15.3   / 312     135 97 (L) 13.7 /  230 (H)   3.5 20 (L) 0.9 \       ALT: 7 (L) AST: 15 AP: 115 TBILI: 0.2   ALB: 3.6 TOT PROTEIN: 6.0 (L) LIPASE: 31       Trop: 21 BNP: N/A       Procal: N/A CRP: N/A Lactic Acid: 1.7

## 2023-02-05 NOTE — CONSULTS
Cardiology New Consult Note    Date of Service: 02/05/23    ASSESSMENT:   Konstantin Sharp is a 52 year old male who presents with chest pain. He has a pmhx of HCM s/p septal myomectomy (2014), s/p ICD (4/29/2014), T2DM, asthma, HTN, pAF on xarelto, CLARY, chronic pain s/p cervical spine surgeries, depression, anxiety. Cardiology is consulted for recommendations of chest pain.     Patient's chest pain does not seem to be related to exertion, nor has there been recent reports of exertional chest pain. This pain had resolved by time of admission, and it is unclear if it was influenced by aspirin or nitroglycerin use. Trop negative, EKG without findings satisfying Sgarbossa criteria. He has not exhibited signs or symptoms of arrhythmia or HF. He has had a relatively recent coronary angiogram elsewhere that has not demonstrated CAD. He does not have features of ACS at this time and would subsequently not treat for this. No other causes of chest pain (dissection, PE, PNA) have been identified at this time. The patient is currently asymptomatic. It is possible that an episode of anxiety causing tachycardia could worsen the patient's LVOT gradient (tachycardic on admission). This could potentially cause chest pain given the reduction in cardiac output. HR has subsequently improved. Can attempt use with low dose verapamil to assess if this improves symptoms. He would benefit from close Cardiology follow up soon with Dr. Castellano.     Of note, the patient's use history is concerning and I have discussed his cardiac risk given these uses. I have offered him resources to quit these habits, though he is not currently contemplating quitting. This should be reinforced and followed as outpatient due to the risk of cardiac damage in the future. Methamphetamines could also increase HR, which would not be ideal given this patient's history of HCM.     RECOMMENDATIONS:  - Ok to discharge from a cardiac standpoint.   - F/u TTE  - No need  to trend troponin  - Start verapamil 80 mg daily  - Follow up with Dr. Castellano in 2-4 weeks  - Continue atorvastatin 80 mg at bedtime   - Continue PTA lasix 20 mg daily  - Continue PTA losartan 25 mg daily  - Continue PTA xarelto given AF  - Continue PTA metoprolol succinate 100 mg daily  - Continue clonidine, though would prefer alternative agents if solely used for BP control. Can discuss as outpatient.     Discussed with attending, Dr. Vijay Gama    Thank you for consulting the cardiovascular services at the [facility]. Please do not hesitate to call us with any questions.     Willie Mendez, PGY-4  Cardiovascular Disease Fellow    ----------------------------------------------------------------------------  REASON FOR CONSULT: Chest pain    History of Present Illness   Konstantin Sharp is a 52 year old male who presents with chest pain. He has a pmhx of HCM s/p septal myomectomy (2014), s/p ICD (4/29/2014), T2DM, asthma, HTN, pAF, CLARY, chronic pain s/p cervical spine surgeries, depression, anxiety. Cardiology is consulted for recommendations of chest pain.     The patient presented to the ED via EMS on 2/4 with chest pain. It was described as a dull stabbing chest pain in the left upper chest radiating to this left arm. Associated symptoms are SOB and diaphoresis by report. This presentation has occurred in the past, and this kind of chest pain has been intermittent over the last year. He has use history including meth (last use 3 weeks ago) and tobacco use which he has been using for 42 years. EMS reported ST elevations in II, III, and aVF. However, by report this was discussed with Cardiology who did not believe this to be a STEMI. He was given nitroglycerin SL en route which improved his chest pain. In the ED, HDS, initially tachycardic which improved. Lab with normal renal function, LA 3.0, Trop 18-->21-->21. EKG with AV paced rhythm. CT chest without evidence of dissection or PE. Device  "interrogation with normal functional dual chamber ICD without VT/VF events.     On encounter, the patient is in NAD and is chest pain free. Endorses that he was home and not exerting himself when the chest pain occurred. Endorsed anxiety prior to chest pain. Did not endorse SOB, radiation, or diaphoresis to me on encounter. No palpitations, lightheadedness, dizziness, ICD shocks, leg swelling, orthopnea. No very active due to neuropathy but denies recent changes in exertional capacity. Recent meth use and active tobacco use, no ready to quit at this time.     In terms of his cardiac history, he has a history of HCM s/p myomectomy in 2014 with a dual chamber ICD placed at that time. He follows with Guerita Castellano and Bambi as outpatient. His most recent TTE in 3/2022 demonstrated LVEF of 60-65%, moderate LVH, no RWMAs. CCTA in 8/2022 demonstrated mild non obstructive CAD without high grade stenoses, CAC of 190. OSH coronary angiogram in 4/2021 without evidence of CAD.     PAST MEDICAL HISTORY:  Past Medical History:   Diagnosis Date     Atrial fibrillation (H)      Chest pain     intermittent     Chronic pain      Cognitive disorder     memory loss     Depressive disorder      Dual ICD (implantable cardiac defibrillator) in place 04/29/2014    and pacemaker     GERD (gastroesophageal reflux disease)      H/O traumatic brain injury 2015     Heart attack (H) 1996     HTN (hypertension)      Hypertrophic nonobstructive cardiomyopathy (H) 09/12/2012    s/p ventricular myectomy     Inflammatory arthritis      Intermittent asthma      PAD (peripheral artery disease) (H)      Polysubstance abuse (H)      Seizures (H)     last episode 2014 when \"blood sugar dropped\" according to patient     Sleep apnea     doesn't use cpap     Type 2 diabetes mellitus without complications (H)        CURRENT MEDICATIONS:  Current Outpatient Medications   Medication Sig Dispense Refill     albuterol (PROAIR HFA/PROVENTIL HFA/VENTOLIN HFA) 108 " (90 Base) MCG/ACT inhaler Inhale 2 puffs into the lungs every 4 hours as needed       amLODIPine (NORVASC) 5 MG tablet Take 1 tablet by mouth daily at 2 pm       atorvastatin (LIPITOR) 20 MG tablet Take 1 tablet by mouth daily at 2 pm       Atorvastatin Calcium (LIPITOR PO) Take 80 mg by mouth daily        cetirizine (ZYRTEC) 10 MG tablet Take 1 tablet by mouth daily at 2 pm       cloNIDine (CATAPRES) 0.1 MG tablet Take 0.1 mg by mouth 3 times daily       diclofenac (VOLTAREN) 1 % topical gel Apply 2 g topically 2 times daily       docusate sodium (COLACE) 100 MG capsule Take 100 mg by mouth 2 times daily       DULoxetine (CYMBALTA) 60 MG capsule Take 2 capsules (120 mg) by mouth daily 60 capsule 3     furosemide (LASIX) 40 MG tablet TAKE 1/2 A TABLET DAILY       INVOKANA 300 MG tablet Take 300 mg by mouth daily before breakfast       KLOR-CON 10 MEQ CR tablet Take 1 tablet by mouth daily at 2 pm       losartan (COZAAR) 25 MG tablet Take 1 tablet (25 mg) by mouth daily 30 tablet 1     metFORMIN (GLUCOPHAGE) 1000 MG tablet Take 1 tablet by mouth 2 times daily       metFORMIN (GLUCOPHAGE) 500 MG tablet Take 500 mg by mouth 2 times daily (with meals)  60 tablet      metoprolol succinate ER (TOPROL XL) 100 MG 24 hr tablet Take 1 tablet by mouth daily at 2 pm       OLANZapine (ZYPREXA) 5 MG tablet Take 1 tablet (5 mg) by mouth At Bedtime 30 tablet 0     pantoprazole (PROTONIX) 40 MG EC tablet Take 40 mg by mouth daily before breakfast       rivaroxaban ANTICOAGULANT (XARELTO) 20 MG TABS tablet Take 1 tablet (20 mg) by mouth daily 90 tablet 3     SENEXON-S 8.6-50 MG tablet Take 1 tablet by mouth 2 times daily       tamsulosin (FLOMAX) 0.4 MG capsule Take 0.4 mg by mouth daily       traZODone (DESYREL) 100 MG tablet TAKE 1 TABLET BY MOUTH AT BEDTIME 30 tablet 3       PAST SURGICAL HISTORY:  Past Surgical History:   Procedure Laterality Date     APPENDECTOMY  1980    [facility]     BACK SURGERY        COLONOSCOPY  2017     DISCECTOMY LUMBAR POSTERIOR MICROSCOPIC THREE+ LEVELS  12/16/2011    Procedure:DISCECTOMY LUMBAR POSTERIOR MICROSCOPIC THREE+ LEVELS; Posterior Decompression L2-S1; Surgeon:CAITIE OSMAN; Location:[facility] OR     EP ICD GENERATOR REPLACEMENT DUAL N/A 7/18/2022    Procedure: Implantable Cardioverter Defibrillator Generator Replacement Dual;  Surgeon: Ritesh Lacy MD;  Location: [facility] OR     FUSION CERVICAL POSTERIOR ONE LEVEL  8/24/2012    Procedure: FUSION CERVICAL POSTERIOR ONE LEVEL;  Posterior Instrumentated Spinal Fusion Cervical 6-7, Right Iliac Crest Bone [place] ;  Surgeon: Caitie Osman MD;  Location: [facility] OR     GRAFT BONE FROM ILIAC CREST  8/24/2012    Procedure: GRAFT BONE FROM ILIAC CREST;;  Surgeon: Caitie Osman MD;  Location: [facility] OR     HEAD & NECK SURGERY  2009     IMPLANT PACEMAKER       IMPLANT PACEMAKER       INJECT STEROID (LOCATION) N/A 2/19/2015    Procedure: INJECT STEROID (LOCATION);  Surgeon: Siri Koch MD;  [facility]: [facility]     INSERT STIMULATOR AND LEADS INTERNAL DORSAL COLUMN  8/7/2013    Procedure: INSERT STIMULATOR AND LEADS INTERNAL DORSAL COLUMN;  SPINAL CORD STIMULATOR IMPLANT ;  Surgeon: Ricardo Bruno MD;  Location: [facility] OR     INSERT STIMULATOR DORSAL COLUMN  08/13/2013    lead replacemend, 12?2016,  battery replacement 4/4/2016     IR GASTROSTOMY TUBE PERCUTANEOUS PLCMNT  10/29/2015     KNEE SURGERY       LASER CO2 LARYNGOSCOPY N/A 2/19/2015    Procedure: LASER CO2 LARYNGOSCOPY;  Surgeon: Siri Koch MD;  [facility]     LASER CO2 LARYNGOSCOPY, COMPLEX  7/22/2014    Procedure: LASER CO2 LARYNGOSCOPY, COMPLEX;  Surgeon: Siri Koch MD;  [facility]     MYECTOMY SEPTAL  4/14/2014    Procedure: Median Sternotomy, Septal Myectomy, Intraoperative BRENT performed by Dr. Castano, on pump oxygenator.;  Surgeon: Aguila Cannon MD;  Location: [facility]     NECK SURGERY       [place] SPINE SURGERY  "PROCEDURE UNLISTED       SHOULDER SURGERY  2006    RIGHT     STOMACH SURGERY  1980    partial gastrectomy for bleeding ulcers, \"stress related\". [facility]     WRIST SURGERY Left 1988    fractured. 1988 or so     [facility] CARDIAC SURG PROCEDURE UNLIST       [facility] HAND/FINGER SURGERY UNLISTED       [facility] SHOULDER SURG PROC UNLISTED       [facility] STOMACH SURGERY PROCEDURE UNLISTED         ALLERGIES  Allergies   Allergen Reactions     Betadine Prepstick Plus Hives, Swelling and Rash     From procedure on 7/18/2022     Bee Venom Swelling     Lisinopril      TABS  Severe cough     Penicillins Hives and Difficulty breathing       FAMILY HISTORY:  Family History   Problem Relation Age of Onset     Heart Disease Father 32        MIs x2; s/p CABG     Substance Abuse Father      Hypertension Father      Obesity Father      Hyperlipidemia Father      Hypertension Brother      Diabetes Brother      Glaucoma Maternal Grandmother      Hypertension Maternal Grandmother      Diabetes Maternal Grandfather      Glaucoma Maternal Grandfather      Hypertension Maternal Grandfather      Cerebrovascular Disease Maternal Grandfather      Obesity Maternal Grandfather      Hyperlipidemia Maternal Grandfather      Coronary Artery Disease Maternal Grandfather      Heart Disease Maternal Grandfather      Substance Abuse Other      Cancer Other      Hypertension Other      Obesity Other      Other Cancer Other         all over     Hyperlipidemia Other      Coronary Artery Disease Other      Obesity Brother      Hyperlipidemia Brother      Lymphoma Maternal Uncle      Diabetes Brother      Depression Daughter      Depression Son      Macular Degeneration No family hx of      Heart Failure Father        SOCIAL HISTORY:  Social History     Socioeconomic History     Marital status: Single   Tobacco Use     Smoking status: Every Day     Packs/day: 0.12     Years: 35.00     Pack years: 4.20     Types: Cigarettes     Start date: 3/8/1982     Last attempt to " quit: 2014     Years since quittin.8     Smokeless tobacco: Never   Substance and Sexual Activity     Alcohol use: Yes     Alcohol/week: 0.0 standard drinks     Comment: rare     Drug use: No     Comment: last using meth 2017     Sexual activity: Not Currently     Partners: Female     Birth control/protection: Male Surgical   Other Topics Concern     Parent/sibling w/ CABG, MI or angioplasty before 65F 55M? Yes     Review of Systems:   10-point ROS reviewed, & found negative w/ exceptions noted in the HPI.    Physical Exam   Temp: 98.1  F (36.7  C) Temp src: Oral BP: 131/76 Pulse: 79   Resp: 13 SpO2: 95 %      Vital Signs with Ranges  Temp:  [98.1  F (36.7  C)] 98.1  F (36.7  C)  Pulse:  [] 79  Resp:  [13-24] 13  BP: (111-133)/() 131/76  SpO2:  [93 %-100 %] 95 %  0 lbs 0 oz    GEN: NAD, pleasant  HEENT: no icterus  CV: RRR, normal s1/s2, no murmurs/rubs/s3/s4, no heave. No JVD.   CHEST: CTAB. No reproducible chest pain on palpitation  ABD: soft, NT/ND, NABS  EXT: No BLE swelling  NEURO: AA&Ox3, fluent/appropriate, motor grossly nonfocal  PSYCH: cooperative, affect appropriate    Data   Recent Labs   Lab 23  0612 23  0439 23  0115 23   WBC  --  9.4  --   --   --  10.7   HGB  --  15.7  --   --  15.3 15.7   MCV  --  90  --   --   --  92   PLT  --  245  --   --   --  312     --   --   --  135 135*   POTASSIUM 3.8  --   --   --  3.5 3.6   CHLORIDE 103  --   --   --   --  97*   CO2 25  --   --   --   --  20*   BUN 13.4  --   --   --   --  13.7   CR 0.63*  --   --  0.9  --  0.81   ANIONGAP 10  --   --   --   --  18*   TANIA 8.5*  --   --   --   --  8.9   *  --  230*  --  380* 412*   ALBUMIN 3.6  --   --   --   --  3.6   PROTTOTAL 5.9*  --   --   --   --  6.0*   BILITOTAL 0.4  --   --   --   --  0.2   ALKPHOS 104  --   --   --   --  115   ALT 10  --   --   --   --  7*   AST 12  --   --   --   --  15   LIPASE  --   --   --   --   --   31       Recent Results (from the past 24 hour(s))   Cardiac Device Check - Remote   Result Value    Date Time Interrogation Session [id number]    Implantable Pulse Generator  Medtronic    Implantable Pulse Generator Model [id number] [place] XT DR MRI    Implantable Pulse Generator Serial Number [id number]    Type Interrogation Session Remote    Clinic Name [facility]    Implantable Pulse Generator Type Defibrillator    Implantable Pulse Generator Implant Date 20220718    Implantable Lead  Medtronic    Implantable Lead Model 5076 CapSureFix Novus MRI SureScan    Implantable Lead Serial Number [id number]    Implantable Lead Implant Date 20220718    Implantable Lead Polarity Type Bipolar Lead    Implantable Lead Location Detail 1 APPENDAGE    Implantable Lead Special Function 52 cm    Implantable Lead Location Right Atrium    Implantable Lead  Medtronic    Implantable Lead Model 6935M Sprint Quattro Secure S    Implantable Lead Serial Number [id number]    Implantable Lead Implant Date 20220718    Implantable Lead Polarity Type Tripolar Lead    Implantable Lead Location Detail 1 SEPTUM    Implantable Lead Special Function 62 cm    Implantable Lead Location Right Ventricle    Abdoulaye Setting Mode (NBG Code) DDDR    Abdoulaye Setting Lower Rate Limit 60    Abdoulaye Setting Maximum Tracking Rate 140    Abdoulaye Setting Maximum Sensor Rate 120    Abdoulaye Setting NANCY Delay Low 150    Badoulaye Setting PAV Delay Low 180    Abdoulaye Setting AT Mode Switch Rate 171    Lead Channel Setting Sensing Polarity Bipolar    Lead Channel Setting Sensing Anode Location Right Atrium    Lead Channel Setting Sensing Anode Terminal Ring    Lead Channel Setting Sensing Cathode Location Right Atrium    Lead Channel Setting Sensing Cathode Terminal Tip    Lead Channel Setting Sensing Sensitivity 0.3    Lead Channel Setting Sensing Polarity Bipolar    Lead Channel Setting Sensing Anode Location Right  Ventricle    Lead Channel Setting Sensing Anode Terminal Ring    Lead Channel Setting Sensing Cathode Location Right Ventricle    Lead Channel Setting Sensing Cathode Terminal Tip    Lead Channel Setting Sensing Sensitivity 0.3    Lead Channel Setting Pacing Polarity Bipolar    Lead Channel Setting Pacing Anode Location Right Atrium    Lead Channel Setting Pacing Anode Terminal Ring    Lead Channel Setting Sensing Cathode Location Right Atrium    Lead Channel Setting Sensing Cathode Terminal Tip    Lead Channel Setting Pacing Pulse Width 0.4    Lead Channel Setting Pacing Amplitude 1.5    Lead Channel Setting Pacing Capture Mode Adaptive    Lead Channel Setting Pacing Polarity Bipolar    Lead Channel Setting Pacing Anode Location Right Ventricle    Lead Channel Setting Pacing Anode Terminal Ring    Lead Channel Setting Sensing Cathode Location Right Ventricle    Lead Channel Setting Sensing Cathode Terminal Tip    Lead Channel Setting Pacing Pulse Width 0.4    Lead Channel Setting Pacing Amplitude 2.0    Lead Channel Setting Pacing Capture Mode Adaptive    Zone Setting Type Category VF    Zone Setting Detection Interval 270    Zone Setting Detection Beats Numerator 30    Zone Setting Detection Beats Denominator 40    Zone Setting Type Category VT    Zone Setting Type Category VT    Zone Setting Detection Interval 360    Zone Setting Type Category VT    Zone Setting Detection Interval 400    Zone Setting Type Category ATRIAL_FIBRILLATION    Zone Setting Type Category AT/AF    Zone Setting Detection Interval 350    Lead Channel Impedance Value 570    Lead Channel Sensing Intrinsic Amplitude 6.5    Lead Channel Pacing Threshold Amplitude 0.75    Lead Channel Pacing Threshold Pulse Width 0.4    Lead Channel Impedance Value 437    Lead Channel Impedance Value 551    Lead Channel Sensing Intrinsic Amplitude 21.6    Lead Channel Pacing Threshold Amplitude 0.75    Lead Channel Pacing Threshold Pulse Width 0.4    Battery  Date Time of Measurements [id number]    Battery RRT Trigger 2.8 V    Battery Remaining Longevity 124    Battery Voltage 3.02    Capacitor Charge Type Shock    Capacitor Last Charge Date Time [id number]    Capacitor Charge Time 4.0    Capacitor Charge Energy 18.0    Abdoulaye Statistic Date Time Start 85050832091214    Abdoulaye Statistic Date Time End 20230204201354    Abdoulaye Statistic RA Percent Paced 2.75    Abdoulaye Statistic RV Percent Paced 99.84    Abdoulaye Statistic AP  Percent 2.75    Abdoulaye Statistic AS  Percent 97.10    Abdoulaye Statistic AP VS Percent 0    Abdoulaye Statistic AS VS Percent 0.15    CRT Statistic Date Time Start 20220802100859    CRT Statistic Date Time End 20230204201354    Atrial Tachy Statistic Date Time Start 20220802100859    Atrial Tachy Statistic Date Time End 20230204201354    Atrial Tachy Statistic AT/AF Fabius Percent 0    Therapy Statistic Recent Shocks Aborted 0    Therapy Statistic Recent ATP Delivered 0    Therapy Statistic Recent Date Time Start 26347574550268    Therapy Statistic Recent Date Time End 20230204201354    Therapy Statistic Total Shocks Aborted 0    Therapy Statistic Total ATP Delivered 0    Therapy Statistic Total  Date Time Start 35210752786922    Therapy Statistic Total  Date Time End 44813576526952    Episode Statistic Recent Count 0    Episode Statistic Type Category AT/AF    Episode Statistic Recent Count 0    Episode Statistic Type Category Monitor    Episode Statistic Recent Count 0    Episode Statistic Type Category Patient Activated    Episode Statistic Recent Count 0    Episode Statistic Type Category SVT    Episode Statistic Recent Count 0    Episode Statistic Type Category VT    Episode Statistic Recent Count 0    Episode Statistic Type Category VF    Episode Statistic Recent Count 0    Episode Statistic Type Category VT    Episode Statistic Recent Count 0    Episode Statistic Type Category VT    Episode Statistic Recent Count 0    Episode Statistic Type  Category VT    Episode Statistic Recent Date Time Start 40152209328126    Episode Statistic Recent Date Time End 24230491544137    Episode Statistic Recent Date Time Start 58330675862897    Episode Statistic Recent Date Time End 98821777565313    Episode Statistic Recent Date Time Start 71085365502914    Episode Statistic Recent Date Time End 51262420593885    Episode Statistic Recent Date Time Start 32501094247266    Episode Statistic Recent Date Time End 96791910385645    Episode Statistic Recent Date Time Start 28651545496552    Episode Statistic Recent Date Time End 10995113823094    Episode Statistic Recent Date Time Start 22871501678874    Episode Statistic Recent Date Time End 81964100485703    Episode Statistic Recent Date Time Start 17069517915294    Episode Statistic Recent Date Time End 26830198545152    Episode Statistic Recent Date Time Start 84744824614615    Episode Statistic Recent Date Time End 94060843849104    Episode Statistic Recent Date Time Start 49867869133683    Episode Statistic Recent Date Time End 16985467164481    Episode Statistic Total Count 0    Episode Statistic Type Category AT/AF    Episode Statistic Total Count 0    Episode Statistic Type Category Monitor    Episode Statistic Total Count 0    Episode Statistic Type Category Patient Activated    Episode Statistic Total Count 0    Episode Statistic Type Category SVT    Episode Statistic Total Count 0    Episode Statistic Type Category VT    Episode Statistic Total Count 0    Episode Statistic Type Category VF    Episode Statistic Total Count 0    Episode Statistic Type Category VT    Episode Statistic Total Count 0    Episode Statistic Type Category VT    Episode Statistic Total Count 0    Episode Statistic Type Category VT    Episode Statistic Total Date Time Start 16837414905882    Episode Statistic Total Date Time End 08683652311466    Episode Statistic Total Date Time Start 64604893692771    Episode Statistic Total Date Time End  20230204201354    Episode Statistic Total Date Time Start 20220718131417    Episode Statistic Total Date Time End 20230204201354    Episode Statistic Total Date Time Start 20220718131417    Episode Statistic Total Date Time End 20230204201354    Episode Statistic Total Date Time Start 20220718131417    Episode Statistic Total Date Time End 20230204201354    Episode Statistic Total Date Time Start 20220718131417    Episode Statistic Total Date Time End 20230204201354    Episode Statistic Total Date Time Start 20220718131417    Episode Statistic Total Date Time End 20230204201354    Episode Statistic Total Date Time Start 20220718131417    Episode Statistic Total Date Time End 20230204201354    Episode Statistic Total Date Time Start 20220718131417    Episode Statistic Total Date Time End 20230204201354    Narrative    [employer] Express Remote ICD transmission received and reviewed. Device   transmission sent per MD orders from the [facility] ER.     Device: Medtronic [id number] [place] XT DR MRI  Normal Device Function.  Mode: DDDR /120 bpm  AP: 3.8%  : 99.7%  Presenting EGM: AS/ @ 109 bpm  Thoracic Impedance: Near baseline  Short V-V intervals: 0  Lead Trends Appear Stable.  Estimated battery longevity to RRT = 10.4 years. Battery voltage = 3.02 V.     No Arrhythmias Recorded  ER RN Notified of Transmission Results:     NIXON Zamora RN  Remote ICD Transmission    I have reviewed and interpreted the device interrogation, settings,   programming and nurse's summary. The device is functioning within normal   device parameters. I agree with the current findings, assessment and plan   CTA Chest with Contrast    Narrative    Exam: Computed tomographic angiography of the chest without and with  contrast including 3D reformations dated 2/4/2023 8:48 PM    Clinical information: double-rule out : ? PE, dissection (Chest pain,  RUE numbness); prior myectomy    Technique: Helical scans through the chest obtained before  the  administration of intravenous contrast media and following the  injection of contrast media in the arterial phase. Source images  reviewed as well as 3D and multi-planar reconstructions.    Contrast: 100 mL Isovue-370    DLP: 264 mGy*cm    Comparison: CT 1/23/2023    Findings:    Normal caliber thoracic aorta. There is normal branching pattern of  the great vessels. Origins of the brachiocephalic artery, left common  carotid artery and left subclavian artery show no focal abnormality.     Normal caliber pulmonary artery. No evidence of pulmonary embolism.     The celiac axis and SMA are patent.     Chest:     Thyroid is unremarkable. No abnormal hilar, mediastinal or axillary  lymphadenopathy is seen. Heart size is normal. No pericardial  effusion. No significant atherosclerotic calcification of the coronary  arteries. No thoracic lymphadenopathy. Esophagus is unremarkable.    The central tracheobronchial tree is patent. No focal consolidative  opacities. Stable pulmonary nodules. For example in the right apex  with average diameter of 4 mm (series 13 image 56) and 3 mm solid  nodule in the left base (series 13 image 220). No pleural effusion or  pneumothorax.    Bones/soft tissues: No acute or aggressive osseous lesions.        Impression    Impression:      1. No evidence of pulmonary embolism.  2. The thoracic aorta is normal in caliber. No evidence of dissection.  3. Stable sub-6 mm pulmonary nodules which do not require follow-up  per Fleischner criteria guidelines.    I have personally reviewed the examination and initial interpretation  and I agree with the findings.    SANNA AMATO MD         SYSTEM ID:  [id number]     EKG 2/4/23:  Tachycardia, AV paced rhythm    TTE 2/5/23:  Pending     TTE 3/4/22:  Interpretation Summary  Known HCM s/p septal myectomy  Global and regional left ventricular function is normal with an EF of 60-65%.  Concentric wall thickening consistent with left ventricular hypertrophy  is  present. Dynamic outflow tract obstruction is not present.  The right ventricle is normal in function and size.  No significant valvular abnormalities  No pericardial effusion  IVC diameter <2.1 cm collapsing >50% with sniff suggests a normal RA pressure  of 3 mmHg.  This study was compared with the study from 7/26/19 there has been no  significant change .    Lexiscan 7/2019:  Impression:   1. There is mild hypokinesia involving the distal mid septum which   corresponds with the small nondominant typical reduced perfusion. No   changes to indicate ischemia.   2. Questionable small possibly marginal/oblique vessel nontransmural   infarct involving the mid distal septum, not typical of large vessel   distribution. Small amount of wall motion abnormalities within this   region.     Coronary angiogram 4/5/21:  Left Main   Left main artery: The segment is large. Angiography shows no disease.       Left Anterior Descending   Left anterior descending artery: The segment is large. Angiography shows no disease. First diagonal: The segment is   large. Angiography shows no disease. Second diagonal: The segment is large. Angiography shows no disease.       Circumflex   Circumflex artery: The segment is large. Angiography shows no disease. First obtuse marginal: The segment is small.   Angiography shows no disease. First left posterolateral: The segment is large. Angiography shows no disease. Left   posterolateral descending artery: The segment is large. Angiography shows no disease.     Right Coronary   Right coronary artery: The segment is small. Angiography shows no disease.     Left Heart Cath   Ejection fraction was not calculated.       Diagnostic Conclusions:   - There is no evidence of angiographic coronary artery disease       CCTA 8/2/22:  IMPRESSION:  1.  Mild non-obstructive coronary artery disease without high-grade  stenoses.  2.  There is no evidence to suggest micro- or macro-perforation of the  cardiac  chambers.   3.  Total Agatston score 190 placing the patient in the 89th  percentile when compared to an age- and gender-matched control group.  4.  Please review the separate Radiology report for incidental  noncardiac findings.      Device Interrogation 2/4/23:  Device: Medtronic [id number] [place] XT  MRI  Normal Device Function.  Mode: DDDR /120 bpm  AP: 3.8%  : 99.7%  Presenting EGM: AS/ @ 109 bpm  Thoracic Impedance: Near baseline  Short V-V intervals: 0  Lead Trends Appear Stable.  Estimated battery longevity to RRT = 10.4 years. Battery voltage = 3.02 V.   No Arrhythmias Recorded  ER RN Notified of Transmission Results:

## 2023-02-05 NOTE — DISCHARGE SUMMARY
[facility]  Hospitalist Discharge Summary      Date of Admission:  2/4/2023  Date of Discharge:  No discharge date for patient encounter.  Discharging Provider: Greg Michelle DO  Discharge Service: Hospitalist Service, GOLD TEAM 11    Discharge Diagnoses     Probable Vasospastic Angina  Hx Chronic Atypical Chest Pain  Paroxysmal Atrial Fibrillation  Hypertrophic Cardiomyopathy s/p septal myomectomy 2014, s/p ICD 4/29/14  GERD  HTN  T2DM  MDD  Insomnia  Lactic Acidosis, Resolved    Follow-ups Needed After Discharge   Follow-up Appointments     Follow Up and recommended labs and tests      Please keep perviously scheduled    An order has been put in for you to follow up with cardiology in 2-4   weeks, they will call you to schedule the appointment             Unresulted Labs Ordered in the Past 30 Days of this Admission     Date and Time Order Name Status Description    2/4/2023 11:21 PM Blood Culture Peripheral Blood In process     2/4/2023 11:21 PM Blood Culture Peripheral Blood In process       These results will be followed up by Hospitalist pool    Discharge Disposition   Discharged to home  Condition at discharge: Stable    Hospital Course      Konstantin Sharp is a 52 year old male with a past medical history significant for HCM s/p septal myomectomy 2014, s/p ICD 4/29/14, T2DM, asthma, HTN, remote history of paroxysmal AF, CLARY, chronic pain from multiple cervical spine surgeries, depression, and anxiety. Admitted for Chest Pain. Cardiac workup negative and seen by cardiology in AM who felt its possible tachycardia due to anxiety worsened outflow gradient causing chest pain and recommended verapamil and cardiology follow up. Patient discharged with no chest pain.     Probable Vasospastic Angina  Hx Chronic Atypical Chest Pain  Paroxysmal Atrial Fibrillation  Hypertrophic Cardiomyopathy s/p septal myomectomy 2014, s/p ICD 4/29/14  Presents with dull stabbing chest pain  localized to left upper chest radiating to left arm associated with dyspnea and diaphoresis. States he has been having this chest pain intermittently for a year now and describes pain as similar to chest pain he had back in 2013 prior to myomectomy. Endorses anxiety as precipitating event prior to onset of chest pain and resolves within few minutes however this recent occurrence lasted for about 2 hours and resolved with nitroglycerin.Endorses meth use however last use about 3 weeks ago. 42 pack years of tobacco use.Received nitroglycerin and aspirin en route to ED given concerns for ST Elevation however per ED provider discussion with cardiologist, EKG not concerning for STEMI. Troponin wnl. CTA Chest on presentation with no evidence of PE or dissection. Had a CTA (08/2022) with a total calcium score of 190, mild non-obstructive CAD with no stenosis.  Cardiac device interrogation (02/2023) with no arrhythmias recorded. TTE (03/2022) with an EF of 60-65%,wall thickening consistent with hypertrophic cardiomyopathy and LVH. TTE on admission unchanged per cardiology. It is possible that an episode of anxiety causing tachycardia could worsen the patient's LVOT gradient (tachycardic on admission). This could potentially cause chest pain given the reduction in cardiac output. HR has subsequently improved.  - Follow up with Dr. Castellano of cardiology in 2-4 weeks  - start verapamil 80 mg daily to see if reduced heart rate helps symptoms   -PTA Rivaroxaban     GERD  Endorses intermittent symptoms of heart burn and regurgitation.  -Started on Pantoprazole QD     HTN  -PTA Clonidine, Lasix, Losartan  - starting verapamil 80 mg daily     T2DM  BS of 412 on presentation.Improved to 230. States compliance with pta metformin. Home blood sugar readings in mid to high 100s. Hgb A1C 8.1 (2018)  - PTA medications      MDD  -PTA Duloxetine     Insomnia  -PTA Trazodone     Lactic Acidosis, Resolved  Lactate of 3.0 on presentation. Repeat  LA 1.7    Consultations This Hospital Stay   CARDIOLOGY GENERAL ADULT IP CONSULT    Code Status   Full Code    Time Spent on this Encounter   I, Greg Michelle DO, personally saw the patient today and spent greater than 30 minutes discharging this patient.       DO [facility] EMERGENCY DEPARTMENT  [address]  [facility] [address]  Phone: [phone]  ______________________________________________________________________    Physical Exam   Vital Signs: Temp: 98.2  F (36.8  C) Temp src: Oral BP: 135/79 Pulse: 90   Resp: 18 SpO2: 99 % O2 Device: None (Room air)    Weight: 0 lbs 0 oz    General Appearance: NAD, Conversant, Laying in bed  Respiratory: CTAB  Cardiovascular: RRR. No m/r/g, no edema  GI: Soft, NTND  Skin: Dry, no rashes noted  Neuro:   A/O X4       Primary Care Physician   Damon Cooper    Discharge Orders      Adult Cardiology Eval  Referral      Reason for your hospital stay    You were in the hospital for chest pain. Your cardiac workup was negative and this is likely your chronic atypical chest pain and you are ok to discharge.     Activity    Your activity upon discharge: activity as tolerated     Follow Up and recommended labs and tests    Please keep perviously scheduled    An order has been put in for you to follow up with cardiology in 2-4 weeks, they will call you to schedule the appointment     Diet    Follow this diet upon discharge: Orders Placed This Encounter      Regular Diet Adult       Significant Results and Procedures   Most Recent 3 CBC's:Recent Labs   Lab Test 02/05/23  0439 02/04/23 2002 02/04/23 2000 01/23/23  1021   WBC 9.4  --  10.7 10.8   HGB 15.7 15.3 15.7 15.8   MCV 90  --  92 91     --  312 246     Most Recent 3 BMP's:Recent Labs   Lab Test 02/05/23  1213 02/05/23  0612 02/05/23  0115 02/04/23 2005 02/04/23 2002 02/04/23 2000 01/23/23  1021   NA  --  138  --   --  135 135* 140   POTASSIUM  --  3.8  --   --  3.5 3.6 3.7   CHLORIDE   --  103  --   --   --  97* 104   CO2  --  25  --   --   --  20* 22   BUN  --  13.4  --   --   --  13.7 9.8   CR  --  0.63*  --  0.9  --  0.81 0.63*   ANIONGAP  --  10  --   --   --  18* 14   TANIA  --  8.5*  --   --   --  8.9 8.8   * 200* 230*  --  380* 412* 183*     Most Recent 2 LFT's:Recent Labs   Lab Test 02/05/23  0612 02/04/23 2000   AST 12 15   ALT 10 7*   ALKPHOS 104 115   BILITOTAL 0.4 0.2     Most Recent 3 INR's:Recent Labs   Lab Test 01/31/18  0945 02/19/15  1214 01/18/15  0639   INR 0.98 0.92 1.12   ,   Results for orders placed or performed during the hospital encounter of 02/04/23   CTA Chest with Contrast    Narrative    Exam: Computed tomographic angiography of the chest without and with  contrast including 3D reformations dated 2/4/2023 8:48 PM    Clinical information: double-rule out : ? PE, dissection (Chest pain,  RUE numbness); prior myectomy    Technique: Helical scans through the chest obtained before the  administration of intravenous contrast media and following the  injection of contrast media in the arterial phase. Source images  reviewed as well as 3D and multi-planar reconstructions.    Contrast: 100 mL Isovue-370    DLP: 264 mGy*cm    Comparison: CT 1/23/2023    Findings:    Normal caliber thoracic aorta. There is normal branching pattern of  the great vessels. Origins of the brachiocephalic artery, left common  carotid artery and left subclavian artery show no focal abnormality.     Normal caliber pulmonary artery. No evidence of pulmonary embolism.     The celiac axis and SMA are patent.     Chest:     Thyroid is unremarkable. No abnormal hilar, mediastinal or axillary  lymphadenopathy is seen. Heart size is normal. No pericardial  effusion. No significant atherosclerotic calcification of the coronary  arteries. No thoracic lymphadenopathy. Esophagus is unremarkable.    The central tracheobronchial tree is patent. No focal consolidative  opacities. Stable pulmonary nodules. For  example in the right apex  with average diameter of 4 mm (series 13 image 56) and 3 mm solid  nodule in the left base (series 13 image 220). No pleural effusion or  pneumothorax.    Bones/soft tissues: No acute or aggressive osseous lesions.        Impression    Impression:      1. No evidence of pulmonary embolism.  2. The thoracic aorta is normal in caliber. No evidence of dissection.  3. Stable sub-6 mm pulmonary nodules which do not require follow-up  per Fleischner criteria guidelines.    I have personally reviewed the examination and initial interpretation  and I agree with the findings.    SANNA AMATO MD         SYSTEM ID:  [id number]     *Note: Due to a large number of results and/or encounters for the requested time period, some results have not been displayed. A complete set of results can be found in Results Review.       Discharge Medications   Current Discharge Medication List      START taking these medications    Details   verapamil (CALAN) 80 MG tablet Take 1 tablet (80 mg) by mouth daily  Qty: 60 tablet, Refills: 1    Associated Diagnoses: Other chest pain; Hypertrophic nonobstructive cardiomyopathy (H)         CONTINUE these medications which have NOT CHANGED    Details   albuterol (PROAIR HFA/PROVENTIL HFA/VENTOLIN HFA) 108 (90 Base) MCG/ACT inhaler Inhale 2 puffs into the lungs every 4 hours as needed      amLODIPine (NORVASC) 5 MG tablet Take 1 tablet by mouth daily at 2 pm      !! atorvastatin (LIPITOR) 20 MG tablet Take 1 tablet by mouth daily at 2 pm      !! Atorvastatin Calcium (LIPITOR PO) Take 80 mg by mouth daily       cetirizine (ZYRTEC) 10 MG tablet Take 1 tablet by mouth daily at 2 pm      cloNIDine (CATAPRES) 0.1 MG tablet Take 0.1 mg by mouth 3 times daily      diclofenac (VOLTAREN) 1 % topical gel Apply 2 g topically 2 times daily      docusate sodium (COLACE) 100 MG capsule Take 100 mg by mouth 2 times daily      DULoxetine (CYMBALTA) 60 MG capsule Take 2 capsules (120 mg) by mouth  daily  Qty: 60 capsule, Refills: 3    Associated Diagnoses: Mood disorder (H)      furosemide (LASIX) 40 MG tablet TAKE 1/2 A TABLET DAILY      INVOKANA 300 MG tablet Take 300 mg by mouth daily before breakfast      KLOR-CON 10 MEQ CR tablet Take 1 tablet by mouth daily at 2 pm      losartan (COZAAR) 25 MG tablet Take 1 tablet (25 mg) by mouth daily  Qty: 30 tablet, Refills: 1    Associated Diagnoses: Hypertrophic cardiomyopathy (H)      !! metFORMIN (GLUCOPHAGE) 1000 MG tablet Take 1 tablet by mouth 2 times daily      !! metFORMIN (GLUCOPHAGE) 500 MG tablet Take 500 mg by mouth 2 times daily (with meals)   Qty: 60 tablet    Associated Diagnoses: Diabetes mellitus (H)      metoprolol succinate ER (TOPROL XL) 100 MG 24 hr tablet Take 1 tablet by mouth daily at 2 pm      OLANZapine (ZYPREXA) 5 MG tablet Take 1 tablet (5 mg) by mouth At Bedtime  Qty: 30 tablet, Refills: 0    Associated Diagnoses: Mood disorder (H)      pantoprazole (PROTONIX) 40 MG EC tablet Take 40 mg by mouth daily before breakfast      rivaroxaban ANTICOAGULANT (XARELTO) 20 MG TABS tablet Take 1 tablet (20 mg) by mouth daily  Qty: 90 tablet, Refills: 3    Associated Diagnoses: Hypertrophic nonobstructive cardiomyopathy (H); Atrial fibrillation (H)      SENEXON-S 8.6-50 MG tablet Take 1 tablet by mouth 2 times daily      tamsulosin (FLOMAX) 0.4 MG capsule Take 0.4 mg by mouth daily      traZODone (DESYREL) 100 MG tablet TAKE 1 TABLET BY MOUTH AT BEDTIME  Qty: 30 tablet, Refills: 3    Associated Diagnoses: Mood disorder (H)       !! - Potential duplicate medications found. Please discuss with provider.        Allergies   Allergies   Allergen Reactions     Betadine Prepstick Plus Hives, Swelling and Rash     From procedure on 7/18/2022     Bee Venom Swelling     Lisinopril      TABS  Severe cough     Penicillins Hives and Difficulty breathing

## 2023-02-05 NOTE — PROGRESS NOTES
Konstantin anxious to go home.  PIV removed.  Reviewed discharge instructions. Accompanied Mo to discharge pharmacy to  his Verapamil.  Discharged to home.

## 2023-02-06 NOTE — TELEPHONE ENCOUNTER
[facility]    Phone Message    May a detailed message be left on voicemail: yes     Reason for Call: Appointment Intake    Referring Provider Name: Greg Michelle DO    Diagnosis and/or Symptoms: Hypertrophic nonobstructive cardiomyopathy     2-4 week appt preferred, per referral notes.  Please call pt to schedule.      Action Taken: Message routed to:  [facility] cardio    Travel Screening: Not Applicable

## 2023-02-21 NOTE — TELEPHONE ENCOUNTER
Please see message below. Ok to wait until 2/28 to see you or does he need to be seen sooner?  Please advise.    Thank you.    JOSH Bansal on 2/21/2023 at 3:07 PM

## 2023-02-21 NOTE — TELEPHONE ENCOUNTER
Pt called for Dr Burnham saying his mood swings have been really bad. Writer offered him an appt for 2/28 which he scheduled. Writer told pt to call back before then if need be, but then remembered Dr Burnham will be out of town. Pls let Dr Burnham know in case this is a pt he wants to call before next week.

## 2023-02-22 NOTE — TELEPHONE ENCOUNTER
"Per Dr. Burnham's message:    \"Thank you, let see how things go we can always make changes over the phone if we have to.\"    Pt was informed of Dr. Burnham's message and had no other questions.      JOSH Bansal on 2/22/2023 at 8:33 AM      "

## 2023-02-23 NOTE — LETTER
2/23/2023      RE: Konstantin Sharp  [address] [place] [address]       Dear Colleague,    Thank you for the opportunity to participate in the care of your patient, Konstantin Sharp, at the [facility] [place] [facility]. Please see a copy of my visit note below.    Start 1528  End 1538    Konstantin Sharp is a 52 year old male, being seen today for recheck of CAD and HOCM. He has a known history of HOCM status post septal myectomy and ICD placement with recent lead revision. He also has a history of paroxysmal atrial fibrillation and is on chronic Xarelto.    Today he reports that he was recently in the ED with chest pain and underwent a work up that was unremarkable. He does report that he continues to have chest pain and describes it as sharp pressure with exertion in his chest that resolves after 15 minutes of rest or abstaining from the activity. He reports that the last time this happened was this monday.    He does have known history of mild non obstructive CAD diagnosed by a coronary CT that was performed just last August in 2022. There were no high grade lesions noted.    He otherwise denies any shortness of breath, orthopnea, PND, lightheadedness, palpitations, or syncope.    He has not had any ICD discharges.    Past Medical, social, family histories, medications, and allergies reviewed and updated  10 point ROS of systems including Constitutional, Eyes, Respiratory, Cardiovascular, Gastroenterology, Genitourinary, Integumentary, Muscularskeletal, Psychiatric were all negative except for pertinent positives noted in my HPI.  GENERAL: Healthy, alert and no distress  EYES: Eyes grossly normal to inspection.  No discharge or erythema, or obvious scleral/conjunctival abnormalities.  RESP: No audible wheeze, cough, or visible cyanosis.  No visible retractions or increased work of breathing.    SKIN: Visible skin clear. No significant rash,  abnormal pigmentation or lesions.  NEURO: Cranial nerves grossly intact.  Mentation and speech appropriate for age.  PSYCH: Mentation appears normal, affect normal/bright, judgement and insight intact, normal speech and appearance well-groomed.    EKG 2/5/23  A-sensed, V-paced rhythm    ECHO 2/5/23  Global and regional left ventricular function is normal with an EF of 55-60%.  Known HCOM with history of myomectomy of the mid septum with associated  hypokinesis. The basal septum still measures 1.8cm. No resting gradient.  Global right ventricular function is normal.  Mild aortic insufficiency.  The inferior vena cava is normal.  No pericardial effusion.     This study was compared with the study from 3/4/22. Minimal interval change.    CTA 8/2/22  1.  Mild non-obstructive coronary artery disease without high-grade  stenoses.  2.  There is no evidence to suggest micro- or macro-perforation of the  cardiac chambers.   3.  Total Agatston score 190 placing the patient in the 89th  percentile when compared to an age- and gender-matched control group.  4.  Please review the separate Radiology report for incidental  noncardiac findings.    Stress NM Lexiscan 2019  1. There is mild hypokinesia involving the distal mid septum which   corresponds with the small nondominant typical reduced perfusion. No   changes to indicate ischemia.   2. Questionable small possibly marginal/oblique vessel nontransmural   infarct involving the mid distal septum, not typical of large vessel   distribution. Small amount of wall motion abnormalities within this   region.     cMRI 2012  1.  Normal left ventricular size and systolic function with a   calculated ejection fraction of 69 %.   2.  Asymmetric basal septal hypertrophy noted with the maximal wall   thickness of 21 mm in the basal anterior septum.   3.  Prominent systolic anterior motion of the anterior mitral leaflet   with left ventricular outflow tract turbulence consistent with    obstructive physiology. Moderate posterolaterally directed mitral   regurgitation.   4.  Delayed enhancement images reveal a focal area of hyper   enhancement in the basal anterior septum at the right ventricular   insertion point. This is a common area of enhancement seen in patients   with hypertrophic cardiomyopathy.       Cardiac morphology is classic for hypertrophic obstructive   cardiomyopathy.       Assessment / Plan    1. Typical angina  -- cardiac MRI, if unable to perform due to ICD, then coronary angiogram    2. Mild non obstructive CAD  -- continue statin, Xarelto    3. Paroxysmal atrial fibrillation, CHADSVASC 3  -- continue Xarelto, BB    4. HOCM status post myectomy, ICD  -- continue BB, CCB    5. Hypertension, well controlled  -- continue CCBm BB, ARB, clonidine    6. Hyperlipidemia, well controlled  -- continue statin    7. Diastolic heart failure, chronic  -- continue Lasix    Follow up with Dr. Nona Griffiths MD

## 2023-02-23 NOTE — PROGRESS NOTES
Start 1528  End 1538    Konstantin Sharp is a 52 year old male, being seen today for recheck of CAD and HOCM. He has a known history of HOCM status post septal myectomy and ICD placement with recent lead revision. He also has a history of paroxysmal atrial fibrillation and is on chronic Xarelto.    Today he reports that he was recently in the ED with chest pain and underwent a work up that was unremarkable. He does report that he continues to have chest pain and describes it as sharp pressure with exertion in his chest that resolves after 15 minutes of rest or abstaining from the activity. He reports that the last time this happened was this monday.    He does have known history of mild non obstructive CAD diagnosed by a coronary CT that was performed just last August in 2022. There were no high grade lesions noted.    He otherwise denies any shortness of breath, orthopnea, PND, lightheadedness, palpitations, or syncope.    He has not had any ICD discharges.    Past Medical, social, family histories, medications, and allergies reviewed and updated  10 point ROS of systems including Constitutional, Eyes, Respiratory, Cardiovascular, Gastroenterology, Genitourinary, Integumentary, Muscularskeletal, Psychiatric were all negative except for pertinent positives noted in my HPI.  GENERAL: Healthy, alert and no distress  EYES: Eyes grossly normal to inspection.  No discharge or erythema, or obvious scleral/conjunctival abnormalities.  RESP: No audible wheeze, cough, or visible cyanosis.  No visible retractions or increased work of breathing.    SKIN: Visible skin clear. No significant rash, abnormal pigmentation or lesions.  NEURO: Cranial nerves grossly intact.  Mentation and speech appropriate for age.  PSYCH: Mentation appears normal, affect normal/bright, judgement and insight intact, normal speech and appearance well-groomed.    EKG 2/5/23  A-sensed, V-paced rhythm    ECHO 2/5/23  Global and regional left ventricular  function is normal with an EF of 55-60%.  Known HCOM with history of myomectomy of the mid septum with associated  hypokinesis. The basal septum still measures 1.8cm. No resting gradient.  Global right ventricular function is normal.  Mild aortic insufficiency.  The inferior vena cava is normal.  No pericardial effusion.     This study was compared with the study from 3/4/22. Minimal interval change.    CTA 8/2/22  1.  Mild non-obstructive coronary artery disease without high-grade  stenoses.  2.  There is no evidence to suggest micro- or macro-perforation of the  cardiac chambers.   3.  Total Agatston score 190 placing the patient in the 89th  percentile when compared to an age- and gender-matched control group.  4.  Please review the separate Radiology report for incidental  noncardiac findings.    Stress NM Lexiscan 2019  1. There is mild hypokinesia involving the distal mid septum which   corresponds with the small nondominant typical reduced perfusion. No   changes to indicate ischemia.   2. Questionable small possibly marginal/oblique vessel nontransmural   infarct involving the mid distal septum, not typical of large vessel   distribution. Small amount of wall motion abnormalities within this   region.     cMRI 2012  1.  Normal left ventricular size and systolic function with a   calculated ejection fraction of 69 %.   2.  Asymmetric basal septal hypertrophy noted with the maximal wall   thickness of 21 mm in the basal anterior septum.   3.  Prominent systolic anterior motion of the anterior mitral leaflet   with left ventricular outflow tract turbulence consistent with   obstructive physiology. Moderate posterolaterally directed mitral   regurgitation.   4.  Delayed enhancement images reveal a focal area of hyper   enhancement in the basal anterior septum at the right ventricular   insertion point. This is a common area of enhancement seen in patients   with hypertrophic cardiomyopathy.       Cardiac  morphology is classic for hypertrophic obstructive   cardiomyopathy.       Assessment / Plan    1. Typical angina  -- cardiac MRI, if unable to perform due to ICD, then coronary angiogram    2. Mild non obstructive CAD  -- continue statin, Xarelto    3. Paroxysmal atrial fibrillation, CHADSVASC 3  -- continue Xarelto, BB    4. HOCM status post myectomy, ICD  -- continue BB, CCB    5. Hypertension, well controlled  -- continue CCBm BB, ARB, clonidine    6. Hyperlipidemia, well controlled  -- continue statin    7. Diastolic heart failure, chronic  -- continue Lasix    Follow up with Dr. Nona Griffiths MD

## 2023-02-23 NOTE — PATIENT INSTRUCTIONS
Patient Instructions:  It was a pleasure to see you in the cardiology clinic today.      If you have any questions, call  Anne Marie Barraza RN, at [phone].   [facility].  To schedule an appointment or to leave a message for your Care Team Press #1  If you are a physician calling for another physician Press #2  For Billing Press #3  For Medical Records Press #4  We are encouraging the use of [employer] to communicate with your HealthCare Provider    Note the new medications: none  Stop the following medications: none    The results from today include: none  Please follow up with a MRI stress or an angiogram      If you have an urgent need after hours (8:00 am to 4:30 pm) please call [phone] and ask for the cardiology fellow on call.

## 2023-02-24 NOTE — TELEPHONE ENCOUNTER
[facility] Call Center    Phone Message    May a detailed message be left on voicemail: yes     Reason for Call: Other: Please call pt caregiver back to discuss the phone call he recieved yesterday about pt medication.      Action Taken: Message routed to:  [facility] ([facility]): Cardiology    Travel Screening: Not Applicable       Thank you!  [facility]

## 2023-02-24 NOTE — CONFIDENTIAL NOTE
"This was a call from the CN about a medication he had questions on and he wanted to talk with \"Yu\". This call was routed to the wrong nurse.  "

## 2023-02-24 NOTE — PROGRESS NOTES
Date: 2/24/2023    Time of Call: 11:49 AM     Diagnosis: HOCM     [ TORB ] Ordering provider: Dr. Castellano  Order: echo, labs, ekg, device check, appt with Dr. Castellano and Dr. Lacy     Order received by: ROSSY Sena     Follow-up/additional notes: Patient is due to be seen by Dr. Castellano and Dr. Lacy. He had his generator changed and was a no show to his follow up. Recently in the ED and not needs to get reestablished.  Jaida Cabrales RN on 2/24/2023 at 11:50 AM

## 2023-02-28 NOTE — PROGRESS NOTES
Outpatient Follow up TBI Evaluation     Pertinent History: Patient is known to me from prior clinic contacted the patient has not been seen by me in over 2 years.  Unfortunately old records are difficult to access at this time due to the new computer system.  The patient and his wife are extremely vague historians but it appears he has been followed through the [facility] by psychiatry.  Medications are listed as above and both the patient and wife report there is been multiple medication changes in the last few years but they are unaware of what these were.  We will try and clarify all of this.  Patient presents today to establish care at this clinic.  The patient's medication list is as described in the nurse's note according to the limited information we have available to us.     The patient reestablished contact with his clinic in December 2021.  He had been followed through .  He had had multiple medication changes over the last few years but we have limited information when we first saw the patient.  We were attempting to clarify his current medication list and past medication trials.  At that visit the patient had significant heart movements noted around his mouth.  The family also reported he had lost 60 pounds in 6 months.  He was having worsening depression and anxiety.     The patient was seen for a follow-up visit on January 11 of 2022.  The patient has been seen by multiple providers prior to that over the course of more than a year.  When I saw him at that visit he was on Cymbalta 20 mg a day.  His mood was worse.  He was having low motivation and low energy but no thoughts of harming himself.  He was willing to restart with a psychotherapist and I did order that.  I increased his Cymbalta to 60 mg a day.     I saw the patient on February 22, 2022.  I also interviewed his wife and both reported the patient seem to be improving with regard to his mood and was less depressed  with no thoughts of wishing he was dead.  He continued with his baseline tremor.  He was sleeping well at night.  There were no new medical issues.  We continued with the treatment plan at that time.    The patient had a follow-up with me on April 5, 2022.  We did increase his Cymbalta to 60 mg a day and did remind him that he had trazodone available that he was not using.  We also ordered some individual therapy.  He was complaining of some chronic fatigue and weight loss and he was going to have that addressed through his primary care doctor.    The patient was seen for follow-up in May 2022 along with Leilani.  He was having some trouble with sleep at that time but was otherwise doing relatively well.  He and his family had recently returned from a road trip to [place] and that went well.  He was doing well and we did not make any medication changes.    The patient was seen on August 18, 2022.  At that time he was doing relatively well but was still having some depression.  He was tolerating the medication and felt that the medication was at least partially helpful.  We elected to increase the patient's Cymbalta to 90 mg a day.    The patient was seen in October 2022.  At that time he was having some trouble with sleep and he felt this was likely related to pain.  We elected to increase his Cymbalta to 120 mg a day and he was going to continue with his medical treatments and cares.    I saw the patient in November 2022.  He was doing relatively well at that time and tolerating the medication.  The family felt he was doing well.  We elected to continue with the current treatment plan and did not make any changes.    The patient had a follow-up visit with me on January 19, 2023.  At that time he was having symptoms complaints of daytime sleepiness otherwise he was doing well regarding mood and behavior.  No change in his chronic tremor.  We elected to discontinue his morning Seroquel dosing.  We continued with the  other medications.       HPI:  Patient presents today for the purposes of medication management.   The patient presented for a video visit today stating he is not doing quite as well.  He states he has been a bit more anxious and his mind tends to race.  He is having some trouble sleeping at night.  He denied however having any thoughts of hurting himself or anybody else.  He denied having any other symptoms of robinson.  No hallucinations or delusions.  He states his focus is a bit more difficult.  He does report he is having more pain in his feet and because of that has been less social and getting out less.  He reported that there have been no other new medical issues or concerns and he continues to follow-up with his medical team.  We did discussed differential diagnoses as well as treatment options and have elected to increase his at bedtime Zyprexa to 7.5 mg.  He agrees with the assessment and plan.         Current Medications: Please see chart. Medications personally reviewed.     Patient Active Problem List    Diagnosis Date Noted     Elevated lactic acid level 02/04/2023     Priority: Medium     Chronic pain due to trauma 09/03/2016     Priority: Medium     Overview:   Broken back/neck 1996/2007 respectively. Did have procedure for spine stimulator       Esophageal reflux 09/03/2016     Priority: Medium     Essential hypertension 09/03/2016     Priority: Medium     History of traumatic brain injury 09/03/2016     Priority: Medium     Large bowel obstruction (H) 09/03/2016     Priority: Medium     S/P laminectomy 09/03/2016     Priority: Medium     Overview:   replacement of failed spinal cord stimulator & placement of epidural paddle electrode - 9/2/2016       Tobacco dependence 09/03/2016     Priority: Medium     Cognitive disorder 06/16/2016     Priority: Medium     Type 2 diabetes mellitus with diabetic neuropathy (H) 05/02/2016     Priority: Medium     Seizures (H) 02/01/2016     Priority: Medium      Respiratory failure (H) 10/19/2015     Priority: Medium     Insomnia 03/13/2015     Priority: Medium     [facility] 03/11/2015     Priority: Medium     For urgent after hours needs, please call [phone] and ask to speak with the [facility] Physician on call - mention job code [phone].           Dysphonia 01/05/2015     Priority: Medium     Postprocedural subglottic stenosis 07/22/2014     Priority: Medium     Pre-operative cardiovascular examination 07/16/2014     Priority: Medium     Automatic implantable cardioverter-defibrillator in situ- [employer], dual chamber- DEPENDENT 04/30/2014     Priority: Medium     Implanted 4/29/14 by Dr. Lacy  Problem list name updated by automated process. Provider to review       S/P ventricular septal myectomy 04/14/2014     Priority: Medium     Syncope 12/28/2013     Priority: Medium     Atrial fibrillation (H) 05/15/2013     Priority: Medium     Asthma 01/24/2013     Priority: Medium     Depressive disorder 01/24/2013     Priority: Medium     Old myocardial infarction 01/24/2013     Priority: Medium     Osteoarthrosis 01/24/2013     Priority: Medium     Spinal stenosis, lumbar region, with neurogenic claudication 11/15/2012     Priority: Medium     Lumbar radicular pain 11/15/2012     Priority: Medium     s/p PSF C6-7 for anterior pseudarthrosis 10/11/2012     Priority: Medium     Hypertrophic nonobstructive cardiomyopathy (H) 09/12/2012     Priority: Medium     Chest pain 09/12/2012     Priority: Medium     Sinus tachycardia 09/12/2012     Priority: Medium     Pseudoarthrosis of cervical spine (H) 08/24/2012     Priority: Medium     Chondromalacia of patella 10/25/2011     Priority: Medium     Anticoagulant long-term use 02/18/2011     Priority: Medium     Cervical vertebral fusion 12/08/2010     Priority: Medium     Herniated cervical intervertebral disc 06/26/2008     Priority: Medium  "    Past Medical History:   Diagnosis Date     Atrial fibrillation (H)      Chest pain     intermittent     Chronic pain      Cognitive disorder     memory loss     Depressive disorder      Dual ICD (implantable cardiac defibrillator) in place 04/29/2014    and pacemaker     GERD (gastroesophageal reflux disease)      H/O traumatic brain injury 2015     Heart attack (H) 1996     HTN (hypertension)      Hypertrophic nonobstructive cardiomyopathy (H) 09/12/2012    s/p ventricular myectomy     Inflammatory arthritis      Intermittent asthma      PAD (peripheral artery disease) (H)      Polysubstance abuse (H)      Seizures (H)     last episode 2014 when \"blood sugar dropped\" according to patient     Sleep apnea     doesn't use cpap     Type 2 diabetes mellitus without complications (H)      Past Surgical History:   Procedure Laterality Date     APPENDECTOMY  1980    [facility]     BACK SURGERY       COLONOSCOPY  2017     DISCECTOMY LUMBAR POSTERIOR MICROSCOPIC THREE+ LEVELS  12/16/2011    Procedure:DISCECTOMY LUMBAR POSTERIOR MICROSCOPIC THREE+ LEVELS; Posterior Decompression L2-S1; Surgeon:CAITIE FUNEZ; Location:[facility]     EP ICD GENERATOR REPLACEMENT DUAL N/A 7/18/2022    Procedure: Implantable Cardioverter Defibrillator Generator Replacement Dual;  Surgeon: Ritesh Lacy MD;  Location: [facility] OR     FUSION CERVICAL POSTERIOR ONE LEVEL  8/24/2012    Procedure: FUSION CERVICAL POSTERIOR ONE LEVEL;  Posterior Instrumentated Spinal Fusion Cervical 6-7, Right Iliac [place] ;  Surgeon: Caitie Funez MD;  [facility]     GRAFT BONE FROM ILIAC CREST  8/24/2012    Procedure: GRAFT BONE FROM ILIAC CREST;;  Surgeon: Caitie Funez MD;  [facility]     HEAD & NECK SURGERY  2009     IMPLANT PACEMAKER       IMPLANT PACEMAKER       INJECT STEROID (LOCATION) N/A 2/19/2015    Procedure: INJECT STEROID (LOCATION);  Surgeon: Siri Koch MD;  Location: [facility] OR " "    INSERT STIMULATOR AND LEADS INTERNAL DORSAL COLUMN  8/7/2013    Procedure: INSERT STIMULATOR AND LEADS INTERNAL DORSAL COLUMN;  SPINAL CORD STIMULATOR IMPLANT ;  Surgeon: Ricardo Bruno MD;  Location: [facility] OR     INSERT STIMULATOR DORSAL COLUMN  08/13/2013    lead replacemend, 12?2016,  battery replacement 4/4/2016     IR GASTROSTOMY TUBE PERCUTANEOUS PLCMNT  10/29/2015     KNEE SURGERY       LASER CO2 LARYNGOSCOPY N/A 2/19/2015    Procedure: LASER CO2 LARYNGOSCOPY;  Surgeon: Siri Koch MD;  [facility]     LASER CO2 LARYNGOSCOPY, COMPLEX  7/22/2014    Procedure: LASER CO2 LARYNGOSCOPY, COMPLEX;  Surgeon: Siri Koch MD;  [facility]     MYECTOMY SEPTAL  4/14/2014    Procedure: Median Sternotomy, Septal Myectomy, Intraoperative BRENT performed by Dr. Castano, on pump oxygenator.;  Surgeon: Aguila Cannon MD;  Location: [facility]     NECK SURGERY       LA SPINE SURGERY PROCEDURE UNLISTED       SHOULDER SURGERY  2006    RIGHT     STOMACH SURGERY  1980    partial gastrectomy for bleeding ulcers, \"stress related\". [facility]     WRIST SURGERY Left 1988    fractured. 1988 or so     [facility] CARDIAC SURG PROCEDURE UNLIST       Z HAND/FINGER SURGERY UNLISTED       [facility] SHOULDER SURG PROC UNLISTED       [facility] STOMACH SURGERY PROCEDURE UNLISTED       Family History   Problem Relation Age of Onset     Heart Disease Father 32        MIs x2; s/p CABG     Substance Abuse Father      Hypertension Father      Obesity Father      Hyperlipidemia Father      Hypertension Brother      Diabetes Brother      Glaucoma Maternal Grandmother      Hypertension Maternal Grandmother      Diabetes Maternal Grandfather      Glaucoma Maternal Grandfather      Hypertension Maternal Grandfather      Cerebrovascular Disease Maternal Grandfather      Obesity Maternal Grandfather      Hyperlipidemia Maternal Grandfather      Coronary Artery Disease Maternal Grandfather      Heart Disease Maternal Grandfather  "     Substance Abuse Other      Cancer Other      Hypertension Other      Obesity Other      Other Cancer Other         all over     Hyperlipidemia Other      Coronary Artery Disease Other      Obesity Brother      Hyperlipidemia Brother      Lymphoma Maternal Uncle      Diabetes Brother      Depression Daughter      Depression Son      Macular Degeneration No family hx of      Heart Failure Father      Current Outpatient Medications   Medication Sig Dispense Refill     albuterol (PROAIR HFA/PROVENTIL HFA/VENTOLIN HFA) 108 (90 Base) MCG/ACT inhaler Inhale 2 puffs into the lungs every 4 hours as needed       amLODIPine (NORVASC) 5 MG tablet Take 1 tablet by mouth daily at 2 pm       atorvastatin (LIPITOR) 20 MG tablet Take 1 tablet by mouth daily at 2 pm       Atorvastatin Calcium (LIPITOR PO) Take 80 mg by mouth daily        cetirizine (ZYRTEC) 10 MG tablet Take 1 tablet by mouth daily at 2 pm       cloNIDine (CATAPRES) 0.1 MG tablet Take 0.1 mg by mouth 3 times daily       diclofenac (VOLTAREN) 1 % topical gel Apply 2 g topically 2 times daily       docusate sodium (COLACE) 100 MG capsule Take 100 mg by mouth 2 times daily       DULoxetine (CYMBALTA) 60 MG capsule Take 2 capsules (120 mg) by mouth daily 60 capsule 3     furosemide (LASIX) 40 MG tablet TAKE 1/2 A TABLET DAILY       INVOKANA 300 MG tablet Take 300 mg by mouth daily before breakfast       KLOR-CON 10 MEQ CR tablet Take 1 tablet by mouth daily at 2 pm       losartan (COZAAR) 25 MG tablet Take 1 tablet (25 mg) by mouth daily 30 tablet 1     metFORMIN (GLUCOPHAGE) 1000 MG tablet Take 1 tablet by mouth 2 times daily       metFORMIN (GLUCOPHAGE) 500 MG tablet Take 500 mg by mouth 2 times daily (with meals)  60 tablet      metoprolol succinate ER (TOPROL XL) 100 MG 24 hr tablet Take 1 tablet by mouth daily at 2 pm       OLANZapine (ZYPREXA) 5 MG tablet Take 1 tablet (5 mg) by mouth At Bedtime 30 tablet 0     pantoprazole (PROTONIX) 40 MG EC tablet Take 40 mg  by mouth daily before breakfast       rivaroxaban ANTICOAGULANT (XARELTO) 20 MG TABS tablet Take 1 tablet (20 mg) by mouth daily 90 tablet 3     SENEXON-S 8.6-50 MG tablet Take 1 tablet by mouth 2 times daily       tamsulosin (FLOMAX) 0.4 MG capsule Take 0.4 mg by mouth daily       traZODone (DESYREL) 100 MG tablet TAKE 1 TABLET BY MOUTH AT BEDTIME 30 tablet 3     verapamil (CALAN) 80 MG tablet Take 1 tablet (80 mg) by mouth daily 60 tablet 1       Allergies   Allergen Reactions     Betadine Prepstick Plus Hives, Swelling and Rash     From procedure on 2022     Bee Venom Swelling     Lisinopril      TABS  Severe cough     Penicillins Hives and Difficulty breathing     Social History     Socioeconomic History     Marital status: Single     Spouse name: Not on file     Number of children: Not on file     Years of education: Not on file     Highest education level: Not on file   Occupational History     Not on file   Tobacco Use     Smoking status: Every Day     Packs/day: 0.12     Years: 35.00     Pack years: 4.20     Types: Cigarettes     Start date: 3/8/1982     Last attempt to quit: 2014     Years since quittin.9     Smokeless tobacco: Never   Substance and Sexual Activity     Alcohol use: Yes     Alcohol/week: 0.0 standard drinks     Comment: rare     Drug use: No     Comment: last using meth 2017     Sexual activity: Not Currently     Partners: Female     Birth control/protection: Male Surgical   Other Topics Concern     Parent/sibling w/ CABG, MI or angioplasty before 65F 55M? Yes   Social History Narrative     Not on file     Social Determinants of Health     Financial Resource Strain: Not on file   Food Insecurity: Not on file   Transportation Needs: Not on file   Physical Activity: Not on file   Stress: Not on file   Social Connections: Not on file   Intimate Partner Violence: Not on file   Housing Stability: Not on file       The following portions of the patient's history were reviewed and  updated as appropriate: allergies, current medications, past family history, past medical history, past social history, past surgical history and problem list.    Review of Systems  A comprehensive review of systems was negative except for what is noted above    Mental Status Exam  Appearance:  The patient was laying on the couch during the interview.  Slow and somewhat tired but no obvious pain or distress.  No shortness of breath.  Behavior:  He denies having any recent behavioral difficulty.  He was calm with me.  No agitation or irritability.  Speech:  Somewhat monotone.  Answers were consistent but a bit vague.  Not pressured or rambling.  Mood: A bit more flat and slow.  Thought content: No hallucinations or delusions.  He denies such.  Thought formation:  Somewhat concrete and needing occasional prompts but participating adequately.  Associations: Fair.  Tracking fairly well today.  No significant change from the last visit.  No obvious deficits.  Fund of knowledge: Unchanged.  Attention/Concentration: Able to attend to track.  Not disorganized.  No racing thoughts.  Insight: Fair.  No obvious significant change.  Judgment: Impaired, chronically with no recent reports of any change.  No obvious change on exam.  Memory: Impaired  Motor status:  The patient reports no new tremors or asymmetries.  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    Other:   Patient will return to clinic in  to 2 to 3 months.  He agrees to call or return sooner with any questions or concerns.  Risks and benefits were discussed.  Continue with his individual therapist.     Continue with the support of the clinic, reassurance, and redirection. Staff monitoring and ongoing assessments per team plan. Current psychotropic medication  appears to represent the minimum effective dosage and appears medically necessary. We will continue to monitor and reassess. This team will utilize appropriate emergency services if necessary. I will make myself available if concerns or problems arise.    Total time spent with the patient today was 26 minutes with greater than 50% of the time spent in counseling and care coordination. The patient agrees to call before then with any questions, concerns or problems. We will assess for the appropriateness of possible psychotropic medication trials/changes. The patient will seek out appropriate emergency services should that become necessary.    Video Visit Details    Type of service: Video Visit    Video Start Time: 10:50 AM    Video End Time: 11:05 AM    Total time for video call: 15 minutes    Originating Location: Patient's home    Distant Location:  [facility]    Mode of Communication: Video call via [employer]    Total time for chart review, interview with family and patient, coordination of care and documentation is 25 minutes.    Patient Instructions   It was nice speaking with you today for our office video visit. The following is a summary of our visit.    General Information:    If lab work was done today as part of your evaluation you will generally be contacted via My Chart, mail, or phone with the results within 1-5 days. If there is an alarming result we will contact you by phone. Lab results come back at varying times, I generally wait until all labs are resulted before making comments on results. Please note labs are automatically released to My Chart once available.     If you need refills please contact your pharmacist. They will send a refill request to me to review. Please allow 3 business days for us to process all refill requests.     Please call or send a medical message through My Chart, with any questions or concerns.    If you need any paperwork completed  please fax forms to [phone]. Please state if you would like a copy of the completed paperwork, mailed or faxed back to the patient and a fax number to fax the paperwork to. Please allow up to 10 business days for paperwork to be completed.    Rolando Burnham MD    Answers for HPI/ROS submitted by the patient on 2/28/2023  If you checked off any problems, how difficult have these problems made it for you to do your work, take care of things at home, or get along with other people?: Not difficult at all  PHQ9 TOTAL SCORE: 12

## 2023-02-28 NOTE — NURSING NOTE
Is the patient currently in the state of [place]? YES    Visit mode:VIDEO    If the visit is dropped, the patient can be reconnected by: VIDEO VISIT: Text to cell phone: [phone]    Will anyone else be joining the visit? NO      How would you like to obtain your AVS? MyChart    Are changes needed to the allergy or medication list? NO    Reason for visit: Mood swings

## 2023-02-28 NOTE — LETTER
2/28/2023         RE: Konstantin Sharp  [address]  [place] [address]        Dear Colleague,    Thank you for referring your patient, Konstantin Sharp, to the [facility]. Please see a copy of my visit note below.        Outpatient Follow up TBI Evaluation     Pertinent History: Patient is known to me from prior clinic contacted the patient has not been seen by me in over 2 years.  Unfortunately old records are difficult to access at this time due to the new computer system.  The patient and his wife are extremely vague historians but it appears he has been followed through the [facility] by psychiatry.  Medications are listed as above and both the patient and wife report there is been multiple medication changes in the last few years but they are unaware of what these were.  We will try and clarify all of this.  Patient presents today to establish care at this clinic.  The patient's medication list is as described in the nurse's note according to the limited information we have available to us.     The patient reestablished contact with his clinic in December 2021.  He had been followed through .  He had had multiple medication changes over the last few years but we have limited information when we first saw the patient.  We were attempting to clarify his current medication list and past medication trials.  At that visit the patient had significant heart movements noted around his mouth.  The family also reported he had lost 60 pounds in 6 months.  He was having worsening depression and anxiety.     The patient was seen for a follow-up visit on January 11 of 2022.  The patient has been seen by multiple providers prior to that over the course of more than a year.  When I saw him at that visit he was on Cymbalta 20 mg a day.  His mood was worse.  He was having low motivation and low energy but no thoughts of harming himself.  He was  willing to restart with a psychotherapist and I did order that.  I increased his Cymbalta to 60 mg a day.     I saw the patient on February 22, 2022.  I also interviewed his wife and both reported the patient seem to be improving with regard to his mood and was less depressed with no thoughts of wishing he was dead.  He continued with his baseline tremor.  He was sleeping well at night.  There were no new medical issues.  We continued with the treatment plan at that time.    The patient had a follow-up with me on April 5, 2022.  We did increase his Cymbalta to 60 mg a day and did remind him that he had trazodone available that he was not using.  We also ordered some individual therapy.  He was complaining of some chronic fatigue and weight loss and he was going to have that addressed through his primary care doctor.    The patient was seen for follow-up in May 2022 along with Leilani.  He was having some trouble with sleep at that time but was otherwise doing relatively well.  He and his family had recently returned from a road trip to [place] and that went well.  He was doing well and we did not make any medication changes.    The patient was seen on August 18, 2022.  At that time he was doing relatively well but was still having some depression.  He was tolerating the medication and felt that the medication was at least partially helpful.  We elected to increase the patient's Cymbalta to 90 mg a day.    The patient was seen in October 2022.  At that time he was having some trouble with sleep and he felt this was likely related to pain.  We elected to increase his Cymbalta to 120 mg a day and he was going to continue with his medical treatments and cares.    I saw the patient in November 2022.  He was doing relatively well at that time and tolerating the medication.  The family felt he was doing well.  We elected to continue with the current treatment plan and did not make any changes.    The patient had a  follow-up visit with me on January 19, 2023.  At that time he was having symptoms complaints of daytime sleepiness otherwise he was doing well regarding mood and behavior.  No change in his chronic tremor.  We elected to discontinue his morning Seroquel dosing.  We continued with the other medications.       HPI:  Patient presents today for the purposes of medication management.   The patient presented for a video visit today stating he is not doing quite as well.  He states he has been a bit more anxious and his mind tends to race.  He is having some trouble sleeping at night.  He denied however having any thoughts of hurting himself or anybody else.  He denied having any other symptoms of robinson.  No hallucinations or delusions.  He states his focus is a bit more difficult.  He does report he is having more pain in his feet and because of that has been less social and getting out less.  He reported that there have been no other new medical issues or concerns and he continues to follow-up with his medical team.  We did discussed differential diagnoses as well as treatment options and have elected to increase his at bedtime Zyprexa to 7.5 mg.  He agrees with the assessment and plan.         Current Medications: Please see chart. Medications personally reviewed.     Patient Active Problem List    Diagnosis Date Noted     Elevated lactic acid level 02/04/2023     Priority: Medium     Chronic pain due to trauma 09/03/2016     Priority: Medium     Overview:   Broken back/neck 1996/2007 respectively. Did have procedure for spine stimulator       Esophageal reflux 09/03/2016     Priority: Medium     Essential hypertension 09/03/2016     Priority: Medium     History of traumatic brain injury 09/03/2016     Priority: Medium     Large bowel obstruction (H) 09/03/2016     Priority: Medium     S/P laminectomy 09/03/2016     Priority: Medium     Overview:   replacement of failed spinal cord stimulator & placement of epidural  paddle electrode - 9/2/2016       Tobacco dependence 09/03/2016     Priority: Medium     Cognitive disorder 06/16/2016     Priority: Medium     Type 2 diabetes mellitus with diabetic neuropathy (H) 05/02/2016     Priority: Medium     Seizures (H) 02/01/2016     Priority: Medium     Respiratory failure (H) 10/19/2015     Priority: Medium     Insomnia 03/13/2015     Priority: Medium     [facility] 03/11/2015     Priority: Medium     For urgent after hours needs, please call [phone] and ask to speak with the Adult [facility] Physician on call - mention job code [phone].           Dysphonia 01/05/2015     Priority: Medium     Postprocedural subglottic stenosis 07/22/2014     Priority: Medium     Pre-operative cardiovascular examination 07/16/2014     Priority: Medium     Automatic implantable cardioverter-defibrillator in situ- [employer], dual chamber- DEPENDENT 04/30/2014     Priority: Medium     Implanted 4/29/14 by Dr. Lacy  Problem list name updated by automated process. Provider to review       S/P ventricular septal myectomy 04/14/2014     Priority: Medium     Syncope 12/28/2013     Priority: Medium     Atrial fibrillation (H) 05/15/2013     Priority: Medium     Asthma 01/24/2013     Priority: Medium     Depressive disorder 01/24/2013     Priority: Medium     Old myocardial infarction 01/24/2013     Priority: Medium     Osteoarthrosis 01/24/2013     Priority: Medium     Spinal stenosis, lumbar region, with neurogenic claudication 11/15/2012     Priority: Medium     Lumbar radicular pain 11/15/2012     Priority: Medium     s/p PSF C6-7 for anterior pseudarthrosis 10/11/2012     Priority: Medium     Hypertrophic nonobstructive cardiomyopathy (H) 09/12/2012     Priority: Medium     Chest pain 09/12/2012     Priority: Medium     Sinus tachycardia 09/12/2012     Priority: Medium     Pseudoarthrosis of cervical spine (H) 08/24/2012      "Priority: Medium     Chondromalacia of patella 10/25/2011     Priority: Medium     Anticoagulant long-term use 02/18/2011     Priority: Medium     Cervical vertebral fusion 12/08/2010     Priority: Medium     Herniated cervical intervertebral disc 06/26/2008     Priority: Medium     Past Medical History:   Diagnosis Date     Atrial fibrillation (H)      Chest pain     intermittent     Chronic pain      Cognitive disorder     memory loss     Depressive disorder      Dual ICD (implantable cardiac defibrillator) in place 04/29/2014    and pacemaker     GERD (gastroesophageal reflux disease)      H/O traumatic brain injury 2015     Heart attack (H) 1996     HTN (hypertension)      Hypertrophic nonobstructive cardiomyopathy (H) 09/12/2012    s/p ventricular myectomy     Inflammatory arthritis      Intermittent asthma      PAD (peripheral artery disease) (H)      Polysubstance abuse (H)      Seizures (H)     last episode 2014 when \"blood sugar dropped\" according to patient     Sleep apnea     doesn't use cpap     Type 2 diabetes mellitus without complications (H)      Past Surgical History:   Procedure Laterality Date     APPENDECTOMY  1980    [facility]     BACK SURGERY       COLONOSCOPY  2017     DISCECTOMY LUMBAR POSTERIOR MICROSCOPIC THREE+ LEVELS  12/16/2011    Procedure:DISCECTOMY LUMBAR POSTERIOR MICROSCOPIC THREE+ LEVELS; Posterior Decompression L2-S1; Surgeon:CAITIE FUNEZ; Location:[facility] OR     EP ICD GENERATOR REPLACEMENT DUAL N/A 7/18/2022    Procedure: Implantable Cardioverter Defibrillator Generator Replacement Dual;  Surgeon: Ritesh Lacy MD;  Location: [facility] OR     FUSION CERVICAL POSTERIOR ONE LEVEL  8/24/2012    Procedure: FUSION CERVICAL POSTERIOR ONE LEVEL;  Posterior Instrumentated Spinal Fusion Cervical 6-7, Right Iliac Crest Bone [place] ;  Surgeon: Caitie Funez MD;  [facility]: [facility] OR     GRAFT BONE FROM ILIAC CREST  8/24/2012    Procedure: GRAFT BONE FROM ILIAC " "CREST;;  Surgeon: Lopez Funez MD;  [facility]: [facility] OR     HEAD & NECK SURGERY  2009     IMPLANT PACEMAKER       IMPLANT PACEMAKER       INJECT STEROID (LOCATION) N/A 2/19/2015    Procedure: INJECT STEROID (LOCATION);  Surgeon: Siri Koch MD;  Location: [facility] OR     INSERT STIMULATOR AND LEADS INTERNAL DORSAL COLUMN  8/7/2013    Procedure: INSERT STIMULATOR AND LEADS INTERNAL DORSAL COLUMN;  SPINAL CORD STIMULATOR IMPLANT ;  Surgeon: Ricardo Bruno MD;  Location: [facility] OR     INSERT STIMULATOR DORSAL COLUMN  08/13/2013    lead replacemend, 12?2016,  battery replacement 4/4/2016     IR GASTROSTOMY TUBE PERCUTANEOUS PLCMNT  10/29/2015     KNEE SURGERY       LASER CO2 LARYNGOSCOPY N/A 2/19/2015    Procedure: LASER CO2 LARYNGOSCOPY;  Surgeon: Siri Koch MD;  [facility]: [facility]     LASER CO2 LARYNGOSCOPY, COMPLEX  7/22/2014    Procedure: LASER CO2 LARYNGOSCOPY, COMPLEX;  Surgeon: Siri Koch MD;  [facility]     MYECTOMY SEPTAL  4/14/2014    Procedure: Median Sternotomy, Septal Myectomy, Intraoperative BRENT performed by Dr. Castano, on pump oxygenator.;  Surgeon: Aguila Cannon MD;  Location: [facility]     NECK SURGERY       MO SPINE SURGERY PROCEDURE UNLISTED       SHOULDER SURGERY  2006    RIGHT     STOMACH SURGERY  1980    partial gastrectomy for bleeding ulcers, \"stress related\". [facility]     WRIST SURGERY Left 1988    fractured. 1988 or so     Z CARDIAC SURG PROCEDURE UNLIST       Z HAND/FINGER SURGERY UNLISTED       Z SHOULDER SURG PROC UNLISTED       [facility] STOMACH SURGERY PROCEDURE UNLISTED       Family History   Problem Relation Age of Onset     Heart Disease Father 32        MIs x2; s/p CABG     Substance Abuse Father      Hypertension Father      Obesity Father      Hyperlipidemia Father      Hypertension Brother      Diabetes Brother      Glaucoma Maternal Grandmother      Hypertension Maternal Grandmother      Diabetes Maternal Grandfather      " Glaucoma Maternal Grandfather      Hypertension Maternal Grandfather      Cerebrovascular Disease Maternal Grandfather      Obesity Maternal Grandfather      Hyperlipidemia Maternal Grandfather      Coronary Artery Disease Maternal Grandfather      Heart Disease Maternal Grandfather      Substance Abuse Other      Cancer Other      Hypertension Other      Obesity Other      Other Cancer Other         all over     Hyperlipidemia Other      Coronary Artery Disease Other      Obesity Brother      Hyperlipidemia Brother      Lymphoma Maternal Uncle      Diabetes Brother      Depression Daughter      Depression Son      Macular Degeneration No family hx of      Heart Failure Father      Current Outpatient Medications   Medication Sig Dispense Refill     albuterol (PROAIR HFA/PROVENTIL HFA/VENTOLIN HFA) 108 (90 Base) MCG/ACT inhaler Inhale 2 puffs into the lungs every 4 hours as needed       amLODIPine (NORVASC) 5 MG tablet Take 1 tablet by mouth daily at 2 pm       atorvastatin (LIPITOR) 20 MG tablet Take 1 tablet by mouth daily at 2 pm       Atorvastatin Calcium (LIPITOR PO) Take 80 mg by mouth daily        cetirizine (ZYRTEC) 10 MG tablet Take 1 tablet by mouth daily at 2 pm       cloNIDine (CATAPRES) 0.1 MG tablet Take 0.1 mg by mouth 3 times daily       diclofenac (VOLTAREN) 1 % topical gel Apply 2 g topically 2 times daily       docusate sodium (COLACE) 100 MG capsule Take 100 mg by mouth 2 times daily       DULoxetine (CYMBALTA) 60 MG capsule Take 2 capsules (120 mg) by mouth daily 60 capsule 3     furosemide (LASIX) 40 MG tablet TAKE 1/2 A TABLET DAILY       INVOKANA 300 MG tablet Take 300 mg by mouth daily before breakfast       KLOR-CON 10 MEQ CR tablet Take 1 tablet by mouth daily at 2 pm       losartan (COZAAR) 25 MG tablet Take 1 tablet (25 mg) by mouth daily 30 tablet 1     metFORMIN (GLUCOPHAGE) 1000 MG tablet Take 1 tablet by mouth 2 times daily       metFORMIN (GLUCOPHAGE) 500 MG tablet Take 500 mg by  mouth 2 times daily (with meals)  60 tablet      metoprolol succinate ER (TOPROL XL) 100 MG 24 hr tablet Take 1 tablet by mouth daily at 2 pm       OLANZapine (ZYPREXA) 5 MG tablet Take 1 tablet (5 mg) by mouth At Bedtime 30 tablet 0     pantoprazole (PROTONIX) 40 MG EC tablet Take 40 mg by mouth daily before breakfast       rivaroxaban ANTICOAGULANT (XARELTO) 20 MG TABS tablet Take 1 tablet (20 mg) by mouth daily 90 tablet 3     SENEXON-S 8.6-50 MG tablet Take 1 tablet by mouth 2 times daily       tamsulosin (FLOMAX) 0.4 MG capsule Take 0.4 mg by mouth daily       traZODone (DESYREL) 100 MG tablet TAKE 1 TABLET BY MOUTH AT BEDTIME 30 tablet 3     verapamil (CALAN) 80 MG tablet Take 1 tablet (80 mg) by mouth daily 60 tablet 1       Allergies   Allergen Reactions     Betadine Prepstick Plus Hives, Swelling and Rash     From procedure on 2022     Bee Venom Swelling     Lisinopril      TABS  Severe cough     Penicillins Hives and Difficulty breathing     Social History     Socioeconomic History     Marital status: Single     Spouse name: Not on file     Number of children: Not on file     Years of education: Not on file     Highest education level: Not on file   Occupational History     Not on file   Tobacco Use     Smoking status: Every Day     Packs/day: 0.12     Years: 35.00     Pack years: 4.20     Types: Cigarettes     Start date: 3/8/1982     Last attempt to quit: 2014     Years since quittin.9     Smokeless tobacco: Never   Substance and Sexual Activity     Alcohol use: Yes     Alcohol/week: 0.0 standard drinks     Comment: rare     Drug use: No     Comment: last using meth 2017     Sexual activity: Not Currently     Partners: Female     Birth control/protection: Male Surgical   Other Topics Concern     Parent/sibling w/ CABG, MI or angioplasty before 65F 55M? Yes   Social History Narrative     Not on file     Social Determinants of Health     Financial Resource Strain: Not on file   Food  Insecurity: Not on file   Transportation Needs: Not on file   Physical Activity: Not on file   Stress: Not on file   Social Connections: Not on file   Intimate Partner Violence: Not on file   Housing Stability: Not on file       The following portions of the patient's history were reviewed and updated as appropriate: allergies, current medications, past family history, past medical history, past social history, past surgical history and problem list.    Review of Systems  A comprehensive review of systems was negative except for what is noted above    Mental Status Exam  Appearance:  The patient was laying on the couch during the interview.  Slow and somewhat tired but no obvious pain or distress.  No shortness of breath.  Behavior:  He denies having any recent behavioral difficulty.  He was calm with me.  No agitation or irritability.  Speech:  Somewhat monotone.  Answers were consistent but a bit vague.  Not pressured or rambling.  Mood: A bit more flat and slow.  Thought content: No hallucinations or delusions.  He denies such.  Thought formation:  Somewhat concrete and needing occasional prompts but participating adequately.  Associations: Fair.  Tracking fairly well today.  No significant change from the last visit.  No obvious deficits.  Fund of knowledge: Unchanged.  Attention/Concentration: Able to attend to track.  Not disorganized.  No racing thoughts.  Insight: Fair.  No obvious significant change.  Judgment: Impaired, chronically with no recent reports of any change.  No obvious change on exam.  Memory: Impaired  Motor status:  The patient reports no new tremors or asymmetries.  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    Other:   Patient will return to clinic in  to 2 to 3 months.  He agrees  to call or return sooner with any questions or concerns.  Risks and benefits were discussed.  Continue with his individual therapist.     Continue with the support of the clinic, reassurance, and redirection. Staff monitoring and ongoing assessments per team plan. Current psychotropic medication appears to represent the minimum effective dosage and appears medically necessary. We will continue to monitor and reassess. This team will utilize appropriate emergency services if necessary. I will make myself available if concerns or problems arise.    Total time spent with the patient today was 26 minutes with greater than 50% of the time spent in counseling and care coordination. The patient agrees to call before then with any questions, concerns or problems. We will assess for the appropriateness of possible psychotropic medication trials/changes. The patient will seek out appropriate emergency services should that become necessary.    Video Visit Details    Type of service: Video Visit    Video Start Time: 10:50 AM    Video End Time: 11:05 AM    Total time for video call: 15 minutes    Originating Location: Patient's home    Distant Location:  [facility]    Mode of Communication: Video call via Cint    Total time for chart review, interview with family and patient, coordination of care and documentation is 25 minutes.    Patient Instructions   It was nice speaking with you today for our office video visit. The following is a summary of our visit.    General Information:    If lab work was done today as part of your evaluation you will generally be contacted via My Chart, mail, or phone with the results within 1-5 days. If there is an alarming result we will contact you by phone. Lab results come back at varying times, I generally wait until all labs are resulted before making comments on results. Please note labs are automatically released to My Chart once available.     If you  need refills please contact your pharmacist. They will send a refill request to me to review. Please allow 3 business days for us to process all refill requests.     Please call or send a medical message through My Chart, with any questions or concerns.    If you need any paperwork completed please fax forms to [phone]. Please state if you would like a copy of the completed paperwork, mailed or faxed back to the patient and a fax number to fax the paperwork to. Please allow up to 10 business days for paperwork to be completed.    Rolando Burnham MD    Answers for HPI/ROS submitted by the patient on 2/28/2023  If you checked off any problems, how difficult have these problems made it for you to do your work, take care of things at home, or get along with other people?: Not difficult at all  PHQ9 TOTAL SCORE: 12          Again, thank you for allowing me to participate in the care of your patient.        Sincerely,        Rolando Burnham MD

## 2023-02-28 NOTE — PATIENT INSTRUCTIONS
The patient reports he has been a bit more irritable and thoughts are going a bit quicker with the recent elimination of the daytime Seroquel.  We did discuss treatment options.  His tardive movements are unchanged.  He is requesting more aggressive medication management and I am going to increase his at bedtime Zyprexa to 7.5 mg.  We will continue with his other medications and he will continue to follow up with his medical team.  He will return to this clinic for a follow-up in 2 to 3 months.

## 2023-03-01 NOTE — TELEPHONE ENCOUNTER
[facility] Call Center    Phone Message    May a detailed message be left on voicemail: yes     Reason for Call: Other:     Jose C is requesting a call back to discuss coordinating care for Konstantin.    Action Taken: Other: cardio    Travel Screening: Not Applicable     Thank you!  [facility]

## 2023-03-01 NOTE — TELEPHONE ENCOUNTER
Received a call from Jose C, he had a question regarding Mo's medications. He was seen in the ED and Jose C, the pharmacy resident had questions regarding the verapamil and amlodipine. Will discuss with Dr. Castellano/Dr. Lacy on Friday and get back to him. The best callback number on that day is [phone].  Jaida Cabrales RN on 3/1/2023 at 3:54 PM

## 2023-03-01 NOTE — TELEPHONE ENCOUNTER
Called and left Jose C SMITH Provided callback number.  Jaida Cabrales RN on 3/1/2023 at 3:09 PM

## 2023-03-03 NOTE — TELEPHONE ENCOUNTER
Clarified medications with Nely Dietz NP. Mo can stop the verapamil and increase his amlodipine to 10mg daily. Will send patient a message and update medication list.  Jaida Cabrales RN on 3/3/2023 at 1:47 PM

## 2023-03-07 NOTE — TELEPHONE ENCOUNTER
Medication refill request for traZODone (DESYREL) 100 MG tablet.     Last Written Prescription Date:  8/25/2022  Last Fill Quantity: 30,  # refills: 3  Last office visit provider:  2/28/2023  Next appointment scheduled: 5/4/2023    Medication T'd for review and signature    JOSH Bansal on 3/7/2023 at 11:55 AM

## 2023-03-10 NOTE — TELEPHONE ENCOUNTER
[facility] Call Center    Phone Message    May a detailed message be left on voicemail: yes     Reason for Call: Medication Refill Request    Has the patient contacted the pharmacy for the refill? Yes   Name of medication being requested: metFORMIN (GLUCOPHAGE) 1000 MG tablet  Provider who prescribed the medication: N/A  Pharmacy:    [id number] - [place], [place] - [address]  Date medication is needed: 3/10/23    Action Taken: Message routed to:  Other: Cardiology    Travel Screening: Not Applicable     Thank you!  [facility]

## 2023-03-10 NOTE — TELEPHONE ENCOUNTER
metFORMIN (GLUCOPHAGE) 1000 MG tablet  Last Written Prescription Date:  unknown  Last Fill Quantity: unknown,   # refills: unknown  Last Office Visit :2/23/23  Future Office visit:  8/8/23    Routing refill request to provider for review/approval because:request per pt call. Not on protocol. not on med list.

## 2023-03-10 NOTE — TELEPHONE ENCOUNTER
Health Call Center    Phone Message    May a detailed message be left on voicemail: yes     Reason for Call: Medication Question or concern regarding medication   Prescription Clarification  Name of Medication: amLODIPine (NORVASC) 10 MG tablet  Prescribing Provider: Khadar Chavez   [facility]:    [facility]  [id number] - [place], [place] - [address] [id number] [facility] - [place], [place] - [address]   What on the order needs clarification? Home health nurse needs an updated order for this medication.    Action Taken: Message routed to:  Other: Cardiology    Travel Screening: Not Applicable     Thank you!  [facility]

## 2023-03-17 NOTE — TELEPHONE ENCOUNTER
Spoke with Chase and confirmed that pt is to stop Verapamil and increase his dose of Amlodipine to 10 mg daily per Nely (see telephone encounter from 3/1/23).    Kayode Mueller, RN   Cardiology Nurse Coordinator

## 2023-03-17 NOTE — TELEPHONE ENCOUNTER
Health Call Center    Phone Message    May a detailed message be left on voicemail: yes     Reason for Call: Medication Question or concern regarding medication   Prescription Clarification  Name of Medication: amLODIPine (NORVASC) 10 MG tablet [71775]  Prescribing Provider: Nely Slaughter   What on the order needs clarification? Chase called to get a verbal confirmation for a med change from Konstantin. Please call Chase at [facility] to give verbal confirmation. Thank you!          Action Taken: Other: Cardiology    Travel Screening: Not Applicable     Thank you!  [facility]

## 2023-03-20 NOTE — TELEPHONE ENCOUNTER
Refill request for duloxetine HCL DR 60 MG  Last follow-up 2/28/23; next follow-up 5/4/23  Medication T'd for review and signature  SAJAN Lundberg ATC on 3/20/2023 at 2:28 PM    DULoxetine (CYMBALTA) 60 MG capsule 60 capsule 3 10/13/2022  --   Sig - Route: Take 2 capsules (120 mg) by mouth daily - Oral

## 2023-03-23 NOTE — CONFIDENTIAL NOTE
Clinical history: 53 M HOCM status post septal myectomy and ICD placement, paroxysmal atrial fibrillation,  mild non obstructive CAD on coronary CT in 2022, chest pain  Comparison CMR: 2014     1. The LV is normal in cavity size. The global systolic function is low-normal. The LVEF is 54%. There is  moderate asymmetric septal hypertrophy with maximal wall thickness of 1.9 cm in the mid septum, s/p septal  myectomy with associated septal hypokinesis. 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      2. The RV is normal in cavity size. The global systolic function is normal. The RVEF is 61%.      3. Both atria are normal in size.     4. There is mild AI. There is no mitral valve FADIA or resting LVOT obstruction noted. There is mild chordal  FADIA.      5. Late gadolinium enhancement imaging shows mild mid-myocardial enhancement in the apical septum.      6. Regadenoson stress perfusion imaging shows no ischemia suggestive of epicardial stenosis. There is slow  filling in a circumferential pattern that is suggestive of microvascular ischemia.      7. There is no pericardial effusion or thickening.     8. There is no LV thrombus.     CONCLUSIONS: HOCM s/p myectomy. Low-normal function, LVEF 54% and RVEF 61%, no mitral valve FADIA or resting  LVOT obstruction noted, minimal scar. Stress perfusion is suggestive of microvascular ischemia without  epicardial stenosis. Compared to prior study from 2014, s/p myectomy with resolution of resting  obstruction.       Appears to be microvascular disease based on the MRI. We can increase his Amlodipine to start if we have BP room. Can we double check to see if he's taking his Losartan? If not, I would start that before increasing Amlodipine.

## 2023-03-24 NOTE — TELEPHONE ENCOUNTER
Spoke with Mo about his cMRI results. He is all ready taking amlodipine 10 mg everyday. He states he has also been taking his losartan and metoprolol as prescribed.   Will increase losartan from 25 mg to 50, call in 2 weeks and see if his chest pain symptoms are relieved.

## 2023-04-05 NOTE — TELEPHONE ENCOUNTER
S-(situation): patient was seen by Dr. Girffiths for evaluation of chest pain in February. A cardiac MRI was ordered for patient with the following results,    CONCLUSIONS: HOCM s/p myectomy. Low-normal function, LVEF 54% and RVEF 61%, no mitral valve FADIA or resting  LVOT obstruction noted, minimal scar. Stress perfusion is suggestive of microvascular ischemia without  epicardial stenosis. Compared to prior study from 2014, s/p myectomy with resolution of resting  Obstruction.    Dr. Griffiths increased his losartan form 25 mg to 50 mg at that time.  Today he states that he has noticed no difference in intensity or frequency of his chest pain after the increase.    B-(background): Today he reports that he was recently in the ED with chest pain and underwent a work up that was unremarkable. He does report that he continues to have chest pain and describes it as sharp pressure with exertion in his chest that resolves after 15 minutes of rest or abstaining from the activity. He reports that the last time this happened was this monday.     He does have known history of mild non obstructive CAD diagnosed by a coronary CT that was performed just last August in 2022. There were no high grade lesions noted.    A-(assessment): remains symptomatic (chest pain) with increase in ARB    R-(recommendations): follow up with Dr. Castellano.

## 2023-04-25 PROBLEM — R07.9 CHEST PAIN, UNSPECIFIED TYPE: Status: ACTIVE | Noted: 2023-01-01

## 2023-04-25 PROBLEM — E87.29 HIGH ANION GAP METABOLIC ACIDOSIS: Status: ACTIVE | Noted: 2023-01-01

## 2023-04-25 PROBLEM — R73.9 HYPERGLYCEMIA: Status: ACTIVE | Noted: 2023-01-01

## 2023-04-25 NOTE — ED NOTES
Bed: ED19  Expected date:   Expected time:   Means of arrival:   Comments:   14: 53M Chest pain, aspirin & nitroglycerin given in route

## 2023-04-25 NOTE — ED TRIAGE NOTES
biba for CP for 40 minutes  Nitroglycerin, asa given en route with relief  Feels like pressure  Cardiac hx  EKG paced, medtronic       Triage Assessment     Row Name 04/25/23 0621       Triage Assessment (Adult)    Airway WDL WDL       Respiratory WDL    Respiratory WDL WDL       Skin Circulation/Temperature WDL    Skin Circulation/Temperature WDL WDL       Cardiac WDL    Cardiac WDL X;chest pain       Peripheral/Neurovascular WDL    Peripheral Neurovascular WDL WDL       Cognitive/Neuro/Behavioral WDL    Cognitive/Neuro/Behavioral WDL WDL

## 2023-04-25 NOTE — ED PROVIDER NOTES
"    [place] EMERGENCY DEPARTMENT ([facility])    4/25/23       ED PROVIDER NOTE  ED19      History     Chief Complaint   Patient presents with     Chest Pain     HPI  Konstantin Sharp is a 53 year old male with a past medical history significant for hypertrophic nonobstructive cardiomyopathy s/p septal myectomy (2014), s/p dual ICD in place (2014, reimplantation 07/2022), NSTEMI (2014), MI (1996), atrial fibrillation (anticoagulated on Xarelto), DVT, COPD (c/b admission 6/15/21-8/29/21 for respiratory failure requiring 3 months of tracheostomy), hypoxic brain injury, TBI, seizures, large bowel obstruction, and DM2 with long-term use of insulin c/b diabetic neuropathy presenting to the ED via EMS for evaluation of chest pain.  Patient states that he was initially experiencing chest pressure, lightheadedness, and dizziness.  He reports that this had been going on for 40 minutes prior to his arrival to the ED. Nitroglycerin and aspirin were administered by EMS en route, which patient reports helped relieve his chest pressure temporarily but he now feels lightheaded. He still has some chest pressure that is getting worse again. Patient states that he is having shortness of breath. He reports becoming \"clammy\" before the onset of his symptoms.  He does have a history of COPD, but states that this does not feel like his typical symptoms.  Patient notes that he has a history of DM2, which he recently received an insulin pump for. He has noticed that his blood sugars have been running higher than typical recently but his doctors are still trying to get the insulin dosing right.  He also notes that he has recently lost about 40 pounds.  Patient reports taking his BP medications as prescribed.  He does not have a history of cardiac stent placement but reports that he was informed that he has small vessel disease.  Patient reports that he did consume alcohol earlier today, however he does not typically drink.  He has no " "history of withdrawal seizures. He has no history of heart failure. Patient denies cough, fever, nausea, or vomiting.      Past Medical History  Past Medical History:   Diagnosis Date     Atrial fibrillation (H)      Chest pain     intermittent     Chronic pain      Cognitive disorder     memory loss     Depressive disorder      Dual ICD (implantable cardiac defibrillator) in place 04/29/2014    and pacemaker     GERD (gastroesophageal reflux disease)      H/O traumatic brain injury 2015     Heart attack (H) 1996     HTN (hypertension)      Hypertrophic nonobstructive cardiomyopathy (H) 09/12/2012    s/p ventricular myectomy     Inflammatory arthritis      Intermittent asthma      PAD (peripheral artery disease) (H)      Polysubstance abuse (H)      Seizures (H)     last episode 2014 when \"blood sugar dropped\" according to patient     Sleep apnea     doesn't use cpap     Type 2 diabetes mellitus without complications (H)      Past Surgical History:   Procedure Laterality Date     APPENDECTOMY  1980    [facility]     BACK SURGERY       COLONOSCOPY  2017     DISCECTOMY LUMBAR POSTERIOR MICROSCOPIC THREE+ LEVELS  12/16/2011    Procedure:DISCECTOMY LUMBAR POSTERIOR MICROSCOPIC THREE+ LEVELS; Posterior Decompression L2-S1; Surgeon:CAITIE FUNEZ; Location:[facility] OR     EP ICD GENERATOR REPLACEMENT DUAL N/A 7/18/2022    Procedure: Implantable Cardioverter Defibrillator Generator Replacement Dual;  Surgeon: Ritesh Lacy MD;  Location: [facility] OR     FUSION CERVICAL POSTERIOR ONE LEVEL  8/24/2012    Procedure: FUSION CERVICAL POSTERIOR ONE LEVEL;  Posterior Instrumentated Spinal Fusion Cervical 6-7, Right Iliac Crest Bone Rembert ;  Surgeon: Caitie Funez MD;  Location: [facility] OR     GRAFT BONE FROM ILIAC CREST  8/24/2012    Procedure: GRAFT BONE FROM ILIAC CREST;;  Surgeon: Caitie Funez MD;  Location: [facility] OR     HEAD & NECK SURGERY  2009     IMPLANT PACEMAKER       IMPLANT PACEMAKER   " "    INJECT STEROID (LOCATION) N/A 2/19/2015    Procedure: INJECT STEROID (LOCATION);  Surgeon: Siri Koch MD;  Location: [facility] OR     INSERT STIMULATOR AND LEADS INTERNAL DORSAL COLUMN  8/7/2013    Procedure: INSERT STIMULATOR AND LEADS INTERNAL DORSAL COLUMN;  SPINAL CORD STIMULATOR IMPLANT ;  Surgeon: Ricardo Bruno MD;  Location: [facility] OR     INSERT STIMULATOR DORSAL COLUMN  08/13/2013    lead replacemend, 12?2016,  battery replacement 4/4/2016     IR GASTROSTOMY TUBE PERCUTANEOUS PLCMNT  10/29/2015     KNEE SURGERY       LASER CO2 LARYNGOSCOPY N/A 2/19/2015    Procedure: LASER CO2 LARYNGOSCOPY;  Surgeon: Siri Koch MD;  [facility] OR     LASER CO2 LARYNGOSCOPY, COMPLEX  7/22/2014    Procedure: LASER CO2 LARYNGOSCOPY, COMPLEX;  Surgeon: Siri Koch MD;  [facility]     MYECTOMY SEPTAL  4/14/2014    Procedure: Median Sternotomy, Septal Myectomy, Intraoperative BRENT performed by Dr. Castano, on pump oxygenator.;  Surgeon: Aguila Cannon MD;  Location: [facility] OR     NECK SURGERY       [place] SPINE SURGERY PROCEDURE UNLISTED       SHOULDER SURGERY  2006    RIGHT     STOMACH SURGERY  1980    partial gastrectomy for bleeding ulcers, \"stress related\". [facility]     WRIST SURGERY Left 1988    fractured. 1988 or so     [facility] CARDIAC SURG PROCEDURE UNLIST       [facility] HAND/FINGER SURGERY UNLISTED       [facility] SHOULDER SURG PROC UNLISTED       [facility] STOMACH SURGERY PROCEDURE UNLISTED       albuterol (PROAIR HFA/PROVENTIL HFA/VENTOLIN HFA) 108 (90 Base) MCG/ACT inhaler  amLODIPine (NORVASC) 10 MG tablet  atorvastatin (LIPITOR) 20 MG tablet  Atorvastatin Calcium (LIPITOR PO)  cetirizine (ZYRTEC) 10 MG tablet  cloNIDine (CATAPRES) 0.1 MG tablet  diclofenac (VOLTAREN) 1 % topical gel  docusate sodium (COLACE) 100 MG capsule  DULoxetine (CYMBALTA) 60 MG capsule  furosemide (LASIX) 40 MG tablet  INVOKANA 300 MG tablet  KLOR-CON 10 MEQ CR tablet  losartan (COZAAR) 50 MG tablet  metFORMIN " (GLUCOPHAGE) 1000 MG tablet  metFORMIN (GLUCOPHAGE) 500 MG tablet  metoprolol succinate ER (TOPROL XL) 100 MG 24 hr tablet  OLANZapine (ZYPREXA) 5 MG tablet  pantoprazole (PROTONIX) 40 MG EC tablet  rivaroxaban ANTICOAGULANT (XARELTO) 20 MG TABS tablet  SENEXON-S 8.6-50 MG tablet  tamsulosin (FLOMAX) 0.4 MG capsule  traZODone (DESYREL) 100 MG tablet      Allergies   Allergen Reactions     Betadine Prepstick Plus Hives, Swelling and Rash     From procedure on 2022     Bee Venom Swelling     Lisinopril      TABS  Severe cough     Penicillins Hives and Difficulty breathing     Family History  Family History   Problem Relation Age of Onset     Heart Disease Father 32        MIs x2; s/p CABG     Substance Abuse Father      Hypertension Father      Obesity Father      Hyperlipidemia Father      Hypertension Brother      Diabetes Brother      Glaucoma Maternal Grandmother      Hypertension Maternal Grandmother      Diabetes Maternal Grandfather      Glaucoma Maternal Grandfather      Hypertension Maternal Grandfather      Cerebrovascular Disease Maternal Grandfather      Obesity Maternal Grandfather      Hyperlipidemia Maternal Grandfather      Coronary Artery Disease Maternal Grandfather      Heart Disease Maternal Grandfather      Substance Abuse Other      Cancer Other      Hypertension Other      Obesity Other      Other Cancer Other         all over     Hyperlipidemia Other      Coronary Artery Disease Other      Obesity Brother      Hyperlipidemia Brother      Lymphoma Maternal Uncle      Diabetes Brother      Depression Daughter      Depression Son      Macular Degeneration No family hx of      Heart Failure Father      Social History   Social History     Tobacco Use     Smoking status: Every Day     Packs/day: 0.12     Years: 35.00     Pack years: 4.20     Types: Cigarettes     Start date: 3/8/1982     Last attempt to quit: 2014     Years since quittin.0     Smokeless tobacco: Never   Substance Use  Topics     Alcohol use: Yes     Alcohol/week: 0.0 standard drinks of alcohol     Comment: rare     Drug use: No     Comment: last using meth 1/2017      Past medical history, past surgical history, medications, allergies, family history, and social history were reviewed with the patient. No additional pertinent items.      A medically appropriate review of systems was performed with pertinent positives and negatives noted in the HPI, and all other systems negative.    Physical Exam   BP: (!) 160/96  Pulse: 106  Temp: 99.7  F (37.6  C)  Resp: 20  SpO2: 98 %  Physical Exam  Vitals and nursing note reviewed.   Constitutional:       General: He is in acute distress.      Appearance: He is well-developed. He is obese. He is ill-appearing and diaphoretic.   HENT:      Head: Normocephalic and atraumatic.      Nose: Nose normal.      Mouth/Throat:      Mouth: Mucous membranes are moist.   Eyes:      General: No scleral icterus.     Conjunctiva/sclera: Conjunctivae normal.   Cardiovascular:      Rate and Rhythm: Regular rhythm. Tachycardia present.   Pulmonary:      Effort: Pulmonary effort is normal. No respiratory distress.      Breath sounds: No stridor.   Abdominal:      General: There is no distension.      Palpations: Abdomen is soft.      Tenderness: There is no abdominal tenderness. There is no guarding or rebound.   Musculoskeletal:         General: No deformity or signs of injury. Normal range of motion.      Cervical back: Normal range of motion and neck supple. No rigidity.   Skin:     General: Skin is warm.      Coloration: Skin is not jaundiced or pale.      Findings: No rash.   Neurological:      General: No focal deficit present.      Mental Status: He is alert and oriented to person, place, and time.   Psychiatric:         Mood and Affect: Mood normal.         Behavior: Behavior normal.         Thought Content: Thought content normal.           ED Course, Procedures, & Data     6:06 PM  The patient was seen  and examined by Chikis Espino   in Room ED19.     Procedures  Results for orders placed during the hospital encounter of 04/25/23    POC US CHEST B-SCAN    Impression  Limited Bedside Lung Ultrasound, performed and interpreted by me.  Indication: cp, sob, lightheaded    With the patient positioned upright, bilateral anterior and lung fields were examined for evidence of pulmonary consolidation and pulmonary edema.      Findings: Bilateral apical lung fields showed A-lines consisting with normal lung artifact. No B-lines.    IMPRESSION: Normal lung ultrasound without evidence of apical pneumonia or pulmonary edema.      POC US ECHO LIMITED    Impression  Limited Bedside Cardiac Ultrasound, performed and interpreted by me.  Indication: Chest Pain, Shortness of Breath and Abnormal EKG.  Parasternal long axis, parasternal short axis, apical 4 chamber and subcostal views were acquired.  Image quality was satisfactory.    Findings:  Global left ventricular function appears intact.  Chambers do not appear dilated.  There is no evidence of free fluid within the pericardium.    IMPRESSION: Grossly normal left ventricular function and chamber size.  No pericardial effusion..       ED Course Selections:        EKG Interpretation:      Interpreted by Chikis Espino MD  Time reviewed: 1758  Symptoms at time of EKG: CP   Rhythm: paced  Rate: Tachycardia  Axis: Left Axis Deviation  Ectopy: none  Conduction: nonspecific interventricular conduction block  ST Segments/ T Waves: Non-specific ST-T wave changes  Q Waves: nonspecific  Comparison to prior: increased anterior RICHARD from previous EKGs    Clinical Impression: non-specific EKG and paced rhythm                           Results for orders placed or performed during the hospital encounter of 04/25/23   POC US ECHO LIMITED     Status: None    Impression    Limited Bedside Cardiac Ultrasound, performed and interpreted by me.   Indication: Chest Pain, Shortness of Breath and  Abnormal EKG.  Parasternal long axis, parasternal short axis, apical 4 chamber and subcostal views were acquired.   Image quality was satisfactory.    Findings:    Global left ventricular function appears intact.  Chambers do not appear dilated.  There is no evidence of free fluid within the pericardium.    IMPRESSION: Grossly normal left ventricular function and chamber size.  No pericardial effusion..     XR Chest Port 1 View     Status: None (Preliminary result)    Impression    RESIDENT PRELIMINARY INTERPRETATION  Impression: No acute cardiopulmonary findings.   POC US CHEST B-SCAN (PTX/Edema/PNA)     Status: None    Impression    Limited Bedside Lung Ultrasound, performed and interpreted by me.   Indication: cp, sob, lightheaded    With the patient positioned upright, bilateral anterior and lung fields were examined for evidence of pulmonary consolidation and pulmonary edema.       Findings: Bilateral apical lung fields showed A-lines consisting with normal lung artifact. No B-lines.     IMPRESSION: Normal lung ultrasound without evidence of apical pneumonia or pulmonary edema.     Glucose by meter     Status: Abnormal   Result Value Ref Range    GLUCOSE BY METER POCT 336 (H) 70 - 99 mg/dL   Troponin T, High Sensitivity     Status: Normal   Result Value Ref Range    Troponin T, High Sensitivity 20 <=22 ng/L   CBC with platelets     Status: Normal   Result Value Ref Range    WBC Count 10.2 4.0 - 11.0 10e3/uL    RBC Count 5.16 4.40 - 5.90 10e6/uL    Hemoglobin 16.0 13.3 - 17.7 g/dL    Hematocrit 47.9 40.0 - 53.0 %    MCV 93 78 - 100 fL    MCH 31.0 26.5 - 33.0 pg    MCHC 33.4 31.5 - 36.5 g/dL    RDW 12.7 10.0 - 15.0 %    Platelet Count 272 150 - 450 10e3/uL   Comprehensive metabolic panel     Status: Abnormal   Result Value Ref Range    Sodium 137 136 - 145 mmol/L    Potassium 4.5 3.4 - 5.3 mmol/L    Chloride 98 98 - 107 mmol/L    Carbon Dioxide (CO2) 16 (L) 22 - 29 mmol/L    Anion Gap 23 (H) 7 - 15 mmol/L    Urea  Nitrogen 12.6 6.0 - 20.0 mg/dL    Creatinine 0.82 0.67 - 1.17 mg/dL    Calcium 9.4 8.6 - 10.0 mg/dL    Glucose 345 (H) 70 - 99 mg/dL    Alkaline Phosphatase 83 40 - 129 U/L    AST 47 10 - 50 U/L    ALT 23 10 - 50 U/L    Protein Total 7.1 6.4 - 8.3 g/dL    Albumin 4.3 3.5 - 5.2 g/dL    Bilirubin Total 0.3 <=1.2 mg/dL    GFR Estimate >90 >60 mL/min/1.73m2   INR     Status: Normal   Result Value Ref Range    INR 1.00 0.85 - 1.15   Partial thromboplastin time     Status: Normal   Result Value Ref Range    aPTT 28 22 - 38 Seconds   iStat Troponin, POCT     Status: Normal   Result Value Ref Range    TROPPC POCT 0.01 <=0.12 ug/L   Extra Tube ([place] Draw)     Status: None    Narrative    The following orders were created for panel order Extra Tube ([place] Draw).  Procedure                               Abnormality         Status                     ---------                               -----------         ------                     Extra Red Top Tube[[id number]]                               Final result                 Please view results for these tests on the individual orders.   Extra Red Top Tube     Status: None   Result Value Ref Range    Hold Specimen JI    Ethyl Alcohol Level     Status: Abnormal   Result Value Ref Range    Alcohol ethyl 0.05 (H) <=0.01 g/dL   Lipase     Status: Normal   Result Value Ref Range    Lipase 47 13 - 60 U/L   Magnesium     Status: Normal   Result Value Ref Range    Magnesium 2.1 1.7 - 2.3 mg/dL   Basic metabolic panel     Status: Abnormal   Result Value Ref Range    Sodium 139 136 - 145 mmol/L    Potassium 4.5 3.4 - 5.3 mmol/L    Chloride 99 98 - 107 mmol/L    Carbon Dioxide (CO2) 13 (L) 22 - 29 mmol/L    Anion Gap 27 (H) 7 - 15 mmol/L    Urea Nitrogen 13.2 6.0 - 20.0 mg/dL    Creatinine 0.82 0.67 - 1.17 mg/dL    Calcium 9.7 8.6 - 10.0 mg/dL    Glucose 340 (H) 70 - 99 mg/dL    GFR Estimate >90 >60 mL/min/1.73m2   Ketone Beta-Hydroxybutyrate Quantitative     Status: Normal   Result  Value Ref Range    Ketone (Beta-Hydroxybutyrate) Quantitative 0.30 <=0.30 mmol/L   Lactic acid whole blood     Status: Normal   Result Value Ref Range    Lactic Acid 2.0 0.7 - 2.0 mmol/L   Blood gas venous     Status: Abnormal   Result Value Ref Range    pH Venous 7.42 7.32 - 7.43    pCO2 Venous 43 40 - 50 mm Hg    pO2 Venous 57 (H) 25 - 47 mm Hg    Bicarbonate Venous 28 21 - 28 mmol/L    Base Excess/Deficit (+/-) 2.9 (H) -7.7 - 1.9 mmol/L    FIO2 0    Troponin T, High Sensitivity     Status: Abnormal   Result Value Ref Range    Troponin T, High Sensitivity 26 (H) <=22 ng/L   Osmolality     Status: Abnormal   Result Value Ref Range    Osmolality Blood 299 (H) 275 - 295 mmol/kg    Narrative    Greater than 385 mmol/kg relates to stupor in hyperglycemia   Greater than 400 mmol/kg can relate to seizures   Greater than 420 mmol/kg can be lethal    Serum Osmalar Gap:   Normal <10   Larger suggest unmeasured substances present in serum (ethanol, methanol, isopropanol, mannitol, ethylene glycol).   Nt probnp inpatient (BNP)     Status: Normal   Result Value Ref Range    N terminal Pro BNP Inpatient 233 0 - 900 pg/mL   iStat Gases (lactate) venous, POCT     Status: Abnormal   Result Value Ref Range    Lactic Acid POCT 1.7 <=2.0 mmol/L    Bicarbonate Venous POCT 28 21 - 28 mmol/L    O2 Sat, Venous POCT 88 (L) 94 - 100 %    pCO2V Venous POCT 45 40 - 50 mm Hg    pH Venous POCT 7.40 7.32 - 7.43    pO2 Venous POCT 55 (H) 25 - 47 mm Hg   EKG 12-lead, tracing only     Status: None   Result Value Ref Range    Systolic Blood Pressure  mmHg    Diastolic Blood Pressure  mmHg    Ventricular Rate 112 BPM    Atrial Rate 111 BPM    TX Interval  ms    QRS Duration 178 ms     ms    QTc 543 ms    P Axis  degrees    R AXIS -87 degrees    T Axis 81 degrees    Interpretation ECG       Wide QRS rhythm  Left axis deviation  Non-specific intra-ventricular conduction block  Lateral infarct , age undetermined  Inferior infarct , age  undetermined  Abnormal ECG  Unconfirmed report - interpretation of this ECG is computer generated - see medical record for final interpretation  Confirmed by - EMERGENCY ROOM, PHYSICIAN ([id number]),  TRICE GARCIA ([id number]) on 4/25/2023 8:12:34 PM     Cardiac Device Check - Remote     Status: None (In process)   Result Value Ref Range    Date Time Interrogation Session [id number]     Implantable Pulse Generator  Medtronic     Implantable Pulse Generator Model [id number] Cobalt XT DR MRI     Implantable Pulse Generator Serial Number [id number]     Type Interrogation Session Remote     Clinic Name [facility]     Implantable Pulse Generator Type Defibrillator     Implantable Pulse Generator Implant Date 20220718     Implantable Lead  Medtronic     Implantable Lead Model 5076 CapSureFix Novus MRI SureScan     Implantable Lead Serial Number [id number]     Implantable Lead Implant Date 20220718     Implantable Lead Polarity Type Bipolar Lead     Implantable Lead Location Detail 1 APPENDAGE     Implantable Lead Special Function 52 cm     Implantable Lead Location Right Atrium     Implantable Lead  Medtronic     Implantable Lead Model 6935M Sprint Quattro Secure S     Implantable Lead Serial Number [id number]     Implantable Lead Implant Date 20220718     Implantable Lead Polarity Type Tripolar Lead     Implantable Lead Location Detail 1 SEPTUM     Implantable Lead Special Function 62 cm     Implantable Lead Location Right Ventricle     Abdoulaye Setting Mode (NBG Code) DDDR     Abdoulaye Setting Lower Rate Limit 60 [beats]/min    Abdoulaye Setting Maximum Tracking Rate 140 [beats]/min    Abdoulaye Setting Maximum Sensor Rate 120 [beats]/min    Abdoulaye Setting NANCY Delay Low 150 ms    Abdoulaye Setting PAV Delay Low 180 ms    Abdoulaye Setting AT Mode Switch Rate 171 [beats]/min    Lead Channel Setting Sensing Polarity Bipolar     Lead Channel Setting Sensing Anode Location Right Atrium      Lead Channel Setting Sensing Anode Terminal Ring     Lead Channel Setting Sensing Cathode Location Right Atrium     Lead Channel Setting Sensing Cathode Terminal Tip     Lead Channel Setting Sensing Sensitivity 0.3 mV    Lead Channel Setting Sensing Polarity Bipolar     Lead Channel Setting Sensing Anode Location Right Ventricle     Lead Channel Setting Sensing Anode Terminal Ring     Lead Channel Setting Sensing Cathode Location Right Ventricle     Lead Channel Setting Sensing Cathode Terminal Tip     Lead Channel Setting Sensing Sensitivity 0.3 mV    Lead Channel Setting Pacing Polarity Bipolar     Lead Channel Setting Pacing Anode Location Right Atrium     Lead Channel Setting Pacing Anode Terminal Ring     Lead Channel Setting Sensing Cathode Location Right Atrium     Lead Channel Setting Sensing Cathode Terminal Tip     Lead Channel Setting Pacing Pulse Width 0.4 ms    Lead Channel Setting Pacing Amplitude 1.5 V    Lead Channel Setting Pacing Capture Mode Adaptive     Lead Channel Setting Pacing Polarity Bipolar     Lead Channel Setting Pacing Anode Location Right Ventricle     Lead Channel Setting Pacing Anode Terminal Ring     Lead Channel Setting Sensing Cathode Location Right Ventricle     Lead Channel Setting Sensing Cathode Terminal Tip     Lead Channel Setting Pacing Pulse Width 0.4 ms    Lead Channel Setting Pacing Amplitude 2.0 V    Lead Channel Setting Pacing Capture Mode Adaptive     Zone Setting Type Category VF     Zone Setting Detection Interval 270 ms    Zone Setting Detection Beats Numerator 30 [beats]    Zone Setting Detection Beats Denominator 40 [beats]    Zone Setting Type Category VT     Zone Setting Type Category VT     Zone Setting Detection Interval 360 ms    Zone Setting Type Category VT     Zone Setting Detection Interval 400 ms    Zone Setting Type Category ATRIAL_FIBRILLATION     Zone Setting Type Category AT/AF     Zone Setting Detection Interval 350 ms    Lead Channel Impedance  Value 475 ohm    Lead Channel Sensing Intrinsic Amplitude 5.1 mV    Lead Channel Pacing Threshold Amplitude 0.875 V    Lead Channel Pacing Threshold Pulse Width 0.4 ms    Lead Channel Impedance Value 342 ohm    Lead Channel Impedance Value 475 ohm    Lead Channel Sensing Intrinsic Amplitude 21.6 mV    Lead Channel Pacing Threshold Amplitude 0.625 V    Lead Channel Pacing Threshold Pulse Width 0.4 ms    Battery Date Time of Measurements [id number]     Battery RRT Trigger 2.8 V     Battery Remaining Longevity 120 mo    Battery Voltage 3.01 V    Capacitor Charge Type Shock     Capacitor Last Charge Date Time [id number]     Capacitor Charge Time 4.0 s    Capacitor Charge Energy 18.0 J    Abdoulaye Statistic Date Time Start 20230321123427     Abdoulaye Statistic Date Time End 20230425221727     Abdoulaye Statistic RA Percent Paced 7.41 %    Abdoulaye Statistic RV Percent Paced 99.9 %    Abdoulaye Statistic AP  Percent 7.38 %    Abdoulaye Statistic AS  Percent 92.53 %    Abdoulaye Statistic AP VS Percent 0 %    Abdoulaye Statistic AS VS Percent 0.09 %    CRT Statistic Date Time Start [id number]     CRT Statistic Date Time End 20230425221727     Atrial Tachy Statistic Date Time Start 20230321123427     Atrial Tachy Statistic Date Time End 20230425221727     Atrial Tachy Statistic AT/AF [place] Percent 0 %    Therapy Statistic Recent Shocks Delivered 0     Therapy Statistic Recent Shocks Aborted 0     Therapy Statistic Recent ATP Delivered 0     Therapy Statistic Recent Date Time Start 20230321123427     Therapy Statistic Recent Date Time End 20230425221727     Therapy Statistic Total Shocks Delivered 0     Therapy Statistic Total Shocks Aborted 0     Therapy Statistic Total ATP Delivered 0     Therapy Statistic Total  Date Time Start [id number]     Therapy Statistic Total  Date Time End 20230425221727     Episode Statistic Recent Count 0     Episode Statistic Type Category AT/AF     Episode Statistic Recent Count 0     Episode  Statistic Type Category Monitor     Episode Statistic Recent Count 0     Episode Statistic Type Category Patient Activated     Episode Statistic Recent Count 0     Episode Statistic Type Category SVT     Episode Statistic Recent Count 0     Episode Statistic Type Category VT     Episode Statistic Recent Count 0     Episode Statistic Type Category VF     Episode Statistic Recent Count 0     Episode Statistic Type Category VT     Episode Statistic Recent Count 0     Episode Statistic Type Category VT     Episode Statistic Recent Count 0     Episode Statistic Type Category VT     Episode Statistic Recent Date Time Start [id number]     Episode Statistic Recent Date Time End [id number]     Episode Statistic Recent Date Time Start [id number]     Episode Statistic Recent Date Time End [id number]     Episode Statistic Recent Date Time Start [id number]     Episode Statistic Recent Date Time End [id number]     Episode Statistic Recent Date Time Start 28178650218064     Episode Statistic Recent Date Time End 72629761032945     Episode Statistic Recent Date Time Start [id number]     Episode Statistic Recent Date Time End 91001514273801     Episode Statistic Recent Date Time Start 45129547795205     Episode Statistic Recent Date Time End [id number]     Episode Statistic Recent Date Time Start 12533658724486     Episode Statistic Recent Date Time End 79121860069171     Episode Statistic Recent Date Time Start 81347250645300     Episode Statistic Recent Date Time End 78420482537020     Episode Statistic Recent Date Time Start 67823990580692     Episode Statistic Recent Date Time End 54951686994188     Episode Statistic Total Count 0     Episode Statistic Type Category AT/AF     Episode Statistic Total Count 0     Episode Statistic Type Category Monitor     Episode Statistic Total Count 0     Episode Statistic Type Category Patient Activated     Episode Statistic Total Count 0     Episode Statistic  Type Category SVT     Episode Statistic Total Count 0     Episode Statistic Type Category VT     Episode Statistic Total Count 0     Episode Statistic Type Category VF     Episode Statistic Total Count 0     Episode Statistic Type Category VT     Episode Statistic Total Count 0     Episode Statistic Type Category VT     Episode Statistic Total Count 0     Episode Statistic Type Category VT     Episode Statistic Total Date Time Start [id number]     Episode Statistic Total Date Time End 49839215005173     Episode Statistic Total Date Time Start 83576209935081     Episode Statistic Total Date Time End 36679077788044     Episode Statistic Total Date Time Start 20220718131417     Episode Statistic Total Date Time End 51722762239574     Episode Statistic Total Date Time Start 55614649524561     Episode Statistic Total Date Time End 19182045073315     Episode Statistic Total Date Time Start 20220718131417     Episode Statistic Total Date Time End 01106254668209     Episode Statistic Total Date Time Start 20220718131417     Episode Statistic Total Date Time End 13075323469340     Episode Statistic Total Date Time Start 17778335631110     Episode Statistic Total Date Time End 77494014819243     Episode Statistic Total Date Time Start 97235744766976     Episode Statistic Total Date Time End 74421215628348     Episode Statistic Total Date Time Start 74720931433471     Episode Statistic Total Date Time End 08242570832101     Narrative    [employer] remote ICD transmission received from [facility] ED and reviewed. Device transmission sent per MD orders.     Device: Medtronic [id number] [place] XT DR MRI  Normal Device Function.  Mode: DDDR /120 bpm  AP: 7.4%  : 99.9%  Presenting EGM: AS/ @ 91 bpm  Thoracic Impedance: Near reference line.   Short V-V intervals: 0  Lead Trends Appear Stable: Yes  Estimated battery longevity to RRT = 10 years. Battery voltage = 3.01 V.   Atrial Arrhythmia: Device records total time in  AT/AF = 4 seconds. No stored episodes for review.  AF Sheldon: <0.1%  Anticoagulant: Xarelto  Ventricular Arrhythmia: None  ED Notified of Transmission Results: Yes    Plan: Continue inpatient evaluation and treatment.  DONALD Charles RN    Remote ICD Transmission    I have reviewed and interpreted the device interrogation, settings, programming and nurse's summary. The device is functioning within normal device parameters. I agree with the current findings, assessment and plan.     Medications   fentaNYL (PF) (SUBLIMAZE) injection 50 mcg (50 mcg Intravenous $Given 4/25/23 1810)   sodium chloride 0.9% infusion ( Intravenous $New Bag 4/25/23 2029)   nitroGLYcerin (NITROSTAT) sublingual tablet 0.4 mg (0.4 mg Sublingual $Given 4/25/23 2025)   diazepam (VALIUM) tablet 10 mg (has no administration in time range)     Or   diazepam (VALIUM) injection 5-10 mg (has no administration in time range)   flumazenil (ROMAZICON) injection 0.2 mg (has no administration in time range)   0.9% sodium chloride BOLUS (0 mLs Intravenous Stopped 4/25/23 2024)   folic acid (FOLVITE) tablet 1 mg (1 mg Oral $Given 4/25/23 2008)   thiamine (B-1) tablet 100 mg (100 mg Oral $Given 4/25/23 2008)   lidocaine (viscous) (XYLOCAINE) 2 % 15 mL, alum & mag hydroxide-simethicone (MAALOX) 15 mL GI Cocktail (30 mLs Oral $Given 4/25/23 2009)     Labs Ordered and Resulted from Time of ED Arrival to Time of ED Departure   GLUCOSE BY METER - Abnormal       Result Value    GLUCOSE BY METER POCT 336 (*)    COMPREHENSIVE METABOLIC PANEL - Abnormal    Sodium 137      Potassium 4.5      Chloride 98      Carbon Dioxide (CO2) 16 (*)     Anion Gap 23 (*)     Urea Nitrogen 12.6      Creatinine 0.82      Calcium 9.4      Glucose 345 (*)     Alkaline Phosphatase 83      AST 47      ALT 23      Protein Total 7.1      Albumin 4.3      Bilirubin Total 0.3      GFR Estimate >90     ETHYL ALCOHOL LEVEL - Abnormal    Alcohol ethyl 0.05 (*)    BASIC METABOLIC PANEL - Abnormal     Sodium 139      Potassium 4.5      Chloride 99      Carbon Dioxide (CO2) 13 (*)     Anion Gap 27 (*)     Urea Nitrogen 13.2      Creatinine 0.82      Calcium 9.7      Glucose 340 (*)     GFR Estimate >90     BLOOD GAS VENOUS - Abnormal    pH Venous 7.42      pCO2 Venous 43      pO2 Venous 57 (*)     Bicarbonate Venous 28      Base Excess/Deficit (+/-) 2.9 (*)     FIO2 0     TROPONIN T, HIGH SENSITIVITY - Abnormal    Troponin T, High Sensitivity 26 (*)    OSMOLALITY - Abnormal    Osmolality Blood 299 (*)    ISTAT GASES LACTATE VENOUS POCT - Abnormal    Lactic Acid POCT 1.7      Bicarbonate Venous POCT 28      O2 Sat, Venous POCT 88 (*)     pCO2V Venous POCT 45      pH Venous POCT 7.40      pO2 Venous POCT 55 (*)    TROPONIN T, HIGH SENSITIVITY - Normal    Troponin T, High Sensitivity 20     CBC WITH PLATELETS - Normal    WBC Count 10.2      RBC Count 5.16      Hemoglobin 16.0      Hematocrit 47.9      MCV 93      MCH 31.0      MCHC 33.4      RDW 12.7      Platelet Count 272     INR - Normal    INR 1.00     PARTIAL THROMBOPLASTIN TIME - Normal    aPTT 28     ISTAT TROPONIN POCT - Normal    TROPPC POCT 0.01     LIPASE - Normal    Lipase 47     MAGNESIUM - Normal    Magnesium 2.1     KETONE BETA-HYDROXYBUTYRATE QUANTITATIVE, RAPID - Normal    Ketone (Beta-Hydroxybutyrate) Quantitative 0.30     LACTIC ACID WHOLE BLOOD - Normal    Lactic Acid 2.0     NT PROBNP INPATIENT - Normal    N terminal Pro BNP Inpatient 233       Cardiac Device Check - Remote         POC US CHEST B-SCAN (PTX/Edema/PNA)   Final Result   Limited Bedside Lung Ultrasound, performed and interpreted by me.    Indication: cp, sob, lightheaded      With the patient positioned upright, bilateral anterior and lung fields were examined for evidence of pulmonary consolidation and pulmonary edema.          Findings: Bilateral apical lung fields showed A-lines consisting with normal lung artifact. No B-lines.       IMPRESSION: Normal lung ultrasound without  evidence of apical pneumonia or pulmonary edema.         XR Chest Port 1 View   Preliminary Result   RESIDENT PRELIMINARY INTERPRETATION   Impression: No acute cardiopulmonary findings.      POC US ECHO LIMITED   Final Result   Limited Bedside Cardiac Ultrasound, performed and interpreted by me.    Indication: Chest Pain, Shortness of Breath and Abnormal EKG.   Parasternal long axis, parasternal short axis, apical 4 chamber and subcostal views were acquired.    Image quality was satisfactory.      Findings:     Global left ventricular function appears intact.   Chambers do not appear dilated.   There is no evidence of free fluid within the pericardium.      IMPRESSION: Grossly normal left ventricular function and chamber size.  No pericardial effusion..                Critical care was performed.   Critical Care Addendum  My initial assessment, based on my review of prehospital provider report, review of nursing observations, review of vital signs, focused history, physical exam, review of cardiac rhythm monitor and 12 lead ECG analysis, established a high suspicion that Konstantin Sharp has ACS/STEMI, which requires immediate intervention, and therefore he is critically ill.     After the initial assessment, the care team initiated multiple lab tests, initiated IV fluid administration, initiated medication therapy with nitroglycerin, consulted with cardiology [facility] staff and performed POCUS ECHO and lung US to provide stabilization care. Due to the critical nature of this patient, I reassessed nursing observations, vital signs, physical exam, review of cardiac rhythm monitor, 12 lead ECG analysis, mental status and respiratory status multiple times prior to his disposition.     Time also spent performing documentation, reviewing test results, discussion with consultants and coordination of care.     Critical care time (excluding teaching time and procedures): 47 minutes.     Assessment & Plan    Konstantin Sharp is a 53  year old male with a past medical history significant for hypertrophic nonobstructive cardiomyopathy s/p septal myectomy (2014), PAD s/p dual ICD in place (2014, reimplantation 07/2022), NSTEMI (2014), MI (1996), atrial fibrillation (anticoagulated on Xarelto), DVT, COPD (c/b admission 6/15/21-8/29/21 for respiratory failure requiring 3 months of tracheostomy), hypoxic brain injury, TBI, seizures, large bowel obstruction, and DM2 with long-term use of insulin c/b diabetic neuropathy presenting to the ED via EMS for evaluation of chest pain.    Ddx: ACS, STEMI with paced EKG, pancreatitis, alcohol withdrawal, gastritis, small vessel cardiac angina, CHF, hyperglycemia, ketoacidosis, hyperosmolar hyperglycemia    Patient with high risk cardiac history coming in with classic angina symptoms and EKG confounded by paced rhythm.  Point-of-care ultrasound without acute systolic failure or obvious LV wall motion abnormalities.  No RV dilation.  No pulmonary edema on ultrasound.  Discussed with [facility] staff who reviewed EKG and patient's previous encounters.  Per cardiology, the patient has been evaluated multiple times for chest pain.  His EKG from today is not concerning for STEMI.  They reviewed patient's previous CT coronary angiogram done last year in August and it showed mild nonobstructive coronary artery disease without high-grade stenosis.  Cardiac MRI performed last month showed microvascular ischemia without epicardial stenosis.  So cardiology did not think heparin infusion was indicated.  They wanted to follow results of repeat trops to determine disposition.  Initial troponin 20.  This is consistent with patient's baseline.  Pain improved with nitroglycerin and blood pressure control.  He was also given fentanyl and IV fluids.  Alcohol level 0.05.  CBC normal.  CMP notable for anion gap of 23 with bicarb of 16.  Normal creatinine and BUN.  Normal LFTs and bilirubin.  Glucose 345.  Added on ketones and blood gas,  lactate.  Lipase normal.  Magnesium normal.  Repeat troponin 26 up from 20.  Patient reports improvement/resolution of his pain.  He is less than 6 hours out from the onset of the pain so I think he warrants additional repeat troponins.  Serum osmolality 299.  Venous blood gas with normal pH and PCO2.  Patient's acid-base labs are inconsistent but at this time I do not have a good explanation for patient's high anion gap metabolic acidosis.  Chest x-ray clear.  .  Ketones 0.3.  Pacer interrogation without notable events.  Admitted to medicine for ongoing management.      I have reviewed the nursing notes. I have reviewed the findings, diagnosis, plan and need for follow up with the patient.    New Prescriptions    No medications on file       Final diagnoses:   Chest pain, unspecified type   Hyperglycemia   High anion gap metabolic acidosis   I, Trudy Dey, am serving as a trained medical scribe to document services personally performed by Chikis Espino MD, based on the provider's statements to me.     IChikis MD, was physically present and have reviewed and verified the accuracy of this note documented by Trudy Dey.    Chikis Espino MD  [facility] EMERGENCY DEPARTMENT  4/25/2023     Chikis Espino MD  04/25/23 9478

## 2023-04-26 NOTE — H&P
[facility]    History and Physical - Medicine Service, MAROON TEAM        Date of Admission:  4/25/2023    Assessment & Plan      Konstantin Sharp is a 53 year old male with a PMHx of hypertrophic nonobstructive cardiomyopathy s/p septal myectomy (2014), dual-chamber ICD (2014, reimplantation 07/2022), NSTEMI (2014), MI (1996), atrial fibrillation (Xarelto), DVT, COPD (c/b admission 6/15/21-8/29/21 for respiratory failure requiring 3 months of tracheostomy), hypoxic brain injury, TBI, seizures, large bowel obstruction, and T2DM (recent insulin pump) who presented to the hospital with chest pain.      Acute chest pain, resolved  Chronic atypical chest pain  Paroxysmal atrial fibrillation  Hypertrophic Cardiomyopathy s/p septal myomectomy 2014, s/p ICD 4/29/14  Presented with 1 hour of substernal/left-sided chest pain with radiation down left arm. No worsening dyspnea on exertion. Received ASA and nitroglycerin which improved pain. ECG without STEMI. Troponin 20 -> 26. CT coronary angiogram (8/2022) revealed mild non-obstructive CAD without high-grade stenosis. The ED spoke with [facility] staff, who did not think heparin infusion was indicated and could trend troponins. Patient had recent large work up for chest pain, which has been thought to be secondary to microvascular disease. Possible vasospastic angina.  - Cardiac monitoring overnight  - Cardiac device check  - Trend trop  - Nitroglycerin PRN  - Blood pressure control with home BP meds (see below)  - Cardiology consulted in the ED - low concern for ACS    Anion gap metabolic acidosis, resolved  Recent ETOH use  Initial AG checked in the ED was 27, with a bicarb of 13. Blood gas with normal pH. Lactate normal. Patient's glucose levels were in the 500s but serum ketones negative. Patient had 2 shots of fireball 2 hours prior to arrival. Denies additional ASA, tylenol, moon-shine, ingestion of other alcohols. Received 1L of LR  in the ED and both the AG and the bicarb improved to normal levels. Suspect that AG was primarily driven by recent alcohol use. No history of ETOH withdrawal, and reports only drinking ~6 shots per week.  - Added on salicylate levels  - UA to check for ketones  - Hold metformin and SGLT inhibitor   - Recheck CMP in the AM  - CIWA protocol overnight, low suspicion for ETOH withdrawal     Insulin-dependent T2DM, with insulin pump  Obtained an insulin pump recently. Denies hypoglycemic events. Reports glucose levels have ranged between 200-300 the past couple weeks. Concern that AG as stated above could be related to DKA, but given no ketones, this is less likely.   - Insulin Pump: T:slim x2 with Control-IQ technology  - Insulin: Novolog    - Basal Rate: 0.58u/hr  - Correction Factor: 61  - Carb Ratio: 21  - Target B mg/dL  - Diabetes educator    Paroxysmal Afib  - Rivaroxaban   - Metoprolol 100 mg daily    COPD  Active Tobacco Use  Smokes ~1 PPD. No respiratory concerns at this time.   - Albuterol q4H PRN  - Denied nicotine replacement therapy    GERD  - Pantoprazole 40 daily     HTN  - Amlodipine 10 mg daily  - Clonidine 0.1 mg TID  - Lasix 40 mg daily  - Losartan 50 mg daily  - Metoprolol 100 mg daily     MDD  - Duloxetine 120 daily     Insomnia  - Trazodone 100 mg at bedtime  - Olanzapine 7.5 mg at bedtime    BPH   - Tamsulosin 0.4 mg daily     CODE STATUS CHANGE:   DNR/DNI - this was discussed with the patient this admission. Patient has a implantable dual chamber ICD. He does NOT want heroic measures performed by medical staff, which includes defibrillation via external defibrillator, chest compressions, and/or intubation    Diet: Combination Diet Low Saturated Fat Na <2400mg Diet, No Caffeine Diet    DVT Prophylaxis: DOAC  Yeung Catheter: Not present  Fluids: Maintenance NS overnight  Lines: None     Cardiac Monitoring: ACTIVE order. Indication: Chest pain/ ACS rule out (24 hours)  Code Status:  DNR/DNI    Clinically Significant Risk Factors Present on Admission          # Anion Gap Metabolic Acidosis: Highest Anion Gap = 27 mmol/L in last 2 days, will monitor and treat as appropriate   # Drug Induced Coagulation Defect: home medication list includes an anticoagulant medication    # Hypertension: home medication list includes antihypertensive(s)     # DMII: A1C = 7.5 % (Ref range: <5.7 %) within past 6 months            Disposition Plan  Likely discharge in the following 1-2 days     Expected Discharge Date: 04/27/2023                 Admitted overnight, to be formally staffed in the AM.    Ilsa Morse MD  Medicine Service, [facility]  Securely message with [employer] (more info)  Text page via [facility] Paging/Directory   See signed in provider for up to date coverage information  ______________________________________________________________________    Chief Complaint   Chest pain    History is obtained from the patient    History of Present Illness   Konstantin Sharp is a 53 year old male with a PMHx of hypertrophic nonobstructive cardiomyopathy s/p septal myectomy (2014), dual-chamber ICD (2014, reimplantation 07/2022), NSTEMI (2014), MI (1996), atrial fibrillation (Xarelto), DVT, COPD (c/b admission 6/15/21-8/29/21 for respiratory failure requiring 3 months of tracheostomy), hypoxic brain injury, TBI, seizures, large bowel obstruction, and T2DM (recent insulin pump) who presented to the hospital with chest pain.      Patient was sitting on the couch this afternoon watching TV when he developed chest pain just left of the sternum, with radiation down the left arm. He also describes feeling clammy and lightheaded. Pain and symptoms progressed over the course of about one hour until he called EMS. He received nitroglycerin and ASA en route which did help relieve his symptoms. He has dyspnea on exertion and at baseline, can walk about one block before  "feeling dyspnea. Denies fevers, chills, cough, nausea, vomiting, diarrhea, abdominal pain, syncope, or vision changes. Currently, his chest pain is still there but is only a 1/10 and significantly improved compared to earlier. He drank 2 shots of hard alcohol a couple hours prior to the chest pain.     He obtained an insulin pump recently - 2 weeks ago? His blood sugars have been consistently in the 200-300 range for the past 2 weeks. No hypoglycemic episodes.     Current insulin orders:  Insulin Pump: T:slim x2 with Control-IQ technology  Insulin: Novolog U-100 FlexPen  Basal Rate: 0.58u/hr  Correction Factor: 61  Carb Ratio: 21  Target B mg/dL    Denies any additional ASA use (besides what EMS gave him) or tylenol use. No methanol, anti-freeze, or moonshine. Denies ingestion of other substances not prescribed to him.     Past Medical History    Past Medical History:   Diagnosis Date     Atrial fibrillation (H)      Chest pain     intermittent     Chronic pain      Cognitive disorder     memory loss     Depressive disorder      Dual ICD (implantable cardiac defibrillator) in place 2014    and pacemaker     GERD (gastroesophageal reflux disease)      H/O traumatic brain injury      Heart attack (H)      HTN (hypertension)      Hypertrophic nonobstructive cardiomyopathy (H) 2012    s/p ventricular myectomy     Inflammatory arthritis      Intermittent asthma      PAD (peripheral artery disease) (H)      Polysubstance abuse (H)      Seizures (H)     last episode  when \"blood sugar dropped\" according to patient     Sleep apnea     doesn't use cpap     Type 2 diabetes mellitus without complications (H)        Past Surgical History   Past Surgical History:   Procedure Laterality Date     APPENDECTOMY      [facility]     BACK SURGERY       COLONOSCOPY  2017     DISCECTOMY LUMBAR POSTERIOR MICROSCOPIC THREE+ LEVELS  2011    Procedure:DISCECTOMY LUMBAR POSTERIOR " MICROSCOPIC THREE+ LEVELS; Posterior Decompression L2-S1; Surgeon:CIATIE OSMAN; Location:[facility] OR     EP ICD GENERATOR REPLACEMENT DUAL N/A 7/18/2022    Procedure: Implantable Cardioverter Defibrillator Generator Replacement Dual;  Surgeon: Ritesh Lacy MD;  [facility]     FUSION CERVICAL POSTERIOR ONE LEVEL  8/24/2012    Procedure: FUSION CERVICAL POSTERIOR ONE LEVEL;  Posterior Instrumentated Spinal Fusion Cervical 6-7, Right Iliac Crest Bone [place] ;  Surgeon: Caitie Osman MD;  [facility]     GRAFT BONE FROM ILIAC CREST  8/24/2012    Procedure: GRAFT BONE FROM ILIAC CREST;;  Surgeon: Caitie Osman MD;  [facility] OR     HEAD & NECK SURGERY  2009     IMPLANT PACEMAKER       IMPLANT PACEMAKER       INJECT STEROID (LOCATION) N/A 2/19/2015    Procedure: INJECT STEROID (LOCATION);  Surgeon: Siri Koch MD;  [facility]     INSERT STIMULATOR AND LEADS INTERNAL DORSAL COLUMN  8/7/2013    Procedure: INSERT STIMULATOR AND LEADS INTERNAL DORSAL COLUMN;  SPINAL CORD STIMULATOR IMPLANT ;  Surgeon: Ricardo Bruno MD;  Location: [facility] OR     INSERT STIMULATOR DORSAL COLUMN  08/13/2013    lead replacemend, 12?2016,  battery replacement 4/4/2016     IR GASTROSTOMY TUBE PERCUTANEOUS PLCMNT  10/29/2015     KNEE SURGERY       LASER CO2 LARYNGOSCOPY N/A 2/19/2015    Procedure: LASER CO2 LARYNGOSCOPY;  Surgeon: Siri Koch MD;  [facility]     LASER CO2 LARYNGOSCOPY, COMPLEX  7/22/2014    Procedure: LASER CO2 LARYNGOSCOPY, COMPLEX;  Surgeon: Siri Koch MD;  [facility]     MYECTOMY SEPTAL  4/14/2014    Procedure: Median Sternotomy, Septal Myectomy, Intraoperative BRENT performed by Dr. Castano, on pump oxygenator.;  Surgeon: Aguila Cannon MD;  [facility]     NECK SURGERY       PA SPINE SURGERY PROCEDURE UNLISTED       SHOULDER SURGERY  2006    RIGHT     STOMACH SURGERY  1980    partial gastrectomy for bleeding ulcers,  "\"stress related\". [facility]     WRIST SURGERY Left 1988    fractured. 1988 or so     [facility] CARDIAC SURG PROCEDURE UNLIST       [facility] HAND/FINGER SURGERY UNLISTED       [facility] SHOULDER SURG PROC UNLISTED       [facility] STOMACH SURGERY PROCEDURE UNLISTED         Prior to Admission Medications   Prior to Admission Medications   Prescriptions Last Dose Informant Patient Reported? Taking?   Atorvastatin Calcium (LIPITOR PO)   Yes No   Sig: Take 80 mg by mouth daily    DULoxetine (CYMBALTA) 60 MG capsule   No No   Sig: Take 2 capsules (120 mg) by mouth daily   INVOKANA 300 MG tablet   Yes No   Sig: Take 300 mg by mouth daily before breakfast   KLOR-CON 10 MEQ CR tablet   Yes No   Sig: Take 1 tablet by mouth daily at 2 pm   OLANZapine (ZYPREXA) 5 MG tablet   No No   Sig: Take 1.5 tablets (7.5 mg) by mouth At Bedtime   SENEXON-S 8.6-50 MG tablet   Yes No   Sig: Take 1 tablet by mouth 2 times daily   albuterol (PROAIR HFA/PROVENTIL HFA/VENTOLIN HFA) 108 (90 Base) MCG/ACT inhaler   Yes No   Sig: Inhale 2 puffs into the lungs every 4 hours as needed   amLODIPine (NORVASC) 10 MG tablet   No No   Sig: Take 1 tablet (10 mg) by mouth daily at 2 pm   atorvastatin (LIPITOR) 20 MG tablet   Yes No   Sig: Take 1 tablet by mouth daily at 2 pm   cetirizine (ZYRTEC) 10 MG tablet   Yes No   Sig: Take 1 tablet by mouth daily at 2 pm   cloNIDine (CATAPRES) 0.1 MG tablet   Yes No   Sig: Take 0.1 mg by mouth 3 times daily   diclofenac (VOLTAREN) 1 % topical gel   Yes No   Sig: Apply 2 g topically 2 times daily   docusate sodium (COLACE) 100 MG capsule   Yes No   Sig: Take 100 mg by mouth 2 times daily   furosemide (LASIX) 40 MG tablet   Yes No   Sig: TAKE 1/2 A TABLET DAILY   losartan (COZAAR) 50 MG tablet   No No   Sig: Take 1 tablet (50 mg) by mouth daily   metFORMIN (GLUCOPHAGE) 1000 MG tablet   Yes No   Sig: Take 1 tablet by mouth 2 times daily   metFORMIN (GLUCOPHAGE) 500 MG tablet   Yes No   Sig: Take 500 mg by mouth 2 times daily (with meals)  "   metoprolol succinate ER (TOPROL XL) 100 MG 24 hr tablet   Yes No   Sig: Take 1 tablet by mouth daily at 2 pm   pantoprazole (PROTONIX) 40 MG EC tablet   Yes No   Sig: Take 40 mg by mouth daily before breakfast   rivaroxaban ANTICOAGULANT (XARELTO) 20 MG TABS tablet   No No   Sig: Take 1 tablet (20 mg) by mouth daily   tamsulosin (FLOMAX) 0.4 MG capsule   Yes No   Sig: Take 0.4 mg by mouth daily   traZODone (DESYREL) 100 MG tablet   No No   Sig: Take 1 tablet (100 mg) by mouth At Bedtime      Facility-Administered Medications: None        Review of Systems    The 10 point Review of Systems is negative other than noted in the HPI or here.     Social History   I have reviewed this patient's social history and updated it with pertinent information if needed.  Social History     Tobacco Use     Smoking status: Every Day     Packs/day: 0.12     Years: 35.00     Pack years: 4.20     Types: Cigarettes     Start date: 3/8/1982     Last attempt to quit: 2014     Years since quittin.0     Smokeless tobacco: Never   Substance Use Topics     Alcohol use: Yes     Alcohol/week: 0.0 standard drinks of alcohol     Comment: rare     Drug use: No     Comment: last using meth 2017       Family History   I have reviewed this patient's family history and updated it with pertinent information if needed.  Family History   Problem Relation Age of Onset     Heart Disease Father 32        MIs x2; s/p CABG     Substance Abuse Father      Hypertension Father      Obesity Father      Hyperlipidemia Father      Hypertension Brother      Diabetes Brother      Glaucoma Maternal Grandmother      Hypertension Maternal Grandmother      Diabetes Maternal Grandfather      Glaucoma Maternal Grandfather      Hypertension Maternal Grandfather      Cerebrovascular Disease Maternal Grandfather      Obesity Maternal Grandfather      Hyperlipidemia Maternal Grandfather      Coronary Artery Disease Maternal Grandfather      Heart Disease Maternal  Grandfather      Substance Abuse Other      Cancer Other      Hypertension Other      Obesity Other      Other Cancer Other         all over     Hyperlipidemia Other      Coronary Artery Disease Other      Obesity Brother      Hyperlipidemia Brother      Lymphoma Maternal Uncle      Diabetes Brother      Depression Daughter      Depression Son      Macular Degeneration No family hx of      Heart Failure Father        Allergies   Allergies   Allergen Reactions     Betadine Prepstick Plus Hives, Swelling and Rash     From procedure on 7/18/2022     Bee Venom Swelling     Lisinopril      TABS  Severe cough     Penicillins Hives and Difficulty breathing        Physical Exam   Vital Signs: Temp: 99.7  F (37.6  C) Temp src: Oral BP: (!) 154/87 Pulse: 97   Resp: 17 SpO2: 95 % O2 Device: None (Room air)    Weight: 0 lbs 0 oz    General Appearance: NAD. Appears well. Awake and alert. Answers all questions appropriately.  Eyes: No scleral icterus. PERRLA  HEENT: Oropharynx clear. No ant/post cervical, submandibular lymphadenopathy.  Respiratory: CTAB. Breathing comfortably on room air. No accessory muscle use.    Cardiovascular: RRR. Mild systolic murmur.  GI: BS+. Soft, nontender, nondistended. No guarding. Insulin pump present in RLQ.   Skin: No rash. No ecchymoses or petechiae.  Musculoskeletal: Normal muscle bulk and tone. Extremities warm and well perfused.  Neurologic: AOx3. Face symmetric. Moving all extremities equally and independently.  Psychiatric: Normal mood and affect.      Data     I have personally reviewed the following data over the past 24 hrs:    10.2  \   16.0   / 272     141 105 14.7 /  140 (H)   3.6 24 0.72 \       ALT: 23 AST: 47 AP: 83 TBILI: 0.3   ALB: 4.3 TOT PROTEIN: 7.1 LIPASE: 47       Trop: 26 (H) BNP: 233       Procal: N/A CRP: N/A Lactic Acid: 2.0       INR:  1.00 PTT:  28   D-dimer:  N/A Fibrinogen:  N/A       Imaging results reviewed over the past 24 hrs:   Recent Results (from the past 24  hour(s))   POC US ECHO LIMITED    Impression    Limited Bedside Cardiac Ultrasound, performed and interpreted by me.   Indication: Chest Pain, Shortness of Breath and Abnormal EKG.  Parasternal long axis, parasternal short axis, apical 4 chamber and subcostal views were acquired.   Image quality was satisfactory.    Findings:    Global left ventricular function appears intact.  Chambers do not appear dilated.  There is no evidence of free fluid within the pericardium.    IMPRESSION: Grossly normal left ventricular function and chamber size.  No pericardial effusion..     XR Chest Port 1 View    Narrative    Exam: XR CHEST PORT 1 VIEW, 4/25/2023 6:52 PM    Comparison: 12/2/2022    History: chest pain, sob    Findings:  Single AP portable semiupright view the chest. Unchanged appearance of  fractured superior 2 sternotomy wires. Left chest wall cardiac device.  Partially visualized cervical spine instrumentation    Trachea is midline. Stable cardiomediastinal silhouette. No acute  airspace disease. There is no pneumothorax or pleural effusion. The  upper abdomen is unremarkable. Mild bilateral perihilar and bibasilar  pulmonary opacities      Impression    Impression: No focal consolidation. Mild streaky perihilar and basilar  opacities may represent atelectasis or pulmonary edema.    I have personally reviewed the examination and initial interpretation  and I agree with the findings.    KEISHA WOODARD MD         SYSTEM ID:  [id number]   POC US CHEST B-SCAN (PTX/Edema/PNA)    Impression    Limited Bedside Lung Ultrasound, performed and interpreted by me.   Indication: cp, sob, lightheaded    With the patient positioned upright, bilateral anterior and lung fields were examined for evidence of pulmonary consolidation and pulmonary edema.       Findings: Bilateral apical lung fields showed A-lines consisting with normal lung artifact. No B-lines.     IMPRESSION: Normal lung ultrasound without evidence of apical pneumonia  or pulmonary edema.     Cardiac Device Check - Remote   Result Value    Date Time Interrogation Session [id number]    Implantable Pulse Generator  Medtronic    Implantable Pulse Generator Model [id number] Cobalt XT DR MRI    Implantable Pulse Generator Serial Number [id number]    Type Interrogation Session Remote    Clinic Name [facility]    Implantable Pulse Generator Type Defibrillator    Implantable Pulse Generator Implant Date 20220718    Implantable Lead  Medtronic    Implantable Lead Model 5076 CapSureFix Novus MRI SureScan    Implantable Lead Serial Number [id number]    Implantable Lead Implant Date 20220718    Implantable Lead Polarity Type Bipolar Lead    Implantable Lead Location Detail 1 APPENDAGE    Implantable Lead Special Function 52 cm    Implantable Lead Location Right Atrium    Implantable Lead  Medtronic    Implantable Lead Model 6935M Sprint Quattro Secure S    Implantable Lead Serial Number [id number]    Implantable Lead Implant Date 20220718    Implantable Lead Polarity Type Tripolar Lead    Implantable Lead Location Detail 1 SEPTUM    Implantable Lead Special Function 62 cm    Implantable Lead Location Right Ventricle    Abdoulaye Setting Mode (NBG Code) DDDR    Abdoulaye Setting Lower Rate Limit 60    Abdoulaye Setting Maximum Tracking Rate 140    Abdoulaye Setting Maximum Sensor Rate 120    Abdoulaye Setting NANCY Delay Low 150    Abdoulaye Setting PAV Delay Low 180    Abdoulaye Setting AT Mode Switch Rate 171    Lead Channel Setting Sensing Polarity Bipolar    Lead Channel Setting Sensing Anode Location Right Atrium    Lead Channel Setting Sensing Anode Terminal Ring    Lead Channel Setting Sensing Cathode Location Right Atrium    Lead Channel Setting Sensing Cathode Terminal Tip    Lead Channel Setting Sensing Sensitivity 0.3    Lead Channel Setting Sensing Polarity Bipolar    Lead Channel Setting Sensing Anode Location Right Ventricle    Lead Channel Setting  Sensing Anode Terminal Ring    Lead Channel Setting Sensing Cathode Location Right Ventricle    Lead Channel Setting Sensing Cathode Terminal Tip    Lead Channel Setting Sensing Sensitivity 0.3    Lead Channel Setting Pacing Polarity Bipolar    Lead Channel Setting Pacing Anode Location Right Atrium    Lead Channel Setting Pacing Anode Terminal Ring    Lead Channel Setting Sensing Cathode Location Right Atrium    Lead Channel Setting Sensing Cathode Terminal Tip    Lead Channel Setting Pacing Pulse Width 0.4    Lead Channel Setting Pacing Amplitude 1.5    Lead Channel Setting Pacing Capture Mode Adaptive    Lead Channel Setting Pacing Polarity Bipolar    Lead Channel Setting Pacing Anode Location Right Ventricle    Lead Channel Setting Pacing Anode Terminal Ring    Lead Channel Setting Sensing Cathode Location Right Ventricle    Lead Channel Setting Sensing Cathode Terminal Tip    Lead Channel Setting Pacing Pulse Width 0.4    Lead Channel Setting Pacing Amplitude 2.0    Lead Channel Setting Pacing Capture Mode Adaptive    Zone Setting Type Category VF    Zone Setting Detection Interval 270    Zone Setting Detection Beats Numerator 30    Zone Setting Detection Beats Denominator 40    Zone Setting Type Category VT    Zone Setting Type Category VT    Zone Setting Detection Interval 360    Zone Setting Type Category VT    Zone Setting Detection Interval 400    Zone Setting Type Category ATRIAL_FIBRILLATION    Zone Setting Type Category AT/AF    Zone Setting Detection Interval 350    Lead Channel Impedance Value 475    Lead Channel Sensing Intrinsic Amplitude 5.1    Lead Channel Pacing Threshold Amplitude 0.875    Lead Channel Pacing Threshold Pulse Width 0.4    Lead Channel Impedance Value 342    Lead Channel Impedance Value 475    Lead Channel Sensing Intrinsic Amplitude 21.6    Lead Channel Pacing Threshold Amplitude 0.625    Lead Channel Pacing Threshold Pulse Width 0.4    Battery Date Time of Measurements  [id number]    Battery RRT Trigger 2.8 V    Battery Remaining Longevity 120    Battery Voltage 3.01    Capacitor Charge Type Shock    Capacitor Last Charge Date Time [id number]    Capacitor Charge Time 4.0    Capacitor Charge Energy 18.0    Abdoulaye Statistic Date Time Start 16836917923287    Abdoulaye Statistic Date Time End 11165946626876    Abdoulaye Statistic RA Percent Paced 7.41    Abdoulaye Statistic RV Percent Paced 99.9    Abdoulaye Statistic AP  Percent 7.38    Abdoulaye Statistic AS  Percent 92.53    Abdoulaye Statistic AP VS Percent 0    Abdoulaye Statistic AS VS Percent 0.09    CRT Statistic Date Time Start 97832159826333    CRT Statistic Date Time End 10246867000906    Atrial Tachy Statistic Date Time Start 96204347205452    Atrial Tachy Statistic Date Time End 66748862793092    Atrial Tachy Statistic AT/AF [place] Percent 0    Therapy Statistic Recent Shocks Delivered 0    Therapy Statistic Recent Shocks Aborted 0    Therapy Statistic Recent ATP Delivered 0    Therapy Statistic Recent Date Time Start 46050120772340    Therapy Statistic Recent Date Time End 39143951514946    Therapy Statistic Total Shocks Delivered 0    Therapy Statistic Total Shocks Aborted 0    Therapy Statistic Total ATP Delivered 0    Therapy Statistic Total  Date Time Start 93616226351969    Therapy Statistic Total  Date Time End 59749889192190    Episode Statistic Recent Count 0    Episode Statistic Type Category AT/AF    Episode Statistic Recent Count 0    Episode Statistic Type Category Monitor    Episode Statistic Recent Count 0    Episode Statistic Type Category Patient Activated    Episode Statistic Recent Count 0    Episode Statistic Type Category SVT    Episode Statistic Recent Count 0    Episode Statistic Type Category VT    Episode Statistic Recent Count 0    Episode Statistic Type Category VF    Episode Statistic Recent Count 0    Episode Statistic Type Category VT    Episode Statistic Recent Count 0    Episode Statistic Type Category VT     Episode Statistic Recent Count 0    Episode Statistic Type Category VT    Episode Statistic Recent Date Time Start [id number]    Episode Statistic Recent Date Time End 27496314302317    Episode Statistic Recent Date Time Start 91500703995169    Episode Statistic Recent Date Time End 11214264160110    Episode Statistic Recent Date Time Start 83727790619939    Episode Statistic Recent Date Time End 23086312360574    Episode Statistic Recent Date Time Start 19025200699910    Episode Statistic Recent Date Time End 03400225622272    Episode Statistic Recent Date Time Start 48104233869776    Episode Statistic Recent Date Time End 50639235173743    Episode Statistic Recent Date Time Start [id number]    Episode Statistic Recent Date Time End [id number]    Episode Statistic Recent Date Time Start [id number]    Episode Statistic Recent Date Time End [id number]    Episode Statistic Recent Date Time Start [id number]    Episode Statistic Recent Date Time End [id number]    Episode Statistic Recent Date Time Start [id number]    Episode Statistic Recent Date Time End [id number]    Episode Statistic Total Count 0    Episode Statistic Type Category AT/AF    Episode Statistic Total Count 0    Episode Statistic Type Category Monitor    Episode Statistic Total Count 0    Episode Statistic Type Category Patient Activated    Episode Statistic Total Count 0    Episode Statistic Type Category SVT    Episode Statistic Total Count 0    Episode Statistic Type Category VT    Episode Statistic Total Count 0    Episode Statistic Type Category VF    Episode Statistic Total Count 0    Episode Statistic Type Category VT    Episode Statistic Total Count 0    Episode Statistic Type Category VT    Episode Statistic Total Count 0    Episode Statistic Type Category VT    Episode Statistic Total Date Time Start [id number]    Episode Statistic Total Date Time End [id number]    Episode Statistic Total Date Time  Start [id number]    Episode Statistic Total Date Time End [id number]    Episode Statistic Total Date Time Start [id number]    Episode Statistic Total Date Time End [id number]    Episode Statistic Total Date Time Start [id number]    Episode Statistic Total Date Time End [id number]    Episode Statistic Total Date Time Start [id number]    Episode Statistic Total Date Time End [id number]    Episode Statistic Total Date Time Start [id number]    Episode Statistic Total Date Time End [id number]    Episode Statistic Total Date Time Start [id number]    Episode Statistic Total Date Time End [id number]    Episode Statistic Total Date Time Start [id number]    Episode Statistic Total Date Time End [id number]    Episode Statistic Total Date Time Start [id number]    Episode Statistic Total Date Time End [id number]    Narrative    [employer] remote ICD transmission received from [facility] ED and reviewed. Device transmission sent per MD orders.     Device: [employer] [id number] [place] XT DR MRI  Normal Device Function.  Mode: DDDR /120 bpm  AP: 7.4%  : 99.9%  Presenting EGM: AS/ @ 91 bpm  Thoracic Impedance: Near reference line.   Short V-V intervals: 0  Lead Trends Appear Stable: Yes  Estimated battery longevity to RRT = 10 years. Battery voltage = 3.01 V.   Atrial Arrhythmia: Device records total time in AT/AF = 4 seconds. No stored episodes for review.  AF Hyder: <0.1%  Anticoagulant: Xarelto  Ventricular Arrhythmia: None  ED Notified of Transmission Results: Yes    Plan: Continue inpatient evaluation and treatment.  DONALD Charles RN    Remote ICD Transmission    I have reviewed and interpreted the device interrogation, settings, programming and nurse's summary. The device is functioning within normal device parameters. I agree with the current findings, assessment and plan.

## 2023-04-26 NOTE — ED NOTES
"Pt states going out to smoke. Writer offered him Nicotine gum or patch , but he declined and insisted took of his monitors states \" I am going I don't care. I am leaving to smoke\" Writer notified attending provider.pt left outside to smoke alone.  "

## 2023-04-26 NOTE — ED NOTES
"This RN observed patient T.S. standing at the exit door of the emergency department, he stated  \"I have been here all night I need to go smoke\" Patient was educated by charge RN (Ondina) that he cannot go out at this time and we need to contact the provider. Pt refused and said \"I need to leave\" RN asked patient if he could just wait a few moments while we get the doctor and patient refused. Patient was educated about leaving AMA and signed the AMA form. RN removed 2 IVs and EKG leads. Patient walked off the unit.    "

## 2023-05-02 NOTE — TELEPHONE ENCOUNTER
Screening Questions  BLUE  KIND OF PREP RED  LOCATION [review exclusion criteria] GREEN  SEDATION TYPE        y Are you active on mychart?       [place] Ordering/Referring Provider?        Medicaid What type of coverage do you have?      n Have you had a positive covid test in the last 14 days?     30.7 1. BMI  [BMI 40+ - review exclusion criteria& smart-phrase document]    y  2. Are you able to give consent for your medical care? [IF NO,RN REVIEW]          n  3. Are you taking any prescription pain medications on a routine schedule   (ex narcotics: oxycodone, roxicodone, oxycontin,  and percocet)? [RN Review]        n  3a. EXTENDED PREP What kind of prescription?     n 4. Do you have any chemical dependencies such as alcohol, street drugs, or methadone?        **If yes 3- 5 , please schedule with MAC sedation.**          IF YES TO ANY 6 - 10 - HOSPITAL SETTING ONLY.     n 6.   Do you need assistance transferring?     n 7.   Have you had a heart or lung transplant?    n 8.   Are you currently on dialysis?   n 9.   Do you use daily home oxygen?   n 10. Do you take nitroglycerin?   10a. n If yes, how often?     11. [FEMALES]   Are you currently pregnant?    11a.  If yes, how many weeks? [ Greater than 12 weeks, OR NEEDED]    n 12. Do you have Pulmonary Hypertension? *NEED PAC APPT AT [facility] w/ MAC*     y--ICD 13. [review exclusion criteria]  Do you have any implantable devices in your body (pacemaker, defib, LVAD)?    n 14. In the past 6 months, have you had any heart related issues including cardiomyopathy or heart attack?     14a. n If yes, did it require cardiac stenting if so when?     n 15. Have you had a stroke or Transient ischemic attack (TIA - aka  mini stroke ) within 6 months?      y 16. Do you have mod to severe Obstructive Sleep Apnea?  [Hospital only]    n 17. Do you have SEVERE AND UNCONTROLLED asthma? *NEED PAC APPT AT [facility] w/MAC*     18. Are you currently taking any blood thinners?     18a. Yes,  "Are you taking Effient/Prasugrel?   18b. Xeralta    n 19. Do you take the medication Phentermine?    19a. If yes, \"Hold for 7 days before procedure.  Please consult your prescribing provider if you have questions about holding this medication.\"     n  20. Do you have chronic kidney disease?      y  21. Do you have a diagnosis of diabetes?     y  22. On a regular basis do you go 3-5 days between bowel movements?      23. Preferred LOCAL Pharmacy for Pre Prescription    [ LIST ONLY ONE PHARMACY]          [facility][id number] - [place], [place] - [address] [place] [address] AV          - CLOSING REMINDERS -    Informed patient they will need an adult    Cannot take any type of public or medical transportation alone    Conscious Sedation- Needs  for 6 hours after the procedure       MAC/General-Needs  for 24 hours after procedure    Pre-Procedure Covid test to be completed [[facility] PCR Testing Required]    Confirmed Nurse will call to complete assessment       - SCHEDULING DETAILS -  y Hospital Setting Required? If yes, what is the exclusion?: CLARY/ICD   [place]  Surgeon    7/20  Date of Procedure  Lower Endoscopy [Colonoscopy]  Type of Procedure Scheduled  [facility] - If you answer yes to questions #1, #3, #22 (De Melvinen and CF pts)Which Colonoscopy Prep was Sent?     MAC Sedation Type     n PAC / Pre-op Required                 "

## 2023-05-04 NOTE — NURSING NOTE
Is the patient currently in the state of [place]? YES    Visit mode:VIDEO    If the visit is dropped, the patient can be reconnected by: VIDEO VISIT: Text to cell phone: [phone]    Will anyone else be joining the visit? NO      How would you like to obtain your AVS? MyChart    Are changes needed to the allergy or medication list? NO    Reason for visit: Video Visit (Follow up )

## 2023-05-04 NOTE — PATIENT INSTRUCTIONS
The patient is doing relatively well and somewhat improved from our last visit.  He is tolerating the medication.  I will make no changes at this time.  The patient will return to this clinic in 2 to 3 months for medication check.  He agrees to contact us before then with any questions or concerns.

## 2023-05-04 NOTE — PROGRESS NOTES
Outpatient Follow up TBI Evaluation     Pertinent History: Patient is known to me from prior clinic contacted the patient has not been seen by me in over 2 years.  Unfortunately old records are difficult to access at this time due to the new computer system.  The patient and his wife are extremely vague historians but it appears he has been followed through the [facility] system by psychiatry.  Medications are listed as above and both the patient and wife report there is been multiple medication changes in the last few years but they are unaware of what these were.  We will try and clarify all of this.  Patient presents today to establish care at this clinic.  The patient's medication list is as described in the nurse's note according to the limited information we have available to us.     The patient reestablished contact with his clinic in December 2021.  He had been followed through [facility].  He had had multiple medication changes over the last few years but we have limited information when we first saw the patient.  We were attempting to clarify his current medication list and past medication trials.  At that visit the patient had significant heart movements noted around his mouth.  The family also reported he had lost 60 pounds in 6 months.  He was having worsening depression and anxiety.     The patient was seen for a follow-up visit on January 11 of 2022.  The patient has been seen by multiple providers prior to that over the course of more than a year.  When I saw him at that visit he was on Cymbalta 20 mg a day.  His mood was worse.  He was having low motivation and low energy but no thoughts of harming himself.  He was willing to restart with a psychotherapist and I did order that.  I increased his Cymbalta to 60 mg a day.     I saw the patient on February 22, 2022.  I also interviewed his wife and both reported the patient seem to be improving with regard to his mood and was less depressed  with no thoughts of wishing he was dead.  He continued with his baseline tremor.  He was sleeping well at night.  There were no new medical issues.  We continued with the treatment plan at that time.    The patient had a follow-up with me on April 5, 2022.  We did increase his Cymbalta to 60 mg a day and did remind him that he had trazodone available that he was not using.  We also ordered some individual therapy.  He was complaining of some chronic fatigue and weight loss and he was going to have that addressed through his primary care doctor.    The patient was seen for follow-up in May 2022 along with Leilani.  He was having some trouble with sleep at that time but was otherwise doing relatively well.  He and his family had recently returned from a road trip to [place] and that went well.  He was doing well and we did not make any medication changes.    The patient was seen on August 18, 2022.  At that time he was doing relatively well but was still having some depression.  He was tolerating the medication and felt that the medication was at least partially helpful.  We elected to increase the patient's Cymbalta to 90 mg a day.    The patient was seen in October 2022.  At that time he was having some trouble with sleep and he felt this was likely related to pain.  We elected to increase his Cymbalta to 120 mg a day and he was going to continue with his medical treatments and cares.    I saw the patient in November 2022.  He was doing relatively well at that time and tolerating the medication.  The family felt he was doing well.  We elected to continue with the current treatment plan and did not make any changes.    The patient had a follow-up visit with me on January 19, 2023.  At that time he was having symptoms complaints of daytime sleepiness otherwise he was doing well regarding mood and behavior.  No change in his chronic tremor.  We elected to discontinue his morning Seroquel dosing.  We continued with the  other medications.    The patient was seen for follow-up on February 28, 2023.  He was having some mind racing.  He was less social.  He was tolerating the medication and continue to follow-up with his medical team.  I did increase his nighttime Zyprexa to 7.5 mg.       HPI:  Patient presents today for the purposes of medication management.   The patient returned for a video visit today and told me he was doing much better.  He stated he was more interactive and felt less depressed.  He denied having any manic symptoms.  No hallucinations or delusions.  He reports he is still having trouble with sleep in large part because he has to get up multiple times to urinate and we did discuss sleep hygiene measures and he is going to follow up with his urologist as well.  I reviewed the records and I do see the patient did have a colonoscopy.  He states he is following up with his medical doctor regarding coronary artery disease.  He does report he is eating healthier and trying to have a healthier lifestyle.  No thoughts of hurting himself or anybody else.  He does report at times he tends to have some unusual thoughts but was vague on details.  He will monitor that.  No side effects to the medication.  No other issues or concerns.  He wanted to continue with the current treatment plan.         Current Medications: Please see chart. Medications personally reviewed.     Patient Active Problem List    Diagnosis Date Noted     Hyperglycemia 04/25/2023     Priority: Medium     High anion gap metabolic acidosis 04/25/2023     Priority: Medium     Chest pain, unspecified type 04/25/2023     Priority: Medium     Elevated lactic acid level 02/04/2023     Priority: Medium     Chronic pain due to trauma 09/03/2016     Priority: Medium     Overview:   Broken back/neck 1996/2007 respectively. Did have procedure for spine stimulator       Esophageal reflux 09/03/2016     Priority: Medium     Essential hypertension 09/03/2016      Priority: Medium     History of traumatic brain injury 09/03/2016     Priority: Medium     Large bowel obstruction (H) 09/03/2016     Priority: Medium     S/P laminectomy 09/03/2016     Priority: Medium     Overview:   replacement of failed spinal cord stimulator & placement of epidural paddle electrode - 9/2/2016       Tobacco dependence 09/03/2016     Priority: Medium     Cognitive disorder 06/16/2016     Priority: Medium     Type 2 diabetes mellitus with diabetic neuropathy (H) 05/02/2016     Priority: Medium     Seizures (H) 02/01/2016     Priority: Medium     Respiratory failure (H) 10/19/2015     Priority: Medium     Insomnia 03/13/2015     Priority: Medium     [facility] 03/11/2015     Priority: Medium     For urgent after hours needs, please call [phone] and ask to speak with the [facility] Physician on call - mention job code [phone].           Dysphonia 01/05/2015     Priority: Medium     Postprocedural subglottic stenosis 07/22/2014     Priority: Medium     Pre-operative cardiovascular examination 07/16/2014     Priority: Medium     Automatic implantable cardioverter-defibrillator in situ- [employer], dual chamber- DEPENDENT 04/30/2014     Priority: Medium     Implanted 4/29/14 by Dr. aLcy  Problem list name updated by automated process. Provider to review       S/P ventricular septal myectomy 04/14/2014     Priority: Medium     Syncope 12/28/2013     Priority: Medium     Atrial fibrillation (H) 05/15/2013     Priority: Medium     Asthma 01/24/2013     Priority: Medium     Depressive disorder 01/24/2013     Priority: Medium     Old myocardial infarction 01/24/2013     Priority: Medium     Osteoarthrosis 01/24/2013     Priority: Medium     Spinal stenosis, lumbar region, with neurogenic claudication 11/15/2012     Priority: Medium     Lumbar radicular pain 11/15/2012     Priority: Medium     s/p PSF C6-7 for anterior  "pseudarthrosis 10/11/2012     Priority: Medium     Hypertrophic nonobstructive cardiomyopathy (H) 09/12/2012     Priority: Medium     Chest pain 09/12/2012     Priority: Medium     Sinus tachycardia 09/12/2012     Priority: Medium     Pseudoarthrosis of cervical spine (H) 08/24/2012     Priority: Medium     Chondromalacia of patella 10/25/2011     Priority: Medium     Anticoagulant long-term use 02/18/2011     Priority: Medium     Cervical vertebral fusion 12/08/2010     Priority: Medium     Herniated cervical intervertebral disc 06/26/2008     Priority: Medium     Past Medical History:   Diagnosis Date     Atrial fibrillation (H)      Chest pain     intermittent     Chronic pain      Cognitive disorder     memory loss     Depressive disorder      Dual ICD (implantable cardiac defibrillator) in place 04/29/2014    and pacemaker     GERD (gastroesophageal reflux disease)      H/O traumatic brain injury 2015     Heart attack (H) 1996     HTN (hypertension)      Hypertrophic nonobstructive cardiomyopathy (H) 09/12/2012    s/p ventricular myectomy     Inflammatory arthritis      Intermittent asthma      PAD (peripheral artery disease) (H)      Polysubstance abuse (H)      Seizures (H)     last episode 2014 when \"blood sugar dropped\" according to patient     Sleep apnea     doesn't use cpap     Type 2 diabetes mellitus without complications (H)      Past Surgical History:   Procedure Laterality Date     APPENDECTOMY  1980    [facility]     BACK SURGERY       COLONOSCOPY  2017     DISCECTOMY LUMBAR POSTERIOR MICROSCOPIC THREE+ LEVELS  12/16/2011    Procedure:DISCECTOMY LUMBAR POSTERIOR MICROSCOPIC THREE+ LEVELS; Posterior Decompression L2-S1; Surgeon:CAITIE OSMAN; Location:[facility]     EP ICD GENERATOR REPLACEMENT DUAL N/A 7/18/2022    Procedure: Implantable Cardioverter Defibrillator Generator Replacement Dual;  Surgeon: Ritesh Lacy MD;  Location: [facility] OR     FUSION CERVICAL POSTERIOR ONE LEVEL  " "8/24/2012    Procedure: FUSION CERVICAL POSTERIOR ONE LEVEL;  Posterior Instrumentated Spinal Fusion Cervical 6-7, Right Iliac Crest Bone Devine ;  Surgeon: Lopez Funez MD;  Location: [facility] OR     GRAFT BONE FROM ILIAC CREST  8/24/2012    Procedure: GRAFT BONE FROM ILIAC CREST;;  Surgeon: Lopez Funez MD;  [facility]: [facility]     HEAD & NECK SURGERY  2009     IMPLANT PACEMAKER       IMPLANT PACEMAKER       INJECT STEROID (LOCATION) N/A 2/19/2015    Procedure: INJECT STEROID (LOCATION);  Surgeon: Siri Koch MD;  Location: [facility]     INSERT STIMULATOR AND LEADS INTERNAL DORSAL COLUMN  8/7/2013    Procedure: INSERT STIMULATOR AND LEADS INTERNAL DORSAL COLUMN;  SPINAL CORD STIMULATOR IMPLANT ;  Surgeon: Ricardo Bruno MD;  Location: [facility] OR     INSERT STIMULATOR DORSAL COLUMN  08/13/2013    lead replacemend, 12?2016,  battery replacement 4/4/2016     IR GASTROSTOMY TUBE PERCUTANEOUS PLCMNT  10/29/2015     KNEE SURGERY       LASER CO2 LARYNGOSCOPY N/A 2/19/2015    Procedure: LASER CO2 LARYNGOSCOPY;  Surgeon: Siri Koch MD;  [facility]: [facility]     LASER CO2 LARYNGOSCOPY, COMPLEX  7/22/2014    Procedure: LASER CO2 LARYNGOSCOPY, COMPLEX;  Surgeon: Siri Koch MD;  [facility]     MYECTOMY SEPTAL  4/14/2014    Procedure: Median Sternotomy, Septal Myectomy, Intraoperative BRENT performed by Dr. Castano, on pump oxygenator.;  Surgeon: Aguila Cannon MD;  Location: [facility]     NECK SURGERY       DC SPINE SURGERY PROCEDURE UNLISTED       SHOULDER SURGERY  2006    RIGHT     STOMACH SURGERY  1980    partial gastrectomy for bleeding ulcers, \"stress related\". [facility]     WRIST SURGERY Left 1988    fractured. 1988 or so     [facility] CARDIAC SURG PROCEDURE UNLIST       [facility] HAND/FINGER SURGERY UNLISTED       [facility] SHOULDER SURG PROC UNLISTED       [facility] STOMACH SURGERY PROCEDURE UNLISTED       Family History   Problem Relation Age of Onset     Heart Disease Father 32    "     MIs x2; s/p CABG     Substance Abuse Father      Hypertension Father      Obesity Father      Hyperlipidemia Father      Hypertension Brother      Diabetes Brother      Glaucoma Maternal Grandmother      Hypertension Maternal Grandmother      Diabetes Maternal Grandfather      Glaucoma Maternal Grandfather      Hypertension Maternal Grandfather      Cerebrovascular Disease Maternal Grandfather      Obesity Maternal Grandfather      Hyperlipidemia Maternal Grandfather      Coronary Artery Disease Maternal Grandfather      Heart Disease Maternal Grandfather      Substance Abuse Other      Cancer Other      Hypertension Other      Obesity Other      Other Cancer Other         all over     Hyperlipidemia Other      Coronary Artery Disease Other      Obesity Brother      Hyperlipidemia Brother      Lymphoma Maternal Uncle      Diabetes Brother      Depression Daughter      Depression Son      Macular Degeneration No family hx of      Heart Failure Father      Current Outpatient Medications   Medication Sig Dispense Refill     albuterol (PROAIR HFA/PROVENTIL HFA/VENTOLIN HFA) 108 (90 Base) MCG/ACT inhaler Inhale 2 puffs into the lungs every 4 hours as needed       amLODIPine (NORVASC) 10 MG tablet Take 1 tablet (10 mg) by mouth daily at 2 pm 90 tablet 3     atorvastatin (LIPITOR) 20 MG tablet Take 1 tablet by mouth daily at 2 pm       Atorvastatin Calcium (LIPITOR PO) Take 80 mg by mouth daily        cetirizine (ZYRTEC) 10 MG tablet Take 1 tablet by mouth daily at 2 pm       cloNIDine (CATAPRES) 0.1 MG tablet Take 0.1 mg by mouth 3 times daily       diclofenac (VOLTAREN) 1 % topical gel Apply 2 g topically 2 times daily       docusate sodium (COLACE) 100 MG capsule Take 100 mg by mouth 2 times daily       DULoxetine (CYMBALTA) 60 MG capsule Take 2 capsules (120 mg) by mouth daily 60 capsule 3     furosemide (LASIX) 40 MG tablet TAKE 1/2 A TABLET DAILY       INVOKANA 300 MG tablet Take 300 mg by mouth daily before  breakfast       KLOR-CON 10 MEQ CR tablet Take 1 tablet by mouth daily at 2 pm       losartan (COZAAR) 50 MG tablet Take 1 tablet (50 mg) by mouth daily 90 tablet 3     metFORMIN (GLUCOPHAGE) 1000 MG tablet Take 1 tablet by mouth 2 times daily       metFORMIN (GLUCOPHAGE) 500 MG tablet Take 500 mg by mouth 2 times daily (with meals)  60 tablet      metoprolol succinate ER (TOPROL XL) 100 MG 24 hr tablet Take 1 tablet by mouth daily at 2 pm       OLANZapine (ZYPREXA) 5 MG tablet Take 1.5 tablets (7.5 mg) by mouth At Bedtime 30 tablet 0     pantoprazole (PROTONIX) 40 MG EC tablet Take 40 mg by mouth daily before breakfast       rivaroxaban ANTICOAGULANT (XARELTO) 20 MG TABS tablet Take 1 tablet (20 mg) by mouth daily 90 tablet 3     SENEXON-S 8.6-50 MG tablet Take 1 tablet by mouth 2 times daily       tamsulosin (FLOMAX) 0.4 MG capsule Take 0.4 mg by mouth daily       traZODone (DESYREL) 100 MG tablet Take 1 tablet (100 mg) by mouth At Bedtime 30 tablet 4       Allergies   Allergen Reactions     Betadine Prepstick Plus Hives, Swelling and Rash     From procedure on 2022     Bee Venom Swelling     Lisinopril      TABS  Severe cough     Penicillins Hives and Difficulty breathing     Social History     Socioeconomic History     Marital status: Single     Spouse name: Not on file     Number of children: Not on file     Years of education: Not on file     Highest education level: Not on file   Occupational History     Not on file   Tobacco Use     Smoking status: Every Day     Packs/day: 0.12     Years: 35.00     Pack years: 4.20     Types: Cigarettes     Start date: 3/8/1982     Last attempt to quit: 2014     Years since quittin.0     Smokeless tobacco: Never   Vaping Use     Vaping status: Not on file   Substance and Sexual Activity     Alcohol use: Yes     Alcohol/week: 0.0 standard drinks of alcohol     Comment: rare     Drug use: No     Comment: last using meth 2017     Sexual activity: Not Currently      Partners: Female     Birth control/protection: Male Surgical   Other Topics Concern     Parent/sibling w/ CABG, MI or angioplasty before 65F 55M? Yes   Social History Narrative     Not on file     Social Determinants of Health     Financial Resource Strain: Not on file   Food Insecurity: Not on file   Transportation Needs: Not on file   Physical Activity: Not on file   Stress: Not on file   Social Connections: Not on file   Intimate Partner Violence: Not on file   Housing Stability: Not on file       The following portions of the patient's history were reviewed and updated as appropriate: allergies, current medications, past family history, past medical history, past social history, past surgical history and problem list.    Review of Systems  A comprehensive review of systems was negative except for what is noted above    Mental Status Exam  Appearance:  Calm and comfortable with good eye contact.  No shortness of breath.  Behavior:  He denies having any recent behavioral changes or problems.  He was cooperative and polite and pleasant with me.  He was able to participate and initiate.  Speech:  Somewhat monotone.  Not pressured or rambling.  Answers were appropriate.  Able to initiate.  Mood: A bit brighter today but still overall flat.  Thought content: No hallucinations or delusions.  Please see above.  Thought formation:  Tracking and participating better today.  Needing fewer prompts and redirection.  Associations: Fair.  Baseline.  Fund of knowledge: Unchanged.  Attention/Concentration: Able to attend to track.  Not disorganized.  No racing thoughts.  Perhaps tracking and participating a bit better today.  Insight: Fair.  No obvious significant change.  Judgment: Impaired, chronically with no recent reports of any change.  No obvious change on exam.  Memory: Impaired  Motor status:  The patient reports no new tremors or asymmetries.  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    Other:   Patient will return to clinic in 3 months.  He agrees to call or return sooner with any questions or concerns.  Risks and benefits were discussed.  Continue with his individual therapist.     Continue with the support of the clinic, reassurance, and redirection. Staff monitoring and ongoing assessments per team plan. Current psychotropic medication appears to represent the minimum effective dosage and appears medically necessary. We will continue to monitor and reassess. This team will utilize appropriate emergency services if necessary. I will make myself available if concerns or problems arise.    Total time spent with the patient today was 25 minutes with greater than 50% of the time spent in counseling and care coordination. The patient agrees to call before then with any questions, concerns or problems. We will assess for the appropriateness of possible psychotropic medication trials/changes. The patient will seek out appropriate emergency services should that become necessary.    Video Visit Details    Type of service: Video Visit    Video Start Time: 12:38 PM    Video End Time: 12:54 PM    Total time for video call: 16 minutes    Originating Location: Patient's home    Distant Location:  [facility]    Mode of Communication: Video call via [employer]    Total time for chart review, interview with family and patient, coordination of care and documentation is 25 minutes.    Patient Instructions   It was nice speaking with you today for our office video visit. The following is a summary of our visit.    General Information:    If lab work was done today as part of your evaluation you will generally be contacted via My Chart, mail, or phone with the results within 1-5 days. If there is an alarming result we  will contact you by phone. Lab results come back at varying times, I generally wait until all labs are resulted before making comments on results. Please note labs are automatically released to My Chart once available.     If you need refills please contact your pharmacist. They will send a refill request to me to review. Please allow 3 business days for us to process all refill requests.     Please call or send a medical message through My Chart, with any questions or concerns.    If you need any paperwork completed please fax forms to [phone]. Please state if you would like a copy of the completed paperwork, mailed or faxed back to the patient and a fax number to fax the paperwork to. Please allow up to 10 business days for paperwork to be completed.    Rolando Burnham MD

## 2023-05-17 NOTE — PROGRESS NOTES
"SUBJECTIVE:   CC: Konstantin is an 53 year old who presents for preventative health visit.       5/17/2023    10:51 AM   Additional Questions   Roomed by Sarah MENDOZA     Patient has been advised of split billing requirements and indicates understanding: Yes  Neuropathy n his feet cause pain and the creams the neurologist aren't helping enough.  Microvascular atherosclerosis and reports it causes constant shortness of breath.  He was diagnosed with diabetes 10-12 years ago.  He reports a lot of neck pain.  He has in insulin pump with CGM. He reports his blood sugar is 197 and going down. He limits his grains. Quit drinking sodas. Drinks a lot of water and caffeine free diet soda and one Turtle Mocha each day.  The cardiologist took him off aspirin. He will be Xeralto for lifetime.  Saw his eye doctor at [facility] last August. No problems aside from visual acuity.   Drinks 1-2 cinnamon whiskey and Jagermeister.  His PCA looks at his feet every day. Never goes barefoot. Has a walker.  He has sleep apnea, but \"is paranoid\" about the hoses wrapping around his neck.  Jara had neck fusions, they cracked and he now has plate in the neck in 2014. His pacemaker is MRI safe. He had an MRI of his heart recently. Pain ins in the lower neck and shoulder. It is hard to hold his head up.  Neuropathy in his neck.    Healthy Habits:     Getting at least 3 servings of Calcium per day:  NO    Bi-annual eye exam:  Yes    Dental care twice a year:  NO    Sleep apnea or symptoms of sleep apnea:  Daytime drowsiness and Sleep apnea    Diet:  Regular (no restrictions)    Frequency of exercise:  None    Taking medications regularly:  Yes    Medication side effects:  Other    PHQ-2 Total Score: 2    Additional concerns today:  No  Have you ever done Advance Care Planning? (For example, a Health Directive, POLST, or a discussion with a medical provider or your loved ones about your wishes): No, advance care planning information given to patient to review. "  Patient plans to discuss their wishes with loved ones or provider.      Social History     Tobacco Use     Smoking status: Every Day     Packs/day: 1.00     Types: Cigarettes     Start date: 5/17/2015     Smokeless tobacco: Never     Tobacco comments:     Had quit and resumed use   Vaping Use     Vaping status: Not on file   Substance Use Topics     Alcohol use: Yes     Alcohol/week: 0.0 standard drinks of alcohol     Comment: rare             5/10/2023     8:45 AM   Alcohol Use   Prescreen: >3 drinks/day or >7 drinks/week? No     Last PSA:   PSA   Date Value Ref Range Status   05/04/2015 0.60 0 - 4 ug/L Final     Prostate Specific Antigen Screen   Date Value Ref Range Status   05/17/2023 0.78 0.00 - 3.50 ng/mL Final       Reviewed orders with patient. Reviewed health maintenance and updated orders accordingly - Yes  BP Readings from Last 3 Encounters:   05/17/23 127/82   04/26/23 (!) 160/86   03/21/23 117/75    Wt Readings from Last 3 Encounters:   05/17/23 101.9 kg (224 lb 9.6 oz)   12/02/22 99.8 kg (220 lb)   07/18/22 100.9 kg (222 lb 7.1 oz)                  Patient Active Problem List   Diagnosis     Herniated cervical intervertebral disc     Chondromalacia of patella     Pseudoarthrosis of cervical spine (H)     Hypertrophic nonobstructive cardiomyopathy (H)     Chest pain     Sinus tachycardia     s/p PSF C6-7 for anterior pseudarthrosis     Spinal stenosis, lumbar region, with neurogenic claudication     Lumbar radicular pain     Atrial fibrillation (H)     Syncope     S/P ventricular septal myectomy     Automatic implantable cardioverter-defibrillator in situ- Medtronic, dual chamber- DEPENDENT     Pre-operative cardiovascular examination     Postprocedural subglottic stenosis     Dysphonia     [facility]     Insomnia     Seizures (H)     Type 2 diabetes mellitus with diabetic neuropathy (H)     Anticoagulant long-term use     Asthma      Cervical vertebral fusion     Chronic pain due to trauma     Cognitive disorder     Depressive disorder     Esophageal reflux     Essential hypertension     History of traumatic brain injury     Large bowel obstruction (H)     Old myocardial infarction     Osteoarthrosis     Respiratory failure (H)     S/P laminectomy     Tobacco dependence     Elevated lactic acid level     Hyperglycemia     High anion gap metabolic acidosis     Chest pain, unspecified type     Past Surgical History:   Procedure Laterality Date     APPENDECTOMY  1980    [facility]     BACK SURGERY       COLONOSCOPY  2017     DISCECTOMY LUMBAR POSTERIOR MICROSCOPIC THREE+ LEVELS  12/16/2011    Procedure:DISCECTOMY LUMBAR POSTERIOR MICROSCOPIC THREE+ LEVELS; Posterior Decompression L2-S1; Surgeon:CAITIE OSMAN; Location:[facility]     EP ICD GENERATOR REPLACEMENT DUAL N/A 7/18/2022    Procedure: Implantable Cardioverter Defibrillator Generator Replacement Dual;  Surgeon: Ritesh Lacy MD;  [facility]: [facility]     FUSION CERVICAL POSTERIOR ONE LEVEL  8/24/2012    Procedure: FUSION CERVICAL POSTERIOR ONE LEVEL;  Posterior Instrumentated Spinal Fusion Cervical 6-7, Right Iliac Crest Bone [place] ;  Surgeon: Caitie Osman MD;  [facility]     GRAFT BONE FROM ILIAC CREST  8/24/2012    Procedure: GRAFT BONE FROM ILIAC CREST;;  Surgeon: Caitie Osman MD;  [facility]     HEAD & NECK SURGERY  2009     IMPLANT PACEMAKER       IMPLANT PACEMAKER       INJECT STEROID (LOCATION) N/A 2/19/2015    Procedure: INJECT STEROID (LOCATION);  Surgeon: Siri Koch MD;  [facility]     INSERT STIMULATOR AND LEADS INTERNAL DORSAL COLUMN  8/7/2013    Procedure: INSERT STIMULATOR AND LEADS INTERNAL DORSAL COLUMN;  SPINAL CORD STIMULATOR IMPLANT ;  Surgeon: Ricardo Bruno MD;  Location: [facility] OR     INSERT STIMULATOR DORSAL COLUMN  08/13/2013    lead replacemend, 12?2016,  battery replacement 4/4/2016     IR  "GASTROSTOMY TUBE PERCUTANEOUS PLCMNT  10/29/2015     KNEE SURGERY       LASER CO2 LARYNGOSCOPY N/A 2/19/2015    Procedure: LASER CO2 LARYNGOSCOPY;  Surgeon: Siri Koch MD;  [facility]     LASER CO2 LARYNGOSCOPY, COMPLEX  7/22/2014    Procedure: LASER CO2 LARYNGOSCOPY, COMPLEX;  Surgeon: Siri Koch MD;  [facility]     MYECTOMY SEPTAL  4/14/2014    Procedure: Median Sternotomy, Septal Myectomy, Intraoperative BRENT performed by Dr. Castano, on pump oxygenator.;  Surgeon: Aguila Cannon MD;  [facility]     NECK SURGERY       MD SPINE SURGERY PROCEDURE UNLISTED       SHOULDER SURGERY  2006    RIGHT     STOMACH SURGERY  1980    partial gastrectomy for bleeding ulcers, \"stress related\". [facility]     WRIST SURGERY Left 1988    fractured. 1988 or so     [facility] CARDIAC SURG PROCEDURE UNLIST       [facility] HAND/FINGER SURGERY UNLISTED       [facility] SHOULDER SURG PROC UNLISTED       [facility] STOMACH SURGERY PROCEDURE UNLISTED         Social History     Tobacco Use     Smoking status: Every Day     Packs/day: 1.00     Types: Cigarettes     Start date: 5/17/2015     Smokeless tobacco: Never     Tobacco comments:     Had quit and resumed use   Vaping Use     Vaping status: Not on file   Substance Use Topics     Alcohol use: Yes     Alcohol/week: 0.0 standard drinks of alcohol     Comment: rare     Family History   Problem Relation Age of Onset     Heart Disease Father 32        MIs x2; s/p CABG     Substance Abuse Father      Hypertension Father      Obesity Father      Hyperlipidemia Father      Hypertension Brother      Diabetes Brother      Glaucoma Maternal Grandmother      Hypertension Maternal Grandmother      Diabetes Maternal Grandfather      Glaucoma Maternal Grandfather      Hypertension Maternal Grandfather      Cerebrovascular Disease Maternal Grandfather      Obesity Maternal Grandfather      Hyperlipidemia Maternal Grandfather      Coronary Artery Disease Maternal Grandfather "      Heart Disease Maternal Grandfather      Substance Abuse Other      Cancer Other      Hypertension Other      Obesity Other      Other Cancer Other         all over     Hyperlipidemia Other      Coronary Artery Disease Other      Obesity Brother      Hyperlipidemia Brother      Lymphoma Maternal Uncle      Diabetes Brother      Depression Daughter      Depression Son      Macular Degeneration No family hx of      Heart Failure Father          Current Outpatient Medications   Medication Sig Dispense Refill     albuterol (PROAIR HFA/PROVENTIL HFA/VENTOLIN HFA) 108 (90 Base) MCG/ACT inhaler Inhale 2 puffs into the lungs every 4 hours as needed       amLODIPine (NORVASC) 10 MG tablet Take 1 tablet (10 mg) by mouth daily at 2 pm 90 tablet 3     ASPIRIN NOT PRESCRIBED (INTENTIONAL) Please choose reason not prescribed from choices below.       atorvastatin (LIPITOR) 20 MG tablet Take 1 tablet by mouth daily at 2 pm       Atorvastatin Calcium (LIPITOR PO) Take 80 mg by mouth daily        cloNIDine (CATAPRES) 0.1 MG tablet Take 0.1 mg by mouth 3 times daily       diclofenac (VOLTAREN) 1 % topical gel Apply 2 g topically 2 times daily       docusate sodium (COLACE) 100 MG capsule Take 100 mg by mouth 2 times daily       DULoxetine (CYMBALTA) 60 MG capsule Take 2 capsules (120 mg) by mouth daily 60 capsule 3     furosemide (LASIX) 40 MG tablet TAKE 1/2 A TABLET DAILY       INVOKANA 300 MG tablet Take 300 mg by mouth daily before breakfast       KLOR-CON 10 MEQ CR tablet Take 1 tablet by mouth daily at 2 pm       losartan (COZAAR) 50 MG tablet Take 1 tablet (50 mg) by mouth daily 90 tablet 3     metFORMIN (GLUCOPHAGE) 1000 MG tablet Take 1 tablet by mouth 2 times daily       metFORMIN (GLUCOPHAGE) 500 MG tablet Take 500 mg by mouth 2 times daily (with meals)  60 tablet      metoprolol succinate ER (TOPROL XL) 100 MG 24 hr tablet Take 1 tablet by mouth daily at 2 pm       OLANZapine (ZYPREXA) 5 MG tablet Take 1.5 tablets  (7.5 mg) by mouth At Bedtime 30 tablet 0     pantoprazole (PROTONIX) 40 MG EC tablet Take 40 mg by mouth daily before breakfast       rivaroxaban ANTICOAGULANT (XARELTO) 20 MG TABS tablet Take 1 tablet (20 mg) by mouth daily 90 tablet 3     SENEXON-S 8.6-50 MG tablet Take 1 tablet by mouth 2 times daily       tamsulosin (FLOMAX) 0.4 MG capsule Take 0.4 mg by mouth daily       traZODone (DESYREL) 100 MG tablet Take 1 tablet (100 mg) by mouth At Bedtime 30 tablet 4     cetirizine (ZYRTEC) 10 MG tablet Take 1 tablet by mouth daily at 2 pm (Patient not taking: Reported on 5/17/2023)       Allergies   Allergen Reactions     Betadine Prepstick Plus Hives, Swelling and Rash     From procedure on 7/18/2022     Bee Venom Swelling     Lisinopril      TABS  Severe cough     Penicillins Hives and Difficulty breathing       Reviewed and updated as needed this visit by clinical staff   Tobacco  Allergies  Meds  Problems  Med Hx  Surg Hx  Fam Hx      Reviewed and updated as needed this visit by Provider   Tobacco  Allergies  Meds  Problems  Med Hx  Surg Hx  Fam Hx       Review of Systems   Constitutional: Negative for chills and fever.   HENT: Negative for congestion, ear pain, hearing loss and sore throat.    Eyes: Negative for pain and visual disturbance.   Respiratory: Positive for shortness of breath. Negative for cough.    Cardiovascular: Positive for chest pain, palpitations and peripheral edema.   Gastrointestinal: Positive for abdominal pain and constipation. Negative for diarrhea, heartburn, hematochezia and nausea.   Genitourinary: Positive for dysuria, frequency, impotence and urgency. Negative for genital sores, hematuria and penile discharge.   Musculoskeletal: Positive for arthralgias and myalgias. Negative for joint swelling.   Skin: Negative for rash.   Neurological: Positive for dizziness and weakness. Negative for headaches and paresthesias.   Psychiatric/Behavioral: Positive for mood changes. The  "patient is nervous/anxious.      OBJECTIVE:   /82 (BP Location: Left arm, Patient Position: Sitting, Cuff Size: Adult Large)   Pulse 115   Temp 98.3  F (36.8  C) (Tympanic)   Resp 25   Ht 1.805 m (5' 11.06\")   Wt 101.9 kg (224 lb 9.6 oz)   SpO2 96%   BMI 31.27 kg/m      Physical Exam  GENERAL: healthy, alert and no distress  EYES: Eyes grossly normal to inspection, PERRL and conjunctivae and sclerae normal  HENT: ear canals and TM's normal, nose and mouth without ulcers or lesions  NECK: no adenopathy, no asymmetry, masses, or scars and thyroid normal to palpation  RESP: lungs clear to auscultation - no rales, rhonchi or wheezes  CV: regular rate and rhythm, normal S1 S2, no S3 or S4, no murmur, click or rub, no peripheral edema and peripheral pulses strong  ABDOMEN: soft, nontender, no hepatosplenomegaly, no masses and bowel sounds normal   (male): Sees a urologist  MS: no gross musculoskeletal defects noted, no edema  SKIN: no suspicious lesions or rashes  NEURO: Normal strength and tone, mentation intact and speech normal  PSYCH: mentation appears normal, affect normal/bright    Diagnostic Test Results:  Labs reviewed in Epic  Results for orders placed or performed in visit on 05/17/23 (from the past 24 hour(s))   Albumin Random Urine Quantitative with Creat Ratio   Result Value Ref Range    Creatinine Urine mg/dL 60.5 mg/dL    Albumin Urine mg/L 24.7 mg/L    Albumin Urine mg/g Cr 40.83 (H) 0.00 - 17.00 mg/g Cr   Aldosterone Renin Ratio    Narrative    The following orders were created for panel order Aldosterone Renin Ratio.  Procedure                               Abnormality         Status                     ---------                               -----------         ------                     Aldosterone[[id number]]                                      In process                 Renin activity[[id number]]                                   In process                 Aldosterone Renin " Ratio[[id number]]                          In process                   Please view results for these tests on the individual orders.   Potassium   Result Value Ref Range    Potassium 3.9 3.4 - 5.3 mmol/L   PSA, screen   Result Value Ref Range    Prostate Specific Antigen Screen 0.78 0.00 - 3.50 ng/mL    Narrative    This result is obtained using the Roche Elecsys total PSA method on the obed [id number] immunoassay analyzer. Results obtained with different assay methods or kits cannot be used interchangeably.     *Note: Due to a large number of results and/or encounters for the requested time period, some results have not been displayed. A complete set of results can be found in Results Review.     ASSESSMENT/PLAN:   Konstantin was seen today for physical and n.    Diagnoses and all orders for this visit:    Urine protein increased  Repeat in six months    Type 2 diabetes mellitus with diabetic neuropathy, with long-term current use of insulin (H)Re  -     Albumin Random Urine Quantitative with Creat Ratio  -     Pain Management  Referral; Future    Need for hepatitis C screening test  -     Hepatitis C Screen Reflex to HCV RNA Quant and Genotype; Future    Nonalcoholic hepatosteatosis    Chronic neck pain with history of cervical spinal surgery  -     MR Cervical Spine w/o & w Contrast; Future  -     Pain Management  Referral; Future    Essential hypertension  -     Aldosterone; Future  -     Renin activity; Future  -     Aldosterone Renin Ratio; Future  -     Potassium; Future    Screening for prostate cancer  -     PSA, screen-- normal and stable    Other orders  -     REVIEW OF HEALTH MAINTENANCE PROTOCOL ORDERS    COUNSELING:   Reviewed preventive health counseling, as reflected in patient instructions       Regular exercise       Healthy diet/nutrition       Vision screening-- sees his eye doctor yearly  Declined hearing screen    BMI:   Estimated body mass index is 31.27 kg/m  as calculated from the  "following:    Height as of this encounter: 1.805 m (5' 11.06\").    Weight as of this encounter: 101.9 kg (224 lb 9.6 oz).   Weight management plan: Discussed healthy diet and exercise guidelines  He reports that he has been smoking cigarettes. He started smoking about 8 years ago. He has been smoking an average of 1 pack per day. He has never used smokeless tobacco.  Nicotine/Tobacco Cessation Plan:   Information offered: Patient not interested at this time            EDILMA GREEN MD  [facility]  "

## 2023-06-17 NOTE — TELEPHONE ENCOUNTER
MEDICAL RECORDS REQUEST   [facility]  [facility]  [address] [place] [id number]  PHONE: [phone]  Fax: [phone]        FUTURE VISIT INFORMATION                                                       APPOINTMENT INFORMATION:    Date: 06/29/2023    Provider: Brennan Fox PA-C    Reason for Visit/Diagnosis: Lower Urinary Symptoms    REFERRAL INFORMATION:    Referring provider:  Damon Cooper MD @ [facility]     Clinic contact number:  Phone: [phone] Fax: [phone]    RECORDS REQUESTED FOR VISIT                                                     NOTES  STATUS/DETAILS   OFFICE NOTE from referring provider  yes, 04/26/2023, 02/27/2023 -- Damon Cooper MD @ [facility]    MEDICATION LIST  yes   LABS     URINALYSIS (UA)  yes   IMAGES  yes     PRE-VISIT CHECKLIST      Joint diagnostic appointment coordinated correctly          (ensure right order & amount of time) Yes   RECORD COLLECTION COMPLETE Yes

## 2023-06-20 NOTE — CONFIDENTIAL NOTE
RECORDS RECEIVED FROM: internal /ce   DATE RECEIVED: 8/28/23    NOTES (FOR ALL VISITS) STATUS DETAILS   OFFICE NOTES from referring provider [facility], Dr. Damon Cooper       MEDICATION LIST internal     IMAGING        XR (Chest) internal /ce Internal 4.25.23, 12.2.22, 7.20.22, 7.18.22,     [place]- 10.14.21   CT (HEAD/NECK/CHEST/ABDOMEN) internal  2.4.23, 1.23.23, 6.14.22, 2.16.22      LABS     DIABETES: HBGA1C, CREATININE, FASTING LIPIDS, MICROALBUMIN URINE, POTASSIUM, TSH, T4    THYROID: TSH, T4, CBC, THYRODLONULIN, TOTAL T3, FREE T4, CALCITONIN, CEA internal /ce BMP- 6.13.23  HBGA1C- 6.13.23  Potassium- 5.17.23  Cbc- 4.26.23  CMP- 4.26.23  Glucose meter- 4.25.23  Lipid- 1.17.23

## 2023-06-22 NOTE — ED TRIAGE NOTES
BIBA  Sitting outside waiting  Intermittent shortness of breath since yesterday  Used an inhaler did not help much  96% RA  Lungs clear  Cardiac history  Pace rhythm       Pt states he is having pain while breathing in the upper part of his chest. He says it feels like he has a spike sticking through his back while breathing. He has not been eating or drinking and vomiting all day.   VSS

## 2023-06-22 NOTE — ED PROVIDER NOTES
ED Provider Note  [facility]      History     Chief Complaint   Patient presents with     Shortness of Breath     HPI     53-year-old male with a past medical history of atrial fibrillation, HTN, polysubstance abuse, DM2, hypertrophoc nonobstructive cardiomyopathy, depression, PAD, seizures, heart attack who presents to the ED with a primary complaint of pleuritic chest pain for the last 2 days.  He states it was all day yesterday and all day today.  He states it was initially more centrally and more right-sided, but now this evening he is also having some on the left.  He states if he takes a real deep breath then it radiates through to his right upper back.  He states it only hurts with breathing, does not hurt when he is not breathing.  No real shortness of breath with it, just pain with breathing.  He states it feels like he is being stabbed with something.  No stuffy nose or sore throat.  No fever or cough.  He states he has some chronic upper abdominal pain for several months but this is unchanged today.  He is also had a some nausea and a lot of vomiting-probably 15 episodes of nonbloody emesis today.  He also had a couple episodes of nonbloody diarrhea.  No new urinary symptoms.  No lower extremity pain or swelling.  He notes that his continuous glucose monitor fell off a few days ago, and his insurance would not pay for another one, so he just got it put back on.  He states that because of that he did not take his insulin for 4 days.  He states he is a type II diabetic.  He states he has been compliant with all of his meds but did throw them up tonight.  He did not take anything specifically for pain.  He was feeling a little bit lightheaded earlier.      Per chart review, patient was seen in the ER on 4/25 for chest pain, hyperglycemia, and anion gap metabolic acidosis.    This part of the medical record was transcribed by Roseanna Mccall, Medical Scribe, from a dictation  "done by Laurel Newman MD.     Past Medical History  Past Medical History:   Diagnosis Date     Atrial fibrillation (H)      Chest pain     intermittent     Chronic pain      Cognitive disorder     memory loss     Depressive disorder      Dual ICD (implantable cardiac defibrillator) in place 04/29/2014    and pacemaker     GERD (gastroesophageal reflux disease)      H/O traumatic brain injury 2015     Heart attack (H) 1996     HTN (hypertension)      Hypertrophic nonobstructive cardiomyopathy (H) 09/12/2012    s/p ventricular myectomy     Inflammatory arthritis      Intermittent asthma      PAD (peripheral artery disease) (H)      Polysubstance abuse (H)      Seizures (H)     last episode 2014 when \"blood sugar dropped\" according to patient     Sleep apnea     doesn't use cpap     Type 2 diabetes mellitus without complications (H)      Past Surgical History:   Procedure Laterality Date     APPENDECTOMY  1980    [facility]? near [facility]     BACK SURGERY       COLONOSCOPY  2017     DISCECTOMY LUMBAR POSTERIOR MICROSCOPIC THREE+ LEVELS  12/16/2011    Procedure:DISCECTOMY LUMBAR POSTERIOR MICROSCOPIC THREE+ LEVELS; Posterior Decompression L2-S1; Surgeon:CAITIE FUNEZ; Location:[facility] OR     EP ICD GENERATOR REPLACEMENT DUAL N/A 7/18/2022    Procedure: Implantable Cardioverter Defibrillator Generator Replacement Dual;  Surgeon: Ritesh Lacy MD;  Location: [facility] OR     FUSION CERVICAL POSTERIOR ONE LEVEL  8/24/2012    Procedure: FUSION CERVICAL POSTERIOR ONE LEVEL;  Posterior Instrumentated Spinal Fusion Cervical 6-7, Right Iliac Crest Bone [place] ;  Surgeon: Caitie Funez MD;  [facility] OR     GRAFT BONE FROM ILIAC CREST  8/24/2012    Procedure: GRAFT BONE FROM ILIAC CREST;;  Surgeon: Caitie Funez MD;  [facility]: [facility] OR     HEAD & NECK SURGERY  2009     IMPLANT PACEMAKER       IMPLANT PACEMAKER       INJECT STEROID (LOCATION) N/A 2/19/2015    Procedure: INJECT STEROID (LOCATION);  " "Surgeon: Siri Koch MD;  Location: [facility] OR     INSERT STIMULATOR AND LEADS INTERNAL DORSAL COLUMN  8/7/2013    Procedure: INSERT STIMULATOR AND LEADS INTERNAL DORSAL COLUMN;  SPINAL CORD STIMULATOR IMPLANT ;  Surgeon: Ricardo Bruno MD;  Location: [facility] OR     INSERT STIMULATOR DORSAL COLUMN  08/13/2013    lead replacemend, 12?2016,  battery replacement 4/4/2016     IR GASTROSTOMY TUBE PERCUTANEOUS PLCMNT  10/29/2015     KNEE SURGERY       LASER CO2 LARYNGOSCOPY N/A 2/19/2015    Procedure: LASER CO2 LARYNGOSCOPY;  Surgeon: Siri Koch MD;  [facility] OR     LASER CO2 LARYNGOSCOPY, COMPLEX  7/22/2014    Procedure: LASER CO2 LARYNGOSCOPY, COMPLEX;  Surgeon: Siri Koch MD;  [facility]     MYECTOMY SEPTAL  4/14/2014    Procedure: Median Sternotomy, Septal Myectomy, Intraoperative BRENT performed by Dr. Castano, on pump oxygenator.;  Surgeon: Aguila Cannon MD;  Location: [facility] OR     NECK SURGERY       [place] SPINE SURGERY PROCEDURE UNLISTED       SHOULDER SURGERY  2006    RIGHT     STOMACH SURGERY  1980    partial gastrectomy for bleeding ulcers, \"stress related\". [facility]     WRIST SURGERY Left 1988    fractured. 1988 or so     [facility] CARDIAC SURG PROCEDURE UNLIST       [facility] HAND/FINGER SURGERY UNLISTED       [facility] SHOULDER SURG PROC UNLISTED       [facility] STOMACH SURGERY PROCEDURE UNLISTED       colchicine (COLCRYS) 0.6 MG tablet  ondansetron (ZOFRAN ODT) 8 MG ODT tab  albuterol (PROAIR HFA/PROVENTIL HFA/VENTOLIN HFA) 108 (90 Base) MCG/ACT inhaler  amLODIPine (NORVASC) 10 MG tablet  ASPIRIN NOT PRESCRIBED (INTENTIONAL)  atorvastatin (LIPITOR) 20 MG tablet  Atorvastatin Calcium (LIPITOR PO)  cetirizine (ZYRTEC) 10 MG tablet  cloNIDine (CATAPRES) 0.1 MG tablet  diclofenac (VOLTAREN) 1 % topical gel  docusate sodium (COLACE) 100 MG capsule  DULoxetine (CYMBALTA) 60 MG capsule  furosemide (LASIX) 40 MG tablet  INVOKANA 300 MG tablet  KLOR-CON 10 MEQ CR tablet  losartan (COZAAR) " 50 MG tablet  metFORMIN (GLUCOPHAGE) 1000 MG tablet  metFORMIN (GLUCOPHAGE) 500 MG tablet  metoprolol succinate ER (TOPROL XL) 100 MG 24 hr tablet  OLANZapine (ZYPREXA) 5 MG tablet  pantoprazole (PROTONIX) 40 MG EC tablet  rivaroxaban ANTICOAGULANT (XARELTO) 20 MG TABS tablet  SENEXON-S 8.6-50 MG tablet  tamsulosin (FLOMAX) 0.4 MG capsule  traZODone (DESYREL) 100 MG tablet      Allergies   Allergen Reactions     Betadine Prepstick Plus Hives, Swelling and Rash     From procedure on 7/18/2022     Bee Venom Swelling     Lisinopril      TABS  Severe cough     Penicillins Hives and Difficulty breathing     Family History  Family History   Problem Relation Age of Onset     Heart Disease Father 32        MIs x2; s/p CABG     Substance Abuse Father      Hypertension Father      Obesity Father      Hyperlipidemia Father      Hypertension Brother      Diabetes Brother      Glaucoma Maternal Grandmother      Hypertension Maternal Grandmother      Diabetes Maternal Grandfather      Glaucoma Maternal Grandfather      Hypertension Maternal Grandfather      Cerebrovascular Disease Maternal Grandfather      Obesity Maternal Grandfather      Hyperlipidemia Maternal Grandfather      Coronary Artery Disease Maternal Grandfather      Heart Disease Maternal Grandfather      Substance Abuse Other      Cancer Other      Hypertension Other      Obesity Other      Other Cancer Other         all over     Hyperlipidemia Other      Coronary Artery Disease Other      Obesity Brother      Hyperlipidemia Brother      Lymphoma Maternal Uncle      Diabetes Brother      Depression Daughter      Depression Son      Macular Degeneration No family hx of      Heart Failure Father      Social History   Social History     Tobacco Use     Smoking status: Every Day     Packs/day: 1.00     Types: Cigarettes     Start date: 5/17/2015     Smokeless tobacco: Never     Tobacco comments:     Had quit and resumed use   Substance Use Topics     Alcohol use: Yes     " Alcohol/week: 0.0 standard drinks of alcohol     Comment: rare     Drug use: No     Comment: last using meth 1/2017         A complete review of systems was performed with pertinent positives and negatives noted in the HPI, and all other systems negative.    Physical Exam   BP: (!) 143/78  Pulse: 92  Temp: 99  F (37.2  C)  Resp: 18  Height: 180.3 cm (5' 11\")  Weight: 101.6 kg (224 lb)  SpO2: 95 %  Physical Exam  Constitutional:       General: He is not in acute distress.     Appearance: Normal appearance. He is not toxic-appearing.   HENT:      Head: Atraumatic.   Eyes:      General: No scleral icterus.     Conjunctiva/sclera: Conjunctivae normal.   Cardiovascular:      Rate and Rhythm: Normal rate.      Heart sounds: Normal heart sounds.   Pulmonary:      Effort: Pulmonary effort is normal. No respiratory distress.      Breath sounds: Normal breath sounds.   Abdominal:      Palpations: Abdomen is soft.      Tenderness: There is no abdominal tenderness.   Musculoskeletal:         General: No deformity.      Cervical back: Neck supple.   Skin:     General: Skin is warm.   Neurological:      Mental Status: He is alert.           ED Course, Procedures, & Data      Procedures       ED Course Selections:        EKG Interpretation:      Interpreted by Laurel Newman MD  Time reviewed: 0040  Symptoms at time of EKG: pleuritic chest pain   Rhythm: paced  Rate: 93  Axis: Normal  Ectopy: none  Conduction: paced  ST Segments/ T Waves: paced  Q Waves: paced  Comparison to prior: Unchanged    Clinical Impression: paced - stable EKG                           Results for orders placed or performed during the hospital encounter of 06/22/23   Chest XR,  PA & LAT     Status: None    Narrative    EXAM: XR CHEST 2 VIEWS  LOCATION: [facility]  DATE: 6/22/2023    INDICATION: chest pain  COMPARISON: 04/25/2023      Impression    IMPRESSION: Stable chest with no acute cardiopulmonary " abnormalities. Transvenous pacer lead tips project over the RA and RV. Postoperative changes sternotomy with slightly displaced fractures involving the 2 uppermost sternal wires. Prior postoperative   changes of the upper spine. Minimal fine fibrosis/atelectasis left lung base.   CT Chest Pulmonary Embolism w Contrast     Status: None    Narrative    EXAM: CT CHEST PULMONARY EMBOLISM W CONTRAST  LOCATION: [facility]  DATE: 6/22/2023    INDICATION: pleuritic chest pain   eval PE  COMPARISON: 01/23/2020  TECHNIQUE: CT chest pulmonary angiogram during arterial phase injection of IV contrast. Multiplanar reformats and MIP reconstructions were performed. Dose reduction techniques were used.   CONTRAST: 72 ml isovue 370     FINDINGS:  ANGIOGRAM CHEST: Pulmonary arteries are normal caliber and negative for pulmonary emboli. Thoracic aorta is negative for dissection. No CT evidence of right heart strain.    LUNGS AND PLEURA: Lungs are clear. No pleural effusion.    MEDIASTINUM/AXILLAE: Small-to-moderate pericardial effusion is present. The heart is normal in size. A single lead pacing device is again seen in place within the right ventricle with the battery pack overlying left chest wall. Postsurgical changes from   prior cardiac surgery are again identified.    CORONARY ARTERY CALCIFICATION: Previous intervention (CABG).    UPPER ABDOMEN: Normal.    MUSCULOSKELETAL: No concerning bone lesions.      Impression    IMPRESSION:  1.  No evidence of pulmonary embolus to the segmental level  2.  Small to moderate pericardial effusion   Abdomen US, limited (RUQ only)     Status: None    Narrative    EXAMINATION: US ABDOMEN LIMITED, 6/22/2023 4:54 AM    COMPARISON: CT 1/23/2023. Ultrasound 4/3/2023.    HISTORY: Right upper quadrant pain, elevated LFTs.    TECHNIQUE: The abdomen was scanned in standard fashion with  specialized ultrasound transducer(s) using both grey scale and  limited  color Doppler techniques.    FINDINGS:   Fluid: No evidence of ascites or pleural effusions.    Liver: The liver demonstrates diffusely increased echogenicity of the  hepatic parenchyma, measuring 18.1 cm in craniocaudal dimension. Focal  fatty sparing near the gallbladder fossa. There is no focal mass. The  main portal vein is patent with antegrade flow towards the liver.    Gallbladder: There is no wall thickening, pericholecystic fluid,  positive sonographic John's sign or evidence for cholelithiasis.    Bile Ducts: Both the intra- and extrahepatic biliary system are of  normal caliber.  The common bile duct measures 5 mm in diameter.    Pancreas: Limited visualization of the pancreas is unremarkable    Kidney: The right kidney measures 10.6 cm long. There is no  hydronephrosis or hydroureter, no shadowing renal calculi, cystic  lesion or mass.       Impression    IMPRESSION:   1.  Diffuse increased echogenicity of the liver parenchyma, which is  nonspecific, but seen with intrinsic hepatic disease (most commonly  hepatic steatosis).  2.  Normal sonographic assessment of the gallbladder.    I have personally reviewed the examination and initial interpretation  and I agree with the findings.    JUNIE GANDHI MD         SYSTEM ID:  [id number]   Comprehensive metabolic panel     Status: Abnormal   Result Value Ref Range    Sodium 134 (L) 136 - 145 mmol/L    Potassium 3.5 3.4 - 5.3 mmol/L    Chloride 94 (L) 98 - 107 mmol/L    Carbon Dioxide (CO2) 25 22 - 29 mmol/L    Anion Gap 15 7 - 15 mmol/L    Urea Nitrogen 15.8 6.0 - 20.0 mg/dL    Creatinine 0.83 0.67 - 1.17 mg/dL    Calcium 9.8 8.6 - 10.0 mg/dL    Glucose 157 (H) 70 - 99 mg/dL    Alkaline Phosphatase 99 40 - 129 U/L    AST 78 (H) 0 - 45 U/L    ALT 40 0 - 70 U/L    Protein Total 7.5 6.4 - 8.3 g/dL    Albumin 4.6 3.5 - 5.2 g/dL    Bilirubin Total 0.9 <=1.2 mg/dL    GFR Estimate >90 >60 mL/min/1.73m2   Lipase     Status: Normal   Result Value Ref Range     Lipase 15 13 - 60 U/L   Troponin T, High Sensitivity     Status: Normal   Result Value Ref Range    Troponin T, High Sensitivity 17 <=22 ng/L   Ketone Beta-Hydroxybutyrate Quantitative     Status: Normal   Result Value Ref Range    Ketone (Beta-Hydroxybutyrate) Quantitative 0.30 <=0.30 mmol/L   [place] Draw     Status: None (In process)    Narrative    The following orders were created for panel order [place] Draw.  Procedure                               Abnormality         Status                     ---------                               -----------         ------                     Extra Blue Top Tube[[id number]]                              Final result               Extra Red Top Tube[[id number]]                                                          Extra Green Top (Lithium...[[id number]]                                                 Extra Purple Top Tube[[id number]]                                                         Please view results for these tests on the individual orders.   CBC with platelets and differential     Status: Abnormal   Result Value Ref Range    WBC Count 17.1 (H) 4.0 - 11.0 10e3/uL    RBC Count 5.18 4.40 - 5.90 10e6/uL    Hemoglobin 16.7 13.3 - 17.7 g/dL    Hematocrit 47.4 40.0 - 53.0 %    MCV 92 78 - 100 fL    MCH 32.2 26.5 - 33.0 pg    MCHC 35.2 31.5 - 36.5 g/dL    RDW 12.7 10.0 - 15.0 %    Platelet Count 239 150 - 450 10e3/uL    % Neutrophils 75 %    % Lymphocytes 13 %    % Monocytes 10 %    % Eosinophils 1 %    % Basophils 1 %    % Immature Granulocytes 0 %    NRBCs per 100 WBC 0 <1 /100    Absolute Neutrophils 12.7 (H) 1.6 - 8.3 10e3/uL    Absolute Lymphocytes 2.3 0.8 - 5.3 10e3/uL    Absolute Monocytes 1.8 (H) 0.0 - 1.3 10e3/uL    Absolute Eosinophils 0.2 0.0 - 0.7 10e3/uL    Absolute Basophils 0.1 0.0 - 0.2 10e3/uL    Absolute Immature Granulocytes 0.1 <=0.4 10e3/uL    Absolute NRBCs 0.0 10e3/uL   Extra Blue Top Tube     Status: None   Result Value Ref Range    Hold Specimen  [facility]    CRP inflammation     Status: Abnormal   Result Value Ref Range    CRP Inflammation 103.00 (H) <5.00 mg/L   EKG 12 lead     Status: None   Result Value Ref Range    Systolic Blood Pressure  mmHg    Diastolic Blood Pressure  mmHg    Ventricular Rate 93 BPM    Atrial Rate 93 BPM    RI Interval 176 ms    QRS Duration 190 ms     ms    QTc 547 ms    P Axis 67 degrees    R AXIS 270 degrees    T Axis 89 degrees    Interpretation ECG       Atrial-sensed ventricular-paced rhythm  Abnormal ECG  Unconfirmed report - interpretation of this ECG is computer generated - see medical record for final interpretation  Confirmed by - EMERGENCY ROOM, PHYSICIAN ([id number]),  MERISSA GERMAN ([id number]) on 6/22/2023 6:37:52 AM     CBC with platelets differential     Status: Abnormal    Narrative    The following orders were created for panel order CBC with platelets differential.  Procedure                               Abnormality         Status                     ---------                               -----------         ------                     CBC with platelets and d...[[id number]]  Abnormal            Final result                 Please view results for these tests on the individual orders.     Medications   ondansetron (ZOFRAN) injection 8 mg (8 mg Intravenous $Given 6/22/23 0048)   0.9% sodium chloride BOLUS (0 mLs Intravenous Stopped 6/22/23 0251)   iopamidol (ISOVUE-370) solution 72 mL (72 mLs Intravenous $Given 6/22/23 0257)   sodium chloride (PF) 0.9% PF flush 100 mL (100 mLs Intravenous $Given 6/22/23 0258)     Labs Ordered and Resulted from Time of ED Arrival to Time of ED Departure   COMPREHENSIVE METABOLIC PANEL - Abnormal       Result Value    Sodium 134 (*)     Potassium 3.5      Chloride 94 (*)     Carbon Dioxide (CO2) 25      Anion Gap 15      Urea Nitrogen 15.8      Creatinine 0.83      Calcium 9.8      Glucose 157 (*)     Alkaline Phosphatase 99      AST 78 (*)     ALT 40      Protein Total 7.5       Albumin 4.6      Bilirubin Total 0.9      GFR Estimate >90     CBC WITH PLATELETS AND DIFFERENTIAL - Abnormal    WBC Count 17.1 (*)     RBC Count 5.18      Hemoglobin 16.7      Hematocrit 47.4      MCV 92      MCH 32.2      MCHC 35.2      RDW 12.7      Platelet Count 239      % Neutrophils 75      % Lymphocytes 13      % Monocytes 10      % Eosinophils 1      % Basophils 1      % Immature Granulocytes 0      NRBCs per 100 WBC 0      Absolute Neutrophils 12.7 (*)     Absolute Lymphocytes 2.3      Absolute Monocytes 1.8 (*)     Absolute Eosinophils 0.2      Absolute Basophils 0.1      Absolute Immature Granulocytes 0.1      Absolute NRBCs 0.0     CRP INFLAMMATION - Abnormal    CRP Inflammation 103.00 (*)    LIPASE - Normal    Lipase 15     TROPONIN T, HIGH SENSITIVITY - Normal    Troponin T, High Sensitivity 17     KETONE BETA-HYDROXYBUTYRATE QUANTITATIVE, RAPID - Normal    Ketone (Beta-Hydroxybutyrate) Quantitative 0.30       Abdomen US, limited (RUQ only)   Final Result   IMPRESSION:    1.  Diffuse increased echogenicity of the liver parenchyma, which is   nonspecific, but seen with intrinsic hepatic disease (most commonly   hepatic steatosis).   2.  Normal sonographic assessment of the gallbladder.      I have personally reviewed the examination and initial interpretation   and I agree with the findings.      JUNIE GANDHI MD            SYSTEM ID:  [id number]      CT Chest Pulmonary Embolism w Contrast   Final Result   IMPRESSION:   1.  No evidence of pulmonary embolus to the segmental level   2.  Small to moderate pericardial effusion      Chest XR,  PA & LAT   Final Result   IMPRESSION: Stable chest with no acute cardiopulmonary abnormalities. Transvenous pacer lead tips project over the RA and RV. Postoperative changes sternotomy with slightly displaced fractures involving the 2 uppermost sternal wires. Prior postoperative    changes of the upper spine. Minimal fine fibrosis/atelectasis left lung base.              Critical care was not performed.     Medical Decision Making  The patient's presentation was of moderate complexity (an acute illness with systemic symptoms).    The patient's evaluation involved:  review of external note(s) from 2 sources (pervious notes)  ordering and/or review of 3+ test(s) in this encounter (see separate area of note for details)  review of 3+ test result(s) ordered prior to this encounter (previous results)  independent interpretation of testing performed by another health professional (CXR independently reviewed and interpreted)  discussion of management or test interpretation with another health professional (Dr Sims, cardiology)    The patient's management necessitated moderate risk (prescription drug management including medications given in the ED).      Assessment & Plan    The patient's EKG was paced, stable compared with previous.  Troponin is normal at 17, and in light of the ongoing symptoms for many hours, this effectively excludes acute coronary syndrome.  Ketones were normal, glucose was 157.  White blood cell count was elevated at 17.1, though this is nonspecific, particularly in light of the fact that the patient reports vomiting multiple times today.  Lipase is normal, though AST is elevated at 78.  Right upper quadrant ultrasound did not show any evidence for acute cholecystitis.  It did show diffuse increased echogenicity of the liver parenchyma, which is nonspecific, but seen in intrinsic hepatic disease, most commonly hepatic steatosis.  This was discussed with the patient and he is encouraged to follow-up with his clinic doctor.  Given the pleuritic nature of chest pain, CT was done to evaluate for possible PE.  PE wise not identified, though he was seen to have a small to moderate pericardial effusion.  This was discussed with the cardiologist on-call, Dr. Grijalva.  He agreed it is certainly possible that the patient's symptoms are related to acute pericarditis.   The EKG cannot be used to to evaluate this given that he is paced.  He did have a notably elevated CRP at 103.  Was recommended that he start NSAIDs as well as colchicine, however, the patient is on Xarelto chronically, so I did just start colchicine for now.  I encouraged him to follow-up with his cardiologist as soon as possible to further determine appropriate treatment.  He was given Zofran and fluids here, is no longer vomiting, is feeling improved.  He will also be given a prescription for Zofran at discharge.  He is encouraged to return to the ER with any worsening or concerns.  He verbalizes understanding and is agreeable to the plan.    Dictation Disclaimer: Some of this Note has been completed with voice-recognition dictation software. Although errors are generally corrected real-time, there is the potential for a rare error to be present in the completed chart.      I have reviewed the nursing notes. I have reviewed the findings, diagnosis, plan and need for follow up with the patient.    Discharge Medication List as of 6/22/2023  5:52 AM      START taking these medications    Details   colchicine (COLCRYS) 0.6 MG tablet Take 1 tablet (0.6 mg) by mouth 2 times daily, Disp-30 tablet, R-0, Local Print      ondansetron (ZOFRAN ODT) 8 MG ODT tab Take 1 tablet (8 mg) by mouth every 8 hours as needed for nausea, Disp-10 tablet, R-0, Local Print             Final diagnoses:   Pleuritic chest pain   Nausea and vomiting, unspecified vomiting type       Laurel Newman MD  [facility] EMERGENCY DEPARTMENT  6/21/2023     Laurel Newman MD  06/22/23 0793

## 2023-06-22 NOTE — DISCHARGE INSTRUCTIONS
Please make an appointment to follow up with Cardiology Clinic (phone: [phone]) within a week. Return with any worsening or concerns. If you start having muscle pain or weakness, stop the medication.

## 2023-06-26 ENCOUNTER — PRE VISIT (OUTPATIENT)
Dept: UROLOGY | Facility: CLINIC | Age: 53
End: 2023-06-26
Payer: MEDICAID

## 2023-06-26 NOTE — TELEPHONE ENCOUNTER
Reason for visit: Consult     Relevant information: BPH/ Urinary retention, cathete in place     Records/imaging/labs/orders: Epic     At Rooming: Video visit     Khai Barclay  6/26/2023  8:57 AM

## 2023-06-27 ENCOUNTER — TELEPHONE (OUTPATIENT)
Dept: NEUROLOGY | Facility: CLINIC | Age: 53
End: 2023-06-27
Payer: MEDICAID

## 2023-06-27 NOTE — TELEPHONE ENCOUNTER
[facility]    Phone Message    May a detailed message be left on voicemail: yes     Reason for Call: Other: Eliza is calling from home health to get verbal orders for medication change for OLANZapine (ZYPREXA) 5 MG tablet, previous dose was 5mg one tablet. Need verbal orders for the 1.5mg tab.    Please contact Eliza back at [phone]    Action Taken: Other: [facility]    Travel Screening: Not Applicable

## 2023-06-28 NOTE — TELEPHONE ENCOUNTER
Returned call to Eliza. We discussed patients current dose of olanzapine which is 7.5mg daily at bedtime. She needed verbal confirmation that this is correct dosing.     RN confirmed that this is correct. Nothing further needed.     Daniel Weems RN, BSN  [facility]

## 2023-06-29 ENCOUNTER — PRE VISIT (OUTPATIENT)
Dept: UROLOGY | Facility: CLINIC | Age: 53
End: 2023-06-29

## 2023-08-28 ENCOUNTER — PRE VISIT (OUTPATIENT)
Dept: ENDOCRINOLOGY | Facility: CLINIC | Age: 53
End: 2023-08-28

## 2024-03-26 NOTE — NURSING NOTE
Chief Complaint   Patient presents with     Follow Up For     48 year old male with history of a fib, hypertrophic cardiomyopathy, s/p ventricular spetal myectomy and ICD placement, Hypertension, hyperlipidemia, limb ischemia and DM presenting for follow up       Vitals were taken and medications were reconciled.   Cindy Cordova RMA  10:41 AM     Patient to follow up with Infectious Disease -Dr. Debbie Rodriguez [phone]

## 2024-05-16 NOTE — PATIENT INSTRUCTIONS
"You were seen today in the Adult Congenital and Cardiovascular Genetics Clinic at the [facility].    Cardiology Providers you saw during your visit:  Dr. Lia Castellano     Diagnosis:  HCM    Results:  The results of your echo were discussed with you today    Recommendations:    1.  Continue to eat a heart healthy, low salt diet.  2.  Continue to get 20-30 minutes of aerobic activity, 4-5 days per week.  Examples of aerobic activity include walking, running, swimming, cycling, etc.  3.  Continue to observe good oral hygiene, with regular dental visits.  4.  No changes today.      Vitals:    03/02/18 1426   BP: 122/83   BP Location: Left arm   Patient Position: Chair   Cuff Size: Adult Large   Pulse: 73   SpO2: 96%   Weight: 121.7 kg (268 lb 3.2 oz)   Height: 1.803 m (5' 11\")     Exercise restrictions:   Yes__X__  No____         If yes, list restrictions:  Must be allowed to rest if fatigued or SOB      Work restrictions:  Yes____  No_X___         If yes, list restrictions:      Follow-up:  Follow up with Dr. Castellano in 1 year with an echo and Lexiscan prior.     For after hours urgent needs, call [phone] and ask to speak to the Adult Congenital Physician on call.  Mention Job Code [id number].    For emergencies call [phone].    For any scheduling needs and to contact your nurse in the Adult Congenital and Cardiovascular Genetics Clinic, please call [facility] , at [phone].    Thank you for your visit today!  If you have questions or concerns about today's visit, please call me.    Anna Chino RN, BSN  Cardiology Care Coordinator  [facility]  [email or web address]  Ph.[phone]    [facility]  [facility]  Mail Code [id number]  [address] [place]  [address]   " Stable

## 2024-05-21 NOTE — LETTER
5/4/2023         RE: Konstantin Sharp  [address]  [place] [address]        Dear Colleague,    Thank you for referring your patient, Konstantin Sharp, to the [facility]. Please see a copy of my visit note below.        Outpatient Follow up TBI Evaluation     Pertinent History: Patient is known to me from prior clinic contacted the patient has not been seen by me in over 2 years.  Unfortunately old records are difficult to access at this time due to the new computer system.  The patient and his wife are extremely vague historians but it appears he has been followed through the [facility] by psychiatry.  Medications are listed as above and both the patient and wife report there is been multiple medication changes in the last few years but they are unaware of what these were.  We will try and clarify all of this.  Patient presents today to establish care at this clinic.  The patient's medication list is as described in the nurse's note according to the limited information we have available to us.     The patient reestablished contact with his clinic in December 2021.  He had been followed through [facility].  He had had multiple medication changes over the last few years but we have limited information when we first saw the patient.  We were attempting to clarify his current medication list and past medication trials.  At that visit the patient had significant heart movements noted around his mouth.  The family also reported he had lost 60 pounds in 6 months.  He was having worsening depression and anxiety.     The patient was seen for a follow-up visit on January 11 of 2022.  The patient has been seen by multiple providers prior to that over the course of more than a year.  When I saw him at that visit he was on Cymbalta 20 mg a day.  His mood was worse.  He was having low motivation and low energy but no thoughts of harming himself.  He was  willing to restart with a psychotherapist and I did order that.  I increased his Cymbalta to 60 mg a day.     I saw the patient on February 22, 2022.  I also interviewed his wife and both reported the patient seem to be improving with regard to his mood and was less depressed with no thoughts of wishing he was dead.  He continued with his baseline tremor.  He was sleeping well at night.  There were no new medical issues.  We continued with the treatment plan at that time.    The patient had a follow-up with me on April 5, 2022.  We did increase his Cymbalta to 60 mg a day and did remind him that he had trazodone available that he was not using.  We also ordered some individual therapy.  He was complaining of some chronic fatigue and weight loss and he was going to have that addressed through his primary care doctor.    The patient was seen for follow-up in May 2022 along with Leilani.  He was having some trouble with sleep at that time but was otherwise doing relatively well.  He and his family had recently returned from a road trip to [place] and that went well.  He was doing well and we did not make any medication changes.    The patient was seen on August 18, 2022.  At that time he was doing relatively well but was still having some depression.  He was tolerating the medication and felt that the medication was at least partially helpful.  We elected to increase the patient's Cymbalta to 90 mg a day.    The patient was seen in October 2022.  At that time he was having some trouble with sleep and he felt this was likely related to pain.  We elected to increase his Cymbalta to 120 mg a day and he was going to continue with his medical treatments and cares.    I saw the patient in November 2022.  He was doing relatively well at that time and tolerating the medication.  The family felt he was doing well.  We elected to continue with the current treatment plan and did not make any changes.    The patient had a  follow-up visit with me on January 19, 2023.  At that time he was having symptoms complaints of daytime sleepiness otherwise he was doing well regarding mood and behavior.  No change in his chronic tremor.  We elected to discontinue his morning Seroquel dosing.  We continued with the other medications.    The patient was seen for follow-up on February 28, 2023.  He was having some mind racing.  He was less social.  He was tolerating the medication and continue to follow-up with his medical team.  I did increase his nighttime Zyprexa to 7.5 mg.       HPI:  Patient presents today for the purposes of medication management.   The patient returned for a video visit today and told me he was doing much better.  He stated he was more interactive and felt less depressed.  He denied having any manic symptoms.  No hallucinations or delusions.  He reports he is still having trouble with sleep in large part because he has to get up multiple times to urinate and we did discuss sleep hygiene measures and he is going to follow up with his urologist as well.  I reviewed the records and I do see the patient did have a colonoscopy.  He states he is following up with his medical doctor regarding coronary artery disease.  He does report he is eating healthier and trying to have a healthier lifestyle.  No thoughts of hurting himself or anybody else.  He does report at times he tends to have some unusual thoughts but was vague on details.  He will monitor that.  No side effects to the medication.  No other issues or concerns.  He wanted to continue with the current treatment plan.         Current Medications: Please see chart. Medications personally reviewed.     Patient Active Problem List    Diagnosis Date Noted     Hyperglycemia 04/25/2023     Priority: Medium     High anion gap metabolic acidosis 04/25/2023     Priority: Medium     Chest pain, unspecified type 04/25/2023     Priority: Medium     Elevated lactic acid level 02/04/2023      Priority: Medium     Chronic pain due to trauma 09/03/2016     Priority: Medium     Overview:   Broken back/neck 1996/2007 respectively. Did have procedure for spine stimulator       Esophageal reflux 09/03/2016     Priority: Medium     Essential hypertension 09/03/2016     Priority: Medium     History of traumatic brain injury 09/03/2016     Priority: Medium     Large bowel obstruction (H) 09/03/2016     Priority: Medium     S/P laminectomy 09/03/2016     Priority: Medium     Overview:   replacement of failed spinal cord stimulator & placement of epidural paddle electrode - 9/2/2016       Tobacco dependence 09/03/2016     Priority: Medium     Cognitive disorder 06/16/2016     Priority: Medium     Type 2 diabetes mellitus with diabetic neuropathy (H) 05/02/2016     Priority: Medium     Seizures (H) 02/01/2016     Priority: Medium     Respiratory failure (H) 10/19/2015     Priority: Medium     Insomnia 03/13/2015     Priority: Medium     Patient is followed by the [facility] 03/11/2015     Priority: Medium     For urgent after hours needs, please call [phone] and ask to speak with the Adult Congenital Heart Disease Physician on call - mention job code [id number].           Dysphonia 01/05/2015     Priority: Medium     Postprocedural subglottic stenosis 07/22/2014     Priority: Medium     Pre-operative cardiovascular examination 07/16/2014     Priority: Medium     Automatic implantable cardioverter-defibrillator in situ- [employer], dual chamber- DEPENDENT 04/30/2014     Priority: Medium     Implanted 4/29/14 by Dr. Lacy  Problem list name updated by automated process. Provider to review       S/P ventricular septal myectomy 04/14/2014     Priority: Medium     Syncope 12/28/2013     Priority: Medium     Atrial fibrillation (H) 05/15/2013     Priority: Medium     Asthma 01/24/2013     Priority: Medium     Depressive disorder 01/24/2013     Priority: Medium     Old myocardial  "infarction 01/24/2013     Priority: Medium     Osteoarthrosis 01/24/2013     Priority: Medium     Spinal stenosis, lumbar region, with neurogenic claudication 11/15/2012     Priority: Medium     Lumbar radicular pain 11/15/2012     Priority: Medium     s/p PSF C6-7 for anterior pseudarthrosis 10/11/2012     Priority: Medium     Hypertrophic nonobstructive cardiomyopathy (H) 09/12/2012     Priority: Medium     Chest pain 09/12/2012     Priority: Medium     Sinus tachycardia 09/12/2012     Priority: Medium     Pseudoarthrosis of cervical spine (H) 08/24/2012     Priority: Medium     Chondromalacia of patella 10/25/2011     Priority: Medium     Anticoagulant long-term use 02/18/2011     Priority: Medium     Cervical vertebral fusion 12/08/2010     Priority: Medium     Herniated cervical intervertebral disc 06/26/2008     Priority: Medium     Past Medical History:   Diagnosis Date     Atrial fibrillation (H)      Chest pain     intermittent     Chronic pain      Cognitive disorder     memory loss     Depressive disorder      Dual ICD (implantable cardiac defibrillator) in place 04/29/2014    and pacemaker     GERD (gastroesophageal reflux disease)      H/O traumatic brain injury 2015     Heart attack (H) 1996     HTN (hypertension)      Hypertrophic nonobstructive cardiomyopathy (H) 09/12/2012    s/p ventricular myectomy     Inflammatory arthritis      Intermittent asthma      PAD (peripheral artery disease) (H)      Polysubstance abuse (H)      Seizures (H)     last episode 2014 when \"blood sugar dropped\" according to patient     Sleep apnea     doesn't use cpap     Type 2 diabetes mellitus without complications (H)      Past Surgical History:   Procedure Laterality Date     APPENDECTOMY  1980    [facility]     BACK SURGERY       COLONOSCOPY  2017     DISCECTOMY LUMBAR POSTERIOR MICROSCOPIC THREE+ LEVELS  12/16/2011    Procedure:DISCECTOMY LUMBAR POSTERIOR MICROSCOPIC THREE+ LEVELS; Posterior " "Decompression L2-S1; Surgeon:CAITIE OSMAN; Location:[facility] OR     EP ICD GENERATOR REPLACEMENT DUAL N/A 7/18/2022    Procedure: Implantable Cardioverter Defibrillator Generator Replacement Dual;  Surgeon: Ritesh Lacy MD;  [facility]: [facility] OR     FUSION CERVICAL POSTERIOR ONE LEVEL  8/24/2012    Procedure: FUSION CERVICAL POSTERIOR ONE LEVEL;  Posterior Instrumentated Spinal Fusion Cervical 6-7, Right Iliac Crest Bone [place] ;  Surgeon: Caitie Osman MD;  [facility] OR     GRAFT BONE FROM ILIAC CREST  8/24/2012    Procedure: GRAFT BONE FROM ILIAC CREST;;  Surgeon: Caitie Osman MD;  [facility] OR     HEAD & NECK SURGERY  2009     IMPLANT PACEMAKER       IMPLANT PACEMAKER       INJECT STEROID (LOCATION) N/A 2/19/2015    Procedure: INJECT STEROID (LOCATION);  Surgeon: Siri Koch MD;  [facility]: [facility]     INSERT STIMULATOR AND LEADS INTERNAL DORSAL COLUMN  8/7/2013    Procedure: INSERT STIMULATOR AND LEADS INTERNAL DORSAL COLUMN;  SPINAL CORD STIMULATOR IMPLANT ;  Surgeon: Ricardo Bruno MD;  Location: [facility] OR     INSERT STIMULATOR DORSAL COLUMN  08/13/2013    lead replacemend, 12?2016,  battery replacement 4/4/2016     IR GASTROSTOMY TUBE PERCUTANEOUS PLCMNT  10/29/2015     KNEE SURGERY       LASER CO2 LARYNGOSCOPY N/A 2/19/2015    Procedure: LASER CO2 LARYNGOSCOPY;  Surgeon: Siri Koch MD;  [facility]     LASER CO2 LARYNGOSCOPY, COMPLEX  7/22/2014    Procedure: LASER CO2 LARYNGOSCOPY, COMPLEX;  Surgeon: Siri Koch MD;  [facility]     MYECTOMY SEPTAL  4/14/2014    Procedure: Median Sternotomy, Septal Myectomy, Intraoperative BRENT performed by Dr. Castano, on pump oxygenator.;  Surgeon: Aguila Cannon MD;  Location: [facility] OR     NECK SURGERY       [place] SPINE SURGERY PROCEDURE UNLISTED       SHOULDER SURGERY  2006    RIGHT     STOMACH SURGERY  1980    partial gastrectomy for bleeding ulcers, \"stress related\". [facility]     " WRIST SURGERY Left 1988    fractured. 1988 or so     [facility] CARDIAC SURG PROCEDURE UNLIST       [facility] HAND/FINGER SURGERY UNLISTED       [facility] SHOULDER SURG PROC UNLISTED       [facility] STOMACH SURGERY PROCEDURE UNLISTED       Family History   Problem Relation Age of Onset     Heart Disease Father 32        MIs x2; s/p CABG     Substance Abuse Father      Hypertension Father      Obesity Father      Hyperlipidemia Father      Hypertension Brother      Diabetes Brother      Glaucoma Maternal Grandmother      Hypertension Maternal Grandmother      Diabetes Maternal Grandfather      Glaucoma Maternal Grandfather      Hypertension Maternal Grandfather      Cerebrovascular Disease Maternal Grandfather      Obesity Maternal Grandfather      Hyperlipidemia Maternal Grandfather      Coronary Artery Disease Maternal Grandfather      Heart Disease Maternal Grandfather      Substance Abuse Other      Cancer Other      Hypertension Other      Obesity Other      Other Cancer Other         all over     Hyperlipidemia Other      Coronary Artery Disease Other      Obesity Brother      Hyperlipidemia Brother      Lymphoma Maternal Uncle      Diabetes Brother      Depression Daughter      Depression Son      Macular Degeneration No family hx of      Heart Failure Father      Current Outpatient Medications   Medication Sig Dispense Refill     albuterol (PROAIR HFA/PROVENTIL HFA/VENTOLIN HFA) 108 (90 Base) MCG/ACT inhaler Inhale 2 puffs into the lungs every 4 hours as needed       amLODIPine (NORVASC) 10 MG tablet Take 1 tablet (10 mg) by mouth daily at 2 pm 90 tablet 3     atorvastatin (LIPITOR) 20 MG tablet Take 1 tablet by mouth daily at 2 pm       Atorvastatin Calcium (LIPITOR PO) Take 80 mg by mouth daily        cetirizine (ZYRTEC) 10 MG tablet Take 1 tablet by mouth daily at 2 pm       cloNIDine (CATAPRES) 0.1 MG tablet Take 0.1 mg by mouth 3 times daily       diclofenac (VOLTAREN) 1 % topical gel Apply 2 g topically 2 times daily        docusate sodium (COLACE) 100 MG capsule Take 100 mg by mouth 2 times daily       DULoxetine (CYMBALTA) 60 MG capsule Take 2 capsules (120 mg) by mouth daily 60 capsule 3     furosemide (LASIX) 40 MG tablet TAKE 1/2 A TABLET DAILY       INVOKANA 300 MG tablet Take 300 mg by mouth daily before breakfast       KLOR-CON 10 MEQ CR tablet Take 1 tablet by mouth daily at 2 pm       losartan (COZAAR) 50 MG tablet Take 1 tablet (50 mg) by mouth daily 90 tablet 3     metFORMIN (GLUCOPHAGE) 1000 MG tablet Take 1 tablet by mouth 2 times daily       metFORMIN (GLUCOPHAGE) 500 MG tablet Take 500 mg by mouth 2 times daily (with meals)  60 tablet      metoprolol succinate ER (TOPROL XL) 100 MG 24 hr tablet Take 1 tablet by mouth daily at 2 pm       OLANZapine (ZYPREXA) 5 MG tablet Take 1.5 tablets (7.5 mg) by mouth At Bedtime 30 tablet 0     pantoprazole (PROTONIX) 40 MG EC tablet Take 40 mg by mouth daily before breakfast       rivaroxaban ANTICOAGULANT (XARELTO) 20 MG TABS tablet Take 1 tablet (20 mg) by mouth daily 90 tablet 3     SENEXON-S 8.6-50 MG tablet Take 1 tablet by mouth 2 times daily       tamsulosin (FLOMAX) 0.4 MG capsule Take 0.4 mg by mouth daily       traZODone (DESYREL) 100 MG tablet Take 1 tablet (100 mg) by mouth At Bedtime 30 tablet 4       Allergies   Allergen Reactions     Betadine Prepstick Plus Hives, Swelling and Rash     From procedure on 7/18/2022     Bee Venom Swelling     Lisinopril      TABS  Severe cough     Penicillins Hives and Difficulty breathing     Social History     Socioeconomic History     Marital status: Single     Spouse name: Not on file     Number of children: Not on file     Years of education: Not on file     Highest education level: Not on file   Occupational History     Not on file   Tobacco Use     Smoking status: Every Day     Packs/day: 0.12     Years: 35.00     Pack years: 4.20     Types: Cigarettes     Start date: 3/8/1982     Last attempt to quit: 4/7/2014     Years since  quittin.0     Smokeless tobacco: Never   Vaping Use     Vaping status: Not on file   Substance and Sexual Activity     Alcohol use: Yes     Alcohol/week: 0.0 standard drinks of alcohol     Comment: rare     Drug use: No     Comment: last using meth 2017     Sexual activity: Not Currently     Partners: Female     Birth control/protection: Male Surgical   Other Topics Concern     Parent/sibling w/ CABG, MI or angioplasty before 65F 55M? Yes   Social History Narrative     Not on file     Social Determinants of Health     Financial Resource Strain: Not on file   Food Insecurity: Not on file   Transportation Needs: Not on file   Physical Activity: Not on file   Stress: Not on file   Social Connections: Not on file   Intimate Partner Violence: Not on file   Housing Stability: Not on file       The following portions of the patient's history were reviewed and updated as appropriate: allergies, current medications, past family history, past medical history, past social history, past surgical history and problem list.    Review of Systems  A comprehensive review of systems was negative except for what is noted above    Mental Status Exam  Appearance:  Calm and comfortable with good eye contact.  No shortness of breath.  Behavior:  He denies having any recent behavioral changes or problems.  He was cooperative and polite and pleasant with me.  He was able to participate and initiate.  Speech:  Somewhat monotone.  Not pressured or rambling.  Answers were appropriate.  Able to initiate.  Mood: A bit brighter today but still overall flat.  Thought content: No hallucinations or delusions.  Please see above.  Thought formation:  Tracking and participating better today.  Needing fewer prompts and redirection.  Associations: Fair.  Baseline.  Fund of knowledge: Unchanged.  Attention/Concentration: Able to attend to track.  Not disorganized.  No racing thoughts.  Perhaps tracking and participating a bit better today.  Insight:  Fair.  No obvious significant change.  Judgment: Impaired, chronically with no recent reports of any change.  No obvious change on exam.  Memory: Impaired  Motor status:  The patient reports no new tremors or asymmetries.  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    Other:   Patient will return to clinic in 3 months.  He agrees to call or return sooner with any questions or concerns.  Risks and benefits were discussed.  Continue with his individual therapist.     Continue with the support of the clinic, reassurance, and redirection. Staff monitoring and ongoing assessments per team plan. Current psychotropic medication appears to represent the minimum effective dosage and appears medically necessary. We will continue to monitor and reassess. This team will utilize appropriate emergency services if necessary. I will make myself available if concerns or problems arise.    Total time spent with the patient today was 25 minutes with greater than 50% of the time spent in counseling and care coordination. The patient agrees to call before then with any questions, concerns or problems. We will assess for the appropriateness of possible psychotropic medication trials/changes. The patient will seek out appropriate emergency services should that become necessary.    Video Visit Details    Type of service: Video Visit    Video Start Time: 12:38 PM    Video End Time: 12:54 PM    Total time for video call: 16 minutes    Originating Location: Patient's home    Distant Location:  [facility]    Mode of Communication: Video call via [employer]    Total time for chart review, interview with family and patient, coordination of care and documentation is 25 minutes.    Patient Instructions   It was nice speaking  with you today for our office video visit. The following is a summary of our visit.    General Information:    If lab work was done today as part of your evaluation you will generally be contacted via My Chart, mail, or phone with the results within 1-5 days. If there is an alarming result we will contact you by phone. Lab results come back at varying times, I generally wait until all labs are resulted before making comments on results. Please note labs are automatically released to My Chart once available.     If you need refills please contact your pharmacist. They will send a refill request to me to review. Please allow 3 business days for us to process all refill requests.     Please call or send a medical message through My Chart, with any questions or concerns.    If you need any paperwork completed please fax forms to [phone]. Please state if you would like a copy of the completed paperwork, mailed or faxed back to the patient and a fax number to fax the paperwork to. Please allow up to 10 business days for paperwork to be completed.    Rolando Burnham MD          Again, thank you for allowing me to participate in the care of your patient.        Sincerely,        Rolando Burnham MD     electronic

## (undated) DEVICE — KIT MICRO-INTRODUCER STIFFEN 4FR 7274V

## (undated) DEVICE — DEFIB PRO-PADZ LVP LQD GEL ADULT 8900-2105-01

## (undated) DEVICE — DECANTER BAG 2002S

## (undated) DEVICE — INTRO SHEATH 4FRX10CM PINNACLE RSS402

## (undated) DEVICE — SU SILK 0 TIE 6X30" A306H

## (undated) DEVICE — ESU PENCIL SMOKE EVAC W/ROCKER SWITCH 0703-047-000

## (undated) DEVICE — PROTECTOR ARM ONE-STEP TRENDELENBURG 40418

## (undated) DEVICE — LINEN GOWN XLG 5407

## (undated) DEVICE — PREP CHLORAPREP 26ML TINTED HI-LITE ORANGE 930815

## (undated) DEVICE — DRAPE IOBAN INCISE 23X17" 6650EZ

## (undated) DEVICE — CATH ULTRASOUND 9FR ULTRA ICE FLUID DOCK M00499151

## (undated) DEVICE — COVER EQUIPMENT 36X40" BANDED ELAS OPN LF 01-3640

## (undated) DEVICE — INTRODUCER SAFESHEATH II LONG 7FR 23CM SSL7

## (undated) DEVICE — SOL ADH LIQUID BENZOIN SWAB 0.6ML C1544

## (undated) DEVICE — KIT ROTATION TOOL  6056

## (undated) DEVICE — SU VICRYL 2-0 CT-1 27" UND J259H

## (undated) DEVICE — SUCTION MANIFOLD NEPTUNE 2 SYS 4 PORT 0702-020-000

## (undated) DEVICE — SYR 10ML FINGER CONTROL W/O NDL 309695

## (undated) DEVICE — DRAPE MAYO STAND 23X54 8337

## (undated) DEVICE — KIT WRENCH 5873W

## (undated) DEVICE — CABLE PACING ALLIGATOR CLIP 12FT 5833SL

## (undated) DEVICE — SHEATH DILATOR TIGHTRAIL ROTATING 11FR 545-511

## (undated) DEVICE — SU ETHIBOND 0 CT-1 CR 8X18" CX21D

## (undated) DEVICE — LINEN TOWEL PACK X30 5481

## (undated) DEVICE — SU VICRYL 4-0 PS-2 18" UND J496H

## (undated) DEVICE — ESU HOLSTER PLASTIC DISP E2400

## (undated) DEVICE — Device

## (undated) DEVICE — CATH ULTRASOUND 8.5FRX110CM ICE FLUID M00499120

## (undated) DEVICE — BLADE SAW STERNAL 20X30MM KM-32

## (undated) DEVICE — DRSG STERI STRIP 1/2X4" R1547

## (undated) DEVICE — ESU PENCIL W/ROCKER SWITCH BLADE HOLSTER E2350HDB

## (undated) DEVICE — KIT PREP BRIDGE 591-001

## (undated) DEVICE — DEVICE LLD EZ LEAD LOCKING STYLET SPECTRANETICS 518-062

## (undated) DEVICE — WIPES FOLEY CARE SURESTEP PROVON DFC100

## (undated) DEVICE — DRSG PRIMAPORE 02X3" 7133

## (undated) DEVICE — DRAPE C-ARM W/STRAPS 42X72" 07-CA104

## (undated) DEVICE — INTRODUCER SAFESHEATH II LONG 9FR 23 CM SSL9

## (undated) DEVICE — PACK ADULT HEART UMMC PV15CG92D

## (undated) DEVICE — CLIPPER LEAD WIRE EXTRACTION LR-CLP001 G20003

## (undated) DEVICE — DRSG PRIMAPORE 03 1/8X6" 66000318

## (undated) DEVICE — ESU ELEC BLADE E-SEP INSULATED NEPTUNE 70MM 0703-070-002

## (undated) DEVICE — SU FIBERWIRE 0 38" BLUE 22.2MM W/TAPER NDL  AR-7250

## (undated) DEVICE — NDL BLUNT 18GA 1" W/O FILTER 305181

## (undated) DEVICE — SHEATH KIT 14FR LASER SPECTRANETICS 500-302

## (undated) DEVICE — KIT ACCESSORY DEVICE LLD SPECTRANETICS 518-027

## (undated) DEVICE — LINEN TOWEL PACK X6 WHITE 5487

## (undated) DEVICE — SU VICRYL 3-0 SH 27" UND J416H

## (undated) DEVICE — NDL 25GA 2"  8881200441

## (undated) RX ORDER — HEPARIN SODIUM 1000 [USP'U]/ML
INJECTION, SOLUTION INTRAVENOUS; SUBCUTANEOUS
Status: DISPENSED
Start: 2022-01-01

## (undated) RX ORDER — IOPAMIDOL 755 MG/ML
INJECTION, SOLUTION INTRAVASCULAR
Status: DISPENSED
Start: 2022-01-01

## (undated) RX ORDER — FENTANYL CITRATE 50 UG/ML
INJECTION, SOLUTION INTRAMUSCULAR; INTRAVENOUS
Status: DISPENSED
Start: 2022-01-01

## (undated) RX ORDER — FENTANYL CITRATE 50 UG/ML
INJECTION, SOLUTION INTRAMUSCULAR; INTRAVENOUS
Status: DISPENSED
Start: 2018-01-31

## (undated) RX ORDER — LIDOCAINE HYDROCHLORIDE 20 MG/ML
INJECTION, SOLUTION EPIDURAL; INFILTRATION; INTRACAUDAL; PERINEURAL
Status: DISPENSED
Start: 2022-01-01

## (undated) RX ORDER — LIDOCAINE HYDROCHLORIDE 40 MG/ML
INJECTION, SOLUTION RETROBULBAR
Status: DISPENSED
Start: 2017-02-03

## (undated) RX ORDER — CLINDAMYCIN PHOSPHATE 900 MG/50ML
INJECTION, SOLUTION INTRAVENOUS
Status: DISPENSED
Start: 2022-01-01

## (undated) RX ORDER — SODIUM CHLORIDE 9 MG/ML
INJECTION, SOLUTION INTRAVENOUS
Status: DISPENSED
Start: 2018-01-31

## (undated) RX ORDER — ONDANSETRON 2 MG/ML
INJECTION INTRAMUSCULAR; INTRAVENOUS
Status: DISPENSED
Start: 2022-01-01

## (undated) RX ORDER — REGADENOSON 0.08 MG/ML
INJECTION, SOLUTION INTRAVENOUS
Status: DISPENSED
Start: 2019-07-26

## (undated) RX ORDER — PROPOFOL 10 MG/ML
INJECTION, EMULSION INTRAVENOUS
Status: DISPENSED
Start: 2022-01-01

## (undated) RX ORDER — REGADENOSON 0.08 MG/ML
INJECTION, SOLUTION INTRAVENOUS
Status: DISPENSED
Start: 2023-01-01

## (undated) RX ORDER — LIDOCAINE HYDROCHLORIDE 10 MG/ML
INJECTION, SOLUTION EPIDURAL; INFILTRATION; INTRACAUDAL; PERINEURAL
Status: DISPENSED
Start: 2022-01-01

## (undated) RX ORDER — IODIXANOL 320 MG/ML
INJECTION, SOLUTION INTRAVASCULAR
Status: DISPENSED
Start: 2022-01-01

## (undated) RX ORDER — ALBUTEROL SULFATE 90 UG/1
AEROSOL, METERED RESPIRATORY (INHALATION)
Status: DISPENSED
Start: 2022-01-01

## (undated) RX ORDER — NITROGLYCERIN 0.4 MG/1
TABLET SUBLINGUAL
Status: DISPENSED
Start: 2022-01-01

## (undated) RX ORDER — VANCOMYCIN HYDROCHLORIDE 1 G/20ML
INJECTION, POWDER, LYOPHILIZED, FOR SOLUTION INTRAVENOUS
Status: DISPENSED
Start: 2022-01-01

## (undated) RX ORDER — LIDOCAINE HYDROCHLORIDE 10 MG/ML
INJECTION, SOLUTION EPIDURAL; INFILTRATION; INTRACAUDAL; PERINEURAL
Status: DISPENSED
Start: 2018-01-31

## (undated) RX ORDER — AMINOPHYLLINE 25 MG/ML
INJECTION, SOLUTION INTRAVENOUS
Status: DISPENSED
Start: 2023-01-01

## (undated) RX ORDER — LIDOCAINE HYDROCHLORIDE AND EPINEPHRINE 10; 10 MG/ML; UG/ML
INJECTION, SOLUTION INFILTRATION; PERINEURAL
Status: DISPENSED
Start: 2022-01-01